# Patient Record
Sex: MALE | Race: BLACK OR AFRICAN AMERICAN | Employment: UNEMPLOYED | URBAN - METROPOLITAN AREA
[De-identification: names, ages, dates, MRNs, and addresses within clinical notes are randomized per-mention and may not be internally consistent; named-entity substitution may affect disease eponyms.]

---

## 2018-10-11 ENCOUNTER — OFFICE VISIT (OUTPATIENT)
Dept: INTERNAL MEDICINE CLINIC | Age: 53
End: 2018-10-11

## 2018-10-11 VITALS
HEART RATE: 91 BPM | DIASTOLIC BLOOD PRESSURE: 83 MMHG | RESPIRATION RATE: 16 BRPM | HEIGHT: 68 IN | TEMPERATURE: 98.1 F | BODY MASS INDEX: 29.4 KG/M2 | SYSTOLIC BLOOD PRESSURE: 149 MMHG | OXYGEN SATURATION: 99 % | WEIGHT: 194 LBS

## 2018-10-11 DIAGNOSIS — Z76.0 MEDICATION REFILL: ICD-10-CM

## 2018-10-11 DIAGNOSIS — R73.9 ELEVATED BLOOD SUGAR LEVEL: ICD-10-CM

## 2018-10-11 DIAGNOSIS — G62.9 NEUROPATHY: ICD-10-CM

## 2018-10-11 DIAGNOSIS — Z79.4 TYPE 2 DIABETES MELLITUS WITH DIABETIC POLYNEUROPATHY, WITH LONG-TERM CURRENT USE OF INSULIN (HCC): ICD-10-CM

## 2018-10-11 DIAGNOSIS — E11.42 TYPE 2 DIABETES MELLITUS WITH DIABETIC POLYNEUROPATHY, WITH LONG-TERM CURRENT USE OF INSULIN (HCC): ICD-10-CM

## 2018-10-11 DIAGNOSIS — I10 ESSENTIAL HYPERTENSION: ICD-10-CM

## 2018-10-11 DIAGNOSIS — Z76.89 ENCOUNTER TO ESTABLISH CARE WITH NEW DOCTOR: Primary | ICD-10-CM

## 2018-10-11 LAB
BILIRUB UR QL STRIP: NEGATIVE
GLUCOSE UR-MCNC: NORMAL MG/DL
HBA1C MFR BLD HPLC: 13.9 %
KETONES P FAST UR STRIP-MCNC: NEGATIVE MG/DL
PH UR STRIP: 6 [PH] (ref 4.6–8)
PROT UR QL STRIP: NORMAL
SP GR UR STRIP: 1.01 (ref 1–1.03)
UA UROBILINOGEN AMB POC: NORMAL (ref 0.2–1)
URINALYSIS CLARITY POC: CLEAR
URINALYSIS COLOR POC: YELLOW
URINE BLOOD POC: NORMAL
URINE LEUKOCYTES POC: NEGATIVE
URINE NITRITES POC: NEGATIVE

## 2018-10-11 RX ORDER — METFORMIN HYDROCHLORIDE 1000 MG/1
1000 TABLET ORAL 2 TIMES DAILY WITH MEALS
Qty: 180 TAB | Refills: 3 | Status: SHIPPED | OUTPATIENT
Start: 2018-10-11 | End: 2018-10-18 | Stop reason: ALTCHOICE

## 2018-10-11 RX ORDER — LISINOPRIL 10 MG/1
10 TABLET ORAL DAILY
Qty: 90 TAB | Refills: 3 | Status: SHIPPED | OUTPATIENT
Start: 2018-10-11 | End: 2019-01-21 | Stop reason: SDUPTHER

## 2018-10-11 RX ORDER — PREGABALIN 150 MG/1
150 CAPSULE ORAL 2 TIMES DAILY
Qty: 60 CAP | Refills: 1 | Status: SHIPPED | OUTPATIENT
Start: 2018-10-11 | End: 2019-01-21 | Stop reason: SDUPTHER

## 2018-10-11 RX ORDER — HUMAN INSULIN 100 [IU]/ML
INJECTION, SUSPENSION SUBCUTANEOUS
Refills: 1 | COMMUNITY
Start: 2018-09-25 | End: 2019-01-21 | Stop reason: SDUPTHER

## 2018-10-11 RX ORDER — GABAPENTIN 300 MG/1
CAPSULE ORAL
Refills: 0 | COMMUNITY
Start: 2018-09-25 | End: 2018-10-11

## 2018-10-11 RX ORDER — PEN NEEDLE, DIABETIC 29 G X1/2"
NEEDLE, DISPOSABLE MISCELLANEOUS
Refills: 5 | COMMUNITY
Start: 2018-09-25 | End: 2019-02-26 | Stop reason: SDUPTHER

## 2018-10-11 RX ORDER — PREGABALIN 75 MG/1
150 CAPSULE ORAL 2 TIMES DAILY
Qty: 60 CAP | Refills: 1 | Status: SHIPPED | OUTPATIENT
Start: 2018-10-11 | End: 2018-10-11 | Stop reason: SDUPTHER

## 2018-10-11 NOTE — PROGRESS NOTES
Chief Complaint   Patient presents with   [address] [place] [address]     1. Have you been to the ER, urgent care clinic since your last visit? Hospitalized since your last visit? Yes Elevated blood sugar    2. Have you seen or consulted any other health care providers outside of the [address] [place] [address] since your last visit? Include any pap smears or colon screening. No   PHQ over the last two weeks 10/11/2018   Little interest or pleasure in doing things Several days   Feeling down, depressed, irritable, or hopeless Several days   Total Score PHQ 2 2     Fall Risk Assessment, last 12 mths 10/11/2018   Able to walk? Yes   Fall in past 12 months? No     Abuse Screening Questionnaire 10/11/2018   Do you ever feel afraid of your partner? N   Are you in a relationship with someone who physically or mentally threatens you? N   Is it safe for you to go home?  Alma Stout

## 2018-10-11 NOTE — PATIENT INSTRUCTIONS
Type 2 Diabetes: Care Instructions  Your Care Instructions    Type 2 diabetes is a disease that develops when the body's tissues cannot use insulin properly. Over time, the pancreas cannot make enough insulin. Insulin is a hormone that helps the body's cells use sugar (glucose) for energy. It also helps the body store extra sugar in muscle, fat, and liver cells. Without insulin, the sugar cannot get into the cells to do its work. It stays in the blood instead. This can cause high blood sugar levels. A person has diabetes when the blood sugar stays too high too much of the time. Over time, diabetes can lead to diseases of the heart, blood vessels, nerves, kidneys, and eyes. You may be able to control your blood sugar by losing weight, eating a healthy diet, and getting daily exercise. You may also have to take insulin or other diabetes medicine. Follow-up care is a key part of your treatment and safety. Be sure to make and go to all appointments. Call your doctor if you are having problems. It's also a good idea to know your test results and keep a list of the medicines you take. How can you care for yourself at home? · Keep your blood sugar at a target level (which you set with your doctor). ¨ Eat a good diet that spreads carbohydrate throughout the day. Carbohydrate--the body's main source of fuel--affects blood sugar more than any other nutrient. Carbohydrate is in fruits, vegetables, milk, and yogurt. It also is in breads, cereals, vegetables such as potatoes and corn, and sugary foods such as candy and cakes. ¨ Aim for 30 minutes of exercise on most, preferably all, days of the week. Walking is a good choice. You also may want to do other activities, such as running, swimming, cycling, or playing tennis or team sports. If your doctor says it's okay, do muscle-strengthening exercises at least 2 times a week. ¨ Take your medicines exactly as prescribed.  Call your doctor if you think you are having a problem with your medicine. You will get more details on the specific medicines your doctor prescribes. · Check your blood sugar as often as your doctor recommends. It is important to keep track of any symptoms you have, such as low blood sugar. Also tell your doctor if you have any changes in your activities, diet, or insulin use. · Talk to your doctor before you start taking aspirin every day. Aspirin can help certain people lower their risk of a heart attack or stroke. But taking aspirin isn't right for everyone, because it can cause serious bleeding. · Do not smoke. If you need help quitting, talk to your doctor about stop-smoking programs and medicines. These can increase your chances of quitting for good. · Keep your cholesterol and blood pressure at normal levels. You may need to take one or more medicines to reach your goals. Take them exactly as directed. Do not stop or change a medicine without talking to your doctor first.  When should you call for help? Call 911 anytime you think you may need emergency care. For example, call if:    · You passed out (lost consciousness), or you suddenly become very sleepy or confused. (You may have very low blood sugar.)    Call your doctor now or seek immediate medical care if:    · Your blood sugar is 300 mg/dL or is higher than the level your doctor has set for you.     · You have symptoms of low blood sugar, such as:  ¨ Sweating. ¨ Feeling nervous, shaky, and weak. ¨ Extreme hunger and slight nausea. ¨ Dizziness and headache. ¨ Blurred vision. ¨ Confusion.    Watch closely for changes in your health, and be sure to contact your doctor if:    · You often have problems controlling your blood sugar.     · You have symptoms of long-term diabetes problems, such as:  ¨ New vision changes. ¨ New pain, numbness, or tingling in your hands or feet. ¨ Skin problems. Where can you learn more? Go to http://maciel-zack.info/.   Enter C553 in the search box to learn more about \"Type 2 Diabetes: Care Instructions. \"  Current as of: December 7, 2017  Content Version: 11.8  © 6290-4077 [employer]. Care instructions adapted under license by [employer] (which disclaims liability or warranty for this information). If you have questions about a medical condition or this instruction, always ask your healthcare professional. [address] any warranty or liability for your use of this information. High Blood Pressure: Care Instructions  Your Care Instructions    If your blood pressure is usually above 130/80, you have high blood pressure, or hypertension. That means the top number is 130 or higher or the bottom number is 80 or higher, or both. Despite what a lot of people think, high blood pressure usually doesn't cause headaches or make you feel dizzy or lightheaded. It usually has no symptoms. But it does increase your risk for heart attack, stroke, and kidney or eye damage. The higher your blood pressure, the more your risk increases. Your doctor will give you a goal for your blood pressure. Your goal will be based on your health and your age. Lifestyle changes, such as eating healthy and being active, are always important to help lower blood pressure. You might also take medicine to reach your blood pressure goal.  Follow-up care is a key part of your treatment and safety. Be sure to make and go to all appointments, and call your doctor if you are having problems. It's also a good idea to know your test results and keep a list of the medicines you take. How can you care for yourself at home? Medical treatment  · If you stop taking your medicine, your blood pressure will go back up. You may take one or more types of medicine to lower your blood pressure. Be safe with medicines. Take your medicine exactly as prescribed. Call your doctor if you think you are having a problem with your medicine.   · Talk to your doctor before you start taking aspirin every day. Aspirin can help certain people lower their risk of a heart attack or stroke. But taking aspirin isn't right for everyone, because it can cause serious bleeding. · See your doctor regularly. You may need to see the doctor more often at first or until your blood pressure comes down. · If you are taking blood pressure medicine, talk to your doctor before you take decongestants or anti-inflammatory medicine, such as ibuprofen. Some of these medicines can raise blood pressure. · Learn how to check your blood pressure at home. Lifestyle changes  · Stay at a healthy weight. This is especially important if you put on weight around the waist. Losing even 10 pounds can help you lower your blood pressure. · If your doctor recommends it, get more exercise. Walking is a good choice. Bit by bit, increase the amount you walk every day. Try for at least 30 minutes on most days of the week. You also may want to swim, bike, or do other activities. · Avoid or limit alcohol. Talk to your doctor about whether you can drink any alcohol. · Try to limit how much sodium you eat to less than 2,300 milligrams (mg) a day. Your doctor may ask you to try to eat less than 1,500 mg a day. · Eat plenty of fruits (such as bananas and oranges), vegetables, legumes, whole grains, and low-fat dairy products. · Lower the amount of saturated fat in your diet. Saturated fat is found in animal products such as milk, cheese, and meat. Limiting these foods may help you lose weight and also lower your risk for heart disease. · Do not smoke. Smoking increases your risk for heart attack and stroke. If you need help quitting, talk to your doctor about stop-smoking programs and medicines. These can increase your chances of quitting for good. When should you call for help? Call 911 anytime you think you may need emergency care.  This may mean having symptoms that suggest that your blood pressure is causing a serious heart or blood vessel problem. Your blood pressure may be over 180/120.   For example, call 911 if:    · You have symptoms of a heart attack. These may include:  ¨ Chest pain or pressure, or a strange feeling in the chest.  ¨ Sweating. ¨ Shortness of breath. ¨ Nausea or vomiting. ¨ Pain, pressure, or a strange feeling in the back, neck, jaw, or upper belly or in one or both shoulders or arms. ¨ Lightheadedness or sudden weakness. ¨ A fast or irregular heartbeat.     · You have symptoms of a stroke. These may include:  ¨ Sudden numbness, tingling, weakness, or loss of movement in your face, arm, or leg, especially on only one side of your body. ¨ Sudden vision changes. ¨ Sudden trouble speaking. ¨ Sudden confusion or trouble understanding simple statements. ¨ Sudden problems with walking or balance. ¨ A sudden, severe headache that is different from past headaches.     · You have severe back or belly pain.    Do not wait until your blood pressure comes down on its own. Get help right away.   Call your doctor now or seek immediate care if:    · Your blood pressure is much higher than normal (such as 180/120 or higher), but you don't have symptoms.     · You think high blood pressure is causing symptoms, such as:  ¨ Severe headache. ¨ Blurry vision.    Watch closely for changes in your health, and be sure to contact your doctor if:    · Your blood pressure measures higher than your doctor recommends at least 2 times. That means the top number is higher or the bottom number is higher, or both.     · You think you may be having side effects from your blood pressure medicine. Where can you learn more? Go to http://maciel-zack.info/. Enter P143 in the search box to learn more about \"High Blood Pressure: Care Instructions. \"  Current as of: December 6, 2017  Content Version: 11.8  © 6951-2085 [employer].  Care instructions adapted under license by [facility] (which disclaims liability or warranty for this information). If you have questions about a medical condition or this instruction, always ask your healthcare professional. [address] any warranty or liability for your use of this information. Neuropathic Pain: Care Instructions  Your Care Instructions    Neuropathic pain is caused by pressure on or damage to your nerves. It's often simply called nerve pain. Some people feel this type of pain all the time. For others, it comes and goes. Diabetes, shingles, or an injury can cause nerve pain. Many people say the pain feels sharp, burning, or stabbing. But some people feel it as a dull ache. In some cases, it makes your skin very sensitive. So touch, pressure, and other sensations that did not hurt before may now cause pain. It's important to know that this kind of pain is real and can affect your quality of life. It's also important to know that treatment can help. Treatment includes pain medicines, exercise, and physical therapy. Medicines can help reduce the number of pain signals that travel over the nerves. This can make the painful areas less sensitive. It can also help you sleep better and improve your mood. But medicines are only one part of successful treatment. Most people do best with more than one kind of treatment. Your doctor may recommend that you try cognitive-behavioral therapy and stress management. Or, if needed, you may decide to try to quit smoking, lower your blood pressure, or better control blood sugar. These kinds of healthy changes can also make a difference. If you feel that your treatment is not working, talk to your doctor. And be sure to tell your doctor if you think you might be depressed or anxious. These are common problems that can also be treated. Follow-up care is a key part of your treatment and safety.  Be sure to make and go to all appointments, and call your doctor if you are having problems. It's also a good idea to know your test results and keep a list of the medicines you take. How can you care for yourself at home? · Be safe with medicines. Read and follow all instructions on the label. ¨ If the doctor gave you a prescription medicine for pain, take it as prescribed. ¨ If you are not taking a prescription pain medicine, ask your doctor if you can take an over-the-counter medicine. · Save hard tasks for days when you have less pain. Follow a hard task with an easy task. And remember to take breaks. · Relax, and reduce stress. You may want to try deep breathing or meditation. These can help. · Keep moving. Gentle, daily exercise can help reduce pain. Your doctor or physical therapist can tell you what type of exercise is best for you. This may include walking, swimming, and stationary biking. It may also include stretches and range-of-motion exercises. · Try heat, cold packs, and massage. · Get enough sleep. Constant pain can make you more tired. If the pain makes it hard to sleep, talk with your doctor. · Think positively. Your thoughts can affect your pain. Do fun things to distract yourself from the pain. See a movie, read a book, listen to music, or spend time with a friend. · Keep a pain diary. Try to write down how strong your pain is and what it feels like. Also try to notice and write down how your moods, thoughts, sleep, activities, and medicine affect your pain. These notes can help you and your doctor find the best ways to treat your pain. Reducing constipation caused by pain medicine  Pain medicines often cause constipation. To reduce constipation:  · Include fruits, vegetables, beans, and whole grains in your diet each day. These foods are high in fiber. · Drink plenty of fluids, enough so that your urine is light yellow or clear like water.  If you have kidney, heart, or liver disease and have to limit fluids, talk with your doctor before you increase the amount of fluids you drink. · Get some exercise every day. Build up slowly to 30 to 60 minutes a day on 5 or more days of the week. · Take a fiber supplement, such as Citrucel or Metamucil, every day if needed. Read and follow all instructions on the label. · Schedule time each day for a bowel movement. Having a daily routine may help. Take your time and do not strain when having a bowel movement. · Ask your doctor about a laxative. The goal is to have one easy bowel movement every 1 to 2 days. Do not let constipation go untreated for more than 3 days. When should you call for help? Call your doctor now or seek immediate medical care if:    · You feel sad, anxious, or hopeless for more than a few days. This could mean you are depressed. Depression is common in people who have a lot of pain. But it can be treated.     · You have trouble with bowel movements, such as:  ¨ No bowel movement in 3 days. ¨ Blood in the anal area, in your stool, or on the toilet paper. ¨ Diarrhea for more than 24 hours.    Watch closely for changes in your health, and be sure to contact your doctor if:    · Your pain is getting worse.     · You can't sleep because of pain.     · You are very worried or anxious about your pain.     · You have trouble taking your pain medicine.     · You have any concerns about your pain medicine or its side effects.     · You have vomiting or cramps for more than 2 hours. Where can you learn more? Go to http://maciel-zack.info/. Enter B081 in the search box to learn more about \"Neuropathic Pain: Care Instructions. \"  Current as of: June 4, 2018  Content Version: 11.8  © 3578-5437 [employer]. Care instructions adapted under license by [employer] (which disclaims liability or warranty for this information).  If you have questions about a medical condition or this instruction, always ask your healthcare professional. Lacy Rincon disclaims any warranty or liability for your use of this information.

## 2018-10-11 NOTE — MR AVS SNAPSHOT
Mariana Manito 
 
 
 [address] [facility] [address] [address] 
[phone] Patient: Ivanna Fernandez MRN: [id number] :1965 Visit Information Date & Time Provider Department Dept. Phone Encounter #  
 10/11/2018  9:45 AM Michelle Simon NP [place] [id number] [address] [facility] [phone] [id number] Follow-up Instructions Return in about 1 day (around 10/12/2018), or if symptoms worsen or fail to improve, for physical exam and labs. Upcoming Health Maintenance Date Due DTaP/Tdap/Td series (1 - Tdap) 1987 Shingrix Vaccine Age 50> (1 of 2) 2016 FOBT Q 1 YEAR AGE 50-75 2016 Influenza Age 5 to Adult 3/31/2019* *Topic was postponed. The date shown is not the original due date. Allergies as of 10/11/2018  Review Complete On: 10/11/2018 By: Michelle Simon NP No Known Allergies Current Immunizations  Never Reviewed No immunizations on file. Not reviewed this visit You Were Diagnosed With   
  
 Codes Comments Encounter to establish care with new doctor    -  Primary ICD-10-CM: Z76.89 
ICD-9-CM: V65.8 Elevated blood sugar level     ICD-10-CM: R73.9 ICD-9-CM: 790.29 Type 2 diabetes mellitus with diabetic polyneuropathy, with long-term current use of insulin (HCC)     ICD-10-CM: E11.42, Z79.4 ICD-9-CM: 250.60, 357.2, V58.67 Essential hypertension     ICD-10-CM: I10 
ICD-9-CM: 401.9 Neuropathy     ICD-10-CM: G62.9 ICD-9-CM: 355.9 Medication refill     ICD-10-CM: Z76.0 ICD-9-CM: V68.1 Vitals BP Pulse Temp Resp Height(growth percentile) Weight(growth percentile) 149/83 (BP 1 Location: Left arm, BP Patient Position: Sitting) 91 98.1 °F (36.7 °C) (Oral) 16 5' 8\" (1.727 m) 194 lb (88 kg) SpO2 BMI Smoking Status 99% 29.5 kg/m2 Current Some Day Smoker Vitals History BMI and BSA Data Body Mass Index Body Surface Area 29.5 kg/m 2 2.05 m 2 Preferred Pharmacy Pharmacy Name Phone [address] [phone] Your Updated Medication List  
  
   
This list is accurate as of 10/11/18 11:19 AM.  Always use your most recent med list.  
  
  
  
  
 BD INSULIN SYRINGE ULTRA-FINE 1 mL 31 gauge x 5/16 Syrg Generic drug:  Insulin Syringe-Needle U-100  
USE AS DIRECTED FOR INJECTING INSULIN  
  
 lisinopril 10 mg tablet Commonly known as:  Perez Peppers Take 1 Tab by mouth daily. metFORMIN 1,000 mg tablet Commonly known as:  GLUCOPHAGE Take 1 Tab by mouth two (2) times daily (with meals). NovoLIN N NPH U-100 Insulin 100 unit/mL injection Generic drug:  insulin NPH INJECT 33 UNITS UNDER THE SKIN BEFORE MEALS THREE TIMES DAILY AS DIRECTED.  
  
 pregabalin 150 mg capsule Commonly known as:  Tatianae Lions Take 1 Cap by mouth two (2) times a day. Max Daily Amount: 300 mg. Prescriptions Printed Refills  
 pregabalin (LYRICA) 150 mg capsule 1 Sig: Take 1 Cap by mouth two (2) times a day. Max Daily Amount: 300 mg. Class: Print Route: Oral  
  
Prescriptions Sent to Pharmacy Refills  
 lisinopril (PRINIVIL, ZESTRIL) 10 mg tablet 3 Sig: Take 1 Tab by mouth daily. Class: Normal  
 Pharmacy: [address] Ph #: [phone] Route: Oral  
 metFORMIN (GLUCOPHAGE) 1,000 mg tablet 3 Sig: Take 1 Tab by mouth two (2) times daily (with meals). Class: Normal  
 Pharmacy: [address] Ph #: [phone] Route: Oral  
  
We Performed the Following AMB POC HEMOGLOBIN A1C [46201 CPT(R)] AMB POC URINALYSIS DIP STICK AUTO W/O MICRO [85903 CPT(R)] Follow-up Instructions Return in about 1 day (around 10/12/2018), or if symptoms worsen or fail to improve, for physical exam and labs. Patient Instructions Type 2 Diabetes: Care Instructions Your Care Instructions Type 2 diabetes is a disease that develops when the body's tissues cannot use insulin properly. Over time, the pancreas cannot make enough insulin. Insulin is a hormone that helps the body's cells use sugar (glucose) for energy. It also helps the body store extra sugar in muscle, fat, and liver cells. Without insulin, the sugar cannot get into the cells to do its work. It stays in the blood instead. This can cause high blood sugar levels. A person has diabetes when the blood sugar stays too high too much of the time. Over time, diabetes can lead to diseases of the heart, blood vessels, nerves, kidneys, and eyes. You may be able to control your blood sugar by losing weight, eating a healthy diet, and getting daily exercise. You may also have to take insulin or other diabetes medicine. Follow-up care is a key part of your treatment and safety. Be sure to make and go to all appointments. Call your doctor if you are having problems. It's also a good idea to know your test results and keep a list of the medicines you take. How can you care for yourself at home? · Keep your blood sugar at a target level (which you set with your doctor). ¨ Eat a good diet that spreads carbohydrate throughout the day. Carbohydratethe body's main source of fuelaffects blood sugar more than any other nutrient. Carbohydrate is in fruits, vegetables, milk, and yogurt. It also is in breads, cereals, vegetables such as potatoes and corn, and sugary foods such as candy and cakes. ¨ Aim for 30 minutes of exercise on most, preferably all, days of the week. Walking is a good choice. You also may want to do other activities, such as running, swimming, cycling, or playing tennis or team sports. If your doctor says it's okay, do muscle-strengthening exercises at least 2 times a week. ¨ Take your medicines exactly as prescribed.  Call your doctor if you think you are having a problem with your medicine. You will get more details on the specific medicines your doctor prescribes. · Check your blood sugar as often as your doctor recommends. It is important to keep track of any symptoms you have, such as low blood sugar. Also tell your doctor if you have any changes in your activities, diet, or insulin use. · Talk to your doctor before you start taking aspirin every day. Aspirin can help certain people lower their risk of a heart attack or stroke. But taking aspirin isn't right for everyone, because it can cause serious bleeding. · Do not smoke. If you need help quitting, talk to your doctor about stop-smoking programs and medicines. These can increase your chances of quitting for good. · Keep your cholesterol and blood pressure at normal levels. You may need to take one or more medicines to reach your goals. Take them exactly as directed. Do not stop or change a medicine without talking to your doctor first. 
When should you call for help? Call 911 anytime you think you may need emergency care. For example, call if: 
  · You passed out (lost consciousness), or you suddenly become very sleepy or confused. (You may have very low blood sugar.)  
 Call your doctor now or seek immediate medical care if: 
  · Your blood sugar is 300 mg/dL or is higher than the level your doctor has set for you.  
  · You have symptoms of low blood sugar, such as: ¨ Sweating. ¨ Feeling nervous, shaky, and weak. ¨ Extreme hunger and slight nausea. ¨ Dizziness and headache. ¨ Blurred vision. ¨ Confusion.  
 Watch closely for changes in your health, and be sure to contact your doctor if: 
  · You often have problems controlling your blood sugar.  
  · You have symptoms of long-term diabetes problems, such as: ¨ New vision changes. ¨ New pain, numbness, or tingling in your hands or feet. ¨ Skin problems. Where can you learn more? Go to http://maciel-zack.info/. Enter C553 in the search box to learn more about \"Type 2 Diabetes: Care Instructions. \" Current as of: December 7, 2017 Content Version: 11.8 © 7839-1096 [employer]. Care instructions adapted under license by [employer] (which disclaims liability or warranty for this information). If you have questions about a medical condition or this instruction, always ask your healthcare professional. [address] any warranty or liability for your use of this information. High Blood Pressure: Care Instructions Your Care Instructions If your blood pressure is usually above 130/80, you have high blood pressure, or hypertension. That means the top number is 130 or higher or the bottom number is 80 or higher, or both. Despite what a lot of people think, high blood pressure usually doesn't cause headaches or make you feel dizzy or lightheaded. It usually has no symptoms. But it does increase your risk for heart attack, stroke, and kidney or eye damage. The higher your blood pressure, the more your risk increases. Your doctor will give you a goal for your blood pressure. Your goal will be based on your health and your age. Lifestyle changes, such as eating healthy and being active, are always important to help lower blood pressure. You might also take medicine to reach your blood pressure goal. 
Follow-up care is a key part of your treatment and safety. Be sure to make and go to all appointments, and call your doctor if you are having problems. It's also a good idea to know your test results and keep a list of the medicines you take. How can you care for yourself at home? Medical treatment · If you stop taking your medicine, your blood pressure will go back up. You may take one or more types of medicine to lower your blood pressure. Be safe with medicines. Take your medicine exactly as prescribed.  Call your doctor if you think you are having a problem with your medicine. · Talk to your doctor before you start taking aspirin every day. Aspirin can help certain people lower their risk of a heart attack or stroke. But taking aspirin isn't right for everyone, because it can cause serious bleeding. · See your doctor regularly. You may need to see the doctor more often at first or until your blood pressure comes down. · If you are taking blood pressure medicine, talk to your doctor before you take decongestants or anti-inflammatory medicine, such as ibuprofen. Some of these medicines can raise blood pressure. · Learn how to check your blood pressure at home. Lifestyle changes · Stay at a healthy weight. This is especially important if you put on weight around the waist. Losing even 10 pounds can help you lower your blood pressure. · If your doctor recommends it, get more exercise. Walking is a good choice. Bit by bit, increase the amount you walk every day. Try for at least 30 minutes on most days of the week. You also may want to swim, bike, or do other activities. · Avoid or limit alcohol. Talk to your doctor about whether you can drink any alcohol. · Try to limit how much sodium you eat to less than 2,300 milligrams (mg) a day. Your doctor may ask you to try to eat less than 1,500 mg a day. · Eat plenty of fruits (such as bananas and oranges), vegetables, legumes, whole grains, and low-fat dairy products. · Lower the amount of saturated fat in your diet. Saturated fat is found in animal products such as milk, cheese, and meat. Limiting these foods may help you lose weight and also lower your risk for heart disease. · Do not smoke. Smoking increases your risk for heart attack and stroke. If you need help quitting, talk to your doctor about stop-smoking programs and medicines. These can increase your chances of quitting for good. When should you call for help? Call 911 anytime you think you may need emergency care. This may mean having symptoms that suggest that your blood pressure is causing a serious heart or blood vessel problem. Your blood pressure may be over 180/120. 
 For example, call 911 if: 
  · You have symptoms of a heart attack. These may include: ¨ Chest pain or pressure, or a strange feeling in the chest. 
¨ Sweating. ¨ Shortness of breath. ¨ Nausea or vomiting. ¨ Pain, pressure, or a strange feeling in the back, neck, jaw, or upper belly or in one or both shoulders or arms. ¨ Lightheadedness or sudden weakness. ¨ A fast or irregular heartbeat.  
  · You have symptoms of a stroke. These may include: 
¨ Sudden numbness, tingling, weakness, or loss of movement in your face, arm, or leg, especially on only one side of your body. ¨ Sudden vision changes. ¨ Sudden trouble speaking. ¨ Sudden confusion or trouble understanding simple statements. ¨ Sudden problems with walking or balance. ¨ A sudden, severe headache that is different from past headaches.  
  · You have severe back or belly pain.  
 Do not wait until your blood pressure comes down on its own. Get help right away. 
 Call your doctor now or seek immediate care if: 
  · Your blood pressure is much higher than normal (such as 180/120 or higher), but you don't have symptoms.  
  · You think high blood pressure is causing symptoms, such as: ¨ Severe headache. ¨ Blurry vision.  
 Watch closely for changes in your health, and be sure to contact your doctor if: 
  · Your blood pressure measures higher than your doctor recommends at least 2 times. That means the top number is higher or the bottom number is higher, or both.  
  · You think you may be having side effects from your blood pressure medicine. Where can you learn more? Go to http://maciel-zack.info/. Enter L438 in the search box to learn more about \"High Blood Pressure: Care Instructions. \" 
 Current as of: December 6, 2017 Content Version: 11.8 © 2134-1476 [employer]. Care instructions adapted under license by [employer] (which disclaims liability or warranty for this information). If you have questions about a medical condition or this instruction, always ask your healthcare professional. [address] any warranty or liability for your use of this information. Neuropathic Pain: Care Instructions Your Care Instructions Neuropathic pain is caused by pressure on or damage to your nerves. It's often simply called nerve pain. Some people feel this type of pain all the time. For others, it comes and goes. Diabetes, shingles, or an injury can cause nerve pain. Many people say the pain feels sharp, burning, or stabbing. But some people feel it as a dull ache. In some cases, it makes your skin very sensitive. So touch, pressure, and other sensations that did not hurt before may now cause pain. It's important to know that this kind of pain is real and can affect your quality of life. It's also important to know that treatment can help. Treatment includes pain medicines, exercise, and physical therapy. Medicines can help reduce the number of pain signals that travel over the nerves. This can make the painful areas less sensitive. It can also help you sleep better and improve your mood. But medicines are only one part of successful treatment. Most people do best with more than one kind of treatment. Your doctor may recommend that you try cognitive-behavioral therapy and stress management. Or, if needed, you may decide to try to quit smoking, lower your blood pressure, or better control blood sugar. These kinds of healthy changes can also make a difference. If you feel that your treatment is not working, talk to your doctor.  And be sure to tell your doctor if you think you might be depressed or anxious. These are common problems that can also be treated. Follow-up care is a key part of your treatment and safety. Be sure to make and go to all appointments, and call your doctor if you are having problems. It's also a good idea to know your test results and keep a list of the medicines you take. How can you care for yourself at home? · Be safe with medicines. Read and follow all instructions on the label. ¨ If the doctor gave you a prescription medicine for pain, take it as prescribed. ¨ If you are not taking a prescription pain medicine, ask your doctor if you can take an over-the-counter medicine. · Save hard tasks for days when you have less pain. Follow a hard task with an easy task. And remember to take breaks. · Relax, and reduce stress. You may want to try deep breathing or meditation. These can help. · Keep moving. Gentle, daily exercise can help reduce pain. Your doctor or physical therapist can tell you what type of exercise is best for you. This may include walking, swimming, and stationary biking. It may also include stretches and range-of-motion exercises. · Try heat, cold packs, and massage. · Get enough sleep. Constant pain can make you more tired. If the pain makes it hard to sleep, talk with your doctor. · Think positively. Your thoughts can affect your pain. Do fun things to distract yourself from the pain. See a movie, read a book, listen to music, or spend time with a friend. · Keep a pain diary. Try to write down how strong your pain is and what it feels like. Also try to notice and write down how your moods, thoughts, sleep, activities, and medicine affect your pain. These notes can help you and your doctor find the best ways to treat your pain. Reducing constipation caused by pain medicine Pain medicines often cause constipation. To reduce constipation: 
· Include fruits, vegetables, beans, and whole grains in your diet each day. These foods are high in fiber. · Drink plenty of fluids, enough so that your urine is light yellow or clear like water. If you have kidney, heart, or liver disease and have to limit fluids, talk with your doctor before you increase the amount of fluids you drink. · Get some exercise every day. Build up slowly to 30 to 60 minutes a day on 5 or more days of the week. · Take a fiber supplement, such as Citrucel or Metamucil, every day if needed. Read and follow all instructions on the label. · Schedule time each day for a bowel movement. Having a daily routine may help. Take your time and do not strain when having a bowel movement. · Ask your doctor about a laxative. The goal is to have one easy bowel movement every 1 to 2 days. Do not let constipation go untreated for more than 3 days. When should you call for help? Call your doctor now or seek immediate medical care if: 
  · You feel sad, anxious, or hopeless for more than a few days. This could mean you are depressed. Depression is common in people who have a lot of pain. But it can be treated.  
  · You have trouble with bowel movements, such as: 
¨ No bowel movement in 3 days. ¨ Blood in the anal area, in your stool, or on the toilet paper. ¨ Diarrhea for more than 24 hours.  
 Watch closely for changes in your health, and be sure to contact your doctor if: 
  · Your pain is getting worse.  
  · You can't sleep because of pain.  
  · You are very worried or anxious about your pain.  
  · You have trouble taking your pain medicine.  
  · You have any concerns about your pain medicine or its side effects.  
  · You have vomiting or cramps for more than 2 hours. Where can you learn more? Go to http://maciel-zack.info/. Enter M141 in the search box to learn more about \"Neuropathic Pain: Care Instructions. \" Current as of: June 4, 2018 Content Version: 11.8 © 6465-8611 [employer].  Care instructions adapted under license by [address] (which disclaims liability or warranty for this information). If you have questions about a medical condition or this instruction, always ask your healthcare professional. [address] any warranty or liability for your use of this information. Introducing [facility]! [facility] introduces [email or web address] patient portal. Now you can access parts of your medical record, email your doctor's office, and request medication refills online. 1. In your internet browser, go to [email or web address] 2. Click on the First Time User? Click Here link in the Sign In box. You will see the New Member Sign Up page. 3. Enter your [email or web address] Access Code exactly as it appears below. You will not need to use this code after youve completed the sign-up process. If you do not sign up before the expiration date, you must request a new code. · [email or web address] Access Code: [email or web address] Expires: 1/9/2019 11:05 AM 
 
4. Enter the last four digits of your Social Security Number (xxxx) and Date of Birth (mm/dd/yyyy) as indicated and click Submit. You will be taken to the next sign-up page. 5. Create a [email or web address] ID. This will be your [email or web address] login ID and cannot be changed, so think of one that is secure and easy to remember. 6. Create a [email or web address] password. You can change your password at any time. 7. Enter your Password Reset Question and Answer. This can be used at a later time if you forget your password. 8. Enter your e-mail address. You will receive e-mail notification when new information is available in [address] 9. Click Sign Up. You can now view and download portions of your medical record. 10. Click the Download Summary menu link to download a portable copy of your medical information. If you have questions, please visit the Frequently Asked Questions section of the [email or web address] website.  Remember, [email or web address] is NOT to be used for urgent needs. For medical emergencies, dial 911. Now available from your iPhone and Android! Please provide this summary of care documentation to your next provider. If you have any questions after today's visit, please call [phone].
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
## 2018-10-18 ENCOUNTER — OFFICE VISIT (OUTPATIENT)
Dept: INTERNAL MEDICINE CLINIC | Age: 53
End: 2018-10-18

## 2018-10-18 VITALS
TEMPERATURE: 97.4 F | RESPIRATION RATE: 16 BRPM | BODY MASS INDEX: 29.73 KG/M2 | SYSTOLIC BLOOD PRESSURE: 129 MMHG | WEIGHT: 196.2 LBS | OXYGEN SATURATION: 98 % | DIASTOLIC BLOOD PRESSURE: 67 MMHG | HEART RATE: 101 BPM | HEIGHT: 68 IN

## 2018-10-18 DIAGNOSIS — E11.9 TYPE 2 DIABETES MELLITUS WITHOUT COMPLICATION, WITH LONG-TERM CURRENT USE OF INSULIN (HCC): ICD-10-CM

## 2018-10-18 DIAGNOSIS — Z00.00 HEALTHCARE MAINTENANCE: ICD-10-CM

## 2018-10-18 DIAGNOSIS — I10 ESSENTIAL HYPERTENSION: ICD-10-CM

## 2018-10-18 DIAGNOSIS — E11.42 TYPE 2 DIABETES MELLITUS WITH DIABETIC POLYNEUROPATHY, WITH LONG-TERM CURRENT USE OF INSULIN (HCC): ICD-10-CM

## 2018-10-18 DIAGNOSIS — Z79.4 TYPE 2 DIABETES MELLITUS WITH DIABETIC POLYNEUROPATHY, WITH LONG-TERM CURRENT USE OF INSULIN (HCC): ICD-10-CM

## 2018-10-18 DIAGNOSIS — Z00.00 ANNUAL PHYSICAL EXAM: Primary | ICD-10-CM

## 2018-10-18 DIAGNOSIS — M54.31 SCIATICA OF RIGHT SIDE: ICD-10-CM

## 2018-10-18 DIAGNOSIS — G62.9 NEUROPATHY: ICD-10-CM

## 2018-10-18 DIAGNOSIS — Z79.4 TYPE 2 DIABETES MELLITUS WITHOUT COMPLICATION, WITH LONG-TERM CURRENT USE OF INSULIN (HCC): ICD-10-CM

## 2018-10-18 DIAGNOSIS — Z12.11 SCREEN FOR COLON CANCER: ICD-10-CM

## 2018-10-18 LAB
CREATININE, EXTERNAL: 0.73
LDL-C, EXTERNAL: 145

## 2018-10-18 RX ORDER — DICLOFENAC SODIUM 75 MG/1
75 TABLET, DELAYED RELEASE ORAL 2 TIMES DAILY
Qty: 60 TAB | Refills: 3 | Status: SHIPPED | OUTPATIENT
Start: 2018-10-18 | End: 2019-01-21 | Stop reason: SDUPTHER

## 2018-10-18 RX ORDER — METFORMIN HYDROCHLORIDE 750 MG/1
750 TABLET, EXTENDED RELEASE ORAL
Qty: 30 TAB | Refills: 3 | Status: SHIPPED | OUTPATIENT
Start: 2018-10-18 | End: 2019-01-21 | Stop reason: SDUPTHER

## 2018-10-18 NOTE — PATIENT INSTRUCTIONS
Well Visit, Men 48 to 72: Care Instructions  Your Care Instructions    Physical exams can help you stay healthy. Your doctor has checked your overall health and may have suggested ways to take good care of yourself. He or she also may have recommended tests. At home, you can help prevent illness with healthy eating, regular exercise, and other steps. Follow-up care is a key part of your treatment and safety. Be sure to make and go to all appointments, and call your doctor if you are having problems. It's also a good idea to know your test results and keep a list of the medicines you take. How can you care for yourself at home? · Reach and stay at a healthy weight. This will lower your risk for many problems, such as obesity, diabetes, heart disease, and high blood pressure. · Get at least 30 minutes of exercise on most days of the week. Walking is a good choice. You also may want to do other activities, such as running, swimming, cycling, or playing tennis or team sports. · Do not smoke. Smoking can make health problems worse. If you need help quitting, talk to your doctor about stop-smoking programs and medicines. These can increase your chances of quitting for good. · Protect your skin from too much sun. When you're outdoors from 10 a.m. to 4 p.m., stay in the shade or cover up with clothing and a hat with a wide brim. Wear sunglasses that block UV rays. Even when it's cloudy, put broad-spectrum sunscreen (SPF 30 or higher) on any exposed skin. · See a dentist one or two times a year for checkups and to have your teeth cleaned. · Wear a seat belt in the car. · Limit alcohol to 2 drinks a day. Too much alcohol can cause health problems. Follow your doctor's advice about when to have certain tests. These tests can spot problems early. · Cholesterol.  Your doctor will tell you how often to have this done based on your overall health and other things that can increase your risk for heart attack and stroke. · Blood pressure. Have your blood pressure checked during a routine doctor visit. Your doctor will tell you how often to check your blood pressure based on your age, your blood pressure results, and other factors. · Prostate exam. Talk to your doctor about whether you should have a blood test (called a PSA test) for prostate cancer. Experts disagree on whether men should have this test. Some experts recommend that you discuss the benefits and risks of the test with your doctor. · Diabetes. Ask your doctor whether you should have tests for diabetes. · Vision. Some experts recommend that you have yearly exams for glaucoma and other age-related eye problems starting at age 48. · Hearing. Tell your doctor if you notice any change in your hearing. You can have tests to find out how well you hear. · Colon cancer. You should begin tests for colon cancer at age 48. You may have one of several tests. Your doctor will tell you how often to have tests based on your age and risk. Risks include whether you already had a precancerous polyp removed from your colon or whether your parent, brother, sister, or child has had colon cancer. · Heart attack and stroke risk. At least every 4 to 6 years, you should have your risk for heart attack and stroke assessed. Your doctor uses factors such as your age, blood pressure, cholesterol, and whether you smoke or have diabetes to show what your risk for a heart attack or stroke is over the next 10 years. · Abdominal aortic aneurysm. Ask your doctor whether you should have a test to check for an aneurysm. You may need a test if you ever smoked or if your parent, brother, sister, or child has had an aneurysm. When should you call for help? Watch closely for changes in your health, and be sure to contact your doctor if you have any problems or symptoms that concern you. Where can you learn more? Go to http://maciel-zack.info/.   Enter G821 in the search box to learn more about \"Well Visit, Men 48 to 72: Care Instructions. \"  Current as of: March 29, 2018  Content Version: 11.8  © 2639-6045 [employer]. Care instructions adapted under license by [employer] (which disclaims liability or warranty for this information). If you have questions about a medical condition or this instruction, always ask your healthcare professional. [address] any warranty or liability for your use of this information. Sciatica: Care Instructions  Your Care Instructions    Sciatica (say \"sci-HI-ae-kuh\") is an irritation of one of the sciatic nerves, which come from the spinal cord in the lower back. The sciatic nerves and their branches extend down through the buttock to the foot. Sciatica can develop when an injured disc in the back presses against a spinal nerve root. Its main symptom is pain, numbness, or weakness that is often worse in the leg or foot than in the back. Sciatica often will improve and go away with time. Early treatment usually includes medicines and exercises to relieve pain. Follow-up care is a key part of your treatment and safety. Be sure to make and go to all appointments, and call your doctor if you are having problems. It's also a good idea to know your test results and keep a list of the medicines you take. How can you care for yourself at home? · Take pain medicines exactly as directed. ? If the doctor gave you a prescription medicine for pain, take it as prescribed. ? If you are not taking a prescription pain medicine, ask your doctor if you can take an over-the-counter medicine. · Use heat or ice to relieve pain. ? To apply heat, put a warm water bottle, heating pad set on low, or warm cloth on your back. Do not go to sleep with a heating pad on your skin. ? To use ice, put ice or a cold pack on the area for 10 to 20 minutes at a time. Put a thin cloth between the ice and your skin.   · Avoid sitting if possible, unless it feels better than standing. · Alternate lying down with short walks. Increase your walking distance as you are able to without making your symptoms worse. · Do not do anything that makes your symptoms worse. When should you call for help? Call 911 anytime you think you may need emergency care. For example, call if:    · You are unable to move a leg at all.   [facility] your doctor now or seek immediate medical care if:    · You have new or worse symptoms in your legs or buttocks. Symptoms may include:  ? Numbness or tingling. ? Weakness. ? Pain.     · You lose bladder or bowel control.    Watch closely for changes in your health, and be sure to contact your doctor if:    · You are not getting better as expected. Where can you learn more? Go to http://maciel-zack.info/. Enter [phone] in the search box to learn more about \"Sciatica: Care Instructions. \"  Current as of: November 29, 2017  Content Version: 11.8  © 5091-6513 [employer]. Care instructions adapted under license by [employer] (which disclaims liability or warranty for this information). If you have questions about a medical condition or this instruction, always ask your healthcare professional. [place] [address] any warranty or liability for your use of this information. Neuropathic Pain: Care Instructions  Your Care Instructions    Neuropathic pain is caused by pressure on or damage to your nerves. It's often simply called nerve pain. Some people feel this type of pain all the time. For others, it comes and goes. Diabetes, shingles, or an injury can cause nerve pain. Many people say the pain feels sharp, burning, or stabbing. But some people feel it as a dull ache. In some cases, it makes your skin very sensitive. So touch, pressure, and other sensations that did not hurt before may now cause pain.   It's important to know that this kind of pain is real and can affect your quality of life. It's also important to know that treatment can help. Treatment includes pain medicines, exercise, and physical therapy. Medicines can help reduce the number of pain signals that travel over the nerves. This can make the painful areas less sensitive. It can also help you sleep better and improve your mood. But medicines are only one part of successful treatment. Most people do best with more than one kind of treatment. Your doctor may recommend that you try cognitive-behavioral therapy and stress management. Or, if needed, you may decide to try to quit smoking, lower your blood pressure, or better control blood sugar. These kinds of healthy changes can also make a difference. If you feel that your treatment is not working, talk to your doctor. And be sure to tell your doctor if you think you might be depressed or anxious. These are common problems that can also be treated. Follow-up care is a key part of your treatment and safety. Be sure to make and go to all appointments, and call your doctor if you are having problems. It's also a good idea to know your test results and keep a list of the medicines you take. How can you care for yourself at home? · Be safe with medicines. Read and follow all instructions on the label. ? If the doctor gave you a prescription medicine for pain, take it as prescribed. ? If you are not taking a prescription pain medicine, ask your doctor if you can take an over-the-counter medicine. · Save hard tasks for days when you have less pain. Follow a hard task with an easy task. And remember to take breaks. · Relax, and reduce stress. You may want to try deep breathing or meditation. These can help. · Keep moving. Gentle, daily exercise can help reduce pain. Your doctor or physical therapist can tell you what type of exercise is best for you. This may include walking, swimming, and stationary biking.  It may also include stretches and range-of-motion exercises. · Try heat, cold packs, and massage. · Get enough sleep. Constant pain can make you more tired. If the pain makes it hard to sleep, talk with your doctor. · Think positively. Your thoughts can affect your pain. Do fun things to distract yourself from the pain. See a movie, read a book, listen to music, or spend time with a friend. · Keep a pain diary. Try to write down how strong your pain is and what it feels like. Also try to notice and write down how your moods, thoughts, sleep, activities, and medicine affect your pain. These notes can help you and your doctor find the best ways to treat your pain. Reducing constipation caused by pain medicine  Pain medicines often cause constipation. To reduce constipation:  · Include fruits, vegetables, beans, and whole grains in your diet each day. These foods are high in fiber. · Drink plenty of fluids, enough so that your urine is light yellow or clear like water. If you have kidney, heart, or liver disease and have to limit fluids, talk with your doctor before you increase the amount of fluids you drink. · Get some exercise every day. Build up slowly to 30 to 60 minutes a day on 5 or more days of the week. · Take a fiber supplement, such as Citrucel or Metamucil, every day if needed. Read and follow all instructions on the label. · Schedule time each day for a bowel movement. Having a daily routine may help. Take your time and do not strain when having a bowel movement. · Ask your doctor about a laxative. The goal is to have one easy bowel movement every 1 to 2 days. Do not let constipation go untreated for more than 3 days. When should you call for help? Call your doctor now or seek immediate medical care if:    · You feel sad, anxious, or hopeless for more than a few days. This could mean you are depressed. Depression is common in people who have a lot of pain.  But it can be treated.     · You have trouble with bowel movements, such as:  ? No bowel movement in 3 days. ? Blood in the anal area, in your stool, or on the toilet paper. ? Diarrhea for more than 24 hours.    Watch closely for changes in your health, and be sure to contact your doctor if:    · Your pain is getting worse.     · You can't sleep because of pain.     · You are very worried or anxious about your pain.     · You have trouble taking your pain medicine.     · You have any concerns about your pain medicine or its side effects.     · You have vomiting or cramps for more than 2 hours. Where can you learn more? Go to http://maciel-zack.info/. Enter X789 in the search box to learn more about \"Neuropathic Pain: Care Instructions. \"  Current as of: June 4, 2018  Content Version: 11.8  © 5827-4717 [employer]. Care instructions adapted under license by [employer] (which disclaims liability or warranty for this information). If you have questions about a medical condition or this instruction, always ask your healthcare professional. [place] [address] any warranty or liability for your use of this information. High Blood Pressure: Care Instructions  Your Care Instructions    If your blood pressure is usually above 130/80, you have high blood pressure, or hypertension. That means the top number is 130 or higher or the bottom number is 80 or higher, or both. Despite what a lot of people think, high blood pressure usually doesn't cause headaches or make you feel dizzy or lightheaded. It usually has no symptoms. But it does increase your risk for heart attack, stroke, and kidney or eye damage. The higher your blood pressure, the more your risk increases. Your doctor will give you a goal for your blood pressure. Your goal will be based on your health and your age. Lifestyle changes, such as eating healthy and being active, are always important to help lower blood pressure.  You might also take medicine to reach your blood pressure goal.  Follow-up care is a key part of your treatment and safety. Be sure to make and go to all appointments, and call your doctor if you are having problems. It's also a good idea to know your test results and keep a list of the medicines you take. How can you care for yourself at home? Medical treatment  · If you stop taking your medicine, your blood pressure will go back up. You may take one or more types of medicine to lower your blood pressure. Be safe with medicines. Take your medicine exactly as prescribed. Call your doctor if you think you are having a problem with your medicine. · Talk to your doctor before you start taking aspirin every day. Aspirin can help certain people lower their risk of a heart attack or stroke. But taking aspirin isn't right for everyone, because it can cause serious bleeding. · See your doctor regularly. You may need to see the doctor more often at first or until your blood pressure comes down. · If you are taking blood pressure medicine, talk to your doctor before you take decongestants or anti-inflammatory medicine, such as ibuprofen. Some of these medicines can raise blood pressure. · Learn how to check your blood pressure at home. Lifestyle changes  · Stay at a healthy weight. This is especially important if you put on weight around the waist. Losing even 10 pounds can help you lower your blood pressure. · If your doctor recommends it, get more exercise. Walking is a good choice. Bit by bit, increase the amount you walk every day. Try for at least 30 minutes on most days of the week. You also may want to swim, bike, or do other activities. · Avoid or limit alcohol. Talk to your doctor about whether you can drink any alcohol. · Try to limit how much sodium you eat to less than 2,300 milligrams (mg) a day. Your doctor may ask you to try to eat less than 1,500 mg a day.   · Eat plenty of fruits (such as bananas and oranges), vegetables, legumes, whole grains, and low-fat dairy products. · Lower the amount of saturated fat in your diet. Saturated fat is found in animal products such as milk, cheese, and meat. Limiting these foods may help you lose weight and also lower your risk for heart disease. · Do not smoke. Smoking increases your risk for heart attack and stroke. If you need help quitting, talk to your doctor about stop-smoking programs and medicines. These can increase your chances of quitting for good. When should you call for help? Call 911 anytime you think you may need emergency care. This may mean having symptoms that suggest that your blood pressure is causing a serious heart or blood vessel problem. Your blood pressure may be over 180/120.   For example, call 911 if:    · You have symptoms of a heart attack. These may include:  ? Chest pain or pressure, or a strange feeling in the chest.  ? Sweating. ? Shortness of breath. ? Nausea or vomiting. ? Pain, pressure, or a strange feeling in the back, neck, jaw, or upper belly or in one or both shoulders or arms. ? Lightheadedness or sudden weakness. ? A fast or irregular heartbeat.     · You have symptoms of a stroke. These may include:  ? Sudden numbness, tingling, weakness, or loss of movement in your face, arm, or leg, especially on only one side of your body. ? Sudden vision changes. ? Sudden trouble speaking. ? Sudden confusion or trouble understanding simple statements. ? Sudden problems with walking or balance. ? A sudden, severe headache that is different from past headaches.     · You have severe back or belly pain.    Do not wait until your blood pressure comes down on its own.  Get help right away.   Call your doctor now or seek immediate care if:    · Your blood pressure is much higher than normal (such as 180/120 or higher), but you don't have symptoms.     · You think high blood pressure is causing symptoms, such as:  ? Severe headache.  ? Blurry vision.    Watch closely for changes in your health, and be sure to contact your doctor if:    · Your blood pressure measures higher than your doctor recommends at least 2 times. That means the top number is higher or the bottom number is higher, or both.     · You think you may be having side effects from your blood pressure medicine. Where can you learn more? Go to http://maciel-zack.info/. Enter Z431 in the search box to learn more about \"High Blood Pressure: Care Instructions. \"  Current as of: December 6, 2017  Content Version: 11.8  © 8082-2656 [employer]. Care instructions adapted under license by [employer] (which disclaims liability or warranty for this information). If you have questions about a medical condition or this instruction, always ask your healthcare professional. [address] any warranty or liability for your use of this information. Type 2 Diabetes: Care Instructions  Your Care Instructions    Type 2 diabetes is a disease that develops when the body's tissues cannot use insulin properly. Over time, the pancreas cannot make enough insulin. Insulin is a hormone that helps the body's cells use sugar (glucose) for energy. It also helps the body store extra sugar in muscle, fat, and liver cells. Without insulin, the sugar cannot get into the cells to do its work. It stays in the blood instead. This can cause high blood sugar levels. A person has diabetes when the blood sugar stays too high too much of the time. Over time, diabetes can lead to diseases of the heart, blood vessels, nerves, kidneys, and eyes. You may be able to control your blood sugar by losing weight, eating a healthy diet, and getting daily exercise. You may also have to take insulin or other diabetes medicine. Follow-up care is a key part of your treatment and safety. Be sure to make and go to all appointments. Call your doctor if you are having problems.  It's also a good idea to know your test results and keep a list of the medicines you take. How can you care for yourself at home? · Keep your blood sugar at a target level (which you set with your doctor). ? Eat a good diet that spreads carbohydrate throughout the day. Carbohydrate--the body's main source of fuel--affects blood sugar more than any other nutrient. Carbohydrate is in fruits, vegetables, milk, and yogurt. It also is in breads, cereals, vegetables such as potatoes and corn, and sugary foods such as candy and cakes. ? Aim for 30 minutes of exercise on most, preferably all, days of the week. Walking is a good choice. You also may want to do other activities, such as running, swimming, cycling, or playing tennis or team sports. If your doctor says it's okay, do muscle-strengthening exercises at least 2 times a week. ? Take your medicines exactly as prescribed. Call your doctor if you think you are having a problem with your medicine. You will get more details on the specific medicines your doctor prescribes. · Check your blood sugar as often as your doctor recommends. It is important to keep track of any symptoms you have, such as low blood sugar. Also tell your doctor if you have any changes in your activities, diet, or insulin use. · Talk to your doctor before you start taking aspirin every day. Aspirin can help certain people lower their risk of a heart attack or stroke. But taking aspirin isn't right for everyone, because it can cause serious bleeding. · Do not smoke. If you need help quitting, talk to your doctor about stop-smoking programs and medicines. These can increase your chances of quitting for good. · Keep your cholesterol and blood pressure at normal levels. You may need to take one or more medicines to reach your goals. Take them exactly as directed. Do not stop or change a medicine without talking to your doctor first.  When should you call for help?   Call 911 anytime you think you may need emergency care. For example, call if:    · You passed out (lost consciousness), or you suddenly become very sleepy or confused. (You may have very low blood sugar.)    Call your doctor now or seek immediate medical care if:    · Your blood sugar is 300 mg/dL or is higher than the level your doctor has set for you.     · You have symptoms of low blood sugar, such as:  ? Sweating. ? Feeling nervous, shaky, and weak. ? Extreme hunger and slight nausea. ? Dizziness and headache.  ? Blurred vision. ? Confusion.    Watch closely for changes in your health, and be sure to contact your doctor if:    · You often have problems controlling your blood sugar.     · You have symptoms of long-term diabetes problems, such as:  ? New vision changes. ? New pain, numbness, or tingling in your hands or feet. ? Skin problems. Where can you learn more? Go to http://maciel-zack.info/. Enter C553 in the search box to learn more about \"Type 2 Diabetes: Care Instructions. \"  Current as of: December 7, 2017  Content Version: 11.8  © 6045-8703 [employer]. Care instructions adapted under license by [employer] (which disclaims liability or warranty for this information). If you have questions about a medical condition or this instruction, always ask your healthcare professional. [place] [address] any warranty or liability for your use of this information.

## 2018-10-18 NOTE — PROGRESS NOTES
Chief Complaint   Patient presents with    Complete Physical    Labs   1. Have you been to the ER, urgent care clinic since your last visit? Hospitalized since your last visit? No    2. Have you seen or consulted any other health care providers outside of the [address] [place] [address] since your last visit? Include any pap smears or colon screening. No   PHQ over the last two weeks 10/11/2018   Little interest or pleasure in doing things Several days   Feeling down, depressed, irritable, or hopeless Several days   Total Score PHQ 2 2     Fall Risk Assessment, last 12 mths 10/11/2018   Able to walk? Yes   Fall in past 12 months? No     Abuse Screening Questionnaire 10/11/2018   Do you ever feel afraid of your partner? N   Are you in a relationship with someone who physically or mentally threatens you? N   Is it safe for you to go home?  Shan Stevenson
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
## 2018-10-26 ENCOUNTER — TELEPHONE (OUTPATIENT)
Dept: INTERNAL MEDICINE CLINIC | Age: 53
End: 2018-10-26

## 2018-10-26 NOTE — TELEPHONE ENCOUNTER
Unable to reach patient with number provided. AppointmentTis scheduled for pain management.  [phone] AT 1:00PM WITH THE ARRIVAL TIME OF 12:30PM IN THE [facility] 6TH FLOOR REMINDER WILL BE MAILED TO PATIENT PER [facility]

## 2019-01-21 ENCOUNTER — OFFICE VISIT (OUTPATIENT)
Dept: INTERNAL MEDICINE CLINIC | Age: 54
End: 2019-01-21

## 2019-01-21 VITALS
TEMPERATURE: 97.6 F | BODY MASS INDEX: 28.39 KG/M2 | RESPIRATION RATE: 16 BRPM | HEART RATE: 102 BPM | OXYGEN SATURATION: 99 % | HEIGHT: 68 IN | SYSTOLIC BLOOD PRESSURE: 150 MMHG | WEIGHT: 187.3 LBS | DIASTOLIC BLOOD PRESSURE: 80 MMHG

## 2019-01-21 DIAGNOSIS — G62.9 NEUROPATHY: ICD-10-CM

## 2019-01-21 DIAGNOSIS — Z79.4 TYPE 2 DIABETES MELLITUS WITH DIABETIC POLYNEUROPATHY, WITH LONG-TERM CURRENT USE OF INSULIN (HCC): Primary | ICD-10-CM

## 2019-01-21 DIAGNOSIS — M19.90 ARTHRITIS: ICD-10-CM

## 2019-01-21 DIAGNOSIS — R80.9 URINE TEST POSITIVE FOR MICROALBUMINURIA: ICD-10-CM

## 2019-01-21 DIAGNOSIS — L98.491 CALLOUS ULCER, LIMITED TO BREAKDOWN OF SKIN (HCC): ICD-10-CM

## 2019-01-21 DIAGNOSIS — E11.42 TYPE 2 DIABETES MELLITUS WITH DIABETIC POLYNEUROPATHY, WITH LONG-TERM CURRENT USE OF INSULIN (HCC): Primary | ICD-10-CM

## 2019-01-21 DIAGNOSIS — I10 ESSENTIAL HYPERTENSION: ICD-10-CM

## 2019-01-21 DIAGNOSIS — L60.0 ONYCHOCRYPTOSIS: ICD-10-CM

## 2019-01-21 LAB
HBA1C MFR BLD HPLC: 13.8 %
MICROALBUMIN UR TEST STR-MCNC: 150 MG/L
MICROALBUMIN/CREAT RATIO POC: >300 MG/G

## 2019-01-21 RX ORDER — METFORMIN HYDROCHLORIDE 750 MG/1
750 TABLET, EXTENDED RELEASE ORAL
Qty: 30 TAB | Refills: 3 | Status: SHIPPED | OUTPATIENT
Start: 2019-01-21 | End: 2019-05-30

## 2019-01-21 RX ORDER — LISINOPRIL 10 MG/1
10 TABLET ORAL DAILY
Qty: 90 TAB | Refills: 3 | Status: SHIPPED | OUTPATIENT
Start: 2019-01-21 | End: 2019-05-23 | Stop reason: SDUPTHER

## 2019-01-21 RX ORDER — HUMAN INSULIN 100 [IU]/ML
INJECTION, SUSPENSION SUBCUTANEOUS
Qty: 1 VIAL | Refills: 1 | Status: SHIPPED | OUTPATIENT
Start: 2019-01-21 | End: 2019-05-23

## 2019-01-21 RX ORDER — DICLOFENAC SODIUM 75 MG/1
75 TABLET, DELAYED RELEASE ORAL 2 TIMES DAILY
Qty: 60 TAB | Refills: 3 | Status: SHIPPED | OUTPATIENT
Start: 2019-01-21 | End: 2019-05-23 | Stop reason: SDUPTHER

## 2019-01-21 RX ORDER — PREGABALIN 150 MG/1
150 CAPSULE ORAL 2 TIMES DAILY
Qty: 60 CAP | Refills: 1 | Status: SHIPPED | OUTPATIENT
Start: 2019-01-21 | End: 2019-05-23 | Stop reason: SDUPTHER

## 2019-01-21 NOTE — PATIENT INSTRUCTIONS
Type 2 Diabetes: Care Instructions  Your Care Instructions    Type 2 diabetes is a disease that develops when the body's tissues cannot use insulin properly. Over time, the pancreas cannot make enough insulin. Insulin is a hormone that helps the body's cells use sugar (glucose) for energy. It also helps the body store extra sugar in muscle, fat, and liver cells. Without insulin, the sugar cannot get into the cells to do its work. It stays in the blood instead. This can cause high blood sugar levels. A person has diabetes when the blood sugar stays too high too much of the time. Over time, diabetes can lead to diseases of the heart, blood vessels, nerves, kidneys, and eyes. You may be able to control your blood sugar by losing weight, eating a healthy diet, and getting daily exercise. You may also have to take insulin or other diabetes medicine. Follow-up care is a key part of your treatment and safety. Be sure to make and go to all appointments. Call your doctor if you are having problems. It's also a good idea to know your test results and keep a list of the medicines you take. How can you care for yourself at home? · Keep your blood sugar at a target level (which you set with your doctor). ? Eat a good diet that spreads carbohydrate throughout the day. Carbohydrate--the body's main source of fuel--affects blood sugar more than any other nutrient. Carbohydrate is in fruits, vegetables, milk, and yogurt. It also is in breads, cereals, vegetables such as potatoes and corn, and sugary foods such as candy and cakes. ? Aim for 30 minutes of exercise on most, preferably all, days of the week. Walking is a good choice. You also may want to do other activities, such as running, swimming, cycling, or playing tennis or team sports. If your doctor says it's okay, do muscle-strengthening exercises at least 2 times a week. ? Take your medicines exactly as prescribed.  Call your doctor if you think you are having a problem with your medicine. You will get more details on the specific medicines your doctor prescribes. · Check your blood sugar as often as your doctor recommends. It is important to keep track of any symptoms you have, such as low blood sugar. Also tell your doctor if you have any changes in your activities, diet, or insulin use. · Talk to your doctor before you start taking aspirin every day. Aspirin can help certain people lower their risk of a heart attack or stroke. But taking aspirin isn't right for everyone, because it can cause serious bleeding. · Do not smoke. If you need help quitting, talk to your doctor about stop-smoking programs and medicines. These can increase your chances of quitting for good. · Keep your cholesterol and blood pressure at normal levels. You may need to take one or more medicines to reach your goals. Take them exactly as directed. Do not stop or change a medicine without talking to your doctor first.  When should you call for help? Call 911 anytime you think you may need emergency care. For example, call if:    · You passed out (lost consciousness), or you suddenly become very sleepy or confused. (You may have very low blood sugar.)    Call your doctor now or seek immediate medical care if:    · Your blood sugar is 300 mg/dL or is higher than the level your doctor has set for you.     · You have symptoms of low blood sugar, such as:  ? Sweating. ? Feeling nervous, shaky, and weak. ? Extreme hunger and slight nausea. ? Dizziness and headache.  ? Blurred vision. ? Confusion.    Watch closely for changes in your health, and be sure to contact your doctor if:    · You often have problems controlling your blood sugar.     · You have symptoms of long-term diabetes problems, such as:  ? New vision changes. ? New pain, numbness, or tingling in your hands or feet. ? Skin problems. Where can you learn more? Go to http://maciel-zack.info/.   Enter C553 in the search box to learn more about \"Type 2 Diabetes: Care Instructions. \"  Current as of: July 25, 2018  Content Version: 11.9  © 4550-7206 [employer]. Care instructions adapted under license by [employer] (which disclaims liability or warranty for this information). If you have questions about a medical condition or this instruction, always ask your healthcare professional. [address] any warranty or liability for your use of this information. Diabetic Neuropathy: Care Instructions  Your Care Instructions    When you have diabetes, your blood sugar level may get too high. Over time, high blood sugar levels can damage nerves. This is called diabetic neuropathy. Nerve damage can cause pain, burning, tingling, and numbness and may leave you feeling weak. The feet are often affected. When you have nerve damage in your feet, you cannot feel your feet and toes as well as normal and may not notice cuts or sores. Even a small injury can lead to a serious infection. It is very important that you follow your doctor's advice on foot care. Sometimes diabetes damages nerves that help the body function. If this happens, your blood pressure, sweating, digestion, and urination might be affected. Your doctor may give you a target blood sugar level that is higher or lower than you are used to. Try to keep your blood sugar very close to this target level to prevent more damage. Follow-up care is a key part of your treatment and safety. Be sure to make and go to all appointments, and call your doctor if you are having problems. It's also a good idea to know your test results and keep a list of the medicines you take. How can you care for yourself at home? · Take your medicines exactly as prescribed. Call your doctor if you think you are having a problem with your medicine. It is very important that you take your insulin or diabetes pills as your doctor tells you.   · Try to keep blood sugar at your target level. ? Eat a variety of healthy foods, with carbohydrate spread out in your meals. A dietitian can help you plan meals. ? Try to get at least 30 minutes of exercise on most days. ? Check your blood sugar as many times each day as your doctor recommends. · Take and record your blood pressure at home if your doctor tells you to. Learn the importance of the two measures of blood pressure (such as 130 over 80, or 130/80). To take your blood pressure at home:  ? Ask your doctor to check your blood pressure monitor to be sure it is accurate and the cuff fits you. Also ask your doctor to watch you to make sure that you are using it right. ? Do not use medicine known to raise blood pressure (such as some nasal decongestant sprays) before taking your blood pressure. ? Avoid taking your blood pressure if you have just exercised or are nervous or upset. Rest at least 15 minutes before you take a reading. · Take pain medicines exactly as directed. ? If the doctor gave you a prescription medicine for pain, take it as prescribed. ? If you are not taking a prescription pain medicine, ask your doctor if you can take an over-the-counter medicine. · Do not smoke. Smoking can increase your chance for a heart attack or stroke. If you need help quitting, talk to your doctor about stop-smoking programs and medicines. These can increase your chances of quitting for good. · Limit alcohol to 2 drinks a day for men and 1 drink a day for women. Too much alcohol can cause health problems. · Eat small meals often, rather than 2 or 3 large meals a day. To care for your feet  · Prevent injury by wearing shoes at all times, even when you are indoors. · Do foot care as part of your daily routine. Wash your feet and then rub lotion on your feet, but not between your toes. Use a handheld mirror or magnifying mirror to inspect your feet for blisters, cuts, cracks, or sores.   · Have your toenails trimmed and filed straight across. · Wear shoes and socks that fit well. Soft shoes that have good support and that fit well (such as tennis shoes) are best for your feet. · Check your shoes for any loose objects or rough edges before you put them on. · Ask your doctor to check your feet during each visit. Your doctor may notice a foot problem you have missed. · Get early treatment for any foot problem, even a minor one. When should you call for help? Call your doctor now or seek immediate medical care if:    · You have symptoms of infection, such as:  ? Increased pain, swelling, warmth, or redness. ? Red streaks leading from the area. ? Pus draining from the area. ? A fever.     · You have new or worse numbness, pain, or tingling in any part of your body.    Watch closely for changes in your health, and be sure to contact your doctor if:    · You have a new problem with your feet, such as:  ? A new sore or ulcer. ? A break in the skin that is not healing after several days. ? Bleeding corns or calluses. ? An ingrown toenail.     · You do not get better as expected. Where can you learn more? Go to http://maciel-zack.info/. Enter N605 in the search box to learn more about \"Diabetic Neuropathy: Care Instructions. \"  Current as of: July 25, 2018  Content Version: 11.9  © 3858-1597 [employer]. Care instructions adapted under license by [employer] (which disclaims liability or warranty for this information). If you have questions about a medical condition or this instruction, always ask your healthcare professional. [place] [address] any warranty or liability for your use of this information. High Blood Pressure: Care Instructions  Overview    It's normal for blood pressure to go up and down throughout the day. But if it stays up, you have high blood pressure. Another name for high blood pressure is hypertension.   Despite what a lot of people think, high blood pressure usually doesn't cause headaches or make you feel dizzy or lightheaded. It usually has no symptoms. But it does increase your risk of stroke, heart attack, and other problems. You and your doctor will talk about your risks of these problems based on your blood pressure. Your doctor will give you a goal for your blood pressure. Your goal will be based on your health and your age. Lifestyle changes, such as eating healthy and being active, are always important to help lower blood pressure. You might also take medicine to reach your blood pressure goal.  Follow-up care is a key part of your treatment and safety. Be sure to make and go to all appointments, and call your doctor if you are having problems. It's also a good idea to know your test results and keep a list of the medicines you take. How can you care for yourself at home? Medical treatment  · If you stop taking your medicine, your blood pressure will go back up. You may take one or more types of medicine to lower your blood pressure. Be safe with medicines. Take your medicine exactly as prescribed. Call your doctor if you think you are having a problem with your medicine. · Talk to your doctor before you start taking aspirin every day. Aspirin can help certain people lower their risk of a heart attack or stroke. But taking aspirin isn't right for everyone, because it can cause serious bleeding. · See your doctor regularly. You may need to see the doctor more often at first or until your blood pressure comes down. · If you are taking blood pressure medicine, talk to your doctor before you take decongestants or anti-inflammatory medicine, such as ibuprofen. Some of these medicines can raise blood pressure. · Learn how to check your blood pressure at home. Lifestyle changes  · Stay at a healthy weight.  This is especially important if you put on weight around the waist. Losing even 10 pounds can help you lower your blood pressure. · If your doctor recommends it, get more exercise. Walking is a good choice. Bit by bit, increase the amount you walk every day. Try for at least 30 minutes on most days of the week. You also may want to swim, bike, or do other activities. · Avoid or limit alcohol. Talk to your doctor about whether you can drink any alcohol. · Try to limit how much sodium you eat to less than 2,300 milligrams (mg) a day. Your doctor may ask you to try to eat less than 1,500 mg a day. · Eat plenty of fruits (such as bananas and oranges), vegetables, legumes, whole grains, and low-fat dairy products. · Lower the amount of saturated fat in your diet. Saturated fat is found in animal products such as milk, cheese, and meat. Limiting these foods may help you lose weight and also lower your risk for heart disease. · Do not smoke. Smoking increases your risk for heart attack and stroke. If you need help quitting, talk to your doctor about stop-smoking programs and medicines. These can increase your chances of quitting for good. When should you call for help? Call 911 anytime you think you may need emergency care. This may mean having symptoms that suggest that your blood pressure is causing a serious heart or blood vessel problem. Your blood pressure may be over 180/120.   For example, call 911 if:    · You have symptoms of a heart attack. These may include:  ? Chest pain or pressure, or a strange feeling in the chest.  ? Sweating. ? Shortness of breath. ? Nausea or vomiting. ? Pain, pressure, or a strange feeling in the back, neck, jaw, or upper belly or in one or both shoulders or arms. ? Lightheadedness or sudden weakness. ? A fast or irregular heartbeat.     · You have symptoms of a stroke. These may include:  ? Sudden numbness, tingling, weakness, or loss of movement in your face, arm, or leg, especially on only one side of your body. ? Sudden vision changes. ? Sudden trouble speaking.   ? Sudden confusion or trouble understanding simple statements. ? Sudden problems with walking or balance. ? A sudden, severe headache that is different from past headaches.     · You have severe back or belly pain.    Do not wait until your blood pressure comes down on its own. Get help right away.   Call your doctor now or seek immediate care if:    · Your blood pressure is much higher than normal (such as 180/120 or higher), but you don't have symptoms.     · You think high blood pressure is causing symptoms, such as:  ? Severe headache.  ? Blurry vision.    Watch closely for changes in your health, and be sure to contact your doctor if:    · Your blood pressure measures higher than your doctor recommends at least 2 times. That means the top number is higher or the bottom number is higher, or both.     · You think you may be having side effects from your blood pressure medicine. Where can you learn more? Go to http://maciel-zack.info/. Enter X083 in the search box to learn more about \"High Blood Pressure: Care Instructions. \"  Current as of: July 22, 2018  Content Version: 11.9  © 8353-8182 [employer]. Care instructions adapted under license by [employer] (which disclaims liability or warranty for this information). If you have questions about a medical condition or this instruction, always ask your healthcare professional. [address] any warranty or liability for your use of this information.

## 2019-01-21 NOTE — PROGRESS NOTES
Chief Complaint   Patient presents with    Pain (Chronic)    Diabetes     1. Have you been to the ER, urgent care clinic since your last visit? Hospitalized since your last visit? No    2. Have you seen or consulted any other health care providers outside of the [address] [place] [address] since your last visit? Include any pap smears or colon screening.  No
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
## 2019-02-26 RX ORDER — PEN NEEDLE, DIABETIC 29 G X1/2"
NEEDLE, DISPOSABLE MISCELLANEOUS
Qty: 100 SYRINGE | Refills: 5 | Status: SHIPPED | OUTPATIENT
Start: 2019-02-26 | End: 2019-05-23 | Stop reason: SDUPTHER

## 2019-02-26 NOTE — PROGRESS NOTES
Ultrafine insulin needles refill sent to pharmacy on record. Unable to reach patient as phone # is not in service.

## 2019-05-21 ENCOUNTER — TELEPHONE (OUTPATIENT)
Dept: INTERNAL MEDICINE CLINIC | Age: 54
End: 2019-05-21

## 2019-05-23 ENCOUNTER — OFFICE VISIT (OUTPATIENT)
Dept: INTERNAL MEDICINE CLINIC | Age: 54
End: 2019-05-23

## 2019-05-23 VITALS
BODY MASS INDEX: 26.98 KG/M2 | DIASTOLIC BLOOD PRESSURE: 96 MMHG | SYSTOLIC BLOOD PRESSURE: 161 MMHG | OXYGEN SATURATION: 96 % | RESPIRATION RATE: 16 BRPM | HEIGHT: 68 IN | HEART RATE: 99 BPM | WEIGHT: 178 LBS | TEMPERATURE: 98.7 F

## 2019-05-23 DIAGNOSIS — E11.65 TYPE 2 DIABETES MELLITUS WITH HYPERGLYCEMIA, WITH LONG-TERM CURRENT USE OF INSULIN (HCC): Primary | ICD-10-CM

## 2019-05-23 DIAGNOSIS — Z79.4 TYPE 2 DIABETES MELLITUS WITH HYPERGLYCEMIA, WITH LONG-TERM CURRENT USE OF INSULIN (HCC): Primary | ICD-10-CM

## 2019-05-23 DIAGNOSIS — Z79.4 TYPE 2 DIABETES MELLITUS WITH DIABETIC POLYNEUROPATHY, WITH LONG-TERM CURRENT USE OF INSULIN (HCC): ICD-10-CM

## 2019-05-23 DIAGNOSIS — R60.0 LOCALIZED EDEMA: ICD-10-CM

## 2019-05-23 DIAGNOSIS — Z13.220 SCREENING FOR CHOLESTEROL LEVEL: ICD-10-CM

## 2019-05-23 DIAGNOSIS — I10 ESSENTIAL HYPERTENSION: ICD-10-CM

## 2019-05-23 DIAGNOSIS — M19.90 ARTHRITIS: ICD-10-CM

## 2019-05-23 DIAGNOSIS — G62.9 NEUROPATHY: ICD-10-CM

## 2019-05-23 DIAGNOSIS — E78.2 MIXED HYPERLIPIDEMIA: ICD-10-CM

## 2019-05-23 DIAGNOSIS — E11.42 TYPE 2 DIABETES MELLITUS WITH DIABETIC POLYNEUROPATHY, WITH LONG-TERM CURRENT USE OF INSULIN (HCC): ICD-10-CM

## 2019-05-23 LAB
CHOLEST SERPL-MCNC: 212 MG/DL
HBA1C MFR BLD HPLC: 12.3 %
HDLC SERPL-MCNC: 58 MG/DL
LDL CHOLESTEROL POC: 116 MG/DL
NON-HDL GOAL (POC): 155
TCHOL/HDL RATIO (POC): 3.7
TRIGL SERPL-MCNC: 194 MG/DL

## 2019-05-23 RX ORDER — ATORVASTATIN CALCIUM 20 MG/1
20 TABLET, FILM COATED ORAL EVERY EVENING
Qty: 90 TAB | Refills: 3 | Status: ON HOLD | OUTPATIENT
Start: 2019-05-23 | End: 2021-03-17 | Stop reason: SDUPTHER

## 2019-05-23 RX ORDER — DICLOFENAC SODIUM 75 MG/1
75 TABLET, DELAYED RELEASE ORAL 2 TIMES DAILY
Qty: 60 TAB | Refills: 3 | Status: SHIPPED | OUTPATIENT
Start: 2019-05-23 | End: 2019-06-26 | Stop reason: ALTCHOICE

## 2019-05-23 RX ORDER — PREGABALIN 150 MG/1
150 CAPSULE ORAL 2 TIMES DAILY
Qty: 60 CAP | Refills: 1 | Status: SHIPPED | OUTPATIENT
Start: 2019-05-23 | End: 2019-05-30

## 2019-05-23 RX ORDER — LISINOPRIL 20 MG/1
20 TABLET ORAL DAILY
Qty: 90 TAB | Refills: 3 | Status: SHIPPED | OUTPATIENT
Start: 2019-05-23 | End: 2019-06-26 | Stop reason: ALTCHOICE

## 2019-05-23 RX ORDER — HYDROCHLOROTHIAZIDE 25 MG/1
25 TABLET ORAL DAILY
Qty: 90 TAB | Refills: 3 | Status: SHIPPED | OUTPATIENT
Start: 2019-05-23 | End: 2019-05-30 | Stop reason: ALTCHOICE

## 2019-05-23 RX ORDER — PEN NEEDLE, DIABETIC 29 G X1/2"
NEEDLE, DISPOSABLE MISCELLANEOUS
Qty: 100 SYRINGE | Refills: 5 | Status: SHIPPED | OUTPATIENT
Start: 2019-05-23 | End: 2020-01-23 | Stop reason: SDUPTHER
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
## 2019-05-23 NOTE — PROGRESS NOTES
Chief Complaint   Patient presents with    Back Pain    Diabetes    Medication Evaluation     1. Have you been to the ER, urgent care clinic since your last visit? Hospitalized since your last visit? No    2. Have you seen or consulted any other health care providers outside of the [address] [place] [address] since your last visit? Include any pap smears or colon screening.  No

## 2019-05-23 NOTE — PATIENT INSTRUCTIONS
Arthritis: Care Instructions  Your Care Instructions  Arthritis, also called osteoarthritis, is a breakdown of the cartilage that cushions your joints. When the cartilage wears down, your bones rub against each other. This causes pain and stiffness. Many people have some arthritis as they age. Arthritis most often affects the joints of the spine, hands, hips, knees, or feet. You can take simple measures to protect your joints, ease your pain, and help you stay active. Follow-up care is a key part of your treatment and safety. Be sure to make and go to all appointments, and call your doctor if you are having problems. It's also a good idea to know your test results and keep a list of the medicines you take. How can you care for yourself at home? · Stay at a healthy weight. Being overweight puts extra strain on your joints. · Talk to your doctor or physical therapist about exercises that will help ease joint pain. ? Stretch. You may enjoy gentle forms of yoga to help keep your joints and muscles flexible. ? Walk instead of jog. Other types of exercise that are less stressful on the joints include riding a bicycle, swimming, hunter chi, or water exercise. ? Lift weights. Strong muscles help reduce stress on your joints. Stronger thigh muscles, for example, take some of the stress off of the knees and hips. Learn the right way to lift weights so you do not make joint pain worse. · Take your medicines exactly as prescribed. Call your doctor if you think you are having a problem with your medicine. · Take pain medicines exactly as directed. ? If the doctor gave you a prescription medicine for pain, take it as prescribed. ? If you are not taking a prescription pain medicine, ask your doctor if you can take an over-the-counter medicine. · Use a cane, crutch, walker, or another device if you need help to get around. These can help rest your joints.  You also can use other things to make life easier, such as a higher toilet seat and padded handles on kitchen utensils. · Do not sit in low chairs, which can make it hard to get up. · Put heat or cold on your sore joints as needed. Use whichever helps you most. You also can take turns with hot and cold packs. ? Apply heat 2 or 3 times a day for 20 to 30 minutes--using a heating pad, hot shower, or hot pack--to relieve pain and stiffness. ? Put ice or a cold pack on your sore joint for 10 to 20 minutes at a time. Put a thin cloth between the ice and your skin. When should you call for help? Call your doctor now or seek immediate medical care if:    · You have sudden swelling, warmth, or pain in any joint.     · You have joint pain and a fever or rash.     · You have such bad pain that you cannot use a joint.    Watch closely for changes in your health, and be sure to contact your doctor if:    · You have mild joint symptoms that continue even with more than 6 weeks of care at home.     · You have stomach pain or other problems with your medicine. Where can you learn more? Go to http://macielCarbolytic Materialszack.info/. Enter F460 in the search box to learn more about \"Arthritis: Care Instructions. \"  Current as of: Chery 10, 2018  Content Version: 11.9  © 3594-0239 [employer]. Care instructions adapted under license by [employer] (which disclaims liability or warranty for this information). If you have questions about a medical condition or this instruction, always ask your healthcare professional. [place] [address] any warranty or liability for your use of this information. Learning About Type 2 Diabetes  What is type 2 diabetes? Insulin is a hormone that helps your body use sugar from your food as energy. Type 2 diabetes happens when your body can't use insulin the right way. Over time, the pancreas can't make enough insulin. If you don't have enough insulin, too much sugar stays in your blood.   If you are overweight, get little or no exercise, or have type 2 diabetes in your family, you are more likely to have problems with the way insulin works in your body.  Americans, Hispanics, Native Americans,  Americans, and Pacific Islanders have a higher risk for type 2 diabetes. Type 2 diabetes can be prevented or delayed with a healthy lifestyle, which includes staying at a healthy weight, making smart food choices, and getting regular exercise. What can you expect with type 2 diabetes? Minda Lutz keep hearing about how important it is to keep your blood sugar within a target range. That's because over time, high blood sugar can lead to serious problems. It can:  · Harm your eyes, nerves, and kidneys. · Damage your blood vessels, leading to heart disease and stroke. · Reduce blood flow and cause nerve damage to parts of your body, especially your feet. This can cause slow healing and pain when you walk. · Make your immune system weak and less able to fight infections. When people hear the word \"diabetes,\" they often think of problems like these. But daily care and treatment can help prevent or delay these problems. The goal is to keep your blood sugar in a target range. That's the best way to reduce your chance of having more problems from diabetes. What are the symptoms? Some people who have type 2 diabetes may not have any symptoms early on. Many people with the disease don't even know they have it at first. But with time, diabetes starts to cause symptoms. You experience most symptoms of type 2 diabetes when your blood sugar is either too high or too low. The most common symptoms of high blood sugar include:  · Thirst.  · Frequent urination. · Weight loss. · Blurry vision. The symptoms of low blood sugar include:  · Sweating. · Shakiness. · Weakness. · Hunger. · Confusion. How can you prevent type 2 diabetes?   The best way to prevent or delay type 2 diabetes is to adopt healthy habits, which include:  · Staying at a healthy weight. · Exercising regularly. · Eating healthy foods. How is type 2 diabetes treated? If you have type 2 diabetes, here are the most important things you can do. · Take your diabetes medicines. · Check your blood sugar as often as your doctor recommends. Also, get a hemoglobin A1c test at least every 6 months. · Try to eat a variety of foods and to spread carbohydrate throughout the day. Carbohydrate raises blood sugar higher and more quickly than any other nutrient does. Carbohydrate is found in sugar, breads and cereals, fruit, starchy vegetables such as potatoes and corn, and milk and yogurt. · Get at least 30 minutes of exercise on most days of the week. Walking is a good choice. You also may want to do other activities, such as running, swimming, cycling, or playing tennis or team sports. If your doctor says it's okay, do muscle-strengthening exercises at least 2 times a week. · See your doctor for checkups and tests on a regular schedule. · If you have high blood pressure or high cholesterol, take the medicines as prescribed by your doctor. · Do not smoke. Smoking can make health problems worse. This includes problems you might have with type 2 diabetes. If you need help quitting, talk to your doctor about stop-smoking programs and medicines. These can increase your chances of quitting for good. Follow-up care is a key part of your treatment and safety. Be sure to make and go to all appointments, and call your doctor if you are having problems. It's also a good idea to know your test results and keep a list of the medicines you take. Where can you learn more? Go to http://maciel-zack.info/. Enter O347 in the search box to learn more about \"Learning About Type 2 Diabetes. \"  Current as of: July 25, 2018  Content Version: 11.9  © 6230-2706 [employer].  Care instructions adapted under license by [employer] (which disclaims liability or warranty for this information). If you have questions about a medical condition or this instruction, always ask your healthcare professional. [address] any warranty or liability for your use of this information. High Cholesterol: Care Instructions  Your Care Instructions    Cholesterol is a type of fat in your blood. It is needed for many body functions, such as making new cells. Cholesterol is made by your body. It also comes from food you eat. High cholesterol means that you have too much of the fat in your blood. This raises your risk of a heart attack and stroke. LDL and HDL are part of your total cholesterol. LDL is the \"bad\" cholesterol. High LDL can raise your risk for heart disease, heart attack, and stroke. HDL is the \"good\" cholesterol. It helps clear bad cholesterol from the body. High HDL is linked with a lower risk of heart disease, heart attack, and stroke. Your cholesterol levels help your doctor find out your risk for having a heart attack or stroke. You and your doctor can talk about whether you need to lower your risk and what treatment is best for you. A heart-healthy lifestyle along with medicines can help lower your cholesterol and your risk. The way you choose to lower your risk will depend on how high your risk is for heart attack and stroke. It will also depend on how you feel about taking medicines. Follow-up care is a key part of your treatment and safety. Be sure to make and go to all appointments, and call your doctor if you are having problems. It's also a good idea to know your test results and keep a list of the medicines you take. How can you care for yourself at home? · Eat a variety of foods every day. Good choices include fruits, vegetables, whole grains (like oatmeal), dried beans and peas, nuts and seeds, soy products (like tofu), and fat-free or low-fat dairy products.   · Replace butter, margarine, and hydrogenated or partially hydrogenated oils with olive and canola oils. (Canola oil margarine without trans fat is fine.)  · Replace red meat with fish, poultry, and soy protein (like tofu). · Limit processed and packaged foods like chips, crackers, and cookies. · Bake, broil, or steam foods. Don't liu them. · Be physically active. Get at least 30 minutes of exercise on most days of the week. Walking is a good choice. You also may want to do other activities, such as running, swimming, cycling, or playing tennis or team sports. · Stay at a healthy weight or lose weight by making the changes in eating and physical activity listed above. Losing just a small amount of weight, even 5 to 10 pounds, can reduce your risk for having a heart attack or stroke. · Do not smoke. When should you call for help? Watch closely for changes in your health, and be sure to contact your doctor if:    · You need help making lifestyle changes.     · You have questions about your medicine. Where can you learn more? Go to http://maciel-zack.info/. Enter V793 in the search box to learn more about \"High Cholesterol: Care Instructions. \"  Current as of: July 22, 2018  Content Version: 11.9  © 3956-3872 [employer]. Care instructions adapted under license by [employer] (which disclaims liability or warranty for this information). If you have questions about a medical condition or this instruction, always ask your healthcare professional. [place] [address] any warranty or liability for your use of this information. High Blood Pressure: Care Instructions  Overview    It's normal for blood pressure to go up and down throughout the day. But if it stays up, you have high blood pressure. Another name for high blood pressure is hypertension. Despite what a lot of people think, high blood pressure usually doesn't cause headaches or make you feel dizzy or lightheaded.  It usually has no symptoms. But it does increase your risk of stroke, heart attack, and other problems. You and your doctor will talk about your risks of these problems based on your blood pressure. Your doctor will give you a goal for your blood pressure. Your goal will be based on your health and your age. Lifestyle changes, such as eating healthy and being active, are always important to help lower blood pressure. You might also take medicine to reach your blood pressure goal.  Follow-up care is a key part of your treatment and safety. Be sure to make and go to all appointments, and call your doctor if you are having problems. It's also a good idea to know your test results and keep a list of the medicines you take. How can you care for yourself at home? Medical treatment  · If you stop taking your medicine, your blood pressure will go back up. You may take one or more types of medicine to lower your blood pressure. Be safe with medicines. Take your medicine exactly as prescribed. Call your doctor if you think you are having a problem with your medicine. · Talk to your doctor before you start taking aspirin every day. Aspirin can help certain people lower their risk of a heart attack or stroke. But taking aspirin isn't right for everyone, because it can cause serious bleeding. · See your doctor regularly. You may need to see the doctor more often at first or until your blood pressure comes down. · If you are taking blood pressure medicine, talk to your doctor before you take decongestants or anti-inflammatory medicine, such as ibuprofen. Some of these medicines can raise blood pressure. · Learn how to check your blood pressure at home. Lifestyle changes  · Stay at a healthy weight. This is especially important if you put on weight around the waist. Losing even 10 pounds can help you lower your blood pressure. · If your doctor recommends it, get more exercise. Walking is a good choice.  Bit by bit, increase the amount you walk every day. Try for at least 30 minutes on most days of the week. You also may want to swim, bike, or do other activities. · Avoid or limit alcohol. Talk to your doctor about whether you can drink any alcohol. · Try to limit how much sodium you eat to less than 2,300 milligrams (mg) a day. Your doctor may ask you to try to eat less than 1,500 mg a day. · Eat plenty of fruits (such as bananas and oranges), vegetables, legumes, whole grains, and low-fat dairy products. · Lower the amount of saturated fat in your diet. Saturated fat is found in animal products such as milk, cheese, and meat. Limiting these foods may help you lose weight and also lower your risk for heart disease. · Do not smoke. Smoking increases your risk for heart attack and stroke. If you need help quitting, talk to your doctor about stop-smoking programs and medicines. These can increase your chances of quitting for good. When should you call for help? Call 911 anytime you think you may need emergency care. This may mean having symptoms that suggest that your blood pressure is causing a serious heart or blood vessel problem. Your blood pressure may be over 180/120.   For example, call 911 if:    · You have symptoms of a heart attack. These may include:  ? Chest pain or pressure, or a strange feeling in the chest.  ? Sweating. ? Shortness of breath. ? Nausea or vomiting. ? Pain, pressure, or a strange feeling in the back, neck, jaw, or upper belly or in one or both shoulders or arms. ? Lightheadedness or sudden weakness. ? A fast or irregular heartbeat.     · You have symptoms of a stroke. These may include:  ? Sudden numbness, tingling, weakness, or loss of movement in your face, arm, or leg, especially on only one side of your body. ? Sudden vision changes. ? Sudden trouble speaking. ? Sudden confusion or trouble understanding simple statements. ? Sudden problems with walking or balance.   ? A sudden, severe headache that is different from past headaches.     · You have severe back or belly pain.    Do not wait until your blood pressure comes down on its own. Get help right away.   Call your doctor now or seek immediate care if:    · Your blood pressure is much higher than normal (such as 180/120 or higher), but you don't have symptoms.     · You think high blood pressure is causing symptoms, such as:  ? Severe headache.  ? Blurry vision.    Watch closely for changes in your health, and be sure to contact your doctor if:    · Your blood pressure measures higher than your doctor recommends at least 2 times. That means the top number is higher or the bottom number is higher, or both.     · You think you may be having side effects from your blood pressure medicine. Where can you learn more? Go to http://maciel-zack.info/. Enter L238 in the search box to learn more about \"High Blood Pressure: Care Instructions. \"  Current as of: July 22, 2018  Content Version: 11.9  © 2006-2018 [employer]. Care instructions adapted under license by [employer] (which disclaims liability or warranty for this information). If you have questions about a medical condition or this instruction, always ask your healthcare professional. [place] [address] any warranty or liability for your use of this information. Neuropathic Pain: Care Instructions  Your Care Instructions    Neuropathic pain is caused by pressure on or damage to your nerves. It's often simply called nerve pain. Some people feel this type of pain all the time. For others, it comes and goes. Diabetes, shingles, or an injury can cause nerve pain. Many people say the pain feels sharp, burning, or stabbing. But some people feel it as a dull ache. In some cases, it makes your skin very sensitive. So touch, pressure, and other sensations that did not hurt before may now cause pain.   It's important to know that this kind of pain is real and can affect your quality of life. It's also important to know that treatment can help. Treatment includes pain medicines, exercise, and physical therapy. Medicines can help reduce the number of pain signals that travel over the nerves. This can make the painful areas less sensitive. It can also help you sleep better and improve your mood. But medicines are only one part of successful treatment. Most people do best with more than one kind of treatment. Your doctor may recommend that you try cognitive-behavioral therapy and stress management. Or, if needed, you may decide to try to quit smoking, lower your blood pressure, or better control blood sugar. These kinds of healthy changes can also make a difference. If you feel that your treatment is not working, talk to your doctor. And be sure to tell your doctor if you think you might be depressed or anxious. These are common problems that can also be treated. Follow-up care is a key part of your treatment and safety. Be sure to make and go to all appointments, and call your doctor if you are having problems. It's also a good idea to know your test results and keep a list of the medicines you take. How can you care for yourself at home? · Be safe with medicines. Read and follow all instructions on the label. ? If the doctor gave you a prescription medicine for pain, take it as prescribed. ? If you are not taking a prescription pain medicine, ask your doctor if you can take an over-the-counter medicine. · Save hard tasks for days when you have less pain. Follow a hard task with an easy task. And remember to take breaks. · Relax, and reduce stress. You may want to try deep breathing or meditation. These can help. · Keep moving. Gentle, daily exercise can help reduce pain. Your doctor or physical therapist can tell you what type of exercise is best for you. This may include walking, swimming, and stationary biking.  It may also include stretches and range-of-motion exercises. · Try heat, cold packs, and massage. · Get enough sleep. Constant pain can make you more tired. If the pain makes it hard to sleep, talk with your doctor. · Think positively. Your thoughts can affect your pain. Do fun things to distract yourself from the pain. See a movie, read a book, listen to music, or spend time with a friend. · Keep a pain diary. Try to write down how strong your pain is and what it feels like. Also try to notice and write down how your moods, thoughts, sleep, activities, and medicine affect your pain. These notes can help you and your doctor find the best ways to treat your pain. Reducing constipation caused by pain medicine  Pain medicines often cause constipation. To reduce constipation:  · Include fruits, vegetables, beans, and whole grains in your diet each day. These foods are high in fiber. · Drink plenty of fluids, enough so that your urine is light yellow or clear like water. If you have kidney, heart, or liver disease and have to limit fluids, talk with your doctor before you increase the amount of fluids you drink. · Get some exercise every day. Build up slowly to 30 to 60 minutes a day on 5 or more days of the week. · Take a fiber supplement, such as Citrucel or Metamucil, every day if needed. Read and follow all instructions on the label. · Schedule time each day for a bowel movement. Having a daily routine may help. Take your time and do not strain when having a bowel movement. · Ask your doctor about a laxative. The goal is to have one easy bowel movement every 1 to 2 days. Do not let constipation go untreated for more than 3 days. When should you call for help? Call your doctor now or seek immediate medical care if:    · You feel sad, anxious, or hopeless for more than a few days. This could mean you are depressed. Depression is common in people who have a lot of pain.  But it can be treated.     · You have trouble with bowel movements, such as:  ? No bowel movement in 3 days. ? Blood in the anal area, in your stool, or on the toilet paper. ? Diarrhea for more than 24 hours.    Watch closely for changes in your health, and be sure to contact your doctor if:    · Your pain is getting worse.     · You can't sleep because of pain.     · You are very worried or anxious about your pain.     · You have trouble taking your pain medicine.     · You have any concerns about your pain medicine or its side effects.     · You have vomiting or cramps for more than 2 hours. Where can you learn more? Go to http://maciel-zack.info/. Enter A801 in the search box to learn more about \"Neuropathic Pain: Care Instructions. \"  Current as of: Chery 3, 2018  Content Version: 11.9  © 3305-7597 [employer]. Care instructions adapted under license by [employer] (which disclaims liability or warranty for this information). If you have questions about a medical condition or this instruction, always ask your healthcare professional. [address] any warranty or liability for your use of this information.

## 2019-05-24 LAB
ALBUMIN SERPL-MCNC: 2.6 G/DL (ref 3.5–5.5)
ALBUMIN/GLOB SERPL: 0.7 {RATIO} (ref 1.2–2.2)
ALP SERPL-CCNC: 184 IU/L (ref 39–117)
ALT SERPL-CCNC: 9 IU/L (ref 0–44)
AST SERPL-CCNC: 28 IU/L (ref 0–40)
BASOPHILS # BLD AUTO: 0 X10E3/UL (ref 0–0.2)
BASOPHILS NFR BLD AUTO: 0 %
BILIRUB SERPL-MCNC: 0.6 MG/DL (ref 0–1.2)
BUN SERPL-MCNC: 10 MG/DL (ref 6–24)
BUN/CREAT SERPL: 8 (ref 9–20)
CALCIUM SERPL-MCNC: 8.7 MG/DL (ref 8.7–10.2)
CHLORIDE SERPL-SCNC: 97 MMOL/L (ref 96–106)
CO2 SERPL-SCNC: 30 MMOL/L (ref 20–29)
CREAT SERPL-MCNC: 1.32 MG/DL (ref 0.76–1.27)
EOSINOPHIL # BLD AUTO: 0.1 X10E3/UL (ref 0–0.4)
EOSINOPHIL NFR BLD AUTO: 1 %
ERYTHROCYTE [DISTWIDTH] IN BLOOD BY AUTOMATED COUNT: 13.9 % (ref 12.3–15.4)
GLOBULIN SER CALC-MCNC: 4 G/DL (ref 1.5–4.5)
GLUCOSE SERPL-MCNC: 325 MG/DL (ref 65–99)
HCT VFR BLD AUTO: 42.1 % (ref 37.5–51)
HGB BLD-MCNC: 14 G/DL (ref 13–17.7)
IMM GRANULOCYTES # BLD AUTO: 0 X10E3/UL (ref 0–0.1)
IMM GRANULOCYTES NFR BLD AUTO: 0 %
LYMPHOCYTES # BLD AUTO: 1.5 X10E3/UL (ref 0.7–3.1)
LYMPHOCYTES NFR BLD AUTO: 9 %
MCH RBC QN AUTO: 28.2 PG (ref 26.6–33)
MCHC RBC AUTO-ENTMCNC: 33.3 G/DL (ref 31.5–35.7)
MCV RBC AUTO: 85 FL (ref 79–97)
MONOCYTES # BLD AUTO: 0.8 X10E3/UL (ref 0.1–0.9)
MONOCYTES NFR BLD AUTO: 5 %
NEUTROPHILS # BLD AUTO: 13.9 X10E3/UL (ref 1.4–7)
NEUTROPHILS NFR BLD AUTO: 85 %
PLATELET # BLD AUTO: 434 X10E3/UL (ref 150–450)
POTASSIUM SERPL-SCNC: 4.1 MMOL/L (ref 3.5–5.2)
PROT SERPL-MCNC: 6.6 G/DL (ref 6–8.5)
RBC # BLD AUTO: 4.97 X10E6/UL (ref 4.14–5.8)
SODIUM SERPL-SCNC: 140 MMOL/L (ref 134–144)
WBC # BLD AUTO: 16.5 X10E3/UL (ref 3.4–10.8)

## 2019-05-28 NOTE — PROGRESS NOTES
Call to patient and informed of lab results and need for repeat testing on visit 5/31/19. He verbalized understanding and was given the opportunity to ask questions.

## 2019-05-30 ENCOUNTER — OFFICE VISIT (OUTPATIENT)
Dept: INTERNAL MEDICINE CLINIC | Age: 54
End: 2019-05-30

## 2019-05-30 VITALS
HEART RATE: 103 BPM | HEIGHT: 68 IN | RESPIRATION RATE: 16 BRPM | DIASTOLIC BLOOD PRESSURE: 92 MMHG | OXYGEN SATURATION: 97 % | BODY MASS INDEX: 25.02 KG/M2 | WEIGHT: 165.1 LBS | SYSTOLIC BLOOD PRESSURE: 150 MMHG | TEMPERATURE: 97.9 F

## 2019-05-30 DIAGNOSIS — G62.9 NEUROPATHY: ICD-10-CM

## 2019-05-30 DIAGNOSIS — R79.89 ELEVATED SERUM CREATININE: Primary | ICD-10-CM

## 2019-05-30 DIAGNOSIS — R73.9 ELEVATED BLOOD SUGAR: ICD-10-CM

## 2019-05-30 DIAGNOSIS — R60.0 BILATERAL LOWER EXTREMITY EDEMA: ICD-10-CM

## 2019-05-30 LAB — GLUCOSE POC: 193 MG/DL

## 2019-05-30 RX ORDER — FUROSEMIDE 40 MG/1
40 TABLET ORAL DAILY
Qty: 30 TAB | Refills: 2 | Status: SHIPPED | OUTPATIENT
Start: 2019-05-30 | End: 2019-06-26 | Stop reason: ALTCHOICE

## 2019-05-30 RX ORDER — AMITRIPTYLINE HYDROCHLORIDE 25 MG/1
25 TABLET, FILM COATED ORAL
Qty: 30 TAB | Refills: 3 | Status: SHIPPED | OUTPATIENT
Start: 2019-05-30 | End: 2019-11-13 | Stop reason: SDUPTHER

## 2019-05-30 NOTE — PATIENT INSTRUCTIONS
Neuropathic Pain: Care Instructions  Your Care Instructions    Neuropathic pain is caused by pressure on or damage to your nerves. It's often simply called nerve pain. Some people feel this type of pain all the time. For others, it comes and goes. Diabetes, shingles, or an injury can cause nerve pain. Many people say the pain feels sharp, burning, or stabbing. But some people feel it as a dull ache. In some cases, it makes your skin very sensitive. So touch, pressure, and other sensations that did not hurt before may now cause pain. It's important to know that this kind of pain is real and can affect your quality of life. It's also important to know that treatment can help. Treatment includes pain medicines, exercise, and physical therapy. Medicines can help reduce the number of pain signals that travel over the nerves. This can make the painful areas less sensitive. It can also help you sleep better and improve your mood. But medicines are only one part of successful treatment. Most people do best with more than one kind of treatment. Your doctor may recommend that you try cognitive-behavioral therapy and stress management. Or, if needed, you may decide to try to quit smoking, lower your blood pressure, or better control blood sugar. These kinds of healthy changes can also make a difference. If you feel that your treatment is not working, talk to your doctor. And be sure to tell your doctor if you think you might be depressed or anxious. These are common problems that can also be treated. Follow-up care is a key part of your treatment and safety. Be sure to make and go to all appointments, and call your doctor if you are having problems. It's also a good idea to know your test results and keep a list of the medicines you take. How can you care for yourself at home? · Be safe with medicines. Read and follow all instructions on the label.   ? If the doctor gave you a prescription medicine for pain, take it as prescribed. ? If you are not taking a prescription pain medicine, ask your doctor if you can take an over-the-counter medicine. · Save hard tasks for days when you have less pain. Follow a hard task with an easy task. And remember to take breaks. · Relax, and reduce stress. You may want to try deep breathing or meditation. These can help. · Keep moving. Gentle, daily exercise can help reduce pain. Your doctor or physical therapist can tell you what type of exercise is best for you. This may include walking, swimming, and stationary biking. It may also include stretches and range-of-motion exercises. · Try heat, cold packs, and massage. · Get enough sleep. Constant pain can make you more tired. If the pain makes it hard to sleep, talk with your doctor. · Think positively. Your thoughts can affect your pain. Do fun things to distract yourself from the pain. See a movie, read a book, listen to music, or spend time with a friend. · Keep a pain diary. Try to write down how strong your pain is and what it feels like. Also try to notice and write down how your moods, thoughts, sleep, activities, and medicine affect your pain. These notes can help you and your doctor find the best ways to treat your pain. Reducing constipation caused by pain medicine  Pain medicines often cause constipation. To reduce constipation:  · Include fruits, vegetables, beans, and whole grains in your diet each day. These foods are high in fiber. · Drink plenty of fluids, enough so that your urine is light yellow or clear like water. If you have kidney, heart, or liver disease and have to limit fluids, talk with your doctor before you increase the amount of fluids you drink. · Get some exercise every day. Build up slowly to 30 to 60 minutes a day on 5 or more days of the week. · Take a fiber supplement, such as Citrucel or Metamucil, every day if needed. Read and follow all instructions on the label.   · Schedule time each day for a bowel movement. Having a daily routine may help. Take your time and do not strain when having a bowel movement. · Ask your doctor about a laxative. The goal is to have one easy bowel movement every 1 to 2 days. Do not let constipation go untreated for more than 3 days. When should you call for help? Call your doctor now or seek immediate medical care if:    · You feel sad, anxious, or hopeless for more than a few days. This could mean you are depressed. Depression is common in people who have a lot of pain. But it can be treated.     · You have trouble with bowel movements, such as:  ? No bowel movement in 3 days. ? Blood in the anal area, in your stool, or on the toilet paper. ? Diarrhea for more than 24 hours.    Watch closely for changes in your health, and be sure to contact your doctor if:    · Your pain is getting worse.     · You can't sleep because of pain.     · You are very worried or anxious about your pain.     · You have trouble taking your pain medicine.     · You have any concerns about your pain medicine or its side effects.     · You have vomiting or cramps for more than 2 hours. Where can you learn more? Go to http://maciel-zack.info/. Enter I683 in the search box to learn more about \"Neuropathic Pain: Care Instructions. \"  Current as of: Chery 3, 2018  Content Version: 11.9  © 8246-9855 [employer], [employer]. Care instructions adapted under license by [employer] (which disclaims liability or warranty for this information). If you have questions about a medical condition or this instruction, always ask your healthcare professional. [place] [address] any warranty or liability for your use of this information. Leg and Ankle Edema: Care Instructions  Your Care Instructions  Swelling in the legs, ankles, and feet is called edema. It is common after you sit or stand for a while.  Long plane flights or car rides often cause swelling in the legs and feet. You may also have swelling if you have to stand for long periods of time at your job. Problems with the veins in the legs (varicose veins) and changes in hormones can also cause swelling. Sometimes the swelling in the ankles and feet is caused by a more serious problem, such as heart failure, infection, blood clots, or liver or kidney disease. Follow-up care is a key part of your treatment and safety. Be sure to make and go to all appointments, and call your doctor if you are having problems. It's also a good idea to know your test results and keep a list of the medicines you take. How can you care for yourself at home? · If your doctor gave you medicine, take it as prescribed. Call your doctor if you think you are having a problem with your medicine. · Whenever you are resting, raise your legs up. Try to keep the swollen area higher than the level of your heart. · Take breaks from standing or sitting in one position. ? Walk around to increase the blood flow in your lower legs. ? Move your feet and ankles often while you stand, or tighten and relax your leg muscles. · Wear support stockings. Put them on in the morning, before swelling gets worse. · Eat a balanced diet. Lose weight if you need to. · Limit the amount of salt (sodium) in your diet. Salt holds fluid in the body and may increase swelling. When should you call for help? Call 911 anytime you think you may need emergency care. For example, call if:    · You have symptoms of a blood clot in your lung (called a pulmonary embolism). These may include:  ? Sudden chest pain. ? Trouble breathing. ? Coughing up blood.    Call your doctor now or seek immediate medical care if:    · You have signs of a blood clot, such as:  ? Pain in your calf, back of the knee, thigh, or groin. ? Redness and swelling in your leg or groin.     · You have symptoms of infection, such as:  ? Increased pain, swelling, warmth, or redness.   ? Red streaks or pus. ? A fever.    Watch closely for changes in your health, and be sure to contact your doctor if:    · Your swelling is getting worse.     · You have new or worsening pain in your legs.     · You do not get better as expected. Where can you learn more? Go to http://maciel-zack.info/. Enter V880 in the search box to learn more about \"Leg and Ankle Edema: Care Instructions. \"  Current as of: September 23, 2018  Content Version: 11.9  © 1262-2297 [employer]. Care instructions adapted under license by [employer] (which disclaims liability or warranty for this information). If you have questions about a medical condition or this instruction, always ask your healthcare professional. [address] any warranty or liability for your use of this information. Learning About High Blood Sugar  What is high blood sugar? Your body turns the food you eat into glucose (sugar), which it uses for energy. But if your body isn't able to use the sugar right away, it can build up in your blood and lead to high blood sugar. When the amount of sugar in your blood stays too high for too much of the time, you may have diabetes. Diabetes is a disease that can cause serious health problems. The good news is that lifestyle changes may help you get your blood sugar back to normal and avoid or delay diabetes. What causes high blood sugar? Sugar (glucose) can build up in your blood if you:  · Are overweight. · Have a family history of diabetes. · Take certain medicines, such as steroids. What are the symptoms? Having high blood sugar may not cause any symptoms at all. Or it may make you feel very thirsty or very hungry. You may also urinate more often than usual, have blurry vision, or lose weight without trying. How is high blood sugar treated? You can take steps to lower your blood sugar level if you understand what makes it get higher.  Your doctor may want you to learn how to test your blood sugar level at home. Then you can see how illness, stress, or different kinds of food or medicine raise or lower your blood sugar level. Other tests may be needed to see if you have diabetes. How can you prevent high blood sugar? · Watch your weight. If you're overweight, losing just a small amount of weight may help. Reducing fat around your waist is most important. · Limit the amount of calories, sweets, and unhealthy fat you eat. Ask your doctor if a dietitian can help you. A registered dietitian can help you create meal plans that fit your lifestyle. · Get at least 30 minutes of exercise on most days of the week. Exercise helps control your blood sugar. It also helps you maintain a healthy weight. Walking is a good choice. You also may want to do other activities, such as running, swimming, cycling, or playing tennis or team sports. · If your doctor prescribed medicines, take them exactly as prescribed. Call your doctor if you think you are having a problem with your medicine. You will get more details on the specific medicines your doctor prescribes. Follow-up care is a key part of your treatment and safety. Be sure to make and go to all appointments, and call your doctor if you are having problems. It's also a good idea to know your test results and keep a list of the medicines you take. Where can you learn more? Go to http://maciel-zack.info/. Enter O108 in the search box to learn more about \"Learning About High Blood Sugar. \"  Current as of: July 25, 2018  Content Version: 11.9  © 3181-1304 [employer]. Care instructions adapted under license by [employer] (which disclaims liability or warranty for this information).  If you have questions about a medical condition or this instruction, always ask your healthcare professional. [address] any warranty or liability for your use of this information.

## 2019-05-30 NOTE — PROGRESS NOTES
Chief Complaint   Patient presents with    Swelling    Hypertension    Diabetes     1. Have you been to the ER, urgent care clinic since your last visit? Hospitalized since your last visit? No    2. Have you seen or consulted any other health care providers outside of the [address] [place] [address] since your last visit? Include any pap smears or colon screening.  No

## 2019-05-31 ENCOUNTER — TELEPHONE (OUTPATIENT)
Dept: INTERNAL MEDICINE CLINIC | Age: 54
End: 2019-05-31

## 2019-05-31 LAB
ALBUMIN SERPL-MCNC: 2.7 G/DL (ref 3.5–5.5)
BUN SERPL-MCNC: 17 MG/DL (ref 6–24)
BUN/CREAT SERPL: 13 (ref 9–20)
CALCIUM SERPL-MCNC: 9.1 MG/DL (ref 8.7–10.2)
CHLORIDE SERPL-SCNC: 90 MMOL/L (ref 96–106)
CO2 SERPL-SCNC: 33 MMOL/L (ref 20–29)
CREAT SERPL-MCNC: 1.31 MG/DL (ref 0.76–1.27)
GLUCOSE SERPL-MCNC: 141 MG/DL (ref 65–99)
PHOSPHATE SERPL-MCNC: 4.1 MG/DL (ref 2.5–4.5)
POTASSIUM SERPL-SCNC: 3.4 MMOL/L (ref 3.5–5.2)
SODIUM SERPL-SCNC: 139 MMOL/L (ref 134–144)
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
## 2019-05-31 NOTE — TELEPHONE ENCOUNTER
TC to patient to discuss lab results (CMP and renal function). Abnormal. He was advised her needs to follow-through with the referral to nephrology. He verbalized understanding and was given the opportunity to ask questions.

## 2019-06-06 ENCOUNTER — TELEPHONE (OUTPATIENT)
Dept: INTERNAL MEDICINE CLINIC | Age: 54
End: 2019-06-06

## 2019-06-06 ENCOUNTER — OFFICE VISIT (OUTPATIENT)
Dept: INTERNAL MEDICINE CLINIC | Age: 54
End: 2019-06-06

## 2019-06-06 VITALS
TEMPERATURE: 98.4 F | OXYGEN SATURATION: 97 % | BODY MASS INDEX: 24.14 KG/M2 | WEIGHT: 159.3 LBS | RESPIRATION RATE: 16 BRPM | DIASTOLIC BLOOD PRESSURE: 92 MMHG | HEIGHT: 68 IN | HEART RATE: 104 BPM | SYSTOLIC BLOOD PRESSURE: 148 MMHG

## 2019-06-06 DIAGNOSIS — F32.A DEPRESSION, UNSPECIFIED DEPRESSION TYPE: ICD-10-CM

## 2019-06-06 DIAGNOSIS — R60.0 LOWER EXTREMITY EDEMA: Primary | ICD-10-CM

## 2019-06-06 DIAGNOSIS — I10 ESSENTIAL HYPERTENSION: ICD-10-CM

## 2019-06-06 DIAGNOSIS — E78.2 MIXED HYPERLIPIDEMIA: ICD-10-CM

## 2019-06-06 DIAGNOSIS — G62.9 PERIPHERAL POLYNEUROPATHY: ICD-10-CM

## 2019-06-06 RX ORDER — PREGABALIN 150 MG/1
150 CAPSULE ORAL 2 TIMES DAILY
Qty: 60 CAP | Refills: 1 | Status: SHIPPED | OUTPATIENT
Start: 2019-06-06 | End: 2019-12-16 | Stop reason: SDUPTHER

## 2019-06-06 NOTE — PROGRESS NOTES
Chief Complaint   Patient presents with    Medication Evaluation     lyrica approved    Hypertension     1. Have you been to the ER, urgent care clinic since your last visit? Hospitalized since your last visit? No    2. Have you seen or consulted any other health care providers outside of the [address] [place] [address] since your last visit? Include any pap smears or colon screening.  No

## 2019-06-06 NOTE — TELEPHONE ENCOUNTER
As the order is currently written, it only allows [facility] for scheduling. It is not allowing the [facility] area. A new order needs to be written.   For clarification, call Katlyn Aguirre at [phone]
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
## 2019-06-13 ENCOUNTER — HOSPITAL ENCOUNTER (OUTPATIENT)
Dept: VASCULAR SURGERY | Age: 54
Discharge: HOME | End: 2019-06-13
Attending: NURSE PRACTITIONER
Payer: MEDICAID

## 2019-06-13 DIAGNOSIS — R60.0 LOWER EXTREMITY EDEMA: ICD-10-CM

## 2019-06-13 PROCEDURE — 93970 EXTREMITY STUDY: CPT

## 2019-06-26 ENCOUNTER — OFFICE VISIT (OUTPATIENT)
Dept: INTERNAL MEDICINE CLINIC | Age: 54
End: 2019-06-26

## 2019-06-26 VITALS
BODY MASS INDEX: 22.11 KG/M2 | OXYGEN SATURATION: 97 % | SYSTOLIC BLOOD PRESSURE: 139 MMHG | TEMPERATURE: 95.9 F | HEART RATE: 88 BPM | HEIGHT: 68 IN | RESPIRATION RATE: 16 BRPM | DIASTOLIC BLOOD PRESSURE: 84 MMHG | WEIGHT: 145.9 LBS

## 2019-06-26 DIAGNOSIS — N12 PYELONEPHRITIS: ICD-10-CM

## 2019-06-26 DIAGNOSIS — N17.9 AKI (ACUTE KIDNEY INJURY) (HCC): ICD-10-CM

## 2019-06-26 DIAGNOSIS — R31.9 HEMATURIA, UNSPECIFIED TYPE: ICD-10-CM

## 2019-06-26 DIAGNOSIS — Z09 HOSPITAL DISCHARGE FOLLOW-UP: Primary | ICD-10-CM

## 2019-06-26 DIAGNOSIS — I10 ESSENTIAL HYPERTENSION: ICD-10-CM

## 2019-06-26 RX ORDER — AMLODIPINE BESYLATE 10 MG/1
10 TABLET ORAL DAILY
Qty: 30 TAB | Refills: 3 | Status: SHIPPED | OUTPATIENT
Start: 2019-06-26 | End: 2020-03-10 | Stop reason: SDUPTHER

## 2019-06-26 RX ORDER — FLUCONAZOLE 200 MG/1
200 TABLET ORAL DAILY
COMMUNITY
End: 2020-03-03

## 2019-06-26 RX ORDER — LEVOFLOXACIN 750 MG/1
750 TABLET ORAL DAILY
COMMUNITY
End: 2019-08-09

## 2019-06-26 NOTE — PROGRESS NOTES
Chief Complaint   Patient presents with   [facility] Follow Up    Medication Evaluation     pt states hospital stopped all of her meds; wants to know what he should start taking again     1. Have you been to the ER, urgent care clinic since your last visit? Hospitalized since your last visit? No    2. Have you seen or consulted any other health care providers outside of the [address] [place] [address] since your last visit? Include any pap smears or colon screening.  No

## 2019-06-26 NOTE — PATIENT INSTRUCTIONS
Blood in the Urine: Care Instructions  Your Care Instructions    Blood in the urine, or hematuria, may make the urine look red, brown, or pink. There may be blood every time you urinate or just from time to time. You cannot always see blood in the urine, but it will show up in a urine test.  Blood in the urine may be serious. It should always be checked by a doctor. Your doctor may recommend more tests, including an X-ray, a CT scan, or a cystoscopy (which lets a doctor look inside the urethra and bladder). Blood in the urine can be a sign of another problem. Common causes are bladder infections and kidney stones. An injury to your groin or your genital area can also cause bleeding in the urinary tract. Very hard exercise--such as running a marathon--can cause blood in the urine. Blood in the urine can also be a sign of kidney disease or cancer in the bladder or kidney. Many cases of blood in the urine are caused by a harmless condition that runs in families. This is called benign familial hematuria. It does not need any treatment. Sometimes your urine may look red or brown even though it does not contain blood. For example, not getting enough fluids (dehydration), taking certain medicines, or having a liver problem can change the color of your urine. Eating foods such as beets, rhubarb, or blackberries or foods with red food coloring can make your urine look red or pink. Follow-up care is a key part of your treatment and safety. Be sure to make and go to all appointments, and call your doctor if you are having problems. It's also a good idea to know your test results and keep a list of the medicines you take. When should you call for help? Call your doctor now or seek immediate medical care if:    · You have symptoms of a urinary infection. For example:  ? You have pus in your urine. ? You have pain in your back just below your rib cage. This is called flank pain. ?  You have a fever, chills, or body aches.  ? It hurts to urinate. ? You have groin or belly pain.     · You have more blood in your urine.    Watch closely for changes in your health, and be sure to contact your doctor if:    · You have new urination problems.     · You do not get better as expected. Where can you learn more? Go to http://maciel-zack.info/. Enter X422 in the search box to learn more about \"Blood in the Urine: Care Instructions. \"  Current as of: March 20, 2018  Content Version: 11.9  © 8688-8021 [employer]. Care instructions adapted under license by [employer] (which disclaims liability or warranty for this information). If you have questions about a medical condition or this instruction, always ask your healthcare professional. [address] any warranty or liability for your use of this information. High Blood Pressure: Care Instructions  Overview    It's normal for blood pressure to go up and down throughout the day. But if it stays up, you have high blood pressure. Another name for high blood pressure is hypertension. Despite what a lot of people think, high blood pressure usually doesn't cause headaches or make you feel dizzy or lightheaded. It usually has no symptoms. But it does increase your risk of stroke, heart attack, and other problems. You and your doctor will talk about your risks of these problems based on your blood pressure. Your doctor will give you a goal for your blood pressure. Your goal will be based on your health and your age. Lifestyle changes, such as eating healthy and being active, are always important to help lower blood pressure. You might also take medicine to reach your blood pressure goal.  Follow-up care is a key part of your treatment and safety. Be sure to make and go to all appointments, and call your doctor if you are having problems.  It's also a good idea to know your test results and keep a list of the medicines you take. How can you care for yourself at home? Medical treatment  · If you stop taking your medicine, your blood pressure will go back up. You may take one or more types of medicine to lower your blood pressure. Be safe with medicines. Take your medicine exactly as prescribed. Call your doctor if you think you are having a problem with your medicine. · Talk to your doctor before you start taking aspirin every day. Aspirin can help certain people lower their risk of a heart attack or stroke. But taking aspirin isn't right for everyone, because it can cause serious bleeding. · See your doctor regularly. You may need to see the doctor more often at first or until your blood pressure comes down. · If you are taking blood pressure medicine, talk to your doctor before you take decongestants or anti-inflammatory medicine, such as ibuprofen. Some of these medicines can raise blood pressure. · Learn how to check your blood pressure at home. Lifestyle changes  · Stay at a healthy weight. This is especially important if you put on weight around the waist. Losing even 10 pounds can help you lower your blood pressure. · If your doctor recommends it, get more exercise. Walking is a good choice. Bit by bit, increase the amount you walk every day. Try for at least 30 minutes on most days of the week. You also may want to swim, bike, or do other activities. · Avoid or limit alcohol. Talk to your doctor about whether you can drink any alcohol. · Try to limit how much sodium you eat to less than 2,300 milligrams (mg) a day. Your doctor may ask you to try to eat less than 1,500 mg a day. · Eat plenty of fruits (such as bananas and oranges), vegetables, legumes, whole grains, and low-fat dairy products. · Lower the amount of saturated fat in your diet. Saturated fat is found in animal products such as milk, cheese, and meat. Limiting these foods may help you lose weight and also lower your risk for heart disease.   · Do not smoke. Smoking increases your risk for heart attack and stroke. If you need help quitting, talk to your doctor about stop-smoking programs and medicines. These can increase your chances of quitting for good. When should you call for help? Call 911 anytime you think you may need emergency care. This may mean having symptoms that suggest that your blood pressure is causing a serious heart or blood vessel problem. Your blood pressure may be over 180/120.   For example, call 911 if:    · You have symptoms of a heart attack. These may include:  ? Chest pain or pressure, or a strange feeling in the chest.  ? Sweating. ? Shortness of breath. ? Nausea or vomiting. ? Pain, pressure, or a strange feeling in the back, neck, jaw, or upper belly or in one or both shoulders or arms. ? Lightheadedness or sudden weakness. ? A fast or irregular heartbeat.     · You have symptoms of a stroke. These may include:  ? Sudden numbness, tingling, weakness, or loss of movement in your face, arm, or leg, especially on only one side of your body. ? Sudden vision changes. ? Sudden trouble speaking. ? Sudden confusion or trouble understanding simple statements. ? Sudden problems with walking or balance. ? A sudden, severe headache that is different from past headaches.     · You have severe back or belly pain.    Do not wait until your blood pressure comes down on its own. Get help right away.   Call your doctor now or seek immediate care if:    · Your blood pressure is much higher than normal (such as 180/120 or higher), but you don't have symptoms.     · You think high blood pressure is causing symptoms, such as:  ? Severe headache.  ? Blurry vision.    Watch closely for changes in your health, and be sure to contact your doctor if:    · Your blood pressure measures higher than your doctor recommends at least 2 times.  That means the top number is higher or the bottom number is higher, or both.     · You think you may be having side effects from your blood pressure medicine. Where can you learn more? Go to http://maciel-zack.info/. Enter Z629 in the search box to learn more about \"High Blood Pressure: Care Instructions. \"  Current as of: July 22, 2018  Content Version: 11.9  © 6541-3409 [employer]. Care instructions adapted under license by [employer] (which disclaims liability or warranty for this information). If you have questions about a medical condition or this instruction, always ask your healthcare professional. [address] any warranty or liability for your use of this information. Kidney Infection: Care Instructions  Your Care Instructions    A kidney infection (pyelonephritis) is a type of urinary tract infection, or UTI. Most UTIs are bladder infections. Kidney infections tend to make people much sicker than bladder infections do. A kidney infection is also more serious because it can cause lasting damage if it is not treated quickly. Follow-up care is a key part of your treatment and safety. Be sure to make and go to all appointments, and call your doctor if you are having problems. It's also a good idea to know your test results and keep a list of the medicines you take. How can you care for yourself at home? · Take your antibiotics as directed. Do not stop taking them just because you feel better. You need to take the full course of antibiotics. · Drink plenty of water, enough so that your urine is light yellow or clear like water. This may help wash out bacteria that are causing the infection. If you have kidney, heart, or liver disease and have to limit fluids, talk with your doctor before you increase the amount of fluids you drink. · Urinate often. Try to empty your bladder each time. · To relieve pain, take a hot shower or lay a heating pad (set on low) over your lower belly. Never go to sleep with a heating pad in place.  Put a thin cloth between the heating pad and your skin. To help prevent kidney infections  · Drink plenty of water each day. This helps you urinate often, which clears bacteria from your system. If you have kidney, heart, or liver disease and have to limit fluids, talk with your doctor before you increase the amount of fluids you drink. · Urinate when you have the urge. Do not hold your urine for a long time. Urinate before you go to sleep. · If you have symptoms of a bladder infection, such as burning when you urinate or having to urinate often, call your doctor so you can treat the problem before it gets worse. If you do not treat a bladder infection quickly, it can spread to the kidney. · Men should keep the tip of the penis clean. If you are a woman, keep these ideas in mind:  · Urinate right after you have sex. · Change sanitary pads often. Avoid douches, feminine hygiene sprays, and other feminine hygiene products that have deodorants. · After going to the bathroom, wipe from front to back. When should you call for help? Call your doctor now or seek immediate medical care if:    · You have symptoms that a kidney infection is getting worse. These may include:  ? Pain or burning when you urinate. ? A frequent need to urinate without being able to pass much urine. ? Pain in the flank, which is just below the rib cage and above the waist on either side of the back. ? Blood in the urine. ? A fever.     · You are vomiting or nauseated.    Watch closely for changes in your health, and be sure to contact your doctor if:    · You do not get better as expected. Where can you learn more? Go to http://maciel-zack.info/. Enter J625 in the search box to learn more about \"Kidney Infection: Care Instructions. \"  Current as of: March 14, 2018  Content Version: 11.9  © 8875-4372 [employer].  Care instructions adapted under license by [employer] (which disclaims liability or warranty for this information). If you have questions about a medical condition or this instruction, always ask your healthcare professional. [address] any warranty or liability for your use of this information. Learning About Type 2 Diabetes  What is type 2 diabetes? Insulin is a hormone that helps your body use sugar from your food as energy. Type 2 diabetes happens when your body can't use insulin the right way. Over time, the pancreas can't make enough insulin. If you don't have enough insulin, too much sugar stays in your blood. If you are overweight, get little or no exercise, or have type 2 diabetes in your family, you are more likely to have problems with the way insulin works in your body.  Americans, Hispanics, Native Americans,  Americans, and Pacific Islanders have a higher risk for type 2 diabetes. Type 2 diabetes can be prevented or delayed with a healthy lifestyle, which includes staying at a healthy weight, making smart food choices, and getting regular exercise. What can you expect with type 2 diabetes? [place] keep hearing about how important it is to keep your blood sugar within a target range. That's because over time, high blood sugar can lead to serious problems. It can:  · Harm your eyes, nerves, and kidneys. · Damage your blood vessels, leading to heart disease and stroke. · Reduce blood flow and cause nerve damage to parts of your body, especially your feet. This can cause slow healing and pain when you walk. · Make your immune system weak and less able to fight infections. When people hear the word \"diabetes,\" they often think of problems like these. But daily care and treatment can help prevent or delay these problems. The goal is to keep your blood sugar in a target range. That's the best way to reduce your chance of having more problems from diabetes. What are the symptoms?   Some people who have type 2 diabetes may not have any symptoms early on. Many people with the disease don't even know they have it at first. But with time, diabetes starts to cause symptoms. You experience most symptoms of type 2 diabetes when your blood sugar is either too high or too low. The most common symptoms of high blood sugar include:  · Thirst.  · Frequent urination. · Weight loss. · Blurry vision. The symptoms of low blood sugar include:  · Sweating. · Shakiness. · Weakness. · Hunger. · Confusion. How can you prevent type 2 diabetes? The best way to prevent or delay type 2 diabetes is to adopt healthy habits, which include:  · Staying at a healthy weight. · Exercising regularly. · Eating healthy foods. How is type 2 diabetes treated? If you have type 2 diabetes, here are the most important things you can do. · Take your diabetes medicines. · Check your blood sugar as often as your doctor recommends. Also, get a hemoglobin A1c test at least every 6 months. · Try to eat a variety of foods and to spread carbohydrate throughout the day. Carbohydrate raises blood sugar higher and more quickly than any other nutrient does. Carbohydrate is found in sugar, breads and cereals, fruit, starchy vegetables such as potatoes and corn, and milk and yogurt. · Get at least 30 minutes of exercise on most days of the week. Walking is a good choice. You also may want to do other activities, such as running, swimming, cycling, or playing tennis or team sports. If your doctor says it's okay, do muscle-strengthening exercises at least 2 times a week. · See your doctor for checkups and tests on a regular schedule. · If you have high blood pressure or high cholesterol, take the medicines as prescribed by your doctor. · Do not smoke. Smoking can make health problems worse. This includes problems you might have with type 2 diabetes. If you need help quitting, talk to your doctor about stop-smoking programs and medicines.  These can increase your chances of quitting for good. Follow-up care is a key part of your treatment and safety. Be sure to make and go to all appointments, and call your doctor if you are having problems. It's also a good idea to know your test results and keep a list of the medicines you take. Where can you learn more? Go to http://maciel-zack.info/. Enter R894 in the search box to learn more about \"Learning About Type 2 Diabetes. \"  Current as of: July 25, 2018  Content Version: 11.9  © 0897-6838 [employer]. Care instructions adapted under license by [employer] (which disclaims liability or warranty for this information). If you have questions about a medical condition or this instruction, always ask your healthcare professional. [address] any warranty or liability for your use of this information. Kidney Disease and Diabetes: Care Instructions  Your Care Instructions    When you have diabetes, your body cannot make enough insulin or use it the way it should. Your body needs insulin to help sugar move from the blood to the cells. Without it, your blood sugar gets too high. High blood sugar damages your kidneys and makes it hard for them to filter blood. This causes fluid and waste to build up in your blood. If you have diabetes, it is very important to keep your blood sugar in your target range. There are many steps you can take to do this. If you can control your blood sugar, you will have the best chance to slow or stop damage to your kidneys. Follow-up care is a key part of your treatment and safety. Be sure to make and go to all appointments, and call your doctor if you are having problems. It's also a good idea to know your test results and keep a list of the medicines you take. How can you care for yourself at home? To control your diabetes and slow or stop damage to your kidneys  · Keep your blood sugar in your target range.  The American Diabetes Association recommends a hemoglobin A1c (Hb A1c) target level of less than 7%. Talk to your doctor about your target. The lower your A1c, the better your chance of stopping kidney damage. · Keep your blood pressure in your target range. Doctors recommend specific types of blood pressure medicines for people who have diabetes and kidney disease. Examples include ACE inhibitors and angiotensin II receptor blockers (ARBs). Your doctor may have you take one of these even if you don't have high blood pressure. · Take all of your medicines. You may have several. For example, you may take medicines for diabetes, cholesterol, and blood pressure. It's very important to take all of them just as your doctor tells you to and to keep taking them. · Make good food choices. Follow an eating plan that is best for your diabetes and your kidneys. You may want to work with a dietitian to make a plan. A good plan will make sure that you spread carbohydrate throughout the day. It will also make sure that you get the right amount of salt (sodium), fluids, and protein. · Stay at a healthy weight. If you need help to lose weight, talk to your doctor or dietitian. Even small changes can make a difference. Try to be aware of your portion sizes, eat more fruits and vegetables, and add some activity to your daily routine. · Exercise. Get at least 30 minutes of activity on most days of the week. Walking is a great exercise that most people can do. Being more active can help you control your blood sugar and stay at a healthy weight. It also can help you lower cholesterol and blood pressure. To improve your kidney health  · Lower your cholesterol. Keep your LDL less than 100 mg/dL and HDL levels more than 40 mg/dL for men and 50 mg/dL for women. If you have high cholesterol, your doctor may prescribe medicine. He or she may also tell you to eat less saturated fat. · Follow your treatment plan.  Check your blood sugar as many times a day as your doctor recommends. Go to all of your follow-up appointments, and be sure to have all the tests your doctor orders. Call your doctor if you think you are having a problem with your medicines. · Take a low-dose aspirin every day if your doctor suggests it. Most doctors believe this can reduce the risk of heart disease and stroke. Your risk of these diseases is much greater than your risk of kidney failure. · Avoid tobacco. Do not smoke or use other tobacco products. If you need help quitting, talk to your doctor about stop-smoking programs and medicines. These can increase your chances of quitting for good. When should you call for help? Call 911 anytime you think you may need emergency care. For example, call if:    · You passed out (lost consciousness).    Call your doctor now or seek immediate medical care if:    · You have new or worse nausea and vomiting.     · You have much less urine than normal, or you have no urine.     · You are feeling confused or cannot think clearly.     · You have new or more blood in your urine.     · You have new swelling.     · You are dizzy or lightheaded, or you feel like you may faint.    Watch closely for changes in your health, and be sure to contact your doctor if:    · You do not get better as expected. Where can you learn more? Go to http://maciel-zack.info/. Enter C726 in the search box to learn more about \"Kidney Disease and Diabetes: Care Instructions. \"  Current as of: March 14, 2018  Content Version: 11.9  © 0747-2537 [employer]. Care instructions adapted under license by [employer] (which disclaims liability or warranty for this information). If you have questions about a medical condition or this instruction, always ask your healthcare professional. [place] [address] any warranty or liability for your use of this information.
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
## 2019-07-02 ENCOUNTER — TELEPHONE (OUTPATIENT)
Dept: INTERNAL MEDICINE CLINIC | Age: 54
End: 2019-07-02

## 2019-07-02 NOTE — TELEPHONE ENCOUNTER
Telephone call to pt. Reminded pt of appt with Nephrologist on Aug 6th at 10\"20am.  Pt stated he was aware and would make sure he goes to appt. Reinforced with pt how important it is that he attend this appt. Pt expressed understanding of this info.

## 2019-08-07 ENCOUNTER — DOCUMENTATION ONLY (OUTPATIENT)
Dept: INTERNAL MEDICINE CLINIC | Age: 54
End: 2019-08-07

## 2019-08-09 ENCOUNTER — OFFICE VISIT (OUTPATIENT)
Dept: INTERNAL MEDICINE CLINIC | Age: 54
End: 2019-08-09

## 2019-08-09 VITALS
SYSTOLIC BLOOD PRESSURE: 122 MMHG | DIASTOLIC BLOOD PRESSURE: 70 MMHG | BODY MASS INDEX: 24.85 KG/M2 | OXYGEN SATURATION: 99 % | HEIGHT: 66 IN | RESPIRATION RATE: 16 BRPM | HEART RATE: 104 BPM | TEMPERATURE: 96.1 F | WEIGHT: 154.6 LBS

## 2019-08-09 DIAGNOSIS — Z09 HOSPITAL DISCHARGE FOLLOW-UP: ICD-10-CM

## 2019-08-09 DIAGNOSIS — Z79.4 TYPE 2 DIABETES MELLITUS WITH HYPERGLYCEMIA, WITH LONG-TERM CURRENT USE OF INSULIN (HCC): ICD-10-CM

## 2019-08-09 DIAGNOSIS — R73.9 ELEVATED BLOOD SUGAR LEVEL: ICD-10-CM

## 2019-08-09 DIAGNOSIS — E11.65 TYPE 2 DIABETES MELLITUS WITH HYPERGLYCEMIA, WITH LONG-TERM CURRENT USE OF INSULIN (HCC): ICD-10-CM
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
## 2019-08-09 LAB — GLUCOSE POC: 361 MG/DL

## 2019-08-09 RX ORDER — CEPHALEXIN 500 MG/1
500 CAPSULE ORAL 4 TIMES DAILY
COMMUNITY
End: 2020-03-03
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
## 2019-11-13 DIAGNOSIS — G62.9 NEUROPATHY: ICD-10-CM

## 2019-11-13 RX ORDER — AMITRIPTYLINE HYDROCHLORIDE 25 MG/1
TABLET, FILM COATED ORAL
Qty: 30 TAB | Refills: 3 | Status: SHIPPED | OUTPATIENT
Start: 2019-11-13 | End: 2020-03-10 | Stop reason: SDUPTHER

## 2019-12-16 DIAGNOSIS — G62.9 PERIPHERAL POLYNEUROPATHY: ICD-10-CM

## 2019-12-18 RX ORDER — PREGABALIN 150 MG/1
CAPSULE ORAL
Qty: 60 CAP | Refills: 0 | Status: SHIPPED | OUTPATIENT
Start: 2019-12-18 | End: 2020-01-21

## 2020-01-18 DIAGNOSIS — G62.9 PERIPHERAL POLYNEUROPATHY: ICD-10-CM

## 2020-01-21 RX ORDER — PREGABALIN 150 MG/1
CAPSULE ORAL
Qty: 60 CAP | Refills: 0 | Status: SHIPPED | OUTPATIENT
Start: 2020-01-21 | End: 2020-03-10 | Stop reason: SDUPTHER

## 2020-01-23 DIAGNOSIS — Z79.4 TYPE 2 DIABETES MELLITUS WITH DIABETIC POLYNEUROPATHY, WITH LONG-TERM CURRENT USE OF INSULIN (HCC): ICD-10-CM

## 2020-01-23 DIAGNOSIS — E11.42 TYPE 2 DIABETES MELLITUS WITH DIABETIC POLYNEUROPATHY, WITH LONG-TERM CURRENT USE OF INSULIN (HCC): ICD-10-CM

## 2020-01-28 RX ORDER — PEN NEEDLE, DIABETIC 29 G X1/2"
NEEDLE, DISPOSABLE MISCELLANEOUS
Qty: 100 SYRINGE | Refills: 5 | Status: SHIPPED | OUTPATIENT
Start: 2020-01-28 | End: 2020-03-10 | Stop reason: SDUPTHER

## 2020-02-19 ENCOUNTER — HOSPITAL ENCOUNTER (INPATIENT)
Age: 55
LOS: 13 days | Discharge: LEFT BEFORE DISCHARGE | DRG: 710 | End: 2020-03-03
Attending: EMERGENCY MEDICINE | Admitting: INTERNAL MEDICINE
Payer: MEDICAID

## 2020-02-19 ENCOUNTER — APPOINTMENT (OUTPATIENT)
Dept: GENERAL RADIOLOGY | Age: 55
DRG: 710 | End: 2020-02-19
Attending: EMERGENCY MEDICINE
Payer: MEDICAID

## 2020-02-19 DIAGNOSIS — F14.90 COCAINE USE: ICD-10-CM

## 2020-02-19 DIAGNOSIS — R65.20 SEVERE SEPSIS (HCC): Primary | ICD-10-CM

## 2020-02-19 DIAGNOSIS — F11.90 HEROIN USE: ICD-10-CM

## 2020-02-19 DIAGNOSIS — A41.9 SEVERE SEPSIS (HCC): Primary | ICD-10-CM

## 2020-02-19 DIAGNOSIS — T81.40XA POSTOPERATIVE INFECTION, UNSPECIFIED TYPE, INITIAL ENCOUNTER: ICD-10-CM

## 2020-02-19 DIAGNOSIS — T87.40: ICD-10-CM

## 2020-02-19 LAB
ALBUMIN SERPL-MCNC: 1.3 G/DL (ref 3.5–5)
ALBUMIN/GLOB SERPL: 0.2 {RATIO} (ref 1.1–2.2)
ALP SERPL-CCNC: 156 U/L (ref 45–117)
ALT SERPL-CCNC: 9 U/L (ref 12–78)
AMPHET UR QL SCN: NEGATIVE
ANION GAP SERPL CALC-SCNC: 8 MMOL/L (ref 5–15)
APPEARANCE UR: CLEAR
AST SERPL-CCNC: 26 U/L (ref 15–37)
BACTERIA URNS QL MICRO: NEGATIVE /HPF
BARBITURATES UR QL SCN: NEGATIVE
BASOPHILS # BLD: 0 K/UL (ref 0–0.1)
BASOPHILS NFR BLD: 0 % (ref 0–1)
BENZODIAZ UR QL: NEGATIVE
BILIRUB SERPL-MCNC: 0.3 MG/DL (ref 0.2–1)
BILIRUB UR QL: NEGATIVE
BNP SERPL-MCNC: ABNORMAL PG/ML
BUN SERPL-MCNC: 19 MG/DL (ref 6–20)
BUN/CREAT SERPL: 14 (ref 12–20)
CALCIUM SERPL-MCNC: 7.9 MG/DL (ref 8.5–10.1)
CANNABINOIDS UR QL SCN: NEGATIVE
CHLORIDE SERPL-SCNC: 104 MMOL/L (ref 97–108)
CK MB CFR SERPL CALC: 1.6 % (ref 0–2.5)
CK MB SERPL-MCNC: 1.4 NG/ML (ref 5–25)
CK SERPL-CCNC: 88 U/L (ref 39–308)
CO2 SERPL-SCNC: 26 MMOL/L (ref 21–32)
COCAINE UR QL SCN: POSITIVE
COLOR UR: ABNORMAL
CREAT SERPL-MCNC: 1.35 MG/DL (ref 0.7–1.3)
CRP SERPL-MCNC: 2.28 MG/DL (ref 0–0.6)
DIFFERENTIAL METHOD BLD: ABNORMAL
DRUG SCRN COMMENT,DRGCM: ABNORMAL
EOSINOPHIL # BLD: 0 K/UL (ref 0–0.4)
EOSINOPHIL NFR BLD: 0 % (ref 0–7)
EPITH CASTS URNS QL MICRO: ABNORMAL /LPF
ERYTHROCYTE [DISTWIDTH] IN BLOOD BY AUTOMATED COUNT: 14.2 % (ref 11.5–14.5)
FLUAV AG NPH QL IA: NEGATIVE
FLUBV AG NOSE QL IA: NEGATIVE
GLOBULIN SER CALC-MCNC: 6.3 G/DL (ref 2–4)
GLUCOSE BLD STRIP.AUTO-MCNC: 235 MG/DL (ref 65–100)
GLUCOSE SERPL-MCNC: 143 MG/DL (ref 65–100)
GLUCOSE UR STRIP.AUTO-MCNC: NEGATIVE MG/DL
HCT VFR BLD AUTO: 30.7 % (ref 36.6–50.3)
HGB BLD-MCNC: 10.3 G/DL (ref 12.1–17)
HGB UR QL STRIP: ABNORMAL
HYALINE CASTS URNS QL MICRO: ABNORMAL /LPF (ref 0–5)
IMM GRANULOCYTES # BLD AUTO: 0 K/UL (ref 0–0.04)
IMM GRANULOCYTES NFR BLD AUTO: 0 % (ref 0–0.5)
KETONES UR QL STRIP.AUTO: NEGATIVE MG/DL
LACTATE SERPL-SCNC: 1.8 MMOL/L (ref 0.4–2)
LEUKOCYTE ESTERASE UR QL STRIP.AUTO: NEGATIVE
LYMPHOCYTES # BLD: 0.8 K/UL (ref 0.8–3.5)
LYMPHOCYTES NFR BLD: 5 % (ref 12–49)
MCH RBC QN AUTO: 26.6 PG (ref 26–34)
MCHC RBC AUTO-ENTMCNC: 33.6 G/DL (ref 30–36.5)
MCV RBC AUTO: 79.3 FL (ref 80–99)
METHADONE UR QL: NEGATIVE
MONOCYTES # BLD: 0 K/UL (ref 0–1)
MONOCYTES NFR BLD: 0 % (ref 5–13)
NEUTS BAND NFR BLD MANUAL: 1 %
NEUTS SEG # BLD: 15.8 K/UL (ref 1.8–8)
NEUTS SEG NFR BLD: 94 % (ref 32–75)
NITRITE UR QL STRIP.AUTO: NEGATIVE
NRBC # BLD: 0 K/UL (ref 0–0.01)
NRBC BLD-RTO: 0 PER 100 WBC
OPIATES UR QL: POSITIVE
PCP UR QL: NEGATIVE
PH UR STRIP: 6.5 [PH] (ref 5–8)
PLATELET # BLD AUTO: 382 K/UL (ref 150–400)
PMV BLD AUTO: 9.8 FL (ref 8.9–12.9)
POTASSIUM SERPL-SCNC: 3.1 MMOL/L (ref 3.5–5.1)
PROT SERPL-MCNC: 7.6 G/DL (ref 6.4–8.2)
PROT UR STRIP-MCNC: 300 MG/DL
RBC # BLD AUTO: 3.87 M/UL (ref 4.1–5.7)
RBC #/AREA URNS HPF: ABNORMAL /HPF (ref 0–5)
RBC MORPH BLD: ABNORMAL
SERVICE CMNT-IMP: ABNORMAL
SODIUM SERPL-SCNC: 138 MMOL/L (ref 136–145)
SP GR UR REFRACTOMETRY: 1.01 (ref 1–1.03)
TROPONIN I SERPL-MCNC: <0.05 NG/ML
UA: UC IF INDICATED,UAUC: ABNORMAL
UROBILINOGEN UR QL STRIP.AUTO: 1 EU/DL (ref 0.2–1)
WBC # BLD AUTO: 16.6 K/UL (ref 4.1–11.1)
WBC URNS QL MICRO: ABNORMAL /HPF (ref 0–4)

## 2020-02-19 PROCEDURE — 87205 SMEAR GRAM STAIN: CPT

## 2020-02-19 PROCEDURE — 99285 EMERGENCY DEPT VISIT HI MDM: CPT

## 2020-02-19 PROCEDURE — 87077 CULTURE AEROBIC IDENTIFY: CPT

## 2020-02-19 PROCEDURE — 87147 CULTURE TYPE IMMUNOLOGIC: CPT

## 2020-02-19 PROCEDURE — 83880 ASSAY OF NATRIURETIC PEPTIDE: CPT

## 2020-02-19 PROCEDURE — 83605 ASSAY OF LACTIC ACID: CPT

## 2020-02-19 PROCEDURE — 74011000258 HC RX REV CODE- 258: Performed by: INTERNAL MEDICINE

## 2020-02-19 PROCEDURE — 82550 ASSAY OF CK (CPK): CPT

## 2020-02-19 PROCEDURE — 36415 COLL VENOUS BLD VENIPUNCTURE: CPT

## 2020-02-19 PROCEDURE — 74011250637 HC RX REV CODE- 250/637: Performed by: INTERNAL MEDICINE

## 2020-02-19 PROCEDURE — 80307 DRUG TEST PRSMV CHEM ANLYZR: CPT

## 2020-02-19 PROCEDURE — 74011250637 HC RX REV CODE- 250/637: Performed by: EMERGENCY MEDICINE

## 2020-02-19 PROCEDURE — 74011000258 HC RX REV CODE- 258: Performed by: EMERGENCY MEDICINE

## 2020-02-19 PROCEDURE — 73630 X-RAY EXAM OF FOOT: CPT

## 2020-02-19 PROCEDURE — 74011250636 HC RX REV CODE- 250/636: Performed by: EMERGENCY MEDICINE

## 2020-02-19 PROCEDURE — 84484 ASSAY OF TROPONIN QUANT: CPT

## 2020-02-19 PROCEDURE — 81001 URINALYSIS AUTO W/SCOPE: CPT

## 2020-02-19 PROCEDURE — 96361 HYDRATE IV INFUSION ADD-ON: CPT

## 2020-02-19 PROCEDURE — 96374 THER/PROPH/DIAG INJ IV PUSH: CPT

## 2020-02-19 PROCEDURE — 71045 X-RAY EXAM CHEST 1 VIEW: CPT

## 2020-02-19 PROCEDURE — 86140 C-REACTIVE PROTEIN: CPT

## 2020-02-19 PROCEDURE — 87040 BLOOD CULTURE FOR BACTERIA: CPT

## 2020-02-19 PROCEDURE — 82962 GLUCOSE BLOOD TEST: CPT

## 2020-02-19 PROCEDURE — 96375 TX/PRO/DX INJ NEW DRUG ADDON: CPT

## 2020-02-19 PROCEDURE — 74011250636 HC RX REV CODE- 250/636: Performed by: INTERNAL MEDICINE

## 2020-02-19 PROCEDURE — 74011636637 HC RX REV CODE- 636/637: Performed by: INTERNAL MEDICINE

## 2020-02-19 PROCEDURE — 87186 SC STD MICRODIL/AGAR DIL: CPT

## 2020-02-19 PROCEDURE — 65660000000 HC RM CCU STEPDOWN

## 2020-02-19 PROCEDURE — 77010033678 HC OXYGEN DAILY

## 2020-02-19 PROCEDURE — 85025 COMPLETE CBC W/AUTO DIFF WBC: CPT

## 2020-02-19 PROCEDURE — 87804 INFLUENZA ASSAY W/OPTIC: CPT

## 2020-02-19 PROCEDURE — 80053 COMPREHEN METABOLIC PANEL: CPT

## 2020-02-19 RX ORDER — ATORVASTATIN CALCIUM 20 MG/1
20 TABLET, FILM COATED ORAL EVERY EVENING
Status: DISCONTINUED | OUTPATIENT
Start: 2020-02-19 | End: 2020-02-21

## 2020-02-19 RX ORDER — INSULIN LISPRO 100 [IU]/ML
INJECTION, SOLUTION INTRAVENOUS; SUBCUTANEOUS
Status: DISCONTINUED | OUTPATIENT
Start: 2020-02-19 | End: 2020-03-01

## 2020-02-19 RX ORDER — DEXTROSE 50 % IN WATER (D50W) INTRAVENOUS SYRINGE
12.5-25 AS NEEDED
Status: DISCONTINUED | OUTPATIENT
Start: 2020-02-19 | End: 2020-03-03 | Stop reason: HOSPADM

## 2020-02-19 RX ORDER — MAGNESIUM SULFATE 100 %
4 CRYSTALS MISCELLANEOUS AS NEEDED
Status: DISCONTINUED | OUTPATIENT
Start: 2020-02-19 | End: 2020-03-03 | Stop reason: HOSPADM

## 2020-02-19 RX ORDER — AMITRIPTYLINE HYDROCHLORIDE 25 MG/1
25 TABLET, FILM COATED ORAL
Status: DISCONTINUED | OUTPATIENT
Start: 2020-02-19 | End: 2020-02-21

## 2020-02-19 RX ORDER — AMLODIPINE BESYLATE 5 MG/1
10 TABLET ORAL DAILY
Status: DISCONTINUED | OUTPATIENT
Start: 2020-02-20 | End: 2020-02-21

## 2020-02-19 RX ORDER — FUROSEMIDE 10 MG/ML
80 INJECTION INTRAMUSCULAR; INTRAVENOUS
Status: COMPLETED | OUTPATIENT
Start: 2020-02-19 | End: 2020-02-19

## 2020-02-19 RX ORDER — SODIUM CHLORIDE 0.9 % (FLUSH) 0.9 %
5-10 SYRINGE (ML) INJECTION AS NEEDED
Status: DISCONTINUED | OUTPATIENT
Start: 2020-02-19 | End: 2020-03-03 | Stop reason: HOSPADM

## 2020-02-19 RX ORDER — ENOXAPARIN SODIUM 100 MG/ML
40 INJECTION SUBCUTANEOUS EVERY 24 HOURS
Status: DISCONTINUED | OUTPATIENT
Start: 2020-02-19 | End: 2020-03-03 | Stop reason: HOSPADM

## 2020-02-19 RX ORDER — POTASSIUM CHLORIDE 20 MEQ/1
60 TABLET, EXTENDED RELEASE ORAL
Status: COMPLETED | OUTPATIENT
Start: 2020-02-19 | End: 2020-02-19

## 2020-02-19 RX ORDER — ACETAMINOPHEN 500 MG
1000 TABLET ORAL ONCE
Status: COMPLETED | OUTPATIENT
Start: 2020-02-19 | End: 2020-02-19

## 2020-02-19 RX ORDER — SODIUM CHLORIDE 9 MG/ML
125 INJECTION, SOLUTION INTRAVENOUS CONTINUOUS
Status: DISCONTINUED | OUTPATIENT
Start: 2020-02-19 | End: 2020-02-19

## 2020-02-19 RX ORDER — ACETAMINOPHEN 325 MG/1
650 TABLET ORAL
Status: DISCONTINUED | OUTPATIENT
Start: 2020-02-19 | End: 2020-03-03 | Stop reason: HOSPADM

## 2020-02-19 RX ORDER — LEVOFLOXACIN 5 MG/ML
750 INJECTION, SOLUTION INTRAVENOUS
Status: COMPLETED | OUTPATIENT
Start: 2020-02-19 | End: 2020-02-19

## 2020-02-19 RX ADMIN — AMITRIPTYLINE HYDROCHLORIDE 25 MG: 25 TABLET, FILM COATED ORAL at 21:35

## 2020-02-19 RX ADMIN — ENOXAPARIN SODIUM 40 MG: 40 INJECTION SUBCUTANEOUS at 21:35

## 2020-02-19 RX ADMIN — CEFEPIME HYDROCHLORIDE 2 G: 2 INJECTION, POWDER, FOR SOLUTION INTRAVENOUS at 20:24

## 2020-02-19 RX ADMIN — ATORVASTATIN CALCIUM 20 MG: 10 TABLET, FILM COATED ORAL at 18:29

## 2020-02-19 RX ADMIN — ACETAMINOPHEN 1000 MG: 500 TABLET ORAL at 14:12

## 2020-02-19 RX ADMIN — SODIUM CHLORIDE 1000 ML: 900 INJECTION, SOLUTION INTRAVENOUS at 14:13

## 2020-02-19 RX ADMIN — POTASSIUM CHLORIDE 60 MEQ: 20 TABLET, EXTENDED RELEASE ORAL at 18:29

## 2020-02-19 RX ADMIN — LEVOFLOXACIN 750 MG: 5 INJECTION, SOLUTION INTRAVENOUS at 16:11

## 2020-02-19 RX ADMIN — INSULIN LISPRO 2 UNITS: 100 INJECTION, SOLUTION INTRAVENOUS; SUBCUTANEOUS at 21:35

## 2020-02-19 RX ADMIN — FUROSEMIDE 80 MG: 10 INJECTION, SOLUTION INTRAMUSCULAR; INTRAVENOUS at 15:43

## 2020-02-19 RX ADMIN — PIPERACILLIN AND TAZOBACTAM 3.38 G: 3; .375 INJECTION, POWDER, LYOPHILIZED, FOR SOLUTION INTRAVENOUS at 15:43

## 2020-02-19 RX ADMIN — PREGABALIN 150 MG: 25 CAPSULE ORAL at 18:29

## 2020-02-19 RX ADMIN — VANCOMYCIN HYDROCHLORIDE 1500 MG: 10 INJECTION, POWDER, LYOPHILIZED, FOR SOLUTION INTRAVENOUS at 18:03

## 2020-02-19 NOTE — ED NOTES
Lab called to clarify new labs ordered. Will try to run what they can and will call if they need more.
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
## 2020-02-19 NOTE — ED NOTES
Patient dressing on left great toe soiled updated Dr. Sari Baker requested wound consult for dressing recommendations. New orders received.

## 2020-02-19 NOTE — ED NOTES
Vital signs done and patient resting in bed.  Tried to get patient into a gown and he does not want to [employer]

## 2020-02-19 NOTE — WOUND CARE
Wound Care Consult for the Toe Abscess / Diabetic wound:   Chart briefly reviewed of all available notes. Assessment: Spoke with the patient regarding these events with the left great toe amputation. He stated that an amputation was done two weeks ago at [facility] and \"they kicked me out\". Patient stated that he left against medical advice. Pt. Abused Heroine in the IV form last night and this morning. Since his surgical discharge from [facility] that patient has been dressing the toe wound with drug store non-stick gauze and wrapping it lightly. The current dressing that was removed was last changed this morning and the wound was not cleansed at all for the past few days due to pain. The current dressing had green drainage with pus and yellow slough covering the wound bed. Green drainage is usually indicative of pseudomonas. When the wound was lightly cleansed with NS to remove the old drainage to prepare for a wound culture, there was gas that came from the wound. There is a mild necrotic odor. There is also an abscessed area on the lateral left great toe between the first / second toes with macerated skin. The entire toe is edematous. See photo below:   2- in the Emergency Dept. :       Treatment: initial surface cleansing done with NS and a culture obtained using the Daily technique per hospital policy using only NS to cleanse away the old drainage and culture the new drainage from the middle of the wound / gas area. The middle of the wound where the gas is escaping was irrigated with NS using a syringe and 10 ml of NS. The wound was then cleansed with Betadine solution and slightly packed with kerlix gauze saturated in Betadine. Covered with gauze and wrapped with Kirill to secure the dressing. Plan: Dr. Vikash Foster came in to obtain a history of the patient for admission. Pt. Will be getting an MRI to determine if osteo is present.  He will most likely need long term antibiotics / long term hospitalization due to the IV drug abuse risk. Pt. Will need a podiatrist to surgically debride / evaluate. Diabetic evaluation and attempt at some kind of control.    Thais Sorenson RN, BSN, [employer]

## 2020-02-19 NOTE — PROGRESS NOTES
Pharmacy Automatic Renal Dosing Protocol - Antimicrobials    Indication for Antimicrobials: bone and joint infection     Current Regimen of Each Antimicrobial:  Vancomycin pharmacy to dose (Start Date ; Day # 1)  Cefepime 2g IV every 8hr (start date: , day 1)    Previous Antimicrobial Therapy:      Goal Level: VANCOMYCIN TROUGH GOAL RANGE    Vancomycin Trough: 15 - 20 mcg/mL  (AUC: 400 - 600 mg/hr/Liter/day)     Date Dose & Interval Measured (mcg/mL) Extrapolated (mcg/mL)                       Date & time of next level: to be determined, 1st dose not given yet    Significant Cultures:   : blood - pending  : wound (foot)- pending    Radiology / Imaging results: (X-ray, CT scan or MRI):   : xray left foot: IMPRESSION: Great toe osteomyelitis. : chest xray: pulmonary edema    Paralysis, amputations, malnutrition: none    Labs:  Recent Labs     20  1334   CREA 1.35*   BUN 19   WBC 16.6*     Temp (24hrs), Av.2 °F (37.9 °C), Min:98.4 °F (36.9 °C), Max:102 °F (38.9 °C)    Creatinine Clearance (mL/min) or Dialysis: ~61ml/min    Impression/Plan:   Continue cefepime  Vancomycin 1500mg IV x 1 loading dose, then 1000mg IV every 12hr to yield a trough of ~18mcg/mL and   Antimicrobial stop date to be determined     Pharmacy will follow daily and adjust medications as appropriate for renal function and/or serum levels. Thank you,  MOISES Caputo    Recommended duration of therapy  [email or web address]/[place]/[facility]/Pharmacy/Clinical%20Companion/Duration%20of%20ABX%20therapy. docx    Renal Dosing  [email or web address]/[place]/[facility]/Pharmacy/Clinical%20Companion/Renal%[email or web address]. pdf

## 2020-02-19 NOTE — ED NOTES
TRANSFER - OUT REPORT:    Verbal report given to [facility] (name) on Irena Eng  being transferred to PCU (unit) for routine progression of care       Report consisted of patients Situation, Background, Assessment and   Recommendations(SBAR). Information from the following report(s) SBAR, Kardex, ED Summary and Intake/Output was reviewed with the receiving nurse. Lines:       Opportunity for questions and clarification was provided.       Patient transported with:   Registered Nurse

## 2020-02-20 ENCOUNTER — APPOINTMENT (OUTPATIENT)
Dept: GENERAL RADIOLOGY | Age: 55
DRG: 710 | End: 2020-02-20
Attending: INTERNAL MEDICINE
Payer: MEDICAID

## 2020-02-20 ENCOUNTER — APPOINTMENT (OUTPATIENT)
Dept: NON INVASIVE DIAGNOSTICS | Age: 55
DRG: 710 | End: 2020-02-20
Attending: INTERNAL MEDICINE
Payer: MEDICAID

## 2020-02-20 LAB
ALBUMIN SERPL-MCNC: 1.4 G/DL (ref 3.5–5)
ALBUMIN/GLOB SERPL: 0.2 {RATIO} (ref 1.1–2.2)
ALP SERPL-CCNC: 136 U/L (ref 45–117)
ALT SERPL-CCNC: 10 U/L (ref 12–78)
ANION GAP SERPL CALC-SCNC: 5 MMOL/L (ref 5–15)
ARTERIAL PATENCY WRIST A: ABNORMAL
AST SERPL-CCNC: 26 U/L (ref 15–37)
ATRIAL RATE: 80 BPM
ATRIAL RATE: 82 BPM
AV VELOCITY RATIO: 0.78
BASE EXCESS BLD CALC-SCNC: 6 MMOL/L
BASOPHILS # BLD: 0.1 K/UL (ref 0–0.1)
BASOPHILS NFR BLD: 0 % (ref 0–1)
BDY SITE: ABNORMAL
BILIRUB SERPL-MCNC: 0.4 MG/DL (ref 0.2–1)
BUN SERPL-MCNC: 18 MG/DL (ref 6–20)
BUN/CREAT SERPL: 13 (ref 12–20)
CA-I BLD-SCNC: 1.19 MMOL/L (ref 1.12–1.32)
CALCIUM SERPL-MCNC: 8.1 MG/DL (ref 8.5–10.1)
CALCULATED P AXIS, ECG09: 68 DEGREES
CALCULATED P AXIS, ECG09: 73 DEGREES
CALCULATED R AXIS, ECG10: 2 DEGREES
CALCULATED R AXIS, ECG10: 5 DEGREES
CALCULATED T AXIS, ECG11: 128 DEGREES
CALCULATED T AXIS, ECG11: 129 DEGREES
CHLORIDE SERPL-SCNC: 104 MMOL/L (ref 97–108)
CO2 SERPL-SCNC: 28 MMOL/L (ref 21–32)
CREAT SERPL-MCNC: 1.35 MG/DL (ref 0.7–1.3)
DIAGNOSIS, 93000: NORMAL
DIAGNOSIS, 93000: NORMAL
DIFFERENTIAL METHOD BLD: ABNORMAL
ECHO AO ROOT DIAM: 3.68 CM
ECHO AV AREA PEAK VELOCITY: 3.1 CM2
ECHO AV AREA/BSA PEAK VELOCITY: 1.6 CM2/M2
ECHO AV PEAK GRADIENT: 2.3 MMHG
ECHO AV PEAK VELOCITY: 75.82 CM/S
ECHO EST RA PRESSURE: 10 MMHG
ECHO LA AREA 4C: 12 CM2
ECHO LA MAJOR AXIS: 3.66 CM
ECHO LA TO AORTIC ROOT RATIO: 0.99
ECHO LA VOL 4C: 28.59 ML (ref 18–58)
ECHO LA VOLUME INDEX A4C: 15.14 ML/M2 (ref 16–28)
ECHO LV E' LATERAL VELOCITY: 5.15 CM/S
ECHO LV E' SEPTAL VELOCITY: 8.04 CM/S
ECHO LV EDV TEICHHOLZ: 0.88 ML
ECHO LV ESV TEICHHOLZ: 0.69 ML
ECHO LV INTERNAL DIMENSION DIASTOLIC: 5.57 CM (ref 4.2–5.9)
ECHO LV INTERNAL DIMENSION SYSTOLIC: 5 CM
ECHO LV IVSD: 1.76 CM (ref 0.6–1)
ECHO LV MASS 2D: 366.3 G (ref 88–224)
ECHO LV MASS INDEX 2D: 194 G/M2 (ref 49–115)
ECHO LV POSTERIOR WALL DIASTOLIC: 0.81 CM (ref 0.6–1)
ECHO LV POSTERIOR WALL SYSTOLIC: 1.05 CM
ECHO LVOT DIAM: 2.25 CM
ECHO LVOT PEAK GRADIENT: 1.4 MMHG
ECHO LVOT PEAK VELOCITY: 59.03 CM/S
ECHO LVOT SV: 50.3 ML
ECHO LVOT VTI: 12.68 CM
ECHO MV A VELOCITY: 42.83 CM/S
ECHO MV AREA VTI: 2.5 CM2
ECHO MV E DECELERATION TIME (DT): 131.5 MS
ECHO MV E VELOCITY: 93.7 CM/S
ECHO MV E/A RATIO: 2.19
ECHO MV E/E' LATERAL: 18.19
ECHO MV E/E' RATIO (AVERAGED): 14.92
ECHO MV E/E' SEPTAL: 11.65
ECHO MV MAX VELOCITY: 98.03 CM/S
ECHO MV MEAN GRADIENT: 1.4 MMHG
ECHO MV MEAN INFLOW VELOCITY: 0.55 M/S
ECHO MV PEAK GRADIENT: 3.8 MMHG
ECHO MV REGURGITANT PEAK GRADIENT: 2.5 MMHG
ECHO MV REGURGITANT PEAK VELOCITY: 79.27 CM/S
ECHO MV VTI: 20.06 CM
ECHO PULMONARY ARTERY SYSTOLIC PRESSURE (PASP): 48.3 MMHG
ECHO RA AREA 4C: 9.78 CM2
ECHO RIGHT VENTRICULAR SYSTOLIC PRESSURE (RVSP): 48.3 MMHG
ECHO TV REGURGITANT MAX VELOCITY: 309.43 CM/S
ECHO TV REGURGITANT PEAK GRADIENT: 38.3 MMHG
EOSINOPHIL # BLD: 0.1 K/UL (ref 0–0.4)
EOSINOPHIL NFR BLD: 0 % (ref 0–7)
ERYTHROCYTE [DISTWIDTH] IN BLOOD BY AUTOMATED COUNT: 14.4 % (ref 11.5–14.5)
EST. AVERAGE GLUCOSE BLD GHB EST-MCNC: 183 MG/DL
GAS FLOW.O2 O2 DELIVERY SYS: ABNORMAL L/MIN
GAS FLOW.O2 SETTING OXYMISER: 6 L/M
GLOBULIN SER CALC-MCNC: 6.9 G/DL (ref 2–4)
GLUCOSE BLD STRIP.AUTO-MCNC: 124 MG/DL (ref 65–100)
GLUCOSE BLD STRIP.AUTO-MCNC: 131 MG/DL (ref 65–100)
GLUCOSE BLD STRIP.AUTO-MCNC: 135 MG/DL (ref 65–100)
GLUCOSE SERPL-MCNC: 105 MG/DL (ref 65–100)
HBA1C MFR BLD: 8 % (ref 4–5.6)
HCO3 BLD-SCNC: 30.4 MMOL/L (ref 22–26)
HCT VFR BLD AUTO: 34.9 % (ref 36.6–50.3)
HGB BLD-MCNC: 11.2 G/DL (ref 12.1–17)
IMM GRANULOCYTES # BLD AUTO: 0.1 K/UL (ref 0–0.04)
IMM GRANULOCYTES NFR BLD AUTO: 1 % (ref 0–0.5)
LVFS 2D: 10.2 %
LVOT MG: 0.83 MMHG
LVOT MV: 0.42 CM/S
LVSV (TEICH): 17.61 ML
LYMPHOCYTES # BLD: 1.6 K/UL (ref 0.8–3.5)
LYMPHOCYTES NFR BLD: 10 % (ref 12–49)
MCH RBC QN AUTO: 25.5 PG (ref 26–34)
MCHC RBC AUTO-ENTMCNC: 32.1 G/DL (ref 30–36.5)
MCV RBC AUTO: 79.3 FL (ref 80–99)
MONOCYTES # BLD: 0.7 K/UL (ref 0–1)
MONOCYTES NFR BLD: 5 % (ref 5–13)
MV DEC SLOPE: 7.12
NEUTS SEG # BLD: 13.3 K/UL (ref 1.8–8)
NEUTS SEG NFR BLD: 84 % (ref 32–75)
NRBC # BLD: 0 K/UL (ref 0–0.01)
NRBC BLD-RTO: 0 PER 100 WBC
P-R INTERVAL, ECG05: 110 MS
P-R INTERVAL, ECG05: 112 MS
PCO2 BLD: 44.1 MMHG (ref 35–45)
PH BLD: 7.45 [PH] (ref 7.35–7.45)
PLATELET # BLD AUTO: 402 K/UL (ref 150–400)
PMV BLD AUTO: 10.2 FL (ref 8.9–12.9)
PO2 BLD: 56 MMHG (ref 80–100)
POTASSIUM SERPL-SCNC: 3.5 MMOL/L (ref 3.5–5.1)
PROT SERPL-MCNC: 8.3 G/DL (ref 6.4–8.2)
Q-T INTERVAL, ECG07: 400 MS
Q-T INTERVAL, ECG07: 416 MS
QRS DURATION, ECG06: 76 MS
QRS DURATION, ECG06: 78 MS
QTC CALCULATION (BEZET), ECG08: 467 MS
QTC CALCULATION (BEZET), ECG08: 479 MS
RBC # BLD AUTO: 4.4 M/UL (ref 4.1–5.7)
SAO2 % BLD: 90 % (ref 92–97)
SERVICE CMNT-IMP: ABNORMAL
SODIUM SERPL-SCNC: 137 MMOL/L (ref 136–145)
SPECIMEN TYPE: ABNORMAL
TOTAL RESP. RATE, ITRR: 25
VENTRICULAR RATE, ECG03: 80 BPM
VENTRICULAR RATE, ECG03: 82 BPM
WBC # BLD AUTO: 15.8 K/UL (ref 4.1–11.1)

## 2020-02-20 PROCEDURE — 93005 ELECTROCARDIOGRAM TRACING: CPT

## 2020-02-20 PROCEDURE — 82803 BLOOD GASES ANY COMBINATION: CPT

## 2020-02-20 PROCEDURE — 77010033678 HC OXYGEN DAILY

## 2020-02-20 PROCEDURE — 74011250636 HC RX REV CODE- 250/636: Performed by: HOSPITALIST

## 2020-02-20 PROCEDURE — 74011000258 HC RX REV CODE- 258: Performed by: INTERNAL MEDICINE

## 2020-02-20 PROCEDURE — 74011250636 HC RX REV CODE- 250/636: Performed by: FAMILY MEDICINE

## 2020-02-20 PROCEDURE — 65620000000 HC RM CCU GENERAL

## 2020-02-20 PROCEDURE — 36600 WITHDRAWAL OF ARTERIAL BLOOD: CPT

## 2020-02-20 PROCEDURE — 93306 TTE W/DOPPLER COMPLETE: CPT

## 2020-02-20 PROCEDURE — 83036 HEMOGLOBIN GLYCOSYLATED A1C: CPT

## 2020-02-20 PROCEDURE — 74011250637 HC RX REV CODE- 250/637: Performed by: INTERNAL MEDICINE

## 2020-02-20 PROCEDURE — 74018 RADEX ABDOMEN 1 VIEW: CPT

## 2020-02-20 PROCEDURE — 85025 COMPLETE CBC W/AUTO DIFF WBC: CPT

## 2020-02-20 PROCEDURE — 74011000250 HC RX REV CODE- 250: Performed by: INTERNAL MEDICINE

## 2020-02-20 PROCEDURE — 82962 GLUCOSE BLOOD TEST: CPT

## 2020-02-20 PROCEDURE — 36415 COLL VENOUS BLD VENIPUNCTURE: CPT

## 2020-02-20 PROCEDURE — 94660 CPAP INITIATION&MGMT: CPT

## 2020-02-20 PROCEDURE — 74011250636 HC RX REV CODE- 250/636: Performed by: INTERNAL MEDICINE

## 2020-02-20 PROCEDURE — 71045 X-RAY EXAM CHEST 1 VIEW: CPT

## 2020-02-20 PROCEDURE — 80053 COMPREHEN METABOLIC PANEL: CPT

## 2020-02-20 PROCEDURE — 74011000250 HC RX REV CODE- 250: Performed by: EMERGENCY MEDICINE

## 2020-02-20 RX ORDER — LANOLIN ALCOHOL/MO/W.PET/CERES
3 CREAM (GRAM) TOPICAL
Status: DISCONTINUED | OUTPATIENT
Start: 2020-02-20 | End: 2020-02-25

## 2020-02-20 RX ORDER — CLONIDINE 0.3 MG/24H
1 PATCH, EXTENDED RELEASE TRANSDERMAL
Status: DISCONTINUED | OUTPATIENT
Start: 2020-02-20 | End: 2020-02-25

## 2020-02-20 RX ORDER — DIAZEPAM 5 MG/1
10 TABLET ORAL EVERY 6 HOURS
Status: DISCONTINUED | OUTPATIENT
Start: 2020-02-20 | End: 2020-02-20

## 2020-02-20 RX ORDER — POTASSIUM CHLORIDE 7.45 MG/ML
10 INJECTION INTRAVENOUS
Status: DISPENSED | OUTPATIENT
Start: 2020-02-20 | End: 2020-02-20

## 2020-02-20 RX ORDER — LORAZEPAM 2 MG/ML
1 INJECTION INTRAMUSCULAR
Status: COMPLETED | OUTPATIENT
Start: 2020-02-20 | End: 2020-02-20

## 2020-02-20 RX ORDER — METRONIDAZOLE 500 MG/100ML
500 INJECTION, SOLUTION INTRAVENOUS EVERY 12 HOURS
Status: DISPENSED | OUTPATIENT
Start: 2020-02-20 | End: 2020-02-26

## 2020-02-20 RX ORDER — FUROSEMIDE 10 MG/ML
INJECTION INTRAMUSCULAR; INTRAVENOUS
Status: DISPENSED
Start: 2020-02-20 | End: 2020-02-21

## 2020-02-20 RX ORDER — GABAPENTIN 300 MG/1
300 CAPSULE ORAL 3 TIMES DAILY
Status: DISCONTINUED | OUTPATIENT
Start: 2020-02-20 | End: 2020-02-20

## 2020-02-20 RX ORDER — FUROSEMIDE 10 MG/ML
60 INJECTION INTRAMUSCULAR; INTRAVENOUS ONCE
Status: COMPLETED | OUTPATIENT
Start: 2020-02-20 | End: 2020-02-20

## 2020-02-20 RX ORDER — CLONIDINE HYDROCHLORIDE 0.1 MG/1
0.2 TABLET ORAL 2 TIMES DAILY
Status: DISCONTINUED | OUTPATIENT
Start: 2020-02-20 | End: 2020-02-20

## 2020-02-20 RX ORDER — DIPHENHYDRAMINE HYDROCHLORIDE 50 MG/ML
25 INJECTION, SOLUTION INTRAMUSCULAR; INTRAVENOUS ONCE
Status: COMPLETED | OUTPATIENT
Start: 2020-02-20 | End: 2020-02-20

## 2020-02-20 RX ORDER — FUROSEMIDE 10 MG/ML
40 INJECTION INTRAMUSCULAR; INTRAVENOUS ONCE
Status: DISPENSED | OUTPATIENT
Start: 2020-02-20 | End: 2020-02-20

## 2020-02-20 RX ORDER — LORAZEPAM 2 MG/ML
1 INJECTION INTRAMUSCULAR
Status: DISCONTINUED | OUTPATIENT
Start: 2020-02-20 | End: 2020-02-20

## 2020-02-20 RX ORDER — LORAZEPAM 2 MG/ML
1 INJECTION INTRAMUSCULAR ONCE
Status: ACTIVE | OUTPATIENT
Start: 2020-02-20 | End: 2020-02-20

## 2020-02-20 RX ORDER — POTASSIUM CHLORIDE 7.45 MG/ML
10 INJECTION INTRAVENOUS ONCE
Status: COMPLETED | OUTPATIENT
Start: 2020-02-20 | End: 2020-02-20

## 2020-02-20 RX ORDER — FUROSEMIDE 10 MG/ML
40 INJECTION INTRAMUSCULAR; INTRAVENOUS ONCE
Status: COMPLETED | OUTPATIENT
Start: 2020-02-20 | End: 2020-02-20

## 2020-02-20 RX ORDER — LORAZEPAM 2 MG/ML
2 INJECTION INTRAMUSCULAR
Status: DISCONTINUED | OUTPATIENT
Start: 2020-02-20 | End: 2020-02-26 | Stop reason: SDUPTHER

## 2020-02-20 RX ADMIN — CEFEPIME HYDROCHLORIDE 2 G: 2 INJECTION, POWDER, FOR SOLUTION INTRAVENOUS at 03:09

## 2020-02-20 RX ADMIN — DAKIN'S SOLUTION 0.125% (QUARTER STRENGTH): 0.12 SOLUTION at 17:56

## 2020-02-20 RX ADMIN — LORAZEPAM 1 MG: 2 INJECTION INTRAMUSCULAR; INTRAVENOUS at 02:49

## 2020-02-20 RX ADMIN — DAKIN'S SOLUTION 0.125% (QUARTER STRENGTH): 0.12 SOLUTION at 00:20

## 2020-02-20 RX ADMIN — DEXMEDETOMIDINE 1.4 MCG/KG/HR: 100 INJECTION, SOLUTION, CONCENTRATE INTRAVENOUS at 15:49

## 2020-02-20 RX ADMIN — LORAZEPAM 1 MG: 2 INJECTION INTRAMUSCULAR; INTRAVENOUS at 00:52

## 2020-02-20 RX ADMIN — DAKIN'S SOLUTION 0.125% (QUARTER STRENGTH): 0.12 SOLUTION at 10:43

## 2020-02-20 RX ADMIN — CEFEPIME HYDROCHLORIDE 2 G: 2 INJECTION, POWDER, FOR SOLUTION INTRAVENOUS at 10:57

## 2020-02-20 RX ADMIN — VANCOMYCIN HYDROCHLORIDE 1000 MG: 1 INJECTION, POWDER, LYOPHILIZED, FOR SOLUTION INTRAVENOUS at 06:21

## 2020-02-20 RX ADMIN — FUROSEMIDE 60 MG: 10 INJECTION, SOLUTION INTRAMUSCULAR; INTRAVENOUS at 16:22

## 2020-02-20 RX ADMIN — DIPHENHYDRAMINE HYDROCHLORIDE 25 MG: 50 INJECTION, SOLUTION INTRAMUSCULAR; INTRAVENOUS at 03:05

## 2020-02-20 RX ADMIN — DEXMEDETOMIDINE 0.4 MCG/KG/HR: 100 INJECTION, SOLUTION, CONCENTRATE INTRAVENOUS at 03:23

## 2020-02-20 RX ADMIN — METRONIDAZOLE 500 MG: 500 INJECTION, SOLUTION INTRAVENOUS at 10:58

## 2020-02-20 RX ADMIN — DEXMEDETOMIDINE 1.4 MCG/KG/HR: 100 INJECTION, SOLUTION, CONCENTRATE INTRAVENOUS at 20:48

## 2020-02-20 RX ADMIN — CEFEPIME HYDROCHLORIDE 2 G: 2 INJECTION, POWDER, FOR SOLUTION INTRAVENOUS at 17:57

## 2020-02-20 RX ADMIN — VANCOMYCIN HYDROCHLORIDE 1000 MG: 1 INJECTION, POWDER, LYOPHILIZED, FOR SOLUTION INTRAVENOUS at 17:59

## 2020-02-20 RX ADMIN — FUROSEMIDE 40 MG: 10 INJECTION, SOLUTION INTRAMUSCULAR; INTRAVENOUS at 04:30

## 2020-02-20 RX ADMIN — DEXMEDETOMIDINE 1.4 MCG/KG/HR: 100 INJECTION, SOLUTION, CONCENTRATE INTRAVENOUS at 09:29

## 2020-02-20 RX ADMIN — LORAZEPAM 1 MG: 2 INJECTION INTRAMUSCULAR; INTRAVENOUS at 19:50

## 2020-02-20 RX ADMIN — POTASSIUM CHLORIDE 10 MEQ: 7.46 INJECTION, SOLUTION INTRAVENOUS at 08:30

## 2020-02-20 RX ADMIN — Medication 1 AMPULE: at 21:00

## 2020-02-20 RX ADMIN — POTASSIUM CHLORIDE 10 MEQ: 7.46 INJECTION, SOLUTION INTRAVENOUS at 06:24

## 2020-02-20 RX ADMIN — ENOXAPARIN SODIUM 40 MG: 40 INJECTION SUBCUTANEOUS at 21:02

## 2020-02-20 RX ADMIN — LORAZEPAM 2 MG: 2 INJECTION INTRAMUSCULAR; INTRAVENOUS at 23:29

## 2020-02-20 RX ADMIN — DEXMEDETOMIDINE 0.2 MCG/KG/HR: 100 INJECTION, SOLUTION, CONCENTRATE INTRAVENOUS at 03:17

## 2020-02-20 RX ADMIN — METRONIDAZOLE 500 MG: 500 INJECTION, SOLUTION INTRAVENOUS at 21:02

## 2020-02-20 RX ADMIN — SODIUM CHLORIDE 2.5 MG/HR: 900 INJECTION, SOLUTION INTRAVENOUS at 04:29

## 2020-02-20 RX ADMIN — Medication 1 AMPULE: at 08:39

## 2020-02-20 NOTE — PROGRESS NOTES
Care Management:    DALIA: Anticipate home with [facility] and follow up with surgeon and PCP. [facility] to transport. Reason for Admission:   Severe sepsis. Podiatry consulted and for MRI this afternoon. Possible need for amputation of toe, ? Osteomyelitis. Hx of DM, HTN, neuropathy, substance abuse. RUR Score:   20    Lives with his fiindira. Active with PCP. Uses [facility]. Do you (patient/family) have any concerns for transition/discharge? Not at this time. Plan for utilizing home health:   Undetermined. Current Advanced Directive/Advance Care Plan:            Full code    DTR Rommel Call is [facility] and her contact number is [phone]. Patient may with to do an advanced directive when he is alert and oriented to appointment his [facility] his decision maker. Care Management Interventions  PCP Verified by CM: Yes  Mode of Transport at Discharge: Other (see comment)([facility])  Current Support Network: Other(Lives with his [facility] and is independent with ADL's. [facility] takes him to his appointments. He has a cane. Te Fair[place]  )  Confirm Follow Up Transport: Friends([facility])     Guille Argueta RN ACM [id number]

## 2020-02-20 NOTE — PROGRESS NOTES
Patient had dressing change scheduled for 2100. Patient refused dressing change, saying it was just done in ER. Nurse checked EMR and dressing change done at 1730 in ER.

## 2020-02-20 NOTE — PROGRESS NOTES
Bedside shift change report given to Bekah Klnie (oncoming nurse) by Pancho Cruz (offgoing nurse). Report included the following information SBAR, Kardex, Intake/Output, MAR, Recent Results and Cardiac Rhythm NSR.    2000  Patient is very somnolent and minimally responsive at beginning of shift. It is very difficult to get him to turn over to administer IV meds or assess him. Patient seems angry when responding. Mendez  Nursing assistant attempts to take patient's vital signs. Patient is very agitated and aggressive to nursing assistant. Nurse had told patient not to get out of bed due to wound on left foot and risk of falls. Patient said he would walk to bathroom and not use bedpan or bedside commode. Patient responses are continually angry. Patient is noted to be shaking. Patient reports feeling SOB, but is 95% on RA. Patient placed on 1L/NC, 97%. 0025  Nurse changed patient's left foot dsg as he stated the dsg was too tight. Patient had refused this dressing change earlier. Patient constantly moving during dressing change. 0030  Patient getting out of bed continually. Patient is agitated and aggressive. Telehospitalist paged regarding patient behavior. Order for 1 mg ativan obtained and given to patient. 8200  Patient had telesitter placed in room. Patient ignores telesitter and continually gets up. He states he is not a fall risk. Patient stands independently, but staggers and sways while standing. 46  Telehospitalist paged regarding patient behavior, likely due to heroin withdrawal.  Patient HR has increased to 120's, ST.  PO meds ordered for patient, but patient unable to stay still, so PO meds not thought to be feasible for patient. Nurse called ER physician to come see patient for severe agitation and withdrawal symptoms. [address] came to see patient. Orders for 1 time dose of IV ativan and transfer to CCU.    0235  Patient transferred to CCU.
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
## 2020-02-20 NOTE — PROGRESS NOTES
Patient still having symptoms of severe detox. Shaking violently, HR in 120's now. B/p elevated 184/114. Patient extremely agitated. Can I please get med for agitation, and something to help him rest. Maybe more Ativan and Benadryl?     Scheduled-Valium, gabapentin, clondine  PRN ativan  one time dose of Benadryl

## 2020-02-20 NOTE — PROGRESS NOTES
I get paged by the nurse about patient agitated tachycardic and tachypneic most likely withdrawal, patient was seen and examined patient tachycardic heart rate 123, blood pressure elevated 184/114, patient most likely withdrawal from opioid/possible alcohol withdrawal, will transfer patient to the ICU, will start patient on Ativan as needed and Precedex.
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
## 2020-02-20 NOTE — PROGRESS NOTES
0105Calton Babinski Telesitter Monitor initiated on 2/20/2020 at [address] for the following reason(s): Attempting to get out of bed, restless/agitated, pulling at lines. Patient educated on use of camera and is in agreement.     If patient unable to verbalize understanding of camera necessity, the responsible party notified and educated:  Patient educated
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
## 2020-02-20 NOTE — PROGRESS NOTES
Interdisciplinary team rounds were held   2/20/2020  with the following team members:Care Management, Diabetes Treatment Specialist, Nursing, Nutrition, Pharmacy, Physical Therapy, Physician and Respiratory Therapy. Plan of care discussed. Goal: See MD orders and progress notes for further  interventions and desired outcomes.
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
## 2020-02-20 NOTE — PROGRESS NOTES
RAPID RESPONSE TEAM    Called to room 2274 over concerns of patient going through withdrawal.  Lorazepam 1 mg IV has been given once with no response. Primary RN Kenisha Soto has been in contact with tele-hospitalist and orders have been received, however patient unable to take medications PO. In house hospitalist Nguyen Torres arrived to bedside, orders received to transfer patient to CCU to initiated precedex gtt. Patient transferred to CCU. Please call with any needs or concerns.     Vero Verduzco  Rapid Response RN  [facility]

## 2020-02-20 NOTE — PROGRESS NOTES
TELE-HOSPITALIST CROSS COVER NOTE:    Visit Vitals  BP (!) 155/93 (BP 1 Location: Right arm, BP Patient Position: At rest)   Pulse 96   Temp 98.1 °F (36.7 °C)   Resp 18   Ht 5' 8\" (1.727 m)   Wt 75.3 kg (166 lb 0.1 oz)   SpO2 97%   BMI 25.24 kg/m²         D/W RN; medical records reviewed; Lorazepam 1mg IV x 1 for symptomatic management.       Francheska Pretty

## 2020-02-20 NOTE — DIABETES MGMT
LORRI HUTCHINS  CLINICAL NURSE SPECIALIST CONSULT  PROGRAM FOR DIABETES HEALTH    INITIAL NOTE    Presentation   This 49year old AA male with known Type 2 diabetes (treated with [place] and NPH insulin PTA) was admitted from the ER with left great toe infection including purulent drainage 2/19/20. Osteomyelitis on Xray. Wound care findings noted. Podiatry consulted; diagnostics ordered. MRI scheduled today; anticipate amputation of affected area. Known heroin user. On Abx and IV sedation. Subjective   Non-responsive    Objective   Physical exam  General Non-responsive. NPO  Vital Signs   Visit Vitals  BP (!) 139/97   Pulse 78   Temp 97.7 °F (36.5 °C)   Resp 15   Ht 5' 8\" (1.727 m)   Wt 75.3 kg (166 lb 0.1 oz)   SpO2 100%   BMI 25.24 kg/m²     Laboratory  Lab Results   Component Value Date/Time    Hemoglobin A1c 8.0 (H) 02/20/2020 04:12 AM    Hemoglobin A1c (POC) 12.3 05/23/2019 03:14 PM     No results found for: LDL, LDLC, DLDLP  Lab Results   Component Value Date/Time    Creatinine 1.35 (H) 02/20/2020 04:12 AM     Lab Results   Component Value Date/Time    Sodium 137 02/20/2020 04:12 AM    Potassium 3.5 02/20/2020 04:12 AM    Chloride 104 02/20/2020 04:12 AM    CO2 28 02/20/2020 04:12 AM    Anion gap 5 02/20/2020 04:12 AM    Glucose 105 (H) 02/20/2020 04:12 AM    BUN 18 02/20/2020 04:12 AM    Creatinine 1.35 (H) 02/20/2020 04:12 AM    BUN/Creatinine ratio 13 02/20/2020 04:12 AM    GFR est AA >60 02/20/2020 04:12 AM    GFR est non-AA 55 (L) 02/20/2020 04:12 AM    Calcium 8.1 (L) 02/20/2020 04:12 AM    Bilirubin, total 0.4 02/20/2020 04:12 AM    AST (SGOT) 26 02/20/2020 04:12 AM    Alk.  phosphatase 136 (H) 02/20/2020 04:12 AM    Protein, total 8.3 (H) 02/20/2020 04:12 AM    Albumin 1.4 (L) 02/20/2020 04:12 AM    Globulin 6.9 (H) 02/20/2020 04:12 AM    A-G Ratio 0.2 (L) 02/20/2020 04:12 AM    ALT (SGPT) 10 (L) 02/20/2020 04:12 AM     Lab Results   Component Value Date/Time    ALT (SGPT) 10 (L) 02/20/2020 04:12 AM Blood glucose pattern        Assessment and Plan   Nursing Diagnosis Risk for unstable blood glucose pattern   Nursing Intervention Domain 7542 Decision-making Support   Nursing Interventions Examined current inpatient diabetes control   Explored factors facilitating and impeding inpatient management     Evaluation   This gentleman, with Type 2 diabetes, has achieved inpatient blood glucose target of 100-180mg/dl excluding one BG reading. Since his PTA medication list includes insulin, will monitor BG trends to determine when insulin is required. Since SGLTs require adequate hydration (and have potential to compromise extremities), this is not a suitable post-discharge medication.     Recommendations   Recommend:    Continuing corrective insulin     BG monitoring    Billing Code(s)     [x] [id number] IP subsequent hospital care - 30 minutes     Abhishek Roe, CNS  Access via R Mindi [place] [phone]

## 2020-02-20 NOTE — PROGRESS NOTES
Patient admits to injecting heroin every day for at least a year. He says he has never had withdrawal symptoms, but has never made an attempt to quit.

## 2020-02-20 NOTE — PROGRESS NOTES
TRANSFER - OUT REPORT:    Bedside report given to Laena (name) on Dario Radford  being transferred to CCU (unit) for change in patient condition(Severe heroin detoxification)       Report consisted of patients Situation, Background, Assessment and   Recommendations(SBAR). Information from the following report(s) SBAR, Kardex, Intake/Output, MAR, Recent Results and Cardiac Rhythm ST was reviewed with the receiving nurse. Lines:   Peripheral IV 02/19/20 Right Hand (Active)   Site Assessment Clean, dry, & intact 2/19/2020  7:20 PM   Phlebitis Assessment 0 2/19/2020  7:20 PM   Infiltration Assessment 0 2/19/2020  7:20 PM   Dressing Status Clean, dry, & intact 2/19/2020  7:20 PM   Dressing Type Tape;Transparent 2/19/2020  7:20 PM   Hub Color/Line Status Pink; Infusing;Flushed;Patent 2/19/2020  7:20 PM   Alcohol Cap Used No 2/19/2020  7:20 PM        Opportunity for questions and clarification was provided.       Patient transported with:   Monitor  O2 @ 2 liters     Bedside report given in CCU

## 2020-02-20 NOTE — PROGRESS NOTES
0330 TRANSFER - IN REPORT:    Verbal report received from Derik(name) on Giuliana Murdock  being received from PCU(unit) for urgent transfer      Report consisted of patients Situation, Background, Assessment and   Recommendations(SBAR). Information from the following report(s) SBAR, Kardex, Intake/Output, MAR, Recent Results and Cardiac Rhythm ST was reviewed with the receiving nurse. Opportunity for questions and clarification was provided. Assessment completed upon patients arrival to unit and care assumed. 0330 transfer to CCU for severe agitation and withdrawal from IV heroin. Need precedex. Drip. Combative. Screaming. Ativan and benadaryl  Given. Awaiting precedex from Pharmacy. Seizure precautions. Very sob. Rales noted. Dual skin done. Only left toes with dressing. Strong odor. 0400 still very agitated. . Started on precedex and titrate to get to RASS -1 to -2. . Dr Brice Brewer made aware of situation. Started on cardene. Drip. Lasix 40 mg IVP given . johnston placed without difficulty. abg done and awaiting cxr. Kenyaann Gang 0430 placed on restraint. Still attempting to pull out lines  0500 CHG bath rendered.  Patient with rass -1.  7 am bedside shift report given to [facility]

## 2020-02-21 ENCOUNTER — APPOINTMENT (OUTPATIENT)
Dept: GENERAL RADIOLOGY | Age: 55
DRG: 710 | End: 2020-02-21
Attending: INTERNAL MEDICINE
Payer: MEDICAID

## 2020-02-21 ENCOUNTER — ANESTHESIA (OUTPATIENT)
Dept: SURGERY | Age: 55
DRG: 710 | End: 2020-02-21
Payer: MEDICAID

## 2020-02-21 ENCOUNTER — ANESTHESIA EVENT (OUTPATIENT)
Dept: SURGERY | Age: 55
DRG: 710 | End: 2020-02-21
Payer: MEDICAID

## 2020-02-21 PROBLEM — F19.10 SUBSTANCE ABUSE (HCC): Status: ACTIVE | Noted: 2020-02-21

## 2020-02-21 PROBLEM — I42.9 CARDIOMYOPATHY (HCC): Status: ACTIVE | Noted: 2020-02-21

## 2020-02-21 PROBLEM — I50.21 ACUTE SYSTOLIC HEART FAILURE (HCC): Status: ACTIVE | Noted: 2020-02-21

## 2020-02-21 LAB
ALBUMIN SERPL-MCNC: 1.3 G/DL (ref 3.5–5)
ALBUMIN/GLOB SERPL: 0.2 {RATIO} (ref 1.1–2.2)
ALP SERPL-CCNC: 112 U/L (ref 45–117)
ALT SERPL-CCNC: 7 U/L (ref 12–78)
ANION GAP SERPL CALC-SCNC: 6 MMOL/L (ref 5–15)
AST SERPL-CCNC: 28 U/L (ref 15–37)
BACTERIA SPEC CULT: ABNORMAL
BASOPHILS # BLD: 0 K/UL (ref 0–0.1)
BASOPHILS NFR BLD: 0 % (ref 0–1)
BILIRUB SERPL-MCNC: 0.7 MG/DL (ref 0.2–1)
BUN SERPL-MCNC: 27 MG/DL (ref 6–20)
BUN/CREAT SERPL: 17 (ref 12–20)
CALCIUM SERPL-MCNC: 8.5 MG/DL (ref 8.5–10.1)
CHLORIDE SERPL-SCNC: 104 MMOL/L (ref 97–108)
CO2 SERPL-SCNC: 29 MMOL/L (ref 21–32)
CREAT SERPL-MCNC: 1.58 MG/DL (ref 0.7–1.3)
DATE LAST DOSE: ABNORMAL
DIFFERENTIAL METHOD BLD: ABNORMAL
EOSINOPHIL # BLD: 0 K/UL (ref 0–0.4)
EOSINOPHIL NFR BLD: 0 % (ref 0–7)
ERYTHROCYTE [DISTWIDTH] IN BLOOD BY AUTOMATED COUNT: 14.5 % (ref 11.5–14.5)
GLOBULIN SER CALC-MCNC: 6.9 G/DL (ref 2–4)
GLUCOSE BLD STRIP.AUTO-MCNC: 104 MG/DL (ref 65–100)
GLUCOSE BLD STRIP.AUTO-MCNC: 131 MG/DL (ref 65–100)
GLUCOSE BLD STRIP.AUTO-MCNC: 154 MG/DL (ref 65–100)
GLUCOSE BLD STRIP.AUTO-MCNC: 165 MG/DL (ref 65–100)
GLUCOSE SERPL-MCNC: 130 MG/DL (ref 65–100)
GRAM STN SPEC: ABNORMAL
GRAM STN SPEC: ABNORMAL
HCT VFR BLD AUTO: 35.6 % (ref 36.6–50.3)
HGB BLD-MCNC: 11.8 G/DL (ref 12.1–17)
IMM GRANULOCYTES # BLD AUTO: 0.1 K/UL (ref 0–0.04)
IMM GRANULOCYTES NFR BLD AUTO: 1 % (ref 0–0.5)
LYMPHOCYTES # BLD: 1.6 K/UL (ref 0.8–3.5)
LYMPHOCYTES NFR BLD: 12 % (ref 12–49)
MAGNESIUM SERPL-MCNC: 1.7 MG/DL (ref 1.6–2.4)
MCH RBC QN AUTO: 25.5 PG (ref 26–34)
MCHC RBC AUTO-ENTMCNC: 33.1 G/DL (ref 30–36.5)
MCV RBC AUTO: 77.1 FL (ref 80–99)
MONOCYTES # BLD: 0.9 K/UL (ref 0–1)
MONOCYTES NFR BLD: 7 % (ref 5–13)
NEUTS SEG # BLD: 10.9 K/UL (ref 1.8–8)
NEUTS SEG NFR BLD: 80 % (ref 32–75)
NRBC # BLD: 0 K/UL (ref 0–0.01)
NRBC BLD-RTO: 0 PER 100 WBC
PHOSPHATE SERPL-MCNC: 4.5 MG/DL (ref 2.6–4.7)
PLATELET # BLD AUTO: 364 K/UL (ref 150–400)
PMV BLD AUTO: 9.9 FL (ref 8.9–12.9)
POTASSIUM SERPL-SCNC: 3.5 MMOL/L (ref 3.5–5.1)
PROT SERPL-MCNC: 8.2 G/DL (ref 6.4–8.2)
RBC # BLD AUTO: 4.62 M/UL (ref 4.1–5.7)
REPORTED DOSE,DOSE: ABNORMAL UNITS
REPORTED DOSE/TIME,TMG: ABNORMAL
SERVICE CMNT-IMP: ABNORMAL
SODIUM SERPL-SCNC: 139 MMOL/L (ref 136–145)
VANCOMYCIN TROUGH SERPL-MCNC: 19.9 UG/ML (ref 5–10)
WBC # BLD AUTO: 13.6 K/UL (ref 4.1–11.1)

## 2020-02-21 PROCEDURE — 74011000258 HC RX REV CODE- 258: Performed by: INTERNAL MEDICINE

## 2020-02-21 PROCEDURE — 84100 ASSAY OF PHOSPHORUS: CPT

## 2020-02-21 PROCEDURE — 80202 ASSAY OF VANCOMYCIN: CPT

## 2020-02-21 PROCEDURE — 77030006788 HC BLD SAW OSC STRY -B: Performed by: PODIATRIST

## 2020-02-21 PROCEDURE — 82962 GLUCOSE BLOOD TEST: CPT

## 2020-02-21 PROCEDURE — 74011250636 HC RX REV CODE- 250/636: Performed by: HOSPITALIST

## 2020-02-21 PROCEDURE — 77030002916 HC SUT ETHLN J&J -A: Performed by: PODIATRIST

## 2020-02-21 PROCEDURE — 88305 TISSUE EXAM BY PATHOLOGIST: CPT

## 2020-02-21 PROCEDURE — 77030011640 HC PAD GRND REM COVD -A: Performed by: PODIATRIST

## 2020-02-21 PROCEDURE — 87186 SC STD MICRODIL/AGAR DIL: CPT

## 2020-02-21 PROCEDURE — 87147 CULTURE TYPE IMMUNOLOGIC: CPT

## 2020-02-21 PROCEDURE — 74011000250 HC RX REV CODE- 250: Performed by: PODIATRIST

## 2020-02-21 PROCEDURE — 0Y6N0Z9 DETACHMENT AT LEFT FOOT, PARTIAL 1ST RAY, OPEN APPROACH: ICD-10-PCS | Performed by: PODIATRIST

## 2020-02-21 PROCEDURE — 76060000032 HC ANESTHESIA 0.5 TO 1 HR: Performed by: PODIATRIST

## 2020-02-21 PROCEDURE — 74011250636 HC RX REV CODE- 250/636: Performed by: INTERNAL MEDICINE

## 2020-02-21 PROCEDURE — 88311 DECALCIFY TISSUE: CPT

## 2020-02-21 PROCEDURE — 71045 X-RAY EXAM CHEST 1 VIEW: CPT

## 2020-02-21 PROCEDURE — 87075 CULTR BACTERIA EXCEPT BLOOD: CPT

## 2020-02-21 PROCEDURE — 83735 ASSAY OF MAGNESIUM: CPT

## 2020-02-21 PROCEDURE — 74011250637 HC RX REV CODE- 250/637: Performed by: INTERNAL MEDICINE

## 2020-02-21 PROCEDURE — 65610000006 HC RM INTENSIVE CARE

## 2020-02-21 PROCEDURE — 87077 CULTURE AEROBIC IDENTIFY: CPT

## 2020-02-21 PROCEDURE — 74011250636 HC RX REV CODE- 250/636: Performed by: PODIATRIST

## 2020-02-21 PROCEDURE — 74011000250 HC RX REV CODE- 250: Performed by: NURSE ANESTHETIST, CERTIFIED REGISTERED

## 2020-02-21 PROCEDURE — 74011250636 HC RX REV CODE- 250/636: Performed by: NURSE ANESTHETIST, CERTIFIED REGISTERED

## 2020-02-21 PROCEDURE — 76210000006 HC OR PH I REC 0.5 TO 1 HR: Performed by: PODIATRIST

## 2020-02-21 PROCEDURE — 74011250637 HC RX REV CODE- 250/637: Performed by: PODIATRIST

## 2020-02-21 PROCEDURE — 85025 COMPLETE CBC W/AUTO DIFF WBC: CPT

## 2020-02-21 PROCEDURE — 87205 SMEAR GRAM STAIN: CPT

## 2020-02-21 PROCEDURE — 76010000138 HC OR TIME 0.5 TO 1 HR: Performed by: PODIATRIST

## 2020-02-21 PROCEDURE — 74011000250 HC RX REV CODE- 250: Performed by: INTERNAL MEDICINE

## 2020-02-21 PROCEDURE — 80053 COMPREHEN METABOLIC PANEL: CPT

## 2020-02-21 PROCEDURE — 74011636637 HC RX REV CODE- 636/637: Performed by: INTERNAL MEDICINE

## 2020-02-21 RX ORDER — SODIUM CHLORIDE, SODIUM LACTATE, POTASSIUM CHLORIDE, CALCIUM CHLORIDE 600; 310; 30; 20 MG/100ML; MG/100ML; MG/100ML; MG/100ML
25 INJECTION, SOLUTION INTRAVENOUS CONTINUOUS
Status: DISCONTINUED | OUTPATIENT
Start: 2020-02-21 | End: 2020-02-21 | Stop reason: HOSPADM

## 2020-02-21 RX ORDER — SODIUM CHLORIDE 0.9 % (FLUSH) 0.9 %
5-40 SYRINGE (ML) INJECTION AS NEEDED
Status: DISCONTINUED | OUTPATIENT
Start: 2020-02-21 | End: 2020-02-21 | Stop reason: HOSPADM

## 2020-02-21 RX ORDER — FUROSEMIDE 10 MG/ML
80 INJECTION INTRAMUSCULAR; INTRAVENOUS ONCE
Status: COMPLETED | OUTPATIENT
Start: 2020-02-21 | End: 2020-02-21

## 2020-02-21 RX ORDER — POTASSIUM CHLORIDE 7.45 MG/ML
10 INJECTION INTRAVENOUS
Status: COMPLETED | OUTPATIENT
Start: 2020-02-21 | End: 2020-02-21

## 2020-02-21 RX ORDER — LIDOCAINE HYDROCHLORIDE 20 MG/ML
INJECTION, SOLUTION EPIDURAL; INFILTRATION; INTRACAUDAL; PERINEURAL AS NEEDED
Status: DISCONTINUED | OUTPATIENT
Start: 2020-02-21 | End: 2020-02-21 | Stop reason: HOSPADM

## 2020-02-21 RX ORDER — FENTANYL CITRATE 50 UG/ML
25 INJECTION, SOLUTION INTRAMUSCULAR; INTRAVENOUS
Status: DISCONTINUED | OUTPATIENT
Start: 2020-02-21 | End: 2020-02-21 | Stop reason: HOSPADM

## 2020-02-21 RX ORDER — MORPHINE SULFATE 10 MG/ML
2 INJECTION, SOLUTION INTRAMUSCULAR; INTRAVENOUS
Status: DISCONTINUED | OUTPATIENT
Start: 2020-02-21 | End: 2020-02-21 | Stop reason: HOSPADM

## 2020-02-21 RX ORDER — ONDANSETRON 2 MG/ML
4 INJECTION INTRAMUSCULAR; INTRAVENOUS AS NEEDED
Status: DISCONTINUED | OUTPATIENT
Start: 2020-02-21 | End: 2020-02-21 | Stop reason: HOSPADM

## 2020-02-21 RX ORDER — LORAZEPAM 2 MG/ML
2 INJECTION INTRAMUSCULAR EVERY 6 HOURS
Status: DISCONTINUED | OUTPATIENT
Start: 2020-02-21 | End: 2020-02-25

## 2020-02-21 RX ORDER — MIDAZOLAM HYDROCHLORIDE 1 MG/ML
0.5 INJECTION, SOLUTION INTRAMUSCULAR; INTRAVENOUS
Status: DISCONTINUED | OUTPATIENT
Start: 2020-02-21 | End: 2020-02-21 | Stop reason: HOSPADM

## 2020-02-21 RX ORDER — HYDRALAZINE HYDROCHLORIDE 20 MG/ML
10 INJECTION INTRAMUSCULAR; INTRAVENOUS EVERY 6 HOURS
Status: DISCONTINUED | OUTPATIENT
Start: 2020-02-21 | End: 2020-02-24

## 2020-02-21 RX ORDER — DIPHENHYDRAMINE HYDROCHLORIDE 50 MG/ML
12.5 INJECTION, SOLUTION INTRAMUSCULAR; INTRAVENOUS AS NEEDED
Status: DISCONTINUED | OUTPATIENT
Start: 2020-02-21 | End: 2020-02-21 | Stop reason: HOSPADM

## 2020-02-21 RX ORDER — HYDROMORPHONE HYDROCHLORIDE 1 MG/ML
.2-.5 INJECTION, SOLUTION INTRAMUSCULAR; INTRAVENOUS; SUBCUTANEOUS
Status: DISCONTINUED | OUTPATIENT
Start: 2020-02-21 | End: 2020-02-21 | Stop reason: HOSPADM

## 2020-02-21 RX ORDER — CARVEDILOL 6.25 MG/1
3.12 TABLET ORAL 2 TIMES DAILY
Status: DISCONTINUED | OUTPATIENT
Start: 2020-02-21 | End: 2020-02-25

## 2020-02-21 RX ORDER — BUPIVACAINE HYDROCHLORIDE 5 MG/ML
INJECTION, SOLUTION EPIDURAL; INTRACAUDAL AS NEEDED
Status: DISCONTINUED | OUTPATIENT
Start: 2020-02-21 | End: 2020-02-21 | Stop reason: HOSPADM

## 2020-02-21 RX ORDER — LIDOCAINE HYDROCHLORIDE 10 MG/ML
0.1 INJECTION, SOLUTION EPIDURAL; INFILTRATION; INTRACAUDAL; PERINEURAL AS NEEDED
Status: DISCONTINUED | OUTPATIENT
Start: 2020-02-21 | End: 2020-02-21 | Stop reason: HOSPADM

## 2020-02-21 RX ORDER — FENTANYL CITRATE 50 UG/ML
50 INJECTION, SOLUTION INTRAMUSCULAR; INTRAVENOUS AS NEEDED
Status: DISCONTINUED | OUTPATIENT
Start: 2020-02-21 | End: 2020-02-21 | Stop reason: HOSPADM

## 2020-02-21 RX ORDER — PROPOFOL 10 MG/ML
INJECTION, EMULSION INTRAVENOUS
Status: DISCONTINUED | OUTPATIENT
Start: 2020-02-21 | End: 2020-02-21 | Stop reason: HOSPADM

## 2020-02-21 RX ORDER — FUROSEMIDE 10 MG/ML
40 INJECTION INTRAMUSCULAR; INTRAVENOUS DAILY
Status: DISCONTINUED | OUTPATIENT
Start: 2020-02-22 | End: 2020-02-25

## 2020-02-21 RX ORDER — LORAZEPAM 2 MG/ML
2 INJECTION INTRAMUSCULAR EVERY 6 HOURS
Status: DISCONTINUED | OUTPATIENT
Start: 2020-02-21 | End: 2020-02-21

## 2020-02-21 RX ORDER — SODIUM CHLORIDE 0.9 % (FLUSH) 0.9 %
5-40 SYRINGE (ML) INJECTION EVERY 8 HOURS
Status: DISCONTINUED | OUTPATIENT
Start: 2020-02-21 | End: 2020-02-21 | Stop reason: HOSPADM

## 2020-02-21 RX ADMIN — DAKIN'S SOLUTION 0.125% (QUARTER STRENGTH): 0.12 SOLUTION at 10:51

## 2020-02-21 RX ADMIN — LORAZEPAM 2 MG: 2 INJECTION INTRAMUSCULAR; INTRAVENOUS at 12:40

## 2020-02-21 RX ADMIN — CEFEPIME HYDROCHLORIDE 2 G: 2 INJECTION, POWDER, FOR SOLUTION INTRAVENOUS at 15:04

## 2020-02-21 RX ADMIN — POTASSIUM CHLORIDE 10 MEQ: 7.46 INJECTION, SOLUTION INTRAVENOUS at 16:53

## 2020-02-21 RX ADMIN — DEXMEDETOMIDINE 1.4 MCG/KG/HR: 100 INJECTION, SOLUTION, CONCENTRATE INTRAVENOUS at 06:08

## 2020-02-21 RX ADMIN — POTASSIUM CHLORIDE 10 MEQ: 7.46 INJECTION, SOLUTION INTRAVENOUS at 10:55

## 2020-02-21 RX ADMIN — LORAZEPAM 2 MG: 2 INJECTION INTRAMUSCULAR; INTRAVENOUS at 19:18

## 2020-02-21 RX ADMIN — PROPOFOL 25 MCG/KG/MIN: 10 INJECTION, EMULSION INTRAVENOUS at 17:28

## 2020-02-21 RX ADMIN — METRONIDAZOLE 500 MG: 500 INJECTION, SOLUTION INTRAVENOUS at 09:31

## 2020-02-21 RX ADMIN — LORAZEPAM 2 MG: 2 INJECTION INTRAMUSCULAR; INTRAVENOUS at 02:12

## 2020-02-21 RX ADMIN — METRONIDAZOLE 500 MG: 500 INJECTION, SOLUTION INTRAVENOUS at 21:00

## 2020-02-21 RX ADMIN — CEFEPIME HYDROCHLORIDE 2 G: 2 INJECTION, POWDER, FOR SOLUTION INTRAVENOUS at 01:31

## 2020-02-21 RX ADMIN — LORAZEPAM 2 MG: 2 INJECTION INTRAMUSCULAR; INTRAVENOUS at 23:10

## 2020-02-21 RX ADMIN — Medication 3 AMPULE: at 16:36

## 2020-02-21 RX ADMIN — SODIUM CHLORIDE, SODIUM LACTATE, POTASSIUM CHLORIDE, AND CALCIUM CHLORIDE 25 ML/HR: 600; 310; 30; 20 INJECTION, SOLUTION INTRAVENOUS at 16:36

## 2020-02-21 RX ADMIN — LORAZEPAM 2 MG: 2 INJECTION INTRAMUSCULAR; INTRAVENOUS at 05:16

## 2020-02-21 RX ADMIN — POTASSIUM CHLORIDE 10 MEQ: 7.46 INJECTION, SOLUTION INTRAVENOUS at 12:36

## 2020-02-21 RX ADMIN — HYDRALAZINE HYDROCHLORIDE 10 MG: 20 INJECTION INTRAMUSCULAR; INTRAVENOUS at 18:55

## 2020-02-21 RX ADMIN — HYDRALAZINE HYDROCHLORIDE 10 MG: 20 INJECTION INTRAMUSCULAR; INTRAVENOUS at 23:10

## 2020-02-21 RX ADMIN — ENOXAPARIN SODIUM 40 MG: 40 INJECTION SUBCUTANEOUS at 21:00

## 2020-02-21 RX ADMIN — LIDOCAINE HYDROCHLORIDE 60 MG: 20 INJECTION, SOLUTION EPIDURAL; INFILTRATION; INTRACAUDAL; PERINEURAL at 17:28

## 2020-02-21 RX ADMIN — VANCOMYCIN HYDROCHLORIDE 1000 MG: 1 INJECTION, POWDER, LYOPHILIZED, FOR SOLUTION INTRAVENOUS at 05:16

## 2020-02-21 RX ADMIN — FUROSEMIDE 80 MG: 10 INJECTION, SOLUTION INTRAMUSCULAR; INTRAVENOUS at 10:52

## 2020-02-21 RX ADMIN — INSULIN LISPRO 2 UNITS: 100 INJECTION, SOLUTION INTRAVENOUS; SUBCUTANEOUS at 13:04

## 2020-02-21 RX ADMIN — DEXMEDETOMIDINE 1.4 MCG/KG/HR: 100 INJECTION, SOLUTION, CONCENTRATE INTRAVENOUS at 02:03

## 2020-02-21 RX ADMIN — INSULIN LISPRO 2 UNITS: 100 INJECTION, SOLUTION INTRAVENOUS; SUBCUTANEOUS at 18:47

## 2020-02-21 RX ADMIN — Medication 1 AMPULE: at 10:18

## 2020-02-21 RX ADMIN — HYDRALAZINE HYDROCHLORIDE 10 MG: 20 INJECTION INTRAMUSCULAR; INTRAVENOUS at 12:37

## 2020-02-21 RX ADMIN — Medication 1 AMPULE: at 20:21

## 2020-02-21 RX ADMIN — VANCOMYCIN HYDROCHLORIDE 1000 MG: 1 INJECTION, POWDER, LYOPHILIZED, FOR SOLUTION INTRAVENOUS at 20:21

## 2020-02-21 RX ADMIN — DEXMEDETOMIDINE 1 MCG/KG/HR: 100 INJECTION, SOLUTION, CONCENTRATE INTRAVENOUS at 14:30

## 2020-02-21 NOTE — PROGRESS NOTES
I spoke with the patient's daughter, Supa Cooper, today, and told her that she needs to contact the nurses for informed consent for her father, discussed with her the reasoning behind the procedure and due to severe signs of infection clinically and radiographically of the proximal phalanx and head of the first metatarsal left foot, incision and drainage with amputation is the best bet to prevent further infection, and to decrease the white blood cell count which is still increased. This is discussed in layman's terms as well as the risks and possible complications to the procedure. The risks and possible complications of not having incision and drainage was also discussed thoroughly. I then saw the patient and examine the foot again, left foot, I also spoke to nurses directly at CCU, also discussed with him the procedure, they are fully aware regarding having the to nurses present during phone conversation with the patient's next of kin, daughter. Patient is on the schedule today for the I&D left foot.
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
## 2020-02-21 NOTE — ANESTHESIA PREPROCEDURE EVALUATION
Relevant Problems   No relevant active problems       Anesthetic History   No history of anesthetic complications            Review of Systems / Medical History  Patient summary reviewed, nursing notes reviewed and pertinent labs reviewed    Pulmonary          Smoker      Comments: Current Some Day Smoker   Neuro/Psych             Comments:  Altered mental status  Neuropathy   Sciatica      Cardiovascular    Hypertension              Exercise tolerance: <4 METS     GI/Hepatic/Renal  Within defined limits              Endo/Other    Diabetes         Other Findings   Comments: OSTEOMYLITIS LEFT FOOT GREAT TOE         Physical Exam    Airway            Comments: Unable to assess Cardiovascular  Regular rate and rhythm,  S1 and S2 normal,  no murmur, click, rub, or gallop             Dental      Comments: Unable to assess   Pulmonary  Breath sounds clear to auscultation               Abdominal  GI exam deferred       Other Findings            Anesthetic Plan    ASA: 3  Anesthesia type: MAC and total IV anesthesia          Induction: Intravenous  Anesthetic plan and risks discussed with: Patient

## 2020-02-21 NOTE — PROGRESS NOTES
Hospitalist Progress Note    NAME: Sakina Pinto   :  1965   MRN:  [id number]     Assessment / Plan:  Sepsis with fever, tachycardia and leukocytosis POA  Acute Metabolic Encephalopathy due to infection  Due to Left Great Toe Osteomyelitis  Bacteremia with MRSA recently at [facility], called by pharmacist Darylene Crew who has called [facility]  Managed in ICU due to sedation  Pt signed AMA at [facility] per report recently  Awaiting formal culture and sensitivities from [facility]  Continue IV Vancomycin, Cefepime and Flagyl  2D echo with EF 25%-30% and MR and IV drug use. Will ask cardiology for JAIME eval  Plan for surgery amputation by podiatry today  On Precedex and ativan due to severe delirium  F/u cultures    Acute Pulmonary Edema suspect cocaine related vs valvular etiology as IV drug abuse  2D echo with 25-30% EF  May need JAIME depending on hospital coarse  IV diuretics as per pulmonary, giving on PRN basis as NPO status  Monitor fluid status  Wean o2 as able to  Images on CXR can be due to septic emboli     Insulin-dependent diabetes  Noncompliance with insulin  Hemoglobin A1c 8  SSI     Polysubstance Abuse  Urine tox screen is positive for cocaine, heroin and methadone  Counseling provided on admission per H&P     Hypokalemia  Replaced orally       Peripheral neuropathy  Holding meds due to sedation    HTN  Considering unable to take PO meds due to sedation, will place on clonidine patch       Code Status: Full code  Surrogate Decision Maker: Girlfrienlogan Keys Raymond([phone])     DVT Prophylaxis: lovenox  GI Prophylaxis: not indicated     Baseline: Functional, IV drug abuse                 Subjective: Pt seen and examined at bedside. Sedated.  Overnight events d/w RN   CHIEF COMPLAINT: f/u \"Fever chills and shortness of breath\"       Review of Systems:  Symptom Y/N Comments  Symptom Y/N Comments   Fever/Chills    Chest Pain     Poor Appetite    Edema     Cough    Abdominal Pain     Sputum    Joint Pain     SOB/DE [place] CRUZ Pruritis/Rash     Nausea/vomit    Tolerating PT/OT     Diarrhea    Tolerating Diet     Constipation    Other       Could NOT obtain due to: Sedated      Objective:     VITALS:   Last 24hrs VS reviewed since prior progress note. Most recent are:  Patient Vitals for the past 24 hrs:   Temp Pulse Resp BP SpO2   02/21/20 1400 -- 72 (!) 32 125/72 (!) 89 %   02/21/20 1300 -- 69 8 145/82 96 %   02/21/20 1200 97.2 °F (36.2 °C) 66 13 (!) 156/93 97 %   02/21/20 1100 -- 68 21 (!) 162/96 94 %   02/21/20 1000 -- 70 25 153/90 96 %   02/21/20 0900 -- 71 25 (!) 163/99 94 %   02/21/20 0800 97.7 °F (36.5 °C) 71 29 (!) 160/96 97 %   02/21/20 0700 -- 75 24 (!) 158/98 97 %   02/21/20 0600 -- 75 27 (!) 155/96 97 %   02/21/20 0500 -- 78 27 (!) 162/92 97 %   02/21/20 0400 -- 79 28 (!) 149/95 97 %   02/21/20 0300 -- 80 28 150/88 97 %   02/21/20 0200 -- 81 29 146/87 97 %   02/21/20 0100 -- 81 (!) 33 (!) 156/92 96 %   02/21/20 0001 -- 83 (!) 31 (!) 155/94 95 %   02/20/20 2300 97.8 °F (36.6 °C) 86 (!) 31 (!) 152/94 96 %   02/20/20 2200 -- 88 30 (!) 152/94 95 %   02/20/20 2139 -- 87 28 (!) 153/96 100 %   02/20/20 2100 -- 82 26 (!) 157/101 100 %   02/20/20 2000 97.8 °F (36.6 °C) 85 24 (!) 159/104 100 %   02/20/20 1900 -- 87 26 (!) 151/97 100 %   02/20/20 1635 -- 84 25 -- 100 %       Intake/Output Summary (Last 24 hours) at 2/21/2020 1526  Last data filed at 2/21/2020 1432  Gross per 24 hour   Intake 1493.6 ml   Output 5185 ml   Net -3691.4 ml        PHYSICAL EXAM:  General: WD, WN. sedated, no acute distress    EENT:  EOMI. Anicteric sclerae. MMM  Resp:  CTA bilaterally, no wheezing or rales. No accessory muscle use  CV:  Regular  rhythm,  No edema  GI:  Soft, Non distended, Non tender.  +Bowel sounds  Neurologic:  Exam limited due to sedation  Psych:   Exam limited due to sedation  Skin:  Left Great Toe Ulcer.   No jaundice    Reviewed most current lab test results and cultures  YES  Reviewed most current radiology test results   YES  Review and summation of old records today    NO  Reviewed patient's current orders and MAR    YES  PMH/SH reviewed - no change compared to H&P  ________________________________________________________________________  Care Plan discussed with:    Comments   Patient     Family   Not at bedside today   RN y    Care Manager     Consultant                        Multidiciplinary team rounds were held today with , nursing, pharmacist and clinical coordinator. Patient's plan of care was discussed; medications were reviewed and discharge planning was addressed. ________________________________________________________________________  Total NON critical care TIME:  30 Minutes    Total CRITICAL CARE TIME Spent:   Minutes non procedure based      Comments   >50% of visit spent in counseling and coordination of care     ________________________________________________________________________  Heidi Gore MD     Procedures: see electronic medical records for all procedures/Xrays and details which were not copied into this note but were reviewed prior to creation of Plan. LABS:  I reviewed today's most current labs and imaging studies.   Pertinent labs include:  Recent Labs     02/21/20  0303 02/20/20  0412 02/19/20  1334   WBC 13.6* 15.8* 16.6*   HGB 11.8* 11.2* 10.3*   HCT 35.6* 34.9* 30.7*    402* 382     Recent Labs     02/21/20  0303 02/20/20  0412 02/19/20  1334    137 138   K 3.5 3.5 3.1*    104 104   CO2 29 28 26   * 105* 143*   BUN 27* 18 19   CREA 1.58* 1.35* 1.35*   CA 8.5 8.1* 7.9*   MG 1.7  --   --    PHOS 4.5  --   --    ALB 1.3* 1.4* 1.3*   TBILI 0.7 0.4 0.3   SGOT 28 26 26   ALT 7* 10* 9*       Signed: Heidi Gore MD

## 2020-02-21 NOTE — PROGRESS NOTES
O2 sat 86% on 6 L NC. suctioned patient,stimulating strong congested cough. sats remained 88%. Changed to 40% VM.

## 2020-02-21 NOTE — INTERDISCIPLINARY ROUNDS
Interdisciplinary team rounds were held 2/21/2020 with the following team members:Care Management, Diabetes Treatment Specialist, Nursing, Nutrition, Pharmacy, Physical Therapy, Physician, Respiratory Therapy and Clinical Coordinator. Plan of care discussed. See clinical pathway and/or care plan for interventions and desired outcomes.

## 2020-02-21 NOTE — PROGRESS NOTES
I think having anesthesia perform an ankle block is the best idea, planning for just distal digital amputation, distal metatarsal level. I am trying to get him on for later this late afternoon. X-ray shoes destruction of proximal phalanx, possible level at distal first metatarsal, second digit unremarkable.

## 2020-02-21 NOTE — PERIOP NOTES
TRANSFER - IN REPORT:    Verbal report received from [facility]) on Tico Pickett  being received from CCU Room 2544(unit) for ordered procedure      Report consisted of patients Situation, Background, Assessment and   Recommendations(SBAR). Information from the following report(s) SBAR, Kardex, Intake/Output, MAR, Recent Results, Med Rec Status, Cardiac Rhythm NSR and Procedure Verification was reviewed with the receiving nurse. Opportunity for questions and clarification was provided. Assessment completed upon patients arrival to unit and care assumed.

## 2020-02-21 NOTE — PROGRESS NOTES
Pharmacy Automatic Renal Dosing Protocol - Antimicrobials    Indication for Antimicrobials: bone and joint infection     Current Regimen of Each Antimicrobial:  Vancomycin pharmacy to dose (Start Date ; Day # 3)  Cefepime 2g IV every 8hr (start date: , day 3)  Metronidazole 500 mg q12h (start date: , Day 2)    Previous Antimicrobial Therapy:      Goal Level: VANCOMYCIN TROUGH GOAL RANGE    Vancomycin Trough: 15 - 20 mcg/mL  (AUC: 400 - 600 mg/hr/Liter/day)     Date Dose & Interval Measured (mcg/mL) Extrapolated (mcg/mL)    1200 mg q12h 19.9 23.2                 Date & time of next level:   2000    Significant Cultures:   [facility] HS Cultures:  : blood - methicillin resistant staph aureous - susceptible to vancomycin   : wound (toe) - Moderate gram positive cocci, beta hemolytic strep group B - susceptible to clindamycin, tetracycline, bactrim, and vancomycin - resistant to oxacillin    [facility]  : blood - NG - prelim  : wound (foot)- Heavy possible staph aureous, heavy streptococci beta hemolytic group B, few gram negative rods - prelim    Radiology / Imaging results: (X-ray, CT scan or MRI):   : xray left foot: IMPRESSION: Great toe osteomyelitis.   : chest xray: pulmonary edema  : chest xray: Interval worsening of congestive HF with moderate pulmonary edema    Paralysis, amputations, malnutrition: none    Labs:  Recent Labs     20  0303 20  0412 20  1334   CREA 1.58* 1.35* 1.35*   BUN 27* 18 19   WBC 13.6* 15.8* 16.6*     Temp (24hrs), Av.8 °F (36.6 °C), Min:97.7 °F (36.5 °C), Max:97.8 °F (36.6 °C)    Creatinine Clearance (mL/min) or Dialysis: ~52 ml/min    Impression/Plan:   Contacted [facility] for culture data - presented above  Continue current regimen; cefepime renally adjusted  Continue vancomycin adjusted to 1000 mg every 16 hours for estimated trough of ~15.4 and AUC of 559     Trough to be drawn prior to 4th dose - approximately  ~ 2000  Antimicrobial stop date to be determined - I&D scheduled for today      Pharmacy will follow daily and adjust medications as appropriate for renal function and/or serum levels. Thank you,  Tuan Mendiola    Recommended duration of therapy  http://[facility]/[place]/[facility]/Pharmacy/Clinical%20Companion/Duration%20of%20ABX%20therapy. docx    Renal Dosing  http://[facility]/[place]/[facility]/Pharmacy/Clinical%20Companion/Renal%[id number]. pdf

## 2020-02-21 NOTE — PROGRESS NOTES
Nutrition Assessment:    RECOMMENDATIONS:   Advance to Diabetic diet as medically able postop    DIETITIANS INTERVENTIONS/PLAN:   Advance diet as able  Monitor plan of care    ASSESSMENT:   Pt admitted with severe sepsis, osteomyelitis + pulmonary edema. PMH: HTN, DM, drug abuse (heroine, cocaine). MST triggered for significant wt loss, per EMR review pt's UBW is between 145-160lb. Current bed wt is 152lb. No significant fat or muscle wasting noted per visual assessment. Unable to complete full NFPE given precedex drip and agitation. No family in the room to speak with. He is NPO currently for big toe amputation later this afternoon. SUBJECTIVE/OBJECTIVE:   Pt sedated on precedex  Diet Order: NPO  % Eaten:  No data found. Pertinent Medications:cefepime, humalog, flagyl, KCl, vancomycin; Drips: precedex      Chemistries:  Lab Results   Component Value Date/Time    Sodium 139 02/21/2020 03:03 AM    Potassium 3.5 02/21/2020 03:03 AM    Chloride 104 02/21/2020 03:03 AM    CO2 29 02/21/2020 03:03 AM    Anion gap 6 02/21/2020 03:03 AM    Glucose 130 (H) 02/21/2020 03:03 AM    BUN 27 (H) 02/21/2020 03:03 AM    Creatinine 1.58 (H) 02/21/2020 03:03 AM    BUN/Creatinine ratio 17 02/21/2020 03:03 AM    GFR est AA 56 (L) 02/21/2020 03:03 AM    GFR est non-AA 46 (L) 02/21/2020 03:03 AM    Calcium 8.5 02/21/2020 03:03 AM    AST (SGOT) 28 02/21/2020 03:03 AM    Alk. phosphatase 112 02/21/2020 03:03 AM    Protein, total 8.2 02/21/2020 03:03 AM    Albumin 1.3 (L) 02/21/2020 03:03 AM    Globulin 6.9 (H) 02/21/2020 03:03 AM    A-G Ratio 0.2 (L) 02/21/2020 03:03 AM    ALT (SGPT) 7 (L) 02/21/2020 03:03 AM      Anthropometrics: Height: 5' 8\" (172.7 cm) Weight: 69 kg (152 lb 1.9 oz)  [] standing scale     [x]bed scale    []stated   []unknown    IBW (%IBW):   ( ) UBW (%UBW):   (  %)    BMI: Body mass index is 23.13 kg/m².     This BMI is indicative of:  []Underweight   [x]Normal   []Overweight   [] Obesity   [] Extreme Obesity (BMI>40)  Estimated Nutrition Needs (Based on): 2000 Kcals/day(MSJ 1504 x 1.3) , 69 g(-93 (1-1.2gPro/kg) ) Protein  Carbohydrate: At Least 130 g/day  Fluids: 2000 mL/day    Last BM: 2/18   []Active     []Hyperactive  [x]Hypoactive       [] Absent   BS  Skin:    [] Intact   [] Incision  [x] Breakdown (diabetic toe ulcer)   [] DTI   [] Tears/Excoriation/Abrasion  []Edema [] Other: Wt Readings from Last 30 Encounters:   02/21/20 69 kg (152 lb 1.9 oz)   02/20/20 75.3 kg (166 lb)   08/09/19 70.1 kg (154 lb 9.6 oz)   06/26/19 66.2 kg (145 lb 14.4 oz)   06/06/19 72.3 kg (159 lb 4.8 oz)   05/30/19 74.9 kg (165 lb 1.6 oz)   05/23/19 80.7 kg (178 lb)   01/21/19 85 kg (187 lb 4.8 oz)   10/18/18 89 kg (196 lb 3.2 oz)   10/11/18 88 kg (194 lb)      NUTRITION DIAGNOSES:   Problem:  Inadequate protein-energy intake      Etiology: related to pt NPO     Signs/Symptoms: as evidenced by NPO meets 0% kcal and protein needs. NUTRITION INTERVENTIONS:  Meals/Snacks: Other(advance diet as able)                  GOAL:   Pt will advance to PO diet in 2-4 days.      Cultural, Alevism, or Ethnic Dietary Needs: None     LEARNING NEEDS (Diet, Food/Nutrient-Drug Interaction):    [x] None Identified   [] Identified and Education Provided/Documented   [] Identified and Pt declined/was not appropriate      [x] Interdisciplinary Care Plan Reviewed/Documented    [x] Participated in Discharge Planning: Diabetic diet     [x] Interdisciplinary Rounds     NUTRITION RISK:    [x] High              [x] Moderate           []  Low  []  Minimal/Uncompromised    PT SEEN FOR:    []  MD Consult: []Calorie Count      []Diabetic Diet Education        []Diet Education     []Electrolyte Management     []General Nutrition Management and Supplements     []Management of Tube Feeding     []TPN Recommendations    [x]  RN Referral:  [x]MST score >=2     []Enteral/Parenteral Nutrition PTA     []Pregnant: Gestational DM or Multigestation   []  Low BMI  []  Re-Screen []  LOS   []  NPO/clears x 5 days   []  New TF/TPN    Deanna Bridges, RD, [address]   Pager [phone]  Weekend Pager [phone]

## 2020-02-21 NOTE — PERIOP NOTES
Handoff Report from Operating Room to PACU    Report received from [address] [place] [address]  and Leo Darnell CRNA regarding Tico Pickett. Surgeon(s):  Emma Borjas DPM  And Procedure(s) (LRB):  I & D WITH LEFT GREAT TOE AMPUTATION AND PARTIAL FIRST METATARSAL  LEFT FOOT (Left)  confirmed   with drains and dressings discussed. Anesthesia type, drugs, patient history, complications, estimated blood loss, vital signs, intake and output, and last pain medication were reviewed.

## 2020-02-21 NOTE — PROGRESS NOTES
Asked to evaluate this patient for best approach to anesthesia for I&D and amputation of left great toe due to osteomyelitis. Given the patient's significant pulmonary edema, I would not recommend general anesthesia for this case. Spinal or epidural might be difficult with his combativeness, and given his possible sepsis I would avoid neuraxial techniques altogether. My recommendation is that the case be done under sedation with either an ankle block or popliteal/femoral block, depending on the extent of the amputation planned.

## 2020-02-21 NOTE — CONSULTS
[facility]     Date of  Admission: 2/19/2020 12:58 PM     Admission type:Emergency    Consult for: cardiomyopath, systolic HF and JAIME  Consult by: hospitalist     Subjective:     Sonam Hahn is a 49 y.o. male admitted for Severe sepsis ([facility] [A41.9, R65.20]. Patient admitted for increased SOB, left great toe infection. Noted to have pulm edema, acute hypoxia, managed with diuretics, now neg 8L since admisson on 2/19/2020. Echo shows EF 20-25%. No previous cardiac hx.  +cocaine and heroin. Unable to obtain information as patient is sedated. Discussed with patients \"fiance\". ECG SR    Cardiac risk factors: smoking/ tobacco exposure, male gender.       Patient Active Problem List    Diagnosis Date Noted    Cardiomyopathy [facility]) 78/63/1241    Acute systolic heart failure ([facility] 02/21/2020    Substance abuse ([facility] 02/21/2020    Severe sepsis ([facility] 02/19/2020    Elevated blood sugar level 10/11/2018    Encounter to establish care with new doctor 10/11/2018      Emile Sunshine NP  Past Medical History:   Diagnosis Date    Acute systolic heart failure ([facility] 2/21/2020    Cardiomyopathy ([facility] 2/21/2020    Diabetes ([facility]     Hypertension     Neuropathy     Sciatica     Substance abuse ([facility] 2/21/2020      Social History     Socioeconomic History    Marital status: UNKNOWN     Spouse name: Not on file    Number of children: Not on file    Years of education: Not on file    Highest education level: Not on file   Tobacco Use    Smoking status: Current Some Day Smoker     Years: 40.00    Smokeless tobacco: Never Used    Tobacco comment: 8-9 cigs/day   Substance and Sexual Activity    Alcohol use: No    Drug use: No    Sexual activity: Yes     No Known Allergies   Family History   Problem Relation Age of Onset    Diabetes Mother     Diabetes Father       Current Facility-Administered Medications   Medication Dose Route Frequency    cefepime (MAXIPIME) 2 g in 0.9% sodium chloride (MBP/ADV) 100 mL  2 g IntraVENous Q12H    LORazepam (ATIVAN) injection 2 mg  2 mg IntraVENous Q6H    potassium chloride 10 mEq in 100 ml IVPB  10 mEq IntraVENous Q2H    hydrALAZINE (APRESOLINE) 20 mg/mL injection 10 mg  10 mg IntraVENous Q6H    vancomycin (VANCOCIN) 1,000 mg in 0.9% sodium chloride (MBP/ADV) 250 mL  1,000 mg IntraVENous Q16H    [Held by provider] melatonin tablet 3 mg  3 mg Oral QHS    alcohol 62% (NOZIN) nasal  1 Ampule  1 Ampule Topical Q12H    dexmedeTOMidine (PRECEDEX) 600 mcg in 0.9% sodium chloride 150 mL infusion  0.2-1.4 mcg/kg/hr (Order-Specific) IntraVENous TITRATE    metroNIDAZOLE (FLAGYL) IVPB premix 500 mg  500 mg IntraVENous Q12H    influenza vaccine 2019-20 (6 mos+)(PF) (FLUARIX/FLULAVAL/FLUZONE QUAD) injection 0.5 mL  0.5 mL IntraMUSCular PRIOR TO DISCHARGE    cloNIDine (CATAPRES) 0.3 mg/24 hr patch 1 Patch  1 Patch TransDERmal Q7D    LORazepam (ATIVAN) injection 2 mg  2 mg IntraVENous Q3H PRN    acetaminophen (TYLENOL) tablet 650 mg  650 mg Oral Q6H PRN    [Held by provider] pregabalin (LYRICA) capsule 150 mg  150 mg Oral BID    sodium chloride (NS) flush 5-10 mL  5-10 mL IntraVENous PRN    insulin lispro (HUMALOG) injection   SubCUTAneous AC&HS    glucose chewable tablet 16 g  4 Tab Oral PRN    dextrose (D50W) injection syrg 12.5-25 g  12.5-25 g IntraVENous PRN    glucagon (GLUCAGEN) injection 1 mg  1 mg IntraMUSCular PRN    enoxaparin (LOVENOX) injection 40 mg  40 mg SubCUTAneous Q24H    sodium hypochlorite (QUARTER STRENGTH DAKIN'S) 0.125% irrigation (bottle)   Topical BID        Review of Symptoms: unable to obtain        Objective:      Visit Vitals  BP (!) 154/93 (BP 1 Location: Right arm, BP Patient Position: At rest)   Pulse 71   Temp 98 °F (36.7 °C)   Resp 24   Ht 5' 8\" (1.727 m)   Wt 69 kg (152 lb 1.9 oz)   SpO2 96%   BMI 23.13 kg/m²       Physical:   General:   Heart: RRR, no m/S3/JVD, no carotid bruits Lungs: clear   Abdomen: Soft, +BS, NTND   Extremities: LE cole +DP/PT, no edema   Neurologic: Grossly normal    Data Review:   Recent Labs     02/21/20  0303 02/20/20  0412 02/19/20  1334   WBC 13.6* 15.8* 16.6*   HGB 11.8* 11.2* 10.3*   HCT 35.6* 34.9* 30.7*    402* 382     Recent Labs     02/21/20  0303 02/20/20  0412 02/19/20  1334    137 138   K 3.5 3.5 3.1*    104 104   CO2 29 28 26   * 105* 143*   BUN 27* 18 19   CREA 1.58* 1.35* 1.35*   CA 8.5 8.1* 7.9*   MG 1.7  --   --    PHOS 4.5  --   --    ALB 1.3* 1.4* 1.3*   TBILI 0.7 0.4 0.3   SGOT 28 26 26   ALT 7* 10* 9*       Recent Labs     02/19/20  1334   TROIQ <0.05   CPK 88   CKMB 1.4         Intake/Output Summary (Last 24 hours) at 2/21/2020 1632  Last data filed at 2/21/2020 1630  Gross per 24 hour   Intake 1493.6 ml   Output 5190 ml   Net -3696.4 ml        Cardiographics    Telemetry: ST-SR  ECG: TWave changes, no previous ECG to compare    Echocardiogram: 2/20/2020  · Mildly dilated left ventricle. Moderate concentric hypertrophy. Mild-to-moderate systolic function. Estimated left ventricular ejection fraction is 25 - 30%. · Reduced systolic function. · Mitral valve non-specific thickening. Mild mitral valve regurgitation is present. · Moderately dilated left atrium. CXRAY:Prominent pulmonary edema pattern. No significant change       Assessment:       Active Problems:    Severe sepsis ([facility] (2/19/2020)      Cardiomyopathy ([facility] (2/21/2020)      Acute systolic heart failure ([facility] (2/21/2020)      Substance abuse ([facility] (2/21/2020)         Plan:     Cardiomyopathy with acute systoli heart failure: (EF 20-25%)  Continue with diuretics, CR mildly elevated, but he continues with pulm edema. Worse given his hypoalbumenimia. Monitor I/Os, daily weights and labs  ? BB due to +cocaine on admission, but it has been several days and with his BP will benefit.  starting low dose Coreg.   No ACEI/ARB/Entresto at this time to allow BB    Sepsis/bacteremia:  JAIME on Monday to evaluate for endocarditis  Currently, consent will need to be signed by POA. Thank you for consulting [address], NP       [facility]    2/21/2020         Patient seen, examined by me personally. Plan discussed as detailed. Agree with note as outlined by  NP. I confirm findings in history and physical exam. No additional findings noted. Agree with plan as outlined above.      Ignacio Rincon MD

## 2020-02-21 NOTE — PROGRESS NOTES
Patient did very well in the procedure, he is stable and has sterile bandage left foot, Left with viable looking bone margin distal first metatarsal left foot, biopsy sent to pathology. Stay off of the left foot, level elevation in bed. Regular previous diet now. I will change the bandage tomorrow.

## 2020-02-21 NOTE — PROGRESS NOTES
I am wanting to have anesthesia assess the patient this AM, for the best possible approach to anesthesia for his I & D and amputation. I can certainly schedule the case and then we can decide what is best, however, I think the assessment first is the best way to go, he is combative and may not be suited for just local anesthesia for the foot procedure. This may put himself ans the staff at risk during the surgery.

## 2020-02-21 NOTE — ANESTHESIA POSTPROCEDURE EVALUATION
Procedure(s):  I & D WITH LEFT GREAT TOE AMPUTATION AND PARTIAL FIRST METATARSAL  LEFT FOOT.    MAC, total IV anesthesia    Anesthesia Post Evaluation        Patient location during evaluation: PACU  Note status: Adequate. Level of consciousness: responsive to verbal stimuli and sleepy but conscious  Pain management: satisfactory to patient  Airway patency: patent  Anesthetic complications: no  Cardiovascular status: acceptable  Respiratory status: acceptable  Hydration status: acceptable  Comments: +Post-Anesthesia Evaluation and Assessment    Patient: Anna Urias MRN: [id number]  SSN: [id number]   YOB: 1965  Age: 49 y.o. Sex: male      Cardiovascular Function/Vital Signs    /87   Pulse 64   Temp 36.5 °C (97.7 °F)   Resp 21   Ht 5' 8\" (1.727 m)   Wt 69 kg (152 lb 1.9 oz)   SpO2 99%   BMI 23.13 kg/m²     Patient is status post Procedure(s):  I & D WITH LEFT GREAT TOE AMPUTATION AND PARTIAL FIRST METATARSAL  LEFT FOOT. Nausea/Vomiting: Controlled. Postoperative hydration reviewed and adequate. Pain:  Pain Scale 1: Visual (02/21/20 1820)  Pain Intensity 1: 0 (02/21/20 1820)   Managed. Neurological Status:   Neuro (WDL): Exceptions to WDL (02/21/20 1645)   At baseline. Mental Status and Level of Consciousness: Arousable. Pulmonary Status:   O2 Device: Ventimask (02/21/20 1820)   Adequate oxygenation and airway patent. Complications related to anesthesia: None    Post-anesthesia assessment completed. No concerns. Signed By: Alta Caal MD    2/21/2020  Post anesthesia nausea and vomiting:  controlled      Vitals Value Taken Time   /87 2/21/2020  6:40 PM   Temp 36.5 °C (97.7 °F) 2/21/2020  6:20 PM   Pulse 65 2/21/2020  6:45 PM   Resp 20 2/21/2020  6:45 PM   SpO2 99 % 2/21/2020  6:45 PM   Vitals shown include unvalidated device data.

## 2020-02-22 ENCOUNTER — APPOINTMENT (OUTPATIENT)
Dept: GENERAL RADIOLOGY | Age: 55
DRG: 710 | End: 2020-02-22
Attending: INTERNAL MEDICINE
Payer: MEDICAID

## 2020-02-22 ENCOUNTER — APPOINTMENT (OUTPATIENT)
Dept: NON INVASIVE DIAGNOSTICS | Age: 55
DRG: 710 | End: 2020-02-22
Attending: NURSE PRACTITIONER
Payer: MEDICAID

## 2020-02-22 LAB
ALBUMIN SERPL-MCNC: 1.2 G/DL (ref 3.5–5)
ALBUMIN/GLOB SERPL: 0.2 {RATIO} (ref 1.1–2.2)
ALP SERPL-CCNC: 100 U/L (ref 45–117)
ALT SERPL-CCNC: <6 U/L (ref 12–78)
ANION GAP SERPL CALC-SCNC: 7 MMOL/L (ref 5–15)
AST SERPL-CCNC: 32 U/L (ref 15–37)
BASOPHILS # BLD: 0 K/UL (ref 0–0.1)
BASOPHILS NFR BLD: 0 % (ref 0–1)
BILIRUB SERPL-MCNC: 0.6 MG/DL (ref 0.2–1)
BUN SERPL-MCNC: 31 MG/DL (ref 6–20)
BUN/CREAT SERPL: 21 (ref 12–20)
CALCIUM SERPL-MCNC: 8.3 MG/DL (ref 8.5–10.1)
CHLORIDE SERPL-SCNC: 106 MMOL/L (ref 97–108)
CO2 SERPL-SCNC: 27 MMOL/L (ref 21–32)
CREAT SERPL-MCNC: 1.45 MG/DL (ref 0.7–1.3)
DIFFERENTIAL METHOD BLD: ABNORMAL
EOSINOPHIL # BLD: 0 K/UL (ref 0–0.4)
EOSINOPHIL NFR BLD: 0 % (ref 0–7)
ERYTHROCYTE [DISTWIDTH] IN BLOOD BY AUTOMATED COUNT: 14.3 % (ref 11.5–14.5)
GLOBULIN SER CALC-MCNC: 6.9 G/DL (ref 2–4)
GLUCOSE BLD STRIP.AUTO-MCNC: 129 MG/DL (ref 65–100)
GLUCOSE BLD STRIP.AUTO-MCNC: 149 MG/DL (ref 65–100)
GLUCOSE BLD STRIP.AUTO-MCNC: 90 MG/DL (ref 65–100)
GLUCOSE SERPL-MCNC: 113 MG/DL (ref 65–100)
HCT VFR BLD AUTO: 36.1 % (ref 36.6–50.3)
HGB BLD-MCNC: 12.2 G/DL (ref 12.1–17)
IMM GRANULOCYTES # BLD AUTO: 0.1 K/UL (ref 0–0.04)
IMM GRANULOCYTES NFR BLD AUTO: 1 % (ref 0–0.5)
LYMPHOCYTES # BLD: 0.8 K/UL (ref 0.8–3.5)
LYMPHOCYTES NFR BLD: 6 % (ref 12–49)
MAGNESIUM SERPL-MCNC: 1.6 MG/DL (ref 1.6–2.4)
MCH RBC QN AUTO: 26 PG (ref 26–34)
MCHC RBC AUTO-ENTMCNC: 33.8 G/DL (ref 30–36.5)
MCV RBC AUTO: 77 FL (ref 80–99)
MONOCYTES # BLD: 0.6 K/UL (ref 0–1)
MONOCYTES NFR BLD: 5 % (ref 5–13)
NEUTS SEG # BLD: 11.2 K/UL (ref 1.8–8)
NEUTS SEG NFR BLD: 88 % (ref 32–75)
NRBC # BLD: 0 K/UL (ref 0–0.01)
NRBC BLD-RTO: 0 PER 100 WBC
PHOSPHATE SERPL-MCNC: 2.7 MG/DL (ref 2.6–4.7)
PLATELET # BLD AUTO: 432 K/UL (ref 150–400)
PMV BLD AUTO: 10.4 FL (ref 8.9–12.9)
POTASSIUM SERPL-SCNC: 3 MMOL/L (ref 3.5–5.1)
PROT SERPL-MCNC: 8.1 G/DL (ref 6.4–8.2)
RBC # BLD AUTO: 4.69 M/UL (ref 4.1–5.7)
RBC MORPH BLD: ABNORMAL
SERVICE CMNT-IMP: ABNORMAL
SERVICE CMNT-IMP: ABNORMAL
SERVICE CMNT-IMP: NORMAL
SODIUM SERPL-SCNC: 140 MMOL/L (ref 136–145)
WBC # BLD AUTO: 12.7 K/UL (ref 4.1–11.1)

## 2020-02-22 PROCEDURE — 80053 COMPREHEN METABOLIC PANEL: CPT

## 2020-02-22 PROCEDURE — 74011000258 HC RX REV CODE- 258: Performed by: INTERNAL MEDICINE

## 2020-02-22 PROCEDURE — 74011250636 HC RX REV CODE- 250/636: Performed by: NURSE PRACTITIONER

## 2020-02-22 PROCEDURE — 74011000250 HC RX REV CODE- 250: Performed by: INTERNAL MEDICINE

## 2020-02-22 PROCEDURE — 74011250636 HC RX REV CODE- 250/636: Performed by: HOSPITALIST

## 2020-02-22 PROCEDURE — 65610000006 HC RM INTENSIVE CARE

## 2020-02-22 PROCEDURE — 74011636637 HC RX REV CODE- 636/637: Performed by: INTERNAL MEDICINE

## 2020-02-22 PROCEDURE — 74011250636 HC RX REV CODE- 250/636: Performed by: INTERNAL MEDICINE

## 2020-02-22 PROCEDURE — 71045 X-RAY EXAM CHEST 1 VIEW: CPT

## 2020-02-22 PROCEDURE — 83735 ASSAY OF MAGNESIUM: CPT

## 2020-02-22 PROCEDURE — 84100 ASSAY OF PHOSPHORUS: CPT

## 2020-02-22 PROCEDURE — 74011250637 HC RX REV CODE- 250/637: Performed by: INTERNAL MEDICINE

## 2020-02-22 PROCEDURE — 77010033678 HC OXYGEN DAILY

## 2020-02-22 PROCEDURE — 74011250637 HC RX REV CODE- 250/637: Performed by: NURSE PRACTITIONER

## 2020-02-22 PROCEDURE — 85025 COMPLETE CBC W/AUTO DIFF WBC: CPT

## 2020-02-22 PROCEDURE — 36415 COLL VENOUS BLD VENIPUNCTURE: CPT

## 2020-02-22 PROCEDURE — 82962 GLUCOSE BLOOD TEST: CPT

## 2020-02-22 RX ADMIN — Medication 1 AMPULE: at 11:14

## 2020-02-22 RX ADMIN — LORAZEPAM 2 MG: 2 INJECTION INTRAMUSCULAR; INTRAVENOUS at 17:17

## 2020-02-22 RX ADMIN — DEXMEDETOMIDINE 0.7 MCG/KG/HR: 100 INJECTION, SOLUTION, CONCENTRATE INTRAVENOUS at 01:05

## 2020-02-22 RX ADMIN — LORAZEPAM 2 MG: 2 INJECTION INTRAMUSCULAR; INTRAVENOUS at 22:10

## 2020-02-22 RX ADMIN — INSULIN LISPRO 2 UNITS: 100 INJECTION, SOLUTION INTRAVENOUS; SUBCUTANEOUS at 17:56

## 2020-02-22 RX ADMIN — DEXMEDETOMIDINE 1 MCG/KG/HR: 100 INJECTION, SOLUTION, CONCENTRATE INTRAVENOUS at 11:15

## 2020-02-22 RX ADMIN — DAKIN'S SOLUTION 0.125% (QUARTER STRENGTH): 0.12 SOLUTION at 11:20

## 2020-02-22 RX ADMIN — CEFEPIME HYDROCHLORIDE 2 G: 2 INJECTION, POWDER, FOR SOLUTION INTRAVENOUS at 01:05

## 2020-02-22 RX ADMIN — METRONIDAZOLE 500 MG: 500 INJECTION, SOLUTION INTRAVENOUS at 11:14

## 2020-02-22 RX ADMIN — HYDRALAZINE HYDROCHLORIDE 10 MG: 20 INJECTION INTRAMUSCULAR; INTRAVENOUS at 17:17

## 2020-02-22 RX ADMIN — CARVEDILOL 3.12 MG: 3.12 TABLET, FILM COATED ORAL at 11:13

## 2020-02-22 RX ADMIN — ENOXAPARIN SODIUM 40 MG: 40 INJECTION SUBCUTANEOUS at 22:10

## 2020-02-22 RX ADMIN — FUROSEMIDE 40 MG: 10 INJECTION, SOLUTION INTRAMUSCULAR; INTRAVENOUS at 11:13

## 2020-02-22 RX ADMIN — CEFEPIME HYDROCHLORIDE 2 G: 2 INJECTION, POWDER, FOR SOLUTION INTRAVENOUS at 14:54

## 2020-02-22 RX ADMIN — LORAZEPAM 2 MG: 2 INJECTION INTRAMUSCULAR; INTRAVENOUS at 02:20

## 2020-02-22 RX ADMIN — LORAZEPAM 2 MG: 2 INJECTION INTRAMUSCULAR; INTRAVENOUS at 05:00

## 2020-02-22 RX ADMIN — LORAZEPAM 2 MG: 2 INJECTION INTRAMUSCULAR; INTRAVENOUS at 12:02

## 2020-02-22 RX ADMIN — DAKIN'S SOLUTION 0.125% (QUARTER STRENGTH): 0.12 SOLUTION at 17:56

## 2020-02-22 RX ADMIN — CARVEDILOL 3.12 MG: 3.12 TABLET, FILM COATED ORAL at 17:17

## 2020-02-22 RX ADMIN — Medication 10 ML: at 22:10

## 2020-02-22 RX ADMIN — LORAZEPAM 2 MG: 2 INJECTION INTRAMUSCULAR; INTRAVENOUS at 05:24

## 2020-02-22 RX ADMIN — VANCOMYCIN HYDROCHLORIDE 1000 MG: 1 INJECTION, POWDER, LYOPHILIZED, FOR SOLUTION INTRAVENOUS at 14:51

## 2020-02-22 RX ADMIN — Medication 1 AMPULE: at 22:10

## 2020-02-22 RX ADMIN — METRONIDAZOLE 500 MG: 500 INJECTION, SOLUTION INTRAVENOUS at 22:10

## 2020-02-22 RX ADMIN — HYDRALAZINE HYDROCHLORIDE 10 MG: 20 INJECTION INTRAMUSCULAR; INTRAVENOUS at 05:24

## 2020-02-22 NOTE — OP NOTES
[address]  OPERATIVE REPORT    Name:  Juve Hernandez  MR#:  [id number]  :  1965  ACCOUNT #:  [de-identified]  DATE OF SERVICE:  2020      PREOPERATIVE DIAGNOSIS:  Necrotic diabetic infection with osteomyelitis great toe left foot. POSTOPERATIVE DIAGNOSIS:  Necrotic diabetic infection with osteomyelitis great toe left foot. PROCEDURE PERFORMED:  Incision and drainage with amputation of great toe left foot, submitted to Pathology for identification, also resection of partial first metatarsal head for submission as a viable proximal bone resection, left foot. SURGEON:  Sara Sierra MD.    ASSISTANT: None. ANESTHESIA:  IV sedation. INJECTABLES:  14 mL of 0.5% Marcaine plain injected preoperatively to left foot. HEMOSTASIS:  Esmarch bandage at midfoot level. COMPLICATIONS:  None apparent. SPECIMENS REMOVED:  Pathology as stated above, necrotic left great toe with viable bone margin of the distal aspect of the first metatarsal head, left foot. MICROBIOLOGY:  Aerobic and anaerobic deep tissue swab cultures of internal surgical site, left foot. IMPLANTS:  None. ESTIMATED BLOOD LOSS:  50 mL. OPERATIVE TECHNIQUE:  The patient was brought to the operating room, placed on the operating room table in supine position, followed by IV sedation with the Anesthesia Department. A mid-foot block using 0.5% Marcaine plain was utilized in the left foot at this point. Next, the left foot was then prepped and draped in usual aseptic technique and an esmarch bandage was utilized at the mid-foot level as hemostasis. A converging semi-elliptical incision was placed on the medial and lateral aspect of the base of the proximal phalanx converging dorsally and plantarly.   Full-thickness flap was utilized to create a disarticulation and removed the base of proximal phalanx from the head of first metatarsal, abundant purulence was seen distal, great toe as well as on the medial incision initially. Base of the proximal phalanx cartilage looked friable, but still dorsally it looked necrotic. The nonviable tissue and tendinous structures that were nonviable were resected far proximal back to viable tissue, sharply. The area was then irrigated with copious amounts of sterile saline, followed by further debridement sharply of all necrotic tissue. At this point, the first metatarsal head did not show any severe damage, there was no purulence at this level. Deep tissue cultures were taken at this region mostly plantarly where the initial infection had been over the base of the proximal phalanx previously was located. Once this was completed, the area was also checked and shown to have no proximal sinus tracking or other sinus tracks or invasion to the lateral aspect or proximal aspect of the foot. Next, the first metatarsal head distally was resected in a plane that was dorsal distal to plantar proximal in a 45-degree fashion to procure a viable bone margin. The marrow at this region was examined showed good bleeding bone marrow with no purulence and no necrosis. At this point, good bleeding tissue edges were appreciated in the surgical site. At this point, the assistant held plantar full-thickness flap in corrected position with some force upward while the skin edges were reapproximated and coapted with 4-0 Vicryl and 3-0 Vicryl in order to coaptate the edges without too much pressure on the direct tissue margins. Once this was completed, complete coaptation of the wound was completed. The tourniquet was removed, Esmarch bandage and mid-foot showing immediate hyperemic flush to the end of the foot into the digits 2 through 5 left. The pathology specimens were labelled and prepared in the back table. The swab cultures were also submitted to microbiology for Gram stain and culture and sensitivity.   Dressing was then completed now with Xeroform, 4 x 4, 3-inch Kerlix, and 3-inch Coban in the left foot. The patient tolerated the above procedures and anesthesia well, was discharged from the operating room with vital signs stable and vascular status intact to the left foot. Prognosis for this patient was satisfactory.         MD HANNAH [email or web address]  D:  02/21/2020 18:12  T:  02/22/2020 0:25  JOB #:  [id number]

## 2020-02-22 NOTE — PERIOP NOTES
TRANSFER - OUT REPORT:    Verbal report given to Bakari Wyatt RN (name) on Aruna De Leon  being transferred to [facility](unit) for routine post - op       Report consisted of patients Situation, Background, Assessment and   Recommendations(SBAR). Information from the following report(s) SBAR, Kardex, OR Summary, Procedure Summary, Intake/Output, MAR, Recent Results and Cardiac Rhythm SR was reviewed with the receiving nurse. Opportunity for questions and clarification was provided.       Patient transported with:   Monitor  O2 @ VM 50% liters  Registered Nurse

## 2020-02-22 NOTE — PROGRESS NOTES
Several attempts were made to change the patients bandage this afternoon. He has refused and pulled away significantly. The bandage does look good, clean and dry, and he is doing better overall. Dressing can wait until tomorrow-he may require sedation for this. Planning for wound care nursing dressing change tomorrow. Surgically, he looked good and had all viable tissue edges immediate post-op. I will see how Nursing wound care dressing changes go for him tomorrow. We are awaiting cultures deep tissue, and awaiting proximal bone margin final reports.

## 2020-02-22 NOTE — PROGRESS NOTES
Hospitalist Progress Note    NAME: Tanesha Olivarez   :  1965   MRN:  [id number]     Assessment / Plan:  Sepsis with fever, tachycardia and leukocytosis POA  Acute Metabolic Encephalopathy due to infection  Due to Left Great Toe Osteomyelitis  Bacteremia with MRSA recently at [facility]  Per Pharmacy report  At [facility],   : blood - methicillin resistant staph aureous - susceptible to vancomycin   2: wound (toe) - Moderate gram positive cocci, beta hemolytic strep group B - susceptible to clindamycin, tetracycline, bactrim, and vancomycin - resistant to oxacillin  Managed in ICU due to sedation  Blood cultures here negative so far  Pt signed AMA at [facility] per report recently  Awaiting sensitivities report from [facility]  Continue IV Vancomycin, Cefepime and Flagyl  2D echo with EF 25%-30% and MR and IV drug use. Plan for JAIME on Monday as per cardiology  Plan for surgery amputation by podiatry today  On Precedex and ativan due to severe delirium  F/u cultures from here    Acute Pulmonary Edema suspect cocaine related vs valvular etiology as IV drug abuse  2D echo with 25-30% EF  Continue IV diuretics  Monitor fluid status  Wean o2 as able to  Cardiology is now on the case     Insulin-dependent diabetes  Noncompliance with insulin  Hemoglobin A1c 8  SSI     Polysubstance Abuse  Urine tox screen is positive for cocaine, heroin and methadone  Counseling provided on admission per H&P     Hypokalemia  Replaced orally       Peripheral neuropathy  Holding meds due to sedation    HTN  Considering unable to take PO meds due to sedation, will place on clonidine patch       Code Status: Full code  Surrogate Decision Maker: Girlfriend Rufina Mcconnell Raymond([phone])     DVT Prophylaxis: lovenox  GI Prophylaxis: not indicated     Baseline: Functional, IV drug abuse                 Subjective: Pt seen and examined at bedside. Sedated.  Overnight events d/w RN   CHIEF COMPLAINT: f/u \"Fever chills and shortness of breath\"       Review of Systems:  Symptom Y/N Comments  Symptom Y/N Comments   Fever/Chills    Chest Pain     Poor Appetite    Edema     Cough    Abdominal Pain     Sputum    Joint Pain     SOB/[place] CRUZ    Pruritis/Rash     Nausea/vomit    Tolerating PT/OT     Diarrhea    Tolerating Diet     Constipation    Other       Could NOT obtain due to: Sedated      Objective:     VITALS:   Last 24hrs VS reviewed since prior progress note.  Most recent are:  Patient Vitals for the past 24 hrs:   Temp Pulse Resp BP SpO2   02/22/20 0802 -- -- -- -- 99 %   02/22/20 0700 -- 82 16 116/70 99 %   02/22/20 0600 -- 88 22 117/62 95 %   02/22/20 0500 -- 92 26 133/78 --   02/22/20 0400 98.4 °F (36.9 °C) 91 28 141/82 95 %   02/22/20 0300 -- 91 22 132/75 94 %   02/22/20 0200 -- 94 27 123/59 94 %   02/22/20 0100 -- 98 26 136/76 93 %   02/22/20 0000 98.4 °F (36.9 °C) 98 20 118/64 94 %   02/21/20 2300 -- 89 29 135/75 91 %   02/21/20 2200 -- 88 25 124/75 95 %   02/21/20 2100 -- 72 24 107/65 93 %   02/21/20 2000 97.9 °F (36.6 °C) 68 28 103/59 95 %   02/21/20 1915 -- 67 21 132/78 97 %   02/21/20 1900 -- 64 20 146/86 99 %   02/21/20 1855 -- 65 -- 145/90 --   02/21/20 1845 -- 65 20 145/90 99 %   02/21/20 1840 -- 64 21 141/87 99 %   02/21/20 1835 -- 64 20 140/89 99 %   02/21/20 1830 -- 63 20 139/83 99 %   02/21/20 1825 -- 63 21 139/81 99 %   02/21/20 1820 97.7 °F (36.5 °C) 63 22 136/81 99 %   02/21/20 1815 -- 63 21 137/81 99 %   02/21/20 1810 97.5 °F (36.4 °C) 65 21 143/81 99 %   02/21/20 1808 -- -- -- 143/81 --   02/21/20 1653 -- 71 23 (!) 150/92 96 %   02/21/20 1627 98 °F (36.7 °C) 71 24 (!) 154/93 96 %   02/21/20 1600 -- 69 27 141/83 98 %   02/21/20 1500 -- 68 30 124/71 92 %   02/21/20 1400 -- 72 (!) 32 125/72 (!) 89 %   02/21/20 1300 -- 69 8 145/82 96 %   02/21/20 1200 97.2 °F (36.2 °C) 66 13 (!) 156/93 97 %   02/21/20 1100 -- 68 21 (!) 162/96 94 %       Intake/Output Summary (Last 24 hours) at 2/22/2020 1003  Last data filed at 2/22/2020 0700  Gross per 24 hour   Intake 1903.89 ml   Output 3735 ml   Net -1831.11 ml        PHYSICAL EXAM:  General: WD, WN. sedated, no acute distress    EENT:  EOMI. Anicteric sclerae. MMM  Resp:  CTA bilaterally, no wheezing or rales. No accessory muscle use  CV:  Regular  rhythm,  No edema  GI:  Soft, Non distended, Non tender.  +Bowel sounds  Neurologic:  Exam limited due to sedation  Psych:   Exam limited due to sedation  Skin:  Left Great Toe Ulcer. No jaundice    Reviewed most current lab test results and cultures  YES  Reviewed most current radiology test results   YES  Review and summation of old records today    NO  Reviewed patient's current orders and MAR    YES  PMH/SH reviewed - no change compared to H&P  ________________________________________________________________________  Care Plan discussed with:    Comments   Patient     Family   Not at bedside today   RN y    Care Manager     Consultant                        Multidiciplinary team rounds were held today with , nursing, pharmacist and clinical coordinator. Patient's plan of care was discussed; medications were reviewed and discharge planning was addressed. ________________________________________________________________________  Total NON critical care TIME: 30 Minutes    Total CRITICAL CARE TIME Spent:   Minutes non procedure based      Comments   >50% of visit spent in counseling and coordination of care     ________________________________________________________________________  Meli Montemayor MD     Procedures: see electronic medical records for all procedures/Xrays and details which were not copied into this note but were reviewed prior to creation of Plan. LABS:  I reviewed today's most current labs and imaging studies.   Pertinent labs include:  Recent Labs     02/22/20  0330 02/21/20  0303 02/20/20  0412   WBC 12.7* 13.6* 15.8*   HGB 12.2 11.8* 11.2*   HCT 36.1* 35.6* 34.9*   * 364 402*     Recent Labs     02/22/20  0330 02/21/20  0303 02/20/20  0412    139 137   K 3.0* 3.5 3.5    104 104   CO2 27 29 28   * 130* 105*   BUN 31* 27* 18   CREA 1.45* 1.58* 1.35*   CA 8.3* 8.5 8.1*   MG 1.6 1.7  --    PHOS 2.7 4.5  --    ALB 1.2* 1.3* 1.4*   TBILI 0.6 0.7 0.4   SGOT 32 28 26   ALT <6* 7* 10*       Signed: Brie Oliveira MD
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
## 2020-02-22 NOTE — PROGRESS NOTES
Pt attempting to climb out of bed. Pt restrained for safety, bed alarms on and working. Ativan PRN given as charted. Will continue to monitor closely.

## 2020-02-22 NOTE — PROGRESS NOTES
Cardiology Progress Note      2/22/2020 2:42 PM    Admit Date: 2/19/2020    Admit Diagnosis: Severe sepsis ([facility] [address] [A41.9, R65.20]      Subjective:     Venice Simmons is somnolent. More pleasant.     Visit Vitals  /65   Pulse 75   Temp 98.4 °F (36.9 °C)   Resp 18   Ht 5' 8\" (1.727 m)   Wt 152 lb 5.4 oz (69.1 kg)   SpO2 98%   BMI 23.16 kg/m²       Current Facility-Administered Medications   Medication Dose Route Frequency    [START ON 2/23/2020] Vancomycin Trough- Please draw prior to 0500 dose on 2/23/20  1 Each Other ONCE    cefepime (MAXIPIME) 2 g in 0.9% sodium chloride (MBP/ADV) 100 mL  2 g IntraVENous Q12H    LORazepam (ATIVAN) injection 2 mg  2 mg IntraVENous Q6H    hydrALAZINE (APRESOLINE) 20 mg/mL injection 10 mg  10 mg IntraVENous Q6H    vancomycin (VANCOCIN) 1,000 mg in 0.9% sodium chloride (MBP/ADV) 250 mL  1,000 mg IntraVENous Q16H    furosemide (LASIX) injection 40 mg  40 mg IntraVENous DAILY    carvediloL (COREG) tablet 3.125 mg  3.125 mg Oral BID    [Held by provider] melatonin tablet 3 mg  3 mg Oral QHS    alcohol 62% (NOZIN) nasal  1 Ampule  1 Ampule Topical Q12H    dexmedeTOMidine (PRECEDEX) 600 mcg in 0.9% sodium chloride 150 mL infusion  0.2-1.4 mcg/kg/hr (Order-Specific) IntraVENous TITRATE    metroNIDAZOLE (FLAGYL) IVPB premix 500 mg  500 mg IntraVENous Q12H    influenza vaccine 2019-20 (6 mos+)(PF) (FLUARIX/FLULAVAL/FLUZONE QUAD) injection 0.5 mL  0.5 mL IntraMUSCular PRIOR TO DISCHARGE    cloNIDine (CATAPRES) 0.3 mg/24 hr patch 1 Patch  1 Patch TransDERmal Q7D    LORazepam (ATIVAN) injection 2 mg  2 mg IntraVENous Q3H PRN    acetaminophen (TYLENOL) tablet 650 mg  650 mg Oral Q6H PRN    [Held by provider] pregabalin (LYRICA) capsule 150 mg  150 mg Oral BID    sodium chloride (NS) flush 5-10 mL  5-10 mL IntraVENous PRN    insulin lispro (HUMALOG) injection   SubCUTAneous AC&HS    glucose chewable tablet 16 g  4 Tab Oral PRN    dextrose (D50W) injection syrg 12.5-25 g  12.5-25 g IntraVENous PRN    glucagon (GLUCAGEN) injection 1 mg  1 mg IntraMUSCular PRN    enoxaparin (LOVENOX) injection 40 mg  40 mg SubCUTAneous Q24H    sodium hypochlorite (QUARTER STRENGTH DAKIN'S) 0.125% irrigation (bottle)   Topical BID         Objective:      Physical Exam:  Visit Vitals  /65   Pulse 75   Temp 98.4 °F (36.9 °C)   Resp 18   Ht 5' 8\" (1.727 m)   Wt 152 lb 5.4 oz (69.1 kg)   SpO2 98%   BMI 23.16 kg/m²     General Appearance:  Well developed, well nourished, individual in no acute distress. Ears/Nose/Mouth/Throat:   Hearing grossly normal.         Neck: Supple. Chest:   Lungs clear to auscultation bilaterally. Cardiovascular:  Regular rate and rhythm, S1, S2 normal, no murmur. Abdomen:   Soft, non-tender, bowel sounds are active. Extremities: No edema bilaterally. Skin: Warm and dry.                Data Review:   Labs:    Recent Results (from the past 24 hour(s))   CULTURE, WOUND W GRAM STAIN    Collection Time: 02/21/20  6:01 PM   Result Value Ref Range    Special Requests: DEEP CULTURE AMPUTATION SITE, LEFT FOOT     GRAM STAIN RARE WBCS SEEN      GRAM STAIN NO ORGANISMS SEEN      Culture result: CHECKING FOR POSSIBLE  SCANT  GROWTH       GLUCOSE, POC    Collection Time: 02/21/20  6:13 PM   Result Value Ref Range    Glucose (POC) 154 (H) 65 - 100 mg/dL    Performed by Mayito Pradhan ([facility])    GLUCOSE, POC    Collection Time: 02/21/20 10:53 PM   Result Value Ref Range    Glucose (POC) 104 (H) 65 - 100 mg/dL    Performed by Safia Pack RN  (traveler)    CBC WITH AUTOMATED DIFF    Collection Time: 02/22/20  3:30 AM   Result Value Ref Range    WBC 12.7 (H) 4.1 - 11.1 K/uL    RBC 4.69 4.10 - 5.70 M/uL    HGB 12.2 12.1 - 17.0 g/dL    HCT 36.1 (L) 36.6 - 50.3 %    MCV 77.0 (L) 80.0 - 99.0 FL    MCH 26.0 26.0 - 34.0 PG    MCHC 33.8 30.0 - 36.5 g/dL    RDW 14.3 11.5 - 14.5 %    PLATELET 209 (H) 709 - 400 K/uL    MPV 10.4 8.9 - 12.9 FL    NRBC 0.0 0  WBC ABSOLUTE NRBC 0.00 0.00 - 0.01 K/uL    NEUTROPHILS 88 (H) 32 - 75 %    LYMPHOCYTES 6 (L) 12 - 49 %    MONOCYTES 5 5 - 13 %    EOSINOPHILS 0 0 - 7 %    BASOPHILS 0 0 - 1 %    IMMATURE GRANULOCYTES 1 (H) 0.0 - 0.5 %    ABS. NEUTROPHILS 11.2 (H) 1.8 - 8.0 K/UL    ABS. LYMPHOCYTES 0.8 0.8 - 3.5 K/UL    ABS. MONOCYTES 0.6 0.0 - 1.0 K/UL    ABS. EOSINOPHILS 0.0 0.0 - 0.4 K/UL    ABS. BASOPHILS 0.0 0.0 - 0.1 K/UL    ABS. IMM. GRANS. 0.1 (H) 0.00 - 0.04 K/UL    DF AUTOMATED      RBC COMMENTS NORMOCYTIC, NORMOCHROMIC     METABOLIC PANEL, COMPREHENSIVE    Collection Time: 02/22/20  3:30 AM   Result Value Ref Range    Sodium 140 136 - 145 mmol/L    Potassium 3.0 (L) 3.5 - 5.1 mmol/L    Chloride 106 97 - 108 mmol/L    CO2 27 21 - 32 mmol/L    Anion gap 7 5 - 15 mmol/L    Glucose 113 (H) 65 - 100 mg/dL    BUN 31 (H) 6 - 20 MG/DL    Creatinine 1.45 (H) 0.70 - 1.30 MG/DL    BUN/Creatinine ratio 21 (H) 12 - 20      GFR est AA >60 >60 ml/min/1.73m2    GFR est non-AA 51 (L) >60 ml/min/1.73m2    Calcium 8.3 (L) 8.5 - 10.1 MG/DL    Bilirubin, total 0.6 0.2 - 1.0 MG/DL    ALT (SGPT) <6 (L) 12 - 78 U/L    AST (SGOT) 32 15 - 37 U/L    Alk. phosphatase 100 45 - 117 U/L    Protein, total 8.1 6.4 - 8.2 g/dL    Albumin 1.2 (L) 3.5 - 5.0 g/dL    Globulin 6.9 (H) 2.0 - 4.0 g/dL    A-G Ratio 0.2 (L) 1.1 - 2.2     MAGNESIUM    Collection Time: 02/22/20  3:30 AM   Result Value Ref Range    Magnesium 1.6 1.6 - 2.4 mg/dL   PHOSPHORUS    Collection Time: 02/22/20  3:30 AM   Result Value Ref Range    Phosphorus 2.7 2.6 - 4.7 MG/DL   GLUCOSE, POC    Collection Time: 02/22/20 12:39 PM   Result Value Ref Range    Glucose (POC) 129 (H) 65 - 100 mg/dL    Performed by Minerva Wiggins        Telemetry: normal sinus rhythm      Assessment:     Active Problems:    Severe sepsis ([facility] [address] (2/19/2020)      Cardiomyopathy ([facility] [address] (2/21/2020)      Acute systolic heart failure ([address] (2/21/2020)      Substance abuse ([facility] [address] (2/21/2020)        Plan:      Tolerating current therapy. JAIME planned for Monday.     Terri Newton MD

## 2020-02-23 LAB
ALBUMIN SERPL-MCNC: 1.1 G/DL (ref 3.5–5)
ALBUMIN/GLOB SERPL: 0.2 {RATIO} (ref 1.1–2.2)
ALP SERPL-CCNC: 83 U/L (ref 45–117)
ALT SERPL-CCNC: <6 U/L (ref 12–78)
ANION GAP SERPL CALC-SCNC: 8 MMOL/L (ref 5–15)
AST SERPL-CCNC: 29 U/L (ref 15–37)
BACTERIA SPEC CULT: ABNORMAL
BASOPHILS # BLD: 0 K/UL (ref 0–0.1)
BASOPHILS NFR BLD: 0 % (ref 0–1)
BILIRUB SERPL-MCNC: 0.4 MG/DL (ref 0.2–1)
BUN SERPL-MCNC: 28 MG/DL (ref 6–20)
BUN/CREAT SERPL: 21 (ref 12–20)
CALCIUM SERPL-MCNC: 8.2 MG/DL (ref 8.5–10.1)
CHLORIDE SERPL-SCNC: 106 MMOL/L (ref 97–108)
CO2 SERPL-SCNC: 26 MMOL/L (ref 21–32)
CREAT SERPL-MCNC: 1.33 MG/DL (ref 0.7–1.3)
DATE LAST DOSE: ABNORMAL
DIFFERENTIAL METHOD BLD: ABNORMAL
EOSINOPHIL # BLD: 0.1 K/UL (ref 0–0.4)
EOSINOPHIL NFR BLD: 1 % (ref 0–7)
ERYTHROCYTE [DISTWIDTH] IN BLOOD BY AUTOMATED COUNT: 14.4 % (ref 11.5–14.5)
GLOBULIN SER CALC-MCNC: 6.4 G/DL (ref 2–4)
GLUCOSE BLD STRIP.AUTO-MCNC: 106 MG/DL (ref 65–100)
GLUCOSE BLD STRIP.AUTO-MCNC: 171 MG/DL (ref 65–100)
GLUCOSE BLD STRIP.AUTO-MCNC: 179 MG/DL (ref 65–100)
GLUCOSE BLD STRIP.AUTO-MCNC: 191 MG/DL (ref 65–100)
GLUCOSE SERPL-MCNC: 115 MG/DL (ref 65–100)
GRAM STN SPEC: ABNORMAL
GRAM STN SPEC: ABNORMAL
HCT VFR BLD AUTO: 33.9 % (ref 36.6–50.3)
HGB BLD-MCNC: 11.2 G/DL (ref 12.1–17)
IMM GRANULOCYTES # BLD AUTO: 0.1 K/UL (ref 0–0.04)
IMM GRANULOCYTES NFR BLD AUTO: 1 % (ref 0–0.5)
LYMPHOCYTES # BLD: 1.2 K/UL (ref 0.8–3.5)
LYMPHOCYTES NFR BLD: 12 % (ref 12–49)
MAGNESIUM SERPL-MCNC: 1.8 MG/DL (ref 1.6–2.4)
MCH RBC QN AUTO: 25.6 PG (ref 26–34)
MCHC RBC AUTO-ENTMCNC: 33 G/DL (ref 30–36.5)
MCV RBC AUTO: 77.6 FL (ref 80–99)
MONOCYTES # BLD: 0.7 K/UL (ref 0–1)
MONOCYTES NFR BLD: 7 % (ref 5–13)
NEUTS SEG # BLD: 8.3 K/UL (ref 1.8–8)
NEUTS SEG NFR BLD: 79 % (ref 32–75)
NRBC # BLD: 0 K/UL (ref 0–0.01)
NRBC BLD-RTO: 0 PER 100 WBC
PHOSPHATE SERPL-MCNC: 2.8 MG/DL (ref 2.6–4.7)
PLATELET # BLD AUTO: 404 K/UL (ref 150–400)
PMV BLD AUTO: 10.1 FL (ref 8.9–12.9)
POTASSIUM SERPL-SCNC: 2.6 MMOL/L (ref 3.5–5.1)
PROT SERPL-MCNC: 7.5 G/DL (ref 6.4–8.2)
RBC # BLD AUTO: 4.37 M/UL (ref 4.1–5.7)
REPORTED DOSE,DOSE: ABNORMAL UNITS
REPORTED DOSE/TIME,TMG: ABNORMAL
SERVICE CMNT-IMP: ABNORMAL
SODIUM SERPL-SCNC: 140 MMOL/L (ref 136–145)
VANCOMYCIN TROUGH SERPL-MCNC: 18.7 UG/ML (ref 5–10)
WBC # BLD AUTO: 10.3 K/UL (ref 4.1–11.1)

## 2020-02-23 PROCEDURE — 74011000258 HC RX REV CODE- 258: Performed by: INTERNAL MEDICINE

## 2020-02-23 PROCEDURE — 74011250637 HC RX REV CODE- 250/637: Performed by: NURSE PRACTITIONER

## 2020-02-23 PROCEDURE — B246ZZ4 ULTRASONOGRAPHY OF RIGHT AND LEFT HEART, TRANSESOPHAGEAL: ICD-10-PCS | Performed by: INTERNAL MEDICINE

## 2020-02-23 PROCEDURE — 36415 COLL VENOUS BLD VENIPUNCTURE: CPT

## 2020-02-23 PROCEDURE — 74011250637 HC RX REV CODE- 250/637: Performed by: INTERNAL MEDICINE

## 2020-02-23 PROCEDURE — 74011250637 HC RX REV CODE- 250/637: Performed by: HOSPITALIST

## 2020-02-23 PROCEDURE — 74011636637 HC RX REV CODE- 636/637: Performed by: INTERNAL MEDICINE

## 2020-02-23 PROCEDURE — 74011250636 HC RX REV CODE- 250/636: Performed by: NURSE PRACTITIONER

## 2020-02-23 PROCEDURE — 74011250636 HC RX REV CODE- 250/636: Performed by: INTERNAL MEDICINE

## 2020-02-23 PROCEDURE — 80053 COMPREHEN METABOLIC PANEL: CPT

## 2020-02-23 PROCEDURE — 74011000250 HC RX REV CODE- 250: Performed by: INTERNAL MEDICINE

## 2020-02-23 PROCEDURE — 80202 ASSAY OF VANCOMYCIN: CPT

## 2020-02-23 PROCEDURE — 82962 GLUCOSE BLOOD TEST: CPT

## 2020-02-23 PROCEDURE — 84100 ASSAY OF PHOSPHORUS: CPT

## 2020-02-23 PROCEDURE — 65610000006 HC RM INTENSIVE CARE

## 2020-02-23 PROCEDURE — 85025 COMPLETE CBC W/AUTO DIFF WBC: CPT

## 2020-02-23 PROCEDURE — 83735 ASSAY OF MAGNESIUM: CPT

## 2020-02-23 RX ORDER — POTASSIUM CHLORIDE 20 MEQ/1
40 TABLET, EXTENDED RELEASE ORAL
Status: COMPLETED | OUTPATIENT
Start: 2020-02-23 | End: 2020-02-23

## 2020-02-23 RX ORDER — POTASSIUM CHLORIDE 20 MEQ/1
40 TABLET, EXTENDED RELEASE ORAL ONCE
Status: COMPLETED | OUTPATIENT
Start: 2020-02-23 | End: 2020-02-23

## 2020-02-23 RX ORDER — LOPERAMIDE HYDROCHLORIDE 2 MG/1
2 CAPSULE ORAL ONCE
Status: COMPLETED | OUTPATIENT
Start: 2020-02-23 | End: 2020-02-23

## 2020-02-23 RX ADMIN — METRONIDAZOLE 500 MG: 500 INJECTION, SOLUTION INTRAVENOUS at 23:25

## 2020-02-23 RX ADMIN — ENOXAPARIN SODIUM 40 MG: 40 INJECTION SUBCUTANEOUS at 21:02

## 2020-02-23 RX ADMIN — LORAZEPAM 2 MG: 2 INJECTION INTRAMUSCULAR; INTRAVENOUS at 05:45

## 2020-02-23 RX ADMIN — Medication 1 AMPULE: at 20:55

## 2020-02-23 RX ADMIN — CARVEDILOL 3.12 MG: 3.12 TABLET, FILM COATED ORAL at 08:12

## 2020-02-23 RX ADMIN — HYDRALAZINE HYDROCHLORIDE 10 MG: 20 INJECTION INTRAMUSCULAR; INTRAVENOUS at 05:44

## 2020-02-23 RX ADMIN — CEFEPIME HYDROCHLORIDE 2 G: 2 INJECTION, POWDER, FOR SOLUTION INTRAVENOUS at 14:10

## 2020-02-23 RX ADMIN — DEXMEDETOMIDINE 1 MCG/KG/HR: 100 INJECTION, SOLUTION, CONCENTRATE INTRAVENOUS at 04:22

## 2020-02-23 RX ADMIN — Medication 10 ML: at 17:18

## 2020-02-23 RX ADMIN — LORAZEPAM 2 MG: 2 INJECTION INTRAMUSCULAR; INTRAVENOUS at 00:24

## 2020-02-23 RX ADMIN — POTASSIUM CHLORIDE 40 MEQ: 20 TABLET, EXTENDED RELEASE ORAL at 05:44

## 2020-02-23 RX ADMIN — POTASSIUM CHLORIDE 40 MEQ: 20 TABLET, EXTENDED RELEASE ORAL at 08:12

## 2020-02-23 RX ADMIN — VANCOMYCIN HYDROCHLORIDE 1000 MG: 1 INJECTION, POWDER, LYOPHILIZED, FOR SOLUTION INTRAVENOUS at 20:56

## 2020-02-23 RX ADMIN — LOPERAMIDE HYDROCHLORIDE 2 MG: 2 CAPSULE ORAL at 11:32

## 2020-02-23 RX ADMIN — LORAZEPAM 2 MG: 2 INJECTION INTRAMUSCULAR; INTRAVENOUS at 11:32

## 2020-02-23 RX ADMIN — VANCOMYCIN HYDROCHLORIDE 1000 MG: 1 INJECTION, POWDER, LYOPHILIZED, FOR SOLUTION INTRAVENOUS at 05:44

## 2020-02-23 RX ADMIN — METRONIDAZOLE 500 MG: 500 INJECTION, SOLUTION INTRAVENOUS at 11:32

## 2020-02-23 RX ADMIN — Medication 1 AMPULE: at 08:12

## 2020-02-23 RX ADMIN — FUROSEMIDE 40 MG: 10 INJECTION, SOLUTION INTRAMUSCULAR; INTRAVENOUS at 08:12

## 2020-02-23 RX ADMIN — DEXMEDETOMIDINE 1 MCG/KG/HR: 100 INJECTION, SOLUTION, CONCENTRATE INTRAVENOUS at 21:12

## 2020-02-23 RX ADMIN — INSULIN LISPRO 2 UNITS: 100 INJECTION, SOLUTION INTRAVENOUS; SUBCUTANEOUS at 12:34

## 2020-02-23 RX ADMIN — CEFEPIME HYDROCHLORIDE 2 G: 2 INJECTION, POWDER, FOR SOLUTION INTRAVENOUS at 02:00

## 2020-02-23 RX ADMIN — INSULIN LISPRO 2 UNITS: 100 INJECTION, SOLUTION INTRAVENOUS; SUBCUTANEOUS at 17:40

## 2020-02-23 RX ADMIN — CARVEDILOL 3.12 MG: 3.12 TABLET, FILM COATED ORAL at 17:40

## 2020-02-23 RX ADMIN — HYDRALAZINE HYDROCHLORIDE 10 MG: 20 INJECTION INTRAMUSCULAR; INTRAVENOUS at 00:24

## 2020-02-23 RX ADMIN — DAKIN'S SOLUTION 0.125% (QUARTER STRENGTH): 0.12 SOLUTION at 08:30

## 2020-02-23 RX ADMIN — DEXMEDETOMIDINE 1 MCG/KG/HR: 100 INJECTION, SOLUTION, CONCENTRATE INTRAVENOUS at 11:46

## 2020-02-23 RX ADMIN — LORAZEPAM 2 MG: 2 INJECTION INTRAMUSCULAR; INTRAVENOUS at 17:40
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
## 2020-02-23 NOTE — PROGRESS NOTES
Cardiology Progress Note      2/23/2020 12:12 PM    Admit Date: 2/19/2020    Admit Diagnosis: Severe sepsis [facility] [A41.9, R65.20]      Subjective:     Dione Denton is more alert, talkative.     Visit Vitals  /59 (BP 1 Location: Right arm, BP Patient Position: At rest)   Pulse 95   Temp 97.8 °F (36.6 °C)   Resp 20   Ht 5' 8\" (1.727 m)   Wt 139 lb 15.9 oz (63.5 kg)   SpO2 97%   BMI 21.29 kg/m²       Current Facility-Administered Medications   Medication Dose Route Frequency    cefepime (MAXIPIME) 2 g in 0.9% sodium chloride (MBP/ADV) 100 mL  2 g IntraVENous Q12H    LORazepam (ATIVAN) injection 2 mg  2 mg IntraVENous Q6H    hydrALAZINE (APRESOLINE) 20 mg/mL injection 10 mg  10 mg IntraVENous Q6H    vancomycin (VANCOCIN) 1,000 mg in 0.9% sodium chloride (MBP/ADV) 250 mL  1,000 mg IntraVENous Q16H    furosemide (LASIX) injection 40 mg  40 mg IntraVENous DAILY    carvediloL (COREG) tablet 3.125 mg  3.125 mg Oral BID    [Held by provider] melatonin tablet 3 mg  3 mg Oral QHS    alcohol 62% (NOZIN) nasal  1 Ampule  1 Ampule Topical Q12H    dexmedeTOMidine (PRECEDEX) 600 mcg in 0.9% sodium chloride 150 mL infusion  0.2-1.4 mcg/kg/hr (Order-Specific) IntraVENous TITRATE    metroNIDAZOLE (FLAGYL) IVPB premix 500 mg  500 mg IntraVENous Q12H    influenza vaccine 2019-20 (6 mos+)(PF) (FLUARIX/FLULAVAL/FLUZONE QUAD) injection 0.5 mL  0.5 mL IntraMUSCular PRIOR TO DISCHARGE    cloNIDine (CATAPRES) 0.3 mg/24 hr patch 1 Patch  1 Patch TransDERmal Q7D    LORazepam (ATIVAN) injection 2 mg  2 mg IntraVENous Q3H PRN    acetaminophen (TYLENOL) tablet 650 mg  650 mg Oral Q6H PRN    [Held by provider] pregabalin (LYRICA) capsule 150 mg  150 mg Oral BID    sodium chloride (NS) flush 5-10 mL  5-10 mL IntraVENous PRN    insulin lispro (HUMALOG) injection   SubCUTAneous AC&HS    glucose chewable tablet 16 g  4 Tab Oral PRN    dextrose (D50W) injection syrg 12.5-25 g  12.5-25 g IntraVENous PRN    glucagon (GLUCAGEN) injection 1 mg  1 mg IntraMUSCular PRN    enoxaparin (LOVENOX) injection 40 mg  40 mg SubCUTAneous Q24H    sodium hypochlorite (QUARTER STRENGTH DAKIN'S) 0.125% irrigation (bottle)   Topical BID         Objective:      Physical Exam:  Visit Vitals  /59 (BP 1 Location: Right arm, BP Patient Position: At rest)   Pulse 95   Temp 97.8 °F (36.6 °C)   Resp 20   Ht 5' 8\" (1.727 m)   Wt 139 lb 15.9 oz (63.5 kg)   SpO2 97%   BMI 21.29 kg/m²     General Appearance:  Well developed, well nourished,alert and oriented x 3, and individual in no acute distress. Ears/Nose/Mouth/Throat:   Hearing grossly normal.         Neck: Supple. Chest:   Lungs clear to auscultation bilaterally. Cardiovascular:  Regular rate and rhythm, S1, S2 normal, no murmur. Abdomen:   Soft, non-tender, bowel sounds are active. Extremities: No edema bilaterally. Skin: Warm and dry.                Data Review:   Labs:    Recent Results (from the past 24 hour(s))   GLUCOSE, POC    Collection Time: 02/22/20 12:39 PM   Result Value Ref Range    Glucose (POC) 129 (H) 65 - 100 mg/dL    Performed by Oksana Ruano, POC    Collection Time: 02/22/20  5:16 PM   Result Value Ref Range    Glucose (POC) 149 (H) 65 - 100 mg/dL    Performed by Oksana Ruano, POC    Collection Time: 02/22/20 10:22 PM   Result Value Ref Range    Glucose (POC) 90 65 - 100 mg/dL    Performed by Jenni Robb RN  (traveler)    CBC WITH AUTOMATED DIFF    Collection Time: 02/23/20  3:35 AM   Result Value Ref Range    WBC 10.3 4.1 - 11.1 K/uL    RBC 4.37 4.10 - 5.70 M/uL    HGB 11.2 (L) 12.1 - 17.0 g/dL    HCT 33.9 (L) 36.6 - 50.3 %    MCV 77.6 (L) 80.0 - 99.0 FL    MCH 25.6 (L) 26.0 - 34.0 PG    MCHC 33.0 30.0 - 36.5 g/dL    RDW 14.4 11.5 - 14.5 %    PLATELET 207 (H) 496 - 400 K/uL    MPV 10.1 8.9 - 12.9 FL    NRBC 0.0 0  WBC    ABSOLUTE NRBC 0.00 0.00 - 0.01 K/uL    NEUTROPHILS 79 (H) 32 - 75 %    LYMPHOCYTES 12 12 - 49 %    MONOCYTES 7 5 - 13 %    EOSINOPHILS 1 0 - 7 %    BASOPHILS 0 0 - 1 %    IMMATURE GRANULOCYTES 1 (H) 0.0 - 0.5 %    ABS. NEUTROPHILS 8.3 (H) 1.8 - 8.0 K/UL    ABS. LYMPHOCYTES 1.2 0.8 - 3.5 K/UL    ABS. MONOCYTES 0.7 0.0 - 1.0 K/UL    ABS. EOSINOPHILS 0.1 0.0 - 0.4 K/UL    ABS. BASOPHILS 0.0 0.0 - 0.1 K/UL    ABS. IMM. GRANS. 0.1 (H) 0.00 - 0.04 K/UL    DF AUTOMATED     METABOLIC PANEL, COMPREHENSIVE    Collection Time: 02/23/20  3:35 AM   Result Value Ref Range    Sodium 140 136 - 145 mmol/L    Potassium 2.6 (LL) 3.5 - 5.1 mmol/L    Chloride 106 97 - 108 mmol/L    CO2 26 21 - 32 mmol/L    Anion gap 8 5 - 15 mmol/L    Glucose 115 (H) 65 - 100 mg/dL    BUN 28 (H) 6 - 20 MG/DL    Creatinine 1.33 (H) 0.70 - 1.30 MG/DL    BUN/Creatinine ratio 21 (H) 12 - 20      GFR est AA >60 >60 ml/min/1.73m2    GFR est non-AA 56 (L) >60 ml/min/1.73m2    Calcium 8.2 (L) 8.5 - 10.1 MG/DL    Bilirubin, total 0.4 0.2 - 1.0 MG/DL    ALT (SGPT) <6 (L) 12 - 78 U/L    AST (SGOT) 29 15 - 37 U/L    Alk.  phosphatase 83 45 - 117 U/L    Protein, total 7.5 6.4 - 8.2 g/dL    Albumin 1.1 (L) 3.5 - 5.0 g/dL    Globulin 6.4 (H) 2.0 - 4.0 g/dL    A-G Ratio 0.2 (L) 1.1 - 2.2     MAGNESIUM    Collection Time: 02/23/20  3:35 AM   Result Value Ref Range    Magnesium 1.8 1.6 - 2.4 mg/dL   PHOSPHORUS    Collection Time: 02/23/20  3:35 AM   Result Value Ref Range    Phosphorus 2.8 2.6 - 4.7 MG/DL   Terrell Lion    Collection Time: 02/23/20  3:35 AM   Result Value Ref Range    Vancomycin,trough 18.7 (H) 5.0 - 10.0 ug/mL    Reported dose date: NOT PROVIDED      Reported dose time: NOT PROVIDED      Reported dose: NOT PROVIDED UNITS   GLUCOSE, POC    Collection Time: 02/23/20  7:50 AM   Result Value Ref Range    Glucose (POC) 106 (H) 65 - 100 mg/dL    Performed by Paxton Perez, POC    Collection Time: 02/23/20 11:31 AM   Result Value Ref Range    Glucose (POC) 179 (H) 65 - 100 mg/dL    Performed by Oli Galan        Telemetry: normal sinus rhythm      Assessment:     Active Problems:    Severe sepsis ([facility] (2/19/2020)      Cardiomyopathy ([facility] (2/21/2020)      Acute systolic heart failure ([facility] (2/21/2020)      Substance abuse ([facility] (2/21/2020)        Plan:     Continue coreg. JAIME tomorrow. Add ACEI/entresto if renal function stable. Ischemia evaluation down the road.     Bird De Leon MD

## 2020-02-23 NOTE — PROGRESS NOTES
0700-Report received from [facility]    0800 Complex assessment completed please see flow-sheet for detailed assessment. 1200 Complex assessment unchanged. 1600 Complex assessment unchanged. 1800 Wound care completed, and dressing changed. Patient was calm, and cooperative during dressing change. Will contact surgery/podiatry for updated wound care recommendations. Wound care directions do not correlate with surgical intervention.      1900 Report given to [facility]

## 2020-02-23 NOTE — PROGRESS NOTES
Spoke with Luis Alberto Roe, PharmD regarding pt's vanc trough of 18.7. Per pharmacist, will administer next vanc dose.

## 2020-02-23 NOTE — PROGRESS NOTES
Pharmacy Automatic Renal Dosing Protocol - Antimicrobials    Indication for Antimicrobials: bone and joint infection     Current Regimen of Each Antimicrobial:  Vancomycin 1000 mg IV every 16 hours (Start Date ; Day # 5)  Cefepime 2 g IV every 12 hr (start date: , day 5)  Metronidazole 500 mg q12h (start date: , Day 4)    Previous Antimicrobial Therapy:  None    Goal Level: VANCOMYCIN TROUGH GOAL RANGE  Vancomycin Trough: 15 - 20 mcg/mL  (AUC: 400 - 600 mg/hr/Liter/day)     Date Dose & Interval Measured (mcg/mL) Extrapolated (mcg/mL)    1200 mg q12h 19.9 23.2    @ 0335 1 g every 16 hours 18.7 15.3           Date & time of next level:    @ 500    Significant Cultures:   [facility] HS Cultures:  : blood - methicillin resistant staph aureous - susceptible to vancomycin   : wound (toe) - Moderate gram positive cocci, beta hemolytic strep group B - susceptible to clindamycin, tetracycline, bactrim, and vancomycin - resistant to oxacillin    [facility]:  : blood - NG - prelim  : wound (foot)- Heavy MRSA, heavy streptococci beta hemolytic group B, few enterobacter - final  : Wound: checking for possible scant growth- pending    Radiology / Imaging results: (X-ray, CT scan or MRI):   : xray left foot: Great toe osteomyelitis. : chest xray: pulmonary edema  : chest xray: Interval worsening of congestive HF with moderate pulmonary edema    Paralysis, amputations, malnutrition: none    Labs:  Recent Labs     20  0335 20  0330 20  0303   CREA 1.33* 1.45* 1.58*   BUN 28* 31* 27*   WBC 10.3 12.7* 13.6*     Temp (24hrs), Av.2 °F (36.8 °C), Min:97.7 °F (36.5 °C), Max:98.7 °F (37.1 °C)    Creatinine Clearance (mL/min) or Dialysis: ~57 ml/min    Impression/Plan:   Vancomycin trough resulted as therapeutic at 15.3 mcg/mL. Will continue current regimen as appropriate for indication and renal function.    Antimicrobial stop date to be determined - I&D scheduled for today      Pharmacy will follow daily and adjust medications as appropriate for renal function and/or serum levels. Thank you,  ANKIT ZhaoD    Recommended duration of therapy  [email or web address]. [email or web address]    Renal Dosing  [email or web address]/[email or web address]. pdf

## 2020-02-23 NOTE — PROGRESS NOTES
0700 Report received from [facility]    7659 Complex assessment completed. Patient is alert and oriented to himself, and date. Still confused on where he is, but knows he is here for his foot. Patient is on room air and oxygen saturation is within normal limits. Patient has coarse lung sounds but has a strong productive cough. Patient had a large bowel movement and was bathed, turned and repositioned. 1200 Reassessment completed and unchanged. [id number] Paged MD Castellano for updated dressing change. MD Castellano discontinued Dakins solution, and wants dressing changed to include xeroform over surgical site, then wrapped with gauze and kerlix. [address] bedside to consent patient for JAIME tomorrow. Patient will be NPO at midnight for procedure. 1600 Reassessment completed and unchanged. Dressing change completed. Sutures are intact, and wound is clean and dry. Xeroform applied and wrapped with kerlix per MD Castellano's order. 1742 Patient was complaining of pain in sacrum. No broken skin noted but pain and possible deep tissue injury. Explained importance of rotating from side side. Will put in wound consult for sacrum.  Z-guard applied and turned on left side    1900 Bedside report given to [employer]

## 2020-02-23 NOTE — PROGRESS NOTES
Hospitalist Progress Note    NAME: Evelin Lei   :  1965   MRN:  [id number]     Assessment / Plan:  Sepsis with fever, tachycardia and leukocytosis POA  Acute Metabolic Encephalopathy due to infection  Due to Left Great Toe Osteomyelitis  Bacteremia with MRSA recently at [facility]  Per Pharmacy report  At [facility],   : blood - methicillin resistant staph aureous - susceptible to vancomycin   : wound (toe) - Moderate gram positive cocci, beta hemolytic strep group B - susceptible to clindamycin, tetracycline, bactrim, and vancomycin - resistant to oxacillin  Managed in ICU due to sedation  Blood cultures here negative so far  Pt signed AMA at [facility] per report recently  Awaiting sensitivities report from [facility]  Continue IV Vancomycin, Cefepime and Flagyl  2D echo with EF 25%-30% and MR and IV drug use. Plan for JAIME on Monday as per cardiology  Plan for surgery amputation by podiatry today  On Precedex and ativan due to severe delirium  F/u cultures from here    Acute Pulmonary Edema suspect cocaine related vs valvular etiology as IV drug abuse  2D echo with 25-30% EF  Continue IV diuretics  Monitor fluid status  Wean o2 as able to  Cardiology is now on the case    Hypokalemia  Monitoring and repleting PRN  Mg ok     Insulin-dependent diabetes  Noncompliance with insulin  Hemoglobin A1c 8  SSI     Polysubstance Abuse  Urine tox screen is positive for cocaine, heroin and methadone  Counseling provided on admission per H&P     Hypokalemia  Replaced orally       Peripheral neuropathy  Holding meds due to sedation    HTN  Considering unable to take PO meds due to sedation, will place on clonidine patch       Code Status: Full code  Surrogate Decision Maker: Girlfriend Cristi Dahl Raymond([phone])     DVT Prophylaxis: lovenox  GI Prophylaxis: not indicated     Baseline: Functional, IV drug abuse                 Subjective: Pt seen and examined at bedside. Sedated.  Overnight events d/w RN   CHIEF COMPLAINT: f/u \"Fever chills and shortness of breath\"       Review of Systems:  Symptom Y/N Comments  Symptom Y/N Comments   Fever/Chills    Chest Pain     Poor Appetite    Edema     Cough    Abdominal Pain     Sputum    Joint Pain     SOB/DE [place] CRUZ    Pruritis/Rash     Nausea/vomit    Tolerating PT/OT     Diarrhea    Tolerating Diet     Constipation    Other       Could NOT obtain due to: Sedated      Objective:     VITALS:   Last 24hrs VS reviewed since prior progress note. Most recent are:  Patient Vitals for the past 24 hrs:   Temp Pulse Resp BP SpO2   02/23/20 1148 97.8 °F (36.6 °C) 95 20 105/59 97 %   02/23/20 1132 -- -- -- 109/59 --   02/23/20 1100 -- 87 24 109/59 --   02/23/20 1000 -- 98 23 130/71 --   02/23/20 0900 -- 89 19 122/70 --   02/23/20 0800 -- 81 18 125/69 --   02/23/20 0748 97.8 °F (36.6 °C) 80 20 -- --   02/23/20 0700 -- 77 18 124/67 98 %   02/23/20 0600 -- 74 16 116/69 97 %   02/23/20 0500 -- 77 25 133/74 96 %   02/23/20 0400 98.3 °F (36.8 °C) 72 24 120/66 96 %   02/23/20 0300 -- 72 21 119/69 97 %   02/23/20 0200 -- 75 13 119/64 97 %   02/23/20 0100 -- 72 17 103/61 96 %   02/23/20 0000 97.7 °F (36.5 °C) 73 21 137/80 99 %   02/22/20 2300 -- 65 21 121/72 99 %   02/22/20 2200 98.2 °F (36.8 °C) 63 18 126/66 98 %   02/22/20 2100 -- 63 19 122/68 99 %   02/22/20 2000 -- 63 19 116/64 97 %   02/22/20 1940 -- 65 23 114/63 98 %   02/22/20 1900 -- 66 17 116/66 97 %   02/22/20 1800 -- 70 19 134/75 97 %   02/22/20 1700 -- 70 18 134/72 99 %   02/22/20 1600 -- 72 18 130/75 97 %   02/22/20 1500 98.4 °F (36.9 °C) 71 19 123/69 98 %   02/22/20 1400 -- 74 18 121/79 97 %   02/22/20 1300 -- 74 19 124/68 99 %   02/22/20 1200 98.7 °F (37.1 °C) 75 18 111/65 98 %       Intake/Output Summary (Last 24 hours) at 2/23/2020 1150  Last data filed at 2/23/2020 0835  Gross per 24 hour   Intake 1677.67 ml   Output 2060 ml   Net -382.33 ml        PHYSICAL EXAM:  General: WD, WN. sedated, no acute distress    EENT:  EOMI. Anicteric sclerae.  MMM  Resp:  CTA bilaterally, no wheezing or rales. No accessory muscle use  CV:  Regular  rhythm,  No edema  GI:  Soft, Non distended, Non tender.  +Bowel sounds  Neurologic:  Exam limited due to sedation  Psych:   Exam limited due to sedation  Skin:  Left Great Toe Ulcer. No jaundice    Reviewed most current lab test results and cultures  YES  Reviewed most current radiology test results   YES  Review and summation of old records today    NO  Reviewed patient's current orders and MAR    YES  PMH/SH reviewed - no change compared to H&P  ________________________________________________________________________  Care Plan discussed with:    Comments   Patient     Family   Not at bedside today   RN y    Care Manager     Consultant                        Multidiciplinary team rounds were held today with , nursing, pharmacist and clinical coordinator. Patient's plan of care was discussed; medications were reviewed and discharge planning was addressed. ________________________________________________________________________  Total NON critical care TIME: 30 Minutes    Total CRITICAL CARE TIME Spent:   Minutes non procedure based      Comments   >50% of visit spent in counseling and coordination of care     ________________________________________________________________________  Nabila Pablo MD     Procedures: see electronic medical records for all procedures/Xrays and details which were not copied into this note but were reviewed prior to creation of Plan. LABS:  I reviewed today's most current labs and imaging studies.   Pertinent labs include:  Recent Labs     02/23/20  0335 02/22/20  0330 02/21/20  0303   WBC 10.3 12.7* 13.6*   HGB 11.2* 12.2 11.8*   HCT 33.9* 36.1* 35.6*   * 432* 364     Recent Labs     02/23/20  0335 02/22/20  0330 02/21/20  0303    140 139   K 2.6* 3.0* 3.5    106 104   CO2 26 27 29   * 113* 130*   BUN 28* 31* 27*   CREA 1.33* 1.45* 1.58*   CA 8.2* 8.3* 8.5   MG 1.8 1. 6 1.7   PHOS 2.8 2.7 4.5   ALB 1.1* 1.2* 1.3*   TBILI 0.4 0.6 0.7   SGOT 29 32 28   ALT <6* <6* 7*       Signed: Randell Lay MD

## 2020-02-24 ENCOUNTER — APPOINTMENT (OUTPATIENT)
Dept: NON INVASIVE DIAGNOSTICS | Age: 55
DRG: 710 | End: 2020-02-24
Attending: NURSE PRACTITIONER
Payer: MEDICAID

## 2020-02-24 LAB
ALBUMIN SERPL-MCNC: 1.1 G/DL (ref 3.5–5)
ALBUMIN/GLOB SERPL: 0.2 {RATIO} (ref 1.1–2.2)
ALP SERPL-CCNC: 105 U/L (ref 45–117)
ALT SERPL-CCNC: <6 U/L (ref 12–78)
ANION GAP SERPL CALC-SCNC: 6 MMOL/L (ref 5–15)
AST SERPL-CCNC: 32 U/L (ref 15–37)
BACTERIA SPEC CULT: ABNORMAL
BACTERIA SPEC CULT: ABNORMAL
BACTERIA SPEC CULT: NORMAL
BASOPHILS # BLD: 0 K/UL (ref 0–0.1)
BASOPHILS NFR BLD: 0 % (ref 0–1)
BILIRUB SERPL-MCNC: 0.3 MG/DL (ref 0.2–1)
BUN SERPL-MCNC: 25 MG/DL (ref 6–20)
BUN/CREAT SERPL: 18 (ref 12–20)
CALCIUM SERPL-MCNC: 8.1 MG/DL (ref 8.5–10.1)
CHLORIDE SERPL-SCNC: 105 MMOL/L (ref 97–108)
CO2 SERPL-SCNC: 25 MMOL/L (ref 21–32)
CREAT SERPL-MCNC: 1.38 MG/DL (ref 0.7–1.3)
DIFFERENTIAL METHOD BLD: ABNORMAL
EOSINOPHIL # BLD: 0.1 K/UL (ref 0–0.4)
EOSINOPHIL NFR BLD: 1 % (ref 0–7)
ERYTHROCYTE [DISTWIDTH] IN BLOOD BY AUTOMATED COUNT: 14.3 % (ref 11.5–14.5)
GLOBULIN SER CALC-MCNC: 6.3 G/DL (ref 2–4)
GLUCOSE BLD STRIP.AUTO-MCNC: 179 MG/DL (ref 65–100)
GLUCOSE BLD STRIP.AUTO-MCNC: 188 MG/DL (ref 65–100)
GLUCOSE BLD STRIP.AUTO-MCNC: 255 MG/DL (ref 65–100)
GLUCOSE BLD STRIP.AUTO-MCNC: 277 MG/DL (ref 65–100)
GLUCOSE SERPL-MCNC: 193 MG/DL (ref 65–100)
HCT VFR BLD AUTO: 33 % (ref 36.6–50.3)
HGB BLD-MCNC: 10.9 G/DL (ref 12.1–17)
IMM GRANULOCYTES # BLD AUTO: 0.1 K/UL (ref 0–0.04)
IMM GRANULOCYTES NFR BLD AUTO: 1 % (ref 0–0.5)
LYMPHOCYTES # BLD: 1.7 K/UL (ref 0.8–3.5)
LYMPHOCYTES NFR BLD: 17 % (ref 12–49)
MAGNESIUM SERPL-MCNC: 2 MG/DL (ref 1.6–2.4)
MCH RBC QN AUTO: 26 PG (ref 26–34)
MCHC RBC AUTO-ENTMCNC: 33 G/DL (ref 30–36.5)
MCV RBC AUTO: 78.6 FL (ref 80–99)
MONOCYTES # BLD: 0.7 K/UL (ref 0–1)
MONOCYTES NFR BLD: 7 % (ref 5–13)
NEUTS SEG # BLD: 7.6 K/UL (ref 1.8–8)
NEUTS SEG NFR BLD: 74 % (ref 32–75)
NRBC # BLD: 0 K/UL (ref 0–0.01)
NRBC BLD-RTO: 0 PER 100 WBC
PHOSPHATE SERPL-MCNC: 2.5 MG/DL (ref 2.6–4.7)
PLATELET # BLD AUTO: 334 K/UL (ref 150–400)
PMV BLD AUTO: 10.5 FL (ref 8.9–12.9)
POTASSIUM SERPL-SCNC: 3.1 MMOL/L (ref 3.5–5.1)
PROT SERPL-MCNC: 7.4 G/DL (ref 6.4–8.2)
RBC # BLD AUTO: 4.2 M/UL (ref 4.1–5.7)
SERVICE CMNT-IMP: ABNORMAL
SERVICE CMNT-IMP: NORMAL
SODIUM SERPL-SCNC: 136 MMOL/L (ref 136–145)
WBC # BLD AUTO: 10.2 K/UL (ref 4.1–11.1)

## 2020-02-24 PROCEDURE — 77030018836 HC SOL IRR NACL ICUM -A

## 2020-02-24 PROCEDURE — 74011250636 HC RX REV CODE- 250/636: Performed by: INTERNAL MEDICINE

## 2020-02-24 PROCEDURE — 74011000258 HC RX REV CODE- 258: Performed by: INTERNAL MEDICINE

## 2020-02-24 PROCEDURE — 83735 ASSAY OF MAGNESIUM: CPT

## 2020-02-24 PROCEDURE — 74011250637 HC RX REV CODE- 250/637: Performed by: NURSE PRACTITIONER

## 2020-02-24 PROCEDURE — 65610000006 HC RM INTENSIVE CARE

## 2020-02-24 PROCEDURE — 74011250636 HC RX REV CODE- 250/636: Performed by: NURSE PRACTITIONER

## 2020-02-24 PROCEDURE — 36415 COLL VENOUS BLD VENIPUNCTURE: CPT

## 2020-02-24 PROCEDURE — 74011250637 HC RX REV CODE- 250/637: Performed by: INTERNAL MEDICINE

## 2020-02-24 PROCEDURE — 80053 COMPREHEN METABOLIC PANEL: CPT

## 2020-02-24 PROCEDURE — 84100 ASSAY OF PHOSPHORUS: CPT

## 2020-02-24 PROCEDURE — 85025 COMPLETE CBC W/AUTO DIFF WBC: CPT

## 2020-02-24 PROCEDURE — 74011636637 HC RX REV CODE- 636/637: Performed by: INTERNAL MEDICINE

## 2020-02-24 PROCEDURE — 82962 GLUCOSE BLOOD TEST: CPT

## 2020-02-24 PROCEDURE — 74011000250 HC RX REV CODE- 250: Performed by: INTERNAL MEDICINE

## 2020-02-24 PROCEDURE — 93312 ECHO TRANSESOPHAGEAL: CPT

## 2020-02-24 RX ORDER — MIDAZOLAM HYDROCHLORIDE 5 MG/ML
5 INJECTION, SOLUTION INTRAMUSCULAR; INTRAVENOUS ONCE
Status: DISCONTINUED | OUTPATIENT
Start: 2020-02-24 | End: 2020-02-24

## 2020-02-24 RX ORDER — MIDAZOLAM HYDROCHLORIDE 1 MG/ML
INJECTION, SOLUTION INTRAMUSCULAR; INTRAVENOUS
Status: DISPENSED
Start: 2020-02-24 | End: 2020-02-24

## 2020-02-24 RX ORDER — MIDAZOLAM HYDROCHLORIDE 1 MG/ML
5 INJECTION, SOLUTION INTRAMUSCULAR; INTRAVENOUS ONCE
Status: COMPLETED | OUTPATIENT
Start: 2020-02-24 | End: 2020-02-24

## 2020-02-24 RX ORDER — HYDRALAZINE HYDROCHLORIDE 20 MG/ML
10 INJECTION INTRAMUSCULAR; INTRAVENOUS
Status: DISCONTINUED | OUTPATIENT
Start: 2020-02-24 | End: 2020-03-01

## 2020-02-24 RX ORDER — POTASSIUM CHLORIDE 7.45 MG/ML
10 INJECTION INTRAVENOUS
Status: DISCONTINUED | OUTPATIENT
Start: 2020-02-24 | End: 2020-02-24

## 2020-02-24 RX ORDER — LISINOPRIL 5 MG/1
2.5 TABLET ORAL DAILY
Status: DISCONTINUED | OUTPATIENT
Start: 2020-02-25 | End: 2020-02-26

## 2020-02-24 RX ORDER — FENTANYL CITRATE 50 UG/ML
100 INJECTION, SOLUTION INTRAMUSCULAR; INTRAVENOUS ONCE
Status: COMPLETED | OUTPATIENT
Start: 2020-02-24 | End: 2020-02-24

## 2020-02-24 RX ORDER — POTASSIUM CHLORIDE 20 MEQ/1
40 TABLET, EXTENDED RELEASE ORAL EVERY 4 HOURS
Status: COMPLETED | OUTPATIENT
Start: 2020-02-24 | End: 2020-02-24

## 2020-02-24 RX ADMIN — CEFEPIME HYDROCHLORIDE 2 G: 2 INJECTION, POWDER, FOR SOLUTION INTRAVENOUS at 02:08

## 2020-02-24 RX ADMIN — INSULIN LISPRO 5 UNITS: 100 INJECTION, SOLUTION INTRAVENOUS; SUBCUTANEOUS at 17:04

## 2020-02-24 RX ADMIN — POTASSIUM CHLORIDE 40 MEQ: 20 TABLET, EXTENDED RELEASE ORAL at 21:04

## 2020-02-24 RX ADMIN — FENTANYL CITRATE 25 MCG: 50 INJECTION, SOLUTION INTRAMUSCULAR; INTRAVENOUS at 11:17

## 2020-02-24 RX ADMIN — METRONIDAZOLE 500 MG: 500 INJECTION, SOLUTION INTRAVENOUS at 14:13

## 2020-02-24 RX ADMIN — POTASSIUM CHLORIDE 10 MEQ: 7.46 INJECTION, SOLUTION INTRAVENOUS at 13:41

## 2020-02-24 RX ADMIN — LORAZEPAM 2 MG: 2 INJECTION INTRAMUSCULAR; INTRAVENOUS at 13:16

## 2020-02-24 RX ADMIN — POTASSIUM CHLORIDE 40 MEQ: 20 TABLET, EXTENDED RELEASE ORAL at 16:42

## 2020-02-24 RX ADMIN — LORAZEPAM 2 MG: 2 INJECTION INTRAMUSCULAR; INTRAVENOUS at 23:21

## 2020-02-24 RX ADMIN — ENOXAPARIN SODIUM 40 MG: 40 INJECTION SUBCUTANEOUS at 21:05

## 2020-02-24 RX ADMIN — LORAZEPAM 2 MG: 2 INJECTION INTRAMUSCULAR; INTRAVENOUS at 05:44

## 2020-02-24 RX ADMIN — DEXMEDETOMIDINE 1 MCG/KG/HR: 100 INJECTION, SOLUTION, CONCENTRATE INTRAVENOUS at 05:41

## 2020-02-24 RX ADMIN — LORAZEPAM 2 MG: 2 INJECTION INTRAMUSCULAR; INTRAVENOUS at 18:30

## 2020-02-24 RX ADMIN — VANCOMYCIN HYDROCHLORIDE 1000 MG: 1 INJECTION, POWDER, LYOPHILIZED, FOR SOLUTION INTRAVENOUS at 16:32

## 2020-02-24 RX ADMIN — DAKIN'S SOLUTION 0.125% (QUARTER STRENGTH): 0.12 SOLUTION at 16:43

## 2020-02-24 RX ADMIN — Medication 1 AMPULE: at 14:15

## 2020-02-24 RX ADMIN — INSULIN LISPRO 5 UNITS: 100 INJECTION, SOLUTION INTRAVENOUS; SUBCUTANEOUS at 13:14

## 2020-02-24 RX ADMIN — HYDRALAZINE HYDROCHLORIDE 10 MG: 20 INJECTION INTRAMUSCULAR; INTRAVENOUS at 05:43

## 2020-02-24 RX ADMIN — CEFEPIME HYDROCHLORIDE 2 G: 2 INJECTION, POWDER, FOR SOLUTION INTRAVENOUS at 15:16

## 2020-02-24 RX ADMIN — MIDAZOLAM 1 MG: 1 INJECTION INTRAMUSCULAR; INTRAVENOUS at 11:14

## 2020-02-24 RX ADMIN — CARVEDILOL 3.12 MG: 3.12 TABLET, FILM COATED ORAL at 13:20

## 2020-02-24 RX ADMIN — CARVEDILOL 3.12 MG: 3.12 TABLET, FILM COATED ORAL at 18:27

## 2020-02-24 RX ADMIN — LORAZEPAM 2 MG: 2 INJECTION INTRAMUSCULAR; INTRAVENOUS at 00:45

## 2020-02-24 RX ADMIN — FUROSEMIDE 40 MG: 10 INJECTION, SOLUTION INTRAMUSCULAR; INTRAVENOUS at 10:45

## 2020-02-24 RX ADMIN — METRONIDAZOLE 500 MG: 500 INJECTION, SOLUTION INTRAVENOUS at 23:20

## 2020-02-24 RX ADMIN — DAPTOMYCIN 600 MG: 500 INJECTION, POWDER, LYOPHILIZED, FOR SOLUTION INTRAVENOUS at 18:45

## 2020-02-24 RX ADMIN — DEXMEDETOMIDINE 0.4 MCG/KG/HR: 100 INJECTION, SOLUTION, CONCENTRATE INTRAVENOUS at 16:35

## 2020-02-24 RX ADMIN — Medication 1 AMPULE: at 21:04
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
## 2020-02-24 NOTE — PROGRESS NOTES
1915 Bedside and Verbal shift change report given to Ayesha Trejo (oncoming nurse) by Domingo Oakley RN (offgoing nurse). Report included the following information SBAR, Intake/Output, Recent Results and Cardiac Rhythm NSR.     0030  No change in assessment. VSS. Patient drowsy but easy to arouse and AX4, NPO for JAIME.    0430  No change in assessment. VSS.    0630  No issues with patient. He is anxious to know about the JAIME.    0778  Bedside report to Marya Bonilla RN at bedside. Dual skin eval done. Questionable that there is a DTI vs fraying/?excoriation right at top of gluteal cleft. No drainage or blistering. All skin blanches well.

## 2020-02-24 NOTE — INTERDISCIPLINARY ROUNDS
Interdisciplinary team rounds were held 2/24/2020 with the following team members:Care Management, Diabetes Treatment Specialist, Nursing, Nutrition, Pharmacy, Physical Therapy, Physician and Respiratory Therapy. Plan of care discussed. See clinical pathway and/or care plan for interventions and desired outcomes.

## 2020-02-24 NOTE — PROGRESS NOTES
Care Management:    DALIA: Anticipate home with shaji and follow up with MD.  Lorena Pichardo will transport. May require [facility] for dressing changes but I am not sure he will agree to that. Continues to require precedex for sedation as he is agitated due to withdrawal from cocaine and heroin. Plan for JAIME today at bedside. S/P Incision and drainage with amputation of great toe left foot, submitted to Pathology for identification, also resection of partial first metatarsal head for submission as a viable proximal bone resection, left foot. Requiring dressing changes. Patient has not been cooperative. Pam Nance and [facility] are his NOK. PTA he was independent and lives with shaji.      Carolynn Ahumada RN AC 2402

## 2020-02-24 NOTE — PROGRESS NOTES
[address] [place] [address]  [phone]      Cardiology Progress Note      2/24/2020 9:56 AM    Admit Date: 2/19/2020    Admit Diagnosis:   Severe sepsis ([address] [A41.9, R65.20]    Subjective:     Gaurav Ramírez continues to require precedex for sedation as he is aggitated due to withdrawal from cocaine and heroin.   Plan for JAIME today at bedside    Visit Vitals  /67 (BP 1 Location: Right arm, BP Patient Position: At rest)   Pulse 71   Temp 97.8 °F (36.6 °C)   Resp 19   Ht 5' 8\" (1.727 m)   Wt 63.5 kg (139 lb 15.9 oz)   SpO2 97%   BMI 21.29 kg/m²       Current Facility-Administered Medications   Medication Dose Route Frequency    potassium chloride 10 mEq in 100 ml IVPB  10 mEq IntraVENous Q1H    cefepime (MAXIPIME) 2 g in 0.9% sodium chloride (MBP/ADV) 100 mL  2 g IntraVENous Q12H    LORazepam (ATIVAN) injection 2 mg  2 mg IntraVENous Q6H    hydrALAZINE (APRESOLINE) 20 mg/mL injection 10 mg  10 mg IntraVENous Q6H    vancomycin (VANCOCIN) 1,000 mg in 0.9% sodium chloride (MBP/ADV) 250 mL  1,000 mg IntraVENous Q16H    furosemide (LASIX) injection 40 mg  40 mg IntraVENous DAILY    carvediloL (COREG) tablet 3.125 mg  3.125 mg Oral BID    [Held by provider] melatonin tablet 3 mg  3 mg Oral QHS    alcohol 62% (NOZIN) nasal  1 Ampule  1 Ampule Topical Q12H    dexmedeTOMidine (PRECEDEX) 600 mcg in 0.9% sodium chloride 150 mL infusion  0.2-1.4 mcg/kg/hr (Order-Specific) IntraVENous TITRATE    metroNIDAZOLE (FLAGYL) IVPB premix 500 mg  500 mg IntraVENous Q12H    influenza vaccine 2019-20 (6 mos+)(PF) (FLUARIX/FLULAVAL/FLUZONE QUAD) injection 0.5 mL  0.5 mL IntraMUSCular PRIOR TO DISCHARGE    cloNIDine (CATAPRES) 0.3 mg/24 hr patch 1 Patch  1 Patch TransDERmal Q7D    LORazepam (ATIVAN) injection 2 mg  2 mg IntraVENous Q3H PRN    acetaminophen (TYLENOL) tablet 650 mg  650 mg Oral Q6H PRN    [Held by provider] pregabalin (LYRICA) capsule 150 mg  150 mg Oral BID    sodium chloride (NS) flush 5-10 mL  5-10 mL IntraVENous PRN    insulin lispro (HUMALOG) injection   SubCUTAneous AC&HS    glucose chewable tablet 16 g  4 Tab Oral PRN    dextrose (D50W) injection syrg 12.5-25 g  12.5-25 g IntraVENous PRN    glucagon (GLUCAGEN) injection 1 mg  1 mg IntraMUSCular PRN    enoxaparin (LOVENOX) injection 40 mg  40 mg SubCUTAneous Q24H    sodium hypochlorite (QUARTER STRENGTH DAKIN'S) 0.125% irrigation (bottle)   Topical BID       Objective:      Physical Exam:  General Appearance:  WNWD AAM in no acute distress  Chest:   Clear  Cardiovascular:  tachy, no murmur. Abdomen:   Soft, non-tender, bowel sounds are active. Extremities: no peripheral edema  Skin:  Warm and dry. Data Review:   Recent Labs     02/24/20 0536 02/23/20 0335 02/22/20  0330   WBC 10.2 10.3 12.7*   HGB 10.9* 11.2* 12.2   HCT 33.0* 33.9* 36.1*    404* 432*     Recent Labs     02/24/20 0536 02/23/20 0335 02/22/20  0330    140 140   K 3.1* 2.6* 3.0*    106 106   CO2 25 26 27   * 115* 113*   BUN 25* 28* 31*   CREA 1.38* 1.33* 1.45*   CA 8.1* 8.2* 8.3*   MG 2.0 1.8 1.6   PHOS 2.5* 2.8 2.7   ALB 1.1* 1.1* 1.2*   TBILI 0.3 0.4 0.6   SGOT 32 29 32   ALT <6* <6* <6*       No results for input(s): TROIQ, CPK, CKMB in the last 72 hours. Intake/Output Summary (Last 24 hours) at 2/24/2020 0956  Last data filed at 2/24/2020 0800  Gross per 24 hour   Intake 2620.86 ml   Output 1470 ml   Net 1150.86 ml        Telemetry: ST      Assessment:     Active Problems:    Severe sepsis ([facility] (2/19/2020)      Cardiomyopathy ([facility] (2/21/2020)      Acute systolic heart failure ([facility] (2/21/2020)      Substance abuse ([facility] (2/21/2020)        Plan:     Cardiomyopathy with acute systolic heart failure: (EF 20-25%)  Continue with diuretics, neg 9L. CR stable. Supplementing K+  Worse given his hypoalbumenimia, now albumin 1.1, nutrition consulted.   Unsure of his intake  Monitor I/Os, daily weights and labs  Continue on BB, start ACEI/ARB tomorrow as BP more stable. Consider adding spironolactone for diuretic and K+ sparing    Sepsis/bacteremia:  JAIME today to evaluate for endocarditis  Currently, consent will need to be signed by JUAN DAVID Ferraro [facility]  [facility]      [facility]    2/24/2020         Patient seen, examined by me personally. Plan discussed as detailed. Agree with note as outlined by  NP. I confirm findings in history and physical exam. No additional findings noted. Agree with plan as outlined above. JAIME showed EF 45%, no endocarditis.     Terri Newton MD

## 2020-02-24 NOTE — PROGRESS NOTES
Pharmacy Automatic Renal Dosing Protocol - Antimicrobials    Indication for Antimicrobials: bone and joint infection, bloodstream infection    Current Regimen of Each Antimicrobial:  Daptomycin 8 mg/kg every 24 hours (Start Date ; Day # 1)  Cefepime 2 g IV every 12 hr (start date: , Day # 6)  Metronidazole 500 mg q12h (start date: , Day # 5)    Previous Antimicrobial Therapy:  Vancomycin 1000 mg IV every 16 hours (Start Date ; Day # 6)    Significant Cultures:   [facility] HS Cultures:  : blood - methicillin resistant staph aureous - susceptible to vancomycin   : wound (toe) - Moderate gram positive cocci, beta hemolytic strep group B - susceptible to clindamycin, tetracycline, bactrim, and vancomycin - resistant to oxacillin    [facility] Cultures:  : blood - NG - prelim  : wound (foot)- Heavy MRSA, heavy streptococci beta hemolytic group B, few enterobacter - final  : Wound: checking for possible scant growth- pending    Radiology / Imaging results: (X-ray, CT scan or MRI):   : xray left foot: Great toe osteomyelitis. : chest xray: pulmonary edema  : chest xray: Interval worsening of congestive HF with moderate pulmonary edema    Paralysis, amputations, malnutrition: none    Labs:  Recent Labs     20  0536 20  0335 20  0330   CREA 1.38* 1.33* 1.45*   BUN 25* 28* 31*   WBC 10.2 10.3 12.7*     Temp (24hrs), Av.6 °F (37 °C), Min:97.8 °F (36.6 °C), Max:99.1 °F (37.3 °C)    Creatinine Clearance (mL/min) or Dialysis: ~59 ml/min    Impression/Plan:   Continue current regimen of cefepime and metronidazole  Daptomycin dose rounded to 600 mg IV every 24 hours. CK baseline and weekly thereafter  Antimicrobial stop date to be determined - I&D + amputation complete - ID consulted     Pharmacy will follow daily and adjust medications as appropriate for renal function and/or serum levels.     Thank you,  Marta August, ANIKTD

## 2020-02-24 NOTE — WOUND CARE
Wound Care Consult for the post amputation wound of the Left foot. I first saw patient on 2/19/2020 when he was in the ED. See photo of that wound at the bottom and the most recent photo of the new amputation site is just below:  Assessment today: The amputation site is sutured and patient stated he has no pain at the site at all. There is no excessive swelling or redness at the site and no abnormal drainage. The drainage on the old dressing was serosanguinous. The suture site was macerated. Needs to stay dryer. Treatment today: Cleansed the site with NS and wiped with gauze to remove the old drainage which was serosanguinous. The suture site was cleansed again with Betadine and a dry dressing placed on the site. Wrapped with jagruti. Orders placed for the same. 2- post op day 3: Amputation site  Sutured. 2- in the ED on day   of Admission:       Plan: Patient needs to improve his protein intake for wound healing.  He has been told this, among other drug habits he needs to stop in order to heal.   Denise Dang, RN, BSN, [employer]

## 2020-02-24 NOTE — PROGRESS NOTES
Nutrition:  Consult received, chart reviewed and case discussed with CCU team.  Noted albumin 1.1. CRP is elevated which indicates pt is in an inflammatory state. As albumin is a negative acute phase protein/reactant, would attribute hypoalbuminemia to inflammation/infection given osteomyelitis. Thus it is less likely that hypoalbuminemia is nutrition related. Pt's UBW is between 145-160lb and per our wt records his wt is fairly stable. He was very hungry this morning, threatening to cancel procedures if he did not get fed. Per RN he has been consuming 100% of his meals trays. Can add Glucerna TID to boost kcal and protein intake. Will continue to monitor pt's case closely. Thank you!     Jack, [address]    Pager [phone]

## 2020-02-24 NOTE — PROGRESS NOTES
Hospitalist Progress Note    NAME: Karina Elizalde   :  1965   MRN:  [id number]     Assessment / Plan:  Sepsis with fever, tachycardia and leukocytosis POA  Acute Metabolic Encephalopathy due to infection  Due to Left Great Toe Osteomyelitis  Bacteremia with MRSA recently at [facility]  Per Pharmacy report  At [facility],   : blood - methicillin resistant staph aureous - susceptible to vancomycin   2: wound (toe) - Moderate gram positive cocci, beta hemolytic strep group B - susceptible to clindamycin, tetracycline, bactrim, and vancomycin - resistant to oxacillin  Managed in ICU due to sedation  Blood cultures here negative so far  Pt signed AMA at [facility] per report recently  Awaiting sensitivities report from [facility]  Continue IV Vancomycin, Cefepime and Flagyl  2D echo with EF 25%-30% and MR and IV drug use.    Appreciate cardiology help with JAIME which is negative  Blood Cultures here negative  S/p Incision and drainage with amputation of great toe left foot, submitted to Pathology for identification, also resection of partial first metatarsal head for submission as a viable proximal bone resection, left foot this admission  On Precedex and ativan due to severe delirium  Wound cultures here with MRSA  ID consult for recommendation and duration of IV abx. likely will need to stay in hospital to complete the coarse with incomplete treatment of bacteremia as signed AMA from [facility]    Acute Pulmonary Edema suspect cocaine related vs valvular etiology as IV drug abuse  2D echo with 25-30% EF  Continue IV diuretics  Monitor fluid status  Wean o2 as able to  Cardiology is now on the case    Hypokalemia  Monitoring and repleting PRN  Mg ok     Insulin-dependent diabetes  Noncompliance with insulin  Hemoglobin A1c 8  SSI     Polysubstance Abuse  Urine tox screen is positive for cocaine, heroin and methadone  Counseling provided on admission per H&P     Hypokalemia  Replaced orally       Peripheral neuropathy  Holding meds due to sedation    HTN  Considering unable to take PO meds due to sedation, will place on clonidine patch       Code Status: Full code  Surrogate Decision Maker: Girlfriend Janey Andrade([phone])     DVT Prophylaxis: lovenox  GI Prophylaxis: not indicated     Baseline: Functional, IV drug abuse                 Subjective: Pt seen and examined at bedside. Alert but on sedation today. Overnight events d/w RN   CHIEF COMPLAINT: f/u \"Fever chills and shortness of breath\"       Review of Systems:  Symptom Y/N Comments  Symptom Y/N Comments   Fever/Chills    Chest Pain     Poor Appetite    Edema     Cough    Abdominal Pain     Sputum    Joint Pain     SOB/DE LA CRUZ    Pruritis/Rash     Nausea/vomit    Tolerating PT/OT     Diarrhea    Tolerating Diet     Constipation    Other       Could NOT obtain due to: Sedated      Objective:     VITALS:   Last 24hrs VS reviewed since prior progress note.  Most recent are:  Patient Vitals for the past 24 hrs:   Temp Pulse Resp BP SpO2   02/24/20 1500 -- 73 20 107/59 98 %   02/24/20 1320 -- 79 -- 107/67 --   02/24/20 1159 -- -- -- 119/68 --   02/24/20 1140 -- 65 19 119/68 97 %   02/24/20 1135 -- 68 18 112/65 96 %   02/24/20 1130 -- 72 18 113/63 98 %   02/24/20 1125 -- 67 19 114/64 (!) 84 %   02/24/20 1120 -- 68 23 -- 96 %   02/24/20 1100 -- 68 13 125/71 99 %   02/24/20 1045 -- 68 -- 123/71 --   02/24/20 1000 -- 68 11 123/71 95 %   02/24/20 0900 -- 67 17 115/68 96 %   02/24/20 0800 97.8 °F (36.6 °C) 71 19 115/67 97 %   02/24/20 0700 -- 76 24 126/74 --   02/24/20 0600 -- 69 16 104/55 --   02/24/20 0543 -- 68 -- 116/68 --   02/24/20 0500 -- 69 15 116/68 98 %   02/24/20 0400 99.1 °F (37.3 °C) 69 22 114/68 99 %   02/24/20 0300 -- 70 19 114/65 98 %   02/24/20 0200 -- 73 22 123/75 100 %   02/24/20 0100 -- 77 18 -- 98 %   02/24/20 0000 99.1 °F (37.3 °C) 75 21 120/78 98 %   02/23/20 2300 -- 76 17 117/69 99 %   02/23/20 2200 -- 78 20 122/70 98 %   02/23/20 2100 -- 79 15 118/66 95 %   02/23/20 2000 99.1 °F (37.3 °C) 78 22 106/61 95 %   02/23/20 1900 -- 79 21 104/57 93 %   02/23/20 1800 -- 82 20 95/50 --   02/23/20 1718 -- -- -- 116/65 --   02/23/20 1700 -- 80 23 116/65 95 %   02/23/20 1600 98.6 °F (37 °C) 80 23 117/73 97 %       Intake/Output Summary (Last 24 hours) at 2/24/2020 1514  Last data filed at 2/24/2020 1500  Gross per 24 hour   Intake 1620.86 ml   Output 1470 ml   Net 150.86 ml        PHYSICAL EXAM:  General: WD, WN. sedated, no acute distress    EENT:  EOMI. Anicteric sclerae. MMM  Resp:  CTA bilaterally, no wheezing or rales. No accessory muscle use  CV:  Regular  rhythm,  No edema  GI:  Soft, Non distended, Non tender.  +Bowel sounds  Neurologic:  Exam limited due to sedation  Psych:   Exam limited due to sedation  Skin:  Left Great Toe Ulcer. No jaundice    Reviewed most current lab test results and cultures  YES  Reviewed most current radiology test results   YES  Review and summation of old records today    NO  Reviewed patient's current orders and MAR    YES  PMH/SH reviewed - no change compared to H&P  ________________________________________________________________________  Care Plan discussed with:    Comments   Patient y    Family  y GirlFriend at bedside today   RN y    Care Manager     Consultant                        Multidiciplinary team rounds were held today with , nursing, pharmacist and clinical coordinator. Patient's plan of care was discussed; medications were reviewed and discharge planning was addressed.      ________________________________________________________________________  Total NON critical care TIME: 30 Minutes    Total CRITICAL CARE TIME Spent:   Minutes non procedure based      Comments   >50% of visit spent in counseling and coordination of care     ________________________________________________________________________  Armando Multani MD     Procedures: see electronic medical records for all procedures/Xrays and details which were not copied into this note but were reviewed prior to creation of Plan. LABS:  I reviewed today's most current labs and imaging studies.   Pertinent labs include:  Recent Labs     02/24/20 0536 02/23/20 0335 02/22/20 0330   WBC 10.2 10.3 12.7*   HGB 10.9* 11.2* 12.2   HCT 33.0* 33.9* 36.1*    404* 432*     Recent Labs     02/24/20 0536 02/23/20 0335 02/22/20 0330    140 140   K 3.1* 2.6* 3.0*    106 106   CO2 25 26 27   * 115* 113*   BUN 25* 28* 31*   CREA 1.38* 1.33* 1.45*   CA 8.1* 8.2* 8.3*   MG 2.0 1.8 1.6   PHOS 2.5* 2.8 2.7   ALB 1.1* 1.1* 1.2*   TBILI 0.3 0.4 0.6   SGOT 32 29 32   ALT <6* <6* <6*       Signed: Jesenia Molina MD

## 2020-02-25 LAB
AMPHET UR QL SCN: NEGATIVE
ANION GAP SERPL CALC-SCNC: 5 MMOL/L (ref 5–15)
BARBITURATES UR QL SCN: NEGATIVE
BENZODIAZ UR QL: POSITIVE
BUN SERPL-MCNC: 21 MG/DL (ref 6–20)
BUN/CREAT SERPL: 16 (ref 12–20)
CALCIUM SERPL-MCNC: 7.8 MG/DL (ref 8.5–10.1)
CANNABINOIDS UR QL SCN: NEGATIVE
CHLORIDE SERPL-SCNC: 107 MMOL/L (ref 97–108)
CK SERPL-CCNC: 35 U/L (ref 39–308)
CO2 SERPL-SCNC: 25 MMOL/L (ref 21–32)
COCAINE UR QL SCN: POSITIVE
CREAT SERPL-MCNC: 1.29 MG/DL (ref 0.7–1.3)
DRUG SCRN COMMENT,DRGCM: ABNORMAL
GLUCOSE BLD STRIP.AUTO-MCNC: 146 MG/DL (ref 65–100)
GLUCOSE BLD STRIP.AUTO-MCNC: 161 MG/DL (ref 65–100)
GLUCOSE BLD STRIP.AUTO-MCNC: 268 MG/DL (ref 65–100)
GLUCOSE BLD STRIP.AUTO-MCNC: 279 MG/DL (ref 65–100)
GLUCOSE SERPL-MCNC: 161 MG/DL (ref 65–100)
HAV IGM SER QL: NONREACTIVE
HBV CORE IGM SER QL: NONREACTIVE
HBV SURFACE AG SER QL: <0.1 INDEX
HBV SURFACE AG SER QL: NEGATIVE
HCV AB SER IA-ACNC: >11 INDEX
HCV AB SERPL QL IA: REACTIVE
HCV COMMENT,HCGAC: ABNORMAL
METHADONE UR QL: NEGATIVE
OPIATES UR QL: POSITIVE
PCP UR QL: NEGATIVE
POTASSIUM SERPL-SCNC: 3.4 MMOL/L (ref 3.5–5.1)
SERVICE CMNT-IMP: ABNORMAL
SODIUM SERPL-SCNC: 137 MMOL/L (ref 136–145)
SP1: ABNORMAL
SP2: ABNORMAL
SP3: ABNORMAL

## 2020-02-25 PROCEDURE — 74011250637 HC RX REV CODE- 250/637: Performed by: INTERNAL MEDICINE

## 2020-02-25 PROCEDURE — 74011000258 HC RX REV CODE- 258: Performed by: INTERNAL MEDICINE

## 2020-02-25 PROCEDURE — 74011000250 HC RX REV CODE- 250: Performed by: EMERGENCY MEDICINE

## 2020-02-25 PROCEDURE — 74011250636 HC RX REV CODE- 250/636: Performed by: INTERNAL MEDICINE

## 2020-02-25 PROCEDURE — 82550 ASSAY OF CK (CPK): CPT

## 2020-02-25 PROCEDURE — 74011250637 HC RX REV CODE- 250/637: Performed by: NURSE PRACTITIONER

## 2020-02-25 PROCEDURE — 80307 DRUG TEST PRSMV CHEM ANLYZR: CPT

## 2020-02-25 PROCEDURE — 36415 COLL VENOUS BLD VENIPUNCTURE: CPT

## 2020-02-25 PROCEDURE — 80074 ACUTE HEPATITIS PANEL: CPT

## 2020-02-25 PROCEDURE — 74011636637 HC RX REV CODE- 636/637: Performed by: INTERNAL MEDICINE

## 2020-02-25 PROCEDURE — 82962 GLUCOSE BLOOD TEST: CPT

## 2020-02-25 PROCEDURE — 80048 BASIC METABOLIC PNL TOTAL CA: CPT

## 2020-02-25 PROCEDURE — 65270000029 HC RM PRIVATE

## 2020-02-25 RX ORDER — CARVEDILOL 6.25 MG/1
6.25 TABLET ORAL 2 TIMES DAILY
Status: DISCONTINUED | OUTPATIENT
Start: 2020-02-25 | End: 2020-03-01

## 2020-02-25 RX ORDER — LORAZEPAM 2 MG/ML
2 INJECTION INTRAMUSCULAR
Status: DISCONTINUED | OUTPATIENT
Start: 2020-02-25 | End: 2020-03-01

## 2020-02-25 RX ORDER — AMLODIPINE BESYLATE 5 MG/1
10 TABLET ORAL DAILY
Status: DISCONTINUED | OUTPATIENT
Start: 2020-02-26 | End: 2020-03-03 | Stop reason: HOSPADM

## 2020-02-25 RX ORDER — FUROSEMIDE 40 MG/1
40 TABLET ORAL DAILY
Status: DISCONTINUED | OUTPATIENT
Start: 2020-02-25 | End: 2020-03-03 | Stop reason: HOSPADM

## 2020-02-25 RX ADMIN — DAKIN'S SOLUTION 0.125% (QUARTER STRENGTH): 0.12 SOLUTION at 08:08

## 2020-02-25 RX ADMIN — CEFEPIME HYDROCHLORIDE 2 G: 2 INJECTION, POWDER, FOR SOLUTION INTRAVENOUS at 02:24

## 2020-02-25 RX ADMIN — Medication 1 AMPULE: at 21:40

## 2020-02-25 RX ADMIN — METRONIDAZOLE 500 MG: 500 INJECTION, SOLUTION INTRAVENOUS at 12:26

## 2020-02-25 RX ADMIN — LISINOPRIL 2.5 MG: 5 TABLET ORAL at 08:04

## 2020-02-25 RX ADMIN — Medication 10 ML: at 08:57

## 2020-02-25 RX ADMIN — Medication 1 AMPULE: at 08:06

## 2020-02-25 RX ADMIN — CARVEDILOL 6.25 MG: 6.25 TABLET, FILM COATED ORAL at 17:58

## 2020-02-25 RX ADMIN — INSULIN LISPRO 2 UNITS: 100 INJECTION, SOLUTION INTRAVENOUS; SUBCUTANEOUS at 08:04

## 2020-02-25 RX ADMIN — INSULIN LISPRO 5 UNITS: 100 INJECTION, SOLUTION INTRAVENOUS; SUBCUTANEOUS at 17:59

## 2020-02-25 RX ADMIN — INSULIN LISPRO 5 UNITS: 100 INJECTION, SOLUTION INTRAVENOUS; SUBCUTANEOUS at 12:26

## 2020-02-25 RX ADMIN — METRONIDAZOLE 500 MG: 500 INJECTION, SOLUTION INTRAVENOUS at 23:23

## 2020-02-25 RX ADMIN — DAPTOMYCIN 600 MG: 500 INJECTION, POWDER, LYOPHILIZED, FOR SOLUTION INTRAVENOUS at 19:21

## 2020-02-25 RX ADMIN — DAKIN'S SOLUTION 0.125% (QUARTER STRENGTH): 0.12 SOLUTION at 17:58

## 2020-02-25 RX ADMIN — LORAZEPAM 2 MG: 2 INJECTION INTRAMUSCULAR; INTRAVENOUS at 06:00

## 2020-02-25 RX ADMIN — CARVEDILOL 3.12 MG: 3.12 TABLET, FILM COATED ORAL at 08:05

## 2020-02-25 RX ADMIN — ENOXAPARIN SODIUM 40 MG: 40 INJECTION SUBCUTANEOUS at 21:40

## 2020-02-25 RX ADMIN — CEFEPIME HYDROCHLORIDE 2 G: 2 INJECTION, POWDER, FOR SOLUTION INTRAVENOUS at 14:18

## 2020-02-25 RX ADMIN — FUROSEMIDE 40 MG: 40 TABLET ORAL at 08:05

## 2020-02-25 NOTE — PROGRESS NOTES
Spiritual Care Assessment/Progress Note  [facility]      NAME: Dario Radford      MRN: [id number]  AGE: 48 y.o.  SEX: male  Sikh Affiliation: Taoism   Language: English     2/24/2020     Total Time (in minutes): 21     Spiritual Assessment begun in [facility] 2 CRITICAL CARE 2 through conversation with:         [x]Patient        [] Family    [] Friend(s)        Reason for Consult: Initial/Spiritual assessment, patient floor, Initial visit     Spiritual beliefs: (Please include comment if needed)     [x] Identifies with a cookie tradition:         [] Supported by a cookie community:            [] Claims no spiritual orientation:           [] Seeking spiritual identity:                [] Adheres to an individual form of spirituality:           [] Not able to assess:                           Identified resources for coping:      [] Prayer                               [] Music                  [] Guided Imagery     [x] Family/friends                 [] Pet visits     [] Devotional reading                         [] Unknown     [] Other:                                              Interventions offered during this visit: (See comments for more details)    Patient Interventions: Affirmation of emotions/emotional suffering, Affirmation of cookie, Initial visit, Initial/Spiritual assessment, patient floor, Normalization of emotional/spiritual concerns, Prayer (assurance of)           Plan of Care:     [x] Support spiritual and/or cultural needs    [] Support AMD and/or advance care planning process      [] Support grieving process   [] Coordinate Rites and/or Rituals    [] Coordination with community clergy   [] No spiritual needs identified at this time   [] Detailed Plan of Care below (See Comments)  [] Make referral to Music Therapy  [] Make referral to Pet Therapy     [] Make referral to Addiction services  [] Make referral to [facility]  [] Make referral to Spiritual Care Partner  [] No future visits requested        [x] Follow up visits as needed     Comments:   made initial visit to patients room on CCU. Patient was lying in bed. Patient had requested the visit from the staff. Patient visited about his medical condition and how he was having some difficulty with his condition. Patient shared some of his feelings, but it was difficult to get him to talk at times.  provided pastoral care, support and active listening during this visit.  also provided words of compassion and encouragement to the patient. Rev.  Josesito Araya 69  Pediatric Speciality Staff   NICU & PICU Staff [facility] Staff   [address] [place] [address] J [facility], [place] [address]: [phone]/ S:[phone]  [address]  [email or web address]

## 2020-02-25 NOTE — PROGRESS NOTES
1915  Bedside and Verbal shift change report given to Ja Ontiveros RN (oncoming nurse) by Gretta Hanna RN (offgoing nurse). Report included the following information SBAR, Kardex, Intake/Output, MAR and Cardiac Rhythm NSR.     2030  Assessment completed. Patient was sleeping when I entered the room. Patient refusing to take potassium supplement stating that his previous nurse told him he only needed to take it once earlier. Stated pills were too big. RN attempted to explain that his supplement was scheduled over a number of doses as did another nurse, but patient was not receptive. Dose held. Patient also stated that he \"needs to have a snack tonight\" due to being a diabetic and not getting enough food for dinner. Stated that I will try to find him diet snacks for bed time. 2110  Patient used bedpan and had moderate amount to loose stool. Zinc ointment applied to gluteal cleft where there is a linear lesion of excoriation. Patient requested his potassium pills. Stated that he was not refusing them earlier. RN stated that he is AO X 4 and is able to accept or refuse medications. Patient did take 2000 dose of K-dur. 2230  Wife at bedside. Curtain was pulled. I went to the bedside and the patient was eating a Filet O' Fish sandwich from [employer]. Not sure if other food was brought in by family. Due to efforts to curtail approved diet and medications as well as bringing in items from outside, I am concerned that patient may be getting street drug from visitors. Will send message to hospitalist.    0005  Paged on call hospitalist.  Concerned about patient getting outside drugs from visitors. Patient making numerous phone calls and RN concerned about giving scheduled medications if patient is taking drugs from outside. Last night, patient was very sleepy and this evening, he has stayed awake and is making constant phone calls and does not appear to be tired at all.     [place]  Spoke with hospitalist and conveyed my concerns. We will get a UDA and if patient's behavior changes more, then may consider having Security search his belongings. 0115  Came to bedside. I was involved with another patient and during that time patient rang for nurse to heat up his Filet O' Fish sandwich. This is the second sandwich the patient ate this evening. I relayed to him that we are balancing his diet and his insulin to promote healing and patient stated that he was told to eat more fish to help with healing. Patient has been on the phone making calls since ~2300. Urine drug screen obtained from [facility]. 0345  No change in assessment. 2686  Patient requesting to sit at the side of the bed or in chair. Reviewed current ordered activity restrictions. Patient continued to ask to get OOB. Explained to patient that we are not able to address this request at 0440 in the morning. 0630  Patient eating chips in bed. Ordering breakfast, also. 0730  Bedside and Verbal shift change report given to Zunilda Correia RN (oncoming nurse) by Ja Ontiveros RN (offgoing nurse). Report included the following information SBAR, Kardex, Procedure Summary, Intake/Output, Recent Results and Cardiac Rhythm NSR with prolonged QTc. Nya Ceja
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
## 2020-02-25 NOTE — PROGRESS NOTES
Nutrition Assessment:    RECOMMENDATIONS:   Continue diabetic diet and supplements    ASSESSMENT:   Chart reviewed, pt moving out of CCU at time of attempted visit. His appetite is excellent and he is consuming 100% of his meals as well as asking for additional items after hours. Added supplements TID that are diabetic friendly. Current intake is likely adequate to meet est needs. BG [id number], diabetes nurse specialist following. Would limit outside food. Dietitians Intervention(s)/Plan(s): Continue diet and supplements  SUBJECTIVE/OBJECTIVE:   Pt moving off the floor   Diet Order: Consistent carb, Other (comment)(Glucerna TID )  % Eaten:    Patient Vitals for the past 72 hrs:   % Diet Eaten   02/23/20 1700 100 %   02/23/20 1300 100 %   02/23/20 0835 100 %     Pertinent Medications:cefepime, cubicin, lasix, humalog, flagyl      Chemistries:  Lab Results   Component Value Date/Time    Sodium 137 02/25/2020 06:14 AM    Potassium 3.4 (L) 02/25/2020 06:14 AM    Chloride 107 02/25/2020 06:14 AM    CO2 25 02/25/2020 06:14 AM    Anion gap 5 02/25/2020 06:14 AM    Glucose 161 (H) 02/25/2020 06:14 AM    BUN 21 (H) 02/25/2020 06:14 AM    Creatinine 1.29 02/25/2020 06:14 AM    BUN/Creatinine ratio 16 02/25/2020 06:14 AM    GFR est AA >60 02/25/2020 06:14 AM    GFR est non-AA 58 (L) 02/25/2020 06:14 AM    Calcium 7.8 (L) 02/25/2020 06:14 AM    Albumin 1.1 (L) 02/24/2020 05:36 AM      Anthropometrics: Height: 5' 8\" (172.7 cm) Weight: 68.9 kg (152 lb)  [] standing scale    []bed scale    []stated   []unknown     IBW (%IBW):   ( ) UBW (%UBW):   (  %)    BMI: Body mass index is 23.11 kg/m². This BMI is indicative of:  []Underweight   [x]Normal   []Overweight   [] Obesity   [] Extreme Obesity (BMI>40)  Estimated Nutrition Needs (Based on): 2000 Kcals/day(MSJ 1504 x 1.3) , 69 g(-93 (1-1.2gPro/kg) ) Protein  Carbohydrate:  At Least 130 g/day  Fluids: 2000 mL/day    Last BM: 2/23   [x]Active     []Hyperactive  []Hypoactive [] Absent   BS  Skin:    [] Intact   [x] Incision  [] Breakdown   [x] DTI(buttocks)   [] Tears/Excoriation/Abrasion  []Edema [] Other: Wt Readings from Last 30 Encounters:   02/24/20 68.9 kg (152 lb)   02/20/20 75.3 kg (166 lb)   08/09/19 70.1 kg (154 lb 9.6 oz)   06/26/19 66.2 kg (145 lb 14.4 oz)   06/06/19 72.3 kg (159 lb 4.8 oz)   05/30/19 74.9 kg (165 lb 1.6 oz)   05/23/19 80.7 kg (178 lb)   01/21/19 85 kg (187 lb 4.8 oz)   10/18/18 89 kg (196 lb 3.2 oz)   10/11/18 88 kg (194 lb)        Dx of Malnutrition since admission: no    NUTRITION DIAGNOSES:   Problem:  Inadequate protein-energy intake      Etiology: related to pt NPO     Signs/Symptoms: as evidenced by NPO meets 0% kcal and protein needs. Previous dx re: inadequate protein energy intake resolved.      NUTRITION INTERVENTIONS:  Meals/Snacks: General/healthful diet   Supplements: Commercial supplement              GOAL:   Pt will consume >75% of meals/supplements in 4-6 days     NUTRITION MONITORING AND EVALUATION   Previous Goal: Pt will advance to PO diet in 2-4 days   Previous Goal Met: Yes   Previous Recommendations Implemented: Yes   Cultural, Hinduism, or Ethnic Dietary Needs: None   LEARNING NEEDS (Diet, Food/Nutrient-Drug Interaction):    [x] None Identified   [] Identified and Education Provided/Documented   [] Identified and Pt declined/was not appropriate      [x] Interdisciplinary Care Plan Reviewed/Documented    [x] Participated in Discharge Planning: Diabetic diet    [] Interdisciplinary Rounds     NUTRITION RISK:    [] High              [] Moderate           [x]  Low  []  Minimal/Uncompromised      Nicol Samuels, [address]  Pager [phone]  Weekend Pager [phone]

## 2020-02-25 NOTE — ROUTINE PROCESS
TRANSFER - OUT REPORT:    Verbal report given to Merry(name) on Dario Sis  being transferred to [facility]) for routine progression of care       Report consisted of patients Situation, Background, Assessment and   Recommendations(SBAR). Information from the following report(s) SBAR, Kardex, Procedure Summary, Intake/Output, MAR, Recent Results and Cardiac Rhythm NSR was reviewed with the receiving nurse. Lines:   Peripheral IV 02/24/20 Right Forearm (Active)   Site Assessment Clean, dry, & intact 2/25/2020  8:00 AM   Phlebitis Assessment 0 2/25/2020  8:00 AM   Infiltration Assessment 0 2/25/2020  8:00 AM   Dressing Status Clean, dry, & intact 2/25/2020  8:00 AM   Dressing Type Transparent 2/25/2020  8:00 AM   Hub Color/Line Status Pink;Capped;Flushed 2/25/2020  8:00 AM   Action Taken Open ports on tubing capped 2/25/2020  8:00 AM   Alcohol Cap Used Yes 2/25/2020  8:00 AM       Peripheral IV 02/24/20 Left Forearm (Active)   Site Assessment Clean, dry, & intact 2/25/2020  8:00 AM   Phlebitis Assessment 0 2/25/2020  8:00 AM   Infiltration Assessment 0 2/25/2020  8:00 AM   Dressing Status Clean, dry, & intact 2/25/2020  8:00 AM   Dressing Type Transparent 2/25/2020  8:00 AM   Hub Color/Line Status Pink;Capped 2/25/2020  8:00 AM   Action Taken Open ports on tubing capped 2/25/2020  8:00 AM   Alcohol Cap Used Yes 2/25/2020  8:00 AM        Opportunity for questions and clarification was provided.       Patient transported with:   [email or web address]
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
## 2020-02-25 NOTE — PROGRESS NOTES
Physical Therapy Screening:    An [facility] screening referral was triggered for physical therapy based on results obtained during the nursing admission assessment. The patients chart was reviewed and the patient is appropriate for a skilled therapy evaluation if there is a decline in functional mobility from baseline. Please order a consult for physical therapy if you are in agreement and would like an evaluation to be completed. Thank you.     Arcelia Avalos, PT, DPT

## 2020-02-25 NOTE — PROGRESS NOTES
Cardiology Progress Note      2/25/2020 12:12 PM    Admit Date: 2/19/2020    Admit Diagnosis: Severe sepsis ([facility] [A41.9, R65.20]      Subjective:     Dione Denton is more alert, off restraints friend at bedside. JAIME yesterday negative for endocarditis, EF 45%.     Visit Vitals  /84   Pulse 87   Temp 98.6 °F (37 °C)   Resp 18   Ht 5' 8\" (1.727 m)   Wt 68.9 kg (152 lb)   SpO2 98%   BMI 23.11 kg/m²       Current Facility-Administered Medications   Medication Dose Route Frequency    LORazepam (ATIVAN) injection 2 mg  2 mg IntraVENous Q6H PRN    furosemide (LASIX) tablet 40 mg  40 mg Oral DAILY    lisinopril (PRINIVIL, ZESTRIL) tablet 2.5 mg  2.5 mg Oral DAILY    hydrALAZINE (APRESOLINE) 20 mg/mL injection 10 mg  10 mg IntraVENous Q6H PRN    DAPTOmycin (CUBICIN) 600 mg in 0.9% sodium chloride 50 mL IVPB  600 mg IntraVENous Q24H    cefepime (MAXIPIME) 2 g in 0.9% sodium chloride (MBP/ADV) 100 mL  2 g IntraVENous Q12H    carvediloL (COREG) tablet 3.125 mg  3.125 mg Oral BID    alcohol 62% (NOZIN) nasal  1 Ampule  1 Ampule Topical Q12H    metroNIDAZOLE (FLAGYL) IVPB premix 500 mg  500 mg IntraVENous Q12H    influenza vaccine 2019-20 (6 mos+)(PF) (FLUARIX/FLULAVAL/FLUZONE QUAD) injection 0.5 mL  0.5 mL IntraMUSCular PRIOR TO DISCHARGE    cloNIDine (CATAPRES) 0.3 mg/24 hr patch 1 Patch  1 Patch TransDERmal Q7D    LORazepam (ATIVAN) injection 2 mg  2 mg IntraVENous Q3H PRN    acetaminophen (TYLENOL) tablet 650 mg  650 mg Oral Q6H PRN    [Held by provider] pregabalin (LYRICA) capsule 150 mg  150 mg Oral BID    sodium chloride (NS) flush 5-10 mL  5-10 mL IntraVENous PRN    insulin lispro (HUMALOG) injection   SubCUTAneous AC&HS    glucose chewable tablet 16 g  4 Tab Oral PRN    dextrose (D50W) injection syrg 12.5-25 g  12.5-25 g IntraVENous PRN    glucagon (GLUCAGEN) injection 1 mg  1 mg IntraMUSCular PRN    enoxaparin (LOVENOX) injection 40 mg  40 mg SubCUTAneous Q24H    sodium hypochlorite (QUARTER STRENGTH DAKIN'S) 0.125% irrigation (bottle)   Topical BID         Objective:      Physical Exam:  Visit Vitals  /84   Pulse 87   Temp 98.6 °F (37 °C)   Resp 18   Ht 5' 8\" (1.727 m)   Wt 68.9 kg (152 lb)   SpO2 98%   BMI 23.11 kg/m²     General Appearance:  Well developed, well nourished,alert and oriented x 3, and individual in no acute distress. Neck: Supple. Chest:   Lungs clear to auscultation bilaterally. Cardiovascular:  Regular rate and rhythm, S1, S2 normal, no murmur. Abdomen:   Soft, non-tender, bowel sounds are active. Extremities: No edema bilaterally. Skin: Warm and dry.              Data Review:   Labs:    Recent Results (from the past 24 hour(s))   GLUCOSE, POC    Collection Time: 02/24/20  4:42 PM   Result Value Ref Range    Glucose (POC) 277 (H) 65 - 100 mg/dL    Performed by Matthew Horowitz RN    GLUCOSE, POC    Collection Time: 02/24/20  9:07 PM   Result Value Ref Range    Glucose (POC) 179 (H) 65 - 100 mg/dL    Performed by Jourdan    Collection Time: 02/25/20  1:21 AM   Result Value Ref Range    AMPHETAMINES NEGATIVE  NEG      BARBITURATES NEGATIVE  NEG      BENZODIAZEPINES POSITIVE (A) NEG      COCAINE POSITIVE (A) NEG      METHADONE NEGATIVE  NEG      OPIATES POSITIVE (A) NEG      PCP(PHENCYCLIDINE) NEGATIVE  NEG      THC (TH-CANNABINOL) NEGATIVE  NEG      Drug screen comment (NOTE)    CK    Collection Time: 02/25/20  6:14 AM   Result Value Ref Range    CK 35 (L) 39 - 219 U/L   METABOLIC PANEL, BASIC    Collection Time: 02/25/20  6:14 AM   Result Value Ref Range    Sodium 137 136 - 145 mmol/L    Potassium 3.4 (L) 3.5 - 5.1 mmol/L    Chloride 107 97 - 108 mmol/L    CO2 25 21 - 32 mmol/L    Anion gap 5 5 - 15 mmol/L    Glucose 161 (H) 65 - 100 mg/dL    BUN 21 (H) 6 - 20 MG/DL    Creatinine 1.29 0.70 - 1.30 MG/DL    BUN/Creatinine ratio 16 12 - 20      GFR est AA >60 >60 ml/min/1.73m2    GFR est non-AA 58 (L) >60 ml/min/1.73m2    Calcium 7.8 (L) 8.5 - 10.1 MG/DL   GLUCOSE, POC    Collection Time: 02/25/20  7:45 AM   Result Value Ref Range    Glucose (POC) 146 (H) 65 - 100 mg/dL    Performed by Tiara 25, POC    Collection Time: 02/25/20 11:24 AM   Result Value Ref Range    Glucose (POC) 268 (H) 65 - 100 mg/dL    Performed by Yesenia Ryan (PCT)        Telemetry: normal sinus rhythm      Assessment:     Active Problems:    Severe sepsis ([facility] (2/19/2020)      Cardiomyopathy ([facility] (2/21/2020)      Acute systolic heart failure ([facility] (2/21/2020)      Substance abuse ([facility] (2/21/2020)        Plan:     Cardiomyopathy with acute systolic heart failure: (EF 45% by JAIME 2/23/2020)  Continue with diuretics, CR mildly elevated, but he continues with pulm edema. Worse given his hypoalbumenimia - Nutrition has seen patient and thinks low albumin r/t infection, not malnutrition   Monitor I/Os, daily weights and labs  Continue low dose BB, lisinopril low dose   Ischemic evaluation eventually, likely as outpt once pt comes to f/u appts and shows compliance. Sepsis/bacteremia:  JAIME negative for endocarditis        Kenneth Barnett NP       [facility]    2/25/2020         Patient seen, examined by me personally. Plan discussed as detailed. Agree with note as outlined by  NP. I confirm findings in history and physical exam. No additional findings noted. Agree with plan as outlined above.      Celina Kirk MD

## 2020-02-25 NOTE — PROGRESS NOTES
0700  Report from Cesia Merlos RN    0800  Assessment complete see flow sheet    1130  Dr Stephanie Watson at bedside for JAIME  See flow sheet   1 mg Versed and 25 mcg Fentanyl given Suction and ambu bag at bedside. Pt on monitor and pulse ox along with O2.    1200  Assessment complete  No changes    1230  Pt able to swallow water with no coughing  At this time  No c/o of numbness in back of throat. Vitals remain stable    1300  Pt ate lunch without difficulty.      1600  Assessment complete  Pt c/o of IV potassium spoke with Dr Asim Salinas will change to PO    1900  Assessment to [facility]

## 2020-02-25 NOTE — PROGRESS NOTES
Telemedicine cross cover:    Ok to check urine drug screen again. - concern about pt's family bringing food.

## 2020-02-25 NOTE — PROGRESS NOTES
0930- Pt arrived to room insisting that he walk around saying that he has been in the bed for the last five days. Nurse assisted pt to walk around room. Nurse explained to pt the need to call to get up out of bed as he was unsteady on his feet. Pt stated \" dont think about putting one of those bed alarms on me\". 1100- Nurse found pt walking back from bathroom unassisted. Pt states that he was using the walker for support. Pt asked to sit in recliner but is refusing for nurse to lock the wheels on the recliner so that it does not roll around. 1244- Pt family just finished visiting pt and brought pt a 2 liter of grape soda. Pt educated that he should not be drinking this because he his a diabetic. His response was that \"he was going to hide it\". General Surgery End of Shift Nursing Note    Bedside shift change report given to Anselmo Sheppard (oncoming nurse) by Ruddy (offgoing nurse). Report included the following information SBAR, Kardex, Procedure Summary, Intake/Output, MAR and Recent Results. Shift worked:   7a-11p   Summary of shift:   Pt arrived to unit from CCU ~0930. Dressing to foot changed per order. Pt ambulating to the bathroom voiding appropriately. Pt had one bowel movemnt this shift. Pt requiring insulin coverage at mealtimes. No complaints of pain.     Issues for physician to address:        Number times ambulated in hallway past shift: 0    Number of times OOB to chair past shift: 3      GI:    Current diet:  DIET DIABETIC CONSISTENT CARB Regular  DIET NUTRITIONAL SUPPLEMENTS Breakfast, Lunch, Dinner; Glucerna Shake    Tolerating current diet: YES  Passing flatus: YES  Last Bowel Movement: today        Abraham Mayers

## 2020-02-25 NOTE — PROGRESS NOTES
0720 - Bedside and Verbal shift change report given to mannie guadarrama (oncoming nurse) by manpreet ulrich (offgoing nurse). Report included the following information SBAR, Kardex, Intake/Output, MAR, Recent Results and Cardiac Rhythm NSR.   0910 - report called to gen surg. Patient transferred at this time.

## 2020-02-26 ENCOUNTER — HOSPITAL ENCOUNTER (OUTPATIENT)
Dept: NON INVASIVE DIAGNOSTICS | Age: 55
Discharge: HOME | DRG: 710 | End: 2020-02-26
Attending: NURSE PRACTITIONER
Payer: MEDICAID

## 2020-02-26 ENCOUNTER — APPOINTMENT (OUTPATIENT)
Dept: NUCLEAR MEDICINE | Age: 55
DRG: 710 | End: 2020-02-26
Attending: NURSE PRACTITIONER
Payer: MEDICAID

## 2020-02-26 LAB
ANION GAP SERPL CALC-SCNC: 5 MMOL/L (ref 5–15)
BUN SERPL-MCNC: 20 MG/DL (ref 6–20)
BUN/CREAT SERPL: 16 (ref 12–20)
CALCIUM SERPL-MCNC: 7.9 MG/DL (ref 8.5–10.1)
CHLORIDE SERPL-SCNC: 102 MMOL/L (ref 97–108)
CO2 SERPL-SCNC: 27 MMOL/L (ref 21–32)
CREAT SERPL-MCNC: 1.26 MG/DL (ref 0.7–1.3)
ERYTHROCYTE [DISTWIDTH] IN BLOOD BY AUTOMATED COUNT: 14.2 % (ref 11.5–14.5)
GLUCOSE BLD STRIP.AUTO-MCNC: 188 MG/DL (ref 65–100)
GLUCOSE BLD STRIP.AUTO-MCNC: 199 MG/DL (ref 65–100)
GLUCOSE BLD STRIP.AUTO-MCNC: 216 MG/DL (ref 65–100)
GLUCOSE BLD STRIP.AUTO-MCNC: 233 MG/DL (ref 65–100)
GLUCOSE SERPL-MCNC: 244 MG/DL (ref 65–100)
HCT VFR BLD AUTO: 34 % (ref 36.6–50.3)
HGB BLD-MCNC: 10.8 G/DL (ref 12.1–17)
MCH RBC QN AUTO: 25.7 PG (ref 26–34)
MCHC RBC AUTO-ENTMCNC: 31.8 G/DL (ref 30–36.5)
MCV RBC AUTO: 81 FL (ref 80–99)
NRBC # BLD: 0 K/UL (ref 0–0.01)
NRBC BLD-RTO: 0 PER 100 WBC
PLATELET # BLD AUTO: 423 K/UL (ref 150–400)
PMV BLD AUTO: 10.4 FL (ref 8.9–12.9)
POTASSIUM SERPL-SCNC: 3.4 MMOL/L (ref 3.5–5.1)
RBC # BLD AUTO: 4.2 M/UL (ref 4.1–5.7)
SERVICE CMNT-IMP: ABNORMAL
SODIUM SERPL-SCNC: 134 MMOL/L (ref 136–145)
WBC # BLD AUTO: 12.5 K/UL (ref 4.1–11.1)

## 2020-02-26 PROCEDURE — 74011250637 HC RX REV CODE- 250/637: Performed by: INTERNAL MEDICINE

## 2020-02-26 PROCEDURE — 65270000029 HC RM PRIVATE

## 2020-02-26 PROCEDURE — 74011250637 HC RX REV CODE- 250/637: Performed by: NURSE PRACTITIONER

## 2020-02-26 PROCEDURE — 85027 COMPLETE CBC AUTOMATED: CPT

## 2020-02-26 PROCEDURE — 80048 BASIC METABOLIC PNL TOTAL CA: CPT

## 2020-02-26 PROCEDURE — 74011250636 HC RX REV CODE- 250/636: Performed by: INTERNAL MEDICINE

## 2020-02-26 PROCEDURE — 74011000258 HC RX REV CODE- 258: Performed by: INTERNAL MEDICINE

## 2020-02-26 PROCEDURE — 93017 CV STRESS TEST TRACING ONLY: CPT

## 2020-02-26 PROCEDURE — 74011250636 HC RX REV CODE- 250/636: Performed by: NURSE PRACTITIONER

## 2020-02-26 PROCEDURE — 74011636637 HC RX REV CODE- 636/637: Performed by: INTERNAL MEDICINE

## 2020-02-26 PROCEDURE — 82962 GLUCOSE BLOOD TEST: CPT

## 2020-02-26 PROCEDURE — 78452 HT MUSCLE IMAGE SPECT MULT: CPT

## 2020-02-26 PROCEDURE — 94760 N-INVAS EAR/PLS OXIMETRY 1: CPT

## 2020-02-26 PROCEDURE — 36415 COLL VENOUS BLD VENIPUNCTURE: CPT

## 2020-02-26 RX ORDER — LISINOPRIL 5 MG/1
7.5 TABLET ORAL ONCE
Status: COMPLETED | OUTPATIENT
Start: 2020-02-26 | End: 2020-02-26

## 2020-02-26 RX ORDER — LISINOPRIL 10 MG/1
10 TABLET ORAL DAILY
Status: DISCONTINUED | OUTPATIENT
Start: 2020-02-27 | End: 2020-03-03 | Stop reason: HOSPADM

## 2020-02-26 RX ORDER — POTASSIUM CHLORIDE 750 MG/1
40 TABLET, FILM COATED, EXTENDED RELEASE ORAL
Status: COMPLETED | OUTPATIENT
Start: 2020-02-26 | End: 2020-02-26

## 2020-02-26 RX ORDER — LINEZOLID 600 MG/1
600 TABLET, FILM COATED ORAL 2 TIMES DAILY
Qty: 60 TAB | Refills: 0 | Status: SHIPPED | OUTPATIENT
Start: 2020-02-26 | End: 2020-03-03

## 2020-02-26 RX ADMIN — Medication 1 AMPULE: at 21:00

## 2020-02-26 RX ADMIN — CEFEPIME HYDROCHLORIDE 2 G: 2 INJECTION, POWDER, FOR SOLUTION INTRAVENOUS at 13:28

## 2020-02-26 RX ADMIN — LISINOPRIL 7.5 MG: 5 TABLET ORAL at 12:19

## 2020-02-26 RX ADMIN — Medication 1 AMPULE: at 08:35

## 2020-02-26 RX ADMIN — METRONIDAZOLE 500 MG: 500 INJECTION, SOLUTION INTRAVENOUS at 12:19

## 2020-02-26 RX ADMIN — CARVEDILOL 6.25 MG: 6.25 TABLET, FILM COATED ORAL at 08:27

## 2020-02-26 RX ADMIN — DAPTOMYCIN 600 MG: 500 INJECTION, POWDER, LYOPHILIZED, FOR SOLUTION INTRAVENOUS at 18:48

## 2020-02-26 RX ADMIN — LISINOPRIL 2.5 MG: 5 TABLET ORAL at 08:27

## 2020-02-26 RX ADMIN — POTASSIUM CHLORIDE 40 MEQ: 750 TABLET, FILM COATED, EXTENDED RELEASE ORAL at 08:27

## 2020-02-26 RX ADMIN — INSULIN LISPRO 2 UNITS: 100 INJECTION, SOLUTION INTRAVENOUS; SUBCUTANEOUS at 12:19

## 2020-02-26 RX ADMIN — DAKIN'S SOLUTION 0.125% (QUARTER STRENGTH): 0.12 SOLUTION at 08:35

## 2020-02-26 RX ADMIN — FUROSEMIDE 40 MG: 40 TABLET ORAL at 08:27

## 2020-02-26 RX ADMIN — CARVEDILOL 6.25 MG: 6.25 TABLET, FILM COATED ORAL at 18:27

## 2020-02-26 RX ADMIN — INSULIN LISPRO 2 UNITS: 100 INJECTION, SOLUTION INTRAVENOUS; SUBCUTANEOUS at 21:36

## 2020-02-26 RX ADMIN — INSULIN LISPRO 2 UNITS: 100 INJECTION, SOLUTION INTRAVENOUS; SUBCUTANEOUS at 16:48

## 2020-02-26 RX ADMIN — AMLODIPINE BESYLATE 10 MG: 5 TABLET ORAL at 08:27

## 2020-02-26 RX ADMIN — DAKIN'S SOLUTION 0.125% (QUARTER STRENGTH): 0.12 SOLUTION at 18:00

## 2020-02-26 RX ADMIN — CEFEPIME HYDROCHLORIDE 2 G: 2 INJECTION, POWDER, FOR SOLUTION INTRAVENOUS at 01:41

## 2020-02-26 RX ADMIN — INSULIN LISPRO 3 UNITS: 100 INJECTION, SOLUTION INTRAVENOUS; SUBCUTANEOUS at 08:27

## 2020-02-26 RX ADMIN — PREGABALIN 150 MG: 25 CAPSULE ORAL at 18:26

## 2020-02-26 RX ADMIN — ENOXAPARIN SODIUM 40 MG: 40 INJECTION SUBCUTANEOUS at 21:36

## 2020-02-26 RX ADMIN — CEFEPIME HYDROCHLORIDE 2 G: 2 INJECTION, POWDER, FOR SOLUTION INTRAVENOUS at 21:36

## 2020-02-26 NOTE — PROGRESS NOTES
Cm spoke to pt's Parul Herr [phone] via phone and offered [facility] CM,CCM and  ARTS( addiction recovery treatment services) service. CM recommended  pt to [address] [place] [address] for OP SA services. Walk in clinic open from 8 am. [place] number [phone]. Pt doesn't have any SNF benefits under his medicaid plan. [facility] services recommend upon discharge.      Danial Salguero MSW  ED Case Manager   Cxm -[phone]

## 2020-02-26 NOTE — PROGRESS NOTES
General Surgery End of Shift Nursing Note    Bedside shift change report given to Cuco Small RN (oncoming nurse) by Allen Cooper RN (offgoing nurse). Report included the following information SBAR, Kardex, OR Summary, Procedure Summary, Intake/Output, MAR, Accordion and Recent Results. Shift worked:   7a-7p   Summary of shift:    Uneventful shift, left foot drsg chg by LPN as ordered. Issues for physician to address:   none     Number times ambulated in hallway past shift: 0    Number of times OOB to chair past shift: 1, majority of shift    Pain Management:  Current medication: none  Patient states pain is manageable on current pain medication: YES    GI:    Current diet:  DIET DIABETIC CONSISTENT CARB Regular  DIET NUTRITIONAL SUPPLEMENTS Breakfast, Lunch, Dinner; Glucerna Shake  DIET ONE TIME MESSAGE    Tolerating current diet: YES  Passing flatus: YES  Last Bowel Movement: several days ago   Appearance:     Respiratory:    Incentive Spirometer at bedside: NO  Patient instructed on use: NO    Patient Safety:    Falls Score: 2  Bed Alarm On? No  Sitter?  No    Brenda Tran RN

## 2020-02-26 NOTE — PROGRESS NOTES
I reviewed pertinent labs and imaging, and discussed /agreed on the plan of care with Dr. Baldemar Morton. Hospitalist Progress Note    NAME: Raymond Rocha   :  1965   MRN:  [id number]     History of Present Illness:  Raymond Rocha is a 49 y.o.  male with past medical history significant for polysubstance abuse including IV heroin abuse, insulin-dependent type 2 diabetes, hypertension and peripheral neuropathy who recently had a left distal phalanx of the big toe amputation done at [facility] and left AGAINST MEDICAL ADVICE 2 weeks ago, he presents with the complaints of sudden onset fever chills and shortness of breath this morning. He also reports increasing discharge from his left big toe phalanx, patient admits to injecting heroin yesterday. He is noted to be septic in the emergency department with purulent discharge from wound on the left big toe. X-ray showed osteomyelitis. Assessment / Plan:  If margins of resection clear then continue Daptomycin and cefepime for 2 weeks if not then further debridement or 6 weeks of antibiotics will be needed. Sepsis, POA d/t left great toe OM  Acute metabolic encephalopathy d/t infection  Left great toe OM  MRSA bacteremia, results from recent admission at [facility]  · Recently left [facility]  · Per pharmacy report from [facility]:  2020- BC with MRSA, susceptible to vancomycin.   2020- Toe wound with moderate gram positive cocci, beta hemolytic strep group b, susceptible to clindamycin, tetracycline, bactrim, and vancomycin; resistant to oxacillin   · Moved out of ICU yesterday; was in ICU for sedation, precedex and ativan for severe delirium   · BC NGTD  · Continue IV vancomycin and cefepime  · Complete 7 days of Flagyl; last dose today   · ECHO with EF 25-30%  · Appreciate cardiology input; stress test today/tomorrow   · S/p I & D with amputation of left great toe and resection of partial fist metatarsal head; pathology results pending   · Wound cultures with MRSA and beta hemolytic group b streptococci   · Appreciate ID input     Acute pulmonary edema, suspect cocaine related vs valvular etiology as IV drug abuse  · ECHO with EF 25-30%  · Continue IV diuretics  · Off O2  · Appreciate cardiology input   · NT pro-BNP 10,870  · 02/22/2020:  CXR- Some improvement in the pulmonary edema    Hypokalemia  · K 3.4; replace and monitor     IDDM type 2  Peripheral neuropathy r/t DM   · Hgb A1c 8.0  · BS AC&HS  · SSI   · Hx of noncompliance     Polysubstance abuse  · UDS +cocaine and opiates    HTN  · Continue amlodipine and coreg     18.5 - 24.9 Normal weight / Body mass index is 23.26 kg/m². Code status: Full  Prophylaxis: Lovenox  Recommended Disposition: TBD     Subjective:     Chief Complaint / Reason for Physician Visit  \"I feel great. \"  Discussed with RN events overnight. Review of Systems:  Symptom Y/N Comments  Symptom Y/N Comments   Fever/Chills n   Chest Pain n    Poor Appetite n   Edema     Cough    Abdominal Pain     Sputum    Joint Pain     SOB/DE LA CRUZ n   Pruritis/Rash     Nausea/vomit n   Tolerating PT/OT     Diarrhea n   Tolerating Diet y    Constipation n   Other       Could NOT obtain due to:      Objective:     VITALS:   Last 24hrs VS reviewed since prior progress note. Most recent are:  Patient Vitals for the past 24 hrs:   Temp Pulse Resp BP SpO2   02/26/20 0759 98.3 °F (36.8 °C) 100 18 (!) 153/93 96 %   02/26/20 0237 97 °F (36.1 °C) 80 16 (!) 156/96 100 %   02/26/20 0106 98.1 °F (36.7 °C) 85 16 147/81 100 %   02/25/20 1943 98.3 °F (36.8 °C) 95 16 126/76 100 %   02/25/20 1605 98.6 °F (37 °C) 87 18 161/84 98 %   02/25/20 1130 98.6 °F (37 °C) 87 18 174/88 96 %       Intake/Output Summary (Last 24 hours) at 2/26/2020 1023  Last data filed at 2/26/2020 4430  Gross per 24 hour   Intake 2190 ml   Output 750 ml   Net 1440 ml        PHYSICAL EXAM:  General: Pleasant AA male. NAD    EENT:  EOMI. Anicteric sclerae.  MMM  Resp:  CTA bilaterally, no wheezing or rales. No accessory muscle use  CV:  Regular rate rhythm,  No edema  GI:  Soft, Non distended, Non tender.  +Bowel sounds  Neurologic:  Alert and oriented X 3, normal speech,   Psych:   Good insight. Not anxious nor agitated  Skin:  No rashes. No jaundice. Picture of amputation site below             Reviewed most current lab test results and cultures  YES  Reviewed most current radiology test results   YES  Review and summation of old records today    NO  Reviewed patient's current orders and MAR    YES  PMH/SH reviewed - no change compared to H&P  ________________________________________________________________________  Care Plan discussed with:    Comments   Patient x    Family      RN x    Care Manager x    Consultant                        Multidiciplinary team rounds were held today with , nursing, pharmacist and clinical coordinator. Patient's plan of care was discussed; medications were reviewed and discharge planning was addressed. ________________________________________________________________________  Total NON critical care TIME:  25   Minutes    Total CRITICAL CARE TIME Spent:   Minutes non procedure based      Comments   >50% of visit spent in counseling and coordination of care     ________________________________________________________________________  Luis Felipe Morales NP     Procedures: see electronic medical records for all procedures/Xrays and details which were not copied into this note but were reviewed prior to creation of Plan. LABS:  I reviewed today's most current labs and imaging studies.   Pertinent labs include:  Recent Labs     02/26/20  0347 02/24/20  0536   WBC 12.5* 10.2   HGB 10.8* 10.9*   HCT 34.0* 33.0*   * 334     Recent Labs     02/26/20  0347 02/25/20  0614 02/24/20  0536   * 137 136   K 3.4* 3.4* 3.1*    107 105   CO2 27 25 25   * 161* 193*   BUN 20 21* 25*   CREA 1.26 1.29 1.38*   CA 7.9* 7.8* 8.1*   MG  --   --  2.0 PHOS  --   --  2.5*   ALB  --   --  1.1*   TBILI  --   --  0.3   SGOT  --   --  32   ALT  --   --  <6*       Signed: Hunt Peabody, NP

## 2020-02-26 NOTE — PROGRESS NOTES
23:00- Received oncoming report from Humberto Herman RN    07:30-  General Surgery End of Shift Nursing Note    Bedside shift change report given to Jose Oden RN (oncoming nurse) by Arthur Zhang RN   (offgoing nurse). Report included the following information SBAR, Kardex, Intake/Output, MAR and Recent Results.     Shift worked:   11p-7a   Summary of shift:    Uneventful   Issues for physician to address:        Arthur Zhang RN

## 2020-02-26 NOTE — PROGRESS NOTES
DALIA:    Medication Price Check  Home with family    UPDATE: 11:14AM    CM informed by [facility] that pt will need prior authorization for medication before d/c. CM will inform MD and NP of the following. KATY Rocha,          CM staffed case with NP and was informed that pt will be d/c PO vs with IV. CM informed that pt's scripts will need to be priced checked for medications. CM sent scripts to the [facility], via fax: [phone]. CM aware that pt has [employer]  Once medications are verified, CM will inform NP and pt of the following. CM will continue to follow.     KATY Rocha, [address] [place] [address]

## 2020-02-26 NOTE — PROGRESS NOTES
Pharmacy Automatic Renal Dosing Protocol - Antimicrobials    Indication for Antimicrobials: bone and joint infection, bloodstream infection    Current Regimen of Each Antimicrobial:  Daptomycin 8 mg/kg every 24 hours (Start Date ; Day # 3)  Cefepime 2 g IV every 12 hr (start date: , Day # 8)  Metronidazole 500 mg q12h (start date: , Day # 7)    Previous Antimicrobial Therapy:  Vancomycin 1000 mg IV every 16 hours (Start Date ; Day # 6)    Goal Level: VANCOMYCIN TROUGH GOAL RANGE  Vancomycin Trough: 15 - 20 mcg/mL  (AUC: 400 - 600 mg/hr/Liter/day)     Date Dose & Interval Measured (mcg/mL) Extrapolated (mcg/mL)    1200 mg q12h 19.9 23.2    @ 0335 1 g every 16 hours 18.7 15.3           Date & time of next level:     Significant Cultures:   [facility] HS Cultures:  : blood - methicillin resistant staph aureous - susceptible to vancomycin   : wound (toe) - Moderate gram positive cocci, beta hemolytic strep group B - susceptible to clindamycin, tetracycline, bactrim, and vancomycin - resistant to oxacillin    [facility] Cultures:  : blood - NG - prelim  : wound (foot)- Heavy MRSA, heavy streptococci beta hemolytic group B, few enterobacter - final  : Wound: checking for possible scant growth- rare mrsa ans strep    Radiology / Imaging results: (X-ray, CT scan or MRI):   : xray left foot: Great toe osteomyelitis.   : chest xray: pulmonary edema  : chest xray: Interval worsening of congestive HF with moderate pulmonary edema    Paralysis, amputations, malnutrition: none    Labs:  Recent Labs     20  0347 20  0614 20  0536   CREA 1.26 1.29 1.38*   BUN 20 21* 25*   WBC 12.5*  --  10.2     Temp (24hrs), Av.2 °F (36.8 °C), Min:97 °F (36.1 °C), Max:98.6 °F (37 °C)    Creatinine Clearance (mL/min) or Dialysis: 65 ml/min    Impression/Plan:   · Cefepime 2 gm every 8 hours for improved SCr  · Coninue metronidazole 500 mg every 12 hours  · Daptomycin dose rounded to 600 mg IV every 24 hours. CK baseline and weekly thereafter  · Antimicrobial stop date to be determined - I&D + amputation complete - ID consulted     Pharmacy will follow daily and adjust medications as appropriate for renal function and/or serum levels.     Thank you,  Apryl Burgess, PHARMD

## 2020-02-26 NOTE — PROGRESS NOTES
Cardiology Progress Note      2/26/2020 9:30AM    Admit Date: 2/19/2020    Admit Diagnosis: Severe sepsis ([facility] [address] [A41.9, R65.20]      Subjective:     Lizz West is A&O. Long discussion reference possible heart damage for use of cocaine and heroin. Plan for nuclear lexiscan stress study today/tomorrow.       Visit Vitals  BP (!) 153/93 Comment: RN notified    Pulse 100   Temp 98.3 °F (36.8 °C)   Resp 18   Ht 5' 8\" (1.727 m)   Wt 69.4 kg (153 lb)   SpO2 96%   BMI 23.26 kg/m²       Current Facility-Administered Medications   Medication Dose Route Frequency    [START ON 2/27/2020] lisinopril (PRINIVIL, ZESTRIL) tablet 10 mg  10 mg Oral DAILY    lisinopril (PRINIVIL, ZESTRIL) tablet 7.5 mg  7.5 mg Oral ONCE    LORazepam (ATIVAN) injection 2 mg  2 mg IntraVENous Q6H PRN    furosemide (LASIX) tablet 40 mg  40 mg Oral DAILY    carvediloL (COREG) tablet 6.25 mg  6.25 mg Oral BID    amLODIPine (NORVASC) tablet 10 mg  10 mg Oral DAILY    hydrALAZINE (APRESOLINE) 20 mg/mL injection 10 mg  10 mg IntraVENous Q6H PRN    DAPTOmycin (CUBICIN) 600 mg in 0.9% sodium chloride 50 mL IVPB  600 mg IntraVENous Q24H    cefepime (MAXIPIME) 2 g in 0.9% sodium chloride (MBP/ADV) 100 mL  2 g IntraVENous Q12H    alcohol 62% (NOZIN) nasal  1 Ampule  1 Ampule Topical Q12H    metroNIDAZOLE (FLAGYL) IVPB premix 500 mg  500 mg IntraVENous Q12H    influenza vaccine 2019-20 (6 mos+)(PF) (FLUARIX/FLULAVAL/FLUZONE QUAD) injection 0.5 mL  0.5 mL IntraMUSCular PRIOR TO DISCHARGE    LORazepam (ATIVAN) injection 2 mg  2 mg IntraVENous Q3H PRN    acetaminophen (TYLENOL) tablet 650 mg  650 mg Oral Q6H PRN    [Held by provider] pregabalin (LYRICA) capsule 150 mg  150 mg Oral BID    sodium chloride (NS) flush 5-10 mL  5-10 mL IntraVENous PRN    insulin lispro (HUMALOG) injection   SubCUTAneous AC&HS    glucose chewable tablet 16 g  4 Tab Oral PRN    dextrose (D50W) injection syrg 12.5-25 g  12.5-25 g IntraVENous PRN    glucagon (GLUCAGEN) injection 1 mg  1 mg IntraMUSCular PRN    enoxaparin (LOVENOX) injection 40 mg  40 mg SubCUTAneous Q24H    sodium hypochlorite (QUARTER STRENGTH DAKIN'S) 0.125% irrigation (bottle)   Topical BID         Objective:      Physical Exam:  Visit Vitals  BP (!) 153/93 Comment: RN notified    Pulse 100   Temp 98.3 °F (36.8 °C)   Resp 18   Ht 5' 8\" (1.727 m)   Wt 69.4 kg (153 lb)   SpO2 96%   BMI 23.26 kg/m²     General Appearance:  Well developed, well nourished,alert and oriented x 3, and individual in no acute distress. Neck: Supple. Chest:   Lungs clear to auscultation bilaterally. Cardiovascular:  Regular rate and rhythm, S1, S2 normal, no murmur. Abdomen:   Soft, non-tender, bowel sounds are active. Extremities: No edema bilaterally. Skin: Warm and dry. Data Review:   Labs:    Recent Results (from the past 24 hour(s))   GLUCOSE, POC    Collection Time: 02/25/20 11:24 AM   Result Value Ref Range    Glucose (POC) 268 (H) 65 - 100 mg/dL    Performed by Esther Dial (PCT)    HEPATITIS PANEL, ACUTE    Collection Time: 02/25/20  1:53 PM   Result Value Ref Range    Hepatitis A, IgM NONREACTIVE NR      __          Hepatitis B surface Ag <0.10 Index    Hep B surface Ag Interp. NEGATIVE  NEG      __          Hepatitis B core, IgM NONREACTIVE NR      __          Hepatitis C virus Ab >11.00 Index    Hep C  virus Ab Interp. REACTIVE (A) NR      Hep C  virus Ab comment Method used is Flowboard     GLUCOSE, POC    Collection Time: 02/25/20  4:40 PM   Result Value Ref Range    Glucose (POC) 279 (H) 65 - 100 mg/dL    Performed by Esther Dial (PCT)    GLUCOSE, POC    Collection Time: 02/25/20 10:18 PM   Result Value Ref Range    Glucose (POC) 161 (H) 65 - 100 mg/dL    Performed by Rowena SOTO(CON)    CBC W/O DIFF    Collection Time: 02/26/20  3:47 AM   Result Value Ref Range    WBC 12.5 (H) 4.1 - 11.1 K/uL    RBC 4.20 4. 10 - 5.70 M/uL    HGB 10.8 (L) 12.1 - 17.0 g/dL HCT 34.0 (L) 36.6 - 50.3 %    MCV 81.0 80.0 - 99.0 FL    MCH 25.7 (L) 26.0 - 34.0 PG    MCHC 31.8 30.0 - 36.5 g/dL    RDW 14.2 11.5 - 14.5 %    PLATELET 570 (H) 429 - 400 K/uL    MPV 10.4 8.9 - 12.9 FL    NRBC 0.0 0  WBC    ABSOLUTE NRBC 0.00 0.00 - 1.75 K/uL   METABOLIC PANEL, BASIC    Collection Time: 02/26/20  3:47 AM   Result Value Ref Range    Sodium 134 (L) 136 - 145 mmol/L    Potassium 3.4 (L) 3.5 - 5.1 mmol/L    Chloride 102 97 - 108 mmol/L    CO2 27 21 - 32 mmol/L    Anion gap 5 5 - 15 mmol/L    Glucose 244 (H) 65 - 100 mg/dL    BUN 20 6 - 20 MG/DL    Creatinine 1.26 0.70 - 1.30 MG/DL    BUN/Creatinine ratio 16 12 - 20      GFR est AA >60 >60 ml/min/1.73m2    GFR est non-AA 60 (L) >60 ml/min/1.73m2    Calcium 7.9 (L) 8.5 - 10.1 MG/DL   GLUCOSE, POC    Collection Time: 02/26/20  7:13 AM   Result Value Ref Range    Glucose (POC) 233 (H) 65 - 100 mg/dL    Performed by Ontodia (PCT)        Telemetry: normal sinus rhythm      Assessment:     Active Problems:    Severe sepsis ([facility] [address] (2/19/2020)      Cardiomyopathy ([facility] [address] (2/21/2020)      Acute systolic heart failure ([facility] [address] (2/21/2020)      Substance abuse ([facility] [address] (2/21/2020)        Plan:     Cardiomyopathy with acute systolic heart failure: (EF 45% by JAIME 2/23/2020)  Continue with diuretics, CR mildly elevated, trending down. Supplement K+  Monitor I/Os, daily weights and labs. Neg 8L, weights not accurate, bed weights  Continue BB increasing for BP control, lisinopril  Ischemic evaluation with nuclear navin study. Sepsis/bacteremia:  JAIME negative for endocarditis      Yuli Cain NP       [address] [facility] Cardiology    2/26/2020         Patient seen, examined by me personally. Plan discussed as detailed. Agree with note as outlined by  NP. I confirm findings in history and physical exam. No additional findings noted. Agree with plan as outlined above.      July Smith MD

## 2020-02-26 NOTE — PROGRESS NOTES
I reviewed the pathology report for the more proximal aspect first metatarsal head of the surgical resection site left foot, he looks much better, obviously,  than the proximal phalanx, but still has area mentioned as 'patchy osteomyelitis', not full osteomyelitis by any means. No malaise, may require further resection, clinically he looks excellent and much improved. However, WBCs now back to 12. I will round on the patient this evening.  I have been in communication with the patient's family ulcer

## 2020-02-26 NOTE — PROGRESS NOTES
Patient seen today, family present. He is alert I. x3, very cooperative. I discussed the pathology biopsy showing the patchy osteomyelitis, and that is a good idea to get back in there, make a dorsal incision and leave the other incision along, and resect more bone to get to viable bony get a viable bone section, I do not think this would be a large piece of bone but we want to make sure we get enough. This is discussed with the patient and the patient's family, he understands totally, and is fully agreeable. The risks and possible complications are again discussed, as well as the reasoning behind the procedure and the importance of following the pathology report. They understand fully, he is fully agreeable, family is fully agreeable. He has a nuclear stress test tomorrow, and he can stay n.p.o. tomorrow and we can the procedure later tomorrow late afternoon after clinic, in the PM after 5. However, I am not sure if he can handle 2 anesthesias and one-day, I will wait and discuss this tomorrow. It may be better to wait till Friday, unless he can handle anesthesia later in the evening also. This is discussed and he agrees also to this.
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
## 2020-02-26 NOTE — PROGRESS NOTES
Hospitalist Progress Note    NAME: Altamese Apley   :  1965   MRN:  [id number]     Assessment / Plan:  Sepsis  POA due to left great toe osteomyelitis  Acute Metabolic Encephalopathy due to infection  Due to Left Great Toe Osteomyelitis  MRSA bacteremia recently at [facility]  Per Pharmacy report  At [facility],   : blood - methicillin resistant staph aureous - susceptible to vancomycin   2: wound (toe) - Moderate gram positive cocci, beta hemolytic strep group B - susceptible to clindamycin, tetracycline, bactrim, and vancomycin - resistant to oxacillin  Managed in ICU due to sedation  Blood cultures here negative so far  Pt signed AMA at [facility] per report recently  Awaiting sensitivities report from [facility]  Continue IV Vancomycin, Cefepime and Flagyl  2D echo with EF 25%-30% and MR and IV drug use. Appreciate cardiology help with JAIME which is negative  Blood Cultures here negative  S/p Incision and drainage with amputation of great toe left foot, submitted to Pathology for identification, also resection of partial first metatarsal head for submission as a viable proximal bone resection, left foot this admission  S/p  Precedex and ativan due to severe delirium  Wound cultures here with MRSA  Appreciate recommendations from ID. Follow final culture results. Antibiotic adjustments per ID    Acute Pulmonary Edema suspect cocaine related vs valvular etiology as IV drug abuse  2D echo with 25-30% EF  Continue IV diuretics  Monitor fluid status  Wean o2 as able to  Cardiology is following. Check BMP in a.m. Hypokalemia  K replaced today. Recheck in a.m.     Insulin-dependent diabetes  Noncompliance with insulin  Hemoglobin A1c 8  SSI     Polysubstance Abuse  Urine tox screen is positive for cocaine, heroin and methadone       Peripheral neuropathy  Holding meds due to sedation    Hypertension  Stop clonidine patch that was started during hospitalization  Continue amlodipine.   Continue Coreg.        Code Status: Full code  Surrogate Decision Maker: Girlfriend Josi Andrade([phone])     DVT Prophylaxis: lovenox  GI Prophylaxis: not indicated     Baseline: Functional, IV drug abuse                 Subjective:    Continues to have foot pain. No fever. No chills. No diarrhea. Transfer out of ICU    CHIEF COMPLAINT: f/u \"Fever chills and shortness of breath\"           Objective:     VITALS:   Last 24hrs VS reviewed since prior progress note. Most recent are:  Patient Vitals for the past 24 hrs:   Temp Pulse Resp BP SpO2   02/25/20 1943 98.3 °F (36.8 °C) 95 16 126/76 100 %   02/25/20 1605 98.6 °F (37 °C) 87 18 161/84 98 %   02/25/20 1130 98.6 °F (37 °C) 87 18 174/88 96 %   02/25/20 0954 97.7 °F (36.5 °C) 97 20 (!) 167/97 98 %   02/25/20 0900 -- -- -- 143/78 --   02/25/20 0800 98.3 °F (36.8 °C) (!) 103 24 144/85 96 %   02/25/20 0700 -- 98 22 148/81 96 %   02/25/20 0600 -- 95 17 148/87 97 %   02/25/20 0500 -- 94 20 139/81 95 %   02/25/20 0400 -- 91 19 130/78 97 %   02/25/20 0300 -- 92 17 133/70 98 %   02/25/20 0200 -- 91 15 132/73 97 %   02/25/20 0130 -- 100 14 153/65 98 %   02/25/20 0100 -- 91 22 155/79 96 %   02/25/20 0030 -- 84 18 128/62 98 %   02/25/20 0000 98.5 °F (36.9 °C) 86 24 117/63 97 %   02/24/20 2330 -- 86 17 119/63 96 %   02/24/20 2300 -- 82 22 116/59 97 %   02/24/20 2230 -- 86 18 127/72 99 %   02/24/20 2200 -- 83 22 120/61 98 %       Intake/Output Summary (Last 24 hours) at 2/25/2020 2155  Last data filed at 2/25/2020 0954  Gross per 24 hour   Intake 369.41 ml   Output 1250 ml   Net -880.59 ml        PHYSICAL EXAM:  General: no acute distress    EENT:  EOMI. Anicteric sclerae. MMM  Resp:  CTA bilaterally, no wheezing or rales. No accessory muscle use  CV:  Regular  rhythm,  No edema  GI:  Soft, Non distended, Non tender.  +Bowel sounds  Neurologic:  Alert and awake  Psych:   Pleasant  Skin:  Left Great Toe Ulcer.   No jaundice    Reviewed most current lab test results and cultures  YES  Reviewed most current radiology test results   YES  Review and summation of old records today    NO  Reviewed patient's current orders and MAR    YES  PMH/SH reviewed - no change compared to H&P  ________________________________________________________________________  Care Plan discussed with:    Comments   Patient y    Family      RN y    Care Manager     Consultant                        Multidiciplinary team rounds were held today with , nursing, pharmacist and clinical coordinator. Patient's plan of care was discussed; medications were reviewed and discharge planning was addressed. ________________________________________________________________________  Total NON critical care TIME: 30 Minutes    Total CRITICAL CARE TIME Spent:   Minutes non procedure based      Comments   >50% of visit spent in counseling and coordination of care     ________________________________________________________________________  Darcy Barr MD     Procedures: see electronic medical records for all procedures/Xrays and details which were not copied into this note but were reviewed prior to creation of Plan. LABS:  I reviewed today's most current labs and imaging studies.   Pertinent labs include:  Recent Labs     02/24/20  0536 02/23/20  0335   WBC 10.2 10.3   HGB 10.9* 11.2*   HCT 33.0* 33.9*    404*     Recent Labs     02/25/20  0614 02/24/20  0536 02/23/20  0335    136 140   K 3.4* 3.1* 2.6*    105 106   CO2 25 25 26   * 193* 115*   BUN 21* 25* 28*   CREA 1.29 1.38* 1.33*   CA 7.8* 8.1* 8.2*   MG  --  2.0 1.8   PHOS  --  2.5* 2.8   ALB  --  1.1* 1.1*   TBILI  --  0.3 0.4   SGOT  --  32 29   ALT  --  <6* <6*       Signed: Darcy Barr MD

## 2020-02-27 ENCOUNTER — HOSPITAL ENCOUNTER (OUTPATIENT)
Dept: NUCLEAR MEDICINE | Age: 55
Discharge: HOME | DRG: 710 | End: 2020-02-27
Attending: NURSE PRACTITIONER
Payer: MEDICAID

## 2020-02-27 ENCOUNTER — ANESTHESIA EVENT (OUTPATIENT)
Dept: SURGERY | Age: 55
DRG: 710 | End: 2020-02-27
Payer: MEDICAID

## 2020-02-27 LAB
ANION GAP SERPL CALC-SCNC: 2 MMOL/L (ref 5–15)
BUN SERPL-MCNC: 22 MG/DL (ref 6–20)
BUN/CREAT SERPL: 17 (ref 12–20)
CALCIUM SERPL-MCNC: 8.1 MG/DL (ref 8.5–10.1)
CHLORIDE SERPL-SCNC: 106 MMOL/L (ref 97–108)
CO2 SERPL-SCNC: 26 MMOL/L (ref 21–32)
CREAT SERPL-MCNC: 1.27 MG/DL (ref 0.7–1.3)
ERYTHROCYTE [DISTWIDTH] IN BLOOD BY AUTOMATED COUNT: 14.2 % (ref 11.5–14.5)
GLUCOSE BLD STRIP.AUTO-MCNC: 121 MG/DL (ref 65–100)
GLUCOSE BLD STRIP.AUTO-MCNC: 148 MG/DL (ref 65–100)
GLUCOSE BLD STRIP.AUTO-MCNC: 250 MG/DL (ref 65–100)
GLUCOSE BLD STRIP.AUTO-MCNC: 280 MG/DL (ref 65–100)
GLUCOSE SERPL-MCNC: 197 MG/DL (ref 65–100)
HCT VFR BLD AUTO: 33.2 % (ref 36.6–50.3)
HGB BLD-MCNC: 11 G/DL (ref 12.1–17)
MCH RBC QN AUTO: 26.1 PG (ref 26–34)
MCHC RBC AUTO-ENTMCNC: 33.1 G/DL (ref 30–36.5)
MCV RBC AUTO: 78.7 FL (ref 80–99)
NRBC # BLD: 0 K/UL (ref 0–0.01)
NRBC BLD-RTO: 0 PER 100 WBC
PLATELET # BLD AUTO: 397 K/UL (ref 150–400)
PMV BLD AUTO: 10.2 FL (ref 8.9–12.9)
POTASSIUM SERPL-SCNC: 3.8 MMOL/L (ref 3.5–5.1)
RBC # BLD AUTO: 4.22 M/UL (ref 4.1–5.7)
SERVICE CMNT-IMP: ABNORMAL
SODIUM SERPL-SCNC: 134 MMOL/L (ref 136–145)
WBC # BLD AUTO: 11 K/UL (ref 4.1–11.1)

## 2020-02-27 PROCEDURE — 85027 COMPLETE CBC AUTOMATED: CPT

## 2020-02-27 PROCEDURE — 74011250636 HC RX REV CODE- 250/636: Performed by: INTERNAL MEDICINE

## 2020-02-27 PROCEDURE — 74011000258 HC RX REV CODE- 258: Performed by: INTERNAL MEDICINE

## 2020-02-27 PROCEDURE — 74011250637 HC RX REV CODE- 250/637: Performed by: INTERNAL MEDICINE

## 2020-02-27 PROCEDURE — 36415 COLL VENOUS BLD VENIPUNCTURE: CPT

## 2020-02-27 PROCEDURE — 80048 BASIC METABOLIC PNL TOTAL CA: CPT

## 2020-02-27 PROCEDURE — 74011636637 HC RX REV CODE- 636/637: Performed by: INTERNAL MEDICINE

## 2020-02-27 PROCEDURE — 74011250637 HC RX REV CODE- 250/637: Performed by: NURSE PRACTITIONER

## 2020-02-27 PROCEDURE — 94760 N-INVAS EAR/PLS OXIMETRY 1: CPT

## 2020-02-27 PROCEDURE — 65270000029 HC RM PRIVATE

## 2020-02-27 PROCEDURE — 82962 GLUCOSE BLOOD TEST: CPT

## 2020-02-27 RX ADMIN — PREGABALIN 50 MG: 25 CAPSULE ORAL at 18:00

## 2020-02-27 RX ADMIN — INSULIN LISPRO 5 UNITS: 100 INJECTION, SOLUTION INTRAVENOUS; SUBCUTANEOUS at 16:30

## 2020-02-27 RX ADMIN — LISINOPRIL 10 MG: 10 TABLET ORAL at 09:20

## 2020-02-27 RX ADMIN — PREGABALIN 150 MG: 25 CAPSULE ORAL at 09:20

## 2020-02-27 RX ADMIN — DAPTOMYCIN 600 MG: 500 INJECTION, POWDER, LYOPHILIZED, FOR SOLUTION INTRAVENOUS at 20:00

## 2020-02-27 RX ADMIN — LORAZEPAM 2 MG: 2 INJECTION INTRAMUSCULAR; INTRAVENOUS at 16:06

## 2020-02-27 RX ADMIN — DAKIN'S SOLUTION 0.125% (QUARTER STRENGTH): 0.12 SOLUTION at 17:54

## 2020-02-27 RX ADMIN — CEFEPIME HYDROCHLORIDE 2 G: 2 INJECTION, POWDER, FOR SOLUTION INTRAVENOUS at 06:00

## 2020-02-27 RX ADMIN — PREGABALIN 100 MG: 25 CAPSULE ORAL at 17:47

## 2020-02-27 RX ADMIN — INSULIN LISPRO 2 UNITS: 100 INJECTION, SOLUTION INTRAVENOUS; SUBCUTANEOUS at 22:19

## 2020-02-27 RX ADMIN — ENOXAPARIN SODIUM 40 MG: 40 INJECTION SUBCUTANEOUS at 21:19

## 2020-02-27 RX ADMIN — CEFEPIME HYDROCHLORIDE 2 G: 2 INJECTION, POWDER, FOR SOLUTION INTRAVENOUS at 22:06

## 2020-02-27 RX ADMIN — CARVEDILOL 6.25 MG: 6.25 TABLET, FILM COATED ORAL at 09:20

## 2020-02-27 RX ADMIN — AMLODIPINE BESYLATE 10 MG: 5 TABLET ORAL at 09:20

## 2020-02-27 RX ADMIN — FUROSEMIDE 40 MG: 40 TABLET ORAL at 09:20

## 2020-02-27 RX ADMIN — CARVEDILOL 6.25 MG: 6.25 TABLET, FILM COATED ORAL at 17:47

## 2020-02-27 RX ADMIN — INSULIN LISPRO 3 UNITS: 100 INJECTION, SOLUTION INTRAVENOUS; SUBCUTANEOUS at 09:20

## 2020-02-27 RX ADMIN — CEFEPIME HYDROCHLORIDE 2 G: 2 INJECTION, POWDER, FOR SOLUTION INTRAVENOUS at 13:53

## 2020-02-27 NOTE — PROGRESS NOTES
Cardiology Progress Note      2/27/2020 9:30AM    Admit Date: 2/19/2020    Admit Diagnosis: [facility] [A41.9, R65.20]      Subjective:     Karina lEizalde is A&O. Received resting nuclear stress test yesterday and was to complete stress portion today, but ate breakfast, even though he knew he should be fasting. Will attempt tomorrow.         Visit Vitals  /88 (BP 1 Location: Left arm, BP Patient Position: At rest)   Pulse 98   Temp 98.4 °F (36.9 °C)   Resp 18   Ht 5' 8\" (1.727 m)   Wt 69.4 kg (153 lb)   SpO2 100%   BMI 23.26 kg/m²       Current Facility-Administered Medications   Medication Dose Route Frequency    lisinopril (PRINIVIL, ZESTRIL) tablet 10 mg  10 mg Oral DAILY    cefepime (MAXIPIME) 2 g in 0.9% sodium chloride (MBP/ADV) 100 mL  2 g IntraVENous Q8H    LORazepam (ATIVAN) injection 2 mg  2 mg IntraVENous Q6H PRN    furosemide (LASIX) tablet 40 mg  40 mg Oral DAILY    carvediloL (COREG) tablet 6.25 mg  6.25 mg Oral BID    amLODIPine (NORVASC) tablet 10 mg  10 mg Oral DAILY    hydrALAZINE (APRESOLINE) 20 mg/mL injection 10 mg  10 mg IntraVENous Q6H PRN    DAPTOmycin (CUBICIN) 600 mg in 0.9% sodium chloride 50 mL IVPB  600 mg IntraVENous Q24H    alcohol 62% (NOZIN) nasal  1 Ampule  1 Ampule Topical Q12H    influenza vaccine 2019-20 (6 mos+)(PF) (FLUARIX/FLULAVAL/FLUZONE QUAD) injection 0.5 mL  0.5 mL IntraMUSCular PRIOR TO DISCHARGE    acetaminophen (TYLENOL) tablet 650 mg  650 mg Oral Q6H PRN    pregabalin (LYRICA) capsule 150 mg  150 mg Oral BID    sodium chloride (NS) flush 5-10 mL  5-10 mL IntraVENous PRN    insulin lispro (HUMALOG) injection   SubCUTAneous AC&HS    glucose chewable tablet 16 g  4 Tab Oral PRN    dextrose (D50W) injection syrg 12.5-25 g  12.5-25 g IntraVENous PRN    glucagon (GLUCAGEN) injection 1 mg  1 mg IntraMUSCular PRN    enoxaparin (LOVENOX) injection 40 mg  40 mg SubCUTAneous Q24H    sodium hypochlorite (QUARTER STRENGTH DAKIN'S) 0.125% irrigation (bottle)   Topical BID         Objective:      Physical Exam:  Visit Vitals  /88 (BP 1 Location: Left arm, BP Patient Position: At rest)   Pulse 98   Temp 98.4 °F (36.9 °C)   Resp 18   Ht 5' 8\" (1.727 m)   Wt 69.4 kg (153 lb)   SpO2 100%   BMI 23.26 kg/m²     General Appearance:  Well developed, well nourished,alert and oriented x 3, and individual in no acute distress. Neck: Supple. Chest:   Lungs clear to auscultation bilaterally. Cardiovascular:  Regular rate and rhythm, S1, S2 normal, no murmur. Abdomen:   Soft, non-tender, bowel sounds are active. Extremities: No edema bilaterally. Skin: Warm and dry.              Data Review:   Labs:    Recent Results (from the past 24 hour(s))   GLUCOSE, POC    Collection Time: 02/26/20 11:44 AM   Result Value Ref Range    Glucose (POC) 188 (H) 65 - 100 mg/dL    Performed by Chinyere May LPN    NUCLEAR CARDIAC STRESS TEST    Collection Time: 02/26/20 12:07 PM   Result Value Ref Range    Target  bpm   GLUCOSE, POC    Collection Time: 02/26/20  4:22 PM   Result Value Ref Range    Glucose (POC) 199 (H) 65 - 100 mg/dL    Performed by Marisol SOTO(CON)    GLUCOSE, POC    Collection Time: 02/26/20  9:26 PM   Result Value Ref Range    Glucose (POC) 216 (H) 65 - 100 mg/dL    Performed by Marisol SOTO(CON)    CBC W/O DIFF    Collection Time: 02/27/20  3:46 AM   Result Value Ref Range    WBC 11.0 4.1 - 11.1 K/uL    RBC 4.22 4.10 - 5.70 M/uL    HGB 11.0 (L) 12.1 - 17.0 g/dL    HCT 33.2 (L) 36.6 - 50.3 %    MCV 78.7 (L) 80.0 - 99.0 FL    MCH 26.1 26.0 - 34.0 PG    MCHC 33.1 30.0 - 36.5 g/dL    RDW 14.2 11.5 - 14.5 %    PLATELET 837 512 - 784 K/uL    MPV 10.2 8.9 - 12.9 FL    NRBC 0.0 0  WBC    ABSOLUTE NRBC 0.00 0.00 - 4.63 K/uL   METABOLIC PANEL, BASIC    Collection Time: 02/27/20  3:46 AM   Result Value Ref Range    Sodium 134 (L) 136 - 145 mmol/L    Potassium 3.8 3.5 - 5.1 mmol/L    Chloride 106 97 - 108 mmol/L    CO2 26 21 - 32 mmol/L    Anion gap 2 (L) 5 - 15 mmol/L    Glucose 197 (H) 65 - 100 mg/dL    BUN 22 (H) 6 - 20 MG/DL    Creatinine 1.27 0.70 - 1.30 MG/DL    BUN/Creatinine ratio 17 12 - 20      GFR est AA >60 >60 ml/min/1.73m2    GFR est non-AA 59 (L) >60 ml/min/1.73m2    Calcium 8.1 (L) 8.5 - 10.1 MG/DL       Telemetry: normal sinus rhythm      Assessment:     Active Problems:    Severe sepsis (HCC) (2/19/2020)      Cardiomyopathy ([facility] (2/21/2020)      Acute systolic heart failure ([facility] (2/21/2020)      Substance abuse ([facility] (2/21/2020)        Plan:     Cardiomyopathy with acute systolic heart failure: (EF 45% by JAIME 2/23/2020)  Continue with diuretics, CR mildly elevated. Supplement K+  Monitor I/Os, daily weights and labs. Neg 7L, weights not accurate, bed weights  Continue BB increasing for BP control, lisinopril  Ischemic evaluation with nuclear navin study to continue tomorrow if patient is compliant with diet/NPO orders    Sepsis/bacteremia:  JAIME negative for endocarditis      Natalia Pichardo NP       [facility]    2/27/2020         Patient seen, examined by me personally. Plan discussed as detailed. Agree with note as outlined by  NP. I confirm findings in history and physical exam. No additional findings noted. Agree with plan as outlined above.      Nova Martin MD

## 2020-02-27 NOTE — PROGRESS NOTES
I reviewed pertinent labs and imaging, and discussed /agreed on the plan of care with Dr. Rivera Walton. Hospitalist Progress Note    NAME: Giuliana Murdock   :  1965   MRN:  [id number]     History of Present Illness:  Giuliana Murdock is a 49 y.o.  male with past medical history significant for polysubstance abuse including IV heroin abuse, insulin-dependent type 2 diabetes, hypertension and peripheral neuropathy who recently had a left distal phalanx of the big toe amputation done at [facility] and left AGAINST MEDICAL ADVICE 2 weeks ago, he presents with the complaints of sudden onset fever chills and shortness of breath this morning. He also reports increasing discharge from his left big toe phalanx, patient admits to injecting heroin yesterday. He is noted to be septic in the emergency department with purulent discharge from wound on the left big toe. X-ray showed osteomyelitis. Assessment / Plan:  Patient ate breakfast, was suppose to be fasting. Plan for stress and resection on Friday     Sepsis, POA d/t left great toe OM  Acute metabolic encephalopathy d/t infection  Left great toe OM  MRSA bacteremia, results from recent admission at [facility]  · Recently left [facility] AMA  · Per pharmacy report from [facility]:  2020- BC with MRSA, susceptible to vancomycin. 2020- Toe wound with moderate gram positive cocci, beta hemolytic strep group b, susceptible to clindamycin, tetracycline, bactrim, and vancomycin; resistant to oxacillin   · Moved out of ICU 2020; was in ICU for sedation, precedex and ativan for severe delirium   · BC NGTD  · Continue IV vancomycin and cefepime  · Completed 7 days of Flagyl   · ECHO with EF 25-30%  · Appreciate cardiology input; stress test was scheduled for today however patient ate breakfast knowing he should have been fasting. Will attempt stress test tomorrow.    · S/p I & D with amputation of left great toe and resection of partial fist metatarsal head  · Final pathologic results: 1. Viable bone margin 1st metatarsal:  Acute chondritis and patchy osteomyelitis  2. Necrotic great toe of left foot, amputation: Cutaneous ulcer with suppurative cellulitis, abscess and osteomyelitis  · Plan for further resection with podiatry on Friday    · Wound cultures with MRSA and beta hemolytic group b streptococci   · Appreciate ID input     Acute pulmonary edema, suspect cocaine related vs valvular etiology as IV drug abuse  · ECHO with EF 25-30%  · Continue IV diuretics  · Off O2  · Appreciate cardiology input   · NT pro-BNP 10,870  · 02/22/2020:  CXR- Some improvement in the pulmonary edema    Hypokalemia- resolved    IDDM type 2  Peripheral neuropathy r/t DM   · Hgb A1c 8.0  · BS AC&HS  · SSI   · Hx of noncompliance     Polysubstance abuse  · UDS +cocaine and opiates    HTN  · Continue amlodipine and coreg     18.5 - 24.9 Normal weight / Body mass index is 23.26 kg/m². Code status: Full  Prophylaxis: Lovenox  Recommended Disposition: TBD     Subjective:     Chief Complaint / Reason for Physician Visit  Patient ate breakfast.  Unable to complete stress test or resection today. Both scheduled for tomorrow. No other new complaints. Discussed with RN events overnight. Review of Systems:  Symptom Y/N Comments  Symptom Y/N Comments   Fever/Chills n   Chest Pain n    Poor Appetite n   Edema     Cough    Abdominal Pain     Sputum    Joint Pain     SOB/DE LA CRUZ n   Pruritis/Rash     Nausea/vomit n   Tolerating PT/OT     Diarrhea n   Tolerating Diet y    Constipation n   Other       Could NOT obtain due to:      Objective:     VITALS:   Last 24hrs VS reviewed since prior progress note.  Most recent are:  Patient Vitals for the past 24 hrs:   Temp Pulse Resp BP SpO2   02/27/20 0413 98.4 °F (36.9 °C) 98 18 148/88 100 %   02/26/20 2348 98.3 °F (36.8 °C) 89 18 127/81 100 %   02/26/20 1934 98.4 °F (36.9 °C) 91 12 156/90 100 %   02/26/20 1501 98.9 °F (37.2 °C) 99 18 147/89 97 %   02/26/20 1149 98.6 °F (37 °C) 100 18 160/82 99 %       Intake/Output Summary (Last 24 hours) at 2/27/2020 1011  Last data filed at 2/26/2020 2235  Gross per 24 hour   Intake 1600 ml   Output 400 ml   Net 1200 ml        PHYSICAL EXAM:  General: No acute distress. AA male. EENT:  EOMI. Anicteric sclerae. MMM  Resp:  Lungs clear throughout, no wheezing or rales. No accessory muscle use  CV:  RRR,  No edema  GI:  Soft, Non distended, Non tender.  +Bowel sounds  Neurologic:  Alert and oriented X 3, normal speech,   Psych:   Good insight. Not anxious nor agitated  Skin:  No rashes. No jaundice. Picture of amputation site below             Reviewed most current lab test results and cultures  YES  Reviewed most current radiology test results   YES  Review and summation of old records today    NO  Reviewed patient's current orders and MAR    YES  PMH/SH reviewed - no change compared to H&P  ________________________________________________________________________  Care Plan discussed with:    Comments   Patient x    Family      RN x    Care Manager x    Consultant                        Multidiciplinary team rounds were held today with , nursing, pharmacist and clinical coordinator. Patient's plan of care was discussed; medications were reviewed and discharge planning was addressed. ________________________________________________________________________  Total NON critical care TIME:  25   Minutes    Total CRITICAL CARE TIME Spent:   Minutes non procedure based      Comments   >50% of visit spent in counseling and coordination of care     ________________________________________________________________________  Shandra Folk, NP     Procedures: see electronic medical records for all procedures/Xrays and details which were not copied into this note but were reviewed prior to creation of Plan. LABS:  I reviewed today's most current labs and imaging studies.   Pertinent labs include:  Recent Labs 02/27/20 0346 02/26/20 0347   WBC 11.0 12.5*   HGB 11.0* 10.8*   HCT 33.2* 34.0*    423*     Recent Labs     02/27/20 0346 02/26/20 0347 02/25/20 0614   * 134* 137   K 3.8 3.4* 3.4*    102 107   CO2 26 27 25   * 244* 161*   BUN 22* 20 21*   CREA 1.27 1.26 1.29   CA 8.1* 7.9* 7.8*       Signed: Ghada Lee, NP

## 2020-02-27 NOTE — PROGRESS NOTES
Patient has eaten today, will now have to plan for the procedure on the foot for tomorrow, I will put in NPO orders for just past AM for tomorrow, I suspect I may be able to do the procedure Friday AM, or may have to wait til Friday PM, if I get bumped on the schedule.

## 2020-02-27 NOTE — PROGRESS NOTES
Problem: Falls - Risk of  Goal: *Absence of Falls  Description  Document Ashley Getting Fall Risk and appropriate interventions in the flowsheet.   Outcome: Progressing Towards Goal  Note: Fall Risk Interventions:  Mobility Interventions: Utilize walker, cane, or other assistive device    Mentation Interventions: Room close to nurse's station    Medication Interventions: Patient to call before getting OOB    Elimination Interventions: Call light in reach    History of Falls Interventions: Room close to nurse's station         Problem: Patient Education: Go to Patient Education Activity  Goal: Patient/Family Education  Outcome: Progressing Towards Goal     Problem: Non-Violent Restraints  Goal: *Removal from restraints as soon as assessed to be safe  Outcome: Progressing Towards Goal  Goal: *No harm/injury to patient while restraints in use  Outcome: Progressing Towards Goal  Goal: *Patient's dignity will be maintained  Outcome: Progressing Towards Goal  Goal: *Patient Specific Goal (EDIT GOAL, INSERT TEXT)  Outcome: Progressing Towards Goal  Goal: Non-violent Restaints:Standard Interventions  Outcome: Progressing Towards Goal  Goal: Non-violent Restraints:Patient Interventions  Outcome: Progressing Towards Goal  Goal: Patient/Family Education  Outcome: Progressing Towards Goal

## 2020-02-27 NOTE — PROGRESS NOTES
.General Surgery End of Shift Nursing Note    Bedside shift change report given to Thebenny García RN (oncoming nurse) by Kylie Lemus RN (offgoing nurse). Report included the following information SBAR, Kardex, Intake/Output, MAR, Accordion and Recent Results. Shift worked:   7p-7a   Summary of shift:    No significant events overnight. Patient stated Dr. Rahat Qureshi for him to be NPO. No order given per chart. Mentioned in the note by podiatry to continue patient to be NPO. RN instructed patient to not eat or drink after midnight in the occurrence that he goes for podiatry procedure. RN walked into patients room this morning to patient eating food. Patient stated he didn't eat yesterday and was going to eat. Pleasantly stated he didn't care if he had the procedure today he wanted to eat. .... Dressing changed to foot x1 [place] discharge. No complaints of pain   Issues for physician to address:   Plan of care     Number times ambulated in hallway past shift: 1    Number of times OOB to chair past shift: 0      GI:    Current diet:  DIET DIABETIC CONSISTENT CARB Regular  DIET NUTRITIONAL SUPPLEMENTS Breakfast, Lunch, Dinner; Glucerna Shake  DIET ONE TIME MESSAGE    Tolerating current diet: YES  Passing flatus: YES  Last Bowel Movement: today   Appearance: soft formed    Respiratory:    Incentive Spirometer at bedside: YES  Patient instructed on use: YES    Patient Safety:    Falls Score: 2  Bed Alarm On? No  Sitter?  No    Hilda Julian

## 2020-02-28 ENCOUNTER — ANESTHESIA (OUTPATIENT)
Dept: SURGERY | Age: 55
DRG: 710 | End: 2020-02-28
Payer: MEDICAID

## 2020-02-28 PROBLEM — Z76.89 ENCOUNTER TO ESTABLISH CARE WITH NEW DOCTOR: Status: RESOLVED | Noted: 2018-10-11 | Resolved: 2020-02-28

## 2020-02-28 PROBLEM — E11.9 DM (DIABETES MELLITUS), TYPE 2 (HCC): Status: ACTIVE | Noted: 2020-02-28

## 2020-02-28 PROBLEM — I10 HTN (HYPERTENSION): Status: ACTIVE | Noted: 2020-02-28

## 2020-02-28 PROBLEM — E87.6 HYPOKALEMIA: Status: ACTIVE | Noted: 2020-02-28

## 2020-02-28 PROBLEM — M86.9 OSTEOMYELITIS OF LEFT FOOT (HCC): Status: ACTIVE | Noted: 2020-02-28

## 2020-02-28 PROBLEM — B95.62 MRSA BACTEREMIA: Status: ACTIVE | Noted: 2020-02-28

## 2020-02-28 PROBLEM — E26.1 SECONDARY HYPERALDOSTERONISM (HCC): Status: ACTIVE | Noted: 2020-02-28

## 2020-02-28 PROBLEM — G93.41 METABOLIC ENCEPHALOPATHY: Status: ACTIVE | Noted: 2020-02-28

## 2020-02-28 PROBLEM — R78.81 MRSA BACTEREMIA: Status: ACTIVE | Noted: 2020-02-28

## 2020-02-28 LAB
ANION GAP SERPL CALC-SCNC: 2 MMOL/L (ref 5–15)
BUN SERPL-MCNC: 23 MG/DL (ref 6–20)
BUN/CREAT SERPL: 16 (ref 12–20)
CALCIUM SERPL-MCNC: 8.1 MG/DL (ref 8.5–10.1)
CHLORIDE SERPL-SCNC: 105 MMOL/L (ref 97–108)
CO2 SERPL-SCNC: 28 MMOL/L (ref 21–32)
CREAT SERPL-MCNC: 1.4 MG/DL (ref 0.7–1.3)
ERYTHROCYTE [DISTWIDTH] IN BLOOD BY AUTOMATED COUNT: 14.7 % (ref 11.5–14.5)
GLUCOSE BLD STRIP.AUTO-MCNC: 100 MG/DL (ref 65–100)
GLUCOSE BLD STRIP.AUTO-MCNC: 103 MG/DL (ref 65–100)
GLUCOSE BLD STRIP.AUTO-MCNC: 113 MG/DL (ref 65–100)
GLUCOSE BLD STRIP.AUTO-MCNC: 141 MG/DL (ref 65–100)
GLUCOSE BLD STRIP.AUTO-MCNC: 179 MG/DL (ref 65–100)
GLUCOSE SERPL-MCNC: 230 MG/DL (ref 65–100)
HCT VFR BLD AUTO: 33.4 % (ref 36.6–50.3)
HGB BLD-MCNC: 11 G/DL (ref 12.1–17)
MCH RBC QN AUTO: 25.9 PG (ref 26–34)
MCHC RBC AUTO-ENTMCNC: 32.9 G/DL (ref 30–36.5)
MCV RBC AUTO: 78.8 FL (ref 80–99)
NRBC # BLD: 0 K/UL (ref 0–0.01)
NRBC BLD-RTO: 0 PER 100 WBC
PLATELET # BLD AUTO: 370 K/UL (ref 150–400)
PMV BLD AUTO: 10.4 FL (ref 8.9–12.9)
POTASSIUM SERPL-SCNC: 4 MMOL/L (ref 3.5–5.1)
RBC # BLD AUTO: 4.24 M/UL (ref 4.1–5.7)
SERVICE CMNT-IMP: ABNORMAL
SERVICE CMNT-IMP: NORMAL
SODIUM SERPL-SCNC: 135 MMOL/L (ref 136–145)
STRESS BASELINE HR: 91 BPM
STRESS ESTIMATED WORKLOAD: 1 METS
STRESS EXERCISE DUR MIN: NORMAL
STRESS PEAK DIAS BP: 102 MMHG
STRESS PEAK SYS BP: 161 MMHG
STRESS PERCENT HR ACHIEVED: 64 %
STRESS POST PEAK HR: 107 BPM
STRESS RATE PRESSURE PRODUCT: NORMAL BPM*MMHG
STRESS TARGET HR: 166 BPM
WBC # BLD AUTO: 9.9 K/UL (ref 4.1–11.1)

## 2020-02-28 PROCEDURE — 87075 CULTR BACTERIA EXCEPT BLOOD: CPT

## 2020-02-28 PROCEDURE — 74011250636 HC RX REV CODE- 250/636: Performed by: NURSE ANESTHETIST, CERTIFIED REGISTERED

## 2020-02-28 PROCEDURE — 36415 COLL VENOUS BLD VENIPUNCTURE: CPT

## 2020-02-28 PROCEDURE — 76210000063 HC OR PH I REC FIRST 0.5 HR: Performed by: PODIATRIST

## 2020-02-28 PROCEDURE — 77030018836 HC SOL IRR NACL ICUM -A: Performed by: PODIATRIST

## 2020-02-28 PROCEDURE — 74011250637 HC RX REV CODE- 250/637: Performed by: INTERNAL MEDICINE

## 2020-02-28 PROCEDURE — 87077 CULTURE AEROBIC IDENTIFY: CPT

## 2020-02-28 PROCEDURE — 87205 SMEAR GRAM STAIN: CPT

## 2020-02-28 PROCEDURE — 77030002916 HC SUT ETHLN J&J -A: Performed by: PODIATRIST

## 2020-02-28 PROCEDURE — 88311 DECALCIFY TISSUE: CPT

## 2020-02-28 PROCEDURE — 74011250636 HC RX REV CODE- 250/636: Performed by: NURSE PRACTITIONER

## 2020-02-28 PROCEDURE — 74011250637 HC RX REV CODE- 250/637: Performed by: NURSE PRACTITIONER

## 2020-02-28 PROCEDURE — 82962 GLUCOSE BLOOD TEST: CPT

## 2020-02-28 PROCEDURE — 85027 COMPLETE CBC AUTOMATED: CPT

## 2020-02-28 PROCEDURE — 74011250636 HC RX REV CODE- 250/636: Performed by: INTERNAL MEDICINE

## 2020-02-28 PROCEDURE — 80048 BASIC METABOLIC PNL TOTAL CA: CPT

## 2020-02-28 PROCEDURE — 74011000250 HC RX REV CODE- 250: Performed by: NURSE ANESTHETIST, CERTIFIED REGISTERED

## 2020-02-28 PROCEDURE — 74011000250 HC RX REV CODE- 250: Performed by: PODIATRIST

## 2020-02-28 PROCEDURE — 87186 SC STD MICRODIL/AGAR DIL: CPT

## 2020-02-28 PROCEDURE — 74011000258 HC RX REV CODE- 258: Performed by: INTERNAL MEDICINE

## 2020-02-28 PROCEDURE — 65270000029 HC RM PRIVATE

## 2020-02-28 PROCEDURE — 77030031139 HC SUT VCRL2 J&J -A: Performed by: PODIATRIST

## 2020-02-28 PROCEDURE — 76010000138 HC OR TIME 0.5 TO 1 HR: Performed by: PODIATRIST

## 2020-02-28 PROCEDURE — 94760 N-INVAS EAR/PLS OXIMETRY 1: CPT

## 2020-02-28 PROCEDURE — 76060000032 HC ANESTHESIA 0.5 TO 1 HR: Performed by: PODIATRIST

## 2020-02-28 PROCEDURE — 88307 TISSUE EXAM BY PATHOLOGIST: CPT

## 2020-02-28 PROCEDURE — 77030011640 HC PAD GRND REM COVD -A: Performed by: PODIATRIST

## 2020-02-28 RX ORDER — SODIUM CHLORIDE 0.9 % (FLUSH) 0.9 %
5-40 SYRINGE (ML) INJECTION AS NEEDED
Status: DISCONTINUED | OUTPATIENT
Start: 2020-02-28 | End: 2020-02-28 | Stop reason: HOSPADM

## 2020-02-28 RX ORDER — BUPIVACAINE HYDROCHLORIDE 5 MG/ML
INJECTION, SOLUTION EPIDURAL; INTRACAUDAL AS NEEDED
Status: DISCONTINUED | OUTPATIENT
Start: 2020-02-28 | End: 2020-02-28 | Stop reason: HOSPADM

## 2020-02-28 RX ORDER — SODIUM CHLORIDE 0.9 % (FLUSH) 0.9 %
10 SYRINGE (ML) INJECTION AS NEEDED
Status: DISCONTINUED | OUTPATIENT
Start: 2020-02-28 | End: 2020-03-01 | Stop reason: HOSPADM

## 2020-02-28 RX ORDER — PROPOFOL 10 MG/ML
INJECTION, EMULSION INTRAVENOUS AS NEEDED
Status: DISCONTINUED | OUTPATIENT
Start: 2020-02-28 | End: 2020-02-28 | Stop reason: HOSPADM

## 2020-02-28 RX ORDER — DIPHENHYDRAMINE HYDROCHLORIDE 50 MG/ML
12.5 INJECTION, SOLUTION INTRAMUSCULAR; INTRAVENOUS AS NEEDED
Status: DISCONTINUED | OUTPATIENT
Start: 2020-02-28 | End: 2020-02-28 | Stop reason: HOSPADM

## 2020-02-28 RX ORDER — FENTANYL CITRATE 50 UG/ML
25 INJECTION, SOLUTION INTRAMUSCULAR; INTRAVENOUS
Status: DISCONTINUED | OUTPATIENT
Start: 2020-02-28 | End: 2020-02-28 | Stop reason: HOSPADM

## 2020-02-28 RX ORDER — DEXMEDETOMIDINE HYDROCHLORIDE 100 UG/ML
INJECTION, SOLUTION INTRAVENOUS AS NEEDED
Status: DISCONTINUED | OUTPATIENT
Start: 2020-02-28 | End: 2020-02-28 | Stop reason: HOSPADM

## 2020-02-28 RX ORDER — MORPHINE SULFATE 10 MG/ML
2 INJECTION, SOLUTION INTRAMUSCULAR; INTRAVENOUS
Status: DISCONTINUED | OUTPATIENT
Start: 2020-02-28 | End: 2020-02-28 | Stop reason: HOSPADM

## 2020-02-28 RX ORDER — SODIUM CHLORIDE, SODIUM LACTATE, POTASSIUM CHLORIDE, CALCIUM CHLORIDE 600; 310; 30; 20 MG/100ML; MG/100ML; MG/100ML; MG/100ML
25 INJECTION, SOLUTION INTRAVENOUS CONTINUOUS
Status: DISCONTINUED | OUTPATIENT
Start: 2020-02-28 | End: 2020-02-28 | Stop reason: HOSPADM

## 2020-02-28 RX ORDER — SODIUM CHLORIDE, SODIUM LACTATE, POTASSIUM CHLORIDE, CALCIUM CHLORIDE 600; 310; 30; 20 MG/100ML; MG/100ML; MG/100ML; MG/100ML
INJECTION, SOLUTION INTRAVENOUS
Status: DISCONTINUED | OUTPATIENT
Start: 2020-02-28 | End: 2020-02-28 | Stop reason: HOSPADM

## 2020-02-28 RX ORDER — ONDANSETRON 2 MG/ML
4 INJECTION INTRAMUSCULAR; INTRAVENOUS AS NEEDED
Status: DISCONTINUED | OUTPATIENT
Start: 2020-02-28 | End: 2020-02-28 | Stop reason: HOSPADM

## 2020-02-28 RX ORDER — PROPOFOL 10 MG/ML
INJECTION, EMULSION INTRAVENOUS
Status: DISCONTINUED | OUTPATIENT
Start: 2020-02-28 | End: 2020-02-28 | Stop reason: HOSPADM

## 2020-02-28 RX ORDER — HYDROMORPHONE HYDROCHLORIDE 1 MG/ML
.2-.5 INJECTION, SOLUTION INTRAMUSCULAR; INTRAVENOUS; SUBCUTANEOUS
Status: DISCONTINUED | OUTPATIENT
Start: 2020-02-28 | End: 2020-02-28 | Stop reason: HOSPADM

## 2020-02-28 RX ORDER — SODIUM CHLORIDE 0.9 % (FLUSH) 0.9 %
5-40 SYRINGE (ML) INJECTION EVERY 8 HOURS
Status: DISCONTINUED | OUTPATIENT
Start: 2020-02-28 | End: 2020-02-28 | Stop reason: HOSPADM

## 2020-02-28 RX ADMIN — DEXMEDETOMIDINE HYDROCHLORIDE 10 MCG: 100 INJECTION, SOLUTION, CONCENTRATE INTRAVENOUS at 18:23

## 2020-02-28 RX ADMIN — PREGABALIN 150 MG: 25 CAPSULE ORAL at 21:14

## 2020-02-28 RX ADMIN — LORAZEPAM 2 MG: 2 INJECTION INTRAMUSCULAR; INTRAVENOUS at 06:00

## 2020-02-28 RX ADMIN — ENOXAPARIN SODIUM 40 MG: 40 INJECTION SUBCUTANEOUS at 21:14

## 2020-02-28 RX ADMIN — CEFEPIME HYDROCHLORIDE 2 G: 2 INJECTION, POWDER, FOR SOLUTION INTRAVENOUS at 05:59

## 2020-02-28 RX ADMIN — DAPTOMYCIN 600 MG: 500 INJECTION, POWDER, LYOPHILIZED, FOR SOLUTION INTRAVENOUS at 19:35

## 2020-02-28 RX ADMIN — REGADENOSON 0.4 MG: 0.08 INJECTION, SOLUTION INTRAVENOUS at 09:54

## 2020-02-28 RX ADMIN — AMLODIPINE BESYLATE 10 MG: 5 TABLET ORAL at 09:00

## 2020-02-28 RX ADMIN — Medication 10 ML: at 09:54

## 2020-02-28 RX ADMIN — LORAZEPAM 2 MG: 2 INJECTION INTRAMUSCULAR; INTRAVENOUS at 12:00

## 2020-02-28 RX ADMIN — PROPOFOL 50 MCG/KG/MIN: 10 INJECTION, EMULSION INTRAVENOUS at 18:24

## 2020-02-28 RX ADMIN — PROPOFOL 30 MG: 10 INJECTION, EMULSION INTRAVENOUS at 18:25

## 2020-02-28 RX ADMIN — CARVEDILOL 6.25 MG: 6.25 TABLET, FILM COATED ORAL at 09:00

## 2020-02-28 RX ADMIN — SODIUM CHLORIDE 0.6 MCG/KG/HR: 900 INJECTION, SOLUTION INTRAVENOUS at 18:22

## 2020-02-28 RX ADMIN — CEFEPIME HYDROCHLORIDE 2 G: 2 INJECTION, POWDER, FOR SOLUTION INTRAVENOUS at 21:15

## 2020-02-28 RX ADMIN — Medication 1 AMPULE: at 09:14

## 2020-02-28 RX ADMIN — SODIUM CHLORIDE, POTASSIUM CHLORIDE, SODIUM LACTATE AND CALCIUM CHLORIDE: 600; 310; 30; 20 INJECTION, SOLUTION INTRAVENOUS at 17:58

## 2020-02-28 RX ADMIN — PREGABALIN 150 MG: 25 CAPSULE ORAL at 09:00

## 2020-02-28 RX ADMIN — CEFEPIME HYDROCHLORIDE 2 G: 2 INJECTION, POWDER, FOR SOLUTION INTRAVENOUS at 14:38

## 2020-02-28 RX ADMIN — DAKIN'S SOLUTION 0.125% (QUARTER STRENGTH): 0.12 SOLUTION at 09:16

## 2020-02-28 RX ADMIN — LISINOPRIL 10 MG: 10 TABLET ORAL at 09:00

## 2020-02-28 RX ADMIN — FUROSEMIDE 40 MG: 40 TABLET ORAL at 09:00

## 2020-02-28 NOTE — PROGRESS NOTES
I reviewed pertinent labs and imaging, and discussed /agreed on the plan of care with Dr. Andres Petty. Hospitalist Progress Note    NAME: Dario Radford   :  1965   MRN:  [id number]     History of Present Illness:  Dario Radford is a 49 y.o.  male with past medical history significant for polysubstance abuse including IV heroin abuse, insulin-dependent type 2 diabetes, hypertension and peripheral neuropathy who recently had a left distal phalanx of the big toe amputation done at [facility] and left AGAINST MEDICAL ADVICE 2 weeks ago, he presents with the complaints of sudden onset fever chills and shortness of breath this morning. He also reports increasing discharge from his left big toe phalanx, patient admits to injecting heroin yesterday. He is noted to be septic in the emergency department with purulent discharge from wound on the left big toe. X-ray showed osteomyelitis. Assessment / Plan:  Stress test this AM then surgery with podiatry at 1730    Sepsis, POA d/t left great toe OM  Acute metabolic encephalopathy d/t infection  Left great toe OM  MRSA bacteremia, results from recent admission at [facility]  · Recently left [facility] AMA  · Per pharmacy report from [facility]:  2020- BC with MRSA, susceptible to vancomycin. 2020- Toe wound with moderate gram positive cocci, beta hemolytic strep group b, susceptible to clindamycin, tetracycline, bactrim, and vancomycin; resistant to oxacillin   · Moved out of ICU 2020; was in ICU for sedation, precedex and ativan for severe delirium   · BC NGTD  · Continue IV vancomycin and cefepime  · Completed 7 days of Flagyl   · ECHO with EF 25-30%  · Appreciate cardiology input; stress test was scheduled for today however patient ate breakfast knowing he should have been fasting. Will attempt stress test tomorrow. · S/p I & D with amputation of left great toe and resection of partial fist metatarsal head  · Final pathologic results: 1.  Viable bone margin 1st metatarsal:  Acute chondritis and patchy osteomyelitis  2. Necrotic great toe of left foot, amputation: Cutaneous ulcer with suppurative cellulitis, abscess and osteomyelitis  · Plan for further resection with podiatry on Friday    · Wound cultures with MRSA and beta hemolytic group b streptococci   · Appreciate ID input     Cardiomyopathy with acute systolic heart failure  Secondary hyperaldosteronism r/t HF  Acute pulmonary edema, suspect cocaine related vs valvular etiology as IV drug abuse  · ECHO with EF 25-30%  · Continue IV diuretics  · Off O2  · Appreciate cardiology input   · NT pro-BNP 10,870  · 02/22/2020:  CXR- Some improvement in the pulmonary edema  · Continue BB and lisinopril   · Stress test today     Hypokalemia- resolved    IDDM type 2  Peripheral neuropathy r/t DM   · Hgb A1c 8.0  · BS AC&HS  · SSI   · Hx of noncompliance     Polysubstance abuse  · UDS +cocaine and opiates    HTN  · Continue amlodipine and coreg     18.5 - 24.9 Normal weight / Body mass index is 23.26 kg/m². Code status: Full  Prophylaxis: Lovenox  Recommended Disposition: TBD     Subjective:     Chief Complaint / Reason for Physician Visit  Stress test this AM and OR at 1730 today. Patient continues to ask nursing when he can eat. Surgery postponed yesterday d/t patient eating breakfast.  Discussed with RN events overnight. Review of Systems:  Symptom Y/N Comments  Symptom Y/N Comments   Fever/Chills n   Chest Pain n    Poor Appetite n   Edema     Cough    Abdominal Pain     Sputum    Joint Pain     SOB/DE LA CRUZ n   Pruritis/Rash     Nausea/vomit n   Tolerating PT/OT     Diarrhea n   Tolerating Diet y    Constipation n   Other       Could NOT obtain due to:      Objective:     VITALS:   Last 24hrs VS reviewed since prior progress note.  Most recent are:  Patient Vitals for the past 24 hrs:   Temp Pulse Resp BP SpO2   02/28/20 0813 98 °F (36.7 °C) (!) 101 17 157/67 98 %   02/28/20 0224 98.6 °F (37 °C) (!) 110 18 162/69 97 % 02/27/20 2358 97.9 °F (36.6 °C) 100 18 161/85 100 %   02/27/20 2044 98.2 °F (36.8 °C) 86 18 138/85 100 %   02/27/20 1550 98.1 °F (36.7 °C) 95 18 142/87 100 %   02/27/20 1206 98.4 °F (36.9 °C) 100 18 149/89 96 %       Intake/Output Summary (Last 24 hours) at 2/28/2020 1040  Last data filed at 2/28/2020 1034  Gross per 24 hour   Intake 800 ml   Output 800 ml   Net 0 ml        PHYSICAL EXAM:  General: AA male. NAD   EENT:  EOMI. Anicteric sclerae. MMM  Resp:  CTA, no wheezing or rales. No accessory muscle use  CV:  Regular rate rhythm,  No edema  GI:  Soft, Non distended, Non tender.  +Bowel sounds  Neurologic:  Alert and oriented X 3, normal speech,   Psych:   Good insight. Not anxious nor agitated  Skin:  No rashes. No jaundice. Picture of amputation site below             Reviewed most current lab test results and cultures  YES  Reviewed most current radiology test results   YES  Review and summation of old records today    NO  Reviewed patient's current orders and MAR    YES  PMH/SH reviewed - no change compared to H&P  ________________________________________________________________________  Care Plan discussed with:    Comments   Patient x    Family      RN x    Care Manager     Consultant                        Multidiciplinary team rounds were held today with , nursing, pharmacist and clinical coordinator. Patient's plan of care was discussed; medications were reviewed and discharge planning was addressed.      ________________________________________________________________________  Total NON critical care TIME:  25   Minutes    Total CRITICAL CARE TIME Spent:   Minutes non procedure based      Comments   >50% of visit spent in counseling and coordination of care     ________________________________________________________________________  Ana Gao NP     Procedures: see electronic medical records for all procedures/Xrays and details which were not copied into this note but were reviewed prior to creation of Plan. LABS:  I reviewed today's most current labs and imaging studies.   Pertinent labs include:  Recent Labs     02/28/20 0218 02/27/20 0346 02/26/20 0347   WBC 9.9 11.0 12.5*   HGB 11.0* 11.0* 10.8*   HCT 33.4* 33.2* 34.0*    397 423*     Recent Labs     02/28/20 0218 02/27/20 0346 02/26/20 0347   * 134* 134*   K 4.0 3.8 3.4*    106 102   CO2 28 26 27   * 197* 244*   BUN 23* 22* 20   CREA 1.40* 1.27 1.26   CA 8.1* 8.1* 7.9*       Signed: Tomasa Hawkins, NP

## 2020-02-28 NOTE — PROGRESS NOTES
Problem: Falls - Risk of  Goal: *Absence of Falls  Description  Document Alva Mc Fall Risk and appropriate interventions in the flowsheet.   Outcome: Progressing Towards Goal  Note: Fall Risk Interventions:  Mobility Interventions: Patient to call before getting OOB    Mentation Interventions: Room close to nurse's station    Medication Interventions: Patient to call before getting OOB    Elimination Interventions: Call light in reach    History of Falls Interventions: Room close to nurse's station         Problem: Patient Education: Go to Patient Education Activity  Goal: Patient/Family Education  Outcome: Progressing Towards Goal     Problem: Non-Violent Restraints  Goal: *Removal from restraints as soon as assessed to be safe  Outcome: Progressing Towards Goal  Goal: *No harm/injury to patient while restraints in use  Outcome: Progressing Towards Goal  Goal: *Patient's dignity will be maintained  Outcome: Progressing Towards Goal  Goal: *Patient Specific Goal (EDIT GOAL, INSERT TEXT)  Outcome: Progressing Towards Goal  Goal: Non-violent Restaints:Standard Interventions  Outcome: Progressing Towards Goal  Goal: Non-violent Restraints:Patient Interventions  Outcome: Progressing Towards Goal  Goal: Patient/Family Education  Outcome: Progressing Towards Goal

## 2020-02-28 NOTE — PROGRESS NOTES
1555: patient very aggressive and agitated towards staff. 2 mg of Ativan given IV.    1730: patient attempting to get out of bed with very unsteady gait, patient barely opening eyes. Instructed patient to call staff when needing to get out of bed due to the most recent administration of ativan; bed alarm placed for safety. 1945: Patient attempting to get out of bed. Four staff members at bedside to assist patient back to bed. Patient stated, \" I've been in this bed too long. \" Patient is leaning forward with eyes closed. Informed patient that at this time he is not safe to ambulate in the room or move to the bedside chair due decrease LOC. [id number] Shaggy at bedside. Patient attempting to get out of bed. Informed patient that he needs to have staff at bedside for safety due to his gait being unsteady and LOC decreased. Landen Barrios stated, \" I have him. \"  Informed her that a staff needs to be present for safety. Patient is a high fall risk at this time due to ativan. Patient stated, \" I used drugs for 30 years and I never had issue before. \" Reiterated that staff needs to be present for safety as long as he continues to receive ativan. 2157: Patient in bed eating Mar[employer] cups. Educated patient on trying to control sugars for wound healing. Also informed patient on being NPO after midnight. Patient verbalized understanding.
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
## 2020-02-28 NOTE — PROGRESS NOTES
Pt to have cardiac nuc med study in am tomorrow. Otherwise ok to proceed with surgery. Benzo, opiate and cocaine positive 2/25.

## 2020-02-28 NOTE — PROGRESS NOTES
Pt stressed with [facility].  Imaging will be completed at 10:50am today with results to follow

## 2020-02-28 NOTE — PROGRESS NOTES
Pt anxious about scheduled procedures today expressed and walking back and forth, alert and oriented to sign consent for stress test, pt back in room from stress test, awaiting surgery at 5:30pm    Morning medication held for stress test, but to be given now

## 2020-02-28 NOTE — ANESTHESIA PREPROCEDURE EVALUATION
Relevant Problems   No relevant active problems       Anesthetic History   No history of anesthetic complications            Review of Systems / Medical History  Patient summary reviewed, nursing notes reviewed and pertinent labs reviewed    Pulmonary          Smoker      Comments: Current Some Day Smoker   Neuro/Psych             Comments: Altered mental status  Neuropathy   Sciatica      Cardiovascular    Hypertension      CHF        Exercise tolerance: <4 METS  Comments: EF 33% with no demonstrated ischemia on stress test today. outpt CHF cardiology follow up.    GI/Hepatic/Renal  Within defined limits              Endo/Other    Diabetes: poorly controlled         Other Findings   Comments: OSTEOMYLITIS LEFT FOOT GREAT TOE      Alcohol use: Yes; 1.0 standard drinks per week  Drug use: Marijuana, Heroin, Cocaine             Physical Exam    Airway  Mallampati: II  TM Distance: 4 - 6 cm  Neck ROM: normal range of motion   Mouth opening: Normal    Comments: Unable to assess Cardiovascular  Regular rate and rhythm,  S1 and S2 normal,  no murmur, click, rub, or gallop             Dental  No notable dental hx    Comments: Unable to assess   Pulmonary  Breath sounds clear to auscultation               Abdominal  GI exam deferred       Other Findings            Anesthetic Plan    ASA: 4  Anesthesia type: MAC and total IV anesthesia          Induction: Intravenous  Anesthetic plan and risks discussed with: Patient

## 2020-02-28 NOTE — PROGRESS NOTES
Problem: Falls - Risk of  Goal: *Absence of Falls  Description  Document Mike Espinosa Fall Risk and appropriate interventions in the flowsheet. Outcome: Progressing Towards Goal  Note: Fall Risk Interventions:  Mobility Interventions: Patient to call before getting OOB    Mentation Interventions: Room close to nurse's station    Medication Interventions: Patient to call before getting OOB    Elimination Interventions: Call light in reach    History of Falls Interventions: Room close to nurse's station         Problem: Patient Education: Go to Patient Education Activity  Goal: Patient/Family Education  Outcome: Progressing Towards Goal     Problem: Non-Violent Restraints  Goal: *Removal from restraints as soon as assessed to be safe  Outcome: Progressing Towards Goal  Goal: *No harm/injury to patient while restraints in use  Outcome: Progressing Towards Goal  Goal: *Patient's dignity will be maintained  Outcome: Progressing Towards Goal  Goal: *Patient Specific Goal (EDIT GOAL, INSERT TEXT)  Outcome: Progressing Towards Goal  Goal: Non-violent Restaints:Standard Interventions  Outcome: Progressing Towards Goal  Goal: Non-violent Restraints:Patient Interventions  Outcome: Progressing Towards Goal  Goal: Patient/Family Education  Outcome: Progressing Towards Goal     Problem: Pressure Injury - Risk of  Goal: *Prevention of pressure injury  Description  Document Sarwat Scale and appropriate interventions in the flowsheet.   Outcome: Progressing Towards Goal  Note: Pressure Injury Interventions:  Sensory Interventions: Assess changes in LOC    Moisture Interventions: Apply protective barrier, creams and emollients    Activity Interventions: Increase time out of bed    Mobility Interventions: Float heels    Nutrition Interventions: Document food/fluid/supplement intake    Friction and Shear Interventions: HOB 30 degrees or less                Problem: Patient Education: Go to Patient Education Activity  Goal: Patient/Family Education  Outcome: Progressing Towards Goal     Problem: Diabetes Self-Management  Goal: *Disease process and treatment process  Description  Define diabetes and identify own type of diabetes; list 3 options for treating diabetes. Outcome: Progressing Towards Goal  Goal: *Incorporating nutritional management into lifestyle  Description  Describe effect of type, amount and timing of food on blood glucose; list 3 methods for planning meals. Outcome: Progressing Towards Goal  Goal: *Incorporating physical activity into lifestyle  Description  State effect of exercise on blood glucose levels. Outcome: Progressing Towards Goal  Goal: *Developing strategies to promote health/change behavior  Description  Define the ABC's of diabetes; identify appropriate screenings, schedule and personal plan for screenings. Outcome: Progressing Towards Goal  Goal: *Using medications safely  Description  State effect of diabetes medications on diabetes; name diabetes medication taking, action and side effects. Outcome: Progressing Towards Goal  Goal: *Monitoring blood glucose, interpreting and using results  Description  Identify recommended blood glucose targets  and personal targets. Outcome: Progressing Towards Goal  Goal: *Prevention, detection, treatment of acute complications  Description  List symptoms of hyper- and hypoglycemia; describe how to treat low blood sugar and actions for lowering  high blood glucose level. Outcome: Progressing Towards Goal  Goal: *Prevention, detection and treatment of chronic complications  Description  Define the natural course of diabetes and describe the relationship of blood glucose levels to long term complications of diabetes.   Outcome: Progressing Towards Goal  Goal: *Developing strategies to address psychosocial issues  Description  Describe feelings about living with diabetes; identify support needed and support network  Outcome: Progressing Towards Goal  Goal: *Insulin pump training  Outcome: Progressing Towards Goal  Goal: *Sick day guidelines  Outcome: Progressing Towards Goal  Goal: *Patient Specific Goal (EDIT GOAL, INSERT TEXT)  Outcome: Progressing Towards Goal     Problem: Patient Education: Go to Patient Education Activity  Goal: Patient/Family Education  Outcome: Progressing Towards Goal     Problem: Risk for Spread of Infection  Goal: Prevent transmission of infectious organism to others  Description  Prevent the transmission of infectious organisms to other patients, staff members, and visitors.   Outcome: Progressing Towards Goal     Problem: Patient Education:  Go to Education Activity  Goal: Patient/Family Education  Outcome: Progressing Towards Goal

## 2020-02-28 NOTE — PROGRESS NOTES
Patient states he is withdrawing from polysubstance abuse (chills + aggitation) spoke to tele hospitalist who referred me to consulting oncall cardiology. Spoke to MD on call states clear to administer ativan.

## 2020-02-28 NOTE — PROGRESS NOTES
.General Surgery End of Shift Nursing Note     Bedside shift change report given to [address] [place] [address] RN (oncoming nurse) by Asa Clifford RN (offgoing nurse). Report included the following information SBAR, Kardex, Intake/Output, MAR, Accordion and Recent Results.     Shift worked:   7p-7a   Summary of shift:   Patient agitated at beginning of shift. Arguing with nursing staff. Given ativan previously in dayshift and remained drowsy for the beginning of nightshift. Unsteady on his feet. Became more awake and pleasant later in the night. This morning patient stated that he was withdrawing. Due to nuclear stress test RN did not know if medication would interfere. Paged hospitalist who referred RN to consult cardiology. RN paged and spoke to on call cardologist who stated it was okay to give ativan. Patient remains NPO since midnight. Consent for toe I&D placed in chart. Issues for physician to address:        Number times ambulated in hallway past shift:   Number of times OOB to chair past shift: 3        GI:     Current diet:  DIET NPO    Tolerating current diet: YES  Passing flatus: YES  Last Bowel Movement: yesterday              Appearance: soft     Respiratory:     Incentive Spirometer at bedside: YES  Patient instructed on use: YES     Patient Safety:     Falls Score: 4  Bed Alarm On? No  Sitter?  No     Karin Herzog

## 2020-02-28 NOTE — PROGRESS NOTES
Cardiology Progress Note      2/28/2020 1:30PM    Admit Date: 2/19/2020    Admit Diagnosis: Severe sepsis [facility] [A41.9, R65.20]      Subjective:     Venice Simmons is A&O. Completed Nuclear Lexiscan, negative for ischemia.          Visit Vitals  /67   Pulse 97   Temp 98.4 °F (36.9 °C)   Resp 17   Ht 5' 8\" (1.727 m)   Wt 69.4 kg (153 lb)   SpO2 98%   BMI 23.26 kg/m²       Current Facility-Administered Medications   Medication Dose Route Frequency    lisinopril (PRINIVIL, ZESTRIL) tablet 10 mg  10 mg Oral DAILY    cefepime (MAXIPIME) 2 g in 0.9% sodium chloride (MBP/ADV) 100 mL  2 g IntraVENous Q8H    LORazepam (ATIVAN) injection 2 mg  2 mg IntraVENous Q6H PRN    furosemide (LASIX) tablet 40 mg  40 mg Oral DAILY    carvediloL (COREG) tablet 6.25 mg  6.25 mg Oral BID    amLODIPine (NORVASC) tablet 10 mg  10 mg Oral DAILY    hydrALAZINE (APRESOLINE) 20 mg/mL injection 10 mg  10 mg IntraVENous Q6H PRN    DAPTOmycin (CUBICIN) 600 mg in 0.9% sodium chloride 50 mL IVPB  600 mg IntraVENous Q24H    alcohol 62% (NOZIN) nasal  1 Ampule  1 Ampule Topical Q12H    influenza vaccine 2019-20 (6 mos+)(PF) (FLUARIX/FLULAVAL/FLUZONE QUAD) injection 0.5 mL  0.5 mL IntraMUSCular PRIOR TO DISCHARGE    acetaminophen (TYLENOL) tablet 650 mg  650 mg Oral Q6H PRN    pregabalin (LYRICA) capsule 150 mg  150 mg Oral BID    sodium chloride (NS) flush 5-10 mL  5-10 mL IntraVENous PRN    insulin lispro (HUMALOG) injection   SubCUTAneous AC&HS    glucose chewable tablet 16 g  4 Tab Oral PRN    dextrose (D50W) injection syrg 12.5-25 g  12.5-25 g IntraVENous PRN    glucagon (GLUCAGEN) injection 1 mg  1 mg IntraMUSCular PRN    enoxaparin (LOVENOX) injection 40 mg  40 mg SubCUTAneous Q24H    sodium hypochlorite (QUARTER STRENGTH DAKIN'S) 0.125% irrigation (bottle)   Topical BID     Facility-Administered Medications Ordered in Other Encounters   Medication Dose Route Frequency    sodium chloride (NS) flush 10 mL  10 mL IntraVENous PRN         Objective:      Physical Exam:  Visit Vitals  /67   Pulse 97   Temp 98.4 °F (36.9 °C)   Resp 17   Ht 5' 8\" (1.727 m)   Wt 69.4 kg (153 lb)   SpO2 98%   BMI 23.26 kg/m²     General Appearance:  Well developed, well nourished,alert and oriented x 3, and individual in no acute distress. Neck: Supple. Chest:   Lungs clear to auscultation bilaterally. Cardiovascular:  tachy, S1, S2 normal, no murmur. Abdomen:   Soft, non-tender, bowel sounds are active. Extremities: No edema bilaterally. Skin: Warm and dry.              Data Review:   Labs:    Recent Results (from the past 24 hour(s))   GLUCOSE, POC    Collection Time: 02/27/20  4:26 PM   Result Value Ref Range    Glucose (POC) 280 (H) 65 - 100 mg/dL    Performed by Alis Nunez (PCT)    GLUCOSE, POC    Collection Time: 02/27/20 10:06 PM   Result Value Ref Range    Glucose (POC) 148 (H) 65 - 100 mg/dL    Performed by Alanna Bustamante PCT    CBC W/O DIFF    Collection Time: 02/28/20  2:18 AM   Result Value Ref Range    WBC 9.9 4.1 - 11.1 K/uL    RBC 4.24 4.10 - 5.70 M/uL    HGB 11.0 (L) 12.1 - 17.0 g/dL    HCT 33.4 (L) 36.6 - 50.3 %    MCV 78.8 (L) 80.0 - 99.0 FL    MCH 25.9 (L) 26.0 - 34.0 PG    MCHC 32.9 30.0 - 36.5 g/dL    RDW 14.7 (H) 11.5 - 14.5 %    PLATELET 004 356 - 320 K/uL    MPV 10.4 8.9 - 12.9 FL    NRBC 0.0 0  WBC    ABSOLUTE NRBC 0.00 0.00 - 1.57 K/uL   METABOLIC PANEL, BASIC    Collection Time: 02/28/20  2:18 AM   Result Value Ref Range    Sodium 135 (L) 136 - 145 mmol/L    Potassium 4.0 3.5 - 5.1 mmol/L    Chloride 105 97 - 108 mmol/L    CO2 28 21 - 32 mmol/L    Anion gap 2 (L) 5 - 15 mmol/L    Glucose 230 (H) 65 - 100 mg/dL    BUN 23 (H) 6 - 20 MG/DL    Creatinine 1.40 (H) 0.70 - 1.30 MG/DL    BUN/Creatinine ratio 16 12 - 20      GFR est AA >60 >60 ml/min/1.73m2    GFR est non-AA 53 (L) >60 ml/min/1.73m2    Calcium 8.1 (L) 8.5 - 10.1 MG/DL   GLUCOSE, POC    Collection Time: 02/28/20  6:51 AM Result Value Ref Range    Glucose (POC) 113 (H) 65 - 100 mg/dL    Performed by [facility] Glennjacqueline PCT    NUCLEAR CARDIAC STRESS TEST    Collection Time: 02/28/20 10:23 AM   Result Value Ref Range    Target  bpm    Exercise duration time 00:00:50     Stress Base Systolic  mmHg    Stress Base Diastolic  mmHg    Post peak  BPM    Baseline HR 91 BPM    Estimated workload 1.0 METS    Percent HR 64 %    Stress Rate Pressure Product 17,227 BPM*mmHg   GLUCOSE, POC    Collection Time: 02/28/20 12:03 PM   Result Value Ref Range    Glucose (POC) 141 (H) 65 - 100 mg/dL    Performed by Lena Aranda (PCT)        Telemetry: normal sinus rhythm      Assessment:     Principal Problem:    Severe sepsis ([address] (2/19/2020)    Active Problems:    Cardiomyopathy ([address] (2/21/2020)      Acute systolic heart failure ([address] (2/21/2020)      Substance abuse ([address] (0/22/0222)      Metabolic encephalopathy (0/62/9067)      Osteomyelitis of left foot ([address] (2/28/2020)      MRSA bacteremia (2/28/2020)      Secondary hyperaldosteronism ([address] (2/28/2020)      Hypokalemia (2/28/2020)      DM (diabetes mellitus), type 2 ([address] (2/28/2020)      HTN (hypertension) (2/28/2020)        Plan:     Cardiomyopathy with acute systolic heart failure: (EF 45% by JAIME 2/23/2020)  Continue with diuretics, CR mildly elevated. Supplement K+  Monitor I/Os, daily weights and labs. Neg 7L. Continue BB, lisinopril  Ischemic evaluation negative. No further cardiology workup  Patient has received education information reference management of heart failure. Sepsis/bacteremia:  JAIME negative for endocarditis    HTN:  Stable, continue on BB, ACEI, norvasc    RCA signing off. Will schedule to Dr. Esthela Alaniz within 2-3 weeks post discharge. Celia Guzmán NP       [facility]    2/28/2020         Patient seen, examined by me personally. Plan discussed as detailed. Agree with note as outlined by  NP.  I confirm findings in history and physical exam. No additional findings noted. Agree with plan as outlined above.      Terri Newton MD

## 2020-02-28 NOTE — PERIOP NOTES
Spoke with nurse on floor [address] Na[address] Epp[address]. she reports that pt is  Stable and had a stress test done today. Unsure of the results. Pt has been npo as per orders. consent is on the chart. Reviewed medical history  Floor will be called when or team is coming for pt.

## 2020-02-29 LAB
ANION GAP SERPL CALC-SCNC: 4 MMOL/L (ref 5–15)
BUN SERPL-MCNC: 25 MG/DL (ref 6–20)
BUN/CREAT SERPL: 20 (ref 12–20)
CALCIUM SERPL-MCNC: 7.7 MG/DL (ref 8.5–10.1)
CHLORIDE SERPL-SCNC: 103 MMOL/L (ref 97–108)
CO2 SERPL-SCNC: 28 MMOL/L (ref 21–32)
CREAT SERPL-MCNC: 1.26 MG/DL (ref 0.7–1.3)
ERYTHROCYTE [DISTWIDTH] IN BLOOD BY AUTOMATED COUNT: 14.6 % (ref 11.5–14.5)
GLUCOSE BLD STRIP.AUTO-MCNC: 160 MG/DL (ref 65–100)
GLUCOSE BLD STRIP.AUTO-MCNC: 346 MG/DL (ref 65–100)
GLUCOSE BLD STRIP.AUTO-MCNC: 367 MG/DL (ref 65–100)
GLUCOSE BLD STRIP.AUTO-MCNC: 425 MG/DL (ref 65–100)
GLUCOSE BLD STRIP.AUTO-MCNC: 555 MG/DL (ref 65–100)
GLUCOSE BLD STRIP.AUTO-MCNC: >600 MG/DL (ref 65–100)
GLUCOSE SERPL-MCNC: 275 MG/DL (ref 65–100)
HCT VFR BLD AUTO: 32.8 % (ref 36.6–50.3)
HGB BLD-MCNC: 10.8 G/DL (ref 12.1–17)
MCH RBC QN AUTO: 26 PG (ref 26–34)
MCHC RBC AUTO-ENTMCNC: 32.9 G/DL (ref 30–36.5)
MCV RBC AUTO: 79 FL (ref 80–99)
NRBC # BLD: 0 K/UL (ref 0–0.01)
NRBC BLD-RTO: 0 PER 100 WBC
PLATELET # BLD AUTO: 390 K/UL (ref 150–400)
PMV BLD AUTO: 10.1 FL (ref 8.9–12.9)
POTASSIUM SERPL-SCNC: 3.4 MMOL/L (ref 3.5–5.1)
RBC # BLD AUTO: 4.15 M/UL (ref 4.1–5.7)
SERVICE CMNT-IMP: ABNORMAL
SODIUM SERPL-SCNC: 135 MMOL/L (ref 136–145)
WBC # BLD AUTO: 10.1 K/UL (ref 4.1–11.1)

## 2020-02-29 PROCEDURE — 74011636637 HC RX REV CODE- 636/637: Performed by: INTERNAL MEDICINE

## 2020-02-29 PROCEDURE — 36415 COLL VENOUS BLD VENIPUNCTURE: CPT

## 2020-02-29 PROCEDURE — 74011250637 HC RX REV CODE- 250/637: Performed by: INTERNAL MEDICINE

## 2020-02-29 PROCEDURE — 80048 BASIC METABOLIC PNL TOTAL CA: CPT

## 2020-02-29 PROCEDURE — 74011000258 HC RX REV CODE- 258: Performed by: INTERNAL MEDICINE

## 2020-02-29 PROCEDURE — 74011250637 HC RX REV CODE- 250/637: Performed by: NURSE PRACTITIONER

## 2020-02-29 PROCEDURE — 82962 GLUCOSE BLOOD TEST: CPT

## 2020-02-29 PROCEDURE — 74011250636 HC RX REV CODE- 250/636: Performed by: INTERNAL MEDICINE

## 2020-02-29 PROCEDURE — 65270000029 HC RM PRIVATE

## 2020-02-29 PROCEDURE — 74011250637 HC RX REV CODE- 250/637: Performed by: HOSPITALIST

## 2020-02-29 PROCEDURE — 85027 COMPLETE CBC AUTOMATED: CPT

## 2020-02-29 RX ORDER — OXYCODONE AND ACETAMINOPHEN 5; 325 MG/1; MG/1
1 TABLET ORAL
Status: DISCONTINUED | OUTPATIENT
Start: 2020-02-29 | End: 2020-03-03 | Stop reason: HOSPADM

## 2020-02-29 RX ORDER — INSULIN GLARGINE 100 [IU]/ML
10 INJECTION, SOLUTION SUBCUTANEOUS
Status: DISCONTINUED | OUTPATIENT
Start: 2020-02-29 | End: 2020-03-01

## 2020-02-29 RX ORDER — POTASSIUM CHLORIDE 20 MEQ/1
40 TABLET, EXTENDED RELEASE ORAL
Status: COMPLETED | OUTPATIENT
Start: 2020-02-29 | End: 2020-02-29

## 2020-02-29 RX ORDER — INSULIN LISPRO 100 [IU]/ML
10 INJECTION, SOLUTION INTRAVENOUS; SUBCUTANEOUS
Status: COMPLETED | OUTPATIENT
Start: 2020-02-29 | End: 2020-02-29

## 2020-02-29 RX ADMIN — LISINOPRIL 10 MG: 10 TABLET ORAL at 08:21

## 2020-02-29 RX ADMIN — AMLODIPINE BESYLATE 10 MG: 5 TABLET ORAL at 08:20

## 2020-02-29 RX ADMIN — PREGABALIN 150 MG: 25 CAPSULE ORAL at 08:21

## 2020-02-29 RX ADMIN — INSULIN GLARGINE 10 UNITS: 100 INJECTION, SOLUTION SUBCUTANEOUS at 22:19

## 2020-02-29 RX ADMIN — POTASSIUM CHLORIDE 40 MEQ: 20 TABLET, EXTENDED RELEASE ORAL at 08:20

## 2020-02-29 RX ADMIN — CEFEPIME HYDROCHLORIDE 2 G: 2 INJECTION, POWDER, FOR SOLUTION INTRAVENOUS at 06:13

## 2020-02-29 RX ADMIN — INSULIN LISPRO 2 UNITS: 100 INJECTION, SOLUTION INTRAVENOUS; SUBCUTANEOUS at 07:30

## 2020-02-29 RX ADMIN — DAKIN'S SOLUTION 0.125% (QUARTER STRENGTH): 0.12 SOLUTION at 08:27

## 2020-02-29 RX ADMIN — ENOXAPARIN SODIUM 40 MG: 40 INJECTION SUBCUTANEOUS at 22:18

## 2020-02-29 RX ADMIN — CEFEPIME HYDROCHLORIDE 2 G: 2 INJECTION, POWDER, FOR SOLUTION INTRAVENOUS at 15:16

## 2020-02-29 RX ADMIN — OXYCODONE HYDROCHLORIDE AND ACETAMINOPHEN 1 TABLET: 5; 325 TABLET ORAL at 12:19

## 2020-02-29 RX ADMIN — DAPTOMYCIN 600 MG: 500 INJECTION, POWDER, LYOPHILIZED, FOR SOLUTION INTRAVENOUS at 19:54

## 2020-02-29 RX ADMIN — LORAZEPAM 2 MG: 2 INJECTION INTRAMUSCULAR; INTRAVENOUS at 18:31

## 2020-02-29 RX ADMIN — FUROSEMIDE 40 MG: 40 TABLET ORAL at 08:20

## 2020-02-29 RX ADMIN — OXYCODONE HYDROCHLORIDE AND ACETAMINOPHEN 1 TABLET: 5; 325 TABLET ORAL at 03:46

## 2020-02-29 RX ADMIN — INSULIN LISPRO 10 UNITS: 100 INJECTION, SOLUTION INTRAVENOUS; SUBCUTANEOUS at 22:19

## 2020-02-29 RX ADMIN — PREGABALIN 150 MG: 25 CAPSULE ORAL at 17:00

## 2020-02-29 RX ADMIN — OXYCODONE HYDROCHLORIDE AND ACETAMINOPHEN 1 TABLET: 5; 325 TABLET ORAL at 08:21

## 2020-02-29 RX ADMIN — OXYCODONE HYDROCHLORIDE AND ACETAMINOPHEN 1 TABLET: 5; 325 TABLET ORAL at 17:00

## 2020-02-29 RX ADMIN — Medication 1 AMPULE: at 08:21

## 2020-02-29 RX ADMIN — LORAZEPAM 2 MG: 2 INJECTION INTRAMUSCULAR; INTRAVENOUS at 12:20

## 2020-02-29 RX ADMIN — CARVEDILOL 6.25 MG: 6.25 TABLET, FILM COATED ORAL at 17:00

## 2020-02-29 RX ADMIN — INSULIN LISPRO 7 UNITS: 100 INJECTION, SOLUTION INTRAVENOUS; SUBCUTANEOUS at 16:30

## 2020-02-29 RX ADMIN — CARVEDILOL 6.25 MG: 6.25 TABLET, FILM COATED ORAL at 08:20

## 2020-02-29 RX ADMIN — LORAZEPAM 2 MG: 2 INJECTION INTRAMUSCULAR; INTRAVENOUS at 00:59

## 2020-02-29 RX ADMIN — CEFEPIME HYDROCHLORIDE 2 G: 2 INJECTION, POWDER, FOR SOLUTION INTRAVENOUS at 22:18

## 2020-02-29 RX ADMIN — DAKIN'S SOLUTION 0.125% (QUARTER STRENGTH): 0.12 SOLUTION at 17:01

## 2020-02-29 RX ADMIN — LORAZEPAM 2 MG: 2 INJECTION INTRAMUSCULAR; INTRAVENOUS at 06:27

## 2020-02-29 RX ADMIN — INSULIN LISPRO 9 UNITS: 100 INJECTION, SOLUTION INTRAVENOUS; SUBCUTANEOUS at 11:30

## 2020-02-29 NOTE — PROGRESS NOTES
I reviewed pertinent labs and imaging, and discussed /agreed on the plan of care with Dr. Crissy Gambino. Hospitalist Progress Note    NAME: Gaurav Ramírez   :  1965   MRN:  [id number]     History of Present Illness:  Gaurav Ramírez is a 49 y.o.  male with past medical history significant for polysubstance abuse including IV heroin abuse, insulin-dependent type 2 diabetes, hypertension and peripheral neuropathy who recently had a left distal phalanx of the big toe amputation done at [facility] and left AGAINST MEDICAL ADVICE 2 weeks ago, he presents with the complaints of sudden onset fever chills and shortness of breath this morning. He also reports increasing discharge from his left big toe phalanx, patient admits to injecting heroin yesterday. He is noted to be septic in the emergency department with purulent discharge from wound on the left big toe. X-ray showed osteomyelitis. Assessment / Plan:  POD #1    Sepsis, POA d/t left great toe OM  Acute metabolic encephalopathy d/t infection  Left great toe OM  MRSA bacteremia, results from recent admission at [facility]  · Recently left [facility] AMA  · Per pharmacy report from [facility]:  2020- BC with MRSA, susceptible to vancomycin. 2020- Toe wound with moderate gram positive cocci, beta hemolytic strep group b, susceptible to clindamycin, tetracycline, bactrim, and vancomycin; resistant to oxacillin   · Moved out of ICU 2020; was in ICU for sedation, precedex and ativan for severe delirium   · BC NGTD  · Continue IV vancomycin and cefepime  · Completed 7 days of Flagyl   · ECHO with EF 25-30%  · Appreciate cardiology input; stress test was scheduled for today however patient ate breakfast knowing he should have been fasting. Will attempt stress test tomorrow. · S/p I & D with amputation of left great toe and resection of partial fist metatarsal head  · Final pathologic results: 1.  Viable bone margin 1st metatarsal:  Acute chondritis and patchy osteomyelitis  2. Necrotic great toe of left foot, amputation: Cutaneous ulcer with suppurative cellulitis, abscess and osteomyelitis  · Wound cultures with MRSA and beta hemolytic group b streptococci   · Appreciate ID input   · 02/28/2020:  Taken back to OR with podiatry for further resection. Cultures pending. Cardiomyopathy with acute systolic heart failure  Secondary hyperaldosteronism r/t HF  Acute pulmonary edema, suspect cocaine related vs valvular etiology as IV drug abuse  · ECHO with EF 25-30%  · Continue IV diuretics  · Off O2  · Appreciate cardiology input   · NT pro-BNP 10,870  · 02/22/2020:  CXR- Some improvement in the pulmonary edema  · Continue BB and lisinopril   · Stress test negative; cardiology signed off 02/28/2020    Hypokalemia  · K 3.4; replace and monitor     IDDM type 2  Peripheral neuropathy r/t DM   · Hgb A1c 8.0  · BS AC&HS  · SSI   · Hx of noncompliance     Polysubstance abuse  · UDS +cocaine and opiates    HTN  · Continue amlodipine and coreg     18.5 - 24.9 Normal weight / Body mass index is 23.26 kg/m². Code status: Full  Prophylaxis: Lovenox  Recommended Disposition: TBD     Subjective:     Chief Complaint / Reason for Physician Visit  Patient requesting stronger pain medication. Discussed with RN events overnight. Review of Systems:  Symptom Y/N Comments  Symptom Y/N Comments   Fever/Chills n   Chest Pain n    Poor Appetite n   Edema     Cough    Abdominal Pain     Sputum    Joint Pain     SOB/DE [place] CRUZ n   Pruritis/Rash     Nausea/vomit n   Tolerating PT/OT     Diarrhea n   Tolerating Diet y    Constipation n   Other       Could NOT obtain due to:      Objective:     VITALS:   Last 24hrs VS reviewed since prior progress note.  Most recent are:  Patient Vitals for the past 24 hrs:   Temp Pulse Resp BP SpO2   02/29/20 0713 98 °F (36.7 °C) 100 18 (!) 156/97 99 %   02/29/20 0333 98.9 °F (37.2 °C) (!) 103 18 129/85 98 %   02/28/20 2236 97.9 °F (36.6 °C) (!) 102 18 (!) 142/92 99 %   02/28/20 1930 98.8 °F (37.1 °C) 81 19 107/75 --   02/28/20 1920 -- 79 13 106/73 100 %   02/28/20 1915 -- 79 14 113/72 100 %   02/28/20 1910 -- 79 14 111/73 100 %   02/28/20 1908 98.1 °F (36.7 °C) 80 16 113/73 100 %   02/28/20 1906 98.1 °F (36.7 °C) 80 16 113/73 --   02/28/20 1702 98.1 °F (36.7 °C) 89 17 (!) 148/92 100 %   02/28/20 1058 98.4 °F (36.9 °C) 97 17 148/67 98 %       Intake/Output Summary (Last 24 hours) at 2/29/2020 1049  Last data filed at 2/29/2020 0948  Gross per 24 hour   Intake 1400 ml   Output 325 ml   Net 1075 ml        PHYSICAL EXAM:  General: No acute distress. AA male. EENT:  EOMI. Anicteric sclerae. MMM  Resp:  Lungs clear, no wheezing or rales. No accessory muscle use  CV:  RRR,  No edema  GI:  Soft, Non distended, Non tender.  +Bowel sounds  Neurologic:  Alert and oriented X 3, normal speech,   Psych:   Good insight. Not anxious nor agitated  Skin:  No rashes. No jaundice. Left foot with clean dressing. Podiatry to change dressing today    Reviewed most current lab test results and cultures  YES  Reviewed most current radiology test results   YES  Review and summation of old records today    NO  Reviewed patient's current orders and MAR    YES  PMH/ reviewed - no change compared to H&P  ________________________________________________________________________  Care Plan discussed with:    Comments   Patient x    Family      RN x    Care Manager     Consultant                        Multidiciplinary team rounds were held today with , nursing, pharmacist and clinical coordinator. Patient's plan of care was discussed; medications were reviewed and discharge planning was addressed.      ________________________________________________________________________  Total NON critical care TIME:  25   Minutes    Total CRITICAL CARE TIME Spent:   Minutes non procedure based      Comments   >50% of visit spent in counseling and coordination of care ________________________________________________________________________  Pema Villarreal NP     Procedures: see electronic medical records for all procedures/Xrays and details which were not copied into this note but were reviewed prior to creation of Plan. LABS:  I reviewed today's most current labs and imaging studies.   Pertinent labs include:  Recent Labs     02/29/20 0107 02/28/20 0218 02/27/20 0346   WBC 10.1 9.9 11.0   HGB 10.8* 11.0* 11.0*   HCT 32.8* 33.4* 33.2*    370 397     Recent Labs     02/29/20 0107 02/28/20 0218 02/27/20 0346   * 135* 134*   K 3.4* 4.0 3.8    105 106   CO2 28 28 26   * 230* 197*   BUN 25* 23* 22*   CREA 1.26 1.40* 1.27   CA 7.7* 8.1* 8.1*       Signed: Pema Villarreal NP

## 2020-02-29 NOTE — PROGRESS NOTES
Problem: Falls - Risk of  Goal: *Absence of Falls  Description  Document Raul Dove Fall Risk and appropriate interventions in the flowsheet. Outcome: Progressing Towards Goal  Note: Fall Risk Interventions:  Mobility Interventions: Patient to call before getting OOB    Mentation Interventions: Room close to nurse's station    Medication Interventions: Patient to call before getting OOB    Elimination Interventions: Call light in reach    History of Falls Interventions: Room close to nurse's station         Problem: Patient Education: Go to Patient Education Activity  Goal: Patient/Family Education  Outcome: Progressing Towards Goal     Problem: Non-Violent Restraints  Goal: *Removal from restraints as soon as assessed to be safe  Outcome: Progressing Towards Goal  Goal: *No harm/injury to patient while restraints in use  Outcome: Progressing Towards Goal  Goal: *Patient's dignity will be maintained  Outcome: Progressing Towards Goal  Goal: *Patient Specific Goal (EDIT GOAL, INSERT TEXT)  Outcome: Progressing Towards Goal  Goal: Non-violent Restaints:Standard Interventions  Outcome: Progressing Towards Goal  Goal: Non-violent Restraints:Patient Interventions  Outcome: Progressing Towards Goal  Goal: Patient/Family Education  Outcome: Progressing Towards Goal     Problem: Pressure Injury - Risk of  Goal: *Prevention of pressure injury  Description  Document Sarwat Scale and appropriate interventions in the flowsheet.   Outcome: Progressing Towards Goal  Note: Pressure Injury Interventions:  Sensory Interventions: Assess need for specialty bed, Float heels, Keep linens dry and wrinkle-free, Maintain/enhance activity level, Minimize linen layers, Monitor skin under medical devices    Moisture Interventions: Apply protective barrier, creams and emollients    Activity Interventions: Increase time out of bed    Mobility Interventions: Float heels    Nutrition Interventions: Document food/fluid/supplement intake    Friction and Shear Interventions: HOB 30 degrees or less                Problem: Patient Education: Go to Patient Education Activity  Goal: Patient/Family Education  Outcome: Progressing Towards Goal     Problem: Diabetes Self-Management  Goal: *Disease process and treatment process  Description  Define diabetes and identify own type of diabetes; list 3 options for treating diabetes. Outcome: Progressing Towards Goal  Goal: *Incorporating nutritional management into lifestyle  Description  Describe effect of type, amount and timing of food on blood glucose; list 3 methods for planning meals. Outcome: Progressing Towards Goal  Goal: *Incorporating physical activity into lifestyle  Description  State effect of exercise on blood glucose levels. Outcome: Progressing Towards Goal  Goal: *Developing strategies to promote health/change behavior  Description  Define the ABC's of diabetes; identify appropriate screenings, schedule and personal plan for screenings. Outcome: Progressing Towards Goal  Goal: *Using medications safely  Description  State effect of diabetes medications on diabetes; name diabetes medication taking, action and side effects. Outcome: Progressing Towards Goal  Goal: *Monitoring blood glucose, interpreting and using results  Description  Identify recommended blood glucose targets  and personal targets. Outcome: Progressing Towards Goal  Goal: *Prevention, detection, treatment of acute complications  Description  List symptoms of hyper- and hypoglycemia; describe how to treat low blood sugar and actions for lowering  high blood glucose level. Outcome: Progressing Towards Goal  Goal: *Prevention, detection and treatment of chronic complications  Description  Define the natural course of diabetes and describe the relationship of blood glucose levels to long term complications of diabetes.   Outcome: Progressing Towards Goal  Goal: *Developing strategies to address psychosocial issues  Description  Describe feelings about living with diabetes; identify support needed and support network  Outcome: Progressing Towards Goal  Goal: *Insulin pump training  Outcome: Progressing Towards Goal  Goal: *Sick day guidelines  Outcome: Progressing Towards Goal  Goal: *Patient Specific Goal (EDIT GOAL, INSERT TEXT)  Outcome: Progressing Towards Goal     Problem: Patient Education: Go to Patient Education Activity  Goal: Patient/Family Education  Outcome: Progressing Towards Goal     Problem: Risk for Spread of Infection  Goal: Prevent transmission of infectious organism to others  Description  Prevent the transmission of infectious organisms to other patients, staff members, and visitors.   Outcome: Progressing Towards Goal     Problem: Patient Education:  Go to Education Activity  Goal: Patient/Family Education  Outcome: Progressing Towards Goal

## 2020-02-29 NOTE — PROGRESS NOTES
Patient tolerated procedure well, and had a good viable appearing section of bone resection as a clear margin (see OP Report), left foot. I will change the dressing tomorrow.

## 2020-02-29 NOTE — ANESTHESIA POSTPROCEDURE EVALUATION
Procedure(s):  INCISION AND DRAINAGE WITH PARTIAL RESECTION OF 1ST METATARSAL LEFT FOOT.    MAC, total IV anesthesia    Anesthesia Post Evaluation        Patient location during evaluation: PACU  Note status: Adequate. Level of consciousness: responsive to verbal stimuli and sleepy but conscious  Pain management: satisfactory to patient  Airway patency: patent  Anesthetic complications: no  Cardiovascular status: acceptable  Respiratory status: acceptable  Hydration status: acceptable  Comments: +Post-Anesthesia Evaluation and Assessment    Patient: Dionne Stevenson MRN: [id number]  SSN: [id number]   YOB: 1965  Age: 48 y.o. Sex: male      Cardiovascular Function/Vital Signs    /73   Pulse 79   Temp 36.7 °C (98.1 °F)   Resp 13   Ht 5' 8\" (1.727 m)   Wt 69.4 kg (153 lb)   SpO2 100%   BMI 23.26 kg/m²     Patient is status post Procedure(s):  INCISION AND DRAINAGE WITH PARTIAL RESECTION OF 1ST METATARSAL LEFT FOOT. Nausea/Vomiting: Controlled. Postoperative hydration reviewed and adequate. Pain:  Pain Scale 1: Numeric (0 - 10) (02/28/20 1930)  Pain Intensity 1: 0 (02/28/20 1930)   Managed. Neurological Status:   Neuro (WDL): Exceptions to WDL (02/21/20 1645)   At baseline. Mental Status and Level of Consciousness: Arousable. Pulmonary Status:   O2 Device: Room air (02/28/20 1930)   Adequate oxygenation and airway patent. Complications related to anesthesia: None    Post-anesthesia assessment completed. No concerns. Signed By: Reynaldo Shaw MD    2/28/2020  Post anesthesia nausea and vomiting:  controlled      Vitals Value Taken Time   BP 94/67 2/28/2020  7:25 PM   Temp 36.7 °C (98.1 °F) 2/28/2020  7:08 PM   Pulse 79 2/28/2020  7:29 PM   Resp 17 2/28/2020  7:29 PM   SpO2 100 % 2/28/2020  7:29 PM   Vitals shown include unvalidated device data.

## 2020-02-29 NOTE — OP NOTES
[address]  OPERATIVE REPORT    Name:  Galo Kam  MR#:  [id number]  :  1965  ACCOUNT #:  [de-identified]  DATE OF SERVICE:  2020      PREOPERATIVE DIAGNOSIS:  Osteomyelitis, first metatarsal, left foot. POSTOPERATIVE DIAGNOSIS:  Osteomyelitis, first metatarsal, left foot. PROCEDURE PERFORMED:  Partial resection of distal first metatarsal with samples sent to pathology for identification, left foot. SURGEON:  Jeferson Nunez MD    ASSISTANT:  None. ANESTHESIA:  General.    HEMOSTASIS:  Esmarch bandage used at midfoot level, left. COMPLICATIONS:  None apparent. SPECIMENS REMOVED:  Microbiology:  Deep tissue culture at surgical site and distal first metatarsal region in the left foot. Pathology:  One section of distal first metatarsal sent to pathology and a second more proximal section of 1 cm of viable appearing bone structure sent to pathology in separate container. IMPLANTS:  None. ESTIMATED BLOOD LOSS:  Approximately 5 mL. INJECTABLES:  14 mL of 0.5% Marcaine plain injected in left foot. OPERATIVE TECHNIQUE:  The patient was brought to the operating table in supine position. Anesthesiologist administered general anesthesia followed by local infiltrative block, the above-mentioned anesthesia, left foot. Foot was then prepped and draped in the usual aseptic technique, left side. An Esmarch bandage was utilized for hemostasis, left side at midfoot and rearfoot level. Access was gained towards the dorsal aspect of the first metatarsal just proximal to the previous incision from last week, a 4-cm linear incision was placed with care being taken to avoid the neurovascular structures. All superficial bleeders were cauterized as necessary. Full-thickness capsule incision was taken down to expose the distal first metatarsal.  No purulence was encountered, no sinus tracking or proximal damage to bone was encountered.   No purulence or deep severe atrophy of tissue was seen. Good bleeding tissue edges were appreciated throughout. 1.5 cm of the distal first metatarsal was then resected flush at 90 degree perpendicular angle and this was sent to pathology labeled distal first metatarsal.  Next, the area was copiously irrigated with bacitracin solution and any nonviable soft tissue was debrided. Tendon tissue was found to be viable with normal extensor and flexor tendon tissue and normal soft tissue with good bleeding tissue edges. At this point, the remaining 1.5-2 cm section of bone was resected with sagittal saw, with viable appearing bone and normal bone marrow tissue seen with no purulence, no foul odor and good solid cortical structure noted. This was placed in separate containers for pathology labeled viable proximal bone of first metatarsal, left foot. Once this was completed, no sharp edges were seen along the plantar aspect of the bone and soft palpable tissue was seen from the plantar aspect. The area was again copiously irrigated with saline bacitracin solution. All bleeders were again assessed and found to be containing no bleeders, but good bleeding tissue edges and viable tissue remaining only in the surgical site. The area was then placed together with light coaptation using 4-0 nylon simple interrupted fashion. Tourniquet was removed showing immediate hyperemic flush to distal stump and digits 2 through 5, left foot. Dressing was now completed with Xeroform, 4x4 padded sterile gauze dressing and Coban, left foot. The patient tolerated the above procedure and anesthesia well and was discharged from the operating room. Vital signs stable and vascular status intact to the left foot. Prognosis for this patient is satisfactory.         Nikko Hsu MD      [id number]  D:  02/28/2020 19:08  T:  02/29/2020 0:03  JOB #:  [id number]

## 2020-02-29 NOTE — PROGRESS NOTES
Pt and family request nurse to ask MD for temporary handicap permit at D/C and to contact  about starting disability paper work.     Primary nurse notifying care team

## 2020-02-29 NOTE — PERIOP NOTES
1906 Handoff Report from Operating Room to PACU    Report received from [facility] CRNA regarding Ty Madrigal. Surgeon(s):  Erika Campbell DPM  And Procedure(s) (LRB):  INCISION AND DRAINAGE WITH PARTIAL RESECTION OF 1ST METATARSAL LEFT FOOT (Left)  confirmed   with allergies and dressings discussed. Anesthesia type, drugs, patient history, complications, estimated blood loss, vital signs, intake and output, and last pain medication, lines and temperature were reviewed. 1935 TRANSFER - OUT REPORT:    Verbal report given to Linsey FRAUSTO(name) on Ty Madrigal  being transferred to [facility] for routine post - op       Report consisted of patients Situation, Background, Assessment and   Recommendations(SBAR). Information from the following report(s) SBAR, Kardex, OR Summary, Procedure Summary, Intake/Output and MAR was reviewed with the receiving nurse. Opportunity for questions and clarification was provided.       1939 Patient transported with:   Registered Nurse  Tech

## 2020-02-29 NOTE — PROGRESS NOTES
General Surgery End of Shift Nursing Note     Bedside shift change report given to Hilda FRAUSTO (oncoming nurse) by Karin FRAUSTO (offgoing nurse). Report included the following information SBAR, Kardex, Intake/Output, MAR, Accordion and Recent Results.     Shift worked:   7p-7a   Summary of shift:   No issues overnight. Patient in good spirits. Complaining of toe pain. PRN perc ordered 5mg Q4.  Ativan given Q6 for \"withdrawal\" symptoms/ agitation   Issues for physician to address:        Number times ambulated in [facility] shift: 3  Number of times OOB to chair past shift: 3        GI:     Current diet:  DIET DIABETIC     Tolerating current diet: YES  Passing flatus: YES  Last Bowel Movement: yesterday              Appearance: soft     Respiratory:     Incentive Spirometer at bedside: YES  Patient instructed on use: YES     Patient Safety:     Falls Score: 4  Bed Alarm On? No  Sitter? No     Karin Herzog

## 2020-02-29 NOTE — PROGRESS NOTES
Problem: Falls - Risk of  Goal: *Absence of Falls  Description  Document Tia Manus Fall Risk and appropriate interventions in the flowsheet.   Outcome: Progressing Towards Goal  Note: Fall Risk Interventions:  Mobility Interventions: Patient to call before getting OOB    Mentation Interventions: Room close to nurse's station    Medication Interventions: Patient to call before getting OOB    Elimination Interventions: Call light in reach    History of Falls Interventions: Room close to nurse's station         Problem: Patient Education: Go to Patient Education Activity  Goal: Patient/Family Education  Outcome: Progressing Towards Goal     Problem: Non-Violent Restraints  Goal: *Removal from restraints as soon as assessed to be safe  Outcome: Progressing Towards Goal  Goal: *No harm/injury to patient while restraints in use  Outcome: Progressing Towards Goal  Goal: *Patient's dignity will be maintained  Outcome: Progressing Towards Goal  Goal: *Patient Specific Goal (EDIT GOAL, INSERT TEXT)  Outcome: Progressing Towards Goal  Goal: Non-violent Restaints:Standard Interventions  Outcome: Progressing Towards Goal  Goal: Non-violent Restraints:Patient Interventions  Outcome: Progressing Towards Goal  Goal: Patient/Family Education  Outcome: Progressing Towards Goal

## 2020-03-01 LAB
BACTERIA SPEC CULT: NORMAL
GLUCOSE BLD STRIP.AUTO-MCNC: 309 MG/DL (ref 65–100)
GLUCOSE BLD STRIP.AUTO-MCNC: 313 MG/DL (ref 65–100)
GLUCOSE BLD STRIP.AUTO-MCNC: 480 MG/DL (ref 65–100)
GLUCOSE BLD STRIP.AUTO-MCNC: 493 MG/DL (ref 65–100)
GLUCOSE BLD STRIP.AUTO-MCNC: 556 MG/DL (ref 65–100)
SERVICE CMNT-IMP: ABNORMAL
SERVICE CMNT-IMP: NORMAL

## 2020-03-01 PROCEDURE — 65270000029 HC RM PRIVATE

## 2020-03-01 PROCEDURE — 74011250637 HC RX REV CODE- 250/637: Performed by: HOSPITALIST

## 2020-03-01 PROCEDURE — 74011250637 HC RX REV CODE- 250/637: Performed by: NURSE PRACTITIONER

## 2020-03-01 PROCEDURE — 74011250636 HC RX REV CODE- 250/636: Performed by: INTERNAL MEDICINE

## 2020-03-01 PROCEDURE — 74011000258 HC RX REV CODE- 258: Performed by: INTERNAL MEDICINE

## 2020-03-01 PROCEDURE — 74011636637 HC RX REV CODE- 636/637: Performed by: NURSE PRACTITIONER

## 2020-03-01 PROCEDURE — 74011636637 HC RX REV CODE- 636/637: Performed by: INTERNAL MEDICINE

## 2020-03-01 PROCEDURE — 74011250637 HC RX REV CODE- 250/637: Performed by: INTERNAL MEDICINE

## 2020-03-01 PROCEDURE — 94760 N-INVAS EAR/PLS OXIMETRY 1: CPT

## 2020-03-01 PROCEDURE — 82962 GLUCOSE BLOOD TEST: CPT

## 2020-03-01 RX ORDER — CLONIDINE HYDROCHLORIDE 0.1 MG/1
0.1 TABLET ORAL
Status: DISCONTINUED | OUTPATIENT
Start: 2020-03-01 | End: 2020-03-03 | Stop reason: HOSPADM

## 2020-03-01 RX ORDER — INSULIN LISPRO 100 [IU]/ML
10 INJECTION, SOLUTION INTRAVENOUS; SUBCUTANEOUS ONCE
Status: COMPLETED | OUTPATIENT
Start: 2020-03-01 | End: 2020-03-01

## 2020-03-01 RX ORDER — LORAZEPAM 0.5 MG/1
0.5 TABLET ORAL
Status: DISCONTINUED | OUTPATIENT
Start: 2020-03-01 | End: 2020-03-03 | Stop reason: HOSPADM

## 2020-03-01 RX ORDER — CARVEDILOL 12.5 MG/1
12.5 TABLET ORAL 2 TIMES DAILY
Status: DISCONTINUED | OUTPATIENT
Start: 2020-03-01 | End: 2020-03-03 | Stop reason: HOSPADM

## 2020-03-01 RX ORDER — INSULIN GLARGINE 100 [IU]/ML
15 INJECTION, SOLUTION SUBCUTANEOUS
Status: DISCONTINUED | OUTPATIENT
Start: 2020-03-01 | End: 2020-03-02

## 2020-03-01 RX ORDER — INSULIN LISPRO 100 [IU]/ML
INJECTION, SOLUTION INTRAVENOUS; SUBCUTANEOUS
Status: DISCONTINUED | OUTPATIENT
Start: 2020-03-01 | End: 2020-03-03 | Stop reason: HOSPADM

## 2020-03-01 RX ADMIN — CEFEPIME HYDROCHLORIDE 2 G: 2 INJECTION, POWDER, FOR SOLUTION INTRAVENOUS at 05:11

## 2020-03-01 RX ADMIN — FUROSEMIDE 40 MG: 40 TABLET ORAL at 08:31

## 2020-03-01 RX ADMIN — LORAZEPAM 0.5 MG: 0.5 TABLET ORAL at 23:18

## 2020-03-01 RX ADMIN — OXYCODONE HYDROCHLORIDE AND ACETAMINOPHEN 1 TABLET: 5; 325 TABLET ORAL at 13:18

## 2020-03-01 RX ADMIN — LORAZEPAM 2 MG: 2 INJECTION INTRAMUSCULAR; INTRAVENOUS at 04:54

## 2020-03-01 RX ADMIN — LORAZEPAM 0.5 MG: 0.5 TABLET ORAL at 17:37

## 2020-03-01 RX ADMIN — DAPTOMYCIN 600 MG: 500 INJECTION, POWDER, LYOPHILIZED, FOR SOLUTION INTRAVENOUS at 21:11

## 2020-03-01 RX ADMIN — INSULIN GLARGINE 15 UNITS: 100 INJECTION, SOLUTION SUBCUTANEOUS at 21:12

## 2020-03-01 RX ADMIN — LORAZEPAM 0.5 MG: 0.5 TABLET ORAL at 11:20

## 2020-03-01 RX ADMIN — Medication 1 AMPULE: at 08:31

## 2020-03-01 RX ADMIN — INSULIN LISPRO 10 UNITS: 100 INJECTION, SOLUTION INTRAVENOUS; SUBCUTANEOUS at 08:00

## 2020-03-01 RX ADMIN — INSULIN LISPRO 12 UNITS: 100 INJECTION, SOLUTION INTRAVENOUS; SUBCUTANEOUS at 11:30

## 2020-03-01 RX ADMIN — PREGABALIN 150 MG: 25 CAPSULE ORAL at 17:36

## 2020-03-01 RX ADMIN — CARVEDILOL 6.25 MG: 6.25 TABLET, FILM COATED ORAL at 08:31

## 2020-03-01 RX ADMIN — Medication 10 ML: at 21:12

## 2020-03-01 RX ADMIN — PREGABALIN 150 MG: 25 CAPSULE ORAL at 08:31

## 2020-03-01 RX ADMIN — LISINOPRIL 10 MG: 10 TABLET ORAL at 08:31

## 2020-03-01 RX ADMIN — OXYCODONE HYDROCHLORIDE AND ACETAMINOPHEN 1 TABLET: 5; 325 TABLET ORAL at 17:37

## 2020-03-01 RX ADMIN — CEFEPIME HYDROCHLORIDE 2 G: 2 INJECTION, POWDER, FOR SOLUTION INTRAVENOUS at 21:13

## 2020-03-01 RX ADMIN — CARVEDILOL 12.5 MG: 12.5 TABLET, FILM COATED ORAL at 17:37

## 2020-03-01 RX ADMIN — OXYCODONE HYDROCHLORIDE AND ACETAMINOPHEN 1 TABLET: 5; 325 TABLET ORAL at 08:31

## 2020-03-01 RX ADMIN — DAKIN'S SOLUTION 0.125% (QUARTER STRENGTH): 0.12 SOLUTION at 11:20

## 2020-03-01 RX ADMIN — AMLODIPINE BESYLATE 10 MG: 5 TABLET ORAL at 08:31

## 2020-03-01 RX ADMIN — INSULIN LISPRO 10 UNITS: 100 INJECTION, SOLUTION INTRAVENOUS; SUBCUTANEOUS at 16:30

## 2020-03-01 RX ADMIN — ENOXAPARIN SODIUM 40 MG: 40 INJECTION SUBCUTANEOUS at 21:12

## 2020-03-01 RX ADMIN — INSULIN LISPRO 5 UNITS: 100 INJECTION, SOLUTION INTRAVENOUS; SUBCUTANEOUS at 21:12

## 2020-03-01 RX ADMIN — OXYCODONE HYDROCHLORIDE AND ACETAMINOPHEN 1 TABLET: 5; 325 TABLET ORAL at 21:21

## 2020-03-01 RX ADMIN — CEFEPIME HYDROCHLORIDE 2 G: 2 INJECTION, POWDER, FOR SOLUTION INTRAVENOUS at 13:18

## 2020-03-01 NOTE — PROGRESS NOTES
Patient is seen today, dressing change showing no dehiscence. A bit of ecchymosis from the surgery, I understand he had been walking on it excessively outside of his hospital section earlier today. I explained to him to remain off of foot as much as possible to utilize the surgical shoe for any necessary walking. I am waiting on pathology result. He shows no dehiscence, sutures intact, no foul odor, minimal drainage. Sterile dressing reapplied, the nursing staff can change this tomorrow.

## 2020-03-01 NOTE — PROGRESS NOTES
Telemed: Paged as follows- RN walked into patients room to find empty candy packages and apple juice in the patients room . That being said, Patients blood glucose at HS was 555 which is outside the parameters for insulin amounts to give.  Thanks    Plan: Chart reviewed and will order Lantus 10 units SQ QHS and Lispro 10 units once now SQ

## 2020-03-01 NOTE — PROGRESS NOTES
Patient was educated by Nabil Duran about not leaving the floor. He was found outside ED in the parking lot. Patient asked to please not leave the unit and to notify his nurse at any time leaving the room. Writer educated patient that if this behavior continues that we will need to put a telesitter outside of his room to alert staff of his whereabouts. Patient then called CNA and LPN demanding his Ativan PRN. Patient then yelled for his primary nurse Guillermina Marsh RN to get his Ativan. Nurse talked with him in depth about not feeling comfortable giving him Ativan if he is unable to stand erect, sit up, and follow commands. Patient expressed that he was given the order for drug withdrawal and that he should be able to get it whenever he wants to. Writer feels that he should get a drug test to compare to his positive drug test on admission. Writer feels his Ativan should be decreased or omitted. Writer reiterated that if patient behavior continues then he will have a telesitter outside of his door.     Felecia Conti RN  Resource Nurse

## 2020-03-01 NOTE — PROGRESS NOTES
Problem: Falls - Risk of  Goal: *Absence of Falls  Description  Document Astrid Ayoub Fall Risk and appropriate interventions in the flowsheet. Outcome: Progressing Towards Goal  Note: Fall Risk Interventions:  Mobility Interventions: Patient to call before getting OOB    Mentation Interventions: Room close to nurse's station    Medication Interventions: Patient to call before getting OOB    Elimination Interventions: Call light in reach    History of Falls Interventions: Room close to nurse's station         Problem: Patient Education: Go to Patient Education Activity  Goal: Patient/Family Education  Outcome: Progressing Towards Goal     Problem: Non-Violent Restraints  Goal: *Removal from restraints as soon as assessed to be safe  Outcome: Progressing Towards Goal  Goal: *No harm/injury to patient while restraints in use  Outcome: Progressing Towards Goal  Goal: *Patient's dignity will be maintained  Outcome: Progressing Towards Goal  Goal: *Patient Specific Goal (EDIT GOAL, INSERT TEXT)  Outcome: Progressing Towards Goal  Goal: Non-violent Restaints:Standard Interventions  Outcome: Progressing Towards Goal  Goal: Non-violent Restraints:Patient Interventions  Outcome: Progressing Towards Goal  Goal: Patient/Family Education  Outcome: Progressing Towards Goal     Problem: Pressure Injury - Risk of  Goal: *Prevention of pressure injury  Description  Document Sarwat Scale and appropriate interventions in the flowsheet.   Outcome: Progressing Towards Goal  Note: Pressure Injury Interventions:  Sensory Interventions: Assess need for specialty bed, Float heels    Moisture Interventions: Apply protective barrier, creams and emollients    Activity Interventions: Increase time out of bed    Mobility Interventions: Float heels    Nutrition Interventions: Document food/fluid/supplement intake    Friction and Shear Interventions: HOB 30 degrees or less                Problem: Patient Education: Go to Patient Education Activity  Goal: Patient/Family Education  Outcome: Progressing Towards Goal     Problem: Diabetes Self-Management  Goal: *Disease process and treatment process  Description  Define diabetes and identify own type of diabetes; list 3 options for treating diabetes. Outcome: Progressing Towards Goal  Goal: *Incorporating nutritional management into lifestyle  Description  Describe effect of type, amount and timing of food on blood glucose; list 3 methods for planning meals. Outcome: Progressing Towards Goal  Goal: *Incorporating physical activity into lifestyle  Description  State effect of exercise on blood glucose levels. Outcome: Progressing Towards Goal  Goal: *Developing strategies to promote health/change behavior  Description  Define the ABC's of diabetes; identify appropriate screenings, schedule and personal plan for screenings. Outcome: Progressing Towards Goal  Goal: *Using medications safely  Description  State effect of diabetes medications on diabetes; name diabetes medication taking, action and side effects. Outcome: Progressing Towards Goal  Goal: *Monitoring blood glucose, interpreting and using results  Description  Identify recommended blood glucose targets  and personal targets. Outcome: Progressing Towards Goal  Goal: *Prevention, detection, treatment of acute complications  Description  List symptoms of hyper- and hypoglycemia; describe how to treat low blood sugar and actions for lowering  high blood glucose level. Outcome: Progressing Towards Goal  Goal: *Prevention, detection and treatment of chronic complications  Description  Define the natural course of diabetes and describe the relationship of blood glucose levels to long term complications of diabetes.   Outcome: Progressing Towards Goal  Goal: *Developing strategies to address psychosocial issues  Description  Describe feelings about living with diabetes; identify support needed and support network  Outcome: Progressing Towards Goal  Goal: *Insulin pump training  Outcome: Progressing Towards Goal  Goal: *Sick day guidelines  Outcome: Progressing Towards Goal  Goal: *Patient Specific Goal (EDIT GOAL, INSERT TEXT)  Outcome: Progressing Towards Goal     Problem: Patient Education: Go to Patient Education Activity  Goal: Patient/Family Education  Outcome: Progressing Towards Goal     Problem: Risk for Spread of Infection  Goal: Prevent transmission of infectious organism to others  Description  Prevent the transmission of infectious organisms to other patients, staff members, and visitors.   Outcome: Progressing Towards Goal     Problem: Patient Education:  Go to Education Activity  Goal: Patient/Family Education  Outcome: Progressing Towards Goal

## 2020-03-01 NOTE — PROGRESS NOTES
Problem: Falls - Risk of  Goal: *Absence of Falls  Description  Document Jorden Page Fall Risk and appropriate interventions in the flowsheet.   Outcome: Progressing Towards Goal  Note: Fall Risk Interventions:  Mobility Interventions: Patient to call before getting OOB    Mentation Interventions: Room close to nurse's station    Medication Interventions: Patient to call before getting OOB    Elimination Interventions: Call light in reach    History of Falls Interventions: Room close to nurse's station         Problem: Patient Education: Go to Patient Education Activity  Goal: Patient/Family Education  Outcome: Progressing Towards Goal     Problem: Non-Violent Restraints  Goal: *Removal from restraints as soon as assessed to be safe  Outcome: Progressing Towards Goal  Goal: *No harm/injury to patient while restraints in use  Outcome: Progressing Towards Goal  Goal: *Patient's dignity will be maintained  Outcome: Progressing Towards Goal  Goal: *Patient Specific Goal (EDIT GOAL, INSERT TEXT)  Outcome: Progressing Towards Goal  Goal: Non-violent Restaints:Standard Interventions  Outcome: Progressing Towards Goal  Goal: Non-violent Restraints:Patient Interventions  Outcome: Progressing Towards Goal  Goal: Patient/Family Education  Outcome: Progressing Towards Goal

## 2020-03-01 NOTE — PROGRESS NOTES
3/1/2020  1:32 PM - Artemio, Lab In [facility]     Specimen Information: Toe        Component Value Flag Ref Range Units Status   Special Requests:      Preliminary   NO SPECIAL REQUESTS    GRAM STAIN RARE WBCS SEEN      Preliminary   GRAM STAIN NO ORGANISMS SEEN      Preliminary   Culture result: Abnormal       Preliminary   RARE GRAM NEGATIVE RODS . Peace Swenson .(OXIDASE POSITIVE)

## 2020-03-01 NOTE — PROGRESS NOTES
Pt seen by surgeon, able to ambulate on foot but minimal, shoe ordered, and agreeable to temporary handicap form if hospitalist sign for it       Shoe placed on pt from ortho unit

## 2020-03-01 NOTE — PROGRESS NOTES
Patient found by security outside the ER. Returned by security. Patient initially told RN that he was going to the galley to heat up his sandwich. Patient returned to floor with a subway bag. Patient and spouse educated indepth on not leaving the floor/risks/saftey/fall concerns. Patient was not acting any different than when he was seen before his success of leaving the floor. Due to history of polysubstance abuse and previous positive drug use while in hospital stay, could benefit from drug test........ Side note: At the start of RN shift patient was very drowsy in the room. Was found stumbling in the hallway stating afterwards he thought he was on the street and was out of it. Patient returned to bed and bed alarm was placed on. Patient slept for the majority of the beginning of my shift with out disruption. Upon assessment and patient interaction patient asked for ativan RN did not find it appropriate at the time due to patients level of consciousness being very drowsy. Patient quickly fell back asleep after RN interaction. After the fact educated indepth regarding side effects of medication/fall prevention/risk. I concur with note of charge nurse about medication and her note regarding situation. Patient is currently resting in the room and understands the consequences of leaving the floor and medication requirements.        Vineet Fischer RN

## 2020-03-01 NOTE — PROGRESS NOTES
I reviewed pertinent labs and imaging, and discussed /agreed on the plan of care with Dr. Vishal Tavarez. Hospitalist Progress Note    NAME: Neymar Arellano   :  1965   MRN:  [id number]     History of Present Illness:  Neymar Arellano is a 49 y.o.  male with past medical history significant for polysubstance abuse including IV heroin abuse, insulin-dependent type 2 diabetes, hypertension and peripheral neuropathy who recently [place] distal [place] of [place] [facility] AGAINST MEDICAL ADVICE 2 weeks ago, he presents with the complaints of sudden onset fever chills and shortness of breath this morning. He also reports increasing discharge from his left big toe phalanx, patient admits to injecting heroin yesterday. He is noted to be septic in the emergency department with purulent discharge from wound on the left big toe. X-ray showed osteomyelitis. Assessment / Plan:  POD #2  Disposition per podiatry and ID. Pathology results pending    Sepsis, POA d/t left great toe OM  Acute metabolic encephalopathy d/t infection  Left great toe OM  MRSA bacteremia, results from recent admission at [facility]  · Recently left [facility] AMA  · Per pharmacy report from [facility]:  2020- BC with MRSA, susceptible to vancomycin. 2020- Toe wound with moderate gram positive cocci, beta hemolytic strep group b, susceptible to clindamycin, tetracycline, bactrim, and vancomycin; resistant to oxacillin   · Moved out of ICU 2020; was in ICU for sedation, precedex and ativan for severe delirium   · BC NGTD  · Continue IV vancomycin and cefepime  · Completed 7 days of Flagyl   · ECHO with EF 25-30%  · Appreciate cardiology input; stress test was scheduled for today however patient ate breakfast knowing he should have been fasting. Will attempt stress test tomorrow. · S/p I & D with amputation of left great toe and resection of partial fist metatarsal head  · Final pathologic results: 1.  Viable bone margin 1st metatarsal:  Acute chondritis and patchy osteomyelitis  2. Necrotic great toe of left foot, amputation: Cutaneous ulcer with suppurative cellulitis, abscess and osteomyelitis  · Wound cultures with MRSA and beta hemolytic group b streptococci   · Appreciate ID input   · 02/28/2020:  Taken back to OR with podiatry for further resection. Cultures pending. Cardiomyopathy with acute systolic heart failure  Secondary hyperaldosteronism r/t HF  Acute pulmonary edema, suspect cocaine related vs valvular etiology as IV drug abuse  · ECHO with EF 25-30%  · Continue IV diuretics  · Off O2  · Appreciate cardiology input   · NT pro-BNP 10,870  · 02/22/2020:  CXR- Some improvement in the pulmonary edema  · Continue BB and lisinopril   · Stress test negative; cardiology signed off 02/28/2020    Hypokalemia  · K 3.4; replace and monitor     IDDM type 2  Peripheral neuropathy r/t DM   · Hgb A1c 8.0  · BS AC&HS  · SSI   · Hx of noncompliance     Polysubstance abuse  · UDS +cocaine and opiates    HTN  · Continue amlodipine and coreg     18.5 - 24.9 Normal weight / Body mass index is 23.26 kg/m². Code status: Full  Prophylaxis: Lovenox  Recommended Disposition: TBD     Subjective:     Chief Complaint / Reason for Physician Visit  Per nursing patient stated he was going to heat up food last night in the galley, however, when nursing went to check on him, he was not there. Security located patient in parking lot with significant other. Discussed with RN events overnight. Review of Systems:  Symptom Y/N Comments  Symptom Y/N Comments   Fever/Chills n   Chest Pain n    Poor Appetite n   Edema     Cough    Abdominal Pain     Sputum    Joint Pain     SOB/DE [place] CRUZ n   Pruritis/Rash     Nausea/vomit n   Tolerating PT/OT     Diarrhea n   Tolerating Diet y    Constipation n   Other       Could NOT obtain due to:      Objective:     VITALS:   Last 24hrs VS reviewed since prior progress note.  Most recent are:  Patient Vitals for the past 24 hrs:   Temp Pulse Resp BP SpO2   03/01/20 0810 98.4 °F (36.9 °C) (!) 107 18 (!) 182/96 100 %   03/01/20 0311 98.3 °F (36.8 °C) (!) 102 18 161/85 100 %   02/29/20 2010 98.2 °F (36.8 °C) (!) 101 18 149/85 97 %   02/29/20 1517 98.2 °F (36.8 °C) (!) 104 18 155/82 100 %   02/29/20 1142 98.6 °F (37 °C) 95 17 147/87 100 %       Intake/Output Summary (Last 24 hours) at 3/1/2020 1002  Last data filed at 3/1/2020 0907  Gross per 24 hour   Intake 1240 ml   Output 1700 ml   Net -460 ml        PHYSICAL EXAM:  General: AA male. NAD  EENT:  EOMI. Anicteric sclerae. MMM  Resp:  CTA no wheezing or rales. No accessory muscle use  CV:  Regular rate rhythm,  No edema  GI:  Soft, Non distended, Non tender.  +Bowel sounds  Neurologic:  Alert and oriented X 3, normal speech,   Psych:   Good insight. Not anxious nor agitated  Skin:  No rashes. No jaundice. Left foot with clean dressing. Reviewed most current lab test results and cultures  YES  Reviewed most current radiology test results   YES  Review and summation of old records today    NO  Reviewed patient's current orders and MAR    YES  PMH/SH reviewed - no change compared to H&P  ________________________________________________________________________  Care Plan discussed with:    Comments   Patient x    Family      RN x    Care Manager     Consultant                        Multidiciplinary team rounds were held today with , nursing, pharmacist and clinical coordinator. Patient's plan of care was discussed; medications were reviewed and discharge planning was addressed.      ________________________________________________________________________  Total NON critical care TIME:  25   Minutes    Total CRITICAL CARE TIME Spent:   Minutes non procedure based      Comments   >50% of visit spent in counseling and coordination of care     ________________________________________________________________________  Ailyn Holman NP Procedures: see electronic medical records for all procedures/Xrays and details which were not copied into this note but were reviewed prior to creation of Plan. LABS:  I reviewed today's most current labs and imaging studies.   Pertinent labs include:  Recent Labs     02/29/20 0107 02/28/20 0218   WBC 10.1 9.9   HGB 10.8* 11.0*   HCT 32.8* 33.4*    370     Recent Labs     02/29/20 0107 02/28/20 0218   * 135*   K 3.4* 4.0    105   CO2 28 28   * 230*   BUN 25* 23*   CREA 1.26 1.40*   CA 7.7* 8.1*       Signed: Connor Pizano NP

## 2020-03-02 LAB
ANION GAP SERPL CALC-SCNC: 6 MMOL/L (ref 5–15)
BACTERIA SPEC CULT: ABNORMAL
BUN SERPL-MCNC: 25 MG/DL (ref 6–20)
BUN/CREAT SERPL: 17 (ref 12–20)
CALCIUM SERPL-MCNC: 8.2 MG/DL (ref 8.5–10.1)
CHLORIDE SERPL-SCNC: 104 MMOL/L (ref 97–108)
CK SERPL-CCNC: 61 U/L (ref 39–308)
CO2 SERPL-SCNC: 24 MMOL/L (ref 21–32)
CREAT SERPL-MCNC: 1.49 MG/DL (ref 0.7–1.3)
ERYTHROCYTE [DISTWIDTH] IN BLOOD BY AUTOMATED COUNT: 15.1 % (ref 11.5–14.5)
GLUCOSE BLD STRIP.AUTO-MCNC: 341 MG/DL (ref 65–100)
GLUCOSE BLD STRIP.AUTO-MCNC: 349 MG/DL (ref 65–100)
GLUCOSE BLD STRIP.AUTO-MCNC: 408 MG/DL (ref 65–100)
GLUCOSE BLD STRIP.AUTO-MCNC: 475 MG/DL (ref 65–100)
GLUCOSE SERPL-MCNC: 346 MG/DL (ref 65–100)
GRAM STN SPEC: ABNORMAL
GRAM STN SPEC: ABNORMAL
HCT VFR BLD AUTO: 32.9 % (ref 36.6–50.3)
HGB BLD-MCNC: 10.6 G/DL (ref 12.1–17)
MCH RBC QN AUTO: 26.4 PG (ref 26–34)
MCHC RBC AUTO-ENTMCNC: 32.2 G/DL (ref 30–36.5)
MCV RBC AUTO: 81.8 FL (ref 80–99)
NRBC # BLD: 0 K/UL (ref 0–0.01)
NRBC BLD-RTO: 0 PER 100 WBC
PLATELET # BLD AUTO: 400 K/UL (ref 150–400)
PMV BLD AUTO: 10.6 FL (ref 8.9–12.9)
POTASSIUM SERPL-SCNC: 4.2 MMOL/L (ref 3.5–5.1)
RBC # BLD AUTO: 4.02 M/UL (ref 4.1–5.7)
SERVICE CMNT-IMP: ABNORMAL
SODIUM SERPL-SCNC: 134 MMOL/L (ref 136–145)
WBC # BLD AUTO: 13.5 K/UL (ref 4.1–11.1)

## 2020-03-02 PROCEDURE — 74011250637 HC RX REV CODE- 250/637: Performed by: INTERNAL MEDICINE

## 2020-03-02 PROCEDURE — 85027 COMPLETE CBC AUTOMATED: CPT

## 2020-03-02 PROCEDURE — 74011636637 HC RX REV CODE- 636/637: Performed by: NURSE PRACTITIONER

## 2020-03-02 PROCEDURE — 74011250636 HC RX REV CODE- 250/636: Performed by: INTERNAL MEDICINE

## 2020-03-02 PROCEDURE — 36415 COLL VENOUS BLD VENIPUNCTURE: CPT

## 2020-03-02 PROCEDURE — 74011250637 HC RX REV CODE- 250/637: Performed by: HOSPITALIST

## 2020-03-02 PROCEDURE — 74011250637 HC RX REV CODE- 250/637: Performed by: NURSE PRACTITIONER

## 2020-03-02 PROCEDURE — 65270000029 HC RM PRIVATE

## 2020-03-02 PROCEDURE — 80048 BASIC METABOLIC PNL TOTAL CA: CPT

## 2020-03-02 PROCEDURE — 82962 GLUCOSE BLOOD TEST: CPT

## 2020-03-02 PROCEDURE — 74011000258 HC RX REV CODE- 258: Performed by: INTERNAL MEDICINE

## 2020-03-02 PROCEDURE — 74011000250 HC RX REV CODE- 250: Performed by: EMERGENCY MEDICINE

## 2020-03-02 PROCEDURE — 82550 ASSAY OF CK (CPK): CPT

## 2020-03-02 RX ORDER — INSULIN LISPRO 100 [IU]/ML
15 INJECTION, SOLUTION INTRAVENOUS; SUBCUTANEOUS ONCE
Status: COMPLETED | OUTPATIENT
Start: 2020-03-02 | End: 2020-03-02

## 2020-03-02 RX ORDER — INSULIN GLARGINE 100 [IU]/ML
30 INJECTION, SOLUTION SUBCUTANEOUS
Status: DISCONTINUED | OUTPATIENT
Start: 2020-03-02 | End: 2020-03-03 | Stop reason: HOSPADM

## 2020-03-02 RX ORDER — INSULIN GLARGINE 100 [IU]/ML
20 INJECTION, SOLUTION SUBCUTANEOUS
Status: DISCONTINUED | OUTPATIENT
Start: 2020-03-02 | End: 2020-03-02

## 2020-03-02 RX ORDER — INSULIN LISPRO 100 [IU]/ML
10 INJECTION, SOLUTION INTRAVENOUS; SUBCUTANEOUS
Status: DISCONTINUED | OUTPATIENT
Start: 2020-03-02 | End: 2020-03-03 | Stop reason: HOSPADM

## 2020-03-02 RX ORDER — INSULIN LISPRO 100 [IU]/ML
18 INJECTION, SOLUTION INTRAVENOUS; SUBCUTANEOUS
Status: COMPLETED | OUTPATIENT
Start: 2020-03-02 | End: 2020-03-02

## 2020-03-02 RX ADMIN — LORAZEPAM 0.5 MG: 0.5 TABLET ORAL at 05:27

## 2020-03-02 RX ADMIN — DAKIN'S SOLUTION 0.125% (QUARTER STRENGTH): 0.12 SOLUTION at 17:33

## 2020-03-02 RX ADMIN — CARVEDILOL 12.5 MG: 12.5 TABLET, FILM COATED ORAL at 17:21

## 2020-03-02 RX ADMIN — INSULIN LISPRO 5 UNITS: 100 INJECTION, SOLUTION INTRAVENOUS; SUBCUTANEOUS at 21:38

## 2020-03-02 RX ADMIN — DAPTOMYCIN 600 MG: 500 INJECTION, POWDER, LYOPHILIZED, FOR SOLUTION INTRAVENOUS at 19:51

## 2020-03-02 RX ADMIN — Medication 1 AMPULE: at 11:19

## 2020-03-02 RX ADMIN — OXYCODONE HYDROCHLORIDE AND ACETAMINOPHEN 1 TABLET: 5; 325 TABLET ORAL at 05:19

## 2020-03-02 RX ADMIN — PREGABALIN 150 MG: 25 CAPSULE ORAL at 17:21

## 2020-03-02 RX ADMIN — LORAZEPAM 0.5 MG: 0.5 TABLET ORAL at 11:20

## 2020-03-02 RX ADMIN — OXYCODONE HYDROCHLORIDE AND ACETAMINOPHEN 1 TABLET: 5; 325 TABLET ORAL at 09:18

## 2020-03-02 RX ADMIN — DAKIN'S SOLUTION 0.125% (QUARTER STRENGTH): 0.12 SOLUTION at 16:29

## 2020-03-02 RX ADMIN — LORAZEPAM 0.5 MG: 0.5 TABLET ORAL at 19:00

## 2020-03-02 RX ADMIN — AMLODIPINE BESYLATE 10 MG: 5 TABLET ORAL at 09:08

## 2020-03-02 RX ADMIN — CEFEPIME HYDROCHLORIDE 2 G: 2 INJECTION, POWDER, FOR SOLUTION INTRAVENOUS at 16:38

## 2020-03-02 RX ADMIN — CARVEDILOL 12.5 MG: 12.5 TABLET, FILM COATED ORAL at 09:08

## 2020-03-02 RX ADMIN — LISINOPRIL 10 MG: 10 TABLET ORAL at 09:08

## 2020-03-02 RX ADMIN — INSULIN LISPRO 18 UNITS: 100 INJECTION, SOLUTION INTRAVENOUS; SUBCUTANEOUS at 17:32

## 2020-03-02 RX ADMIN — Medication 1 AMPULE: at 21:40

## 2020-03-02 RX ADMIN — FUROSEMIDE 40 MG: 40 TABLET ORAL at 09:08

## 2020-03-02 RX ADMIN — OXYCODONE HYDROCHLORIDE AND ACETAMINOPHEN 1 TABLET: 5; 325 TABLET ORAL at 17:25

## 2020-03-02 RX ADMIN — OXYCODONE HYDROCHLORIDE AND ACETAMINOPHEN 1 TABLET: 5; 325 TABLET ORAL at 13:21

## 2020-03-02 RX ADMIN — INSULIN GLARGINE 30 UNITS: 100 INJECTION, SOLUTION SUBCUTANEOUS at 21:38

## 2020-03-02 RX ADMIN — INSULIN LISPRO 10 UNITS: 100 INJECTION, SOLUTION INTRAVENOUS; SUBCUTANEOUS at 07:58

## 2020-03-02 RX ADMIN — CEFEPIME HYDROCHLORIDE 2 G: 2 INJECTION, POWDER, FOR SOLUTION INTRAVENOUS at 21:37

## 2020-03-02 RX ADMIN — CEFEPIME HYDROCHLORIDE 2 G: 2 INJECTION, POWDER, FOR SOLUTION INTRAVENOUS at 05:19

## 2020-03-02 RX ADMIN — PREGABALIN 150 MG: 25 CAPSULE ORAL at 09:08

## 2020-03-02 RX ADMIN — ENOXAPARIN SODIUM 40 MG: 40 INJECTION SUBCUTANEOUS at 21:38

## 2020-03-02 RX ADMIN — INSULIN LISPRO 15 UNITS: 100 INJECTION, SOLUTION INTRAVENOUS; SUBCUTANEOUS at 11:38

## 2020-03-02 RX ADMIN — CLONIDINE HYDROCHLORIDE 0.1 MG: 0.1 TABLET ORAL at 11:43

## 2020-03-02 RX ADMIN — OXYCODONE HYDROCHLORIDE AND ACETAMINOPHEN 1 TABLET: 5; 325 TABLET ORAL at 21:38

## 2020-03-02 NOTE — DIABETES MGMT
[facility]  CLINICAL NURSE SPECIALIST CONSULT  PROGRAM FOR DIABETES HEALTH    Presentation   Aruna De Leon is a 48 y.o. male admitted for dyspnea & fevers 2.19.20. Found to have acute respiratory failure, pulmonary edema and sepsis. Osteomyelitis of left great toe required partial amputation (completed at [facility]). Hx of drug abuse (heroin and cocaine abuse), followed by partial resection of distal first metatarsal of left foot per podiatry 2/28/20. On ABx. Currently afebrile. Hypertension. Current clinical course has been uncomplicated. Patient has known Type 2 diabetes, last treated with insulin. Patient describes a couple stints in the \"[facility]ent[place]\" with resultant 90 lb weight gain. He reports losing that weight when he left halfway. Has not taken anything for his diabetes in several years. Patient does not correlate poor BG control to amputation risk or CV health risk. Nursing staff report family is bringing food from home & fast food into patient. Consulted by Provider for advanced diabetes nursing assessment and care, specifically related to   [x] Inpatient management strategy    Subjective   Listen to me. You can't control my blood sugar in a couple days of being in the hospital.    Patient reports the following home diabetes self-care practices:  Eating pattern-May eat one meal per day at ANY time of day  [x] Breakfast Pancake, eggs with sausage  [x] Dinner  Chicken, potatoes or corn w/wo greens  Physical activity pattern-None shared  Monitoring pattern-Doesn't have a working meter system right now  Taking medications pattern-Doesn't take any medication    Objective   Physical exam  General Alert, oriented and ill-appearing.    Vital Signs   Visit Vitals  /88   Pulse 90   Temp 98.9 °F (37.2 °C)   Resp 18   Ht 5' 8\" (1.727 m)   Wt 69.4 kg (153 lb)   SpO2 99%   BMI 23.26 kg/m²     Laboratory  Lab Results   Component Value Date/Time    Hemoglobin A1c 8.0 (H) 02/20/2020 04:12 AM    Hemoglobin A1c (POC) 12.3 05/23/2019 03:14 PM     No results found for: LDL, LDLC, DLDLP  Lab Results   Component Value Date/Time    Creatinine 1.49 (H) 03/02/2020 05:18 AM     Lab Results   Component Value Date/Time    Sodium 134 (L) 03/02/2020 05:18 AM    Potassium 4.2 03/02/2020 05:18 AM    Chloride 104 03/02/2020 05:18 AM    CO2 24 03/02/2020 05:18 AM    Anion gap 6 03/02/2020 05:18 AM    Glucose 346 (H) 03/02/2020 05:18 AM    BUN 25 (H) 03/02/2020 05:18 AM    Creatinine 1.49 (H) 03/02/2020 05:18 AM    BUN/Creatinine ratio 17 03/02/2020 05:18 AM    GFR est AA 60 (L) 03/02/2020 05:18 AM    GFR est non-AA 49 (L) 03/02/2020 05:18 AM    Calcium 8.2 (L) 03/02/2020 05:18 AM    Bilirubin, total 0.3 02/24/2020 05:36 AM    AST (SGOT) 32 02/24/2020 05:36 AM    Alk. phosphatase 105 02/24/2020 05:36 AM    Protein, total 7.4 02/24/2020 05:36 AM    Albumin 1.1 (L) 02/24/2020 05:36 AM    Globulin 6.3 (H) 02/24/2020 05:36 AM    A-G Ratio 0.2 (L) 02/24/2020 05:36 AM    ALT (SGPT) <6 (L) 02/24/2020 05:36 AM     Lab Results   Component Value Date/Time    ALT (SGPT) <6 (L) 02/24/2020 05:36 AM     Blood glucose pattern        Assessment and Plan   Nursing Diagnosis Risk for unstable blood glucose pattern   Nursing Intervention Domain 6421 Decision-making Support   Nursing Interventions Examined current inpatient diabetes control   Explored factors facilitating and impeding inpatient management  Identified self-management practices impeding diabetes control     Evaluation   This gentleman, with Type 2, hasn't achieved inpatient blood glucose target of 100-180mg/dl. Patient required 38 units of insluin 2/29/20 and 52 units 3/1/20. BGs still in the 300-400 range. Since he is eating food from outside hospital, need to provide enough insulin coverage to override this effect.     Recommendations   Recommend:  Basal insulin   [x] 0.4 units/kg/D = Lantus insulin 30 units D    Bolus insulin  [x] Normal sensitivity = Humalog insulin 10 units with consumed meals    Corrective insulin  [x] Normal sensitivity    Billing Code(s)     [x] [id number] IP subsequent hospital care - 39 minutes      Claria Meigs, CNS  Access via LAURYN Valencia [phone]

## 2020-03-02 NOTE — PROGRESS NOTES
General Surgery End of Shift Nursing Note      Shift worked: [id number]   Summary of shift:  No acute events or behavior issues over night. Pt calm and cooperative. Pt brought up that he left the unit last night, and told me he would be going to the [facility] for food/coffee but told me he'd let me know when he did and that he would not leave the unit. Percocet requested and given x2, Ativan requested and given x2. Educated about post op shoe, pt verbalizes understanding. Pt saves extra food from during the day and eats it throughout night.      Pt refused 2300 & 0300 VS.    Issues for physician to address:  None     Number times ambulated in hallway past shift: 5    Number of times OOB to chair past shift: 2    Pain Management:  Current medication: Percocet   Patient states pain is manageable on current pain medication: YES    GI:    Current diet:  DIET NUTRITIONAL SUPPLEMENTS Breakfast, Lunch, Dinner; [employer]  DIET ONE TIME MESSAGE  DIET REGULAR    Tolerating current diet: YES  Passing flatus: YES  Last Bowel Movement: yesterday         Damian Qureshi RN

## 2020-03-02 NOTE — PROGRESS NOTES
Hospitalist Progress Note    NAME: Rip Calloway   :  1965   MRN:  [id number]     I reviewed with Dr. Jared Lee about the medical history and the findings on the physical examination. I discussed with Dr. Jared Lee the patient's diagnosis and concur with the plan. Interim Hospital Summary: 48 y.o. male whom presented on 2020 with fever/chills. He is currently treated for MRSA bacteremia. Pt required ICU stay for sedation for severe delirium. Pt was transferred to the floor on 2020     Assessment / Plan:  Pt is very noncompliant even as an inpatient. Pt walks around the kitchen drinks whatever the drink he wants to drink, orders additional tray when he desires. As a result, his blood glucose has been 300-500 during this hospitalization. We discussed about importance blood glucose control. DM educator and dietitian have been consulted. Pt wonders off from his room then returns whenever he wants to do. At this time, we asked him to stay inside of his room, limit ambulation to near by his room. Pt became very agitated during conversation as he states that \"I will do whatever I want to. Will see about that. \"  Presented very aggressive behavior and mannerism during visit. Sepsis, POA d/t left great toe OM  Acute metabolic encephalopathy (resolved) d/t infection  Left great toe OM  MRSA bacteremia, results from recent admission at [facility]  - Recently left [facility] AMA without completing treatment   [facility]: blood cx (2020) grew MRSA, susceptible to vancomycin. Wound cx ( & ): MRSA, Strep beta hemolytic group B, enterobacter cloacae, pseudomonas aeruginosa, alpha strep    Wound cx (2020) RARE PSEUDOMONAS AERUGINOSA     Blood cx (2020) no growth    Pathology (2020):   1. Viable bone margin 1st metatarsal:  Acute chondritis and patchy osteomyelitis   2.  Necrotic great toe of left foot, amputation: Cutaneous ulcer with suppurative cellulitis, abscess and osteomyelitis      JAIME (2/24/20202): no vegetation, EF 40-45%      Appreciate ID input; Continue with IV daptomycin, cefepime (received 7 days of IV Flagyl)     Appreciate podiatry input;     S/p partial resection of distal first metatarsal (2/28/2020): pathology result pending    Cardiomyopathy with acute systolic heart failure  Secondary hyperaldosteronism r/t HF  Acute pulmonary edema, suspect cocaine related vs valvular etiology as IV drug abuse  Hypertension  - ECHO with EF 40-45%    Appreciate cardiology input; continue with IV diuretics. Check daily weight; Wt Readings from Last 3 Encounters:   02/28/20 69.4 kg (153 lb)   02/20/20 75.3 kg (166 lb)   08/09/19 70.1 kg (154 lb 9.6 oz)       NT pro-BNP 10,870    CXR- Some improvement in the pulmonary edema    Continue BB, norvasc, and lisinopril     Stress test negative; cardiology signed off 02/28/2020     Hypokalemia  - resolved     IDDM type 2 with hyperglycemia  - A1C 8.0    Fasting blood glucose has been 300 - 500's due poor dietary compliance. Pt visits kitchen and get additional food/drink    Continue with lantus (we may have to increase twice a day. Will wait until pt seen by diabetic educator first)    Check qac/qhs blood glucose and follow SSI    Peripheral neuropathy r/t DM   - continue with lyrica     Polysubstance abuse  - UDS +cocaine and opiates       18.5 - 24.9 Normal weight / Body mass index is 23.26 kg/m².     Code status: Full  Prophylaxis: Lovenox    Recommended Disposition: Home w/Family     Subjective:     Chief Complaint / Reason for Physician Visit  \"I will eat more if I get hungry\". Discussed with RN events overnight.      Review of Systems:  Symptom Y/N Comments  Symptom Y/N Comments   Fever/Chills n   Chest Pain n    Poor Appetite n   Edema     Cough    Abdominal Pain     Sputum    Joint Pain     SOB/DE LA CRUZ n   Pruritis/Rash     Nausea/vomit n   Tolerating PT/OT     Diarrhea n   Tolerating Diet     Constipation n   Other       Could NOT obtain due to: Objective:     VITALS:   Last 24hrs VS reviewed since prior progress note. Most recent are:  Patient Vitals for the past 24 hrs:   Temp Pulse Resp BP SpO2   03/01/20 2126 98.1 °F (36.7 °C) 89 18 117/63 98 %   03/01/20 1504 98.7 °F (37.1 °C) (!) 110 18 162/90 98 %   03/01/20 1051 98.4 °F (36.9 °C) (!) 103 18 145/70 99 %   03/01/20 0810 98.4 °F (36.9 °C) (!) 107 18 (!) 182/96 100 %       Intake/Output Summary (Last 24 hours) at 3/2/2020 0754  Last data filed at 3/1/2020 7889  Gross per 24 hour   Intake 240 ml   Output --   Net 240 ml        PHYSICAL EXAM:  General: Ill appearing, Alert, cooperative, no acute distress    EENT:  EOMI. Anicteric sclerae. MMM  Resp:  CTA bilaterally, no wheezing or rales. No accessory muscle use  CV:  Regular  rhythm,  No edema  GI:  Soft, Non distended, Non tender. +Bowel sounds  Neurologic:  Alert and oriented X 3, normal speech,   Psych:   fair insight. easily gets anxious and agitated  Skin:  No rashes. No jaundice, right foot dressing dry and intact    Reviewed most current lab test results and cultures  YES  Reviewed most current radiology test results   YES  Review and summation of old records today    NO  Reviewed patient's current orders and MAR    YES  PMH/SH reviewed - no change compared to H&P  ________________________________________________________________________  Care Plan discussed with:    Comments   Patient y    Family      RN y    Care Manager     Consultant                        Multidiciplinary team rounds were held today with , nursing, pharmacist and clinical coordinator. Patient's plan of care was discussed; medications were reviewed and discharge planning was addressed. ________________________________________________________________________  Dhaval Neither, NP     Procedures: see electronic medical records for all procedures/Xrays and details which were not copied into this note but were reviewed prior to creation of Plan.       LABS:  I reviewed today's most current labs and imaging studies.   Pertinent labs include:  Recent Labs     03/02/20 0518 02/29/20 0107   WBC 13.5* 10.1   HGB 10.6* 10.8*   HCT 32.9* 32.8*    390     Recent Labs     03/02/20 0518 02/29/20 0107   * 135*   K 4.2 3.4*    103   CO2 24 28   * 275*   BUN 25* 25*   CREA 1.49* 1.26   CA 8.2* 7.7*       Signed: )Johana Chaudhari, NP

## 2020-03-02 NOTE — PROGRESS NOTES
Nutrition Assessment:    INTERVENTIONS/RECOMMENDATIONS:   · Continue current consistent carb diet. · Glucerna TID. ASSESSMENT:   3/2: Chart reviewed, pt med noted for osteomyelitis of big toe, s/p amputation, noncompliant w/ insulin dependent DM, polysubstance abuse, sepsis, pulmonary edema. Consulted for diabetes education. Visited pt at bedside with CNS, pt spouse present. Pt very agitated, displayed lack of cookie in healthcare team \"you can't control my BG with a week of being in the hospital. I've been doing this for 30 years, its impossible. \" Pt BG continuing to rise, last 3 days steadily ~300-600. Pt noted that when he feels hypoglycemic (for him BG ~120) he \"eats everything\" because he \"hates that feeling. \" Pt dislikes hospital food, states it has no seasoning, claims he cannot eat it, ~50% of lunch tray seen eaten. Pt currently on consistent carb diet, per flow-sheet 100% consumed, pt ordering duplicate trays and eating snacks/candy brought in by family members. Difficult to determine what pt is consuming. Pt claimed wt loss from 250# in 2018, no significant acute wt loss noted. Provided diabetic myPlate handout, pt unwilling to discuss. Highly doubt dietary compliance as pt has not yet reached pre-contemplation stage. Diet Order: Consistent carb, Other (comment)(Glucerna TID )  % Eaten:    Patient Vitals for the past 72 hrs:   % Diet Eaten   03/01/20 0907 100 %   02/29/20 0948 100 %     Pertinent Medications: [x] Reviewed []Other: insulin, diuretics   Pertinent Labs: [x]Reviewed  []Other:  Food Allergies: [x]None []Other:     Last BM: 2/28   []Active     []Hyperactive  []Hypoactive       [] Absent  BS  Skin:    [] Intact   [x] Incision toe  [] Breakdown   []Edema   []Other:    Anthropometrics: Height: 5' 8\" (172.7 cm) Weight: 69.4 kg (153 lb)    IBW (%IBW):   ( ) UBW (%UBW):   (  %)    BMI: Body mass index is 23.26 kg/m².     This BMI is indicative of:  []Underweight   [x]Normal   []Overweight   [] Obesity   [] Extreme Obesity (BMI>40)  Last Weight Metrics:  Weight Loss Metrics 2/28/2020 8/9/2019 6/26/2019 6/6/2019 5/30/2019 5/23/2019 1/21/2019   Today's Wt 153 lb 154 lb 9.6 oz 145 lb 14.4 oz 159 lb 4.8 oz 165 lb 1.6 oz 178 lb 187 lb 4.8 oz   BMI 23.26 kg/m2 24.95 kg/m2 22.18 kg/m2 24.22 kg/m2 25.1 kg/m2 27.06 kg/m2 28.48 kg/m2     Estimated Nutrition Needs (Based on): 2000 Kcals/day(MSJ 1504 x 1.3) , 69 g(-93 (1-1.2gPro/kg) ) Protein  Carbohydrate: At Least 130 g/day  Fluids: 2000 mL/day  NUTRITION DIAGNOSES:   Problem:  Not ready for diet/lifestyle change      Etiology: related to uncontrolled DM      Signs/Symptoms: as evidenced by pt interview, agitated distrustful pt.     Previous Nutrition Dx:  [] Resolved  [] Unresolved           [x] Progressing  NUTRITION INTERVENTIONS:  Meals/Snacks: General/healthful diet   Supplements: Commercial supplement           GOAL:   Pt will consume >75% of meals/supplements in 4-6 days   NUTRITION MONITORING AND EVALUATION   Food/Nutrient Intake Outcomes: Liquid meal replacement, Total energy intake  Physical Signs/Symptoms Outcomes: Electrolyte and renal profile, GI profile, Weight/weight change, Glucose profile, GI  Previous Goal Met:   [] Met              [x] Progressing Towards Goal              [] Not Progressing Towards Goal   Previous Recommendations:   [x] Implemented          [] Not Implemented          [] Not Applicable  LEARNING NEEDS (Diet, Food/Nutrient-Drug Interaction):    [x] None Identified   [] Identified and Education Provided/Documented   [] Identified and Pt declined/was not appropriate  Cultural, Shinto, OR Ethnic Dietary Needs:    [x] None Identified   [] Identified and Addressed   [x] Interdisciplinary Care Plan Reviewed/Documented    [x] Discharge Planning: consistent carb diet    [] Participated in Interdisciplinary Rounds  NUTRITION RISK:    [x] Patient At Nutritional Risk       [] Patient Not At [facility]  Pager [phone]  Weekend Pager [phone]

## 2020-03-03 VITALS
DIASTOLIC BLOOD PRESSURE: 84 MMHG | SYSTOLIC BLOOD PRESSURE: 147 MMHG | HEIGHT: 68 IN | OXYGEN SATURATION: 99 % | TEMPERATURE: 98 F | RESPIRATION RATE: 17 BRPM | HEART RATE: 67 BPM | BODY MASS INDEX: 23.19 KG/M2 | WEIGHT: 153 LBS

## 2020-03-03 LAB
ANION GAP SERPL CALC-SCNC: 4 MMOL/L (ref 5–15)
BUN SERPL-MCNC: 26 MG/DL (ref 6–20)
BUN/CREAT SERPL: 18 (ref 12–20)
CALCIUM SERPL-MCNC: 8.2 MG/DL (ref 8.5–10.1)
CHLORIDE SERPL-SCNC: 104 MMOL/L (ref 97–108)
CO2 SERPL-SCNC: 26 MMOL/L (ref 21–32)
CREAT SERPL-MCNC: 1.41 MG/DL (ref 0.7–1.3)
ERYTHROCYTE [SEDIMENTATION RATE] IN BLOOD: 107 MM/HR (ref 0–20)
GLUCOSE BLD STRIP.AUTO-MCNC: 237 MG/DL (ref 65–100)
GLUCOSE BLD STRIP.AUTO-MCNC: 246 MG/DL (ref 65–100)
GLUCOSE SERPL-MCNC: 239 MG/DL (ref 65–100)
POTASSIUM SERPL-SCNC: 4.3 MMOL/L (ref 3.5–5.1)
SERVICE CMNT-IMP: ABNORMAL
SERVICE CMNT-IMP: ABNORMAL
SODIUM SERPL-SCNC: 134 MMOL/L (ref 136–145)

## 2020-03-03 PROCEDURE — 74011250636 HC RX REV CODE- 250/636: Performed by: INTERNAL MEDICINE

## 2020-03-03 PROCEDURE — 77030036687 HC SHOE PSTOP S2SG -A

## 2020-03-03 PROCEDURE — 74011250637 HC RX REV CODE- 250/637: Performed by: INTERNAL MEDICINE

## 2020-03-03 PROCEDURE — 85652 RBC SED RATE AUTOMATED: CPT

## 2020-03-03 PROCEDURE — 74011250637 HC RX REV CODE- 250/637: Performed by: NURSE PRACTITIONER

## 2020-03-03 PROCEDURE — 74011636637 HC RX REV CODE- 636/637: Performed by: NURSE PRACTITIONER

## 2020-03-03 PROCEDURE — 80048 BASIC METABOLIC PNL TOTAL CA: CPT

## 2020-03-03 PROCEDURE — 82962 GLUCOSE BLOOD TEST: CPT

## 2020-03-03 PROCEDURE — 74011250637 HC RX REV CODE- 250/637: Performed by: HOSPITALIST

## 2020-03-03 PROCEDURE — 36415 COLL VENOUS BLD VENIPUNCTURE: CPT

## 2020-03-03 PROCEDURE — 74011000258 HC RX REV CODE- 258: Performed by: INTERNAL MEDICINE

## 2020-03-03 RX ORDER — INSULIN GLARGINE 100 [IU]/ML
30 INJECTION, SOLUTION SUBCUTANEOUS
Qty: 1 VIAL | Refills: 0 | Status: SHIPPED | OUTPATIENT
Start: 2020-03-03 | End: 2020-03-10 | Stop reason: SDUPTHER

## 2020-03-03 RX ORDER — FUROSEMIDE 40 MG/1
40 TABLET ORAL DAILY
Qty: 30 TAB | Refills: 0 | Status: SHIPPED | OUTPATIENT
Start: 2020-03-03 | End: 2020-03-10 | Stop reason: ALTCHOICE

## 2020-03-03 RX ORDER — LISINOPRIL 10 MG/1
10 TABLET ORAL DAILY
Qty: 30 TAB | Refills: 0 | Status: SHIPPED | OUTPATIENT
Start: 2020-03-04 | End: 2020-03-10 | Stop reason: SDUPTHER

## 2020-03-03 RX ORDER — CIPROFLOXACIN 500 MG/1
500 TABLET ORAL 2 TIMES DAILY
Qty: 28 TAB | Refills: 0 | Status: SHIPPED | OUTPATIENT
Start: 2020-03-03 | End: 2020-03-17

## 2020-03-03 RX ORDER — DOXYCYCLINE 100 MG/1
100 TABLET ORAL 2 TIMES DAILY
Qty: 28 TAB | Refills: 0 | Status: SHIPPED | OUTPATIENT
Start: 2020-03-03 | End: 2020-03-17

## 2020-03-03 RX ORDER — CARVEDILOL 12.5 MG/1
12.5 TABLET ORAL 2 TIMES DAILY
Qty: 60 TAB | Refills: 0 | Status: SHIPPED | OUTPATIENT
Start: 2020-03-03 | End: 2020-03-10 | Stop reason: SDUPTHER

## 2020-03-03 RX ADMIN — FUROSEMIDE 40 MG: 40 TABLET ORAL at 08:07

## 2020-03-03 RX ADMIN — INSULIN LISPRO 4 UNITS: 100 INJECTION, SOLUTION INTRAVENOUS; SUBCUTANEOUS at 11:42

## 2020-03-03 RX ADMIN — CEFEPIME HYDROCHLORIDE 2 G: 2 INJECTION, POWDER, FOR SOLUTION INTRAVENOUS at 13:47

## 2020-03-03 RX ADMIN — LISINOPRIL 10 MG: 10 TABLET ORAL at 08:07

## 2020-03-03 RX ADMIN — DAKIN'S SOLUTION 0.125% (QUARTER STRENGTH): 0.12 SOLUTION at 08:07

## 2020-03-03 RX ADMIN — AMLODIPINE BESYLATE 10 MG: 5 TABLET ORAL at 08:07

## 2020-03-03 RX ADMIN — LORAZEPAM 0.5 MG: 0.5 TABLET ORAL at 02:23

## 2020-03-03 RX ADMIN — INSULIN LISPRO 4 UNITS: 100 INJECTION, SOLUTION INTRAVENOUS; SUBCUTANEOUS at 07:31

## 2020-03-03 RX ADMIN — OXYCODONE HYDROCHLORIDE AND ACETAMINOPHEN 1 TABLET: 5; 325 TABLET ORAL at 11:49

## 2020-03-03 RX ADMIN — OXYCODONE HYDROCHLORIDE AND ACETAMINOPHEN 1 TABLET: 5; 325 TABLET ORAL at 07:34

## 2020-03-03 RX ADMIN — LORAZEPAM 0.5 MG: 0.5 TABLET ORAL at 14:13

## 2020-03-03 RX ADMIN — OXYCODONE HYDROCHLORIDE AND ACETAMINOPHEN 1 TABLET: 5; 325 TABLET ORAL at 02:23

## 2020-03-03 RX ADMIN — LORAZEPAM 0.5 MG: 0.5 TABLET ORAL at 08:17

## 2020-03-03 RX ADMIN — INSULIN LISPRO 10 UNITS: 100 INJECTION, SOLUTION INTRAVENOUS; SUBCUTANEOUS at 07:31

## 2020-03-03 RX ADMIN — PREGABALIN 150 MG: 25 CAPSULE ORAL at 08:07

## 2020-03-03 RX ADMIN — Medication 1 AMPULE: at 08:06

## 2020-03-03 RX ADMIN — CARVEDILOL 12.5 MG: 12.5 TABLET, FILM COATED ORAL at 08:07

## 2020-03-03 RX ADMIN — Medication 10 ML: at 13:48

## 2020-03-03 RX ADMIN — INSULIN LISPRO 10 UNITS: 100 INJECTION, SOLUTION INTRAVENOUS; SUBCUTANEOUS at 11:42

## 2020-03-03 RX ADMIN — CEFEPIME HYDROCHLORIDE 2 G: 2 INJECTION, POWDER, FOR SOLUTION INTRAVENOUS at 05:28

## 2020-03-03 NOTE — DISCHARGE INSTRUCTIONS
Smoking Cessation Program:   This is a free, phone/text/email based, smoking cessation program. The program is individualized to meet each patient's needs. To enroll use this link [email or web address]    Patient Discharge Instructions     Pt Name  Golden Jones   Date of Birth 1965   Age  48 y.o. Medical Record Number  [id number]   PCP Peggy Lopez NP    Admit date:  2/19/2020 @    [place] [address]    Room Number  2195/21   Date of Discharge 3/3/2020     Admission Diagnoses:     Osteomyelitis of left foot ([address]        No Known Allergies     You were admitted to [address] for  Osteomyelitis of left foot ([address]    YOUR OTHER MEDICAL DIAGNOSES INCLUDE (BUT NOT LIMITED TO ):  Present on Admission:   Severe sepsis ([address]   Cardiomyopathy ([address]   Acute systolic heart failure ([address]   Substance abuse ([address]   Metabolic encephalopathy   Osteomyelitis of left foot ([address]   MRSA bacteremia   Secondary hyperaldosteronism ([address]   Hypokalemia   DM (diabetes mellitus), type 2 ([address]   HTN (hypertension)      DIET:  Diabetic Diet       Recommended activity: Activity as tolerated  Follow up : Follow-up Information     Follow up With Specialties Details Why Contact Info    [facility]  On 3/19/2020 at 10:15AM with Nurse Practitioner [id number] [place] [address] , [address] [place] [address], NP Nurse Practitioner   [address] [facility]  [phone]           Wound care for the Left Great Toe Wound:  Cleanse the Amputation site with Dakin Solution and gauze and dry well. Apply some Betadine and allow it to dry and then apply a bulky amount of 4x4's and wrap with jagruti. · It is important that you take the medication exactly as they are prescribed.    · Keep your medication in the bottles provided by the pharmacist and keep a list of the medication names, dosages, and times to be taken in your wallet. · Do not take other medications without consulting your doctor. ADDITIONAL INFORMATION: If you experience any of the following symptoms or have any health problem not listed below, then please call your primary care physician or return to the emergency room if you cannot get hold of your doctor: Fever, chills, nausea, vomiting, diarrhea, change in mentation, falling, bleeding, shortness of breath. I understand that if any problems occur once I am discharged, I am supposed to call my Primary care physician for further care or seek help in the Emergency Department at the nearest Healthcare facility. I have had an opportunity to discuss my clinical issues with my doctor and nursing staff. I understand and acknowledge receipt of the above instructions.                                                                                                                                            Physician's or R.N.'s Signature                                                            Date/Time                                                                                                                                              Patient or Representative Signature                                                 Date/Time

## 2020-03-03 NOTE — PROGRESS NOTES
Patient not following infectious disease and hospitalists directives, and did not follow directions by staying in house, and following proper dietary guidelines to decrease sugar levels, and wait for the final pathology results. He left prematurely against the recommendation of the nurse practitioner and infectious disease department. He has walked excessively, and sometimes without surgical shoe, told him this puts him at high risk for postop complication, and that he needs to follow proper dietary guidelines, medications from both the doctors as far as internal medicine and infectious disease, and have patient's waiting for the final viable bone pathology report. He has refused all of this, and has left the hospital and the care on his own-I will be attempting to contact him and his family regarding proper home health care dressing changes, as well as following up in my clinic  so I can assess the wound incision make sure it is healing, nature he is stable, they eventually, hopefully, graduating into proper shoe gear in the future.     -High-risk due to drug usage and noncompliant behavior  -High-risk due to dismissal of medical care and leading medical institutions this without proper medical recommendation from the professionals  -High-risk due to willingness to follow recommendations regarding medication and mostly dietary guidelines

## 2020-03-03 NOTE — PROGRESS NOTES
Hospitalist Progress Note    NAME: Dionne Stevenson   :  1965   MRN:  [id number]     I reviewed with Dr. Laxmi Cancino about the medical history and the findings on the physical examination. I discussed with Dr. Laxmi Cancino the patient's diagnosis and concur with the plan. Interim Hospital Summary: 48 y.o. male whom presented on 2020 with fever/chills. He is currently treated for MRSA bacteremia. Pt required ICU stay for sedation for severe delirium. Pt was transferred to the floor on 2020     Assessment / Plan:  Pt's blood glucose is now down to 200's vs 300-600. Pt was very upset about the choice of food. I suggested pt to talk with the dietitian again to get help with selecting his menu choice based on their recommendation. Pt immediately screamed and yell as stating, \"You make me stay hungry, get the hell out, I don't ever want to see you again. \" Pt refused to be examined. Sepsis, POA d/t left great toe OM  Acute metabolic encephalopathy (resolved) d/t infection  Left great toe OM  MRSA bacteremia, results from recent admission at [facility]  - Recently left [facility] AMA without completing treatment   [facility]: blood cx (2020) grew MRSA, susceptible to vancomycin. Wound cx ( & ): MRSA, Strep beta hemolytic group B, enterobacter cloacae, pseudomonas aeruginosa, alpha strep    Wound cx (2020) RARE PSEUDOMONAS AERUGINOSA     Blood cx (2020) no growth    Pathology (2020):   1. Viable bone margin 1st metatarsal:  Acute chondritis and patchy osteomyelitis   2. Necrotic great toe of left foot, amputation: Cutaneous ulcer with suppurative cellulitis, abscess and osteomyelitis     Waiting for pathology report from . If margins is clear then we may d/c the pt with doxycycline/cipro per ID.      JAIME (): no vegetation, EF 40-45%      Appreciate ID input; Continue with IV daptomycin, cefepime (received 7 days of IV Flagyl)     Appreciate podiatry input;     S/p partial resection of distal first metatarsal (2/28/2020): pathology result pending    Cardiomyopathy with acute systolic heart failure  Secondary hyperaldosteronism r/t HF  Acute pulmonary edema, suspect cocaine related vs valvular etiology as IV drug abuse  Hypertension  - ECHO with EF 40-45%    Appreciate cardiology input; continue with IV diuretics. Check daily weight; Wt Readings from Last 3 Encounters:   02/28/20 69.4 kg (153 lb)   02/20/20 75.3 kg (166 lb)   08/09/19 70.1 kg (154 lb 9.6 oz)       NT pro-BNP 10,870    CXR- Some improvement in the pulmonary edema    Continue BB, norvasc, and lisinopril     Stress test negative; cardiology signed off 02/28/2020     Hypokalemia  - resolved     IDDM type 2 with hyperglycemia  - A1C 8.0    Fasting blood glucose has been 300 - 500's due poor dietary compliance. Pt visits kitchen and get additional food/drink. Much better glucose control, but pt is extremely upset with the menu. Continue with lantus (we may have to increase twice a day. Will wait until pt seen by diabetic educator first)    Check qac/qhs blood glucose and follow SSI    Peripheral neuropathy r/t DM   - continue with lyrica     Polysubstance abuse  - UDS +cocaine and opiates       18.5 - 24.9 Normal weight / Body mass index is 23.26 kg/m².     Code status: Full  Prophylaxis: Lovenox    Recommended Disposition: Home w/Family     Subjective:     Chief Complaint / Reason for Physician Visit  \"I don't like the menu, you make me stay hungry! \". Discussed with RN events overnight. Review of Systems:  Symptom Y/N Comments  Symptom Y/N Comments   Fever/Chills n   Chest Pain n    Poor Appetite n   Edema     Cough    Abdominal Pain     Sputum    Joint Pain     SOB/DE LA CRUZ n   Pruritis/Rash     Nausea/vomit n   Tolerating PT/OT     Diarrhea n   Tolerating Diet     Constipation n   Other       Could NOT obtain due to:      Objective:     VITALS:   Last 24hrs VS reviewed since prior progress note.  Most recent are:  Patient Vitals for the past 24 hrs:   Temp Pulse Resp BP SpO2   03/03/20 1130 98 °F (36.7 °C) 67 17 145/84 98 %   03/03/20 0820 98.7 °F (37.1 °C) 96 18 150/82 96 %   03/03/20 0513 98.1 °F (36.7 °C) 87 18 133/81 100 %   03/02/20 1925 98.4 °F (36.9 °C) 91 18 121/71 100 %   03/02/20 1538 98.2 °F (36.8 °C) 90 18 130/68 99 %       Intake/Output Summary (Last 24 hours) at 3/3/2020 1418  Last data filed at 3/3/2020 0830  Gross per 24 hour   Intake 720 ml   Output 100 ml   Net 620 ml        PHYSICAL EXAM:  General: Ill appearing, Alert, cooperative, no acute distress    EENT:  EOMI. Anicteric sclerae. MMM  Resp:  Pt declined to be auscultate. No accessory muscle use  CV:  Regular  rhythm,  No edema  GI:  Pt declined for exam  Neurologic:  Alert and oriented X 3, normal speech,   Psych:   fair insight. easily gets anxious and agitated  Skin:  No rashes. No jaundice, right foot dressing dry and intact    Reviewed most current lab test results and cultures  YES  Reviewed most current radiology test results   YES  Review and summation of old records today    NO  Reviewed patient's current orders and MAR    YES  PMH/ reviewed - no change compared to H&P  ________________________________________________________________________  Care Plan discussed with:    Comments   Patient y    Family  y Wife at bedside   RN y    Care Manager     Consultant                        Multidiciplinary team rounds were held today with , nursing, pharmacist and clinical coordinator. Patient's plan of care was discussed; medications were reviewed and discharge planning was addressed. ________________________________________________________________________  Aun March, NP     Procedures: see electronic medical records for all procedures/Xrays and details which were not copied into this note but were reviewed prior to creation of Plan. LABS:  I reviewed today's most current labs and imaging studies.   Pertinent labs include:  Recent Labs 03/02/20  0518   WBC 13.5*   HGB 10.6*   HCT 32.9*        Recent Labs     03/03/20  0813 03/02/20  0518   * 134*   K 4.3 4.2    104   CO2 26 24   * 346*   BUN 26* 25*   CREA 1.41* 1.49*   CA 8.2* 8.2*       Signed: )Johana Chaudhari, NP

## 2020-03-03 NOTE — PROGRESS NOTES
Verbal shift change report given to [facility] (oncoming nurse) by [address] [facility] [place] (offgoing nurse). Report included the following information SBAR, Kardex, Intake/Output, MAR and Recent Results. Pt uneventful. BS increased. Diabetes management came and saw patient. Pt states that he is here for his foot to be treated and the infection and not to control his diabetes. States he will walk on unit and to [place] if he wants to and eat what he wants to eat. Medicated as needed for ativan and percocet. Wound care done on foot.

## 2020-03-03 NOTE — DISCHARGE SUMMARY
Hospitalist Discharge Summary     Patient ID:  Venice Simmons  [id number]  48 y.o.  1965    PCP on record: Tash Nunez NP    Admit date: 2/19/2020  Discharge date and time: 3/3/2020      DISCHARGE DIAGNOSIS:  Sepsis, POA d/t left great toe OM  Acute metabolic encephalopathy (resolved) d/t infection  Left great toe OM  MRSA bacteremia, results from recent admission at [address] [facility] with acute systolic heart failure  Secondary hyperaldosteronism r/t HF  Acute pulmonary edema, suspect cocaine related vs valvular etiology as IV drug abuse  Hypertension  Hypokalemia  IDDM type 2 with hyperglycemia  Peripheral neuropathy r/t DM   Polysubstance abuse  18.5 - 24.9 Normal weight / Body mass index is 23.26 kg/m².         CONSULTATIONS:  IP CONSULT TO HOSPITALIST  IP CONSULT TO PODIATRY  IP CONSULT TO INFECTIOUS DISEASES  IP CONSULT TO CARDIOLOGY    Excerpted HPI from H&P of Mert Irwin MD:  Venice Simmons is a 49 y.o.  male with past medical history significant for polysubstance abuse including IV heroin abuse, insulin-dependent type 2 diabetes, hypertension and peripheral neuropathy who recently had a left distal phalanx of the big toe amputation done at [facility] and left AGAINST MEDICAL ADVICE 2 weeks ago, he presents with the complaints of sudden onset fever chills and shortness of breath this morning. He also reports increasing discharge from his left big toe phalanx, patient admits to injecting heroin yesterday. He denies any cough, sputum production, abdominal pain, diarrhea, urine urgency frequency dysuria, heat or cold intolerance. He is noted to be septic in the emergency department with purulent discharge from wound on the left big toe. X-ray showed osteomyelitis. Patient is also noted to have pulmonary edema.     We were asked to admit for work up and evaluation of the above problems. ______________________________________________________________________  DISCHARGE SUMMARY/HOSPITAL COURSE:  for full details see H&P, daily progress notes, labs, consult notes. 48 y.o. male whom presented on 2/19/2020 with fever/chills. Pt left AMA from [address] about 2 weeks prior to the admission at [address] He was treated for MRSA bacteremia and left great toe OM. Pt required ICU stay for sedation for severe delirium. Pt was transferred to the floor on 2/25/2020. Pt had two resection during this admission. Pt was not compliant with medical therapy. He was on isolation for MRSA but tends to wonder around all over the hospital and going off the floor, visited kitchen, drank juices/regular ginger ale. His family brought junk food and he was ordering extra regular trays in addition to what is already on scheduled. Pt's fasting blood glucose was running between 300-500's. He was visited by two dietitians yesterday and working selecting appropriate menus from the option. His blood glucose finally down to 200's today. As I was complementing his blood glucose control goal and providing him with updated information on antibiotic therapy choices and possible discharge plan. He started to screaming and yelling at me as stated \"you make me feel hungry. I eat whatever the food I want to eat. \" He demanded to get out of the room. Pt's advocate has been called. He then demanded to leave the hospital but he did not want to get as sick as before. We attempt to reasoning about importance of completing medical therapy include optimal blood glucose control. But, he declined. I spoke with Dr. Gia Rdz and Dr. Allison You. We all agreed to provided him with both doxycyline and cipro for 2 weeks to complete therapy. Pt understood that the infection may not get resolved and may have recurrent infection from the wound.   Pt left the hospital with the prescriptions as he assuring the nursing staff that he will not return to the hospital.     Very high risk of readmission with another Sepsis due to poor compliance! Below are the detail medical illness that we treated during this hospitalization prior to pt leaving AMA; Sepsis, POA d/t left great toe OM  Acute metabolic encephalopathy (resolved) d/t infection  Left great toe OM  MRSA bacteremia, results from recent admission at [facility]  - Recently left [facility] AMA without completing treatment   [facility]: blood cx (01/28/2020) grew MRSA, susceptible to vancomycin. Wound cx (2/19 & 21/2020): MRSA, Strep beta hemolytic group B, enterobacter cloacae, pseudomonas aeruginosa, alpha strep    Wound cx (2/28/2020) RARE PSEUDOMONAS AERUGINOSA     Blood cx (2/19/2020) no growth    Pathology (2/21/2020):   1. Viable bone margin 1st metatarsal:  Acute chondritis and patchy osteomyelitis   2. Necrotic great toe of left foot, amputation: Cutaneous ulcer with suppurative cellulitis, abscess and osteomyelitis     Was Waiting for pathology report from 2/28. If margins is clear then we may d/c the pt with doxycycline/cipro per ID.    -> RX for doxcy and cipro given when pt insisted to leave AMA     JAIME (2/24/20202): no vegetation, EF 40-45%      Appreciate ID input; Continue with IV daptomycin, cefepime (received 7 days of IV Flagyl)     Appreciate podiatry input;     S/p partial resection of distal first metatarsal (2/28/2020): pathology result pending     Cardiomyopathy with acute systolic heart failure  Secondary hyperaldosteronism r/t HF  Acute pulmonary edema, suspect cocaine related vs valvular etiology as IV drug abuse  Hypertension  - ECHO with EF 40-45%    Appreciate cardiology input; continue with IV diuretics. Check daily weight;       Wt Readings from Last 3 Encounters:   02/28/20 69.4 kg (153 lb)   02/20/20 75.3 kg (166 lb)   08/09/19 70.1 kg (154 lb 9.6 oz)        NT pro-BNP 10,870    CXR- Some improvement in the pulmonary edema    Continue BB, norvasc, and lisinopril     Stress test negative; cardiology signed off 02/28/2020     Hypokalemia  - resolved     IDDM type 2 with hyperglycemia  - A1C 8.0    Fasting blood glucose has been 300 - 500's due poor dietary compliance. Pt visits kitchen and get additional food/drink. Much better glucose control, but pt is extremely upset with the menu. Continue with lantus (we may have to increase twice a day. Will wait until pt seen by diabetic educator first)    Check qac/qhs blood glucose and follow SSI     Peripheral neuropathy r/t DM   - continue with lyrica     Polysubstance abuse  - UDS +cocaine and opiates        18.5 - 24.9 Normal weight / Body mass index is 23.26 kg/m².             _______________________________________________________________________  Patient seen and examined by me on discharge day. Pertinent Findings:  Gen:    Not in distress  Pt was alert and orient x 4. VSS. Declined to be examined. Left foot dressing dry and intact.   _______________________________________________________________________  DISCHARGE MEDICATIONS:   Current Discharge Medication List      START taking these medications    Details   furosemide (LASIX) 40 mg tablet Take 1 Tab by mouth daily. Qty: 30 Tab, Refills: 0      lisinopril (PRINIVIL, ZESTRIL) 10 mg tablet Take 1 Tab by mouth daily. Qty: 30 Tab, Refills: 0      carvediloL (COREG) 12.5 mg tablet Take 1 Tab by mouth two (2) times a day. Qty: 60 Tab, Refills: 0      insulin glargine (LANTUS) 100 unit/mL injection 30 Units by SubCUTAneous route nightly. Qty: 1 Vial, Refills: 0      doxycycline (ADOXA) 100 mg tablet Take 1 Tab by mouth two (2) times a day for 14 days. Qty: 28 Tab, Refills: 0      ciprofloxacin HCl (CIPRO) 500 mg tablet Take 1 Tab by mouth two (2) times a day for 14 days.   Qty: 28 Tab, Refills: 0         CONTINUE these medications which have NOT CHANGED    Details   BD INSULIN SYRINGE ULTRA-FINE 1 mL 31 gauge x 5/16 syrg USE AS DIRECTED FOR INJECTING INSULIN  Qty: 100 Syringe, Refills: 5 Associated Diagnoses: Type 2 diabetes mellitus with diabetic polyneuropathy, with long-term current use of insulin ([facility])      pregabalin (LYRICA) 150 mg capsule take 1 capsule by mouth twice a day . maximum daily dose of 2 CAPSULES  Qty: 60 Cap, Refills: 0    Associated Diagnoses: Peripheral polyneuropathy      amitriptyline (ELAVIL) 25 mg tablet take 1 tablet by mouth NIGHTLY  Qty: 30 Tab, Refills: 3    Associated Diagnoses: Neuropathy      amLODIPine (NORVASC) 10 mg tablet Take 1 Tab by mouth daily. Qty: 30 Tab, Refills: 3    Associated Diagnoses: Essential hypertension      atorvastatin (LIPITOR) 20 mg tablet Take 1 Tab by mouth every evening. Qty: 90 Tab, Refills: 3    Associated Diagnoses: Mixed hyperlipidemia      dapagliflozin (FARXIGA) 5 mg tab tablet Take 1 Tab by mouth daily. Qty: 90 Tab, Refills: 3    Associated Diagnoses: Type 2 diabetes mellitus with hyperglycemia, with long-term current use of insulin ([facility])      insulin NPH (NOVOLIN N, HUMULIN N) 100 unit/mL injection Inject 33 units subcutaneously three times a day  Qty: 1 Vial, Refills: 11    Comments: Please dispense as Humulin N. Patient's insurance does not cover Novolin N. Associated Diagnoses: Type 2 diabetes mellitus with hyperglycemia, with long-term current use of insulin ([facility])         STOP taking these medications       cephALEXin (KEFLEX) 500 mg capsule Comments:   Reason for Stopping:         fluconazole (DIFLUCAN) 200 mg tablet Comments:   Reason for Stopping:               My Recommended Diet, Activity, Wound Care, and follow-up labs are listed in the patient's Discharge Insturctions which I have personally completed and reviewed.     _______________________________________________________________________  DISPOSITION:    Home with Family:    Home with HH/PT/OT/RN:    SNF/LTC:    JUNE:    OTHER: y       Condition at Discharge:  Stable  Left Against medical advice  _______________________________________________________________________  Follow up with:   PCP : Ab Acevedo NP  Follow-up Information     Follow up With Specialties Details Why Contact [facility]  On 3/19/2020 at 10:15AM with Nurse Practitioner [address] [facility] [address] [address]   [address] [place] [address] [facility], Mylene Wagner NP Nurse Practitioner   [address] [facility] [address] [phone]                Total time in minutes spent coordinating this discharge (includes going over instructions, follow-up, prescriptions, and preparing report for sign off to her PCP) :  40 minutes    Signed:  Johana Roman NP

## 2020-03-03 NOTE — PROGRESS NOTES
Verbal shift change report given to Aminta Keating (oncoming nurse) by Noe Velez (offgoing nurse). Report included the following information SBAR, Kardex, Intake/Output, MAR and Recent Results.      Pt noted resting, no distress noted at this time. Pt. Iv replaced. Pt. Is wanting to know when he will transition to oral abx. Pt. With complaint of pain, medicated and resolved. Late Lab orders noted. Day RN made aware.
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
## 2020-03-03 NOTE — PROGRESS NOTES
Pt left AMA and signed form. Pt wanted to see Gray or Moustapha to be discharged and told patient that those two doctors aren't in charge of discharge, the hospitalist/internal medicine HCP is in charge. Pt stated he did not want to see Catracho Norwood NP anymore because she was the problem. Called Gray the poditrist and informed him that patient wanted to go home, in which he replied that he was not in charge of discharge and was waiting for results of pathology that was taken of foot so antibiotics could be prescribed. . This nurse also called Catracho Norwood to inform that patient wanted to be discharged and wanted the antibiotics that Dr. Esther Snyder said that pt would need when he would be dishcarged, NP stated that she will speak with Moustapha to see what antibiotics would be prescribed. AMA form was signed by this nurse as witness, Gina Valladares, and the patient and form is located in chart. IV line removed. Prescriptions given to patient and patient took remaining supplies for wound care with him. Told the patient that there were risks to him leaving and stated that if he gets sick he will go to another hospital. Before leaving pt told this nurse, LPN and myrtleee thank you.
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
## 2020-03-10 ENCOUNTER — OFFICE VISIT (OUTPATIENT)
Dept: INTERNAL MEDICINE CLINIC | Age: 55
End: 2020-03-10

## 2020-03-10 VITALS
HEIGHT: 68 IN | OXYGEN SATURATION: 99 % | TEMPERATURE: 97.9 F | RESPIRATION RATE: 16 BRPM | DIASTOLIC BLOOD PRESSURE: 78 MMHG | WEIGHT: 152.4 LBS | BODY MASS INDEX: 23.1 KG/M2 | SYSTOLIC BLOOD PRESSURE: 128 MMHG | HEART RATE: 86 BPM

## 2020-03-10 DIAGNOSIS — G62.9 NEUROPATHY: ICD-10-CM

## 2020-03-10 DIAGNOSIS — I10 ESSENTIAL HYPERTENSION: ICD-10-CM

## 2020-03-10 DIAGNOSIS — Z09 HOSPITAL DISCHARGE FOLLOW-UP: Primary | ICD-10-CM

## 2020-03-10 DIAGNOSIS — G62.9 PERIPHERAL POLYNEUROPATHY: ICD-10-CM

## 2020-03-10 DIAGNOSIS — E11.65 TYPE 2 DIABETES MELLITUS WITH HYPERGLYCEMIA, WITH LONG-TERM CURRENT USE OF INSULIN (HCC): ICD-10-CM

## 2020-03-10 DIAGNOSIS — I50.23 ACUTE ON CHRONIC SYSTOLIC CONGESTIVE HEART FAILURE (HCC): ICD-10-CM

## 2020-03-10 DIAGNOSIS — I42.9 CARDIOMYOPATHY, UNSPECIFIED TYPE (HCC): ICD-10-CM

## 2020-03-10 DIAGNOSIS — Z79.4 TYPE 2 DIABETES MELLITUS WITH DIABETIC POLYNEUROPATHY, WITH LONG-TERM CURRENT USE OF INSULIN (HCC): ICD-10-CM

## 2020-03-10 DIAGNOSIS — Z79.4 TYPE 2 DIABETES MELLITUS WITH HYPERGLYCEMIA, WITH LONG-TERM CURRENT USE OF INSULIN (HCC): ICD-10-CM

## 2020-03-10 DIAGNOSIS — E11.42 TYPE 2 DIABETES MELLITUS WITH DIABETIC POLYNEUROPATHY, WITH LONG-TERM CURRENT USE OF INSULIN (HCC): ICD-10-CM

## 2020-03-10 LAB — GLUCOSE POC: 286 MG/DL

## 2020-03-10 RX ORDER — INSULIN GLARGINE 100 [IU]/ML
30 INJECTION, SOLUTION SUBCUTANEOUS
Qty: 1 VIAL | Refills: 11 | Status: SHIPPED | OUTPATIENT
Start: 2020-03-10 | End: 2021-06-04

## 2020-03-10 RX ORDER — AMLODIPINE BESYLATE 10 MG/1
10 TABLET ORAL DAILY
Qty: 30 TAB | Refills: 5 | Status: SHIPPED | OUTPATIENT
Start: 2020-03-10 | End: 2020-07-29 | Stop reason: SDUPTHER

## 2020-03-10 RX ORDER — LANCETS
EACH MISCELLANEOUS
Qty: 100 EACH | Refills: 11 | Status: SHIPPED | OUTPATIENT
Start: 2020-03-10 | End: 2021-12-03

## 2020-03-10 RX ORDER — PREGABALIN 150 MG/1
CAPSULE ORAL
Qty: 60 CAP | Refills: 0 | Status: SHIPPED | OUTPATIENT
Start: 2020-03-10 | End: 2020-04-22

## 2020-03-10 RX ORDER — CARVEDILOL 12.5 MG/1
12.5 TABLET ORAL 2 TIMES DAILY
Qty: 60 TAB | Refills: 5 | Status: ON HOLD | OUTPATIENT
Start: 2020-03-10 | End: 2021-03-17 | Stop reason: SDUPTHER

## 2020-03-10 RX ORDER — LISINOPRIL 10 MG/1
10 TABLET ORAL DAILY
Qty: 30 TAB | Refills: 5 | Status: SHIPPED | OUTPATIENT
Start: 2020-03-10 | End: 2021-03-17

## 2020-03-10 RX ORDER — BLOOD-GLUCOSE METER
1 EACH MISCELLANEOUS 2 TIMES DAILY
Qty: 1 EACH | Refills: 0 | Status: SHIPPED | OUTPATIENT
Start: 2020-03-10 | End: 2021-12-03

## 2020-03-10 RX ORDER — AMITRIPTYLINE HYDROCHLORIDE 25 MG/1
TABLET, FILM COATED ORAL
Qty: 30 TAB | Refills: 3 | Status: SHIPPED | OUTPATIENT
Start: 2020-03-10 | End: 2021-04-30

## 2020-03-10 RX ORDER — HYDROCHLOROTHIAZIDE 25 MG/1
25 TABLET ORAL DAILY
Qty: 30 TAB | Refills: 5 | Status: SHIPPED | OUTPATIENT
Start: 2020-03-10 | End: 2021-03-17

## 2020-03-10 RX ORDER — PEN NEEDLE, DIABETIC 29 G X1/2"
NEEDLE, DISPOSABLE MISCELLANEOUS
Qty: 100 SYRINGE | Refills: 5 | Status: SHIPPED | OUTPATIENT
Start: 2020-03-10 | End: 2021-12-03

## 2020-03-10 NOTE — PROGRESS NOTES
Chief Complaint   Patient presents with   [facility] Follow Up     sepsis     1. Have you been to the ER, urgent care clinic since your last visit? Hospitalized since your last visit? Yes Where: [facility] Sepsis    2. Have you seen or consulted any other health care providers outside of the [address] [place] [address] since your last visit? Include any pap smears or colon screening.  Yes Reason for visit: ED; hospital care

## 2020-03-10 NOTE — PATIENT INSTRUCTIONS
Type 2 Diabetes: Care Instructions  Your Care Instructions    Type 2 diabetes is a disease that develops when the body's tissues cannot use insulin properly. Over time, the pancreas cannot make enough insulin. Insulin is a hormone that helps the body's cells use sugar (glucose) for energy. It also helps the body store extra sugar in muscle, fat, and liver cells. Without insulin, the sugar cannot get into the cells to do its work. It stays in the blood instead. This can cause high blood sugar levels. A person has diabetes when the blood sugar stays too high too much of the time. Over time, diabetes can lead to diseases of the heart, blood vessels, nerves, kidneys, and eyes. You may be able to control your blood sugar by losing weight, eating a healthy diet, and getting daily exercise. You may also have to take insulin or other diabetes medicine. Follow-up care is a key part of your treatment and safety. Be sure to make and go to all appointments. Call your doctor if you are having problems. It's also a good idea to know your test results and keep a list of the medicines you take. How can you care for yourself at home? · Keep your blood sugar at a target level (which you set with your doctor). ? Eat a good diet that spreads carbohydrate throughout the day. Carbohydrate--the body's main source of fuel--affects blood sugar more than any other nutrient. Carbohydrate is in fruits, vegetables, milk, and yogurt. It also is in breads, cereals, vegetables such as potatoes and corn, and sugary foods such as candy and cakes. ? Aim for 30 minutes of exercise on most, preferably all, days of the week. Walking is a good choice. You also may want to do other activities, such as running, swimming, cycling, or playing tennis or team sports. If your doctor says it's okay, do muscle-strengthening exercises at least 2 times a week. ? Take your medicines exactly as prescribed.  Call your doctor if you think you are having a problem with your medicine. You will get more details on the specific medicines your doctor prescribes. · Check your blood sugar as often as your doctor recommends. It is important to keep track of any symptoms you have, such as low blood sugar. Also tell your doctor if you have any changes in your activities, diet, or insulin use. · Talk to your doctor before you start taking aspirin every day. Aspirin can help certain people lower their risk of a heart attack or stroke. But taking aspirin isn't right for everyone, because it can cause serious bleeding. · Do not smoke. If you need help quitting, talk to your doctor about stop-smoking programs and medicines. These can increase your chances of quitting for good. · Keep your cholesterol and blood pressure at normal levels. You may need to take one or more medicines to reach your goals. Take them exactly as directed. Do not stop or change a medicine without talking to your doctor first.  When should you call for help? Call 911 anytime you think you may need emergency care. For example, call if:    · You passed out (lost consciousness), or you suddenly become very sleepy or confused. (You may have very low blood sugar.)    Call your doctor now or seek immediate medical care if:    · Your blood sugar is 300 mg/dL or is higher than the level your doctor has set for you.     · You have symptoms of low blood sugar, such as:  ? Sweating. ? Feeling nervous, shaky, and weak. ? Extreme hunger and slight nausea. ? Dizziness and headache.  ? Blurred vision. ? Confusion.    Watch closely for changes in your health, and be sure to contact your doctor if:    · You often have problems controlling your blood sugar.     · You have symptoms of long-term diabetes problems, such as:  ? New vision changes. ? New pain, numbness, or tingling in your hands or feet. ? Skin problems. Where can you learn more? Go to http://maciel-zack.info/.   Enter C553 in the search box to learn more about \"Type 2 Diabetes: Care Instructions. \"  Current as of: April 16, 2019  Content Version: 12.2  © 3135-7772 [employer]. Care instructions adapted under license by [employer] (which disclaims liability or warranty for this information). If you have questions about a medical condition or this instruction, always ask your healthcare professional. [address] any warranty or liability for your use of this information. High Blood Pressure: Care Instructions  Overview    It's normal for blood pressure to go up and down throughout the day. But if it stays up, you have high blood pressure. Another name for high blood pressure is hypertension. Despite what a lot of people think, high blood pressure usually doesn't cause headaches or make you feel dizzy or lightheaded. It usually has no symptoms. But it does increase your risk of stroke, heart attack, and other problems. You and your doctor will talk about your risks of these problems based on your blood pressure. Your doctor will give you a goal for your blood pressure. Your goal will be based on your health and your age. Lifestyle changes, such as eating healthy and being active, are always important to help lower blood pressure. You might also take medicine to reach your blood pressure goal.  Follow-up care is a key part of your treatment and safety. Be sure to make and go to all appointments, and call your doctor if you are having problems. It's also a good idea to know your test results and keep a list of the medicines you take. How can you care for yourself at home? Medical treatment  · If you stop taking your medicine, your blood pressure will go back up. You may take one or more types of medicine to lower your blood pressure. Be safe with medicines. Take your medicine exactly as prescribed. Call your doctor if you think you are having a problem with your medicine.   · Talk to your doctor before you start taking aspirin every day. Aspirin can help certain people lower their risk of a heart attack or stroke. But taking aspirin isn't right for everyone, because it can cause serious bleeding. · See your doctor regularly. You may need to see the doctor more often at first or until your blood pressure comes down. · If you are taking blood pressure medicine, talk to your doctor before you take decongestants or anti-inflammatory medicine, such as ibuprofen. Some of these medicines can raise blood pressure. · Learn how to check your blood pressure at home. Lifestyle changes  · Stay at a healthy weight. This is especially important if you put on weight around the waist. Losing even 10 pounds can help you lower your blood pressure. · If your doctor recommends it, get more exercise. Walking is a good choice. Bit by bit, increase the amount you walk every day. Try for at least 30 minutes on most days of the week. You also may want to swim, bike, or do other activities. · Avoid or limit alcohol. Talk to your doctor about whether you can drink any alcohol. · Try to limit how much sodium you eat to less than 2,300 milligrams (mg) a day. Your doctor may ask you to try to eat less than 1,500 mg a day. · Eat plenty of fruits (such as bananas and oranges), vegetables, legumes, whole grains, and low-fat dairy products. · Lower the amount of saturated fat in your diet. Saturated fat is found in animal products such as milk, cheese, and meat. Limiting these foods may help you lose weight and also lower your risk for heart disease. · Do not smoke. Smoking increases your risk for heart attack and stroke. If you need help quitting, talk to your doctor about stop-smoking programs and medicines. These can increase your chances of quitting for good. When should you call for help? Call  911 anytime you think you may need emergency care.  This may mean having symptoms that suggest that your blood pressure is causing a serious heart or blood vessel problem. Your blood pressure may be over 180/120.   For example, call  911 if:    · You have symptoms of a heart attack. These may include:  ? Chest pain or pressure, or a strange feeling in the chest.  ? Sweating. ? Shortness of breath. ? Nausea or vomiting. ? Pain, pressure, or a strange feeling in the back, neck, jaw, or upper belly or in one or both shoulders or arms. ? Lightheadedness or sudden weakness. ? A fast or irregular heartbeat.     · You have symptoms of a stroke. These may include:  ? Sudden numbness, tingling, weakness, or loss of movement in your face, arm, or leg, especially on only one side of your body. ? Sudden vision changes. ? Sudden trouble speaking. ? Sudden confusion or trouble understanding simple statements. ? Sudden problems with walking or balance. ? A sudden, severe headache that is different from past headaches.     · You have severe back or belly pain.    Do not wait until your blood pressure comes down on its own. Get help right away.   Call your doctor now or seek immediate care if:    · Your blood pressure is much higher than normal (such as 180/120 or higher), but you don't have symptoms.     · You think high blood pressure is causing symptoms, such as:  ? Severe headache.  ? Blurry vision.    Watch closely for changes in your health, and be sure to contact your doctor if:    · Your blood pressure measures higher than your doctor recommends at least 2 times. That means the top number is higher or the bottom number is higher, or both.     · You think you may be having side effects from your blood pressure medicine. Where can you learn more? Go to http://maciel-zack.info/. Enter K124 in the search box to learn more about \"High Blood Pressure: Care Instructions. \"  Current as of: April 9, 2019  Content Version: 12.2  © 8505-8064 [employer].  Care instructions adapted under license by [employer] (which disclaims liability or warranty for this information). If you have questions about a medical condition or this instruction, always ask your healthcare professional. [address] any warranty or liability for your use of this information.

## 2020-03-10 NOTE — PROGRESS NOTES
I did get a hold of this patient and he is planning to come for wound check visit in my clinic at the end of this week.
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
## 2020-04-14 ENCOUNTER — VIRTUAL VISIT (OUTPATIENT)
Dept: CARDIOLOGY CLINIC | Age: 55
End: 2020-04-14

## 2020-04-14 VITALS — BODY MASS INDEX: 24.25 KG/M2 | WEIGHT: 160 LBS | HEIGHT: 68 IN

## 2020-04-14 DIAGNOSIS — I42.0 DILATED CARDIOMYOPATHY (HCC): Primary | ICD-10-CM

## 2020-04-14 DIAGNOSIS — I10 ESSENTIAL HYPERTENSION: ICD-10-CM

## 2020-04-14 NOTE — PROGRESS NOTES
Chief Complaint   Patient presents with    Referral / Consult    CHF    Hypertension    His current  Glucose 220 no other vitals. Abigail Altamirano

## 2020-04-14 NOTE — PROGRESS NOTES
Cardiology Telemedicine Encounter    Chandra Whiteside is a 48 y.o. male who was seen by synchronous (real-time) audio-video technology on 4/14/2020           Subjective/HPI:     Chandra Whiteside is a 48 y.o. male is here for routine follow-up via virtual visit. He has a PMHx of non ischemic CM with an EF of 45% diagnosed 3/20. He was admitted with osteomyelitis of his toe, sepsis at that time. JAIME was negative for endocarditis. Stress test showed ni ischemia. He denies any chest pain, SOB. Some swelling in the foot with surgery. PCP Provider  Quinten Hill NP    Past Medical History:   Diagnosis Date    Acute systolic heart failure ([facility] 2/21/2020    Cardiomyopathy ([facility] 2/21/2020    Diabetes ([facility]     Encounter to establish care with new doctor 10/11/2018    Hypertension     Neuropathy     Sciatica     Substance abuse ([facility] 2/21/2020        No Known Allergies     Outpatient Encounter Medications as of 4/14/2020   Medication Sig Dispense Refill    insulin glargine (LANTUS) 100 unit/mL injection 30 Units by SubCUTAneous route nightly. (Patient taking differently: 30 Units by SubCUTAneous route nightly. PRN IG  GLUCOSE  > 200) 1 Vial 11    BD INSULIN SYRINGE ULTRA-FINE 1 mL 31 gauge x 5/16 syrg USE AS DIRECTED FOR INJECTING INSULIN 100 Syringe 5    pregabalin (LYRICA) 150 mg capsule take 1 capsule by mouth twice a day 60 Cap 0    dapagliflozin (FARXIGA) 5 mg tab tablet Take 1 Tab by mouth daily. 90 Tab 3    Blood-Glucose Meter (TRUE METRIX GLUCOSE METER) misc 1 Device by Does Not Apply route two (2) times a day. 1 Each 0    glucose blood VI test strips (TRUE METRIX GLUCOSE TEST STRIP) strip Test BS 2 times a day 100 Strip 11    lancets misc Check BS 2 times a day 100 Each 11    lisinopriL (PRINIVIL, ZESTRIL) 10 mg tablet Take 1 Tab by mouth daily. 30 Tab 5    carvediloL (COREG) 12.5 mg tablet Take 1 Tab by mouth two (2) times a day.  60 Tab 5    amLODIPine (NORVASC) 10 mg tablet Take 1 Tab by mouth daily. 30 Tab 5    amitriptyline (ELAVIL) 25 mg tablet take 1 tablet by mouth NIGHTLY 30 Tab 3    hydroCHLOROthiazide (HYDRODIURIL) 25 mg tablet Take 1 Tab by mouth daily. 30 Tab 5    atorvastatin (LIPITOR) 20 mg tablet Take 1 Tab by mouth every evening. 90 Tab 3     No facility-administered encounter medications on file as of 4/14/2020. Review of Symptoms:    Review of Systems   Constitutional: Negative. Negative for chills and fever. HENT: Negative. Negative for hearing loss. Respiratory: Negative. Negative for cough, hemoptysis and shortness of breath. Cardiovascular: Negative. Negative for chest pain, palpitations, orthopnea, claudication, leg swelling and PND. Gastrointestinal: Negative. Negative for nausea and vomiting. Musculoskeletal: Negative for myalgias. Skin: Negative. Negative for rash. Neurological: Negative. Negative for dizziness, loss of consciousness and headaches. Physical Exam:      General: Well developed, in no acute distress, cooperative and alert  HEENT: trach is midline. PEERL, EOM intact. Respiratory: No audible wheezing, no acute respiratory distress, normal effort of breathing  Extremities:  No cyanosis, atraumatic. No lower extremity edema. Neuro: A&Ox3, speech clear  Skin: Skin color is normal. No rashes or lesions. Non diaphoretic  Due to this being a TeleHealth evaluation, many elements of the physical examination are unable to be assessed.      Cardiology Labs:    No results found for: FLP, CHOL, HDL, LDLC, VLDL, CHHD    Lab Results   Component Value Date/Time    Hemoglobin A1c 8.0 (H) 02/20/2020 04:12 AM    Hemoglobin A1c (POC) 12.3 05/23/2019 03:14 PM       Lab Results   Component Value Date/Time    Sodium 134 (L) 03/03/2020 08:13 AM    Potassium 4.3 03/03/2020 08:13 AM    Chloride 104 03/03/2020 08:13 AM    CO2 26 03/03/2020 08:13 AM    Glucose 239 (H) 03/03/2020 08:13 AM    BUN 26 (H) 03/03/2020 08:13 AM Creatinine 1.41 (H) 03/03/2020 08:13 AM    BUN/Creatinine ratio 18 03/03/2020 08:13 AM    GFR est AA >60 03/03/2020 08:13 AM    GFR est non-AA 52 (L) 03/03/2020 08:13 AM    Calcium 8.2 (L) 03/03/2020 08:13 AM    Anion gap 4 (L) 03/03/2020 08:13 AM    Bilirubin, total 0.3 02/24/2020 05:36 AM    ALT (SGPT) <6 (L) 02/24/2020 05:36 AM    AST (SGOT) 32 02/24/2020 05:36 AM    Alk. phosphatase 105 02/24/2020 05:36 AM    Protein, total 7.4 02/24/2020 05:36 AM    Albumin 1.1 (L) 02/24/2020 05:36 AM    Globulin 6.3 (H) 02/24/2020 05:36 AM    A-G Ratio 0.2 (L) 02/24/2020 05:36 AM          Assessment:       ICD-10-CM ICD-9-CM    1. Dilated cardiomyopathy (HCC) I42.0 425.4    2. Essential hypertension I10 401.9         Plan:     1. Dilated cardiomyopathy (HCC)  Stable. Last EF 45%. Continue current therapy. Repeat echo in 6 months . Advised to continue current therapy. 2. Essential hypertension  Continue current therapy. F/u in 6 months. Gab Pretty MD        This visit was completed using [employer] Virtual Visit telemedicine services    Pursuant to the emergency declaration under the [facility] waiver authority and the Coronavirus Preparedness and [employer] Act, this Virtual Visit was conducted, with patient's consent, to reduce the patient's risk of exposure to COVID-19 and provide continuity of care for an established patient. Services were provided through a video synchronous discussion virtually to substitute for in-person clinic visit.

## 2020-04-22 DIAGNOSIS — G62.9 PERIPHERAL POLYNEUROPATHY: ICD-10-CM

## 2020-04-22 RX ORDER — PREGABALIN 150 MG/1
CAPSULE ORAL
Qty: 60 CAP | Refills: 0 | Status: SHIPPED | OUTPATIENT
Start: 2020-04-22 | End: 2021-03-17

## 2020-06-18 ENCOUNTER — VIRTUAL VISIT (OUTPATIENT)
Dept: INTERNAL MEDICINE CLINIC | Age: 55
End: 2020-06-18

## 2020-06-18 DIAGNOSIS — E11.42 DIABETIC POLYNEUROPATHY ASSOCIATED WITH TYPE 2 DIABETES MELLITUS (HCC): Primary | ICD-10-CM

## 2020-06-18 RX ORDER — HYDROCHLOROTHIAZIDE 12.5 MG/1
12.5 CAPSULE ORAL DAILY
COMMUNITY
End: 2021-03-17

## 2020-06-18 RX ORDER — LISINOPRIL 10 MG/1
TABLET ORAL DAILY
COMMUNITY
End: 2021-03-17

## 2020-06-18 RX ORDER — INSULIN GLARGINE 100 [IU]/ML
INJECTION, SOLUTION SUBCUTANEOUS
Status: ON HOLD | COMMUNITY
End: 2021-04-30 | Stop reason: SDUPTHER

## 2020-06-18 RX ORDER — PREGABALIN 150 MG/1
150 CAPSULE ORAL 2 TIMES DAILY
Status: ON HOLD | COMMUNITY
End: 2020-06-19 | Stop reason: SDUPTHER

## 2020-06-18 RX ORDER — AMLODIPINE BESYLATE 10 MG/1
TABLET ORAL DAILY
COMMUNITY
End: 2020-07-02

## 2020-06-18 RX ORDER — ATORVASTATIN CALCIUM 20 MG/1
20 TABLET, FILM COATED ORAL DAILY
COMMUNITY
End: 2021-03-17

## 2020-06-18 RX ORDER — AMITRIPTYLINE HYDROCHLORIDE 25 MG/1
25 TABLET, FILM COATED ORAL
Status: ON HOLD | COMMUNITY
End: 2020-06-19 | Stop reason: SDUPTHER

## 2020-06-18 RX ORDER — CARVEDILOL 12.5 MG/1
12.5 TABLET ORAL 2 TIMES DAILY WITH MEALS
COMMUNITY
End: 2021-03-17

## 2020-06-18 RX ORDER — CEPHALEXIN 500 MG/1
500 CAPSULE ORAL 2 TIMES DAILY
COMMUNITY
End: 2020-07-02

## 2020-06-18 NOTE — PROGRESS NOTES
Chief Complaint   Patient presents with    Medication Evaluation     Patient states that he need a medication refill. 1. Have you been to the ER, urgent care clinic since your last visit? Hospitalized since your last visit? Yes Reason for visit: infection in toe     2. Have you seen or consulted any other health care providers outside of the [address] [place] [address] since your last visit? Include any pap smears or colon screening.  No

## 2020-06-19 ENCOUNTER — HOSPITAL ENCOUNTER (INPATIENT)
Age: 55
LOS: 12 days | Discharge: HOME HEALTH | DRG: 951 | End: 2020-07-02
Attending: EMERGENCY MEDICINE | Admitting: HOSPITALIST
Payer: MEDICAID

## 2020-06-19 DIAGNOSIS — M86.60 OTHER CHRONIC OSTEOMYELITIS, UNSPECIFIED SITE (HCC): Primary | ICD-10-CM

## 2020-06-19 DIAGNOSIS — I50.33 ACUTE ON CHRONIC DIASTOLIC CONGESTIVE HEART FAILURE (HCC): ICD-10-CM

## 2020-06-19 PROCEDURE — 96365 THER/PROPH/DIAG IV INF INIT: CPT

## 2020-06-19 PROCEDURE — 86140 C-REACTIVE PROTEIN: CPT

## 2020-06-19 PROCEDURE — 84484 ASSAY OF TROPONIN QUANT: CPT

## 2020-06-19 PROCEDURE — 96375 TX/PRO/DX INJ NEW DRUG ADDON: CPT

## 2020-06-19 PROCEDURE — 99285 EMERGENCY DEPT VISIT HI MDM: CPT

## 2020-06-19 PROCEDURE — 80053 COMPREHEN METABOLIC PANEL: CPT

## 2020-06-19 PROCEDURE — 85652 RBC SED RATE AUTOMATED: CPT

## 2020-06-19 PROCEDURE — 85025 COMPLETE CBC W/AUTO DIFF WBC: CPT

## 2020-06-20 ENCOUNTER — APPOINTMENT (OUTPATIENT)
Dept: GENERAL RADIOLOGY | Age: 55
DRG: 951 | End: 2020-06-20
Attending: EMERGENCY MEDICINE
Payer: MEDICAID

## 2020-06-20 PROBLEM — I50.9 ACUTE CHF (CONGESTIVE HEART FAILURE) (HCC): Status: ACTIVE | Noted: 2020-06-20

## 2020-06-20 LAB
ALBUMIN SERPL-MCNC: 1.8 G/DL (ref 3.5–5)
ALBUMIN/GLOB SERPL: 0.2 {RATIO} (ref 1.1–2.2)
ALP SERPL-CCNC: 142 U/L (ref 45–117)
ALT SERPL-CCNC: 16 U/L (ref 12–78)
ANION GAP SERPL CALC-SCNC: 6 MMOL/L (ref 5–15)
APPEARANCE UR: ABNORMAL
APTT PPP: 29.5 SEC (ref 22.1–32)
AST SERPL-CCNC: 57 U/L (ref 15–37)
ATRIAL RATE: 121 BPM
BACTERIA URNS QL MICRO: NEGATIVE /HPF
BASOPHILS # BLD: 0 K/UL (ref 0–0.1)
BASOPHILS NFR BLD: 0 % (ref 0–1)
BILIRUB SERPL-MCNC: 0.2 MG/DL (ref 0.2–1)
BILIRUB UR QL: NEGATIVE
BNP SERPL-MCNC: 6588 PG/ML
BUN SERPL-MCNC: 27 MG/DL (ref 6–20)
BUN/CREAT SERPL: 13 (ref 12–20)
CALCIUM SERPL-MCNC: 7.9 MG/DL (ref 8.5–10.1)
CALCULATED P AXIS, ECG09: 71 DEGREES
CALCULATED R AXIS, ECG10: -23 DEGREES
CALCULATED T AXIS, ECG11: 180 DEGREES
CHLORIDE SERPL-SCNC: 100 MMOL/L (ref 97–108)
CO2 SERPL-SCNC: 25 MMOL/L (ref 21–32)
COLOR UR: ABNORMAL
COMMENT, HOLDF: NORMAL
CREAT SERPL-MCNC: 2.15 MG/DL (ref 0.7–1.3)
CRP SERPL-MCNC: 3.96 MG/DL (ref 0–0.6)
D DIMER PPP FEU-MCNC: 2.97 MG/L FEU (ref 0–0.65)
DIAGNOSIS, 93000: NORMAL
DIFFERENTIAL METHOD BLD: ABNORMAL
EOSINOPHIL # BLD: 0.2 K/UL (ref 0–0.4)
EOSINOPHIL NFR BLD: 2 % (ref 0–7)
EPITH CASTS URNS QL MICRO: ABNORMAL /LPF
ERYTHROCYTE [DISTWIDTH] IN BLOOD BY AUTOMATED COUNT: 15.5 % (ref 11.5–14.5)
ERYTHROCYTE [SEDIMENTATION RATE] IN BLOOD: 128 MM/HR (ref 0–20)
FERRITIN SERPL-MCNC: 337 NG/ML (ref 26–388)
FIBRINOGEN PPP-MCNC: 508 MG/DL (ref 200–475)
GLOBULIN SER CALC-MCNC: 7.3 G/DL (ref 2–4)
GLUCOSE BLD STRIP.AUTO-MCNC: 160 MG/DL (ref 65–100)
GLUCOSE BLD STRIP.AUTO-MCNC: 88 MG/DL (ref 65–100)
GLUCOSE BLD STRIP.AUTO-MCNC: 97 MG/DL (ref 65–100)
GLUCOSE SERPL-MCNC: 162 MG/DL (ref 65–100)
GLUCOSE UR STRIP.AUTO-MCNC: 100 MG/DL
HCT VFR BLD AUTO: 30.3 % (ref 36.6–50.3)
HGB BLD-MCNC: 9.7 G/DL (ref 12.1–17)
HGB UR QL STRIP: ABNORMAL
HYALINE CASTS URNS QL MICRO: ABNORMAL /LPF (ref 0–5)
IMM GRANULOCYTES # BLD AUTO: 0.1 K/UL (ref 0–0.04)
IMM GRANULOCYTES NFR BLD AUTO: 1 % (ref 0–0.5)
INR PPP: 1.1 (ref 0.9–1.1)
KETONES UR QL STRIP.AUTO: NEGATIVE MG/DL
LACTATE BLD-SCNC: 1.11 MMOL/L (ref 0.4–2)
LEUKOCYTE ESTERASE UR QL STRIP.AUTO: NEGATIVE
LYMPHOCYTES # BLD: 1.1 K/UL (ref 0.8–3.5)
LYMPHOCYTES NFR BLD: 12 % (ref 12–49)
MAGNESIUM SERPL-MCNC: 2 MG/DL (ref 1.6–2.4)
MCH RBC QN AUTO: 26.1 PG (ref 26–34)
MCHC RBC AUTO-ENTMCNC: 32 G/DL (ref 30–36.5)
MCV RBC AUTO: 81.7 FL (ref 80–99)
MONOCYTES # BLD: 0.7 K/UL (ref 0–1)
MONOCYTES NFR BLD: 7 % (ref 5–13)
NEUTS SEG # BLD: 7.1 K/UL (ref 1.8–8)
NEUTS SEG NFR BLD: 78 % (ref 32–75)
NITRITE UR QL STRIP.AUTO: NEGATIVE
NRBC # BLD: 0 K/UL (ref 0–0.01)
NRBC BLD-RTO: 0 PER 100 WBC
P-R INTERVAL, ECG05: 124 MS
PH UR STRIP: 6.5 [PH] (ref 5–8)
PLATELET # BLD AUTO: 263 K/UL (ref 150–400)
PMV BLD AUTO: 9.9 FL (ref 8.9–12.9)
POTASSIUM SERPL-SCNC: 3.6 MMOL/L (ref 3.5–5.1)
PROCALCITONIN SERPL-MCNC: 6.91 NG/ML
PROT SERPL-MCNC: 9.1 G/DL (ref 6.4–8.2)
PROT UR STRIP-MCNC: 300 MG/DL
PROTHROMBIN TIME: 11.7 SEC (ref 9–11.1)
Q-T INTERVAL, ECG07: 352 MS
QRS DURATION, ECG06: 82 MS
QTC CALCULATION (BEZET), ECG08: 499 MS
RBC # BLD AUTO: 3.71 M/UL (ref 4.1–5.7)
RBC #/AREA URNS HPF: ABNORMAL /HPF (ref 0–5)
SAMPLES BEING HELD,HOLD: NORMAL
SERVICE CMNT-IMP: ABNORMAL
SERVICE CMNT-IMP: NORMAL
SERVICE CMNT-IMP: NORMAL
SODIUM SERPL-SCNC: 131 MMOL/L (ref 136–145)
SP GR UR REFRACTOMETRY: 1.01 (ref 1–1.03)
THERAPEUTIC RANGE,PTTT: NORMAL SECS (ref 58–77)
TROPONIN I SERPL-MCNC: <0.05 NG/ML
TROPONIN I SERPL-MCNC: <0.05 NG/ML
UA: UC IF INDICATED,UAUC: ABNORMAL
UROBILINOGEN UR QL STRIP.AUTO: 1 EU/DL (ref 0.2–1)
VENTRICULAR RATE, ECG03: 121 BPM
WBC # BLD AUTO: 9.2 K/UL (ref 4.1–11.1)
WBC URNS QL MICRO: ABNORMAL /HPF (ref 0–4)

## 2020-06-20 PROCEDURE — 85379 FIBRIN DEGRADATION QUANT: CPT

## 2020-06-20 PROCEDURE — 93005 ELECTROCARDIOGRAM TRACING: CPT

## 2020-06-20 PROCEDURE — 85730 THROMBOPLASTIN TIME PARTIAL: CPT

## 2020-06-20 PROCEDURE — 83605 ASSAY OF LACTIC ACID: CPT

## 2020-06-20 PROCEDURE — 87635 SARS-COV-2 COVID-19 AMP PRB: CPT

## 2020-06-20 PROCEDURE — 84145 PROCALCITONIN (PCT): CPT

## 2020-06-20 PROCEDURE — 74011250636 HC RX REV CODE- 250/636: Performed by: EMERGENCY MEDICINE

## 2020-06-20 PROCEDURE — 74011636637 HC RX REV CODE- 636/637: Performed by: HOSPITALIST

## 2020-06-20 PROCEDURE — 81001 URINALYSIS AUTO W/SCOPE: CPT

## 2020-06-20 PROCEDURE — 73630 X-RAY EXAM OF FOOT: CPT

## 2020-06-20 PROCEDURE — 71045 X-RAY EXAM CHEST 1 VIEW: CPT

## 2020-06-20 PROCEDURE — 74011250637 HC RX REV CODE- 250/637: Performed by: HOSPITALIST

## 2020-06-20 PROCEDURE — 74011250636 HC RX REV CODE- 250/636: Performed by: HOSPITALIST

## 2020-06-20 PROCEDURE — 82728 ASSAY OF FERRITIN: CPT

## 2020-06-20 PROCEDURE — 87040 BLOOD CULTURE FOR BACTERIA: CPT

## 2020-06-20 PROCEDURE — 82962 GLUCOSE BLOOD TEST: CPT

## 2020-06-20 PROCEDURE — 85610 PROTHROMBIN TIME: CPT

## 2020-06-20 PROCEDURE — 83880 ASSAY OF NATRIURETIC PEPTIDE: CPT

## 2020-06-20 PROCEDURE — 74011250637 HC RX REV CODE- 250/637: Performed by: EMERGENCY MEDICINE

## 2020-06-20 PROCEDURE — 36415 COLL VENOUS BLD VENIPUNCTURE: CPT

## 2020-06-20 PROCEDURE — 65660000000 HC RM CCU STEPDOWN

## 2020-06-20 PROCEDURE — 83735 ASSAY OF MAGNESIUM: CPT

## 2020-06-20 PROCEDURE — 85384 FIBRINOGEN ACTIVITY: CPT

## 2020-06-20 RX ORDER — AMLODIPINE BESYLATE 5 MG/1
10 TABLET ORAL DAILY
Status: DISCONTINUED | OUTPATIENT
Start: 2020-06-20 | End: 2020-06-30

## 2020-06-20 RX ORDER — INSULIN LISPRO 100 [IU]/ML
INJECTION, SOLUTION INTRAVENOUS; SUBCUTANEOUS
Status: DISCONTINUED | OUTPATIENT
Start: 2020-06-20 | End: 2020-06-24

## 2020-06-20 RX ORDER — SODIUM CHLORIDE 0.9 % (FLUSH) 0.9 %
5-40 SYRINGE (ML) INJECTION AS NEEDED
Status: DISCONTINUED | OUTPATIENT
Start: 2020-06-20 | End: 2020-07-02 | Stop reason: HOSPADM

## 2020-06-20 RX ORDER — DEXTROSE 50 % IN WATER (D50W) INTRAVENOUS SYRINGE
12.5-25 AS NEEDED
Status: DISCONTINUED | OUTPATIENT
Start: 2020-06-20 | End: 2020-07-02 | Stop reason: HOSPADM

## 2020-06-20 RX ORDER — ONDANSETRON 2 MG/ML
4 INJECTION INTRAMUSCULAR; INTRAVENOUS
Status: DISCONTINUED | OUTPATIENT
Start: 2020-06-20 | End: 2020-06-21

## 2020-06-20 RX ORDER — PREGABALIN 150 MG/1
150 CAPSULE ORAL 2 TIMES DAILY
Status: DISCONTINUED | OUTPATIENT
Start: 2020-06-20 | End: 2020-07-02 | Stop reason: HOSPADM

## 2020-06-20 RX ORDER — FUROSEMIDE 10 MG/ML
40 INJECTION INTRAMUSCULAR; INTRAVENOUS
Status: COMPLETED | OUTPATIENT
Start: 2020-06-20 | End: 2020-06-20

## 2020-06-20 RX ORDER — SODIUM CHLORIDE 0.9 % (FLUSH) 0.9 %
5-40 SYRINGE (ML) INJECTION EVERY 8 HOURS
Status: DISCONTINUED | OUTPATIENT
Start: 2020-06-20 | End: 2020-07-02 | Stop reason: HOSPADM

## 2020-06-20 RX ORDER — NITROGLYCERIN 0.4 MG/1
0.4 TABLET SUBLINGUAL
Status: DISCONTINUED | OUTPATIENT
Start: 2020-06-20 | End: 2020-07-02 | Stop reason: HOSPADM

## 2020-06-20 RX ORDER — NITROGLYCERIN 0.4 MG/1
0.4 TABLET SUBLINGUAL
Status: DISCONTINUED | OUTPATIENT
Start: 2020-06-20 | End: 2020-06-21

## 2020-06-20 RX ORDER — AMITRIPTYLINE HYDROCHLORIDE 50 MG/1
25 TABLET, FILM COATED ORAL
Status: DISCONTINUED | OUTPATIENT
Start: 2020-06-20 | End: 2020-07-02 | Stop reason: HOSPADM

## 2020-06-20 RX ORDER — ACETAMINOPHEN 325 MG/1
650 TABLET ORAL
Status: DISCONTINUED | OUTPATIENT
Start: 2020-06-20 | End: 2020-07-02 | Stop reason: HOSPADM

## 2020-06-20 RX ORDER — CARVEDILOL 12.5 MG/1
12.5 TABLET ORAL 2 TIMES DAILY WITH MEALS
Status: DISCONTINUED | OUTPATIENT
Start: 2020-06-20 | End: 2020-07-02 | Stop reason: HOSPADM

## 2020-06-20 RX ORDER — HEPARIN SODIUM 5000 [USP'U]/ML
5000 INJECTION, SOLUTION INTRAVENOUS; SUBCUTANEOUS EVERY 8 HOURS
Status: DISCONTINUED | OUTPATIENT
Start: 2020-06-20 | End: 2020-07-02 | Stop reason: HOSPADM

## 2020-06-20 RX ORDER — ATORVASTATIN CALCIUM 20 MG/1
20 TABLET, FILM COATED ORAL DAILY
Status: DISCONTINUED | OUTPATIENT
Start: 2020-06-20 | End: 2020-07-02 | Stop reason: HOSPADM

## 2020-06-20 RX ORDER — MAGNESIUM SULFATE 100 %
4 CRYSTALS MISCELLANEOUS AS NEEDED
Status: DISCONTINUED | OUTPATIENT
Start: 2020-06-20 | End: 2020-07-02 | Stop reason: HOSPADM

## 2020-06-20 RX ORDER — INSULIN GLARGINE 100 [IU]/ML
20 INJECTION, SOLUTION SUBCUTANEOUS
Status: DISCONTINUED | OUTPATIENT
Start: 2020-06-20 | End: 2020-06-24

## 2020-06-20 RX ORDER — NITROGLYCERIN 0.4 MG/1
0.4 TABLET SUBLINGUAL
Status: COMPLETED | OUTPATIENT
Start: 2020-06-20 | End: 2020-06-20

## 2020-06-20 RX ORDER — FUROSEMIDE 10 MG/ML
40 INJECTION INTRAMUSCULAR; INTRAVENOUS DAILY
Status: DISCONTINUED | OUTPATIENT
Start: 2020-06-20 | End: 2020-06-24

## 2020-06-20 RX ADMIN — Medication 10 ML: at 15:52

## 2020-06-20 RX ADMIN — CARVEDILOL 12.5 MG: 12.5 TABLET, FILM COATED ORAL at 16:20

## 2020-06-20 RX ADMIN — FUROSEMIDE 40 MG: 10 INJECTION, SOLUTION INTRAMUSCULAR; INTRAVENOUS at 03:44

## 2020-06-20 RX ADMIN — NITROGLYCERIN 0.4 MG: 0.4 TABLET, ORALLY DISINTEGRATING SUBLINGUAL at 01:54

## 2020-06-20 RX ADMIN — FUROSEMIDE 40 MG: 10 INJECTION, SOLUTION INTRAMUSCULAR; INTRAVENOUS at 11:59

## 2020-06-20 RX ADMIN — NITROGLYCERIN 1 INCH: 20 OINTMENT TOPICAL at 04:14

## 2020-06-20 RX ADMIN — HEPARIN SODIUM 5000 UNITS: 5000 INJECTION INTRAVENOUS; SUBCUTANEOUS at 08:09

## 2020-06-20 RX ADMIN — NITROGLYCERIN 0.4 MG: 0.4 TABLET, ORALLY DISINTEGRATING SUBLINGUAL at 03:44

## 2020-06-20 RX ADMIN — NITROGLYCERIN 0.4 MG: 0.4 TABLET, ORALLY DISINTEGRATING SUBLINGUAL at 01:59

## 2020-06-20 RX ADMIN — AMLODIPINE BESYLATE 10 MG: 5 TABLET ORAL at 08:09

## 2020-06-20 RX ADMIN — INSULIN GLARGINE 20 UNITS: 100 INJECTION, SOLUTION SUBCUTANEOUS at 23:27

## 2020-06-20 RX ADMIN — AZITHROMYCIN 500 MG: 500 INJECTION, POWDER, LYOPHILIZED, FOR SOLUTION INTRAVENOUS at 05:22

## 2020-06-20 RX ADMIN — PREGABALIN 150 MG: 150 CAPSULE ORAL at 18:37

## 2020-06-20 RX ADMIN — AMITRIPTYLINE HYDROCHLORIDE 25 MG: 25 TABLET, FILM COATED ORAL at 23:27

## 2020-06-20 RX ADMIN — Medication 10 ML: at 23:27

## 2020-06-20 RX ADMIN — HEPARIN SODIUM 5000 UNITS: 5000 INJECTION INTRAVENOUS; SUBCUTANEOUS at 15:52

## 2020-06-20 RX ADMIN — CARVEDILOL 12.5 MG: 12.5 TABLET, FILM COATED ORAL at 07:54

## 2020-06-20 RX ADMIN — HEPARIN SODIUM 5000 UNITS: 5000 INJECTION INTRAVENOUS; SUBCUTANEOUS at 23:45

## 2020-06-20 RX ADMIN — INSULIN LISPRO 2 UNITS: 100 INJECTION, SOLUTION INTRAVENOUS; SUBCUTANEOUS at 16:20

## 2020-06-20 NOTE — PROGRESS NOTES
TRANSFER - IN REPORT:    Verbal report received from ty (name) on [facility] Records  being received from ER(unit) for routine progression of care      Report consisted of patients Situation, Background, Assessment and   Recommendations(SBAR). Information from the following report(s) SBAR, Kardex, Intake/Output, MAR and Recent Results was reviewed with the receiving nurse. Opportunity for questions and clarification was provided. Assessment completed upon patients arrival to unit and care assumed. [facility] consult. Left message  0737 Kyle Marrow will see patient    [id number] Patient does not listen to instructions and gets up out of bed without a nurse in the room.   Bed alarm on

## 2020-06-20 NOTE — ED NOTES
Pt got up out of bed to the bedside commode and had a large BM (filled up over half a BSC) brown green loose incontinent further in his briefs and on the commode,   Assist pt with cleaning all of this, he got a clean pair of shorts from his own suitcase  He took items out of his pockets of his red pants and put them hisself in his suitcase  His red pants, belt and grey briefs he had on were soiled with stool which I helped him clean with warm water  Pt was feeling much better after this breathing at ease. He said he hadn't had a BM in several days. He was appreciative of our care.    RN Amelie Bo helped me with cleaning pt

## 2020-06-20 NOTE — ED PROVIDER NOTES
EMERGENCY DEPARTMENT HISTORY AND PHYSICAL EXAM      Date: 6/19/2020  Patient Name: Jasmin Dalal  Patient Age and Sex: 48 y.o. male    History of Presenting Illness     Chief Complaint   Patient presents with    Toe Pain     Pt is diabetic- got big left toe removed- and was told that adjacent toe has to be removed soon-  told  Pt to come to ER  to get it removed. Dr Nova Carey was referring physician. History Provided By: Patient    HPI: Jasmin Dalal, is a 48 y.o. male whose medical history includes renal disease, insulin-dependent diabetes, diabetic nephropathy, hypertension, presents to the emergency room after his podiatrist told him to come here in order to get his second right toe amputated. He was encouraged to come to the ED on Wednesday but did not come immediately because he had \"things to do\". No fever or chills. No increased pain in his leg. Pt denies any other alleviating or exacerbating factors. There are no other complaints, changes or physical findings at this time. PCP: Leena Kemp MD    Past History   Past Medical History:  Past Medical History:   Diagnosis Date    Diabetes ([address]     Hypertension        Past Surgical History:  Past Surgical History:   Procedure Laterality Date    HX HEENT      HX ORTHOPAEDIC         Family History:  No family history on file. Social History:  Social History     Tobacco Use    Smoking status: Current Every Day Smoker    Smokeless tobacco: Former User   Substance Use Topics    Alcohol use: Not Currently    Drug use: Not Currently     Types: Cocaine, Heroin       Allergies:  No Known Allergies    Current Medications:  No current facility-administered medications on file prior to encounter. Current Outpatient Medications on File Prior to Encounter   Medication Sig Dispense Refill    amitriptyline (ELAVIL) 25 mg tablet Take 25 mg by mouth nightly.       carvediloL (COREG) 12.5 mg tablet Take 12.5 mg by mouth two (2) times daily (with meals).  atorvastatin (LIPITOR) 20 mg tablet Take 20 mg by mouth daily.  hydroCHLOROthiazide (MICROZIDE) 12.5 mg capsule Take 12.5 mg by mouth daily.  lisinopriL (PRINIVIL, ZESTRIL) 10 mg tablet Take  by mouth daily.  amLODIPine (NORVASC) 10 mg tablet Take  by mouth daily.  cephALEXin (KEFLEX) 500 mg capsule Take 500 mg by mouth two (2) times a day.  pregabalin (Lyrica) 150 mg capsule Take 150 mg by mouth two (2) times a day.  insulin glargine (Lantus U-100 Insulin) 100 unit/mL injection by SubCUTAneous route nightly. 30 units      dapagliflozin (Farxiga) 5 mg tab tablet Take 5 mg by mouth daily. Review of Systems     Review of Systems   Constitutional: Negative for appetite change, chills and fever. Respiratory: Negative for cough, chest tightness and shortness of breath. Cardiovascular: Negative for chest pain, palpitations and leg swelling. Gastrointestinal: Negative for abdominal distention, abdominal pain, blood in stool, constipation, diarrhea, nausea and vomiting. Genitourinary: Negative for decreased urine volume, difficulty urinating, dysuria, flank pain, frequency and hematuria. Musculoskeletal: Negative for arthralgias, joint swelling, myalgias, neck pain and neck stiffness. Skin: Positive for wound. Neurological: Negative for dizziness, weakness, light-headedness, numbness and headaches. Hematological: Negative for adenopathy. All other systems reviewed and are negative. Physical Exam     Physical Exam  Vitals signs and nursing note reviewed. Constitutional:       Appearance: He is not toxic-appearing. HENT:      Head: Atraumatic. Mouth/Throat:      Mouth: Mucous membranes are moist.   Eyes:      General: No scleral icterus. Extraocular Movements: Extraocular movements intact. Conjunctiva/sclera: Conjunctivae normal.      Pupils: Pupils are equal, round, and reactive to light.    Neck:      Musculoskeletal: Normal range of motion and neck supple. Cardiovascular:      Rate and Rhythm: Normal rate and regular rhythm. Pulses: Normal pulses. Heart sounds: Normal heart sounds. Pulmonary:      Effort: Pulmonary effort is normal.      Breath sounds: Normal breath sounds. No wheezing. Abdominal:      General: Bowel sounds are normal.      Palpations: Abdomen is soft. Tenderness: There is no abdominal tenderness. Musculoskeletal: Normal range of motion. Comments: Left second toe with chronic ulceration at base. No purulent discharge. Skin:     General: Skin is warm and dry. Capillary Refill: Capillary refill takes less than 2 seconds. Neurological:      General: No focal deficit present. Mental Status: He is alert. Mental status is at baseline. Psychiatric:         Mood and Affect: Mood normal.         Behavior: Behavior normal.         Diagnostic Study Results     Labs - I have personally reviewed and interpreted all laboratory results. Juve Cabrera MD, MSc  Recent Results (from the past 24 hour(s))   TROPONIN I    Collection Time: 06/19/20 11:55 PM   Result Value Ref Range    Troponin-I, Qt. <0.05 <0.05 ng/mL   CBC WITH AUTOMATED DIFF    Collection Time: 06/19/20 11:55 PM   Result Value Ref Range    WBC 9.2 4.1 - 11.1 K/uL    RBC 3.71 (L) 4.10 - 5.70 M/uL    HGB 9.7 (L) 12.1 - 17.0 g/dL    HCT 30.3 (L) 36.6 - 50.3 %    MCV 81.7 80.0 - 99.0 FL    MCH 26.1 26.0 - 34.0 PG    MCHC 32.0 30.0 - 36.5 g/dL    RDW 15.5 (H) 11.5 - 14.5 %    PLATELET 446 142 - 429 K/uL    MPV 9.9 8.9 - 12.9 FL    NRBC 0.0 0  WBC    ABSOLUTE NRBC 0.00 0.00 - 0.01 K/uL    NEUTROPHILS 78 (H) 32 - 75 %    LYMPHOCYTES 12 12 - 49 %    MONOCYTES 7 5 - 13 %    EOSINOPHILS 2 0 - 7 %    BASOPHILS 0 0 - 1 %    IMMATURE GRANULOCYTES 1 (H) 0.0 - 0.5 %    ABS. NEUTROPHILS 7.1 1.8 - 8.0 K/UL    ABS. LYMPHOCYTES 1.1 0.8 - 3.5 K/UL    ABS. MONOCYTES 0.7 0.0 - 1.0 K/UL    ABS. EOSINOPHILS 0.2 0.0 - 0.4 K/UL    ABS.  BASOPHILS 0.0 0.0 - 0.1 K/UL    ABS. IMM. GRANS. 0.1 (H) 0.00 - 0.04 K/UL    DF AUTOMATED     METABOLIC PANEL, COMPREHENSIVE    Collection Time: 06/19/20 11:55 PM   Result Value Ref Range    Sodium 131 (L) 136 - 145 mmol/L    Potassium 3.6 3.5 - 5.1 mmol/L    Chloride 100 97 - 108 mmol/L    CO2 25 21 - 32 mmol/L    Anion gap 6 5 - 15 mmol/L    Glucose 162 (H) 65 - 100 mg/dL    BUN 27 (H) 6 - 20 MG/DL    Creatinine 2.15 (H) 0.70 - 1.30 MG/DL    BUN/Creatinine ratio 13 12 - 20      GFR est AA 39 (L) >60 ml/min/1.73m2    GFR est non-AA 32 (L) >60 ml/min/1.73m2    Calcium 7.9 (L) 8.5 - 10.1 MG/DL    Bilirubin, total 0.2 0.2 - 1.0 MG/DL    ALT (SGPT) 16 12 - 78 U/L    AST (SGOT) 57 (H) 15 - 37 U/L    Alk. phosphatase 142 (H) 45 - 117 U/L    Protein, total 9.1 (H) 6.4 - 8.2 g/dL    Albumin 1.8 (L) 3.5 - 5.0 g/dL    Globulin 7.3 (H) 2.0 - 4.0 g/dL    A-G Ratio 0.2 (L) 1.1 - 2.2     SED RATE (ESR)    Collection Time: 06/19/20 11:55 PM   Result Value Ref Range    Sed rate, automated 128 (H) 0 - 20 mm/hr   POC LACTIC ACID    Collection Time: 06/20/20  1:24 AM   Result Value Ref Range    Lactic Acid (POC) 1.11 0.40 - 2.00 mmol/L   EKG, 12 LEAD, INITIAL    Collection Time: 06/20/20  1:27 AM   Result Value Ref Range    Ventricular Rate 121 BPM    Atrial Rate 121 BPM    P-R Interval 124 ms    QRS Duration 82 ms    Q-T Interval 352 ms    QTC Calculation (Bezet) 499 ms    Calculated P Axis 71 degrees    Calculated R Axis -23 degrees    Calculated T Axis 180 degrees    Diagnosis       Sinus tachycardia with occasional premature ventricular complexes  Possible Anterior infarct , age undetermined  ST & T wave abnormality, consider lateral ischemia  No previous ECGs available         Radiologic Studies - I have personally reviewed and interpreted all imaging studies and agree with radiology interpretation and report. - Saba Layne MD, MSc  XR CHEST PORT   Final Result   IMPRESSION: Extensive bilateral interstitial and patchy airspace disease. Differential diagnosis includes: Pulmonary vascular congestive changes and/or   infectious process. XR FOOT LT MIN 3 V   Final Result   IMPRESSION:    1. Osteomyelitis of the distal second metatarsal and base of the proximal   phalanx of the left second toe. Oblique distracted fracture of the base of the   proximal phalanx of the left second toe. 2. Left great toe transmetatarsal amputation postoperative changes. Multiple   ossific fragments adjacent to the residual distal first metatarsal. Residual   great toe soft tissues are edematous. MR can be performed for further   evaluation, as indicated. Medical Decision Making   I am the first provider for this patient. Records Reviewed: I reviewed our electronic medical record system for any past medical records that were available that may contribute to the patient's current condition, including their PMH, surgical history, social and family history. Reviewed the nursing notes and vital signs from today's visit. Nursing notes will be reviewed as they become available in realtime while the pt has been in the ED. Kristian Hawthorne MD Msc    Vital Signs-Reviewed the patient's vital signs. Patient Vitals for the past 24 hrs:   Temp Pulse Resp BP SpO2   06/20/20 0230 -- (!) 105 12 125/66 95 %   06/20/20 0154 -- (!) 116 15 (!) 175/155 96 %   06/20/20 0145 -- (!) 116 13 (!) 200/112 98 %   06/19/20 2330 -- -- -- (!) 155/98 91 %   06/19/20 2125 98.1 °F (36.7 °C) (!) 102 18 (!) 154/98 98 %       Provider Notes (Medical Decision Making):   Patient presents with a chronic diabetic ulcer affecting second toe on left. Is here for admission as recommended by podiatry. On exam, chronic ulceration noted, no significant cellutlitis surrounding lesion, no crepitus or odor. While in the ED, patient experienced multiple episodes of recurrent sob associated with severe htn and tachycardia. No hypoxemia. Possible flash pulm edema, possible related to his htn. Osteo also noted on imaging studies. Will start abx for toe. In terms of sob, will start nitro to control BP, drip if needed. ED Course:   Initial assessment performed. The patients presenting problems have been discussed, and they are in agreement with the care plan formulated and outlined with them. I have encouraged them to ask questions as they arise throughout their visit. Eduardo Pierre MD, am the attending of record for this patient encounter. ED Orders Placed/Medications Administered During ED Course:   Orders Placed This Encounter    XR FOOT LT MIN 3 V    XR CHEST PORT    TROPONIN I    CBC WITH AUTOMATED DIFF    METABOLIC PANEL, COMPREHENSIVE    SED RATE (ESR)    C REACTIVE PROTEIN, QT    NT-PRO BNP    MAGNESIUM    PROCALCITONIN    PROTHROMBIN TIME + INR    PTT    FIBRINOGEN    FERRITIN    D DIMER    SARS-COV-2    POC LACTIC ACID    NURSING-MISCELLANEOUS: PPE required for any aerosolizing procedure (ie, intubation, bronchoscopy). Surgical mask on patient for any transport outside room. Patient should be single room, closed door with notification of droplet plus isolation. CON. ..    DROPLET PLUS    POC LACTIC ACID    EKG 12 LEAD INITIAL    nitroglycerin (NITROSTAT) tablet 0.4 mg    nitroglycerin (NITROSTAT) tablet 0.4 mg    furosemide (LASIX) injection 40 mg    nitroglycerin (NITROSTAT) tablet 0.4 mg    nitroglycerin (NITROBID) 2 % ointment 1 Inch       Medications   nitroglycerin (NITROSTAT) tablet 0.4 mg (has no administration in time range)   nitroglycerin (NITROSTAT) tablet 0.4 mg (0.4 mg SubLINGual Given 6/20/20 0159)   furosemide (LASIX) injection 40 mg (has no administration in time range)   nitroglycerin (NITROSTAT) tablet 0.4 mg (has no administration in time range)   nitroglycerin (NITROBID) 2 % ointment 1 Inch (has no administration in time range)     ED Course as of Jun 20 0255   Sat Jun 20, 2020   0146 Patient reports sudden onset of SOB.  States that this has happened to him 4 or 5 times in the past and since his toe amputation surgery. Chronic dry cough, not changed. No fever. Denies chest pain. O2 sat currently 98% patient tachy to 110. Speaking in full sentences. [TZ]   0152 On my repeat exam of the patient, he has coarse bilateral breath sounds, minimal wheezing. Systolic pressure significantly elevated. [TZ]   0153 On my reexamination of the patient, his lung sounds are coarse bilaterally. Mild tachypnea but minimal wheezing. He has an elevated JVD. All this suggests flash pulmonary edema or congestive heart failure exacerbation. I have a low suspicion for COVID. We will start with sublingual nitro to treat pressure as well as the shortness of breath, if necessary we will start him on a drip. Chest x-ray read still pending as is BNP. [TZ]   0220 Feeling better after nitro but still having difficulty breathing. HTN improved although still significantly high. Will continue to monitor. [TZ]      ED Course User Index  [TZ] Cesar Dale MD       Consult Note:  Kristian Hawthorne MD spoke with admitting hospitalist,   Discussed pt's hx, physical exam and available diagnostic and imaging results. Reviewed care plans. Agree with management and plan thus far. DISPOSITION: ADMIT  Patient is being admitted to the hospital.  Their test results and reasons for admission have been discussed. The patient and/or available family express agreement with and understanding of the need to be admitted and their admission diagnosis. Thank you for resuming the care of this patient. Please don't hesitate to contact me in the emergency department if you  have any additional questions. Kristian Hawthorne MD, MSc        Diagnosis     Clinical Impression:   1. Other chronic osteomyelitis, unspecified site ([facility] [address]    2.  Acute on chronic diastolic congestive heart failure (HCC)        Attestation:  I personally performed the services described in this documentation on this date 6/19/2020 for patient Brand Perry. Sheila Goodman MD    Please note that this dictation was completed with [facility], the computer voice recognition software. Quite often unanticipated grammatical, syntax, homophones, and other interpretive errors are inadvertently transcribed by the computer software. Please disregard these errors. Please excuse any errors that have escaped final proofreading.

## 2020-06-20 NOTE — ED NOTES
TRANSFER - OUT REPORT:    Verbal report given to Sofiya RN(name) on Renetta Bertrand  being transferred to pcu (unit) for routine progression of care       Report consisted of patients Situation, Background, Assessment and   Recommendations(SBAR). Information from the following report(s) SBAR, Kardex, ED Summary, [facility] and Recent Results was reviewed with the receiving nurse. Lines:   Peripheral IV 06/20/20 Right Forearm (Active)   Site Assessment Clean, dry, & intact 6/20/2020  2:40 AM   Phlebitis Assessment 0 6/20/2020  2:40 AM   Infiltration Assessment 0 6/20/2020  2:40 AM   Dressing Status Clean, dry, & intact 6/20/2020  2:40 AM   Dressing Type Tape;Transparent 6/20/2020  2:40 AM   Hub Color/Line Status Pink;Flushed;Patent 6/20/2020  2:40 AM   Action Taken Blood drawn 6/20/2020  2:40 AM   Alcohol Cap Used No 6/20/2020  2:40 AM        Opportunity for questions and clarification was provided.       Patient transported with:   Registered Nurse

## 2020-06-20 NOTE — ED NOTES
Pt states toe needs to be amputated, pt states PCP sent him to the ED to get his toe amputated; pt states he gotten his big toe on left foot amputated end of feb/ beginning of march; denies chest pain, sob, NVD; placed on monitor x2; call bell within reach will continue to monitor

## 2020-06-20 NOTE — H&P
HISTORY AND PHYSICAL      PCP: Zhao Braxton MD  History source: the patient, ED      CC: shortness of breath      HPI: 48 y.o man with DM, HTN, who initially presented to the ED due to his 2nd left toe being \"messed up\" and reportedly needing to be amputated. While here, he developed shortness of breath and was referred for admission. He states his dyspnea began about 2 weeks ago which coincides when he noticed that his toe was hurting him. He denies cough or fever. No leg swelling or chest pain. He was given IV furosemide and his dyspnea is improving. He denies a history of kidney or heart disease. he is a poor historian. PMH/PSH:  Past Medical History:   Diagnosis Date    Diabetes ([facility] [address]     Hypertension      Past Surgical History:   Procedure Laterality Date    HX HEENT      HX ORTHOPAEDIC         Home meds:   Prior to Admission medications    Medication Sig Start Date End Date Taking? Authorizing Provider   amitriptyline (ELAVIL) 25 mg tablet Take 25 mg by mouth nightly. Provider, Historical   carvediloL (COREG) 12.5 mg tablet Take 12.5 mg by mouth two (2) times daily (with meals). Provider, Historical   atorvastatin (LIPITOR) 20 mg tablet Take 20 mg by mouth daily. Provider, Historical   hydroCHLOROthiazide (MICROZIDE) 12.5 mg capsule Take 12.5 mg by mouth daily. Provider, Historical   lisinopriL (PRINIVIL, ZESTRIL) 10 mg tablet Take  by mouth daily. Provider, Historical   amLODIPine (NORVASC) 10 mg tablet Take  by mouth daily. Provider, Historical   cephALEXin (KEFLEX) 500 mg capsule Take 500 mg by mouth two (2) times a day. Provider, Historical   pregabalin (Lyrica) 150 mg capsule Take 150 mg by mouth two (2) times a day. Provider, Historical   insulin glargine (Lantus U-100 Insulin) 100 unit/mL injection by SubCUTAneous route nightly. 30 units    Provider, Historical   dapagliflozin (Farxiga) 5 mg tab tablet Take 5 mg by mouth daily.     Provider, Historical Allergies:  No Known Allergies    FH:  No pertinent family history    SH:  Social History     Tobacco Use    Smoking status: Current Every Day Smoker    Smokeless tobacco: Former User   Substance Use Topics    Alcohol use: Not Currently       ROS: Review of systems not obtained due to patient factors. poor historian; not forthcoming with answers. PHYSICAL EXAM:  Visit Vitals  BP (!) 157/98 (BP 1 Location: Right arm, BP Patient Position: Lying right side) Comment: Simultaneous filing. User may not have seen previous data. Pulse (!) 104 Comment: Simultaneous filing. User may not have seen previous data. Temp 98.5 °F (36.9 °C)   Resp 13 Comment: Simultaneous filing. User may not have seen previous data. Ht 5' 7\" (1.702 m)   Wt 65.8 kg (145 lb)   SpO2 97% Comment: Simultaneous filing. User may not have seen previous data.    BMI 22.71 kg/m²       Gen: NAD, non-toxic  HEENT: anicteric sclerae, normal conjunctiva  Neck: supple, trachea midline, no adenopathy  Heart: RRR, no MRG, no JVD, no peripheral edema  Lungs: feint b/l crackles, non-labored respirations on O2  Abd: soft, NT, ND, BS+  Extr: L 1st toe amputated, 2nd toe with some dry  Skin: dry, no rash  Neuro: CN II-XII grossly intact, normal speech, moves all extremities  Psych: answers questions reluctantly, sleeping throughout interview, keeps eyes closed when awakened      Labs/Imaging:  Recent Results (from the past 24 hour(s))   TROPONIN I    Collection Time: 06/19/20 11:55 PM   Result Value Ref Range    Troponin-I, Qt. <0.05 <0.05 ng/mL   CBC WITH AUTOMATED DIFF    Collection Time: 06/19/20 11:55 PM   Result Value Ref Range    WBC 9.2 4.1 - 11.1 K/uL    RBC 3.71 (L) 4.10 - 5.70 M/uL    HGB 9.7 (L) 12.1 - 17.0 g/dL    HCT 30.3 (L) 36.6 - 50.3 %    MCV 81.7 80.0 - 99.0 FL    MCH 26.1 26.0 - 34.0 PG    MCHC 32.0 30.0 - 36.5 g/dL    RDW 15.5 (H) 11.5 - 14.5 %    PLATELET 723 379 - 119 K/uL    MPV 9.9 8.9 - 12.9 FL    NRBC 0.0 0  WBC ABSOLUTE NRBC 0.00 0.00 - 0.01 K/uL    NEUTROPHILS 78 (H) 32 - 75 %    LYMPHOCYTES 12 12 - 49 %    MONOCYTES 7 5 - 13 %    EOSINOPHILS 2 0 - 7 %    BASOPHILS 0 0 - 1 %    IMMATURE GRANULOCYTES 1 (H) 0.0 - 0.5 %    ABS. NEUTROPHILS 7.1 1.8 - 8.0 K/UL    ABS. LYMPHOCYTES 1.1 0.8 - 3.5 K/UL    ABS. MONOCYTES 0.7 0.0 - 1.0 K/UL    ABS. EOSINOPHILS 0.2 0.0 - 0.4 K/UL    ABS. BASOPHILS 0.0 0.0 - 0.1 K/UL    ABS. IMM. GRANS. 0.1 (H) 0.00 - 0.04 K/UL    DF AUTOMATED     METABOLIC PANEL, COMPREHENSIVE    Collection Time: 06/19/20 11:55 PM   Result Value Ref Range    Sodium 131 (L) 136 - 145 mmol/L    Potassium 3.6 3.5 - 5.1 mmol/L    Chloride 100 97 - 108 mmol/L    CO2 25 21 - 32 mmol/L    Anion gap 6 5 - 15 mmol/L    Glucose 162 (H) 65 - 100 mg/dL    BUN 27 (H) 6 - 20 MG/DL    Creatinine 2.15 (H) 0.70 - 1.30 MG/DL    BUN/Creatinine ratio 13 12 - 20      GFR est AA 39 (L) >60 ml/min/1.73m2    GFR est non-AA 32 (L) >60 ml/min/1.73m2    Calcium 7.9 (L) 8.5 - 10.1 MG/DL    Bilirubin, total 0.2 0.2 - 1.0 MG/DL    ALT (SGPT) 16 12 - 78 U/L    AST (SGOT) 57 (H) 15 - 37 U/L    Alk.  phosphatase 142 (H) 45 - 117 U/L    Protein, total 9.1 (H) 6.4 - 8.2 g/dL    Albumin 1.8 (L) 3.5 - 5.0 g/dL    Globulin 7.3 (H) 2.0 - 4.0 g/dL    A-G Ratio 0.2 (L) 1.1 - 2.2     SED RATE (ESR)    Collection Time: 06/19/20 11:55 PM   Result Value Ref Range    Sed rate, automated 128 (H) 0 - 20 mm/hr   POC LACTIC ACID    Collection Time: 06/20/20  1:24 AM   Result Value Ref Range    Lactic Acid (POC) 1.11 0.40 - 2.00 mmol/L   EKG, 12 LEAD, INITIAL    Collection Time: 06/20/20  1:27 AM   Result Value Ref Range    Ventricular Rate 121 BPM    Atrial Rate 121 BPM    P-R Interval 124 ms    QRS Duration 82 ms    Q-T Interval 352 ms    QTC Calculation (Bezet) 499 ms    Calculated P Axis 71 degrees    Calculated R Axis -23 degrees    Calculated T Axis 180 degrees    Diagnosis       Sinus tachycardia with occasional premature ventricular complexes  Possible Anterior infarct , age undetermined  ST & T wave abnormality, consider lateral ischemia  No previous ECGs available     NT-PRO BNP    Collection Time: 06/20/20  2:41 AM   Result Value Ref Range    NT pro-BNP 6,588 (H) <125 PG/ML   MAGNESIUM    Collection Time: 06/20/20  2:41 AM   Result Value Ref Range    Magnesium 2.0 1.6 - 2.4 mg/dL   SAMPLES BEING HELD    Collection Time: 06/20/20  2:41 AM   Result Value Ref Range    SAMPLES BEING HELD LV     COMMENT        Add-on orders for these samples will be processed based on acceptable specimen integrity and analyte stability, which may vary by analyte.    TROPONIN I    Collection Time: 06/20/20  2:41 AM   Result Value Ref Range    Troponin-I, Qt. <0.05 <0.05 ng/mL   PROCALCITONIN    Collection Time: 06/20/20  2:46 AM   Result Value Ref Range    Procalcitonin 6.91 ng/mL   PROTHROMBIN TIME + INR    Collection Time: 06/20/20  2:46 AM   Result Value Ref Range    INR 1.1 0.9 - 1.1      Prothrombin time 11.7 (H) 9.0 - 11.1 sec   PTT    Collection Time: 06/20/20  2:46 AM   Result Value Ref Range    aPTT 29.5 22.1 - 32.0 sec    aPTT, therapeutic range     58.0 - 77.0 SECS   D DIMER    Collection Time: 06/20/20  2:46 AM   Result Value Ref Range    D-dimer 2.97 (H) 0.00 - 0.65 mg/L FEU   SARS-COV-2    Collection Time: 06/20/20  2:46 AM   Result Value Ref Range    Specimen source Nasopharyngeal      SARS-CoV-2 PENDING     SARS-CoV-2 PENDING     Specimen source Nasopharyngeal      COVID-19 rapid test PENDING     COVID-19 PENDING NEG   FIBRINOGEN    Collection Time: 06/20/20  2:46 AM   Result Value Ref Range    Fibrinogen 508 (H) 200 - 475 mg/dL   URINALYSIS W/ REFLEX CULTURE    Collection Time: 06/20/20  4:15 AM   Result Value Ref Range    Color YELLOW/STRAW      Appearance TURBID (A) CLEAR      Specific gravity 1.014 1.003 - 1.030      pH (UA) 6.5 5.0 - 8.0      Protein 300 (A) NEG mg/dL    Glucose 100 (A) NEG mg/dL    Ketone Negative NEG mg/dL    Bilirubin Negative NEG      Blood MODERATE (A) NEG      Urobilinogen 1.0 0.2 - 1.0 EU/dL    Nitrites Negative NEG      Leukocyte Esterase Negative NEG      WBC 0-4 0 - 4 /hpf    RBC 10-20 0 - 5 /hpf    Epithelial cells FEW FEW /lpf    Bacteria Negative NEG /hpf    UA:UC IF INDICATED CULTURE NOT INDICATED BY UA RESULT CNI      Hyaline cast 0-2 0 - 5 /lpf       Recent Labs     06/19/20  2355   WBC 9.2   HGB 9.7*   HCT 30.3*        Recent Labs     06/20/20 0241 06/19/20  2355   NA  --  131*   K  --  3.6   CL  --  100   CO2  --  25   BUN  --  27*   CREA  --  2.15*   GLU  --  162*   CA  --  7.9*   MG 2.0  --      Recent Labs     06/19/20  2355   ALT 16   *   TBILI 0.2   TP 9.1*   ALB 1.8*   GLOB 7.3*       Recent Labs     06/20/20 0241 06/19/20  2355   TROIQ <0.05 <0.05       Recent Labs     06/20/20  0246   INR 1.1   PTP 11.7*   APTT 29.5        No results for input(s): PH, PCO2, PO2 in the last 72 hours. Xr Foot Lt Min 3 V    Result Date: 6/20/2020  IMPRESSION: 1. Osteomyelitis of the distal second metatarsal and base of the proximal phalanx of the left second toe. Oblique distracted fracture of the base of the proximal phalanx of the left second toe. 2. Left great toe transmetatarsal amputation postoperative changes. Multiple ossific fragments adjacent to the residual distal first metatarsal. Residual great toe soft tissues are edematous. MR can be performed for further evaluation, as indicated. Xr Chest Port    Result Date: 6/20/2020  IMPRESSION: Extensive bilateral interstitial and patchy airspace disease. Differential diagnosis includes: Pulmonary vascular congestive changes and/or infectious process. Assessment & Plan:     Shortness of breath: suspect acute pulmonary edema. He denies underlying CHF. NT pro BNP is elevated.  Symptoms improving with diuretics  -continue IV furosemide  -I/O's, daily weight  -echocardiogram  -COVID-19 being ruled out    L 2nd toe osteomyelitis:  -consult podiatry    Type 2 DM:  -continue Lantus at reduced dose  -SSI/POC checks    HTN:  -continue home meds  -hold HCTZ while on IV diuretics    Elevated serum Cr: he denies history of CKD  -hold lisinopril  -monitor with diuresis    DVT ppx: sq heparin  Code status: full  Disposition: home when ready    Signed By: Earl Issa MD     June 20, 2020

## 2020-06-20 NOTE — ED NOTES
Bedside shift change report given to [address] (oncoming nurse) by Roberto Purdy RN (offgoing nurse). Report included the following information SBAR, Kardex, ED Summary, [facility] and Recent Results.

## 2020-06-20 NOTE — ED NOTES
Pt standing on side of bed, stating he cannot breathe; O2 SATS high 90s; sinus tachy on the monitor; will continue to monitor

## 2020-06-21 ENCOUNTER — APPOINTMENT (OUTPATIENT)
Dept: NON INVASIVE DIAGNOSTICS | Age: 55
DRG: 951 | End: 2020-06-21
Attending: HOSPITALIST
Payer: MEDICAID

## 2020-06-21 LAB
ANION GAP SERPL CALC-SCNC: 6 MMOL/L (ref 5–15)
BUN SERPL-MCNC: 22 MG/DL (ref 6–20)
BUN/CREAT SERPL: 11 (ref 12–20)
CALCIUM SERPL-MCNC: 7.6 MG/DL (ref 8.5–10.1)
CHLORIDE SERPL-SCNC: 105 MMOL/L (ref 97–108)
CO2 SERPL-SCNC: 25 MMOL/L (ref 21–32)
CREAT SERPL-MCNC: 2 MG/DL (ref 0.7–1.3)
ERYTHROCYTE [DISTWIDTH] IN BLOOD BY AUTOMATED COUNT: 15.5 % (ref 11.5–14.5)
EST. AVERAGE GLUCOSE BLD GHB EST-MCNC: 174 MG/DL
GLUCOSE BLD STRIP.AUTO-MCNC: 100 MG/DL (ref 65–100)
GLUCOSE BLD STRIP.AUTO-MCNC: 113 MG/DL (ref 65–100)
GLUCOSE BLD STRIP.AUTO-MCNC: 129 MG/DL (ref 65–100)
GLUCOSE BLD STRIP.AUTO-MCNC: 138 MG/DL (ref 65–100)
GLUCOSE SERPL-MCNC: 127 MG/DL (ref 65–100)
HBA1C MFR BLD: 7.7 % (ref 4–5.6)
HCT VFR BLD AUTO: 28.4 % (ref 36.6–50.3)
HGB BLD-MCNC: 9.4 G/DL (ref 12.1–17)
MAGNESIUM SERPL-MCNC: 1.8 MG/DL (ref 1.6–2.4)
MCH RBC QN AUTO: 26.6 PG (ref 26–34)
MCHC RBC AUTO-ENTMCNC: 33.1 G/DL (ref 30–36.5)
MCV RBC AUTO: 80.5 FL (ref 80–99)
NRBC # BLD: 0 K/UL (ref 0–0.01)
NRBC BLD-RTO: 0 PER 100 WBC
PLATELET # BLD AUTO: 300 K/UL (ref 150–400)
PMV BLD AUTO: 9.7 FL (ref 8.9–12.9)
POTASSIUM SERPL-SCNC: 3.2 MMOL/L (ref 3.5–5.1)
RBC # BLD AUTO: 3.53 M/UL (ref 4.1–5.7)
SERVICE CMNT-IMP: ABNORMAL
SERVICE CMNT-IMP: NORMAL
SODIUM SERPL-SCNC: 136 MMOL/L (ref 136–145)
T4 FREE SERPL-MCNC: 1.5 NG/DL (ref 0.8–1.5)
TSH SERPL DL<=0.05 MIU/L-ACNC: 0.62 UIU/ML (ref 0.36–3.74)
WBC # BLD AUTO: 8.9 K/UL (ref 4.1–11.1)

## 2020-06-21 PROCEDURE — 85027 COMPLETE CBC AUTOMATED: CPT

## 2020-06-21 PROCEDURE — 65270000029 HC RM PRIVATE

## 2020-06-21 PROCEDURE — 83735 ASSAY OF MAGNESIUM: CPT

## 2020-06-21 PROCEDURE — 80048 BASIC METABOLIC PNL TOTAL CA: CPT

## 2020-06-21 PROCEDURE — 36415 COLL VENOUS BLD VENIPUNCTURE: CPT

## 2020-06-21 PROCEDURE — 82962 GLUCOSE BLOOD TEST: CPT

## 2020-06-21 PROCEDURE — 74011250637 HC RX REV CODE- 250/637: Performed by: HOSPITALIST

## 2020-06-21 PROCEDURE — 84439 ASSAY OF FREE THYROXINE: CPT

## 2020-06-21 PROCEDURE — 83036 HEMOGLOBIN GLYCOSYLATED A1C: CPT

## 2020-06-21 PROCEDURE — 84443 ASSAY THYROID STIM HORMONE: CPT

## 2020-06-21 PROCEDURE — 74011000258 HC RX REV CODE- 258: Performed by: INTERNAL MEDICINE

## 2020-06-21 PROCEDURE — 74011250636 HC RX REV CODE- 250/636: Performed by: HOSPITALIST

## 2020-06-21 PROCEDURE — 74011250636 HC RX REV CODE- 250/636: Performed by: INTERNAL MEDICINE

## 2020-06-21 RX ORDER — POTASSIUM CHLORIDE 20 MEQ/1
40 TABLET, EXTENDED RELEASE ORAL
Status: COMPLETED | OUTPATIENT
Start: 2020-06-21 | End: 2020-06-21

## 2020-06-21 RX ORDER — POTASSIUM CHLORIDE 20 MEQ/1
40 TABLET, EXTENDED RELEASE ORAL
Status: DISCONTINUED | OUTPATIENT
Start: 2020-06-21 | End: 2020-06-21

## 2020-06-21 RX ORDER — POTASSIUM CHLORIDE 20 MEQ/1
20 TABLET, EXTENDED RELEASE ORAL
Status: COMPLETED | OUTPATIENT
Start: 2020-06-21 | End: 2020-06-21

## 2020-06-21 RX ORDER — VANCOMYCIN/0.9 % SOD CHLORIDE 1.5G/250ML
1500 PLASTIC BAG, INJECTION (ML) INTRAVENOUS ONCE
Status: COMPLETED | OUTPATIENT
Start: 2020-06-21 | End: 2020-06-22

## 2020-06-21 RX ORDER — ONDANSETRON 2 MG/ML
2 INJECTION INTRAMUSCULAR; INTRAVENOUS
Status: DISCONTINUED | OUTPATIENT
Start: 2020-06-21 | End: 2020-07-02 | Stop reason: HOSPADM

## 2020-06-21 RX ADMIN — VANCOMYCIN HYDROCHLORIDE 1500 MG: 10 INJECTION, POWDER, LYOPHILIZED, FOR SOLUTION INTRAVENOUS at 17:35

## 2020-06-21 RX ADMIN — POTASSIUM CHLORIDE 20 MEQ: 1500 TABLET, EXTENDED RELEASE ORAL at 06:53

## 2020-06-21 RX ADMIN — POTASSIUM CHLORIDE 40 MEQ: 1500 TABLET, EXTENDED RELEASE ORAL at 06:52

## 2020-06-21 RX ADMIN — PREGABALIN 150 MG: 150 CAPSULE ORAL at 09:12

## 2020-06-21 RX ADMIN — HEPARIN SODIUM 5000 UNITS: 5000 INJECTION INTRAVENOUS; SUBCUTANEOUS at 16:06

## 2020-06-21 RX ADMIN — Medication 10 ML: at 06:53

## 2020-06-21 RX ADMIN — ATORVASTATIN CALCIUM 20 MG: 20 TABLET, FILM COATED ORAL at 09:12

## 2020-06-21 RX ADMIN — Medication 10 ML: at 16:06

## 2020-06-21 RX ADMIN — CEFTRIAXONE 1 G: 1 INJECTION, POWDER, FOR SOLUTION INTRAMUSCULAR; INTRAVENOUS at 16:05

## 2020-06-21 RX ADMIN — HEPARIN SODIUM 5000 UNITS: 5000 INJECTION INTRAVENOUS; SUBCUTANEOUS at 09:12

## 2020-06-21 RX ADMIN — CARVEDILOL 12.5 MG: 12.5 TABLET, FILM COATED ORAL at 09:12

## 2020-06-21 RX ADMIN — FUROSEMIDE 40 MG: 10 INJECTION, SOLUTION INTRAMUSCULAR; INTRAVENOUS at 09:12

## 2020-06-21 RX ADMIN — AMLODIPINE BESYLATE 10 MG: 5 TABLET ORAL at 09:13

## 2020-06-21 NOTE — PROGRESS NOTES
Orthopedic End of Shift Note    Bedside shift change report given to JETT Ortega (oncoming nurse) by Navin Shea RN (offgoing nurse). Report included the following information ED Summary, Intake/Output, MAR, Recent Results and Cardiac Rhythm NSR. POD# N/A  Significant issues during shift: Labs added on for AM labs procalcitonin, CRP, high sensitivity, sed rate when attempted to get labs patient stated \"ya'll gonna have to come back,I'm sick of this shit, I want to get some sleep \"  Patient also informed that if he has a bm to alert staff so a stool sample can be collected. Patient voiced, \"If I have to [place] hold it in cause if I give you my shit , my blood sugar will bottom out\"  Communicated to patient that giving us a stool sample will not make his blood sugar bottom out.  Patient turned over and closed his eyes and said, \"right now I'm going to sleep\"    Issues for Physician to address: Dr. Sam Borrero needs to be contacted in AM  for podiatry consult telephone report from Angelique Ayon , [facility] voiced that podiatry consult to Sam Borrero was attempted to call several times and no answer     Activity This Shift  (check all that apply) [] chair  [] dangle   [] bathroom  [] bedside commode [] hallway  [x] bedrest   Nausea/Vomiting [] yes [x] no     Voiding Status [x] void [] Mares [] I&O Cath   Bowel Movements [] yes [x] no     Foot Pumps or SCD [] yes [x] no    Ice Pack [] yes    [x] no    Incentive Spirometer [] yes [x] no Volume:      Telemetry Monitoring   [x] yes [] no Rhythm: NSR   Supplemental O2 [] yes [x] no Sat off O2: 99  %

## 2020-06-21 NOTE — PROGRESS NOTES
Problem: Falls - Risk of  Goal: *Absence of Falls  Description: Document Luz Elena Stade Fall Risk and appropriate interventions in the flowsheet. Outcome: Progressing Towards Goal  Note: Fall Risk Interventions:  Mobility Interventions: Assess mobility with egress test         Medication Interventions: Evaluate medications/consider consulting pharmacy, Assess postural VS orthostatic hypotension, Bed/chair exit alarm    Elimination Interventions: Bed/chair exit alarm, Call light in reach, Patient to call for help with toileting needs              Problem: Patient Education: Go to Patient Education Activity  Goal: Patient/Family Education  Outcome: Progressing Towards Goal     Problem: Pressure Injury - Risk of  Goal: *Prevention of pressure injury  Description: Document [place] Scale and appropriate interventions in the flowsheet.   Outcome: Progressing Towards Goal  Note: Pressure Injury Interventions:  Sensory Interventions: Assess changes in LOC, Assess need for specialty bed    Moisture Interventions: Absorbent underpads    Activity Interventions: PT/OT evaluation, Increase time out of bed    Mobility Interventions: PT/OT evaluation, Assess need for specialty bed    Nutrition Interventions: Document food/fluid/supplement intake                     Problem: Patient Education: Go to Patient Education Activity  Goal: Patient/Family Education  Outcome: Progressing Towards Goal

## 2020-06-21 NOTE — PROGRESS NOTES
Podiatry    Pt is existing patient of Dr. Inez Carias, who told him to come to [facility]. The call schedule is for unassigned patients, not those with pre-existing patient-doctor relationship. Please consult Dr. Clifford Parsons. I have ordered ABIs to help facilitate the work-up.

## 2020-06-21 NOTE — PROGRESS NOTES
Progress Note      Pt Name  Vladimir Lowe   Date of Birth 1965   Medical Record Number  [id number]      Age  48 y.o. PCP Sepideh Christianson MD   Admit date:  6/19/2020    Room Number  2243/01  @ [facility]   Date of Service  6/21/2020     Admission Diagnoses:  Acute respiratory failure with hypoxia      Admission Summary:  \" 48 y.o man with DM, HTN, who initially presented to the ED due to his 2nd left toe being \"messed up\" and reportedly needing to be amputated. While here, he developed shortness of breath and was referred for admission. He states his dyspnea began about 2 weeks ago which coincides when he noticed that his toe was hurting him. He denies cough or fever. No leg swelling or chest pain. He was given IV furosemide and his dyspnea is improving. He denies a history of kidney or heart disease. he is a poor historian.  \"       Assessment and plan:     Acute respiratory failure with hypoxia Etiology not clear; I am not sure if this all due to pulmonary edema/heart failure; I wonder if he is suffering from viral infection   Diarrhea - intermittent   Chills without fever PTA   I remain concerned for COVID infection in this pt who is residing in community, probably exposed to COVID-19 and reportedly abuses Heroin   · Check for COVID -19 infection x2 (1st one result pending)   · Oxygen supplementation   · Empiric Diuretic ordered - we will continue that   · Echo complete to determine the EF and / or any other wall motion abnormalities   · Watch chemistries closely   · Continue Droplet + isolation until COVID is ruled out (usually two tests)   · Will ask pulmonologist to see him tomorrow   · Sputum for RVP     CKD (?) we don't have any previous record for this pt's previous renal function     Osteomyelitis of Foot   · Possibly chronic   · Podiatrist consulted   · Wound culture will be sent for bacterial infection   · I would like to start him on Abx - which should help, in case the ground glass opacities in his lungs are due to bacterial infection as well     Diabetes Mellitus   · Change diet to diabetic   · SSI   · Check HgbA1c     HTN   Continue Beta blocker and  CCB     Hyperlipidemia   · Continue Statin as was     Neuropathy ?    · Continue Gabapentin as was PTA     Heroin abuse   · Watch for withdrawal         CODE STATUS   Full     Functional Status  Pt lives with a female  in [place]   He I a retired        Surrogate decision maker:  Pt's lady friend      Prophylaxis   Hep SQ   Discharge Plan:  Home w/Family,     Misc   Benefit:  Payor: [facility] MEDICAID / Plan: [employer] / Product Type: Managed Care Medicaid /    Isolation :  Droplet Plus   ADT status:  INPATIENT      Query   None noted today    Prognosis      Social issues  Date  Comment                        Subjective Data     \"I could not breathe \"  Review of Systems - History obtained from the patient  Respiratory ROS: positive for - cough and chills prior to admission   Cardiovascular ROS: positive for - dyspnea on exertion    Objective Data       Comments  Pt is sitting on bedside chair in no distress   He has a small pull-on in the room      Patient Vitals for the past 24 hrs:   BP   06/21/20 1054 125/75   06/21/20 0755 120/75   06/21/20 0400 124/70   06/20/20 2330 131/82   06/20/20 1930 142/85   06/20/20 1500 135/80      Patient Vitals for the past 24 hrs:   Pulse   06/21/20 1054 85   06/21/20 0755 89   06/21/20 0400 89   06/20/20 2330 96   06/20/20 1930 81   06/20/20 1500 88      Patient Vitals for the past 24 hrs:   Resp   06/21/20 1054 16   06/21/20 0755 16   06/21/20 0400 16   06/20/20 2330 16   06/20/20 1930 16   06/20/20 1500 18      Patient Vitals for the past 24 hrs:   Temp   06/21/20 1054 98.6 °F (37 °C)   06/21/20 0755 98.6 °F (37 °C)   06/21/20 0400 98.4 °F (36.9 °C)   06/20/20 2330 98 °F (36.7 °C)   06/20/20 1930 98.2 °F (36.8 °C)   06/20/20 1500 98.4 °F (36.9 °C)        SpO2 Readings from Last 6 Encounters:   06/21/20 98%       O2 Flow Rate (L/min): 2 l/min  O2 Device: Room air Body mass index is 24.43 kg/m². -  Wt Readings from Last 10 Encounters:   06/20/20 70.8 kg (156 lb)        Physical Exam:             General:  Alert, cooperative,   well noursished,   well developed,   appears stated age    Ears/Eyes:  Hearing intact  Sclera anicteric. Pupils equal   Mouth/Throat:  Mucous membranes normal pink and moist     Neck:     Lungs:  Trachea midline  Chest excursion symmetrical   Auscultation B/L Symmetrical with   Vesicular breath sounds     No Crepitations noted          CVS:  Regular rate and rhythm   no  murmur,   No click, rub or gallop  S1 normal   S2 normal      Abdomen:    Soft, non-tender  Bowel sounds normal     Extremities:  LEFT Foot Great toe missing from recent amputation   Second toe is swollen with clear small amount of drainage   Could not tell if there is cellulitis, no warmth to touch   No cyanosis, jaundice  No edema noted   No sign of DVT/cord like lesion on palpation  No sign of acute trauma .     Skin:    Skin color, texture, turgor normal. no acute rash or lesions    Lymph nodes:     Musculoskeletal Muscle bulk B/L symmetrical   Neuro Cranial nerves are intact,   motor movement b/l symmetrical,   Sensory evaluation b/l symmetrical    Psych:  Alert and oriented,   normal mood & affect          Medications reviewed     Current Facility-Administered Medications   Medication Dose Route Frequency    ondansetron (ZOFRAN) injection 2 mg  2 mg IntraVENous Q4H PRN    nitroglycerin (NITROSTAT) tablet 0.4 mg  0.4 mg SubLINGual Q5MIN PRN    acetaminophen (TYLENOL) tablet 650 mg  650 mg Oral Q6H PRN    sodium chloride (NS) flush 5-40 mL  5-40 mL IntraVENous Q8H    sodium chloride (NS) flush 5-40 mL  5-40 mL IntraVENous PRN    heparin (porcine) injection 5,000 Units  5,000 Units SubCUTAneous Q8H    amitriptyline (ELAVIL) tablet 25 mg  25 mg Oral QHS    amLODIPine (NORVASC) tablet 10 mg  10 mg Oral DAILY    carvediloL (COREG) tablet 12.5 mg  12.5 mg Oral BID WITH MEALS    atorvastatin (LIPITOR) tablet 20 mg  20 mg Oral DAILY    pregabalin (LYRICA) capsule 150 mg  150 mg Oral BID    furosemide (LASIX) injection 40 mg  40 mg IntraVENous DAILY    insulin lispro (HUMALOG) injection   SubCUTAneous AC&HS    glucose chewable tablet 16 g  4 Tab Oral PRN    dextrose (D50W) injection syrg 12.5-25 g  12.5-25 g IntraVENous PRN    glucagon (GLUCAGEN) injection 1 mg  1 mg IntraMUSCular PRN    insulin glargine (LANTUS) injection 20 Units  20 Units SubCUTAneous QHS       Relevant other informations: Other medical conditions listed in [facility] problem list section; all of these and other pertinent data were taken into consideration when treatment plan is developed and customized to this patient's unique overall circumstances and needs. We have reviewed available old medical records within the constraints of this admission process. Data Review:   Recent Days:  All Micro Results     Procedure Component Value Units Date/Time    CULTURE, BLOOD, PAIRED [[id number]] Collected:  06/20/20 1305    Order Status:  Completed Specimen:  Blood Updated:  06/20/20 1312    S. Fredderick Canavan, UR/CSF [[id number]]     Order Status:  Canceled           Recent Labs     06/21/20  0253 06/19/20  2355   WBC 8.9 9.2   HGB 9.4* 9.7*   HCT 28.4* 30.3*    263     Recent Labs     06/21/20  0253 06/20/20  0246 06/20/20  0241 06/19/20  2355     --   --  131*   K 3.2*  --   --  3.6     --   --  100   CO2 25  --   --  25   *  --   --  162*   BUN 22*  --   --  27*   CREA 2.00*  --   --  2.15*   CA 7.6*  --   --  7.9*   MG 1.8  --  2.0  --    ALB  --   --   --  1.8*   TBILI  --   --   --  0.2   ALT  --   --   --  16   INR  --  1.1  --   --       No results found for: TSH, TSHEXT         Care Plan discussed with:Patient/Family and Nurse   Other medical conditions are listed in the active hospital problem list section; these and other pertinent data were taken into consideration when the treatment plan was developed and customized to this patient's unique overall circumstances and needs. High complexity decision making was performed for this patient who is at high risk for decompensation with multiple organ involvement. Today total floor/unit time was 45 minutes while caring for this patient and greater than 50% of that time was spent with patient (and/or family) coordinating patients clinical issues; this includes time spent during multidisciplinary rounds.         Kylie Green MD MPH FACP    6/21/2020

## 2020-06-21 NOTE — PROGRESS NOTES
Bedside and Verbal shift change report given to [address] (oncoming nurse) by Ruby Whitt (offgoing nurse). Report included the following information SBAR, Kardex, Intake/Output, MAR, Med Rec Status and Quality Measures. [id number] Genaro Tompkins came and examined patient. Patient's podiatrist is delphine linda [phone] and he will be called because he was the one who sent patient to [facility]    [phone] to give report but nurse was busy settling another patient. 1800 Patient refused medications, coreg and lyrica. Patient has been noncompliant with diet ordered and aggressive verbally to staff. TRANSFER - OUT REPORT:    Verbal report given to Nico Dwyer (name) on Floria Dancer  being transferred to [facility] (unit) for routine progression of care       Report consisted of patients Situation, Background, Assessment and   Recommendations(SBAR). Information from the following report(s) SBAR, Kardex, Intake/Output, MAR and Quality Measures was reviewed with the receiving nurse.     Lines:   Peripheral IV 06/20/20 Right Forearm (Active)   Site Assessment Clean, dry, & intact 6/21/2020  7:55 AM   Phlebitis Assessment 0 6/21/2020  7:55 AM   Infiltration Assessment 0 6/21/2020  7:55 AM   Dressing Status Clean, dry, & intact 6/21/2020  7:55 AM   Dressing Type Tape;Transparent 6/21/2020  7:55 AM   Hub Color/Line Status Capped;Pink 6/21/2020  7:55 AM   Action Taken Blood drawn 6/20/2020  2:40 AM   Alcohol Cap Used No 6/20/2020  2:40 AM       Peripheral IV 93/34/82 Left;Upper Basilic (Active)   Site Assessment Clean, dry, & intact 6/21/2020  7:55 AM   Phlebitis Assessment 0 6/21/2020  7:55 AM   Infiltration Assessment 0 6/21/2020  7:55 AM   Dressing Status Clean, dry, & intact 6/21/2020  7:55 AM   Dressing Type Tape;Transparent 6/21/2020  7:55 AM   Hub Color/Line Status Green 6/21/2020  7:55 AM   Action Taken Open ports on tubing capped 6/21/2020  7:55 AM   Alcohol Cap Used No 6/21/2020  7:55 AM        Opportunity for questions and clarification was provided.       Patient transported with:   Registered Nurse

## 2020-06-21 NOTE — PROGRESS NOTES
1900: Verbal shift change report given to René quintero RN  (oncoming nurse) by Chong Knox RN  (offgoing nurse). Report included the following information SBAR, Kardex, MAR and Cardiac Rhythm NSR, sinus tach. * contacted tele med about low potassium. Shift summary :   Pt Become more alert throughout the night. Woke up and ate snacks from his personal suitcase. Pt was easier to arise than at start of shift . Was more tolerable of safety measures put in place however did often refuse to stay connected to monitoring. Pt remained hemodynamically stable throughout the night. 0700: Bedside and Verbal shift change report given to JETT Arriaza (oncoming nurse) by René quintero RN (offgoing nurse). Report included the following information SBAR, Kardex, MAR and Cardiac Rhythm NSR. Pt is up out of bed , demanding to take a shower , Told pt that he does not have shower privileges and can not get in the shower. Pt is not tolerating request to use call bell before getting out of bed. Pt is seems agitated. Proper education given to  pt about fall safety and prevention.

## 2020-06-21 NOTE — PROGRESS NOTES
Pharmacy Automatic Renal Dosing Protocol - Antimicrobials    Indication for Antimicrobials: SSTI     Current Regimen of Each Antimicrobial:  Vancomycin - pharmacy dosing (Start Date ; Day # 1)  Ceftriaxone 1 gm every 24 hours (, day 1)    Previous Antimicrobial Therapy:    Goal Level: VANCOMYCIN TROUGH GOAL RANGE  ~15   (AUC: 400 - 600 mg/hr/Liter/day)     Date Dose & Interval Measured (mcg/mL) Extrapolated (mcg/mL)                       Date & time of next level:    Significant Cultures:    blood:     Radiology / Imaging results: (X-ray, CT scan or MRI):     Paralysis, amputations, malnutrition:     Labs:  Recent Labs     20  0253 20  2355   CREA 2.00* 2.15*   BUN 22* 27*   WBC 8.9 9.2     Temp (24hrs), Av.4 °F (36.9 °C), Min:98 °F (36.7 °C), Max:98.6 °F (37 °C)    Creatinine Clearance (mL/min) or Dialysis: 40    Impression/Plan:   · Vancomycin 1500 mg x 1, then 750 mg every 16 hours  · Antimicrobial stop date 5 days CTX, TBD for vanc     Pharmacy will follow daily and adjust medications as appropriate for renal function and/or serum levels.     Thank you,  Sanchez Aranda, PHARMD

## 2020-06-22 ENCOUNTER — APPOINTMENT (OUTPATIENT)
Dept: NON INVASIVE DIAGNOSTICS | Age: 55
DRG: 951 | End: 2020-06-22
Attending: HOSPITALIST
Payer: MEDICAID

## 2020-06-22 LAB
ANION GAP SERPL CALC-SCNC: 4 MMOL/L (ref 5–15)
BASOPHILS # BLD: 0.1 K/UL (ref 0–0.1)
BASOPHILS NFR BLD: 1 % (ref 0–1)
BNP SERPL-MCNC: 6076 PG/ML
BUN SERPL-MCNC: 21 MG/DL (ref 6–20)
BUN/CREAT SERPL: 10 (ref 12–20)
CALCIUM SERPL-MCNC: 7.8 MG/DL (ref 8.5–10.1)
CHLORIDE SERPL-SCNC: 107 MMOL/L (ref 97–108)
CO2 SERPL-SCNC: 27 MMOL/L (ref 21–32)
CREAT SERPL-MCNC: 2.06 MG/DL (ref 0.7–1.3)
CRP SERPL HS-MCNC: >9.5 MG/L
DIFFERENTIAL METHOD BLD: ABNORMAL
EOSINOPHIL # BLD: 0.3 K/UL (ref 0–0.4)
EOSINOPHIL NFR BLD: 4 % (ref 0–7)
ERYTHROCYTE [DISTWIDTH] IN BLOOD BY AUTOMATED COUNT: 16.2 % (ref 11.5–14.5)
ERYTHROCYTE [SEDIMENTATION RATE] IN BLOOD: 82 MM/HR (ref 0–20)
GLUCOSE BLD STRIP.AUTO-MCNC: 105 MG/DL (ref 65–100)
GLUCOSE BLD STRIP.AUTO-MCNC: 114 MG/DL (ref 65–100)
GLUCOSE BLD STRIP.AUTO-MCNC: 137 MG/DL (ref 65–100)
GLUCOSE BLD STRIP.AUTO-MCNC: 87 MG/DL (ref 65–100)
GLUCOSE SERPL-MCNC: 157 MG/DL (ref 65–100)
HCT VFR BLD AUTO: 34.1 % (ref 36.6–50.3)
HGB BLD-MCNC: 11.1 G/DL (ref 12.1–17)
IMM GRANULOCYTES # BLD AUTO: 0.1 K/UL (ref 0–0.04)
IMM GRANULOCYTES NFR BLD AUTO: 1 % (ref 0–0.5)
LYMPHOCYTES # BLD: 1.4 K/UL (ref 0.8–3.5)
LYMPHOCYTES NFR BLD: 17 % (ref 12–49)
MCH RBC QN AUTO: 26.5 PG (ref 26–34)
MCHC RBC AUTO-ENTMCNC: 32.6 G/DL (ref 30–36.5)
MCV RBC AUTO: 81.4 FL (ref 80–99)
MONOCYTES # BLD: 0.6 K/UL (ref 0–1)
MONOCYTES NFR BLD: 8 % (ref 5–13)
NEUTS SEG # BLD: 5.5 K/UL (ref 1.8–8)
NEUTS SEG NFR BLD: 69 % (ref 32–75)
NRBC # BLD: 0 K/UL (ref 0–0.01)
NRBC BLD-RTO: 0 PER 100 WBC
PLATELET # BLD AUTO: 373 K/UL (ref 150–400)
PMV BLD AUTO: 9.2 FL (ref 8.9–12.9)
POTASSIUM SERPL-SCNC: 4.2 MMOL/L (ref 3.5–5.1)
PROCALCITONIN SERPL-MCNC: 2.03 NG/ML
RBC # BLD AUTO: 4.19 M/UL (ref 4.1–5.7)
SARS-COV-2, COV2: NOT DETECTED
SERVICE CMNT-IMP: ABNORMAL
SERVICE CMNT-IMP: NORMAL
SODIUM SERPL-SCNC: 138 MMOL/L (ref 136–145)
SOURCE, COVRS: NORMAL
SPECIMEN SOURCE, FCOV2M: NORMAL
WBC # BLD AUTO: 7.9 K/UL (ref 4.1–11.1)

## 2020-06-22 PROCEDURE — 74011250637 HC RX REV CODE- 250/637: Performed by: INTERNAL MEDICINE

## 2020-06-22 PROCEDURE — 87635 SARS-COV-2 COVID-19 AMP PRB: CPT

## 2020-06-22 PROCEDURE — 83880 ASSAY OF NATRIURETIC PEPTIDE: CPT

## 2020-06-22 PROCEDURE — 74011250636 HC RX REV CODE- 250/636: Performed by: INTERNAL MEDICINE

## 2020-06-22 PROCEDURE — 85025 COMPLETE CBC W/AUTO DIFF WBC: CPT

## 2020-06-22 PROCEDURE — 74011250636 HC RX REV CODE- 250/636: Performed by: HOSPITALIST

## 2020-06-22 PROCEDURE — 82962 GLUCOSE BLOOD TEST: CPT

## 2020-06-22 PROCEDURE — 84145 PROCALCITONIN (PCT): CPT

## 2020-06-22 PROCEDURE — 65270000029 HC RM PRIVATE

## 2020-06-22 PROCEDURE — 74011000258 HC RX REV CODE- 258: Performed by: INTERNAL MEDICINE

## 2020-06-22 PROCEDURE — 80048 BASIC METABOLIC PNL TOTAL CA: CPT

## 2020-06-22 PROCEDURE — 74011636637 HC RX REV CODE- 636/637: Performed by: HOSPITALIST

## 2020-06-22 PROCEDURE — 85652 RBC SED RATE AUTOMATED: CPT

## 2020-06-22 PROCEDURE — 74011250637 HC RX REV CODE- 250/637: Performed by: HOSPITALIST

## 2020-06-22 PROCEDURE — 86141 C-REACTIVE PROTEIN HS: CPT

## 2020-06-22 PROCEDURE — 36415 COLL VENOUS BLD VENIPUNCTURE: CPT

## 2020-06-22 PROCEDURE — 93306 TTE W/DOPPLER COMPLETE: CPT

## 2020-06-22 RX ORDER — BENZONATATE 100 MG/1
100 CAPSULE ORAL
Status: DISCONTINUED | OUTPATIENT
Start: 2020-06-22 | End: 2020-07-02 | Stop reason: HOSPADM

## 2020-06-22 RX ORDER — FUROSEMIDE 10 MG/ML
40 INJECTION INTRAMUSCULAR; INTRAVENOUS ONCE
Status: COMPLETED | OUTPATIENT
Start: 2020-06-22 | End: 2020-06-22

## 2020-06-22 RX ORDER — IPRATROPIUM BROMIDE AND ALBUTEROL SULFATE 2.5; .5 MG/3ML; MG/3ML
3 SOLUTION RESPIRATORY (INHALATION)
Status: DISCONTINUED | OUTPATIENT
Start: 2020-06-22 | End: 2020-07-02 | Stop reason: HOSPADM

## 2020-06-22 RX ORDER — GUAIFENESIN 100 MG/5ML
100 SOLUTION ORAL
Status: DISCONTINUED | OUTPATIENT
Start: 2020-06-22 | End: 2020-07-02 | Stop reason: HOSPADM

## 2020-06-22 RX ADMIN — INSULIN GLARGINE 20 UNITS: 100 INJECTION, SOLUTION SUBCUTANEOUS at 21:06

## 2020-06-22 RX ADMIN — Medication 10 ML: at 00:18

## 2020-06-22 RX ADMIN — Medication 10 ML: at 14:22

## 2020-06-22 RX ADMIN — FUROSEMIDE 40 MG: 10 INJECTION, SOLUTION INTRAMUSCULAR; INTRAVENOUS at 08:57

## 2020-06-22 RX ADMIN — CARVEDILOL 12.5 MG: 12.5 TABLET, FILM COATED ORAL at 08:57

## 2020-06-22 RX ADMIN — HEPARIN SODIUM 5000 UNITS: 5000 INJECTION INTRAVENOUS; SUBCUTANEOUS at 10:16

## 2020-06-22 RX ADMIN — INSULIN GLARGINE 20 UNITS: 100 INJECTION, SOLUTION SUBCUTANEOUS at 00:14

## 2020-06-22 RX ADMIN — AMLODIPINE BESYLATE 10 MG: 5 TABLET ORAL at 08:56

## 2020-06-22 RX ADMIN — ATORVASTATIN CALCIUM 20 MG: 20 TABLET, FILM COATED ORAL at 08:57

## 2020-06-22 RX ADMIN — PREGABALIN 150 MG: 150 CAPSULE ORAL at 18:28

## 2020-06-22 RX ADMIN — BENZONATATE 100 MG: 100 CAPSULE ORAL at 00:56

## 2020-06-22 RX ADMIN — Medication 10 ML: at 06:32

## 2020-06-22 RX ADMIN — AMITRIPTYLINE HYDROCHLORIDE 25 MG: 25 TABLET, FILM COATED ORAL at 21:06

## 2020-06-22 RX ADMIN — FUROSEMIDE 40 MG: 10 INJECTION, SOLUTION INTRAMUSCULAR; INTRAVENOUS at 21:03

## 2020-06-22 RX ADMIN — CEFTRIAXONE 1 G: 1 INJECTION, POWDER, FOR SOLUTION INTRAMUSCULAR; INTRAVENOUS at 16:51

## 2020-06-22 RX ADMIN — PREGABALIN 150 MG: 150 CAPSULE ORAL at 08:56

## 2020-06-22 RX ADMIN — HEPARIN SODIUM 5000 UNITS: 5000 INJECTION INTRAVENOUS; SUBCUTANEOUS at 18:28

## 2020-06-22 RX ADMIN — CARVEDILOL 12.5 MG: 12.5 TABLET, FILM COATED ORAL at 16:52

## 2020-06-22 RX ADMIN — Medication 30 ML: at 21:07

## 2020-06-22 RX ADMIN — Medication 10 ML: at 21:03

## 2020-06-22 RX ADMIN — AZITHROMYCIN 500 MG: 500 INJECTION, POWDER, LYOPHILIZED, FOR SOLUTION INTRAVENOUS at 12:00

## 2020-06-22 RX ADMIN — AMITRIPTYLINE HYDROCHLORIDE 25 MG: 25 TABLET, FILM COATED ORAL at 00:13

## 2020-06-22 RX ADMIN — BENZONATATE 100 MG: 100 CAPSULE ORAL at 06:31

## 2020-06-22 RX ADMIN — VANCOMYCIN HYDROCHLORIDE 750 MG: 750 INJECTION, POWDER, LYOPHILIZED, FOR SOLUTION INTRAVENOUS at 08:57

## 2020-06-22 NOTE — PROGRESS NOTES
Oncology End of Shift Note      Bedside shift change report given to Angel De Leon RN (incoming nurse) by Errol Nicholson (outgoing nurse) on [employer]. Report included the following information SBAR, Kardex and MAR. Shift Summary:   Patient has not complained of any pain during my shift. Patient refused Heparin, see MAR. Humalog was held due to patient's blood sugar being 129. All other medication were given, see MAR. Patient was coughing incessantly throughout the night, Tessalon was prescribed, see PRN MAR. I tried drawing the patient's morning labs twice but was unsuccessful, I subsequently informed the Charge Nurse, Carlito Gandhi. Patient is up with the assistance of one to the bathroom. Routine rounding and patient teaching was done. Patient refused Heparin medication,, see MAR      Issues for Physician to Address:       Patient on Cardiac Monitoring?     [] Yes  [x] No    Rhythm:          Shift Events        Errol Nicholson

## 2020-06-22 NOTE — PROGRESS NOTES
Problem: Falls - Risk of  Goal: *Absence of Falls  Description: Document Austin Hernandez Fall Risk and appropriate interventions in the flowsheet. Outcome: Progressing Towards Goal  Note: Fall Risk Interventions:  Mobility Interventions: Patient to call before getting OOB    Medication Interventions: Patient to call before getting OOB, Teach patient to arise slowly    Elimination Interventions: Call light in reach       Problem: Pressure Injury - Risk of  Goal: *Prevention of pressure injury  Description: Document Sarwat Scale and appropriate interventions in the flowsheet. Outcome: Progressing Towards Goal  Note: Pressure Injury Interventions:  Sensory Interventions: Keep linens dry and wrinkle-free, Minimize linen layers    Moisture Interventions: Minimize layers    Activity Interventions: Increase time out of bed    Mobility Interventions: HOB 30 degrees or less    Nutrition Interventions: Document food/fluid/supplement intake      Problem: Diabetes Self-Management  Goal: *Disease process and treatment process  Description: Define diabetes and identify own type of diabetes; list 3 options for treating diabetes. Outcome: Progressing Towards Goal  Goal: *Incorporating nutritional management into lifestyle  Description: Describe effect of type, amount and timing of food on blood glucose; list 3 methods for planning meals. Outcome: Progressing Towards Goal  Goal: *Incorporating physical activity into lifestyle  Description: State effect of exercise on blood glucose levels. Outcome: Progressing Towards Goal  Goal: *Developing strategies to promote health/change behavior  Description: Define the ABC's of diabetes; identify appropriate screenings, schedule and personal plan for screenings. Outcome: Progressing Towards Goal  Goal: *Using medications safely  Description: State effect of diabetes medications on diabetes; name diabetes medication taking, action and side effects.   Outcome: Progressing Towards Goal  Goal: *Monitoring blood glucose, interpreting and using results  Description: Identify recommended blood glucose targets  and personal targets. Outcome: Progressing Towards Goal  Goal: *Prevention, detection, treatment of acute complications  Description: List symptoms of hyper- and hypoglycemia; describe how to treat low blood sugar and actions for lowering  high blood glucose level. Outcome: Progressing Towards Goal  Goal: *Prevention, detection and treatment of chronic complications  Description: Define the natural course of diabetes and describe the relationship of blood glucose levels to long term complications of diabetes. Outcome: Progressing Towards Goal  Goal: *Developing strategies to address psychosocial issues  Description: Describe feelings about living with diabetes; identify support needed and support network  Outcome: Progressing Towards Goal      Problem: Heart Failure: Day 3  Goal: Activity/Safety  Outcome: Progressing Towards Goal  Goal: Diagnostic Test/Procedures  Outcome: Progressing Towards Goal  Note: Echo pending.   Goal: Nutrition/Diet  Outcome: Progressing Towards Goal  Goal: Discharge Planning  Outcome: Progressing Towards Goal  Goal: Medications  Outcome: Progressing Towards Goal  Goal: Respiratory  Outcome: Progressing Towards Goal  Goal: Treatments/Interventions/Procedures  Outcome: Progressing Towards Goal  Goal: Psychosocial  Outcome: Progressing Towards Goal  Goal: *Oxygen saturation within defined limits  Outcome: Progressing Towards Goal  Goal: *Hemodynamically stable  Outcome: Progressing Towards Goal  Goal: *Optimal pain control at patient's stated goal  Outcome: Progressing Towards Goal  Goal: *Anxiety reduced or absent  Outcome: Progressing Towards Goal  Goal: *Demonstrates progressive activity  Outcome: Progressing Towards Goal

## 2020-06-22 NOTE — CONSULTS
[facility]  Pulmonary, Critical Care, and Sleep Medicine    Initial Patient Consult    Name: Luly Mtz MRN: [id number]   : 1965 Hospital: [facility]   Date: 2020        IMPRESSION:   · No hypoxemia  · Pulmonary edema  · Heroine use  · R/O COVID      RECOMMENDATIONS:   · No need for O2  · Improved with diuretics  · Empiric abx  · Awaiting covid testing results  · On respiratory isolation  · Nothing more to add  · Will follow pt prn     Subjective: This patient has been seen and evaluated at the request of Dr. Luisa Cerna for r/o covid. Patient is a 48 y.o. male heroine user presented c/o 2nd left toe pain, sob. No fever, chills  Admitted to r/o covid  Dyspnea improved with diuretics  Today pt in no distress, on RA with adequate sats  No sob, no cp      Past Medical History:   Diagnosis Date    Diabetes ([facility] [address]     Hypertension       Past Surgical History:   Procedure Laterality Date    HX HEENT      HX ORTHOPAEDIC        Prior to Admission medications    Medication Sig Start Date End Date Taking? Authorizing Provider   amitriptyline (ELAVIL) 25 mg tablet Take 25 mg by mouth nightly. Provider, Historical   carvediloL (COREG) 12.5 mg tablet Take 12.5 mg by mouth two (2) times daily (with meals). Provider, Historical   atorvastatin (LIPITOR) 20 mg tablet Take 20 mg by mouth daily. Provider, Historical   hydroCHLOROthiazide (MICROZIDE) 12.5 mg capsule Take 12.5 mg by mouth daily. Provider, Historical   lisinopriL (PRINIVIL, ZESTRIL) 10 mg tablet Take  by mouth daily. Provider, Historical   amLODIPine (NORVASC) 10 mg tablet Take  by mouth daily. Provider, Historical   cephALEXin (KEFLEX) 500 mg capsule Take 500 mg by mouth two (2) times a day. Provider, Historical   pregabalin (Lyrica) 150 mg capsule Take 150 mg by mouth two (2) times a day.     Provider, Historical   insulin glargine (Lantus U-100 Insulin) 100 unit/mL injection by SubCUTAneous route nightly. 30 units    Provider, Historical   dapagliflozin (Farxiga) 5 mg tab tablet Take 5 mg by mouth daily. Provider, Historical     No Known Allergies   Social History     Tobacco Use    Smoking status: Current Every Day Smoker    Smokeless tobacco: Former User   Substance Use Topics    Alcohol use: Not Currently      No family history on file. Current Facility-Administered Medications   Medication Dose Route Frequency    cefTRIAXone (ROCEPHIN) 1 g in 0.9% sodium chloride (MBP/ADV) 50 mL  1 g IntraVENous Q24H    vancomycin (VANCOCIN) 750 mg in 0.9% sodium chloride (MBP/ADV) 250 mL  750 mg IntraVENous Q16H    sodium chloride (NS) flush 5-40 mL  5-40 mL IntraVENous Q8H    heparin (porcine) injection 5,000 Units  5,000 Units SubCUTAneous Q8H    amitriptyline (ELAVIL) tablet 25 mg  25 mg Oral QHS    amLODIPine (NORVASC) tablet 10 mg  10 mg Oral DAILY    carvediloL (COREG) tablet 12.5 mg  12.5 mg Oral BID WITH MEALS    atorvastatin (LIPITOR) tablet 20 mg  20 mg Oral DAILY    pregabalin (LYRICA) capsule 150 mg  150 mg Oral BID    furosemide (LASIX) injection 40 mg  40 mg IntraVENous DAILY    insulin lispro (HUMALOG) injection   SubCUTAneous AC&HS    insulin glargine (LANTUS) injection 20 Units  20 Units SubCUTAneous QHS       Review of Systems:  A comprehensive review of systems was negative. Objective:   Vital Signs:    Visit Vitals  /87   Pulse (!) 107   Temp 98.3 °F (36.8 °C)   Resp 18   Ht 5' 7\" (1.702 m)   Wt 70.8 kg (156 lb)   SpO2 93%   BMI 24.43 kg/m²       O2 Device: Room air   O2 Flow Rate (L/min): 2 l/min   Temp (24hrs), Av.2 °F (36.8 °C), Min:97.9 °F (36.6 °C), Max:98.6 °F (37 °C)       Intake/Output:   Last shift:      No intake/output data recorded.   Last 3 shifts:  1901 -  0700  In: 1400 [P.O.:1400]  Out: 0     Intake/Output Summary (Last 24 hours) at 2020 0858  Last data filed at 2020 1735  Gross per 24 hour   Intake 550 ml Output --   Net 550 ml      Physical Exam:   General:  Alert, cooperative, no distress, appears stated age. Head:  Normocephalic, without obvious abnormality, atraumatic. Eyes:  Conjunctivae/corneas clear. PERRL, EOMs intact. Nose: Nares normal. Septum midline. Mucosa normal. No drainage or sinus tenderness. Throat: Lips, mucosa, and tongue normal. Teeth and gums normal.   Neck: Supple, symmetrical, trachea midline, no adenopathy, thyroid: no enlargment/tenderness/nodules, no carotid bruit and no JVD. Back:   Symmetric, no curvature. ROM normal.   Lungs:   Clear to auscultation bilaterally. Chest wall:  No tenderness or deformity. Heart:  Regular rate and rhythm, S1, S2 normal, no murmur, click, rub or gallop. Abdomen:   Soft, non-tender. Bowel sounds normal. No masses,  No organomegaly. Extremities: Extremities normal, atraumatic, no cyanosis or edema. Pulses: 2+ and symmetric all extremities. Skin: Skin color, texture, turgor normal. No rashes or lesions   Lymph nodes: Cervical, supraclavicular, and axillary nodes normal.   Neurologic: Grossly nonfocal     Data review:     Recent Results (from the past 24 hour(s))   GLUCOSE, POC    Collection Time: 06/21/20 11:45 AM   Result Value Ref Range    Glucose (POC) 100 65 - 100 mg/dL    Performed by Jennifer Castañeda RN    GLUCOSE, POC    Collection Time: 06/21/20  5:00 PM   Result Value Ref Range    Glucose (POC) 138 (H) 65 - 100 mg/dL    Performed by Jennifer Castañeda RN    HEMOGLOBIN A1C WITH EAG    Collection Time: 06/21/20  5:38 PM   Result Value Ref Range    Hemoglobin A1c 7.7 (H) 4.0 - 5.6 %    Est. average glucose 174 mg/dL   GLUCOSE, POC    Collection Time: 06/21/20  9:31 PM   Result Value Ref Range    Glucose (POC) 129 (H) 65 - 100 mg/dL    Performed by Karri Villa    CBC WITH AUTOMATED DIFF    Collection Time: 06/22/20  5:49 AM   Result Value Ref Range    WBC 7.9 4.1 - 11.1 K/uL    RBC 4.19 4. 10 - 5.70 M/uL    HGB 11.1 (L) 12.1 - 17.0 g/dL HCT 34.1 (L) 36.6 - 50.3 %    MCV 81.4 80.0 - 99.0 FL    MCH 26.5 26.0 - 34.0 PG    MCHC 32.6 30.0 - 36.5 g/dL    RDW 16.2 (H) 11.5 - 14.5 %    PLATELET 619 303 - 328 K/uL    MPV 9.2 8.9 - 12.9 FL    NRBC 0.0 0  WBC    ABSOLUTE NRBC 0.00 0.00 - 0.01 K/uL    NEUTROPHILS 69 32 - 75 %    LYMPHOCYTES 17 12 - 49 %    MONOCYTES 8 5 - 13 %    EOSINOPHILS 4 0 - 7 %    BASOPHILS 1 0 - 1 %    IMMATURE GRANULOCYTES 1 (H) 0.0 - 0.5 %    ABS. NEUTROPHILS 5.5 1.8 - 8.0 K/UL    ABS. LYMPHOCYTES 1.4 0.8 - 3.5 K/UL    ABS. MONOCYTES 0.6 0.0 - 1.0 K/UL    ABS. EOSINOPHILS 0.3 0.0 - 0.4 K/UL    ABS. BASOPHILS 0.1 0.0 - 0.1 K/UL    ABS. IMM.  GRANS. 0.1 (H) 0.00 - 0.04 K/UL    DF AUTOMATED     METABOLIC PANEL, BASIC    Collection Time: 06/22/20  5:49 AM   Result Value Ref Range    Sodium 138 136 - 145 mmol/L    Potassium 4.2 3.5 - 5.1 mmol/L    Chloride 107 97 - 108 mmol/L    CO2 27 21 - 32 mmol/L    Anion gap 4 (L) 5 - 15 mmol/L    Glucose 157 (H) 65 - 100 mg/dL    BUN 21 (H) 6 - 20 MG/DL    Creatinine 2.06 (H) 0.70 - 1.30 MG/DL    BUN/Creatinine ratio 10 (L) 12 - 20      GFR est AA 41 (L) >60 ml/min/1.73m2    GFR est non-AA 34 (L) >60 ml/min/1.73m2    Calcium 7.8 (L) 8.5 - 10.1 MG/DL   PROCALCITONIN    Collection Time: 06/22/20  5:49 AM   Result Value Ref Range    Procalcitonin 2.03 ng/mL   SED RATE (ESR)    Collection Time: 06/22/20  5:49 AM   Result Value Ref Range    Sed rate, automated 82 (H) 0 - 20 mm/hr   GLUCOSE, POC    Collection Time: 06/22/20  7:05 AM   Result Value Ref Range    Glucose (POC) 137 (H) 65 - 100 mg/dL    Performed by Alyce Ortiz PCT        Imaging:  I have personally reviewed the patients radiographs and have reviewed the reports:  CXR: pulmonary edema        Miladis French MD

## 2020-06-22 NOTE — PROGRESS NOTES
Hospitalist Progress Note    NAME: Brenda Villagomez   :  1965   MRN:  [id number]       Assessment / Plan:  SOB due to PNA vs pulmonary edema  Diarrhea, intermittent. None today   CXR showed Extensive bilateral interstitial and patchy airspace disease. Differential diagnosis includes: Pulmonary vascular congestive changes and/or  infectious process. procalcitonin 6.9  Start empiric abx for PNA  Cont' IV lasix  Initial covid neg, repeat pending  Echo ordered pending  He is currently on RA. No longer hypoxic    CKD3   Osteomyelitis of Foot   XR L foot showed Osteomyelitis of the distal second metatarsal and base of the proximal phalanx of the left second toe. Oblique distracted fracture of the base of the  proximal phalanx of the left second toe. Left great toe transmetatarsal amputation postoperative changes. Multiple ossific fragments adjacent to the residual distal first metatarsal. Residual great toe soft tissues are edematous. MR can be performed for further evaluation, as indicated. SHERI ordered and is pending  consult podiatry    Type 2 DM with neuropathy  continue Lantus at reduced dose  SSI/POC checks  cont' gabapentin     HTN   Continue Beta blocker and  CCB      Hyperlipidemia  Cont' statin     Heroin abuse     Code: Full  DVT prophylaxis: heparin     Subjective:     Chief Complaint / Reason for Physician Visit  Pt up in bed, NAD. He wants icechips. Discussed with RN events overnight. Review of Systems:  Symptom Y/N Comments  Symptom Y/N Comments   Fever/Chills n   Chest Pain n    Poor Appetite    Edema     Cough    Abdominal Pain     Sputum    Joint Pain     SOB/DE [place] CRUZ n   Pruritis/Rash     Nausea/vomit    Tolerating PT/OT     Diarrhea    Tolerating Diet     Constipation    Other       Could NOT obtain due to:      Objective:     VITALS:   Last 24hrs VS reviewed since prior progress note.  Most recent are:  Patient Vitals for the past 24 hrs:   Temp Pulse Resp BP SpO2   20 1159 -- 99 -- -- --   06/22/20 1147 97.9 °F (36.6 °C) (!) 108 18 (!) 156/95 98 %   06/22/20 0808 98.3 °F (36.8 °C) (!) 107 18 162/87 93 %   06/22/20 0322 98.2 °F (36.8 °C) 97 18 127/77 94 %   06/21/20 2321 97.9 °F (36.6 °C) 92 18 140/85 92 %   06/21/20 1904 97.9 °F (36.6 °C) 73 18 121/84 93 %   06/21/20 1500 98.2 °F (36.8 °C) 78 16 130/80 98 %       Intake/Output Summary (Last 24 hours) at 6/22/2020 1346  Last data filed at 6/22/2020 1159  Gross per 24 hour   Intake 550 ml   Output 400 ml   Net 150 ml        PHYSICAL EXAM:  General:          no acute distress    EENT:              Anicteric sclerae. Resp:               No accessory muscle use, breathing pattern regular  CV:                  Tachycardic, sinus  GI:                   No distended   Neurologic:      Alert and awake , normal speech  Psych:             Not anxious nor agitated  Skin:                No rashes. No jaundice    Reviewed most current lab test results and cultures  YES  Reviewed most current radiology test results   YES  Review and summation of old records today    NO  Reviewed patient's current orders and MAR    YES  PMH/ reviewed - no change compared to H&P  ________________________________________________________________________  Care Plan discussed with:    Comments   Patient x    Family      RN x    Care Manager     Consultant                        Multidiciplinary team rounds were held today with , nursing, pharmacist and clinical coordinator. Patient's plan of care was discussed; medications were reviewed and discharge planning was addressed.      ________________________________________________________________________  Total NON critical care TIME:  35  Minutes    Total CRITICAL CARE TIME Spent:   Minutes non procedure based      Comments   >50% of visit spent in counseling and coordination of care     ________________________________________________________________________  Nadia Sacks, MD     Procedures: see electronic medical records for all procedures/Xrays and details which were not copied into this note but were reviewed prior to creation of Plan. LABS:  I reviewed today's most current labs and imaging studies.   Pertinent labs include:  Recent Labs     06/22/20 0549 06/21/20 0253 06/19/20  2355   WBC 7.9 8.9 9.2   HGB 11.1* 9.4* 9.7*   HCT 34.1* 28.4* 30.3*    300 263     Recent Labs     06/22/20 0549 06/21/20 0253 06/20/20 0246 06/20/20 0241 06/19/20  2355    136  --   --  131*   K 4.2 3.2*  --   --  3.6    105  --   --  100   CO2 27 25  --   --  25   * 127*  --   --  162*   BUN 21* 22*  --   --  27*   CREA 2.06* 2.00*  --   --  2.15*   CA 7.8* 7.6*  --   --  7.9*   MG  --  1.8  --  2.0  --    ALB  --   --   --   --  1.8*   TBILI  --   --   --   --  0.2   ALT  --   --   --   --  16   INR  --   --  1.1  --   --        Signed: Mandeep Parra MD
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
## 2020-06-22 NOTE — PROGRESS NOTES
Transition of Care  Home  PCP follow-up       CM acknowledge inpatient admission. CM will follow pt for consults and any needs prior to discharge. If any concerns or questions arise pertaining to pt discharge, please contact unit CM.       Anna Maria Metro, [place], [address]

## 2020-06-22 NOTE — PROGRESS NOTES
[phone]: Patient is stating he is feeling SOB since he woke up from sleep around 3pm. O2 sat is 95% on room air, lung sounds diminished. Notified Dr. Cesario Rich and received order for additional dose of Lasix 40mg IV.    1915: Verbal shift change report given to [facility] (oncoming nurse) by [facility] (offgoing nurse). Report included the following information SBAR, Kardex, Intake/Output, MAR, Recent Results and Med Rec Status.

## 2020-06-23 ENCOUNTER — APPOINTMENT (OUTPATIENT)
Dept: VASCULAR SURGERY | Age: 55
DRG: 951 | End: 2020-06-23
Attending: PODIATRIST
Payer: MEDICAID

## 2020-06-23 ENCOUNTER — ANESTHESIA (OUTPATIENT)
Dept: SURGERY | Age: 55
DRG: 951 | End: 2020-06-23
Payer: MEDICAID

## 2020-06-23 ENCOUNTER — ANESTHESIA EVENT (OUTPATIENT)
Dept: SURGERY | Age: 55
DRG: 951 | End: 2020-06-23
Payer: MEDICAID

## 2020-06-23 LAB
ANION GAP SERPL CALC-SCNC: 6 MMOL/L (ref 5–15)
BASOPHILS # BLD: 0.1 K/UL (ref 0–0.1)
BASOPHILS NFR BLD: 1 % (ref 0–1)
BUN SERPL-MCNC: 23 MG/DL (ref 6–20)
BUN/CREAT SERPL: 13 (ref 12–20)
CALCIUM SERPL-MCNC: 8 MG/DL (ref 8.5–10.1)
CHLORIDE SERPL-SCNC: 109 MMOL/L (ref 97–108)
CO2 SERPL-SCNC: 24 MMOL/L (ref 21–32)
CREAT SERPL-MCNC: 1.8 MG/DL (ref 0.7–1.3)
DATE LAST DOSE: ABNORMAL
DIFFERENTIAL METHOD BLD: ABNORMAL
ECHO AO ROOT DIAM: 3.09 CM
ECHO AR MAX VEL PISA: 266.51 CM/S
ECHO AR MAX VEL PISA: 399.99 CM/S
ECHO AR MAX VEL PISA: 65.66 CM/S
ECHO AR MAX VEL PISA: 94.66 CM/S
ECHO AV AREA PEAK VELOCITY: 2.46 CM2
ECHO AV CUSP MM: 2.16 CM
ECHO AV PEAK GRADIENT: 3.58 MMHG
ECHO EST RA PRESSURE: 10 MMHG
ECHO LA AREA 4C: 25.72 CM2
ECHO LA MAJOR AXIS: 4.23 CM
ECHO LA MINOR AXIS: 2.4 CM
ECHO LA TO AORTIC ROOT RATIO: 1.27
ECHO LA TO AORTIC ROOT RATIO: 1.27
ECHO LA VOL 2C: 80.02 ML (ref 18–58)
ECHO LA VOL 4C: 91.26 ML (ref 18–58)
ECHO LA VOL BP: 98.05 ML (ref 18–58)
ECHO LA VOL/BSA BIPLANE: 55.58 ML/M2 (ref 16–28)
ECHO LA VOLUME INDEX A2C: 45.36 ML/M2 (ref 16–28)
ECHO LA VOLUME INDEX A4C: 51.73 ML/M2 (ref 16–28)
ECHO LV E' LATERAL VELOCITY: 6.53 CM/S
ECHO LV E' SEPTAL VELOCITY: 8.75 CM/S
ECHO LV INTERNAL DIMENSION DIASTOLIC: 4.66 CM (ref 4.2–5.9)
ECHO LV INTERNAL DIMENSION SYSTOLIC: 3.9 CM
ECHO LV IVSD: 1.43 CM (ref 0.6–1)
ECHO LV IVSS: 1.86 CM
ECHO LV POSTERIOR WALL DIASTOLIC: 1.38 CM (ref 0.6–1)
ECHO LV POSTERIOR WALL SYSTOLIC: 1.97 CM
ECHO LVOT DIAM: 2.12 CM
ECHO LVOT PEAK GRADIENT: 1.72 MMHG
ECHO MV A VELOCITY: 38.1 CM/S
ECHO MV E DECELERATION TIME (DT): 0.08 S
ECHO MV E VELOCITY: 76.99 CM/S
ECHO MV E/A RATIO: 2.02
ECHO MV E/E' LATERAL: 11.79
ECHO MV E/E' RATIO (AVERAGED): 10.29
ECHO MV E/E' SEPTAL: 8.8
ECHO RA AREA 4C: 11.16 CM2
ECHO RIGHT VENTRICULAR SYSTOLIC PRESSURE (RVSP): 38.41 MMHG
ECHO RV INTERNAL DIMENSION: 2.49 CM
ECHO TV REGURGITANT PEAK GRADIENT: 28.41 MMHG
EOSINOPHIL # BLD: 0.3 K/UL (ref 0–0.4)
EOSINOPHIL NFR BLD: 4 % (ref 0–7)
ERYTHROCYTE [DISTWIDTH] IN BLOOD BY AUTOMATED COUNT: 16 % (ref 11.5–14.5)
GLUCOSE BLD STRIP.AUTO-MCNC: 109 MG/DL (ref 65–100)
GLUCOSE BLD STRIP.AUTO-MCNC: 111 MG/DL (ref 65–100)
GLUCOSE BLD STRIP.AUTO-MCNC: 161 MG/DL (ref 65–100)
GLUCOSE BLD STRIP.AUTO-MCNC: 167 MG/DL (ref 65–100)
GLUCOSE BLD STRIP.AUTO-MCNC: 170 MG/DL (ref 65–100)
GLUCOSE BLD STRIP.AUTO-MCNC: 52 MG/DL (ref 65–100)
GLUCOSE BLD STRIP.AUTO-MCNC: 75 MG/DL (ref 65–100)
GLUCOSE BLD STRIP.AUTO-MCNC: 93 MG/DL (ref 65–100)
GLUCOSE SERPL-MCNC: 96 MG/DL (ref 65–100)
HCT VFR BLD AUTO: 32.3 % (ref 36.6–50.3)
HGB BLD-MCNC: 10.2 G/DL (ref 12.1–17)
IMM GRANULOCYTES # BLD AUTO: 0.1 K/UL (ref 0–0.04)
IMM GRANULOCYTES NFR BLD AUTO: 1 % (ref 0–0.5)
LYMPHOCYTES # BLD: 1.7 K/UL (ref 0.8–3.5)
LYMPHOCYTES NFR BLD: 17 % (ref 12–49)
MCH RBC QN AUTO: 26.2 PG (ref 26–34)
MCHC RBC AUTO-ENTMCNC: 31.6 G/DL (ref 30–36.5)
MCV RBC AUTO: 83 FL (ref 80–99)
MONOCYTES # BLD: 0.7 K/UL (ref 0–1)
MONOCYTES NFR BLD: 7 % (ref 5–13)
NEUTS SEG # BLD: 7 K/UL (ref 1.8–8)
NEUTS SEG NFR BLD: 70 % (ref 32–75)
NRBC # BLD: 0 K/UL (ref 0–0.01)
NRBC BLD-RTO: 0 PER 100 WBC
PLATELET # BLD AUTO: 368 K/UL (ref 150–400)
PMV BLD AUTO: 9.1 FL (ref 8.9–12.9)
POTASSIUM SERPL-SCNC: 4.3 MMOL/L (ref 3.5–5.1)
RBC # BLD AUTO: 3.89 M/UL (ref 4.1–5.7)
REPORTED DOSE,DOSE: ABNORMAL UNITS
REPORTED DOSE/TIME,TMG: ABNORMAL
SARS-COV-2, COV2: NOT DETECTED
SERVICE CMNT-IMP: ABNORMAL
SERVICE CMNT-IMP: NORMAL
SERVICE CMNT-IMP: NORMAL
SODIUM SERPL-SCNC: 139 MMOL/L (ref 136–145)
SOURCE, COVRS: NORMAL
SPECIMEN SOURCE, FCOV2M: NORMAL
VANCOMYCIN TROUGH SERPL-MCNC: 20.1 UG/ML (ref 5–10)
WBC # BLD AUTO: 9.8 K/UL (ref 4.1–11.1)

## 2020-06-23 PROCEDURE — 80202 ASSAY OF VANCOMYCIN: CPT

## 2020-06-23 PROCEDURE — 74011250637 HC RX REV CODE- 250/637: Performed by: HOSPITALIST

## 2020-06-23 PROCEDURE — 74011250637 HC RX REV CODE- 250/637: Performed by: INTERNAL MEDICINE

## 2020-06-23 PROCEDURE — 0Y6Q0Z1 DETACHMENT AT LEFT 1ST TOE, HIGH, OPEN APPROACH: ICD-10-PCS | Performed by: PODIATRIST

## 2020-06-23 PROCEDURE — 80048 BASIC METABOLIC PNL TOTAL CA: CPT

## 2020-06-23 PROCEDURE — 74011250636 HC RX REV CODE- 250/636: Performed by: INTERNAL MEDICINE

## 2020-06-23 PROCEDURE — 74011250636 HC RX REV CODE- 250/636: Performed by: HOSPITALIST

## 2020-06-23 PROCEDURE — 85025 COMPLETE CBC W/AUTO DIFF WBC: CPT

## 2020-06-23 PROCEDURE — 82962 GLUCOSE BLOOD TEST: CPT

## 2020-06-23 PROCEDURE — 74011636637 HC RX REV CODE- 636/637: Performed by: HOSPITALIST

## 2020-06-23 PROCEDURE — 74011000258 HC RX REV CODE- 258: Performed by: INTERNAL MEDICINE

## 2020-06-23 PROCEDURE — 74011000250 HC RX REV CODE- 250: Performed by: HOSPITALIST

## 2020-06-23 PROCEDURE — 0Y6S0Z0 DETACHMENT AT LEFT 2ND TOE, COMPLETE, OPEN APPROACH: ICD-10-PCS | Performed by: PODIATRIST

## 2020-06-23 PROCEDURE — 93922 UPR/L XTREMITY ART 2 LEVELS: CPT

## 2020-06-23 PROCEDURE — 65270000029 HC RM PRIVATE

## 2020-06-23 PROCEDURE — 74011000250 HC RX REV CODE- 250: Performed by: ANESTHESIOLOGY

## 2020-06-23 PROCEDURE — 36415 COLL VENOUS BLD VENIPUNCTURE: CPT

## 2020-06-23 RX ORDER — CLONIDINE HYDROCHLORIDE 0.1 MG/1
0.1 TABLET ORAL
Status: DISCONTINUED | OUTPATIENT
Start: 2020-06-23 | End: 2020-06-23 | Stop reason: SDUPTHER

## 2020-06-23 RX ORDER — LOPERAMIDE HYDROCHLORIDE 2 MG/1
4 CAPSULE ORAL
Status: DISCONTINUED | OUTPATIENT
Start: 2020-06-23 | End: 2020-07-02 | Stop reason: HOSPADM

## 2020-06-23 RX ORDER — LORAZEPAM 2 MG/ML
1 INJECTION INTRAMUSCULAR
Status: DISCONTINUED | OUTPATIENT
Start: 2020-06-23 | End: 2020-06-25

## 2020-06-23 RX ORDER — DEXTROSE 50 % IN WATER (D50W) INTRAVENOUS SYRINGE
Status: DISPENSED
Start: 2020-06-23 | End: 2020-06-24

## 2020-06-23 RX ORDER — CLONIDINE HYDROCHLORIDE 0.1 MG/1
0.1 TABLET ORAL
Status: DISCONTINUED | OUTPATIENT
Start: 2020-06-23 | End: 2020-06-25

## 2020-06-23 RX ORDER — LIDOCAINE HYDROCHLORIDE 10 MG/ML
0.1 INJECTION, SOLUTION EPIDURAL; INFILTRATION; INTRACAUDAL; PERINEURAL AS NEEDED
Status: DISCONTINUED | OUTPATIENT
Start: 2020-06-23 | End: 2020-06-24 | Stop reason: HOSPADM

## 2020-06-23 RX ORDER — DEXTROSE 50 % IN WATER (D50W) INTRAVENOUS SYRINGE
12.5 ONCE
Status: COMPLETED | OUTPATIENT
Start: 2020-06-23 | End: 2020-06-23

## 2020-06-23 RX ORDER — DICYCLOMINE HYDROCHLORIDE 10 MG/1
10 CAPSULE ORAL 4 TIMES DAILY
Status: DISCONTINUED | OUTPATIENT
Start: 2020-06-23 | End: 2020-07-02 | Stop reason: HOSPADM

## 2020-06-23 RX ORDER — METHADONE HYDROCHLORIDE 10 MG/1
10 TABLET ORAL ONCE
Status: COMPLETED | OUTPATIENT
Start: 2020-06-23 | End: 2020-06-23

## 2020-06-23 RX ORDER — PREGABALIN 150 MG/1
150 CAPSULE ORAL 2 TIMES DAILY
Qty: 60 CAP | Refills: 0 | Status: SHIPPED | OUTPATIENT
Start: 2020-06-23 | End: 2020-07-08 | Stop reason: SDUPTHER

## 2020-06-23 RX ORDER — FENTANYL CITRATE 50 UG/ML
50 INJECTION, SOLUTION INTRAMUSCULAR; INTRAVENOUS AS NEEDED
Status: DISCONTINUED | OUTPATIENT
Start: 2020-06-23 | End: 2020-06-24 | Stop reason: HOSPADM

## 2020-06-23 RX ORDER — AMITRIPTYLINE HYDROCHLORIDE 25 MG/1
25 TABLET, FILM COATED ORAL
Qty: 30 TAB | Refills: 0 | Status: SHIPPED | OUTPATIENT
Start: 2020-06-23 | End: 2020-07-08 | Stop reason: SDUPTHER

## 2020-06-23 RX ORDER — LORAZEPAM 2 MG/ML
1 INJECTION INTRAMUSCULAR
Status: DISCONTINUED | OUTPATIENT
Start: 2020-06-23 | End: 2020-06-23

## 2020-06-23 RX ADMIN — CARVEDILOL 12.5 MG: 12.5 TABLET, FILM COATED ORAL at 17:29

## 2020-06-23 RX ADMIN — VANCOMYCIN HYDROCHLORIDE 750 MG: 750 INJECTION, POWDER, LYOPHILIZED, FOR SOLUTION INTRAVENOUS at 17:28

## 2020-06-23 RX ADMIN — PREGABALIN 150 MG: 150 CAPSULE ORAL at 09:34

## 2020-06-23 RX ADMIN — CEFTRIAXONE 1 G: 1 INJECTION, POWDER, FOR SOLUTION INTRAMUSCULAR; INTRAVENOUS at 16:00

## 2020-06-23 RX ADMIN — LORAZEPAM 1 MG: 2 INJECTION INTRAMUSCULAR; INTRAVENOUS at 08:47

## 2020-06-23 RX ADMIN — LORAZEPAM 1 MG: 2 INJECTION INTRAMUSCULAR; INTRAVENOUS at 22:00

## 2020-06-23 RX ADMIN — DICYCLOMINE HYDROCHLORIDE 10 MG: 10 CAPSULE ORAL at 17:29

## 2020-06-23 RX ADMIN — PREGABALIN 150 MG: 150 CAPSULE ORAL at 17:29

## 2020-06-23 RX ADMIN — FUROSEMIDE 40 MG: 10 INJECTION, SOLUTION INTRAMUSCULAR; INTRAVENOUS at 09:39

## 2020-06-23 RX ADMIN — DICYCLOMINE HYDROCHLORIDE 10 MG: 10 CAPSULE ORAL at 14:34

## 2020-06-23 RX ADMIN — Medication 10 ML: at 04:17

## 2020-06-23 RX ADMIN — METHADONE HYDROCHLORIDE 10 MG: 10 TABLET ORAL at 09:00

## 2020-06-23 RX ADMIN — LOPERAMIDE HYDROCHLORIDE 4 MG: 2 CAPSULE ORAL at 19:16

## 2020-06-23 RX ADMIN — DICYCLOMINE HYDROCHLORIDE 10 MG: 10 CAPSULE ORAL at 09:34

## 2020-06-23 RX ADMIN — GUAIFENESIN 100 MG: 200 SOLUTION ORAL at 04:34

## 2020-06-23 RX ADMIN — Medication 10 ML: at 01:16

## 2020-06-23 RX ADMIN — DICYCLOMINE HYDROCHLORIDE 10 MG: 10 CAPSULE ORAL at 21:59

## 2020-06-23 RX ADMIN — HEPARIN SODIUM 5000 UNITS: 5000 INJECTION INTRAVENOUS; SUBCUTANEOUS at 01:14

## 2020-06-23 RX ADMIN — CARVEDILOL 12.5 MG: 12.5 TABLET, FILM COATED ORAL at 09:34

## 2020-06-23 RX ADMIN — ATORVASTATIN CALCIUM 20 MG: 20 TABLET, FILM COATED ORAL at 09:34

## 2020-06-23 RX ADMIN — INSULIN GLARGINE 20 UNITS: 100 INJECTION, SOLUTION SUBCUTANEOUS at 21:59

## 2020-06-23 RX ADMIN — DEXTROSE MONOHYDRATE 25 G: 25 INJECTION, SOLUTION INTRAVENOUS at 12:07

## 2020-06-23 RX ADMIN — Medication 10 ML: at 14:34

## 2020-06-23 RX ADMIN — AZITHROMYCIN 500 MG: 500 INJECTION, POWDER, LYOPHILIZED, FOR SOLUTION INTRAVENOUS at 14:35

## 2020-06-23 RX ADMIN — DEXTROSE MONOHYDRATE 12.5 G: 25 INJECTION, SOLUTION INTRAVENOUS at 13:15

## 2020-06-23 RX ADMIN — Medication 10 ML: at 21:51

## 2020-06-23 RX ADMIN — HEPARIN SODIUM 5000 UNITS: 5000 INJECTION INTRAVENOUS; SUBCUTANEOUS at 17:28

## 2020-06-23 RX ADMIN — VANCOMYCIN HYDROCHLORIDE 750 MG: 750 INJECTION, POWDER, LYOPHILIZED, FOR SOLUTION INTRAVENOUS at 01:14

## 2020-06-23 RX ADMIN — AMITRIPTYLINE HYDROCHLORIDE 25 MG: 25 TABLET, FILM COATED ORAL at 21:59

## 2020-06-23 RX ADMIN — GUAIFENESIN 100 MG: 200 SOLUTION ORAL at 21:59

## 2020-06-23 RX ADMIN — ONDANSETRON 2 MG: 2 INJECTION INTRAMUSCULAR; INTRAVENOUS at 08:47

## 2020-06-23 NOTE — PROGRESS NOTES
**Consult Information**  Member Facility: [address] [place] [address] MRN: [id number]  Consult ID: [id number]  Facility Time Zone: ET  Date and Time of Request: 06/22/2020 10:46:13 PM  Requesting Clinician: jesu Lay  Patient Name: Gabrielle Asencio  YOB: 1965  Gender: Male    **Clinical Note**  Clinical Note: Order placed for prn Robitussin. **Attestation**  Interaction Mode: Phone Only  Phone Duration (mins): 1  Time of Call : 06/22/2020 10:47 PM  Interaction Attestation: Interprofessional internet consultation was delivered through telephonic and/or electronic communication upon the request of the patients treating provider, while the requesting and the rendering provider were not in the same physical location. A written summary report was provided to the requesting provider at the originating site.   Evaluation Duration (mins): 1    **Physician Signature**  This document was electronically signed by: Brooklyn Marcus MD  06/22/2020 10:47 PM

## 2020-06-23 NOTE — PERIOP NOTES
0673:  Infection control phoned & message left regarding droplet precautions. 8461:  TRANSFER - IN REPORT:    Verbal report received from Ciera Berg [facility] on Yefri Cotter  being received from [phone] for ordered procedure      Report consisted of patients Situation, Background, Assessment and   Recommendations(SBAR). Information from the following report(s) SBAR, Kardex, Intake/Output, MAR and Recent Results was reviewed with the receiving nurse. Opportunity for questions and clarification was provided. 1225:  Assessment completed upon patients arrival to unit and care assumed. Patient only responsive to painful stimulus at this time. O2 sats on room air 90%. Patient placed on monitor & O2 via NC at 2l/m. Dr. Chester Roy (anesthesia) phoned. 1232:  Dr. Chester Roy at bedside. 1310:  Dr. Lala Barajas made aware that we still do not have consent for surgery. We will give the patient's daughter until 1330 to return calls. If she does not return call surgery may be postponed till another day.

## 2020-06-23 NOTE — PROGRESS NOTES
1245-Pt medicated, unable to sign consent at this time. Relative on file contacted, Shalini Fernandez. She states she is his fiance. She will contact daughter, Yojana Kanrk. 1300-Shelley called back, unable to reach Leodis . I attempted to reach Leodis  as well, left message.  Notified team.

## 2020-06-23 NOTE — PROGRESS NOTES
Patient was found eating and drinking at 0145 am. Patient was educated about staying NPO for surgery tomorrow. Patient stated; \"The doctor told me that I don't need to stay NPO\". Food was removed and patient agreed not to eat or drink until after surgery.

## 2020-06-23 NOTE — PROGRESS NOTES
I spoke with patient's fiancé, she is clear and oriented. She understands the need for the procedure, as well as the reason for delay in in order to get the consent, then I can have time of clinic to perform the procedure. This will be for tomorrow, which may be best anyway, arterial Doppler was done at bedside, need to review that. Also gives him another day of antibiotics and hopefully stability as far as withdrawal? Still the same plan, I am trying to get the schedule for tomorrow sometime, and will follow through with this. This discussed thoroughly with patient's fiancé over the phone.       Dr. Bush Nearing  [phone]

## 2020-06-23 NOTE — PROGRESS NOTES
07 30  - PCT attempted to obtain vital signs and blood sugar on patient. Patient refused. Patient would not allow nurse to check vitals. Patient states he is withdrawing from heroin. Patient shaking and agitated. Patient also hallucinating and trying to get out of bed on his own. Orders obtained for ativan and methadone. Unable to get consent form signed at this time. Patient refused CHG. 1030 - Tele sitter at bedside as patient is trying to get up on his own.     1102 - SHERI being performed at the bedside.

## 2020-06-23 NOTE — PROGRESS NOTES
Patient requested cough meds.  MD Hospitalist notified by page      Success  A new connect request was successfully created for: Victor M Ferrari : 1965 MRN: [id number] ConnectID: [id number] REASON: Other non-Emergent ACUITY: 30 Minutes SUBMITTED: 2020 22:46 EDT CLOSE

## 2020-06-23 NOTE — PROGRESS NOTES
Spoke with Dr. Mariposa Valdez who stated patient will go for surgery at 5pm tomorrow. Patient can have breakfast tomorrow. NPO at 9am tomorrow morning.

## 2020-06-23 NOTE — PROGRESS NOTES
Angelica Lai is a 48 y.o. male who was seen by synchronous (real-time) audio-video technology on 6/18/2020. Consent: Angelica Lai, who was seen by synchronous (real-time) audio-video technology, and/or his healthcare decision maker, is aware that this patient-initiated, Telehealth encounter on 6/18/2020 is a billable service, with coverage as determined by his insurance carrier. He is aware that he may receive a bill and has provided verbal consent to proceed: Yes. Assessment & Plan:   Diagnoses and all orders for this visit:    1. Diabetic polyneuropathy associated with type 2 diabetes mellitus ([facility] [address]    Other orders  -     amitriptyline (ELAVIL) 25 mg tablet; Take 1 Tab by mouth nightly. -     pregabalin (Lyrica) 150 mg capsule; Take 1 Cap by mouth two (2) times a day. Max Daily Amount: 300 mg. (not able to forward prescriptions as generated; there is no pharmacy information on record)    Per review of available records and patients , there is no sign of overuse, misuse, diversion, or concerning side effects. Today reviewed: the risk of overdose, addiction, and dependency ,proper storage and disposal of medications, the goals of treatment (improve functionality, quality of life, and pain) alternative treatment options including non-narcotic modalities, the risks and benefits of continuing current regimen  The following changes were made to the patients current treatment plan: none    Office visit following amputation procedure. Subjective:   Angelica Lai is a 48 y.o. male who was seen for Medication Evaluation (Patient states that he need a medication refill. )    Male patient presenting for refills of chronic medications. Patient has a history of diabetic polyneuropathy, hypertension, dyslipidemia, toe amputation. He is preop for an additional amputation of a toe. He takes Elavil and Lyrica for neuropathic pain. He tolerates his medications without side effects.     Prior to Admission medications    Medication Sig Start Date End Date Taking? Authorizing Provider   amitriptyline (ELAVIL) 25 mg tablet Take 25 mg by mouth nightly. Yes Provider, Historical   carvediloL (COREG) 12.5 mg tablet Take 12.5 mg by mouth two (2) times daily (with meals). Yes Provider, Historical   atorvastatin (LIPITOR) 20 mg tablet Take 20 mg by mouth daily. Yes Provider, Historical   hydroCHLOROthiazide (MICROZIDE) 12.5 mg capsule Take 12.5 mg by mouth daily. Yes Provider, Historical   lisinopriL (PRINIVIL, ZESTRIL) 10 mg tablet Take  by mouth daily. Yes Provider, Historical   amLODIPine (NORVASC) 10 mg tablet Take  by mouth daily. Yes Provider, Historical   cephALEXin (KEFLEX) 500 mg capsule Take 500 mg by mouth two (2) times a day. Yes Provider, Historical   pregabalin (Lyrica) 150 mg capsule Take 150 mg by mouth two (2) times a day. Yes Provider, Historical   insulin glargine (Lantus U-100 Insulin) 100 unit/mL injection by SubCUTAneous route nightly. 30 units   Yes Provider, Historical   dapagliflozin (Farxiga) 5 mg tab tablet Take 5 mg by mouth daily.    Yes Provider, Historical     No Known Allergies    Patient Active Problem List   Diagnosis Code    Acute CHF (congestive heart failure) ([facility]) I50.9     Patient Active Problem List    Diagnosis Date Noted    Acute CHF (congestive heart failure) ([facility] [address] 06/20/2020     Facility-Administered Medications Ordered in Other Visits   Medication Dose Route Frequency Provider Last Rate Last Dose    dicyclomine (BENTYL) capsule 10 mg  10 mg Oral QID Kerri Wheeler MD   10 mg at 06/23/20 1729    loperamide (IMODIUM) capsule 4 mg  4 mg Oral Q4H PRN Kerri Wheeler MD        cloNIDine HCL (CATAPRES) tablet 0.1 mg  0.1 mg Oral TID PRN Kerri Wheeler MD        prochlorperazine (COMPAZINE) with saline injection 5 mg  5 mg IntraVENous Q6H PRN Kerri Wheeler MD        [START ON 6/24/2020] Vancomycin Trough- please draw prior to 0900 dose on 6/24/20  1 Each Other ONCE Kerri Wheeler MD  lidocaine (PF) (XYLOCAINE) 10 mg/mL (1 %) injection 0.1 mL  0.1 mL SubCUTAneous PRN Pooja Mcconnell MD        fentaNYL citrate (PF) injection 50 mcg  50 mcg IntraVENous PRN Pooja Mcconnell MD        dextrose (D50W) 50% injection syrg             LORazepam (ATIVAN) injection 1 mg  1 mg IntraVENous TID PRN Drenda Kawasaki, MD        albuterol-ipratropium (DUO-NEB) 2.5 MG-0.5 MG/3 ML  3 mL Nebulization Q4H PRN Joselito Hidalgo MD        benzonatate (TESSALON) capsule 100 mg  100 mg Oral Q4H PRN Joselito Hidalgo MD   100 mg at 06/22/20 0631    azithromycin (ZITHROMAX) 500 mg in 0.9% sodium chloride (MBP/ADV) 250 mL  500 mg IntraVENous Q24H Drenda Kawasaki,  mL/hr at 06/23/20 1435 500 mg at 06/23/20 1435    guaiFENesin (ROBITUSSIN) 100 mg/5 mL oral liquid 100 mg  100 mg Oral Q4H PRN Homar Blevins MD   100 mg at 06/23/20 0434    ondansetron (ZOFRAN) injection 2 mg  2 mg IntraVENous Q4H PRN Haseeb Cheek MD   2 mg at 06/23/20 0847    cefTRIAXone (ROCEPHIN) 1 g in 0.9% sodium chloride (MBP/ADV) 50 mL  1 g IntraVENous Q24H Haseeb Cheek  mL/hr at 06/23/20 1600 1 g at 06/23/20 1600    vancomycin (VANCOCIN) 750 mg in 0.9% sodium chloride (MBP/ADV) 250 mL  750 mg IntraVENous Q16H Haseeb Cheek  mL/hr at 06/23/20 1728 750 mg at 06/23/20 1728    nitroglycerin (NITROSTAT) tablet 0.4 mg  0.4 mg SubLINGual Q5MIN PRN Sirena Garces MD        acetaminophen (TYLENOL) tablet 650 mg  650 mg Oral Q6H PRN Sirena Garces MD        sodium chloride (NS) flush 5-40 mL  5-40 mL IntraVENous Q8H Brit Medley MD   10 mL at 06/23/20 1434    sodium chloride (NS) flush 5-40 mL  5-40 mL IntraVENous PRN Vandana Hernández MD   10 mL at 06/23/20 0116    heparin (porcine) injection 5,000 Units  5,000 Units SubCUTAneous Q8H Vandana Hernández MD   5,000 Units at 06/23/20 1728    amitriptyline (ELAVIL) tablet 25 mg  25 mg Oral QHS Vandana Hernández MD   25 mg at 06/22/20 2106    amLODIPine (NORVASC) tablet 10 mg 10 mg Oral DAILY Carlotta Laurent MD   Stopped at 06/23/20 0933    carvediloL (COREG) tablet 12.5 mg  12.5 mg Oral BID WITH MEALS Carlotta Laurent MD   12.5 mg at 06/23/20 1729    atorvastatin (LIPITOR) tablet 20 mg  20 mg Oral DAILY Carlotta Laurent MD   20 mg at 06/23/20 3596    pregabalin (LYRICA) capsule 150 mg  150 mg Oral BID Carlotta Laurent MD   150 mg at 06/23/20 1729    furosemide (LASIX) injection 40 mg  40 mg IntraVENous DAILY Carlotta Laurent MD   40 mg at 06/23/20 1015    insulin lispro (HUMALOG) injection   SubCUTAneous AC&HS Carlotta Laurent MD   Stopped at 06/20/20 2200    glucose chewable tablet 16 g  4 Tab Oral PRN Carlotta Laurent MD        dextrose (D50W) injection syrg 12.5-25 g  12.5-25 g IntraVENous PRN Carlotta Laurent MD   25 g at 06/23/20 1207    glucagon (GLUCAGEN) injection 1 mg  1 mg IntraMUSCular PRN Carlotta Laurent MD        insulin glargine (LANTUS) injection 20 Units  20 Units SubCUTAneous QHS Carlotta Laurent MD   20 Units at 06/22/20 2106     No Known Allergies  Past Medical History:   Diagnosis Date    Diabetes ([facility] [address]     Hypertension      Past Surgical History:   Procedure Laterality Date    HX HEENT      HX ORTHOPAEDIC       History reviewed. No pertinent family history. Social History     Tobacco Use    Smoking status: Current Every Day Smoker    Smokeless tobacco: Former User   Substance Use Topics    Alcohol use: Not Currently       ROS  Pertinent positives and negatives are listed in HPI    Objective:   Vital Signs: (As obtained by patient/caregiver at home)  There were no vitals taken for this visit.      [INSTRUCTIONS:  \"[x]\" Indicates a positive item  \"[]\" Indicates a negative item  -- DELETE ALL ITEMS NOT EXAMINED]    Constitutional: [x] Appears well-developed and well-nourished [x] No apparent distress      [] Abnormal -     Mental status: [x] Alert and awake  [x] Oriented to person/place/time [x] Able to follow commands    [] Abnormal -     Eyes:   EOM    [x]  Normal    [] Abnormal -   Sclera  [x]  Normal    [] Abnormal -          Discharge [x]  None visible   [] Abnormal -     HENT: [x] Normocephalic, atraumatic  [] Abnormal -   [x] Mouth/Throat: Mucous membranes are moist    External Ears [x] Normal  [] Abnormal -    Neck: [x] No visualized mass [] Abnormal -     Pulmonary/Chest: [x] Respiratory effort normal   [x] No visualized signs of difficulty breathing or respiratory distress        [] Abnormal -      Musculoskeletal:   [] Normal gait with no signs of ataxia         [x] Normal range of motion of neck        [] Abnormal -     Neurological:        [x] No Facial Asymmetry (Cranial nerve 7 motor function) (limited exam due to video visit)          [x] No gaze palsy        [] Abnormal -          Skin:        [x] No significant exanthematous lesions or discoloration noted on facial skin         [] Abnormal -            Psychiatric:       [x] Normal Affect [] Abnormal -        [x] No Hallucinations    Other pertinent observable physical exam findings:-        We discussed the expected course, resolution and complications of the diagnosis(es) in detail. Medication risks, benefits, costs, interactions, and alternatives were discussed as indicated. I advised him to contact the office if his condition worsens, changes or fails to improve as anticipated. He expressed understanding with the diagnosis(es) and plan. Darren Topete is a 48 y.o. male who was evaluated by a video visit encounter for concerns as above. Patient identification was verified prior to start of the visit. A caregiver was present when appropriate. Due to this being a TeleHealth encounter (During [facility] public [facility] emergency), evaluation of the following organ systems was limited: Vitals/Constitutional/EENT/Resp/CV/GI//MS/Neuro/Skin/Heme-Lymph-Imm.   Pursuant to the emergency declaration under the [address], [address] waiver authority and the [employer] Act, this Virtual  Visit was conducted, with patient's (and/or legal guardian's) consent, to reduce the patient's risk of exposure to COVID-19 and provide necessary medical care. Services were provided through a video synchronous discussion virtually to substitute for in-person clinic visit. Patient and provider were located at their individual homes.       Stephanie Castillo MD

## 2020-06-23 NOTE — PROGRESS NOTES
@ 06:30 am patient was found drinking water and believed to be from sink. Patient continue requesting food and drinks. Patient was educated about NPO status.

## 2020-06-23 NOTE — ANESTHESIA PREPROCEDURE EVALUATION
Relevant Problems   No relevant active problems       Anesthetic History   No history of anesthetic complications            Review of Systems / Medical History  Patient summary reviewed, nursing notes reviewed and pertinent labs reviewed    Pulmonary          Smoker      Comments: Current Every Day Smoker   Neuro/Psych   Within defined limits           Cardiovascular    Hypertension      CHF  Dysrhythmias : PVC      Exercise tolerance: >4 METS  Comments: Echo 6/22/20: The estimated ejection fraction is 50 - 55%  Tachycardia     GI/Hepatic/Renal         Renal disease: CRI       Endo/Other    Diabetes    Anemia     Other Findings   Comments: Hx drug abuse (heroin, cocaine)  Actively withdrawing from heroin  Code Placedo called earlier today because patient was hallucinating.          Physical Exam    Airway  Mallampati: IV    Neck ROM: normal range of motion   Mouth opening: Normal     Cardiovascular      Rate: abnormal         Dental    Dentition: Poor dentition  Comments: Multiple missing teeth   Pulmonary  Breath sounds clear to auscultation               Abdominal  GI exam deferred       Other Findings            Anesthetic Plan    ASA: 3  Anesthesia type: general    Monitoring Plan: BIS      Induction: Intravenous  Anesthetic plan and risks discussed with: Patient      LMA OK

## 2020-06-23 NOTE — PROGRESS NOTES
Hospitalist Progress Note    NAME: Erica James   :  1965   MRN:  [id number]       Assessment / Plan:  SOB due to PNA vs pulmonary edema  Diarrhea, intermittent. None today   CXR showed Extensive bilateral interstitial and patchy airspace disease. Differential diagnosis includes: Pulmonary vascular congestive changes and/or  infectious process. procalcitonin 6.9  Start empiric abx for PNA  Cont' IV lasix  Initial covid neg, repeat pending  Echo with EF 50-55%. Mild concentric hypertrophy  He is currently on RA. No longer hypoxic    Heroin withdrawal symptoms  Received ativan this AM, speech is slurred. Will order methadone 10mg x1. Pt is scheduled for surgery later today. Add bentyl, prn promethazine and prn loperamide to help with withdrawal symptoms    CKD3   Osteomyelitis of Foot   XR L foot showed Osteomyelitis of the distal second metatarsal and base of the proximal phalanx of the left second toe. Oblique distracted fracture of the base of the  proximal phalanx of the left second toe. Left great toe transmetatarsal amputation postoperative changes. Multiple ossific fragments adjacent to the residual distal first metatarsal. Residual great toe soft tissues are edematous. MR can be performed for further evaluation, as indicated. Cont' empiric abx for now  SHERI ordered and is pending  Podiatry following    Type 2 DM with neuropathy  continue Lantus at reduced dose  SSI/POC checks  cont' gabapentin     HTN   Continue Beta blocker and  CCB      Hyperlipidemia  Cont' statin     Heroin abuse     Code: Full  DVT prophylaxis: heparin     Subjective:     Chief Complaint / Reason for Physician Visit  Pt up in bed, agitated, having withdrawal symptoms from heroin. Discussed with RN events overnight.      Review of Systems:  Symptom Y/N Comments  Symptom Y/N Comments   Fever/Chills n   Chest Pain n    Poor Appetite    Edema     Cough    Abdominal Pain     Sputum    Joint Pain     SOB/[place] n Pruritis/Rash     Nausea/vomit    Tolerating PT/OT     Diarrhea    Tolerating Diet     Constipation    Other       Could NOT obtain due to:      Objective:     VITALS:   Last 24hrs VS reviewed since prior progress note. Most recent are:  Patient Vitals for the past 24 hrs:   Temp Pulse Resp BP SpO2   06/23/20 0803 97.6 °F (36.4 °C) 89 20 142/88 100 %   06/23/20 0258 98.2 °F (36.8 °C) 97 18 141/89 95 %   06/22/20 2315 98.2 °F (36.8 °C) 94 17 129/72 96 %   06/22/20 1932 97.9 °F (36.6 °C) 95 18 (!) 153/94 98 %   06/22/20 1651 -- 91 -- (!) 150/97 --   06/22/20 1423 98.1 °F (36.7 °C) 98 18 131/85 95 %   06/22/20 1159 -- 99 -- -- --   06/22/20 1147 97.9 °F (36.6 °C) (!) 108 18 (!) 156/95 98 %       Intake/Output Summary (Last 24 hours) at 6/23/2020 0914  Last data filed at 6/23/2020 0145  Gross per 24 hour   Intake 970 ml   Output 400 ml   Net 570 ml        PHYSICAL EXAM:  General:          no acute distress    EENT:              Anicteric sclerae. Resp:               No accessory muscle use, breathing pattern regular  CV:                  Tachycardic, sinus  GI:                   No distended   Neurologic:      Alert and awake , normal speech  Psych:             Not anxious nor agitated  Skin:                No rashes. No jaundice    Reviewed most current lab test results and cultures  YES  Reviewed most current radiology test results   YES  Review and summation of old records today    NO  Reviewed patient's current orders and MAR    YES  PMH/SH reviewed - no change compared to H&P  ________________________________________________________________________  Care Plan discussed with:    Comments   Patient x    Family      RN x    Care Manager     Consultant                        Multidiciplinary team rounds were held today with , nursing, pharmacist and clinical coordinator. Patient's plan of care was discussed; medications were reviewed and discharge planning was addressed. ________________________________________________________________________  Total NON critical care TIME:  35  Minutes    Total CRITICAL CARE TIME Spent:   Minutes non procedure based      Comments   >50% of visit spent in counseling and coordination of care     ________________________________________________________________________  Rica Felty, MD     Procedures: see electronic medical records for all procedures/Xrays and details which were not copied into this note but were reviewed prior to creation of Plan. LABS:  I reviewed today's most current labs and imaging studies.   Pertinent labs include:  Recent Labs     06/23/20 0418 06/22/20  0549 06/21/20  0253   WBC 9.8 7.9 8.9   HGB 10.2* 11.1* 9.4*   HCT 32.3* 34.1* 28.4*    373 300     Recent Labs     06/23/20 0418 06/22/20  0549 06/21/20  0253    138 136   K 4.3 4.2 3.2*   * 107 105   CO2 24 27 25   GLU 96 157* 127*   BUN 23* 21* 22*   CREA 1.80* 2.06* 2.00*   CA 8.0* 7.8* 7.6*   MG  --   --  1.8       Signed: Rica Felty, MD

## 2020-06-23 NOTE — PROGRESS NOTES
Interpretation Summary     · The right resting SHERI is normal.  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 · Right toe PPG is normal.  · Left great toe amputation. 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 The right resting SHERI is normal. PVR waveforms in the right ankle are normal. PVR waveforms at the right metatarsal region are normal. PVR waveforms in the left ankle are normal. PVR waveforms of the left metatarsal region are normal. Right toe PPG is normal. Left great toe amputation.    Arterial Pressure Measurements      Right Left   Brachial  mmHg       143 mmHg         Post Tibial  mmHg             Tibial/DP  mmHg             Tibial/DP Location DPA        DPA          SHERI 1.4              Toe Pressure 159 mmHg             TBI 1.11

## 2020-06-23 NOTE — PERIOP NOTES
1235 - PT ARRIVED INTO PRE-OP [facility] [place] [facility].  PT RESPONDS TO TACTILE STIMULI ONLY. RESP EVEN AND UNLABORED - POX ON RA 90% - PLACED ON 2L NC.  POX >96%. DR. Mitchell Keep AWARE OF PT'S CURRENT STATUS. ATTEMPTING TO GET IN TOUCH WITH DTR BJ FOR SURGICAL CONSENT. CALL PLACED TO DR. Jeff Flood. 1315- BS=75  DR. Arturo Isaac. 1/2 AMP D50 ADM IVP AS PER ORDERED. PT AT TIMES WAKES AND IS CONFUSED - DOES NOT FOLLOW COMMAND - YELLING. ATTEMPTED TO RE-ORIENTED PT WITH LITTLE EFFECT. READILY FALLS BACK TO SLEEP.    1345- CASE POSTPONED TO TOMORROW PER DR. Jeff Flood. REPORT PHONED TO PAUL FRAUSTO - AWARE OF CURRENT  AND PT'S CURRENT STATUS. PT TRANSFERRED TO 3240 VIA BED.    3054 - PT ARRIVED INTO 3240 [facility] RN AWARE. BED PLACED IN LOWEST POSITION, CB IN PLACE AND SR UP X3. UNABLE TO PLUG IN BED ALARM - CNA AWARE. RELINQUISHING CARE.

## 2020-06-23 NOTE — PROGRESS NOTES
Pharmacy Automatic Renal Dosing Protocol - Antimicrobials    Indication for Antimicrobials: SSTI     Current Regimen of Each Antimicrobial:  Vancomycin 750 mg IV every 16 hours (Start Date ; Day # 3)  Ceftriaxone 1 g every 24 hours (Start Date ; Day # 3)  Azithromycin 500 mg IV every 24 hours (Start Date ; Day # 3)    Previous Antimicrobial Therapy:  None    Goal Level: VANCOMYCIN TROUGH GOAL RANGE  ~15   (AUC: 400 - 600 mg/hr/Liter/day)     Date Dose & Interval Measured (mcg/mL) Extrapolated (mcg/mL)    @ 0846 750 mg every 16 hours 20.1                  Date & time of next level:  @ 0900    Significant Cultures:    blood: pending    Radiology / Imaging results: (X-ray, CT scan or MRI):    CXR: Extensive bilateral interstitial and patchy airspace disease. Paralysis, amputations, malnutrition: None documented    Labs:  Recent Labs     20  0418 20  0549 20  0253   CREA 1.80* 2.06* 2.00*   BUN 23* 21* 22*   WBC 9.8 7.9 8.9     Temp (24hrs), Av °F (36.7 °C), Min:97.6 °F (36.4 °C), Max:98.2 °F (36.8 °C)    Creatinine Clearance (mL/min) or Dialysis: 38 mL/min    Impression/Plan:   Will continue current regimen as appropriate for indication and renal function. Level drawn too soon, will check level tomorrow for more accurate measure of staeady state. Renal function is improving so I do not expect it to be supratherapeutic  Antimicrobial stop date 5 days CTX, TBD for vanc     Pharmacy will follow daily and adjust medications as appropriate for renal function and/or serum levels. Thank you,  Dottie Brown, PHARMD    Recommended duration of therapy  [email or web address]/Pharmacy/Clinical%20Companion/[email or web address]%[email or web address]. docx    Renal Dosing  [email or web address]/[facility]/Pharmacy/Clinical%20Companion/Renal%[email or web address]. pdf

## 2020-06-23 NOTE — PROGRESS NOTES
Problem: Falls - Risk of  Goal: *Absence of Falls  Description: Document Gregorio Romo Fall Risk and appropriate interventions in the flowsheet. Outcome: Progressing Towards Goal  Note: Fall Risk Interventions:  Mobility Interventions: Patient to call before getting OOB         Medication Interventions: Patient to call before getting OOB, Teach patient to arise slowly, Evaluate medications/consider consulting pharmacy    Elimination Interventions: Call light in reach              Problem: Patient Education: Go to Patient Education Activity  Goal: Patient/Family Education  Outcome: Progressing Towards Goal     Problem: Pressure Injury - Risk of  Goal: *Prevention of pressure injury  Description: Document Sar[place] Scale and appropriate interventions in the flowsheet. Outcome: Progressing Towards Goal  Note: Pressure Injury Interventions:  Sensory Interventions: Keep linens dry and wrinkle-free, Minimize linen layers    Moisture Interventions: Minimize layers    Activity Interventions: Increase time out of bed    Mobility Interventions: HOB 30 degrees or less    Nutrition Interventions: Document food/fluid/supplement intake                     Problem: Patient Education: Go to Patient Education Activity  Goal: Patient/Family Education  Outcome: Progressing Towards Goal     Problem: Diabetes Self-Management  Goal: *Disease process and treatment process  Description: Define diabetes and identify own type of diabetes; list 3 options for treating diabetes. Outcome: Progressing Towards Goal  Goal: *Incorporating nutritional management into lifestyle  Description: Describe effect of type, amount and timing of food on blood glucose; list 3 methods for planning meals. Outcome: Progressing Towards Goal  Goal: *Incorporating physical activity into lifestyle  Description: State effect of exercise on blood glucose levels.   Outcome: Progressing Towards Goal  Goal: *Developing strategies to promote health/change behavior  Description: Define the ABC's of diabetes; identify appropriate screenings, schedule and personal plan for screenings. Outcome: Progressing Towards Goal  Goal: *Using medications safely  Description: State effect of diabetes medications on diabetes; name diabetes medication taking, action and side effects. Outcome: Progressing Towards Goal  Goal: *Monitoring blood glucose, interpreting and using results  Description: Identify recommended blood glucose targets  and personal targets. Outcome: Progressing Towards Goal  Goal: *Prevention, detection, treatment of acute complications  Description: List symptoms of hyper- and hypoglycemia; describe how to treat low blood sugar and actions for lowering  high blood glucose level. Outcome: Progressing Towards Goal  Goal: *Prevention, detection and treatment of chronic complications  Description: Define the natural course of diabetes and describe the relationship of blood glucose levels to long term complications of diabetes.   Outcome: Progressing Towards Goal  Goal: *Developing strategies to address psychosocial issues  Description: Describe feelings about living with diabetes; identify support needed and support network  Outcome: Progressing Towards Goal  Goal: *Insulin pump training  Outcome: Progressing Towards Goal  Goal: *Sick day guidelines  Outcome: Progressing Towards Goal  Goal: *Patient Specific Goal (EDIT GOAL, INSERT TEXT)  Outcome: Progressing Towards Goal     Problem: Patient Education: Go to Patient Education Activity  Goal: Patient/Family Education  Outcome: Progressing Towards Goal     Problem: Heart Failure: Day 3  Goal: Off Pathway (Use only if patient is Off Pathway)  Outcome: Progressing Towards Goal  Goal: Activity/Safety  Outcome: Progressing Towards Goal  Goal: Diagnostic Test/Procedures  Outcome: Progressing Towards Goal  Goal: Nutrition/Diet  Outcome: Progressing Towards Goal  Goal: Discharge Planning  Outcome: Progressing Towards Goal  Goal: Medications  Outcome: Progressing Towards Goal  Goal: Respiratory  Outcome: Progressing Towards Goal  Goal: Treatments/Interventions/Procedures  Outcome: Progressing Towards Goal  Goal: Psychosocial  Outcome: Progressing Towards Goal  Goal: *Oxygen saturation within defined limits  Outcome: Progressing Towards Goal  Goal: *Hemodynamically stable  Outcome: Progressing Towards Goal  Goal: *Optimal pain control at patient's stated goal  Outcome: Progressing Towards Goal  Goal: *Anxiety reduced or absent  Outcome: Progressing Towards Goal  Goal: *Demonstrates progressive activity  Outcome: Progressing Towards Goal     Problem: Heart Failure: Day 4  Goal: Off Pathway (Use only if patient is Off Pathway)  Outcome: Progressing Towards Goal  Goal: Activity/Safety  Outcome: Progressing Towards Goal  Goal: Diagnostic Test/Procedures  Outcome: Progressing Towards Goal  Goal: Nutrition/Diet  Outcome: Progressing Towards Goal  Goal: Discharge Planning  Outcome: Progressing Towards Goal  Goal: Medications  Outcome: Progressing Towards Goal  Goal: Respiratory  Outcome: Progressing Towards Goal  Goal: Treatments/Interventions/Procedures  Outcome: Progressing Towards Goal  Goal: Psychosocial  Outcome: Progressing Towards Goal  Goal: *Oxygen saturation within defined limits  Outcome: Progressing Towards Goal  Goal: *Hemodynamically stable  Outcome: Progressing Towards Goal  Goal: *Optimal pain control at patient's stated goal  Outcome: Progressing Towards Goal  Goal: *Anxiety reduced or absent  Outcome: Progressing Towards Goal  Goal: *Demonstrates progressive activity  Outcome: Progressing Towards Goal     Problem: Heart Failure: Day 5  Goal: Off Pathway (Use only if patient is Off Pathway)  Outcome: Progressing Towards Goal  Goal: Activity/Safety  Outcome: Progressing Towards Goal  Goal: Diagnostic Test/Procedures  Outcome: Progressing Towards Goal  Goal: Nutrition/Diet  Outcome: Progressing Towards Goal  Goal: Discharge Planning  Outcome: Progressing Towards Goal  Goal: Medications  Outcome: Progressing Towards Goal  Goal: Respiratory  Outcome: Progressing Towards Goal  Goal: Treatments/Interventions/Procedures  Outcome: Progressing Towards Goal  Goal: Psychosocial  Outcome: Progressing Towards Goal     Problem: Heart Failure: Discharge Outcomes  Goal: *Demonstrates ability to perform prescribed activity without shortness of breath or discomfort  Outcome: Progressing Towards Goal  Goal: *Left ventricular function assessment completed prior to or during stay, or planned for post-discharge  Outcome: Progressing Towards Goal  Goal: *ACEI prescribed if LVEF less than 40% and no contraindications or ARB prescribed  Outcome: Progressing Towards Goal  Goal: *Verbalizes understanding and describes prescribed diet  Outcome: Progressing Towards Goal  Goal: *Verbalizes understanding/describes prescribed medications  Outcome: Progressing Towards Goal  Goal: *Describes available resources and support systems  Description: (eg: Home Health, Palliative Care, Advanced Medical Directive)  Outcome: Progressing Towards Goal  Goal: *Describes smoking cessation resources  Outcome: Progressing Towards Goal  Goal: *Understands and describes signs and symptoms to report to providers(Stroke Metric)  Outcome: Progressing Towards Goal  Goal: *Describes/verbalizes understanding of follow-up/return appt  Description: (eg: to physicians, diabetes treatment coordinator, and other resources  Outcome: Progressing Towards Goal  Goal: *Describes importance of continuing daily weights and changes to report to physician  Outcome: Progressing Towards Goal

## 2020-06-24 ENCOUNTER — APPOINTMENT (OUTPATIENT)
Dept: CT IMAGING | Age: 55
DRG: 951 | End: 2020-06-24
Attending: HOSPITALIST
Payer: MEDICAID

## 2020-06-24 ENCOUNTER — APPOINTMENT (OUTPATIENT)
Dept: GENERAL RADIOLOGY | Age: 55
DRG: 951 | End: 2020-06-24
Attending: INTERNAL MEDICINE
Payer: MEDICAID

## 2020-06-24 ENCOUNTER — ANESTHESIA (OUTPATIENT)
Dept: SURGERY | Age: 55
DRG: 951 | End: 2020-06-24
Payer: MEDICAID

## 2020-06-24 ENCOUNTER — ANESTHESIA EVENT (OUTPATIENT)
Dept: SURGERY | Age: 55
DRG: 951 | End: 2020-06-24
Payer: MEDICAID

## 2020-06-24 LAB
ANION GAP SERPL CALC-SCNC: 4 MMOL/L (ref 5–15)
BASOPHILS # BLD: 0.1 K/UL (ref 0–0.1)
BASOPHILS NFR BLD: 1 % (ref 0–1)
BNP SERPL-MCNC: 5202 PG/ML
BUN SERPL-MCNC: 25 MG/DL (ref 6–20)
BUN/CREAT SERPL: 14 (ref 12–20)
CALCIUM SERPL-MCNC: 8 MG/DL (ref 8.5–10.1)
CHLORIDE SERPL-SCNC: 108 MMOL/L (ref 97–108)
CO2 SERPL-SCNC: 24 MMOL/L (ref 21–32)
CREAT SERPL-MCNC: 1.83 MG/DL (ref 0.7–1.3)
DATE LAST DOSE: ABNORMAL
DIFFERENTIAL METHOD BLD: ABNORMAL
EOSINOPHIL # BLD: 0.4 K/UL (ref 0–0.4)
EOSINOPHIL NFR BLD: 3 % (ref 0–7)
ERYTHROCYTE [DISTWIDTH] IN BLOOD BY AUTOMATED COUNT: 16 % (ref 11.5–14.5)
GLUCOSE BLD STRIP.AUTO-MCNC: 132 MG/DL (ref 65–100)
GLUCOSE BLD STRIP.AUTO-MCNC: 190 MG/DL (ref 65–100)
GLUCOSE BLD STRIP.AUTO-MCNC: 206 MG/DL (ref 65–100)
GLUCOSE BLD STRIP.AUTO-MCNC: 58 MG/DL (ref 65–100)
GLUCOSE BLD STRIP.AUTO-MCNC: 61 MG/DL (ref 65–100)
GLUCOSE BLD STRIP.AUTO-MCNC: 62 MG/DL (ref 65–100)
GLUCOSE BLD STRIP.AUTO-MCNC: 72 MG/DL (ref 65–100)
GLUCOSE BLD STRIP.AUTO-MCNC: 82 MG/DL (ref 65–100)
GLUCOSE BLD STRIP.AUTO-MCNC: 92 MG/DL (ref 65–100)
GLUCOSE SERPL-MCNC: 73 MG/DL (ref 65–100)
HCT VFR BLD AUTO: 32.4 % (ref 36.6–50.3)
HGB BLD-MCNC: 10.5 G/DL (ref 12.1–17)
IMM GRANULOCYTES # BLD AUTO: 0.1 K/UL (ref 0–0.04)
IMM GRANULOCYTES NFR BLD AUTO: 1 % (ref 0–0.5)
LYMPHOCYTES # BLD: 1.3 K/UL (ref 0.8–3.5)
LYMPHOCYTES NFR BLD: 11 % (ref 12–49)
MCH RBC QN AUTO: 26 PG (ref 26–34)
MCHC RBC AUTO-ENTMCNC: 32.4 G/DL (ref 30–36.5)
MCV RBC AUTO: 80.2 FL (ref 80–99)
MONOCYTES # BLD: 0.6 K/UL (ref 0–1)
MONOCYTES NFR BLD: 5 % (ref 5–13)
NEUTS SEG # BLD: 9.4 K/UL (ref 1.8–8)
NEUTS SEG NFR BLD: 79 % (ref 32–75)
NRBC # BLD: 0 K/UL (ref 0–0.01)
NRBC BLD-RTO: 0 PER 100 WBC
PLATELET # BLD AUTO: 392 K/UL (ref 150–400)
PMV BLD AUTO: 9.7 FL (ref 8.9–12.9)
POTASSIUM SERPL-SCNC: 4 MMOL/L (ref 3.5–5.1)
RBC # BLD AUTO: 4.04 M/UL (ref 4.1–5.7)
REPORTED DOSE,DOSE: ABNORMAL UNITS
REPORTED DOSE/TIME,TMG: ABNORMAL
SERVICE CMNT-IMP: ABNORMAL
SERVICE CMNT-IMP: NORMAL
SODIUM SERPL-SCNC: 136 MMOL/L (ref 136–145)
VANCOMYCIN TROUGH SERPL-MCNC: 18.2 UG/ML (ref 5–10)
WBC # BLD AUTO: 11.7 K/UL (ref 4.1–11.1)

## 2020-06-24 PROCEDURE — 74011250636 HC RX REV CODE- 250/636: Performed by: INTERNAL MEDICINE

## 2020-06-24 PROCEDURE — 88305 TISSUE EXAM BY PATHOLOGIST: CPT

## 2020-06-24 PROCEDURE — 83880 ASSAY OF NATRIURETIC PEPTIDE: CPT

## 2020-06-24 PROCEDURE — 77030002916 HC SUT ETHLN J&J -A: Performed by: PODIATRIST

## 2020-06-24 PROCEDURE — 87205 SMEAR GRAM STAIN: CPT

## 2020-06-24 PROCEDURE — 82962 GLUCOSE BLOOD TEST: CPT

## 2020-06-24 PROCEDURE — 70450 CT HEAD/BRAIN W/O DYE: CPT

## 2020-06-24 PROCEDURE — 74011250637 HC RX REV CODE- 250/637: Performed by: HOSPITALIST

## 2020-06-24 PROCEDURE — 74011000258 HC RX REV CODE- 258: Performed by: INTERNAL MEDICINE

## 2020-06-24 PROCEDURE — 74011250637 HC RX REV CODE- 250/637: Performed by: INTERNAL MEDICINE

## 2020-06-24 PROCEDURE — 76010000149 HC OR TIME 1 TO 1.5 HR: Performed by: PODIATRIST

## 2020-06-24 PROCEDURE — 74011250636 HC RX REV CODE- 250/636: Performed by: ANESTHESIOLOGY

## 2020-06-24 PROCEDURE — 87147 CULTURE TYPE IMMUNOLOGIC: CPT

## 2020-06-24 PROCEDURE — 77030011640 HC PAD GRND REM COVD -A: Performed by: PODIATRIST

## 2020-06-24 PROCEDURE — 71045 X-RAY EXAM CHEST 1 VIEW: CPT

## 2020-06-24 PROCEDURE — 76210000016 HC OR PH I REC 1 TO 1.5 HR: Performed by: PODIATRIST

## 2020-06-24 PROCEDURE — 85025 COMPLETE CBC W/AUTO DIFF WBC: CPT

## 2020-06-24 PROCEDURE — 80202 ASSAY OF VANCOMYCIN: CPT

## 2020-06-24 PROCEDURE — 77030020143 HC AIRWY LARYN INTUB CGAS -A: Performed by: NURSE ANESTHETIST, CERTIFIED REGISTERED

## 2020-06-24 PROCEDURE — 74011250636 HC RX REV CODE- 250/636: Performed by: HOSPITALIST

## 2020-06-24 PROCEDURE — 65270000029 HC RM PRIVATE

## 2020-06-24 PROCEDURE — 74011250637 HC RX REV CODE- 250/637: Performed by: ANESTHESIOLOGY

## 2020-06-24 PROCEDURE — 77030018836 HC SOL IRR NACL ICUM -A: Performed by: PODIATRIST

## 2020-06-24 PROCEDURE — 74011000250 HC RX REV CODE- 250: Performed by: HOSPITALIST

## 2020-06-24 PROCEDURE — 87186 SC STD MICRODIL/AGAR DIL: CPT

## 2020-06-24 PROCEDURE — 80048 BASIC METABOLIC PNL TOTAL CA: CPT

## 2020-06-24 PROCEDURE — 74011250636 HC RX REV CODE- 250/636: Performed by: NURSE ANESTHETIST, CERTIFIED REGISTERED

## 2020-06-24 PROCEDURE — 87077 CULTURE AEROBIC IDENTIFY: CPT

## 2020-06-24 PROCEDURE — 74011000250 HC RX REV CODE- 250: Performed by: INTERNAL MEDICINE

## 2020-06-24 PROCEDURE — 76060000033 HC ANESTHESIA 1 TO 1.5 HR: Performed by: PODIATRIST

## 2020-06-24 PROCEDURE — 88311 DECALCIFY TISSUE: CPT

## 2020-06-24 PROCEDURE — 87075 CULTR BACTERIA EXCEPT BLOOD: CPT

## 2020-06-24 PROCEDURE — 74011000250 HC RX REV CODE- 250: Performed by: ANESTHESIOLOGY

## 2020-06-24 PROCEDURE — 74011000250 HC RX REV CODE- 250: Performed by: NURSE ANESTHETIST, CERTIFIED REGISTERED

## 2020-06-24 PROCEDURE — 36415 COLL VENOUS BLD VENIPUNCTURE: CPT

## 2020-06-24 RX ORDER — HYDROMORPHONE HYDROCHLORIDE 1 MG/ML
.2-.5 INJECTION, SOLUTION INTRAMUSCULAR; INTRAVENOUS; SUBCUTANEOUS
Status: DISCONTINUED | OUTPATIENT
Start: 2020-06-24 | End: 2020-06-24 | Stop reason: HOSPADM

## 2020-06-24 RX ORDER — FUROSEMIDE 10 MG/ML
40 INJECTION INTRAMUSCULAR; INTRAVENOUS 2 TIMES DAILY
Status: DISCONTINUED | OUTPATIENT
Start: 2020-06-24 | End: 2020-06-26

## 2020-06-24 RX ORDER — OXYCODONE AND ACETAMINOPHEN 5; 325 MG/1; MG/1
1 TABLET ORAL
Status: DISCONTINUED | OUTPATIENT
Start: 2020-06-24 | End: 2020-06-24 | Stop reason: HOSPADM

## 2020-06-24 RX ORDER — SODIUM CHLORIDE 0.9 % (FLUSH) 0.9 %
5-40 SYRINGE (ML) INJECTION AS NEEDED
Status: DISCONTINUED | OUTPATIENT
Start: 2020-06-24 | End: 2020-06-24 | Stop reason: HOSPADM

## 2020-06-24 RX ORDER — INSULIN GLARGINE 100 [IU]/ML
10 INJECTION, SOLUTION SUBCUTANEOUS
Status: DISCONTINUED | OUTPATIENT
Start: 2020-06-24 | End: 2020-06-27

## 2020-06-24 RX ORDER — DIPHENHYDRAMINE HYDROCHLORIDE 50 MG/ML
12.5 INJECTION, SOLUTION INTRAMUSCULAR; INTRAVENOUS AS NEEDED
Status: DISCONTINUED | OUTPATIENT
Start: 2020-06-24 | End: 2020-06-24 | Stop reason: HOSPADM

## 2020-06-24 RX ORDER — FENTANYL CITRATE 50 UG/ML
25 INJECTION, SOLUTION INTRAMUSCULAR; INTRAVENOUS
Status: DISCONTINUED | OUTPATIENT
Start: 2020-06-24 | End: 2020-06-24 | Stop reason: HOSPADM

## 2020-06-24 RX ORDER — FUROSEMIDE 10 MG/ML
40 INJECTION INTRAMUSCULAR; INTRAVENOUS ONCE
Status: COMPLETED | OUTPATIENT
Start: 2020-06-24 | End: 2020-06-24

## 2020-06-24 RX ORDER — MIDAZOLAM HYDROCHLORIDE 1 MG/ML
0.5 INJECTION, SOLUTION INTRAMUSCULAR; INTRAVENOUS
Status: DISCONTINUED | OUTPATIENT
Start: 2020-06-24 | End: 2020-06-24 | Stop reason: HOSPADM

## 2020-06-24 RX ORDER — ONDANSETRON 2 MG/ML
INJECTION INTRAMUSCULAR; INTRAVENOUS AS NEEDED
Status: DISCONTINUED | OUTPATIENT
Start: 2020-06-24 | End: 2020-06-24 | Stop reason: HOSPADM

## 2020-06-24 RX ORDER — FENTANYL CITRATE 50 UG/ML
INJECTION, SOLUTION INTRAMUSCULAR; INTRAVENOUS AS NEEDED
Status: DISCONTINUED | OUTPATIENT
Start: 2020-06-24 | End: 2020-06-24 | Stop reason: HOSPADM

## 2020-06-24 RX ORDER — MORPHINE SULFATE 10 MG/ML
2 INJECTION, SOLUTION INTRAMUSCULAR; INTRAVENOUS
Status: DISCONTINUED | OUTPATIENT
Start: 2020-06-24 | End: 2020-06-24 | Stop reason: HOSPADM

## 2020-06-24 RX ORDER — IBUPROFEN 200 MG
1 TABLET ORAL DAILY
Status: DISCONTINUED | OUTPATIENT
Start: 2020-06-25 | End: 2020-07-02 | Stop reason: HOSPADM

## 2020-06-24 RX ORDER — SODIUM CHLORIDE, SODIUM LACTATE, POTASSIUM CHLORIDE, CALCIUM CHLORIDE 600; 310; 30; 20 MG/100ML; MG/100ML; MG/100ML; MG/100ML
25 INJECTION, SOLUTION INTRAVENOUS CONTINUOUS
Status: DISCONTINUED | OUTPATIENT
Start: 2020-06-24 | End: 2020-06-24 | Stop reason: HOSPADM

## 2020-06-24 RX ORDER — SODIUM CHLORIDE 0.9 % (FLUSH) 0.9 %
5-40 SYRINGE (ML) INJECTION EVERY 8 HOURS
Status: DISCONTINUED | OUTPATIENT
Start: 2020-06-24 | End: 2020-06-24 | Stop reason: HOSPADM

## 2020-06-24 RX ORDER — LIDOCAINE HYDROCHLORIDE 20 MG/ML
INJECTION, SOLUTION EPIDURAL; INFILTRATION; INTRACAUDAL; PERINEURAL AS NEEDED
Status: DISCONTINUED | OUTPATIENT
Start: 2020-06-24 | End: 2020-06-24 | Stop reason: HOSPADM

## 2020-06-24 RX ORDER — DEXTROSE 50 % IN WATER (D50W) INTRAVENOUS SYRINGE
25 ONCE
Status: COMPLETED | OUTPATIENT
Start: 2020-06-24 | End: 2020-06-24

## 2020-06-24 RX ORDER — INSULIN LISPRO 100 [IU]/ML
INJECTION, SOLUTION INTRAVENOUS; SUBCUTANEOUS EVERY 6 HOURS
Status: DISCONTINUED | OUTPATIENT
Start: 2020-06-24 | End: 2020-06-25

## 2020-06-24 RX ORDER — PROPOFOL 10 MG/ML
INJECTION, EMULSION INTRAVENOUS AS NEEDED
Status: DISCONTINUED | OUTPATIENT
Start: 2020-06-24 | End: 2020-06-24 | Stop reason: HOSPADM

## 2020-06-24 RX ORDER — PHENYLEPHRINE HCL IN 0.9% NACL 0.4MG/10ML
SYRINGE (ML) INTRAVENOUS AS NEEDED
Status: DISCONTINUED | OUTPATIENT
Start: 2020-06-24 | End: 2020-06-24 | Stop reason: HOSPADM

## 2020-06-24 RX ADMIN — Medication 200 MCG: at 17:56

## 2020-06-24 RX ADMIN — SODIUM CHLORIDE, SODIUM LACTATE, POTASSIUM CHLORIDE, AND CALCIUM CHLORIDE 25 ML/HR: 600; 310; 30; 20 INJECTION, SOLUTION INTRAVENOUS at 16:00

## 2020-06-24 RX ADMIN — DICYCLOMINE HYDROCHLORIDE 10 MG: 10 CAPSULE ORAL at 08:29

## 2020-06-24 RX ADMIN — Medication 3 AMPULE: at 16:00

## 2020-06-24 RX ADMIN — ONDANSETRON 2 MG: 2 INJECTION INTRAMUSCULAR; INTRAVENOUS at 10:34

## 2020-06-24 RX ADMIN — ATORVASTATIN CALCIUM 20 MG: 20 TABLET, FILM COATED ORAL at 08:28

## 2020-06-24 RX ADMIN — CLONIDINE HYDROCHLORIDE 0.1 MG: 0.1 TABLET ORAL at 08:29

## 2020-06-24 RX ADMIN — CARVEDILOL 12.5 MG: 12.5 TABLET, FILM COATED ORAL at 08:28

## 2020-06-24 RX ADMIN — PROPOFOL 200 MG: 10 INJECTION, EMULSION INTRAVENOUS at 17:34

## 2020-06-24 RX ADMIN — DEXTROSE MONOHYDRATE 25 G: 25 INJECTION, SOLUTION INTRAVENOUS at 18:57

## 2020-06-24 RX ADMIN — Medication 10 ML: at 23:40

## 2020-06-24 RX ADMIN — Medication 10 ML: at 02:27

## 2020-06-24 RX ADMIN — DICYCLOMINE HYDROCHLORIDE 10 MG: 10 CAPSULE ORAL at 23:39

## 2020-06-24 RX ADMIN — PREGABALIN 150 MG: 150 CAPSULE ORAL at 08:29

## 2020-06-24 RX ADMIN — VANCOMYCIN HYDROCHLORIDE 750 MG: 750 INJECTION, POWDER, LYOPHILIZED, FOR SOLUTION INTRAVENOUS at 08:39

## 2020-06-24 RX ADMIN — AMITRIPTYLINE HYDROCHLORIDE 25 MG: 25 TABLET, FILM COATED ORAL at 23:39

## 2020-06-24 RX ADMIN — CEFTRIAXONE 1 G: 1 INJECTION, POWDER, FOR SOLUTION INTRAMUSCULAR; INTRAVENOUS at 16:34

## 2020-06-24 RX ADMIN — NITROGLYCERIN 1 INCH: 20 OINTMENT TOPICAL at 23:39

## 2020-06-24 RX ADMIN — AZITHROMYCIN 500 MG: 500 INJECTION, POWDER, LYOPHILIZED, FOR SOLUTION INTRAVENOUS at 11:24

## 2020-06-24 RX ADMIN — Medication 200 MCG: at 17:52

## 2020-06-24 RX ADMIN — ONDANSETRON HYDROCHLORIDE 4 MG: 2 INJECTION, SOLUTION INTRAMUSCULAR; INTRAVENOUS at 18:33

## 2020-06-24 RX ADMIN — LORAZEPAM 1 MG: 2 INJECTION INTRAMUSCULAR; INTRAVENOUS at 03:00

## 2020-06-24 RX ADMIN — LIDOCAINE HYDROCHLORIDE 100 MG: 20 INJECTION, SOLUTION INTRAVENOUS at 17:32

## 2020-06-24 RX ADMIN — HEPARIN SODIUM 5000 UNITS: 5000 INJECTION INTRAVENOUS; SUBCUTANEOUS at 02:26

## 2020-06-24 RX ADMIN — FUROSEMIDE 40 MG: 10 INJECTION, SOLUTION INTRAMUSCULAR; INTRAVENOUS at 03:47

## 2020-06-24 RX ADMIN — FENTANYL CITRATE 50 MCG: 50 INJECTION, SOLUTION INTRAMUSCULAR; INTRAVENOUS at 18:26

## 2020-06-24 RX ADMIN — PROCHLORPERAZINE EDISYLATE 5 MG: 5 INJECTION INTRAMUSCULAR; INTRAVENOUS at 02:33

## 2020-06-24 RX ADMIN — DEXTROSE MONOHYDRATE 12.5 G: 500 INJECTION PARENTERAL at 16:05

## 2020-06-24 RX ADMIN — PHENYLEPHRINE HYDROCHLORIDE 40 MCG/MIN: 10 INJECTION INTRAVENOUS at 17:57

## 2020-06-24 RX ADMIN — FUROSEMIDE 40 MG: 10 INJECTION, SOLUTION INTRAMUSCULAR; INTRAVENOUS at 13:13

## 2020-06-24 RX ADMIN — AMLODIPINE BESYLATE 10 MG: 5 TABLET ORAL at 08:29

## 2020-06-24 RX ADMIN — LORAZEPAM 1 MG: 2 INJECTION INTRAMUSCULAR; INTRAVENOUS at 10:35

## 2020-06-24 RX ADMIN — FUROSEMIDE 40 MG: 10 INJECTION, SOLUTION INTRAMUSCULAR; INTRAVENOUS at 08:29

## 2020-06-24 RX ADMIN — Medication 10 ML: at 02:33

## 2020-06-24 NOTE — ROUTINE PROCESS
TRANSFER - IN REPORT:    Verbal report received from CATHY Forbes RN(name) on [employer]  being received from OR(unit) for routine post - op      Report consisted of patients Situation, Background, Assessment and   Recommendations(SBAR). Information from the following report(s) OR Summary was reviewed with the receiving nurse. Opportunity for questions and clarification was provided. Assessment completed upon patients arrival to unit and care assumed.

## 2020-06-24 NOTE — PERIOP NOTES
TRANSFER - IN REPORT:    Verbal report received from Lakisha FRAUSTO(name) on Floria Dancer  being received from Ortho Room [facility]240(unit) for ordered procedure      Report consisted of patients Situation, Background, Assessment and   Recommendations(SBAR). Information from the following report(s) SBAR, Kardex, Intake/Output, MAR, Recent Results, Med Rec Status, Cardiac Rhythm Sinus Tachy and Procedure Verification was reviewed with the receiving nurse. Opportunity for questions and clarification was provided. Assessment completed upon patients arrival to unit and care assumed.

## 2020-06-24 NOTE — ANESTHESIA POSTPROCEDURE EVALUATION
Procedure(s):  AMPUTATION SECOND DIGIT WITH PARTIAL FIRST AND SECOND METATARSAL. LEFT FOOT. general    Anesthesia Post Evaluation        Patient location during evaluation: PACU  Note status: Adequate. Level of consciousness: responsive to verbal stimuli and sleepy but conscious  Pain management: satisfactory to patient  Airway patency: patent  Anesthetic complications: no  Cardiovascular status: acceptable  Respiratory status: acceptable  Hydration status: acceptable  Comments: +Post-Anesthesia Evaluation and Assessment    Patient: Altaf Maurer MRN: [id number]  SSN: [id number]   YOB: 1965  Age: 48 y.o. Sex: male      Cardiovascular Function/Vital Signs    /75   Pulse 80   Temp 36.7 °C (98 °F)   Resp 12   Ht 5' 7\" (1.702 m)   Wt 65.8 kg (145 lb 1 oz)   SpO2 96%   BMI 22.72 kg/m²     Patient is status post Procedure(s):  AMPUTATION SECOND DIGIT WITH PARTIAL FIRST AND SECOND METATARSAL. LEFT FOOT. Nausea/Vomiting: Controlled. Postoperative hydration reviewed and adequate. Pain:  Pain Scale 1: Visual (06/24/20 1915)  Pain Intensity 1: 0 (06/24/20 1915)   Managed. Neurological Status:   Neuro (WDL): Exceptions to WDL (06/24/20 1915)   At baseline. Mental Status and Level of Consciousness: Arousable. Pulmonary Status:   O2 Device: Nasal cannula (06/24/20 1838)   Adequate oxygenation and airway patent. Complications related to anesthesia: None    Post-anesthesia assessment completed. No concerns. Signed By: Erasmo Hannah MD    6/24/2020  Post anesthesia nausea and vomiting:  controlled      INITIAL Post-op Vital signs:   Vitals Value Taken Time   /77 6/24/2020  7:30 PM   Temp 36.7 °C (98 °F) 6/24/2020  6:38 PM   Pulse 83 6/24/2020  7:31 PM   Resp 13 6/24/2020  7:31 PM   SpO2 95 % 6/24/2020  7:31 PM   Vitals shown include unvalidated device data.

## 2020-06-24 NOTE — ROUTINE PROCESS
Patient continues to be uncooperative, attempting to get OOB without assistance. Telemetry video monitor on and working. Earlier patient stumbled into bathroom stretching IV tubing, voided all over floor. Patient continues to hallucinate and have jerky movements. Speech very unclear and garbled. Bed alarm on at all times. This nurse and PCT have been in patient's room every few minutes as needed. Patient has been trying to get water to drink  from sink.   Reminded patient that he is NPO for surgery this evening at 5pm.    Crissy Patel RN

## 2020-06-24 NOTE — BRIEF OP NOTE
Brief Postoperative Note    Patient: Ricco Montelongo  YOB: 1965  MRN: [id number]    Date of Procedure: 6/24/2020     Pre-Op Diagnosis: NECROTIC TOE    Post-Op Diagnosis: Same    Procedure(s):  AMPUTATION SECOND DIGIT WITH PARTIAL FIRST AND SECOND METATARSAL.  LEFT FOOT    Surgeon(s):  Deidre Barroso DPM    Surgical Assistant: None    Anesthesia: General     Estimated Blood Loss (mL): 29VK    Complications: None    Specimens:   ID Type Source Tests Collected by Time Destination   1 : Bone from ulcer site, 2nd Metatarsal- LEFT FOOT Preservative Foot, left  Deidre Barroso DPM 6/24/2020 1800 Pathology   2 : bone from Ulcer Site, 1st metatarsal- LEFT FOOT Preservative Foot, left  Deidre Barroso, [address] 6/24/2020 1807 Pathology   3 : viable bone margin, 1st metatarsal LEFT FOOT Preservative Foot, left  Deidre aBrroso, [address] 6/24/2020 1811 Pathology   4 : viable bone margin, 2nd metatarsal LEFT FOOT Preservative Foot, left  Deidre Barroso DPM 6/24/2020 1813 Pathology   1 : Culture from Deep wound, LEFT foot Wound Foot, left ANAEROBIC/AEROBIC/GRAM Earlene Goltz, DPM 6/24/2020 1820 Microbiology        Implants: None    Drains: None    Findings: Nonviable bone second and first metatarsal left foot    Electronically Signed by Latha Colbert DPM on 6/24/2020 at 6:37 PM

## 2020-06-24 NOTE — PROGRESS NOTES
Hospitalist Progress Note    NAME: Dinora Alvarez   :  1965   MRN:  [id number]       Assessment / Plan:  SOB due to PNA and pulmonary edema  Diarrhea, intermittent. None today   CXR showed Extensive bilateral interstitial and patchy airspace disease. Differential diagnosis includes: Pulmonary vascular congestive changes and/or  infectious process. Repeat CXR showed interval worsening of diffuse interstitial and airspace opacities which again may be due to edema and/or pneumonia. Repeat probnp  procalcitonin 6.9  Con't empiric abx for PNA  Increase IV lasix to 40mg BID  Covid neg x2  Echo with EF 50-55%. Mild concentric hypertrophy    Heroin withdrawal symptoms  Heroin abuse   S/p methadone 10mg x1. Mildly sedated today. Calm in bed.  telesitter at bedside. Cont' IV ativan prn bentyl, prn promethazine and prn loperamide to help with withdrawal symptoms    CKD3   Osteomyelitis of Foot   XR L foot showed osteomyelitis of the distal second metatarsal and base of the proximal phalanx of the left second toe. Oblique distracted fracture of the base of the  proximal phalanx of the left second toe. Left great toe transmetatarsal amputation postoperative changes. Multiple ossific fragments adjacent to the residual distal first metatarsal. Residual great toe soft tissues are edematous. MR can be performed for further evaluation, as indicated. Cont' empiric abx for now  SHERI reviewed  Podiatry following for surgery today    Type 2 DM with neuropathy  BG 61, improved with breakfast.  Resume lower dose of lantus tonight   SSI/POC checks  cont' gabapentin     HTN   Continue Beta blocker and  CCB      Hyperlipidemia  Cont' statin         Code: Full  DVT prophylaxis: heparin     Subjective:     Chief Complaint / Reason for Physician Visit  Pt up in bed, calmed, mildly sedated. RRT called overnight for respiratory distress. He is currently on RA. Discussed with RN events overnight.      Review of Systems:  Symptom Y/N Comments  Symptom Y/N Comments   Fever/Chills    Chest Pain     Poor Appetite    Edema     Cough    Abdominal Pain     Sputum    Joint Pain     SOB/DE [place] CRUZ    Pruritis/Rash     Nausea/vomit    Tolerating PT/OT     Diarrhea    Tolerating Diet     Constipation    Other       Could NOT obtain due to: Mildly sedated     Objective:     VITALS:   Last 24hrs VS reviewed since prior progress note. Most recent are:  Patient Vitals for the past 24 hrs:   Temp Pulse Resp BP SpO2   06/24/20 0733 98.1 °F (36.7 °C) (!) 109 22 (!) 161/91 94 %   06/24/20 0429 97.5 °F (36.4 °C) (!) 107 18 (!) 162/104 96 %   06/24/20 0329 -- (!) 104 17 (!) 159/105 --   06/24/20 0309 -- (!) 107 -- (!) 165/108 --   06/24/20 0307 98.6 °F (37 °C) (!) 106 18 (!) 167/106 97 %   06/23/20 2333 98.1 °F (36.7 °C) 90 17 151/83 93 %   06/23/20 1955 97.6 °F (36.4 °C) 86 18 142/86 96 %   06/23/20 1542 98 °F (36.7 °C) 81 16 117/64 96 %   06/23/20 1235 97.5 °F (36.4 °C) 70 15 119/84 98 %   06/23/20 1231 -- -- -- -- 97 %   06/23/20 1225 -- -- -- -- 90 %   06/23/20 1009 97.8 °F (36.6 °C) 90 20 (!) 160/98 96 %       Intake/Output Summary (Last 24 hours) at 6/24/2020 0368  Last data filed at 6/23/2020 1345  Gross per 24 hour   Intake --   Output 650 ml   Net -650 ml        PHYSICAL EXAM:  General:          pt in bed, no acute distress    EENT:              Anicteric sclerae. Resp:               No accessory muscle use, breathing pattern regular. CTA b/l  CV:                  Tachycardic, sinus  GI:                   No distended   Neurologic:      sleeping in bed, wakes up easily to voice. Moving all exts  Psych:             Not anxious nor agitated  Skin:                No rashes.   No jaundice    Reviewed most current lab test results and cultures  YES  Reviewed most current radiology test results   YES  Review and summation of old records today    NO  Reviewed patient's current orders and MAR    YES  PMH/SH reviewed - no change compared to H&P  ________________________________________________________________________  Care Plan discussed with:    Comments   Patient x    Family      RN x    Care Manager     Consultant                        Multidiciplinary team rounds were held today with , nursing, pharmacist and clinical coordinator. Patient's plan of care was discussed; medications were reviewed and discharge planning was addressed. ________________________________________________________________________  Total NON critical care TIME:  35  Minutes    Total CRITICAL CARE TIME Spent:   Minutes non procedure based      Comments   >50% of visit spent in counseling and coordination of care     ________________________________________________________________________  Clarissa Uriarte MD     Procedures: see electronic medical records for all procedures/Xrays and details which were not copied into this note but were reviewed prior to creation of Plan. LABS:  I reviewed today's most current labs and imaging studies.   Pertinent labs include:  Recent Labs     06/24/20 0345 06/23/20 0418 06/22/20  0549   WBC 11.7* 9.8 7.9   HGB 10.5* 10.2* 11.1*   HCT 32.4* 32.3* 34.1*    368 373     Recent Labs     06/24/20 0345 06/23/20 0418 06/22/20  0549    139 138   K 4.0 4.3 4.2    109* 107   CO2 24 24 27   GLU 73 96 157*   BUN 25* 23* 21*   CREA 1.83* 1.80* 2.06*   CA 8.0* 8.0* 7.8*       Signed: Clarissa Uriarte MD

## 2020-06-24 NOTE — ANESTHESIA PREPROCEDURE EVALUATION
Relevant Problems   No relevant active problems       Anesthetic History   No history of anesthetic complications            Review of Systems / Medical History  Patient summary reviewed, nursing notes reviewed and pertinent labs reviewed    Pulmonary          Smoker      Comments: Current Every Day Smoker   Neuro/Psych   Within defined limits           Cardiovascular    Hypertension      CHF  Dysrhythmias : PVC      Exercise tolerance: >4 METS  Comments: Echo 6/22/20: The estimated ejection fraction is 50 - 55%  Tachycardia     GI/Hepatic/Renal         Renal disease: CRI       Endo/Other    Diabetes    Anemia     Other Findings   Comments: Hx drug abuse (heroin, cocaine)  Actively withdrawing from heroin  Code Melvin called because patient was hallucinating.            Physical Exam    Airway  Mallampati: IV    Neck ROM: normal range of motion   Mouth opening: Normal     Cardiovascular  Regular rate and rhythm,  S1 and S2 normal,  no murmur, click, rub, or gallop  Rhythm: regular  Rate: abnormal         Dental    Dentition: Poor dentition  Comments: Multiple missing teeth   Pulmonary  Breath sounds clear to auscultation               Abdominal  GI exam deferred       Other Findings            Anesthetic Plan    ASA: 3  Anesthesia type: general    Monitoring Plan: BIS      Induction: Intravenous  Anesthetic plan and risks discussed with: Patient      LMA OK

## 2020-06-24 NOTE — PROGRESS NOTES
Bedside shift change report given to JETT Banuelos (oncoming nurse) by Kaylin Mac (offgoing nurse). Report included the following information SBAR, Kardex, Procedure Summary, Intake/Output, MAR, Accordion, Recent Results and Med Rec Status.

## 2020-06-24 NOTE — PROGRESS NOTES
Pharmacy Automatic Renal Dosing Protocol - Antimicrobials    Indication for Antimicrobials: osteomyelitis, PNA     Current Regimen of Each Antimicrobial:  Vancomycin 750 mg IV every 16 hours (Start Date ; Day # 4)  Ceftriaxone 1 g every 24 hours (Start Date ; Day # 4)  Azithromycin 500 mg IV every 24 hours (Start Date ; Day # 4)    Previous Antimicrobial Therapy:  None    Goal Level: VANCOMYCIN TROUGH GOAL RANGE  15-20   (AUC: 400 - 600 mg/hr/Liter/day)     Date Dose & Interval Measured (mcg/mL) Extrapolated (mcg/mL)    @ 0846 750 mg every 16 hours 20.1     750 mg q16h 18.2            Date & time of next level:     Significant Cultures:    blood: ng pending    Radiology / Imaging results: (X-ray, CT scan or MRI):    CXR: Extensive bilateral interstitial and patchy airspace disease. Paralysis, amputations, malnutrition: None documented    Labs:  Recent Labs     20  0345 20  0418 20  0549   CREA 1.83* 1.80* 2.06*   BUN 25* 23* 21*   WBC 11.7* 9.8 7.9     Temp (24hrs), Av.9 °F (36.6 °C), Min:97.5 °F (36.4 °C), Max:98.6 °F (37 °C)    Creatinine Clearance (mL/min) or Dialysis: 43 mL/min    Impression/Plan:   · Vancomycin level within goal, continue current dosing  · Antimicrobial stop date 5 days CTX and azithromycin, TBD for vanc     Pharmacy will follow daily and adjust medications as appropriate for renal function and/or serum levels.     Thank you,  Orlin Oakes, PHARMD

## 2020-06-24 NOTE — PROGRESS NOTES
Bedside shift change report given to Marbella Vazquez RN (oncoming nurse) by Marin Shay RN (offgoing nurse). Report included the following information SBAR, Kardex, Procedure Summary, Intake/Output, MAR, Accordion, Recent Results and Med Rec Status.

## 2020-06-24 NOTE — PROGRESS NOTES
Problem: Falls - Risk of  Goal: *Absence of Falls  Description: Document Maria L Martines Fall Risk and appropriate interventions in the flowsheet. Outcome: Progressing Towards Goal  Note: Fall Risk Interventions:  Mobility Interventions: Assess mobility with egress test, Patient to call before getting OOB, Bed/chair exit alarm    Mentation Interventions: Adequate sleep, hydration, pain control, Door open when patient unattended    Medication Interventions: Assess postural VS orthostatic hypotension, Patient to call before getting OOB, Teach patient to arise slowly, Evaluate medications/consider consulting pharmacy    Elimination Interventions: Call light in reach, Urinal in reach              Problem: Patient Education: Go to Patient Education Activity  Goal: Patient/Family Education  Outcome: Progressing Towards Goal     Problem: Pressure Injury - Risk of  Goal: *Prevention of pressure injury  Description: Document Sarwat Scale and appropriate interventions in the flowsheet. Outcome: Progressing Towards Goal  Note: Pressure Injury Interventions:  Sensory Interventions: Assess changes in LOC, Float heels, Keep linens dry and wrinkle-free, Minimize linen layers    Moisture Interventions: Absorbent underpads    Activity Interventions: Increase time out of bed, Pressure redistribution bed/mattress(bed type), PT/OT evaluation    Mobility Interventions: Float heels, Pressure redistribution bed/mattress (bed type)    Nutrition Interventions: Document food/fluid/supplement intake                     Problem: Patient Education: Go to Patient Education Activity  Goal: Patient/Family Education  Outcome: Progressing Towards Goal     Problem: Diabetes Self-Management  Goal: *Disease process and treatment process  Description: Define diabetes and identify own type of diabetes; list 3 options for treating diabetes.   Outcome: Progressing Towards Goal  Goal: *Incorporating nutritional management into lifestyle  Description: Describe effect of type, amount and timing of food on blood glucose; list 3 methods for planning meals. Outcome: Progressing Towards Goal  Goal: *Incorporating physical activity into lifestyle  Description: State effect of exercise on blood glucose levels. Outcome: Progressing Towards Goal  Goal: *Developing strategies to promote health/change behavior  Description: Define the ABC's of diabetes; identify appropriate screenings, schedule and personal plan for screenings. Outcome: Progressing Towards Goal  Goal: *Using medications safely  Description: State effect of diabetes medications on diabetes; name diabetes medication taking, action and side effects. Outcome: Progressing Towards Goal  Goal: *Monitoring blood glucose, interpreting and using results  Description: Identify recommended blood glucose targets  and personal targets. Outcome: Progressing Towards Goal  Goal: *Prevention, detection, treatment of acute complications  Description: List symptoms of hyper- and hypoglycemia; describe how to treat low blood sugar and actions for lowering  high blood glucose level. Outcome: Progressing Towards Goal  Goal: *Prevention, detection and treatment of chronic complications  Description: Define the natural course of diabetes and describe the relationship of blood glucose levels to long term complications of diabetes.   Outcome: Progressing Towards Goal  Goal: *Developing strategies to address psychosocial issues  Description: Describe feelings about living with diabetes; identify support needed and support network  Outcome: Progressing Towards Goal  Goal: *Insulin pump training  Outcome: Progressing Towards Goal  Goal: *Sick day guidelines  Outcome: Progressing Towards Goal  Goal: *Patient Specific Goal (EDIT GOAL, INSERT TEXT)  Outcome: Progressing Towards Goal     Problem: Patient Education: Go to Patient Education Activity  Goal: Patient/Family Education  Outcome: Progressing Towards Goal     Problem: Heart Failure: Day 3  Goal: Off Pathway (Use only if patient is Off Pathway)  Outcome: Progressing Towards Goal  Goal: Activity/Safety  Outcome: Progressing Towards Goal  Goal: Diagnostic Test/Procedures  Outcome: Progressing Towards Goal  Goal: Nutrition/Diet  Outcome: Progressing Towards Goal  Goal: Discharge Planning  Outcome: Progressing Towards Goal  Goal: Medications  Outcome: Progressing Towards Goal  Goal: Respiratory  Outcome: Progressing Towards Goal  Goal: Treatments/Interventions/Procedures  Outcome: Progressing Towards Goal  Goal: Psychosocial  Outcome: Progressing Towards Goal  Goal: *Oxygen saturation within defined limits  Outcome: Progressing Towards Goal  Goal: *Hemodynamically stable  Outcome: Progressing Towards Goal  Goal: *Optimal pain control at patient's stated goal  Outcome: Progressing Towards Goal  Goal: *Anxiety reduced or absent  Outcome: Progressing Towards Goal  Goal: *Demonstrates progressive activity  Outcome: Progressing Towards Goal     Problem: Heart Failure: Day 4  Goal: Off Pathway (Use only if patient is Off Pathway)  Outcome: Progressing Towards Goal  Goal: Activity/Safety  Outcome: Progressing Towards Goal  Goal: Diagnostic Test/Procedures  Outcome: Progressing Towards Goal  Goal: Nutrition/Diet  Outcome: Progressing Towards Goal  Goal: Discharge Planning  Outcome: Progressing Towards Goal  Goal: Medications  Outcome: Progressing Towards Goal  Goal: Respiratory  Outcome: Progressing Towards Goal  Goal: Treatments/Interventions/Procedures  Outcome: Progressing Towards Goal  Goal: Psychosocial  Outcome: Progressing Towards Goal  Goal: *Oxygen saturation within defined limits  Outcome: Progressing Towards Goal  Goal: *Hemodynamically stable  Outcome: Progressing Towards Goal  Goal: *Optimal pain control at patient's stated goal  Outcome: Progressing Towards Goal  Goal: *Anxiety reduced or absent  Outcome: Progressing Towards Goal  Goal: *Demonstrates progressive activity  Outcome: Progressing Towards Goal     Problem: Heart Failure: Day 5  Goal: Off Pathway (Use only if patient is Off Pathway)  Outcome: Progressing Towards Goal  Goal: Activity/Safety  Outcome: Progressing Towards Goal  Goal: Diagnostic Test/Procedures  Outcome: Progressing Towards Goal  Goal: Nutrition/Diet  Outcome: Progressing Towards Goal  Goal: Discharge Planning  Outcome: Progressing Towards Goal  Goal: Medications  Outcome: Progressing Towards Goal  Goal: Respiratory  Outcome: Progressing Towards Goal  Goal: Treatments/Interventions/Procedures  Outcome: Progressing Towards Goal  Goal: Psychosocial  Outcome: Progressing Towards Goal     Problem: Heart Failure: Discharge Outcomes  Goal: *Demonstrates ability to perform prescribed activity without shortness of breath or discomfort  Outcome: Progressing Towards Goal  Goal: *Left ventricular function assessment completed prior to or during stay, or planned for post-discharge  Outcome: Progressing Towards Goal  Goal: *ACEI prescribed if LVEF less than 40% and no contraindications or ARB prescribed  Outcome: Progressing Towards Goal  Goal: *Verbalizes understanding and describes prescribed diet  Outcome: Progressing Towards Goal  Goal: *Verbalizes understanding/describes prescribed medications  Outcome: Progressing Towards Goal  Goal: *Describes available resources and support systems  Description: (eg: Home Health, Palliative Care, Advanced Medical Directive)  Outcome: Progressing Towards Goal  Goal: *Describes smoking cessation resources  Outcome: Progressing Towards Goal  Goal: *Understands and describes signs and symptoms to report to providers(Stroke Metric)  Outcome: Progressing Towards Goal  Goal: *Describes/verbalizes understanding of follow-up/return appt  Description: (eg: to physicians, diabetes treatment coordinator, and other resources  Outcome: Progressing Towards Goal  Goal: *Describes importance of continuing daily weights and changes to report to physician  Outcome: Progressing Towards Goal

## 2020-06-24 NOTE — PERIOP NOTES
1924 Pt. arouses to voice and stimulation, opens eyes for a few seconds, and looks at me, the rolls over pulling blanket up, then falling back to sleep.

## 2020-06-24 NOTE — PROGRESS NOTES
06/24/20 0307 06/24/20 0309 06/24/20 0329   Vital Signs   Temp 98.6 °F (37 °C)  --   --    Temp Source Oral  --   --    Pulse (Heart Rate) (!) 106 (!) 107 (!) 104   Heart Rate Source Monitor Monitor Monitor   Cardiac Rhythm Sinus Tach Sinus Tach Sinus Tach   Resp Rate 18  --  17   O2 Sat (%) 97 %  --   --    Level of Consciousness Alert  --   --    BP (!) 167/106 (!) 165/108 (!) 159/105   MAP (Monitor) 130 125 121   MAP (Calculated) 126 127 123   BP 1 Method Automatic  --  Automatic   BP 1 Location Right arm  --  Right arm   BP Patient Position At rest  --  At rest   MEWS Score 2  --   --      Rapid response was activated as the patient c/o shortness of breath and found to have crackles.  Patient is hypertensive and tachycardic at the time

## 2020-06-24 NOTE — PROGRESS NOTES
RAPID RESPONSE TEAM    Responded to rapid response @0324 for increased WOB and hypoxia. Covid negative. Was on RA; now on 3LNC with SpO2 97%. Coarse and bilateral crackles upon auscultation. Pt has hx of HF and pro-BNP elevated to 6,076; has been receiving IV lasix. 40mg IV lasix ONCE ordered per MD Amaury. Cele [place] also ordered per protocol. Has also been receiving IV abx for potential PNA; pro-calcitonin 2.03 down from 6.91. WBC 9. Afebrile. SBP 150s. Will follow up with CXR.   Gracy Watson RN  Ext. [phone]    Vitals w/ MEWS Score (last day)     Date/Time MEWS Score Pulse Resp Temp BP Level of Consciousness SpO2    06/24/20 0329  --  (!) 104  17  --  (!) 159/105  --  --    06/24/20 0309  --  (!) 107  --  --  (!) 165/108  --  --    06/24/20 0307  2  (!) 106  18  98.6 °F (37 °C)  (!) 167/106  Alert  97 %    06/23/20 2333  1  90  17  98.1 °F (36.7 °C)  151/83  Alert  93 %    06/23/20 1955  1  86  18  97.6 °F (36.4 °C)  142/86  Alert  96 %    06/23/20 1542  1  81  16  98 °F (36.7 °C)  117/64  Alert  96 %    06/23/20 1235  3  70  15  97.5 °F (36.4 °C)  119/84  (!) Responds to Pain  98 %    06/23/20 1231  --  --  --  --  --  --  97 %    06/23/20 1225  --  --  --  --  --  --  90 %    06/23/20 1009  1  90  20  97.8 °F (36.6 °C)  (!) 160/98  Alert  96 %    06/23/20 0803  1  89  20  97.6 °F (36.4 °C)  142/88  Alert  100 %    06/23/20 0258  1  97  18  98.2 °F (36.8 °C)  141/89  Alert  95 %

## 2020-06-24 NOTE — PROGRESS NOTES
Third response was called due to increased work of breathing. Alert response nurse at bedside reported patient is having bilateral crackles. Received Lasix 40 mg 1 dose in the morning. We will give 1 dose of Lasix 40 mg IV x1.

## 2020-06-24 NOTE — PERIOP NOTES
1550- SBAR IN NOTE - PT ARRIVED INTO PRE-OP HOLDING ROOM 17.  PT RESPONDING TO PAINFUL STIMULI ONLY. RESP EVEN AND UNLABORED. POX ON RA>93%. 1555- PT WOKE YELLING NEEDED TO \"PEE\" -PT CONNER STRONGLY AND TO COMMAND. URINAL GIVEN PT ID HIMSELF WITH NAME AND  ASKED WHEN WAS HIS SURGERY AND AFTER VOIDING RAPIDLY BACK TO SLEEP - AGAIN RESPONDING TO PAINFUL STIMULI ONLY. 1605 - FS =62 REPORTED TO DR. Michael Iraheta. / AMP D50 ADM IVP AS ORDERED.    1640- REPEAT FS = 92.  REPORT GIVEN TO DENZEL FRAUSTO.

## 2020-06-25 LAB
ANION GAP SERPL CALC-SCNC: 6 MMOL/L (ref 5–15)
BACTERIA SPEC CULT: NORMAL
BASOPHILS # BLD: 0.1 K/UL (ref 0–0.1)
BASOPHILS NFR BLD: 0 % (ref 0–1)
BUN SERPL-MCNC: 24 MG/DL (ref 6–20)
BUN/CREAT SERPL: 14 (ref 12–20)
CALCIUM SERPL-MCNC: 7.9 MG/DL (ref 8.5–10.1)
CHLORIDE SERPL-SCNC: 106 MMOL/L (ref 97–108)
CO2 SERPL-SCNC: 25 MMOL/L (ref 21–32)
CREAT SERPL-MCNC: 1.72 MG/DL (ref 0.7–1.3)
DIFFERENTIAL METHOD BLD: ABNORMAL
EOSINOPHIL # BLD: 0.2 K/UL (ref 0–0.4)
EOSINOPHIL NFR BLD: 1 % (ref 0–7)
ERYTHROCYTE [DISTWIDTH] IN BLOOD BY AUTOMATED COUNT: 16.2 % (ref 11.5–14.5)
GLUCOSE BLD STRIP.AUTO-MCNC: 181 MG/DL (ref 65–100)
GLUCOSE BLD STRIP.AUTO-MCNC: 267 MG/DL (ref 65–100)
GLUCOSE BLD STRIP.AUTO-MCNC: 283 MG/DL (ref 65–100)
GLUCOSE BLD STRIP.AUTO-MCNC: 89 MG/DL (ref 65–100)
GLUCOSE SERPL-MCNC: 66 MG/DL (ref 65–100)
HCT VFR BLD AUTO: 33.8 % (ref 36.6–50.3)
HGB BLD-MCNC: 11 G/DL (ref 12.1–17)
IMM GRANULOCYTES # BLD AUTO: 0.1 K/UL (ref 0–0.04)
IMM GRANULOCYTES NFR BLD AUTO: 1 % (ref 0–0.5)
LEFT ARM BP: 143 MMHG
LEFT ATA BP LEVEL: NORMAL
LYMPHOCYTES # BLD: 1.7 K/UL (ref 0.8–3.5)
LYMPHOCYTES NFR BLD: 12 % (ref 12–49)
MCH RBC QN AUTO: 26.1 PG (ref 26–34)
MCHC RBC AUTO-ENTMCNC: 32.5 G/DL (ref 30–36.5)
MCV RBC AUTO: 80.3 FL (ref 80–99)
MONOCYTES # BLD: 0.6 K/UL (ref 0–1)
MONOCYTES NFR BLD: 4 % (ref 5–13)
NEUTS SEG # BLD: 11.1 K/UL (ref 1.8–8)
NEUTS SEG NFR BLD: 82 % (ref 32–75)
NRBC # BLD: 0 K/UL (ref 0–0.01)
NRBC BLD-RTO: 0 PER 100 WBC
PLATELET # BLD AUTO: 422 K/UL (ref 150–400)
PMV BLD AUTO: 9.6 FL (ref 8.9–12.9)
POTASSIUM SERPL-SCNC: 3.7 MMOL/L (ref 3.5–5.1)
RBC # BLD AUTO: 4.21 M/UL (ref 4.1–5.7)
RIGHT ABI: 1.4
RIGHT ANTERIOR TIBIAL: 182 MMHG
RIGHT ARM BP: 140 MMHG
RIGHT ATA BP LEVEL: NORMAL
RIGHT POSTERIOR TIBIAL: 200 MMHG
RIGHT TBI: 1.11
RIGHT TOE PRESSURE: 159 MMHG
SERVICE CMNT-IMP: ABNORMAL
SERVICE CMNT-IMP: NORMAL
SERVICE CMNT-IMP: NORMAL
SODIUM SERPL-SCNC: 137 MMOL/L (ref 136–145)
WBC # BLD AUTO: 13.6 K/UL (ref 4.1–11.1)

## 2020-06-25 PROCEDURE — 80048 BASIC METABOLIC PNL TOTAL CA: CPT

## 2020-06-25 PROCEDURE — 74011250637 HC RX REV CODE- 250/637: Performed by: INTERNAL MEDICINE

## 2020-06-25 PROCEDURE — 74011636637 HC RX REV CODE- 636/637: Performed by: INTERNAL MEDICINE

## 2020-06-25 PROCEDURE — 36415 COLL VENOUS BLD VENIPUNCTURE: CPT

## 2020-06-25 PROCEDURE — 74011250636 HC RX REV CODE- 250/636: Performed by: INTERNAL MEDICINE

## 2020-06-25 PROCEDURE — 65270000029 HC RM PRIVATE

## 2020-06-25 PROCEDURE — 74011250636 HC RX REV CODE- 250/636: Performed by: HOSPITALIST

## 2020-06-25 PROCEDURE — 74011250637 HC RX REV CODE- 250/637: Performed by: EMERGENCY MEDICINE

## 2020-06-25 PROCEDURE — 74011000258 HC RX REV CODE- 258: Performed by: INTERNAL MEDICINE

## 2020-06-25 PROCEDURE — 74011250637 HC RX REV CODE- 250/637: Performed by: HOSPITALIST

## 2020-06-25 PROCEDURE — 94760 N-INVAS EAR/PLS OXIMETRY 1: CPT

## 2020-06-25 PROCEDURE — 82962 GLUCOSE BLOOD TEST: CPT

## 2020-06-25 PROCEDURE — 77010033678 HC OXYGEN DAILY

## 2020-06-25 PROCEDURE — 85025 COMPLETE CBC W/AUTO DIFF WBC: CPT

## 2020-06-25 RX ORDER — LORAZEPAM 2 MG/ML
0.5 INJECTION INTRAMUSCULAR
Status: DISCONTINUED | OUTPATIENT
Start: 2020-06-25 | End: 2020-06-25

## 2020-06-25 RX ORDER — LORAZEPAM 2 MG/ML
0.5 INJECTION INTRAMUSCULAR
Status: DISCONTINUED | OUTPATIENT
Start: 2020-06-25 | End: 2020-07-02 | Stop reason: HOSPADM

## 2020-06-25 RX ORDER — INSULIN LISPRO 100 [IU]/ML
INJECTION, SOLUTION INTRAVENOUS; SUBCUTANEOUS
Status: DISCONTINUED | OUTPATIENT
Start: 2020-06-25 | End: 2020-07-02 | Stop reason: HOSPADM

## 2020-06-25 RX ADMIN — INSULIN LISPRO 3 UNITS: 100 INJECTION, SOLUTION INTRAVENOUS; SUBCUTANEOUS at 21:58

## 2020-06-25 RX ADMIN — DICYCLOMINE HYDROCHLORIDE 10 MG: 10 CAPSULE ORAL at 17:48

## 2020-06-25 RX ADMIN — LORAZEPAM 1 MG: 2 INJECTION INTRAMUSCULAR; INTRAVENOUS at 05:39

## 2020-06-25 RX ADMIN — HEPARIN SODIUM 5000 UNITS: 5000 INJECTION INTRAVENOUS; SUBCUTANEOUS at 17:47

## 2020-06-25 RX ADMIN — HEPARIN SODIUM 5000 UNITS: 5000 INJECTION INTRAVENOUS; SUBCUTANEOUS at 02:27

## 2020-06-25 RX ADMIN — LORAZEPAM 0.5 MG: 2 INJECTION INTRAMUSCULAR; INTRAVENOUS at 21:43

## 2020-06-25 RX ADMIN — ATORVASTATIN CALCIUM 20 MG: 20 TABLET, FILM COATED ORAL at 08:17

## 2020-06-25 RX ADMIN — Medication 10 ML: at 21:53

## 2020-06-25 RX ADMIN — AMITRIPTYLINE HYDROCHLORIDE 25 MG: 25 TABLET, FILM COATED ORAL at 21:41

## 2020-06-25 RX ADMIN — NITROGLYCERIN 1 INCH: 20 OINTMENT TOPICAL at 17:07

## 2020-06-25 RX ADMIN — DICYCLOMINE HYDROCHLORIDE 10 MG: 10 CAPSULE ORAL at 21:41

## 2020-06-25 RX ADMIN — HEPARIN SODIUM 5000 UNITS: 5000 INJECTION INTRAVENOUS; SUBCUTANEOUS at 10:45

## 2020-06-25 RX ADMIN — NITROGLYCERIN 1 INCH: 20 OINTMENT TOPICAL at 08:19

## 2020-06-25 RX ADMIN — FUROSEMIDE 40 MG: 10 INJECTION, SOLUTION INTRAMUSCULAR; INTRAVENOUS at 17:47

## 2020-06-25 RX ADMIN — DICYCLOMINE HYDROCHLORIDE 10 MG: 10 CAPSULE ORAL at 08:17

## 2020-06-25 RX ADMIN — Medication 10 ML: at 13:22

## 2020-06-25 RX ADMIN — INSULIN LISPRO 5 UNITS: 100 INJECTION, SOLUTION INTRAVENOUS; SUBCUTANEOUS at 11:30

## 2020-06-25 RX ADMIN — CEFTRIAXONE 1 G: 1 INJECTION, POWDER, FOR SOLUTION INTRAMUSCULAR; INTRAVENOUS at 17:16

## 2020-06-25 RX ADMIN — PREGABALIN 150 MG: 150 CAPSULE ORAL at 08:17

## 2020-06-25 RX ADMIN — PREGABALIN 150 MG: 150 CAPSULE ORAL at 17:47

## 2020-06-25 RX ADMIN — DICYCLOMINE HYDROCHLORIDE 10 MG: 10 CAPSULE ORAL at 13:02

## 2020-06-25 RX ADMIN — ACETAMINOPHEN 650 MG: 325 TABLET, FILM COATED ORAL at 02:32

## 2020-06-25 RX ADMIN — ACETAMINOPHEN 650 MG: 325 TABLET, FILM COATED ORAL at 10:49

## 2020-06-25 RX ADMIN — CARVEDILOL 12.5 MG: 12.5 TABLET, FILM COATED ORAL at 08:16

## 2020-06-25 RX ADMIN — NITROGLYCERIN 1 INCH: 20 OINTMENT TOPICAL at 21:45

## 2020-06-25 RX ADMIN — VANCOMYCIN HYDROCHLORIDE 750 MG: 750 INJECTION, POWDER, LYOPHILIZED, FOR SOLUTION INTRAVENOUS at 02:27

## 2020-06-25 RX ADMIN — CARVEDILOL 12.5 MG: 12.5 TABLET, FILM COATED ORAL at 17:07

## 2020-06-25 RX ADMIN — INSULIN GLARGINE 10 UNITS: 100 INJECTION, SOLUTION SUBCUTANEOUS at 21:42

## 2020-06-25 RX ADMIN — INSULIN LISPRO 2 UNITS: 100 INJECTION, SOLUTION INTRAVENOUS; SUBCUTANEOUS at 17:09

## 2020-06-25 RX ADMIN — VANCOMYCIN HYDROCHLORIDE 750 MG: 750 INJECTION, POWDER, LYOPHILIZED, FOR SOLUTION INTRAVENOUS at 19:00

## 2020-06-25 RX ADMIN — FUROSEMIDE 40 MG: 10 INJECTION, SOLUTION INTRAMUSCULAR; INTRAVENOUS at 08:17

## 2020-06-25 RX ADMIN — LORAZEPAM 1 MG: 2 INJECTION INTRAMUSCULAR; INTRAVENOUS at 10:49

## 2020-06-25 RX ADMIN — AMLODIPINE BESYLATE 10 MG: 5 TABLET ORAL at 08:16

## 2020-06-25 RX ADMIN — Medication 10 ML: at 05:39

## 2020-06-25 RX ADMIN — AZITHROMYCIN 500 MG: 500 INJECTION, POWDER, LYOPHILIZED, FOR SOLUTION INTRAVENOUS at 13:24

## 2020-06-25 NOTE — PROGRESS NOTES
RAPID RESPONSE TEAM- Follow Up     Rounded on patient due to recent rapid response for hypoxia and increased WOB; also to assess patient s/p fall when ambulating to bathroom. Unknown LOC, but RN found patient awake when arriving into room. Patient verbalizes that he hit R side frontal area of head when he fell. VSS. Patient A&Ox3. Strength equal bilaterally. PERRLA. Patient denies N/V, dizziness, lightheadedness, or double vision. H&H stable. Has not had coags checked since 6/20. Starting subq heparin today after surgery. Spoke with Yoni Allison MD; CT head wo contrast ordered. No current increased WOB; on 3LNC. Lasix increased to 40mg IV BID. Pro BNP down to 5,202. Will follow up with CT results. Please call with any further questions or concerns.    Lemuel Samayoa RN  Ext. [phone]    Vitals w/ MEWS Score (last day)     Date/Time MEWS Score Pulse Resp Temp BP Level of Consciousness SpO2    06/24/20 2121  1  93  16  98 °F (36.7 °C)  147/87  Alert  97 %    06/24/20 2023  1  87  16  97.6 °F (36.4 °C)  121/82  Alert  97 %    06/24/20 2000  --  86  15  --  129/79  --  94 %    06/24/20 1945  1  85  16  98.1 °F (36.7 °C)  127/79  Alert  94 %    06/24/20 1930  --  84  14  --  125/77  --  95 %    06/24/20 1920  --  80  12  --  116/75  --  96 %    06/24/20 1915  --  79  9  --  116/73  --  97 %    06/24/20 1904  --  83  18  --  122/76  --  90 %    06/24/20 1855  --  80  17  --  123/76  --  97 %    06/24/20 1845  --  79  --  --  116/75  --  92 %    06/24/20 1838  --  79  15  98 °F (36.7 °C)  119/77  --  96 %    06/24/20 1550  3  90  16  97.9 °F (36.6 °C)  119/78  (!) Confused or Agitated  100 %    06/24/20 1242  1  91  18  98 °F (36.7 °C)  141/76  Alert  97 %    06/24/20 1200  --  93  --  --  --  --  --    06/24/20 0733  3  (!) 109  22  98.1 °F (36.7 °C)  (!) 161/91  Alert  94 %    06/24/20 0429  2  (!) 107  18  97.5 °F (36.4 °C)  (!) 162/104  Alert  96 %    06/24/20 0329  --  (!) 104  17  --  (!) 159/105 --  --    06/24/20 0309  --  (!) 107  --  --  (!) 165/108  --  --    06/24/20 0307  2  (!) 106  18  98.6 °F (37 °C)  (!) 167/106  Alert  97 %    06/23/20 2333  1  90  17  98.1 °F (36.7 °C)  151/83  Alert  93 %    06/23/20 1955  1  86  18  97.6 °F (36.4 °C)  142/86  Alert  96 %    06/23/20 1542  1  81  16  98 °F (36.7 °C)  117/64  Alert  96 %    06/23/20 1235  3  70  15  97.5 °F (36.4 °C)  119/84  (!) Responds to Pain  98 %    06/23/20 1231  --  --  --  --  --  --  97 %    06/23/20 1225  --  --  --  --  --  --  90 %    06/23/20 1009  1  90  20  97.8 °F (36.6 °C)  (!) 160/98  Alert  96 %    06/23/20 0803  1  89  20  97.6 °F (36.4 °C)  142/88  Alert  100 %    06/23/20 0258  1  97  18  98.2 °F (36.8 °C)  141/89  Alert  95 %

## 2020-06-25 NOTE — OP NOTES
[address]  OPERATIVE REPORT    Name:  Ariela Brown  MR#:  [id number]  :  1965  ACCOUNT #:  [de-identified]  DATE OF SERVICE:  2020    PREOPERATIVE DIAGNOSES:  1. Septic joint, second metatarsophalangeal joint of the left foot. 2.  Osteomyelitis, first metatarsal left foot. POSTOPERATIVE DIAGNOSES:  1. Septic joint, second metatarsophalangeal joint of the left foot. 2.  Osteomyelitis, first metatarsal left foot. PROCEDURE PERFORMED:  Amputation of second digit, left foot, with partial resection of the first and second metatarsal, also partial resection of viable proximal bone margins first and second metatarsal left foot. SURGEON:  Jorge Suggs MD    ASSISTANT:  None. ANESTHESIA:  General.    HEMOSTASIS:  Esmarch bandage at midfoot level, left. COMPLICATIONS:  None apparent. SPECIMENS REMOVED:  Pathology sent of dystrophic second metatarsal head and distal shaft as well as distal first metatarsal stump. Also viable bone margin resections from first and second metatarsal left foot. IMPLANTS:  None. INJECTABLES: 10 mL 0.5% marcaine plain left foot    ESTIMATED BLOOD LOSS:  30 mL approximately. OPERATIVE TECHNIQUE:  The patient was brought to the operating room and placed on the operating table in supine position. Anesthesiologist administered general anesthesia followed by local infiltrative block of the above-mentioned anesthesia. The left foot was then prepped and draped in the usual aseptic technique. Esmarch bandage was utilized on the left side and tied off at the midfoot level. Incision was placed over the dorsal second metatarsophalangeal joint, and this was lengthened a little bit in order to encompass the secondary tarsal shaft and midshaft region. A full thickness flap was created out with gentle retraction of the tissue edges and margins. [place]on elevator was utilized to expose the second metatarsal head.   A curvilinear incision was placed along the base of the second digit to remove and disarticulate the second digit. This was then placed in the back table. No purulent discharge is seen. No foul odor, dorsal necrotic extensor tendon was seen and excised back approximately 2 cm to viable bone tissue. Good bleeding tissue edges were appreciated. Bleeding tissue edges were excellent at this time frame. However, necrotic bone was encountered and confirmed under intraoperative fluoroscopy, also visually seen as weak, nonviable bone. This was resected distally and placed in a proper labelled container for pathology. 2.  Proximal bone suction was carried out with help of the fluoroscopy to gain a viable bone suction which was also tested with a [place] Hodgkin as well as visually handled to check for viable bone margins. The area was then cultured with antibiotic solution. The incision was then reapproximated and coapted with 4-0 nylon in simple interrupted fashion. Access was then gained towards the dorsal aspect of the first metatarsal where the incision was placed dorsally on the previous incision. The first metatarsal distal stump section was disarticulated, nonviable tissue was seen and this was sharply debrided down to good fat flap viable tissue. This was resected and placed in the container labelled bone resection from ulcer site. Next, a proximal viable suction was resected and sent to pathology as a viable bone margin. This area was also irrigated with antibiotic solution followed by sharp excision of a nonviable tissue. Good bleeding tissue edges were also appreciated here. Good bleeding bone marrow was seen at the first metatarsal, no purulence was seen, no sinus tracking proximally. The area was then irrigated one more time with antibiotic solution. The skin was reapproximated and coapted with 4-0 nylon.     Now the Esmarch was removed showing immediate hyperemic flush to digits 3 through 5 on the left with good pinking of the surgical margins. Dressings completed with sterile Adaptic, 4 x 4's, 4-inch Kerlix, and 3-inch Coban, left foot. It is noted also that deep tissue cultures were taken at the deep sight of the septic joint tissue second metatarsophalangeal joint, sent to Microbiology. The patient tolerated the above procedures and anesthesia well and was discharged from the operating room with vital signs stable and vascular status intact through the left foot. Prognosis for this patient is satisfactory.       Delona Severs, MD [id number]  D:  06/24/2020 18:44  T:  06/25/2020 3:59  JOB #:  [id number]

## 2020-06-25 NOTE — PERIOP NOTES
TRANSFER - OUT REPORT:    Verbal report given to Vandana Chance RN(name) on Smith Lopez  being transferred to [facility](unit) for routine progression of care       Report consisted of patients Situation, Background, Assessment and   Recommendations(SBAR). Information from the following report(s) OR Summary, Intake/Output and MAR was reviewed with the receiving nurse. Opportunity for questions and clarification was provided.       Patient transported with:   Monitor  O2 @ 3 liters  Registered Nurse

## 2020-06-25 NOTE — PROGRESS NOTES
Problem: Falls - Risk of  Goal: *Absence of Falls  Description: Document Austin Hernandez Fall Risk and appropriate interventions in the flowsheet. Outcome: Progressing Towards Goal  Note: Fall Risk Interventions:  Mobility Interventions: Bed/chair exit alarm, Patient to call before getting OOB, Communicate number of staff needed for ambulation/transfer    Mentation Interventions: Bed/chair exit alarm, Door open when patient unattended, Adequate sleep, hydration, pain control, Family/sitter at bedside, More frequent rounding, Reorient patient, Room close to nurse's station    Medication Interventions: Bed/chair exit alarm, Patient to call before getting OOB, Evaluate medications/consider consulting pharmacy, Teach patient to arise slowly    Elimination Interventions: Call light in reach, Bed/chair exit alarm, Patient to call for help with toileting needs, Stay With Me (per policy), Urinal in reach, Toileting schedule/hourly rounds    History of Falls Interventions: Bed/chair exit alarm, Consult care management for discharge planning, Door open when patient unattended, Evaluate medications/consider consulting pharmacy, Investigate reason for fall, Room close to nurse's station, Vital signs minimum Q4HRs X 24 hrs (comment for end date), Assess for delayed presentation/identification of injury for 48 hrs (comment for end date), Utilize gait belt for transfer/ambulation         Problem: Patient Education: Go to Patient Education Activity  Goal: Patient/Family Education  Outcome: Progressing Towards Goal     Problem: Pressure Injury - Risk of  Goal: *Prevention of pressure injury  Description: Document [place] Scale and appropriate interventions in the flowsheet.   Outcome: Progressing Towards Goal  Note: Pressure Injury Interventions:  Sensory Interventions: Keep linens dry and wrinkle-free, Maintain/enhance activity level, Minimize linen layers, Monitor skin under medical devices    Moisture Interventions: Contain wound drainage, Maintain skin hydration (lotion/cream), Minimize layers    Activity Interventions: Pressure redistribution bed/mattress(bed type), PT/OT evaluation    Mobility Interventions: HOB 30 degrees or less, Pressure redistribution bed/mattress (bed type), PT/OT evaluation    Nutrition Interventions: Document food/fluid/supplement intake                     Problem: Patient Education: Go to Patient Education Activity  Goal: Patient/Family Education  Outcome: Progressing Towards Goal     Problem: Diabetes Self-Management  Goal: *Disease process and treatment process  Description: Define diabetes and identify own type of diabetes; list 3 options for treating diabetes. Outcome: Progressing Towards Goal  Goal: *Incorporating nutritional management into lifestyle  Description: Describe effect of type, amount and timing of food on blood glucose; list 3 methods for planning meals. Outcome: Progressing Towards Goal  Goal: *Incorporating physical activity into lifestyle  Description: State effect of exercise on blood glucose levels. Outcome: Progressing Towards Goal  Goal: *Developing strategies to promote health/change behavior  Description: Define the ABC's of diabetes; identify appropriate screenings, schedule and personal plan for screenings. Outcome: Progressing Towards Goal  Goal: *Using medications safely  Description: State effect of diabetes medications on diabetes; name diabetes medication taking, action and side effects. Outcome: Progressing Towards Goal  Goal: *Monitoring blood glucose, interpreting and using results  Description: Identify recommended blood glucose targets  and personal targets. Outcome: Progressing Towards Goal  Goal: *Prevention, detection, treatment of acute complications  Description: List symptoms of hyper- and hypoglycemia; describe how to treat low blood sugar and actions for lowering  high blood glucose level.   Outcome: Progressing Towards Goal  Goal: *Prevention, detection and treatment of chronic complications  Description: Define the natural course of diabetes and describe the relationship of blood glucose levels to long term complications of diabetes.   Outcome: Progressing Towards Goal  Goal: *Developing strategies to address psychosocial issues  Description: Describe feelings about living with diabetes; identify support needed and support network  Outcome: Progressing Towards Goal  Goal: *Insulin pump training  Outcome: Progressing Towards Goal  Goal: *Sick day guidelines  Outcome: Progressing Towards Goal  Goal: *Patient Specific Goal (EDIT GOAL, INSERT TEXT)  Outcome: Progressing Towards Goal     Problem: Patient Education: Go to Patient Education Activity  Goal: Patient/Family Education  Outcome: Progressing Towards Goal     Problem: Heart Failure: Day 3  Goal: Off Pathway (Use only if patient is Off Pathway)  Outcome: Progressing Towards Goal  Goal: Activity/Safety  Outcome: Progressing Towards Goal  Goal: Diagnostic Test/Procedures  Outcome: Progressing Towards Goal  Goal: Nutrition/Diet  Outcome: Progressing Towards Goal  Goal: Discharge Planning  Outcome: Progressing Towards Goal  Goal: Medications  Outcome: Progressing Towards Goal  Goal: Respiratory  Outcome: Progressing Towards Goal  Goal: Treatments/Interventions/Procedures  Outcome: Progressing Towards Goal  Goal: Psychosocial  Outcome: Progressing Towards Goal  Goal: *Oxygen saturation within defined limits  Outcome: Progressing Towards Goal  Goal: *Hemodynamically stable  Outcome: Progressing Towards Goal  Goal: *Optimal pain control at patient's stated goal  Outcome: Progressing Towards Goal  Goal: *Anxiety reduced or absent  Outcome: Progressing Towards Goal  Goal: *Demonstrates progressive activity  Outcome: Progressing Towards Goal     Problem: Heart Failure: Day 4  Goal: Off Pathway (Use only if patient is Off Pathway)  Outcome: Progressing Towards Goal  Goal: Activity/Safety  Outcome: Progressing Towards Goal  Goal: Diagnostic Test/Procedures  Outcome: Progressing Towards Goal  Goal: Nutrition/Diet  Outcome: Progressing Towards Goal  Goal: Discharge Planning  Outcome: Progressing Towards Goal  Goal: Medications  Outcome: Progressing Towards Goal  Goal: Respiratory  Outcome: Progressing Towards Goal  Goal: Treatments/Interventions/Procedures  Outcome: Progressing Towards Goal  Goal: Psychosocial  Outcome: Progressing Towards Goal  Goal: *Oxygen saturation within defined limits  Outcome: Progressing Towards Goal  Goal: *Hemodynamically stable  Outcome: Progressing Towards Goal  Goal: *Optimal pain control at patient's stated goal  Outcome: Progressing Towards Goal  Goal: *Anxiety reduced or absent  Outcome: Progressing Towards Goal  Goal: *Demonstrates progressive activity  Outcome: Progressing Towards Goal     Problem: Heart Failure: Day 5  Goal: Off Pathway (Use only if patient is Off Pathway)  Outcome: Progressing Towards Goal  Goal: Activity/Safety  Outcome: Progressing Towards Goal  Goal: Diagnostic Test/Procedures  Outcome: Progressing Towards Goal  Goal: Nutrition/Diet  Outcome: Progressing Towards Goal  Goal: Discharge Planning  Outcome: Progressing Towards Goal  Goal: Medications  Outcome: Progressing Towards Goal  Goal: Respiratory  Outcome: Progressing Towards Goal  Goal: Treatments/Interventions/Procedures  Outcome: Progressing Towards Goal  Goal: Psychosocial  Outcome: Progressing Towards Goal     Problem: Heart Failure: Discharge Outcomes  Goal: *Demonstrates ability to perform prescribed activity without shortness of breath or discomfort  Outcome: Progressing Towards Goal  Goal: *Left ventricular function assessment completed prior to or during stay, or planned for post-discharge  Outcome: Progressing Towards Goal  Goal: *ACEI prescribed if LVEF less than 40% and no contraindications or ARB prescribed  Outcome: Progressing Towards Goal  Goal: *Verbalizes understanding and describes prescribed diet  Outcome: Progressing Towards Goal  Goal: *Verbalizes understanding/describes prescribed medications  Outcome: Progressing Towards Goal  Goal: *Describes available resources and support systems  Description: (eg: Home Health, Palliative Care, Advanced Medical Directive)  Outcome: Progressing Towards Goal  Goal: *Describes smoking cessation resources  Outcome: Progressing Towards Goal  Goal: *Understands and describes signs and symptoms to report to providers(Stroke Metric)  Outcome: Progressing Towards Goal  Goal: *Describes/verbalizes understanding of follow-up/return appt  Description: (eg: to physicians, diabetes treatment coordinator, and other resources  Outcome: Progressing Towards Goal  Goal: *Describes importance of continuing daily weights and changes to report to physician  Outcome: Progressing Towards Goal

## 2020-06-25 NOTE — PROGRESS NOTES
Verbal and bedside report given to [place] by Chad Meade. Report included SBAR, Kardex, Intake/Output, Mar and Recent results. Pt has a sitter in room.

## 2020-06-25 NOTE — PROGRESS NOTES
Patient arrived on unit at 20:23 from PACU. Patient fall 21:15. Harshad Collier, primary RN was in another room when she heard patient calling out. Patient was found prone on floor. Patient reported he was trying to go to the bathroom and he fell and hit his head. Code yellow called. Patient assisted back to bed with patient mobility team. Vital signs taken. Slight bleeding noted to new surgical site L foot, dressing reapplied by primary RN. RRT RNHarini and nursing supervisor at bedside. Hospitalist notified by RRT RN. CT head completed. Post Fall Documentation    Jasmin Dalal unwitnessed fall occurred on 6/24/20  (Date) at 21:15 (Time). The answers to the following questions summarize the fall: In the patient's own words:  · What were you attempting to do when you fell? \"trying to go to the bathroom\"  · Do you know why you fell? \"trying to go to the bathroom\"  · Do you have any pain/discomfort or any other complaints? \"my head\"  · Which part of your body made contact with the floor or other object? \"my head\" pt pointed to R side of head  Nurse:  [facility] Was this an assisted fall? no   Was fall witnessed? No   If witnessed, what part of the body made contact with the floor or other object? NA   Patients mental status after the fall/when found: Other alert and oriented x3 (disoriented to time)   Any apparent injury:  Bleeding from L foot   Immediate interventions for injury/suspected injury? Other RRT RN at bedside to assess and assist, dressing reinforced by primary RNHarshad   Patient assisted back to bed? Assist X2 and Mobility team   Name of provider notified and time, any comments? Hospitalist, Jimbo Rock, notified by RRT RN, Harini Tadeo, RN. Primary RNHarshad, to notify podiatry   Name of family member notified and time: primary RNHarshad, to notify    Document Immediate VS and physical assessment in flowsheets.     Document Neuro assessment every hour x 4 (for potential head injury or unwitnessed fall) in flowsheets.         Brook Underwood, charge RN

## 2020-06-25 NOTE — PROGRESS NOTES
Problem: Falls - Risk of  Goal: *Absence of Falls  Description: Document Ke Uriostegui Fall Risk and appropriate interventions in the flowsheet. Outcome: Progressing Towards Goal  Note: Fall Risk Interventions:  Mobility Interventions: Bed/chair exit alarm, Patient to call before getting OOB, Communicate number of staff needed for ambulation/transfer    Mentation Interventions: Bed/chair exit alarm, Door open when patient unattended, Adequate sleep, hydration, pain control, Family/sitter at bedside, More frequent rounding, Reorient patient, Room close to nurse's station    Medication Interventions: Bed/chair exit alarm, Patient to call before getting OOB, Evaluate medications/consider consulting pharmacy, Teach patient to arise slowly    Elimination Interventions: Call light in reach, Bed/chair exit alarm, Patient to call for help with toileting needs, Stay With Me (per policy), Urinal in reach, Toileting schedule/hourly rounds    History of Falls Interventions: Bed/chair exit alarm, Consult care management for discharge planning, Door open when patient unattended, Evaluate medications/consider consulting pharmacy, Investigate reason for fall, Room close to nurse's station, Vital signs minimum Q4HRs X 24 hrs (comment for end date), Assess for delayed presentation/identification of injury for 48 hrs (comment for end date), Utilize gait belt for transfer/ambulation         Problem: Patient Education: Go to Patient Education Activity  Goal: Patient/Family Education  Outcome: Progressing Towards Goal     Problem: Pressure Injury - Risk of  Goal: *Prevention of pressure injury  Description: Document Sarwat Scale and appropriate interventions in the flowsheet.   Outcome: Progressing Towards Goal  Note: Pressure Injury Interventions:  Sensory Interventions: Keep linens dry and wrinkle-free, Maintain/enhance activity level, Minimize linen layers, Monitor skin under medical devices    Moisture Interventions: Contain wound drainage, Maintain skin hydration (lotion/cream), Minimize layers    Activity Interventions: Pressure redistribution bed/mattress(bed type), PT/OT evaluation    Mobility Interventions: HOB 30 degrees or less, Pressure redistribution bed/mattress (bed type), PT/OT evaluation    Nutrition Interventions: Document food/fluid/supplement intake                     Problem: Patient Education: Go to Patient Education Activity  Goal: Patient/Family Education  Outcome: Progressing Towards Goal     Problem: Diabetes Self-Management  Goal: *Disease process and treatment process  Description: Define diabetes and identify own type of diabetes; list 3 options for treating diabetes. Outcome: Progressing Towards Goal  Goal: *Incorporating nutritional management into lifestyle  Description: Describe effect of type, amount and timing of food on blood glucose; list 3 methods for planning meals. Outcome: Progressing Towards Goal  Goal: *Incorporating physical activity into lifestyle  Description: State effect of exercise on blood glucose levels. Outcome: Progressing Towards Goal  Goal: *Developing strategies to promote health/change behavior  Description: Define the ABC's of diabetes; identify appropriate screenings, schedule and personal plan for screenings. Outcome: Progressing Towards Goal  Goal: *Using medications safely  Description: State effect of diabetes medications on diabetes; name diabetes medication taking, action and side effects. Outcome: Progressing Towards Goal  Goal: *Monitoring blood glucose, interpreting and using results  Description: Identify recommended blood glucose targets  and personal targets. Outcome: Progressing Towards Goal  Goal: *Prevention, detection, treatment of acute complications  Description: List symptoms of hyper- and hypoglycemia; describe how to treat low blood sugar and actions for lowering  high blood glucose level.   Outcome: Progressing Towards Goal  Goal: *Prevention, detection and treatment of chronic complications  Description: Define the natural course of diabetes and describe the relationship of blood glucose levels to long term complications of diabetes.   Outcome: Progressing Towards Goal  Goal: *Developing strategies to address psychosocial issues  Description: Describe feelings about living with diabetes; identify support needed and support network  Outcome: Progressing Towards Goal  Goal: *Insulin pump training  Outcome: Progressing Towards Goal  Goal: *Sick day guidelines  Outcome: Progressing Towards Goal  Goal: *Patient Specific Goal (EDIT GOAL, INSERT TEXT)  Outcome: Progressing Towards Goal     Problem: Patient Education: Go to Patient Education Activity  Goal: Patient/Family Education  Outcome: Progressing Towards Goal     Problem: Heart Failure: Day 3  Goal: Off Pathway (Use only if patient is Off Pathway)  Outcome: Progressing Towards Goal  Goal: Activity/Safety  Outcome: Progressing Towards Goal  Goal: Diagnostic Test/Procedures  Outcome: Progressing Towards Goal  Goal: Nutrition/Diet  Outcome: Progressing Towards Goal  Goal: Discharge Planning  Outcome: Progressing Towards Goal  Goal: Medications  Outcome: Progressing Towards Goal  Goal: Respiratory  Outcome: Progressing Towards Goal  Goal: Treatments/Interventions/Procedures  Outcome: Progressing Towards Goal  Goal: Psychosocial  Outcome: Progressing Towards Goal  Goal: *Oxygen saturation within defined limits  Outcome: Progressing Towards Goal  Goal: *Hemodynamically stable  Outcome: Progressing Towards Goal  Goal: *Optimal pain control at patient's stated goal  Outcome: Progressing Towards Goal  Goal: *Anxiety reduced or absent  Outcome: Progressing Towards Goal  Goal: *Demonstrates progressive activity  Outcome: Progressing Towards Goal     Problem: Heart Failure: Day 4  Goal: Off Pathway (Use only if patient is Off Pathway)  Outcome: Progressing Towards Goal  Goal: Activity/Safety  Outcome: Progressing Towards Goal  Goal: Diagnostic Test/Procedures  Outcome: Progressing Towards Goal  Goal: Nutrition/Diet  Outcome: Progressing Towards Goal  Goal: Discharge Planning  Outcome: Progressing Towards Goal  Goal: Medications  Outcome: Progressing Towards Goal  Goal: Respiratory  Outcome: Progressing Towards Goal  Goal: Treatments/Interventions/Procedures  Outcome: Progressing Towards Goal  Goal: Psychosocial  Outcome: Progressing Towards Goal  Goal: *Oxygen saturation within defined limits  Outcome: Progressing Towards Goal  Goal: *Hemodynamically stable  Outcome: Progressing Towards Goal  Goal: *Optimal pain control at patient's stated goal  Outcome: Progressing Towards Goal  Goal: *Anxiety reduced or absent  Outcome: Progressing Towards Goal  Goal: *Demonstrates progressive activity  Outcome: Progressing Towards Goal     Problem: Heart Failure: Day 5  Goal: Off Pathway (Use only if patient is Off Pathway)  Outcome: Progressing Towards Goal  Goal: Activity/Safety  Outcome: Progressing Towards Goal  Goal: Diagnostic Test/Procedures  Outcome: Progressing Towards Goal  Goal: Nutrition/Diet  Outcome: Progressing Towards Goal  Goal: Discharge Planning  Outcome: Progressing Towards Goal  Goal: Medications  Outcome: Progressing Towards Goal  Goal: Respiratory  Outcome: Progressing Towards Goal  Goal: Treatments/Interventions/Procedures  Outcome: Progressing Towards Goal  Goal: Psychosocial  Outcome: Progressing Towards Goal     Problem: Heart Failure: Discharge Outcomes  Goal: *Demonstrates ability to perform prescribed activity without shortness of breath or discomfort  Outcome: Progressing Towards Goal  Goal: *Left ventricular function assessment completed prior to or during stay, or planned for post-discharge  Outcome: Progressing Towards Goal  Goal: *ACEI prescribed if LVEF less than 40% and no contraindications or ARB prescribed  Outcome: Progressing Towards Goal  Goal: *Verbalizes understanding and describes prescribed diet  Outcome: Progressing Towards Goal  Goal: *Verbalizes understanding/describes prescribed medications  Outcome: Progressing Towards Goal  Goal: *Describes available resources and support systems  Description: (eg: Home Health, Palliative Care, Advanced Medical Directive)  Outcome: Progressing Towards Goal  Goal: *Describes smoking cessation resources  Outcome: Progressing Towards Goal  Goal: *Understands and describes signs and symptoms to report to providers(Stroke Metric)  Outcome: Progressing Towards Goal  Goal: *Describes/verbalizes understanding of follow-up/return appt  Description: (eg: to physicians, diabetes treatment coordinator, and other resources  Outcome: Progressing Towards Goal  Goal: *Describes importance of continuing daily weights and changes to report to physician  Outcome: Progressing Towards Goal

## 2020-06-25 NOTE — PROGRESS NOTES
General Surgery End of Shift Nursing Note    Bedside shift change report given to Arianne Rodriguez (oncoming nurse) by Khushboo Oliveira RN (offgoing nurse). Report included the following information SBAR, Kardex, Procedure Summary, Intake/Output and Quality Measures. Shift worked:   [id number]     Summary of shift:    Patient post amputation of 2nd toe with partial 1st and 2nd metatarsal resection. Patient also withdrawing from heroin. Patient fall an hour post arrival to unit. See notes. Tele sitter in room. Patient requires a lot of direction. Patient also stating that he was missing money from his belongings. Security called to unit. Security had patient's money that was placed in [address] prior to surgery. Issues for physician to address:   none     Number times ambulated in hallway past shift: 0    Number of times OOB to chair past shift: 0    Pain Management:  Current medication: tylenol  Patient states pain is manageable on current pain medication: NO    GI:    Current diet:  DIET CARDIAC Regular; Consistent Carb 2000kcal    Tolerating current diet: YES  Passing flatus: YES  Last Bowel Movement: several days ago   Appearance:     Respiratory:    Incentive Spirometer at bedside: YES  Patient instructed on use: YES    Patient Safety:    Falls Score: 6  Bed Alarm On? Yes  Sitter?  No    Marline Cranker

## 2020-06-25 NOTE — PROGRESS NOTES
Hospitalist Progress Note    NAME: Tej Nugent   :  1965   MRN:  [id number]       Assessment / Plan:  SOB due to PNA and pulmonary edema  Diarrhea, resolved  Covid 19 ruled out  CXR showed Extensive bilateral interstitial and patchy airspace disease. Differential diagnosis includes: Pulmonary vascular congestive changes and/or  infectious process. Repeat CXR showed interval worsening of diffuse interstitial and airspace opacities which again may be due to edema and/or pneumonia. probnp ~6K-->5K  procalcitonin 6.9  Completing empiric abx for PNA  Cont' IV lasix to 40mg BID, repeat CXR in the AM to ensure resolution of pulm edema  Covid neg x2   Echo with EF 50-55%. Mild concentric hypertrophy  Currently stable on RA    Fall   Sitter prn  Head CT neg.  AAox2  Avoid sedating meds  PT/OT    Heroin withdrawal symptoms  Heroin abuse   S/p methadone 10mg x1. Calmed in bed.  telesitter at bedside. Suffered a fall last night. He is at risk for further fall. Place sitter prn  Reduce IV ativan 0.5mg BID prn bentyl, prn promethazine and prn loperamide to help with withdrawal symptoms. He is 4 days out should be at the tail of it. CKD3   Septic joint, 2nd metatarsophalangeal joint of L foot  Osteomyelitis of 1st metatarsal L foot  XR L foot showed osteomyelitis of the distal second metatarsal and base of the proximal phalanx of the left second toe. Oblique distracted fracture of the base of the  proximal phalanx of the left second toe. Left great toe transmetatarsal amputation postoperative changes. Multiple ossific fragments adjacent to the residual distal first metatarsal. Residual great toe soft tissues are edematous. MR can be performed for further evaluation, as indicated. S/p amputation of second digit, left foot, with partial resection of the first and second metatarsal, also partial resection of viable proximal bone margins first and second metatarsal left foot.   Path pending  Gram stain and Wound cx so far growing staph species  Cont' empiric abx for now  Podiatry following    Type 2 DM with neuropathy  Cont' lower dose of lantus. RN reports pt has good PO intake today  SSI/POC checks  cont' gabapentin     HTN   Continue Beta blocker and  CCB      Hyperlipidemia  Cont' statin         Code: Full  DVT prophylaxis: heparin     Subjective:     Chief Complaint / Reason for Physician Visit  Pt sitting up in bed, tele sitter in room. He follows directions appropriately when asked to get back in bed but he is high risk for fall. Has good PO intake today. Overnight event noted and discussed with RN. Review of Systems:  Symptom Y/N Comments  Symptom Y/N Comments   Fever/Chills n   Chest Pain n    Poor Appetite    Edema     Cough    Abdominal Pain n    Sputum    Joint Pain     SOB/[place] n   Pruritis/Rash     Nausea/vomit    Tolerating PT/OT     Diarrhea    Tolerating Diet     Constipation    Other       Could NOT obtain due to:      Objective:     VITALS:   Last 24hrs VS reviewed since prior progress note.  Most recent are:  Patient Vitals for the past 24 hrs:   Temp Pulse Resp BP SpO2   06/25/20 1215 98.6 °F (37 °C) 83 16 108/62 100 %   06/25/20 0726 98.5 °F (36.9 °C) 94 16 120/83 94 %   06/25/20 0228 98.6 °F (37 °C) (!) 106 17 (!) 144/92 93 %   06/24/20 2339 98.8 °F (37.1 °C) (!) 106 17 151/87 92 %   06/24/20 2121 98 °F (36.7 °C) 93 16 147/87 97 %   06/24/20 2023 97.6 °F (36.4 °C) 87 16 121/82 97 %   06/24/20 2000 -- 86 15 129/79 94 %   06/24/20 1945 98.1 °F (36.7 °C) 85 16 127/79 94 %   06/24/20 1930 -- 84 14 125/77 95 %   06/24/20 1920 -- 80 12 116/75 96 %   06/24/20 1915 -- 79 9 116/73 97 %   06/24/20 1904 -- 83 18 122/76 90 %   06/24/20 1855 -- 80 17 123/76 97 %   06/24/20 1845 -- 79 -- 116/75 92 %   06/24/20 1838 98 °F (36.7 °C) 79 15 119/77 96 %   06/24/20 1550 97.9 °F (36.6 °C) 90 16 119/78 100 %       Intake/Output Summary (Last 24 hours) at 6/25/2020 1355  Last data filed at 6/25/2020 0962  Gross per 24 hour   Intake 740 ml   Output 1960 ml   Net -1220 ml        PHYSICAL EXAM:  General:          pt in bed, no acute distress    EENT:              Anicteric sclerae. Resp:               No accessory muscle use, breathing pattern regular. CTA b/l  CV:                  NSR, no murmur. GI:                   No distended   Neurologic:      AAOx2, follow commands. Moving all exts  Psych:             Not anxious nor agitated  Skin:                No rashes. No jaundice    Reviewed most current lab test results and cultures  YES  Reviewed most current radiology test results   YES  Review and summation of old records today    NO  Reviewed patient's current orders and MAR    YES  PMH/SH reviewed - no change compared to H&P  ________________________________________________________________________  Care Plan discussed with:    Comments   Patient x    Family      RN x    Care Manager     Consultant                        Multidiciplinary team rounds were held today with , nursing, pharmacist and clinical coordinator. Patient's plan of care was discussed; medications were reviewed and discharge planning was addressed. ________________________________________________________________________  Total NON critical care TIME:  35  Minutes    Total CRITICAL CARE TIME Spent:   Minutes non procedure based      Comments   >50% of visit spent in counseling and coordination of care     ________________________________________________________________________  Amy Thomas MD     Procedures: see electronic medical records for all procedures/Xrays and details which were not copied into this note but were reviewed prior to creation of Plan. LABS:  I reviewed today's most current labs and imaging studies.   Pertinent labs include:  Recent Labs     06/25/20  0336 06/24/20  0345 06/23/20  0418   WBC 13.6* 11.7* 9.8   HGB 11.0* 10.5* 10.2*   HCT 33.8* 32.4* 32.3*   * 392 368     Recent Labs     06/25/20  0336 06/24/20  0345 06/23/20  0418    136 139   K 3.7 4.0 4.3    108 109*   CO2 25 24 24   GLU 66 73 96   BUN 24* 25* 23*   CREA 1.72* 1.83* 1.80*   CA 7.9* 8.0* 8.0*       Signed: Nadia Sacks, MD

## 2020-06-25 NOTE — ROUTINE PROCESS
RAPID RESPONSE TEAM- Follow Up     No acute findings on CT head. CT Results (most recent):  Results from Hospital Encounter encounter on 06/19/20   CT HEAD WO CONT    Narrative EXAM: CT HEAD WO CONT    INDICATION: Fall and head injury. COMPARISON: None    TECHNIQUE: Noncontrast head CT. Coronal and sagittal reformats. CT dose  reduction was achieved through the use of a standardized protocol tailored for  this examination and automatic exposure control for dose modulation. FINDINGS: The ventricles and sulci are age-appropriate without hydrocephalus. There is no mass effect or midline shift. There is no intracranial hemorrhage or  extra-axial fluid collection. There is no abnormal area of decreased density to  suggest infarct. The calvarium is intact. The visualized paranasal sinuses and mastoid air cells  are clear. Impression IMPRESSION:     Normal noncontrast CT head.        Harini Tadeo RN  Ext. [phone]

## 2020-06-26 ENCOUNTER — APPOINTMENT (OUTPATIENT)
Dept: GENERAL RADIOLOGY | Age: 55
DRG: 951 | End: 2020-06-26
Attending: INTERNAL MEDICINE
Payer: MEDICAID

## 2020-06-26 LAB
ANION GAP SERPL CALC-SCNC: 4 MMOL/L (ref 5–15)
BACTERIA SPEC CULT: NORMAL
BASOPHILS # BLD: 0.1 K/UL (ref 0–0.1)
BASOPHILS NFR BLD: 1 % (ref 0–1)
BUN SERPL-MCNC: 28 MG/DL (ref 6–20)
BUN/CREAT SERPL: 14 (ref 12–20)
CALCIUM SERPL-MCNC: 7.9 MG/DL (ref 8.5–10.1)
CHLORIDE SERPL-SCNC: 106 MMOL/L (ref 97–108)
CO2 SERPL-SCNC: 26 MMOL/L (ref 21–32)
CREAT SERPL-MCNC: 1.94 MG/DL (ref 0.7–1.3)
DIFFERENTIAL METHOD BLD: ABNORMAL
EOSINOPHIL # BLD: 0.3 K/UL (ref 0–0.4)
EOSINOPHIL NFR BLD: 2 % (ref 0–7)
ERYTHROCYTE [DISTWIDTH] IN BLOOD BY AUTOMATED COUNT: 15.9 % (ref 11.5–14.5)
GLUCOSE BLD STRIP.AUTO-MCNC: 155 MG/DL (ref 65–100)
GLUCOSE BLD STRIP.AUTO-MCNC: 167 MG/DL (ref 65–100)
GLUCOSE BLD STRIP.AUTO-MCNC: 199 MG/DL (ref 65–100)
GLUCOSE BLD STRIP.AUTO-MCNC: 313 MG/DL (ref 65–100)
GLUCOSE SERPL-MCNC: 179 MG/DL (ref 65–100)
HCT VFR BLD AUTO: 30.5 % (ref 36.6–50.3)
HGB BLD-MCNC: 10 G/DL (ref 12.1–17)
IMM GRANULOCYTES # BLD AUTO: 0.1 K/UL (ref 0–0.04)
IMM GRANULOCYTES NFR BLD AUTO: 1 % (ref 0–0.5)
LYMPHOCYTES # BLD: 1.9 K/UL (ref 0.8–3.5)
LYMPHOCYTES NFR BLD: 18 % (ref 12–49)
MCH RBC QN AUTO: 26.5 PG (ref 26–34)
MCHC RBC AUTO-ENTMCNC: 32.8 G/DL (ref 30–36.5)
MCV RBC AUTO: 80.9 FL (ref 80–99)
MONOCYTES # BLD: 1 K/UL (ref 0–1)
MONOCYTES NFR BLD: 9 % (ref 5–13)
NEUTS SEG # BLD: 7.4 K/UL (ref 1.8–8)
NEUTS SEG NFR BLD: 69 % (ref 32–75)
NRBC # BLD: 0 K/UL (ref 0–0.01)
NRBC BLD-RTO: 0 PER 100 WBC
PLATELET # BLD AUTO: 370 K/UL (ref 150–400)
PMV BLD AUTO: 9.5 FL (ref 8.9–12.9)
POTASSIUM SERPL-SCNC: 4 MMOL/L (ref 3.5–5.1)
RBC # BLD AUTO: 3.77 M/UL (ref 4.1–5.7)
SERVICE CMNT-IMP: ABNORMAL
SERVICE CMNT-IMP: NORMAL
SODIUM SERPL-SCNC: 136 MMOL/L (ref 136–145)
WBC # BLD AUTO: 10.7 K/UL (ref 4.1–11.1)

## 2020-06-26 PROCEDURE — 94760 N-INVAS EAR/PLS OXIMETRY 1: CPT

## 2020-06-26 PROCEDURE — 97537 COMMUNITY/WORK REINTEGRATION: CPT

## 2020-06-26 PROCEDURE — 97535 SELF CARE MNGMENT TRAINING: CPT

## 2020-06-26 PROCEDURE — 74011250637 HC RX REV CODE- 250/637: Performed by: INTERNAL MEDICINE

## 2020-06-26 PROCEDURE — 74011636637 HC RX REV CODE- 636/637: Performed by: INTERNAL MEDICINE

## 2020-06-26 PROCEDURE — 80048 BASIC METABOLIC PNL TOTAL CA: CPT

## 2020-06-26 PROCEDURE — 82962 GLUCOSE BLOOD TEST: CPT

## 2020-06-26 PROCEDURE — 85025 COMPLETE CBC W/AUTO DIFF WBC: CPT

## 2020-06-26 PROCEDURE — 74011250637 HC RX REV CODE- 250/637: Performed by: HOSPITALIST

## 2020-06-26 PROCEDURE — 97165 OT EVAL LOW COMPLEX 30 MIN: CPT

## 2020-06-26 PROCEDURE — 65270000029 HC RM PRIVATE

## 2020-06-26 PROCEDURE — 36415 COLL VENOUS BLD VENIPUNCTURE: CPT

## 2020-06-26 PROCEDURE — 74011250636 HC RX REV CODE- 250/636: Performed by: INTERNAL MEDICINE

## 2020-06-26 PROCEDURE — 97530 THERAPEUTIC ACTIVITIES: CPT

## 2020-06-26 PROCEDURE — 71045 X-RAY EXAM CHEST 1 VIEW: CPT

## 2020-06-26 PROCEDURE — 74011250636 HC RX REV CODE- 250/636: Performed by: HOSPITALIST

## 2020-06-26 PROCEDURE — 97162 PT EVAL MOD COMPLEX 30 MIN: CPT

## 2020-06-26 PROCEDURE — 74011000258 HC RX REV CODE- 258: Performed by: INTERNAL MEDICINE

## 2020-06-26 PROCEDURE — 97116 GAIT TRAINING THERAPY: CPT

## 2020-06-26 PROCEDURE — 74011636637 HC RX REV CODE- 636/637: Performed by: HOSPITALIST

## 2020-06-26 RX ORDER — FUROSEMIDE 40 MG/1
40 TABLET ORAL DAILY
Status: DISCONTINUED | OUTPATIENT
Start: 2020-06-27 | End: 2020-06-28

## 2020-06-26 RX ADMIN — INSULIN LISPRO 2 UNITS: 100 INJECTION, SOLUTION INTRAVENOUS; SUBCUTANEOUS at 17:17

## 2020-06-26 RX ADMIN — VANCOMYCIN HYDROCHLORIDE 750 MG: 750 INJECTION, POWDER, LYOPHILIZED, FOR SOLUTION INTRAVENOUS at 08:24

## 2020-06-26 RX ADMIN — DICYCLOMINE HYDROCHLORIDE 10 MG: 10 CAPSULE ORAL at 08:16

## 2020-06-26 RX ADMIN — AMITRIPTYLINE HYDROCHLORIDE 25 MG: 25 TABLET, FILM COATED ORAL at 21:19

## 2020-06-26 RX ADMIN — Medication 10 ML: at 17:25

## 2020-06-26 RX ADMIN — Medication 10 ML: at 06:25

## 2020-06-26 RX ADMIN — HEPARIN SODIUM 5000 UNITS: 5000 INJECTION INTRAVENOUS; SUBCUTANEOUS at 09:17

## 2020-06-26 RX ADMIN — INSULIN LISPRO 7 UNITS: 100 INJECTION, SOLUTION INTRAVENOUS; SUBCUTANEOUS at 21:20

## 2020-06-26 RX ADMIN — DICYCLOMINE HYDROCHLORIDE 10 MG: 10 CAPSULE ORAL at 11:46

## 2020-06-26 RX ADMIN — CARVEDILOL 12.5 MG: 12.5 TABLET, FILM COATED ORAL at 08:16

## 2020-06-26 RX ADMIN — CEFTRIAXONE 1 G: 1 INJECTION, POWDER, FOR SOLUTION INTRAMUSCULAR; INTRAVENOUS at 17:23

## 2020-06-26 RX ADMIN — CARVEDILOL 12.5 MG: 12.5 TABLET, FILM COATED ORAL at 17:19

## 2020-06-26 RX ADMIN — DICYCLOMINE HYDROCHLORIDE 10 MG: 10 CAPSULE ORAL at 17:20

## 2020-06-26 RX ADMIN — NITROGLYCERIN 1 INCH: 20 OINTMENT TOPICAL at 08:18

## 2020-06-26 RX ADMIN — INSULIN LISPRO 2 UNITS: 100 INJECTION, SOLUTION INTRAVENOUS; SUBCUTANEOUS at 08:16

## 2020-06-26 RX ADMIN — DICYCLOMINE HYDROCHLORIDE 10 MG: 10 CAPSULE ORAL at 21:19

## 2020-06-26 RX ADMIN — PREGABALIN 150 MG: 150 CAPSULE ORAL at 18:00

## 2020-06-26 RX ADMIN — INSULIN LISPRO 2 UNITS: 100 INJECTION, SOLUTION INTRAVENOUS; SUBCUTANEOUS at 11:38

## 2020-06-26 RX ADMIN — HEPARIN SODIUM 5000 UNITS: 5000 INJECTION INTRAVENOUS; SUBCUTANEOUS at 06:25

## 2020-06-26 RX ADMIN — NITROGLYCERIN 1 INCH: 20 OINTMENT TOPICAL at 21:19

## 2020-06-26 RX ADMIN — AZITHROMYCIN 500 MG: 500 INJECTION, POWDER, LYOPHILIZED, FOR SOLUTION INTRAVENOUS at 11:40

## 2020-06-26 RX ADMIN — NITROGLYCERIN 1 INCH: 20 OINTMENT TOPICAL at 17:20

## 2020-06-26 RX ADMIN — INSULIN GLARGINE 10 UNITS: 100 INJECTION, SOLUTION SUBCUTANEOUS at 21:19

## 2020-06-26 RX ADMIN — AMLODIPINE BESYLATE 10 MG: 5 TABLET ORAL at 08:16

## 2020-06-26 RX ADMIN — Medication 10 ML: at 11:41

## 2020-06-26 RX ADMIN — PREGABALIN 150 MG: 150 CAPSULE ORAL at 08:16

## 2020-06-26 RX ADMIN — HEPARIN SODIUM 5000 UNITS: 5000 INJECTION INTRAVENOUS; SUBCUTANEOUS at 17:18

## 2020-06-26 RX ADMIN — ATORVASTATIN CALCIUM 20 MG: 20 TABLET, FILM COATED ORAL at 08:16

## 2020-06-26 NOTE — PROGRESS NOTES
Problem: Falls - Risk of  Goal: *Absence of Falls  Description: Document George Stovall Fall Risk and appropriate interventions in the flowsheet. Outcome: Progressing Towards Goal  Note: Fall Risk Interventions:  Mobility Interventions: Assess mobility with egress test    Mentation Interventions: More frequent rounding, Increase mobility, Adequate sleep, hydration, pain control    Medication Interventions: Teach patient to arise slowly, Patient to call before getting OOB    Elimination Interventions: Call light in reach    History of Falls Interventions:  Investigate reason for fall, Door open when patient unattended

## 2020-06-26 NOTE — PROGRESS NOTES
General Surgery End of Shift Nursing Note    Bedside shift change report given to Melissa Jesus RN (oncoming nurse) by Chace Cottrell RN RN (offgoing nurse). Report included the following information SBAR, Kardex, Procedure Summary, Intake/Output and Quality Measures. Shift worked:   7a to 7p     Summary of shift:  Dressing changed by Dr. Jordana Ray to foot. PT remains impulsive, when he wants something he hops out of bed quickly no safety awareness. Reinforced him moving  Slower,  Sitter D/C'd. Issues for physician to address:   none     Number times ambulated in hallway past shift: 2, with PT and cane    Number of times OOB to chair past shift: up ad melly with sitter in room    Pain Management:  Current medication: tylenol  Patient states pain is manageable on current pain medication: NO    GI:    Current diet:  DIET CARDIAC Regular; Consistent Carb 2000kcal    Tolerating current diet: YES  Passing flatus: YES  Last Bowel Movement: several days ago   Appearance:     Respiratory:    Incentive Spirometer at bedside: YES  Patient instructed on use: YES    Patient Safety:    Falls Score: 6  Bed Alarm On? Yes  Sitter?  No    Meera Dillon RN

## 2020-06-26 NOTE — PROGRESS NOTES
OCCUPATIONAL THERAPY EVALUATION/DISCHARGE  Patient: Gene Savage (78 y.o. male)  Date: 6/26/2020  Primary Diagnosis: Acute CHF (congestive heart failure) ([facility] [address] [I50.9]  Procedure(s) (LRB):  AMPUTATION SECOND DIGIT WITH PARTIAL FIRST AND SECOND METATARSAL. LEFT FOOT (Left) 2 Days Post-Op   Precautions:        ASSESSMENT  Based on the objective data described below, the patient presents with at his baseline for OT and was able to bathe and dress self with setup. Pt has been using a SPC due to his sciatica and he did have 2 small LOB after his great toe was removed. Pt was able to memo healing shoe and slipper sitting on EOb and was SBA to supervision for walking and functional transfers. Pt has no OT needs and will discharge from services. Pt to return home with girlfriend and no further OT needed. Current Level of Function (ADLs/self-care): pt is setup in this setting    Functional Outcome Measure: The patient scored 80/100 on the Barthel outcome measure which is indicative of no assist for ADLs . Other factors to consider for discharge: pt to return home with no further OT      PLAN :  Recommend with staff: Deandre Rodriguez for meals and walk to bathroom     Recommendation for discharge: (in order for the patient to meet his/her long term goals)  No skilled occupational therapy/ follow up rehabilitation needs identified at this time. This discharge recommendation:  Has been made in collaboration with the attending provider and/or case management    IF patient discharges home will need the following DME: none       SUBJECTIVE:   Patient stated I can take of myself .     OBJECTIVE DATA SUMMARY:   HISTORY:   Past Medical History:   Diagnosis Date    Diabetes ([facility] [address]     Hypertension      Past Surgical History:   Procedure Laterality Date    HX HEENT      HX ORTHOPAEDIC         Prior Level of Function/Environment/Context: pt lives with girlfriend and was independent with ADLs and used a cane  Expanded or extensive additional review of patient history:   Home Situation  Home Environment: Private residence  # Steps to Enter: 2  Rails to Enter: Yes  Hand Rails : Left  Wheelchair Ramp: No  One/Two Story Residence: One story  Living Alone: No  Support Systems: Spouse/Significant Other/Partner  Patient Expects to be Discharged to[de-identified] Private residence  Current DME Used/Available at Home: Cane, straight  Tub or Shower Type: Tub/Shower combination    Hand dominance: Right    EXAMINATION OF PERFORMANCE DEFICITS:  Cognitive/Behavioral Status:  Neurologic State: Alert  Orientation Level: Oriented X4  Cognition: Appropriate decision making; Appropriate for age attention/concentration; Appropriate safety awareness; Follows commands  Perception: Appears intact  Perseveration: No perseveration noted  Safety/Judgement: Awareness of environment;Good awareness of safety precautions    Skin: in good health     Edema: none    Hearing: Auditory  Auditory Impairment: None    Vision/Perceptual:            Intact                          Range of Motion:    AROM: Within functional limits  PROM: Within functional limits                      Strength:    Strength: Within functional limits                Coordination:  Coordination: Within functional limits  Fine Motor Skills-Upper: Left Intact; Right Intact    Gross Motor Skills-Upper: Left Intact; Right Intact    Tone & Sensation:    Tone: Normal  Sensation: Impaired(L foot poor sensation; intermittent L foot numbness from sci)                      Balance:  Sitting: Intact  Standing: Impaired  Standing - Static: Good  Standing - Dynamic : Fair;Occasional    Functional Mobility and Transfers for ADLs:  Bed Mobility:  Rolling: Independent  Supine to Sit: Independent  Sit to Supine: Independent  Scooting: Independent    Transfers:  Sit to Stand: Modified independent  Stand to Sit: Modified independent  Bed to Chair: Supervision(due to IV lines)  Bathroom Mobility: Supervision/set up  Toilet Transfer : Supervision    ADL Assessment:  Feeding: Independent    Oral Facial Hygiene/Grooming: Setup    Bathing: Setup    Upper Body Dressing: Setup    Lower Body Dressing: Setup    Toileting: Supervision              Cognitive Retraining  Safety/Judgement: Awareness of environment;Good awareness of safety precautions      Functional Measure:  Barthel Index:    Bathin  Bladder: 5  Bowels: 5  Groomin  Dressing: 10  Feeding: 10  Mobility: 10  Stairs: 10  Toilet Use: 10  Transfer (Bed to Chair and Back): 10  Total: 80/100        The Barthel ADL Index: Guidelines  1. The index should be used as a record of what a patient does, not as a record of what a patient could do. 2. The main aim is to establish degree of independence from any help, physical or verbal, however minor and for whatever reason. 3. The need for supervision renders the patient not independent. 4. A patient's performance should be established using the best available evidence. Asking the patient, friends/relatives and nurses are the usual sources, but direct observation and common sense are also important. However direct testing is not needed. 5. Usually the patient's performance over the preceding 24-48 hours is important, but occasionally longer periods will be relevant. 6. Middle categories imply that the patient supplies over 50 per cent of the effort. 7. Use of aids to be independent is allowed. Sasha Villa., Barthel, D.W. (1586). Functional evaluation: the Barthel Index. [address] [facility] (14)2. MANNIE Smith, Katie Purcell., Nazia Prim., [place], [address] [place] [address] (). Measuring the change indisability after inpatient rehabilitation; comparison of the responsiveness of the Barthel Index and Functional [place] Measure. Journal of Neurology, Neurosurgery, and Psychiatry, 66(4), 230-459. Carrie Steward, N.TREVER.A, TANIA Jefferson, & Lynn Iraheta, MAylinA. (2004.) Assessment of post-stroke quality of life in cost-effectiveness studies:  The usefulness of the Barthel Index and the EuroQoL-5D. Quality of Life Research, 15, 555-25         Occupational Therapy Evaluation Charge Determination   History Examination Decision-Making   LOW Complexity : Brief history review  LOW Complexity : 1-3 performance deficits relating to physical, cognitive , or psychosocial skils that result in activity limitations and / or participation restrictions  LOW Complexity : No comorbidities that affect functional and no verbal or physical assistance needed to complete eval tasks       Based on the above components, the patient evaluation is determined to be of the following complexity level: LOW   Pain Rating:  none    Activity Tolerance:   Good  Please refer to the flowsheet for vital signs taken during this treatment. After treatment patient left in no apparent distress:    Sitting in chair and Call bell within reach    COMMUNICATION/EDUCATION:   The patients plan of care was discussed with: Physical therapist and Registered nurse.      Thank you for this referral.  Rosalva Albert, OT  Time Calculation: 20 mins

## 2020-06-26 NOTE — PROGRESS NOTES
Orders received, chart reviewed and patient evaluated by physical therapy. Pending progression with skilled acute physical therapy, recommend:  No skilled physical therapy/ follow up rehabilitation needs identified at this time. But he could use a RW for home use due to intermittent sciatica which can cause his RLE to weaken and buckle. Recommend with nursing patient to complete as able in order to maintain strength, endurance and independence: OOB to chair 3x/day with supervision and ambulating with RW. Thank you for your assistance. Full evaluation to follow.      Lawanda Everett, PT

## 2020-06-26 NOTE — PROGRESS NOTES
Problem: Falls - Risk of  Goal: *Absence of Falls  Description: Document Hannah Minaya Fall Risk and appropriate interventions in the flowsheet. Outcome: Progressing Towards Goal  Note: Fall Risk Interventions:  Mobility Interventions: Patient to call before getting OOB    Mentation Interventions: Reorient patient    Medication Interventions: Evaluate medications/consider consulting pharmacy, Teach patient to arise slowly, Patient to call before getting OOB    Elimination Interventions: Call light in reach    History of Falls Interventions: Bed/chair exit alarm, Consult care management for discharge planning, Door open when patient unattended, Evaluate medications/consider consulting pharmacy, Investigate reason for fall, Room close to nurse's station, Vital signs minimum Q4HRs X 24 hrs (comment for end date), Assess for delayed presentation/identification of injury for 48 hrs (comment for end date), Utilize gait belt for transfer/ambulation         Problem: Patient Education: Go to Patient Education Activity  Goal: Patient/Family Education  Outcome: Progressing Towards Goal     Problem: Pressure Injury - Risk of  Goal: *Prevention of pressure injury  Description: Document Sarwat Scale and appropriate interventions in the flowsheet.   Outcome: Progressing Towards Goal  Note: Pressure Injury Interventions:  Sensory Interventions: Pressure redistribution bed/mattress (bed type)    Moisture Interventions: Absorbent underpads    Activity Interventions: Pressure redistribution bed/mattress(bed type)    Mobility Interventions: Pressure redistribution bed/mattress (bed type)    Nutrition Interventions: Document food/fluid/supplement intake

## 2020-06-26 NOTE — PROGRESS NOTES
Hospitalist Progress Note    NAME: Romario Calderon   :  1965   MRN:  [id number]       Assessment / Plan:  SOB due to PNA and pulmonary edema  Diarrhea, resolved  Covid 19 ruled out  CXR showed Extensive bilateral interstitial and patchy airspace disease. Differential diagnosis includes: Pulmonary vascular congestive changes and/or  infectious process. Repeat CXR showed interval worsening of diffuse interstitial and airspace opacities which again may be due to edema and/or pneumonia. probnp ~6K-->5K  procalcitonin 6.9  Completed empiric abx for PNA  Stop IV lasix as cr is creeping up. Pt stable on RA. Will repeat CXR in the AM to ensure resolution of pulm edema  Repeat probnp tomorrow  Plan to start PO lasix 40mg tomorrow pending cr. Will d/c HCTZ at discharge  Covid neg x2    Echo with EF 50-55%. Mild concentric hypertrophy  Currently stable on RA    Fall   Sitter prn  Head CT neg. AAOx3  Avoid sedating meds  PT/OT    Heroin withdrawal symptoms, resolved  Heroin abuse   S/p methadone 10mg x1. Calmed in bed.  telesitter at bedside. Suffered a fall last night. Fall precautions  Low dose rn ativan for agitation    CKD3   Septic joint, 2nd metatarsophalangeal joint of L foot  Osteomyelitis of 1st metatarsal L foot  XR L foot showed osteomyelitis of the distal second metatarsal and base of the proximal phalanx of the left second toe. Oblique distracted fracture of the base of the  proximal phalanx of the left second toe. Left great toe transmetatarsal amputation postoperative changes. Multiple ossific fragments adjacent to the residual distal first metatarsal. Residual great toe soft tissues are edematous. MR can be performed for further evaluation, as indicated. S/p amputation of second digit, left foot, with partial resection of the first and second metatarsal, also partial resection of viable proximal bone margins first and second metatarsal left foot.   Path pending  Gram stain and Wound cx so far growing staph species. Path pending  Cont' empiric abx for now : vancomycin/rocephin  Podiatry following    Type 2 DM with neuropathy  Cont' lower dose of lantus. SSI/POC checks  cont' gabapentin     HTN   Continue Beta blocker and  CCB      Hyperlipidemia  Cont' statin         Code: Full  DVT prophylaxis: heparin     Subjective:     Chief Complaint / Reason for Physician Visit  Pt in bed, NAD. No acute vent overnight. Vitals stable. No further symptoms of withdrawal.  mildly agitated overnight but easily reoriented per RN. Overnight event with night RN. Review of Systems:  Symptom Y/N Comments  Symptom Y/N Comments   Fever/Chills n   Chest Pain n    Poor Appetite    Edema     Cough    Abdominal Pain n    Sputum    Joint Pain     SOB/DE [place] CRUZ n   Pruritis/Rash     Nausea/vomit    Tolerating PT/OT     Diarrhea    Tolerating Diet     Constipation    Other       Could NOT obtain due to:      Objective:     VITALS:   Last 24hrs VS reviewed since prior progress note. Most recent are:  Patient Vitals for the past 24 hrs:   Temp Pulse Resp BP SpO2   06/26/20 0701 97.7 °F (36.5 °C) 95 16 140/88 100 %   06/26/20 0358 98.6 °F (37 °C) 97 16 137/82 100 %   06/25/20 2354 98.6 °F (37 °C) 91 17 128/83 99 %   06/25/20 2249 -- 90 -- -- --   06/25/20 2100 97.9 °F (36.6 °C) 84 16 120/71 100 %   06/25/20 1603 98 °F (36.7 °C) 84 16 122/64 100 %   06/25/20 1215 98.6 °F (37 °C) 83 16 108/62 100 %       Intake/Output Summary (Last 24 hours) at 6/26/2020 0745  Last data filed at 6/26/2020 0407  Gross per 24 hour   Intake 240 ml   Output 1415 ml   Net -1175 ml        PHYSICAL EXAM:  General:          pt in bed, no acute distress    EENT:              Anicteric sclerae. Resp:               No accessory muscle use, breathing pattern regular. CTA b/l  CV:                  NSR, no murmur. GI:                   No distended   Neurologic:      AAOx3, follow commands.   Moving all exts  Psych:             Not anxious nor agitated  Skin:                No rashes. No jaundice    Reviewed most current lab test results and cultures  YES  Reviewed most current radiology test results   YES  Review and summation of old records today    NO  Reviewed patient's current orders and MAR    YES  PMH/SH reviewed - no change compared to H&P  ________________________________________________________________________  Care Plan discussed with:    Comments   Patient x    Family      RN x    Care Manager     Consultant                        Multidiciplinary team rounds were held today with , nursing, pharmacist and clinical coordinator. Patient's plan of care was discussed; medications were reviewed and discharge planning was addressed. ________________________________________________________________________  Total NON critical care TIME:  35  Minutes    Total CRITICAL CARE TIME Spent:   Minutes non procedure based      Comments   >50% of visit spent in counseling and coordination of care     ________________________________________________________________________  Nadia Sacks, MD     Procedures: see electronic medical records for all procedures/Xrays and details which were not copied into this note but were reviewed prior to creation of Plan. LABS:  I reviewed today's most current labs and imaging studies.   Pertinent labs include:  Recent Labs     06/26/20  0503 06/25/20  0336 06/24/20  0345   WBC 10.7 13.6* 11.7*   HGB 10.0* 11.0* 10.5*   HCT 30.5* 33.8* 32.4*    422* 392     Recent Labs     06/26/20  0503 06/25/20  0336 06/24/20  0345    137 136   K 4.0 3.7 4.0    106 108   CO2 26 25 24   * 66 73   BUN 28* 24* 25*   CREA 1.94* 1.72* 1.83*   CA 7.9* 7.9* 8.0*       Signed: Nadia Sacks, MD

## 2020-06-26 NOTE — PROGRESS NOTES
Patient seen at bedside today, left foot doing well, no dehiscence, no erythema. He states he feels much better. Sterile dressing change carried out today, we'll leave this in place, clean, dry. May ambulate to tolerance only in surgical shoe only. Still limiting activity overall however with level foot elevation. Doing well, still awaiting confirmed culture, and mostly pathology biopsy results.

## 2020-06-26 NOTE — PROGRESS NOTES
PHYSICAL THERAPY EVALUATION/DISCHARGE  Patient: Zayda Lowery (68 y.o. male)  Date: 6/26/2020  Primary Diagnosis: Acute CHF (congestive heart failure) ([facility] [address] [I50.9]  Procedure(s) (LRB):  AMPUTATION SECOND DIGIT WITH PARTIAL FIRST AND SECOND METATARSAL. LEFT FOOT (Left) 2 Days Post-Op   Precautions:        ASSESSMENT  Based on the objective data described below, the patient presents with good strength, independent bed mobility and transfers, near independent gait skill and good activity tolerance. He was able to ambulate in quiles about 300 feet with cane with sba. Gait was steady with intermittent path deviations to R x 2. He would benefit from using a RW to better stabilize this deviation. Cleared 4 steps with rails independently. He was cooperative and followed all directions well. Functional Outcome Measure: The patient scored 80/100 on the Barthel outcome measure which is indicative of 20% functional impairment. Other factors to consider for discharge: independent PLOF with cane     Further skilled acute physical therapy is not indicated at this time. Will sign off. PLAN :  Recommendation for discharge: (in order for the patient to meet his/her long term goals)  No skilled physical therapy/ follow up rehabilitation needs identified at this time. This discharge recommendation:  Has been made in collaboration with the attending provider and/or case management    IF patient discharges home will need the following DME: rolling walker       SUBJECTIVE:   Patient stated I wish they would turn off my bed alarm.     OBJECTIVE DATA SUMMARY:   HISTORY:    Past Medical History:   Diagnosis Date    Diabetes ([facility] [address]     Hypertension      Past Surgical History:   Procedure Laterality Date    HX HEENT      HX ORTHOPAEDIC         Prior level of function: independent PLOF with cane  Personal factors and/or comorbidities impacting plan of care: substance abuse    Home Situation  Home Environment: Private residence  # Steps to Enter: 2  Rails to Enter: Yes  Hand Rails : Left  Wheelchair Ramp: No  One/Two Story Residence: One story  Living Alone: No  Support Systems: Spouse/Significant Other/Partner  Patient Expects to be Discharged to[de-identified] Private residence  Current DME Used/Available at Home: Cane, straight  Tub or Shower Type: Tub/Shower combination    EXAMINATION/PRESENTATION/DECISION MAKING:   Critical Behavior:  Neurologic State: Alert  Orientation Level: Oriented X4  Cognition: Appropriate decision making, Appropriate for age attention/concentration, Appropriate safety awareness, Follows commands  Safety/Judgement: Awareness of environment, Good awareness of safety precautions  Hearing: Auditory  Auditory Impairment: None  Skin:  L foot dressing is CDI  Edema: none  Range Of Motion:  AROM: Within functional limits           PROM: Within functional limits           Strength:    Strength: Within functional limits                    Tone & Sensation:   Tone: Normal              Sensation: Impaired(L foot poor sensation; intermittent L foot numbness from sci)               Coordination:  Coordination: Within functional limits  Vision:      Functional Mobility:  Bed Mobility:  Rolling: Independent  Supine to Sit: Independent  Sit to Supine: Independent  Scooting: Independent  Transfers:  Sit to Stand: Modified independent  Stand to Sit: Modified independent        Bed to Chair: Supervision(due to IV lines)              Balance:   Sitting: Intact  Standing: Impaired  Standing - Static: Good  Standing - Dynamic : Fair;Occasional  Ambulation/Gait Training:  Distance (ft): 300 Feet (ft)  Assistive Device: Gait belt;Cane, straight  Ambulation - Level of Assistance: Stand-by assistance     Gait Description (WDL): Exceptions to WDL  Gait Abnormalities: Antalgic; Path deviations(mild LOB R x 2)  Right Side Weight Bearing: Full  Left Side Weight Bearing: As tolerated(wears post op shoe)        Speed/Leslie: Pace decreased (<100 feet/min)           Interventions: Safety awareness training            Stairs:  Number of Stairs Trained: 4  Stairs - Level of Assistance: Modified independent   Rail Use: Both    Functional Measure:  Barthel Index:    Bathin  Bladder: 5  Bowels: 5  Groomin  Dressing: 10  Feeding: 10  Mobility: 10  Stairs: 10  Toilet Use: 10  Transfer (Bed to Chair and Back): 10  Total: 80/100       The Barthel ADL Index: Guidelines  1. The index should be used as a record of what a patient does, not as a record of what a patient could do. 2. The main aim is to establish degree of independence from any help, physical or verbal, however minor and for whatever reason. 3. The need for supervision renders the patient not independent. 4. A patient's performance should be established using the best available evidence. Asking the patient, friends/relatives and nurses are the usual sources, but direct observation and common sense are also important. However direct testing is not needed. 5. Usually the patient's performance over the preceding 24-48 hours is important, but occasionally longer periods will be relevant. 6. Middle categories imply that the patient supplies over 50 per cent of the effort. 7. Use of aids to be independent is allowed. Satya Broderick., Barthel, D.W. (4747). Functional evaluation: the Barthel Index. [address] [facility] (14)2. MANNIE Funk, Marizol Corona., Ai Cantu., [place], [address] [place] [address] (). Measuring the change indisability after inpatient rehabilitation; comparison of the responsiveness of the Barthel Index and Functional [place] Measure. Journal of Neurology, Neurosurgery, and Psychiatry, 66(4), 078-008. Gumaro Ceballos NKURTIS.A, TANIA Jefferson, & Norah Hogue, M.A. (2004.) Assessment of post-stroke quality of life in cost-effectiveness studies: The usefulness of the Barthel Index and the EuroQoL-5D.  Quality of Life Research, 15, 456-52        Physical Therapy Evaluation Charge Determination   History Examination Presentation Decision-Making   HIGH Complexity :3+ comorbidities / personal factors will impact the outcome/ POC  HIGH Complexity : 4+ Standardized tests and measures addressing body structure, function, activity limitation and / or participation in recreation  LOW Complexity : Stable, uncomplicated  Other outcome measures Barthel  LOW       Based on the above components, the patient evaluation is determined to be of the following complexity level: LOW       Activity Tolerance:   Good and SpO2 stable on RA  Please refer to the flowsheet for vital signs taken during this treatment. After treatment patient left in no apparent distress:   Sitting in chair, Call bell within reach and Caregiver / family present    COMMUNICATION/EDUCATION:   The patients plan of care was discussed with: Occupational therapist and Registered nurse. Fall prevention education was provided and the patient/caregiver indicated understanding., Patient/family have participated as able in goal setting and plan of care. and Patient/family agree to work toward stated goals and plan of care.     Thank you for this referral.  Gilles Guy, PT   Time Calculation: 33 mins

## 2020-06-26 NOTE — PROGRESS NOTES
General Surgery End of Shift Nursing Note    Bedside shift change report given to Mina Boo (oncoming nurse) by Sivan Lyn (offgoing nurse). Report included the following information SBAR and Kardex. Shift worked:   [id number]     Summary of shift:    Due meds given. No issues overnight. VS stable   Issues for physician to address:   none     Number times ambulated in hallway past shift: 0    Number of times OOB to chair past shift: 0      Pain Management:  Current medication:     Patient states pain is manageable on current pain medication: YES    GI:    Current diet:  DIET CARDIAC Regular; Consistent Carb 2000kcal    Tolerating current diet: YES  Passing flatus: YES  Last Bowel Movement: yesterday   Appearance:     [id number]}  Respiratory:    Incentive Spirometer at bedside: NO  Patient instructed on use: no      Patient Safety:    Falls Score: 3  Bed Alarm On? Yes  Sitter?  Yes    Mook Cardona

## 2020-06-27 LAB
ANION GAP SERPL CALC-SCNC: 6 MMOL/L (ref 5–15)
BACTERIA SPEC CULT: ABNORMAL
BASOPHILS # BLD: 0.1 K/UL (ref 0–0.1)
BASOPHILS NFR BLD: 1 % (ref 0–1)
BUN SERPL-MCNC: 33 MG/DL (ref 6–20)
BUN/CREAT SERPL: 17 (ref 12–20)
CALCIUM SERPL-MCNC: 8 MG/DL (ref 8.5–10.1)
CHLORIDE SERPL-SCNC: 104 MMOL/L (ref 97–108)
CO2 SERPL-SCNC: 24 MMOL/L (ref 21–32)
CREAT SERPL-MCNC: 1.99 MG/DL (ref 0.7–1.3)
DATE LAST DOSE: ABNORMAL
DIFFERENTIAL METHOD BLD: ABNORMAL
EOSINOPHIL # BLD: 0.3 K/UL (ref 0–0.4)
EOSINOPHIL NFR BLD: 3 % (ref 0–7)
ERYTHROCYTE [DISTWIDTH] IN BLOOD BY AUTOMATED COUNT: 15.9 % (ref 11.5–14.5)
GLUCOSE BLD STRIP.AUTO-MCNC: 131 MG/DL (ref 65–100)
GLUCOSE BLD STRIP.AUTO-MCNC: 138 MG/DL (ref 65–100)
GLUCOSE BLD STRIP.AUTO-MCNC: 225 MG/DL (ref 65–100)
GLUCOSE BLD STRIP.AUTO-MCNC: 270 MG/DL (ref 65–100)
GLUCOSE SERPL-MCNC: 258 MG/DL (ref 65–100)
GRAM STN SPEC: ABNORMAL
GRAM STN SPEC: ABNORMAL
HCT VFR BLD AUTO: 29.3 % (ref 36.6–50.3)
HGB BLD-MCNC: 9.4 G/DL (ref 12.1–17)
IMM GRANULOCYTES # BLD AUTO: 0.1 K/UL (ref 0–0.04)
IMM GRANULOCYTES NFR BLD AUTO: 1 % (ref 0–0.5)
LYMPHOCYTES # BLD: 1.9 K/UL (ref 0.8–3.5)
LYMPHOCYTES NFR BLD: 18 % (ref 12–49)
MCH RBC QN AUTO: 26.2 PG (ref 26–34)
MCHC RBC AUTO-ENTMCNC: 32.1 G/DL (ref 30–36.5)
MCV RBC AUTO: 81.6 FL (ref 80–99)
MONOCYTES # BLD: 0.9 K/UL (ref 0–1)
MONOCYTES NFR BLD: 9 % (ref 5–13)
NEUTS SEG # BLD: 7.2 K/UL (ref 1.8–8)
NEUTS SEG NFR BLD: 68 % (ref 32–75)
NRBC # BLD: 0 K/UL (ref 0–0.01)
NRBC BLD-RTO: 0 PER 100 WBC
PLATELET # BLD AUTO: 377 K/UL (ref 150–400)
PMV BLD AUTO: 9.7 FL (ref 8.9–12.9)
POTASSIUM SERPL-SCNC: 4.1 MMOL/L (ref 3.5–5.1)
RBC # BLD AUTO: 3.59 M/UL (ref 4.1–5.7)
REPORTED DOSE,DOSE: ABNORMAL UNITS
REPORTED DOSE/TIME,TMG: 900
SERVICE CMNT-IMP: ABNORMAL
SODIUM SERPL-SCNC: 134 MMOL/L (ref 136–145)
VANCOMYCIN TROUGH SERPL-MCNC: 18.3 UG/ML (ref 5–10)
WBC # BLD AUTO: 10.4 K/UL (ref 4.1–11.1)

## 2020-06-27 PROCEDURE — 74011636637 HC RX REV CODE- 636/637: Performed by: INTERNAL MEDICINE

## 2020-06-27 PROCEDURE — 74011250637 HC RX REV CODE- 250/637: Performed by: HOSPITALIST

## 2020-06-27 PROCEDURE — 85025 COMPLETE CBC W/AUTO DIFF WBC: CPT

## 2020-06-27 PROCEDURE — 65270000029 HC RM PRIVATE

## 2020-06-27 PROCEDURE — 80202 ASSAY OF VANCOMYCIN: CPT

## 2020-06-27 PROCEDURE — 74011250637 HC RX REV CODE- 250/637: Performed by: INTERNAL MEDICINE

## 2020-06-27 PROCEDURE — 74011636637 HC RX REV CODE- 636/637: Performed by: HOSPITALIST

## 2020-06-27 PROCEDURE — 74011000258 HC RX REV CODE- 258: Performed by: INTERNAL MEDICINE

## 2020-06-27 PROCEDURE — 82962 GLUCOSE BLOOD TEST: CPT

## 2020-06-27 PROCEDURE — 80048 BASIC METABOLIC PNL TOTAL CA: CPT

## 2020-06-27 PROCEDURE — 74011250636 HC RX REV CODE- 250/636: Performed by: INTERNAL MEDICINE

## 2020-06-27 PROCEDURE — 36415 COLL VENOUS BLD VENIPUNCTURE: CPT

## 2020-06-27 PROCEDURE — 74011250636 HC RX REV CODE- 250/636: Performed by: HOSPITALIST

## 2020-06-27 RX ORDER — INSULIN GLARGINE 100 [IU]/ML
15 INJECTION, SOLUTION SUBCUTANEOUS
Status: DISCONTINUED | OUTPATIENT
Start: 2020-06-27 | End: 2020-07-02

## 2020-06-27 RX ADMIN — INSULIN GLARGINE 15 UNITS: 100 INJECTION, SOLUTION SUBCUTANEOUS at 21:21

## 2020-06-27 RX ADMIN — PREGABALIN 150 MG: 150 CAPSULE ORAL at 17:11

## 2020-06-27 RX ADMIN — PREGABALIN 150 MG: 150 CAPSULE ORAL at 08:48

## 2020-06-27 RX ADMIN — ATORVASTATIN CALCIUM 20 MG: 20 TABLET, FILM COATED ORAL at 08:48

## 2020-06-27 RX ADMIN — Medication 10 ML: at 11:34

## 2020-06-27 RX ADMIN — DICYCLOMINE HYDROCHLORIDE 10 MG: 10 CAPSULE ORAL at 21:20

## 2020-06-27 RX ADMIN — DICYCLOMINE HYDROCHLORIDE 10 MG: 10 CAPSULE ORAL at 08:48

## 2020-06-27 RX ADMIN — VANCOMYCIN HYDROCHLORIDE 750 MG: 750 INJECTION, POWDER, LYOPHILIZED, FOR SOLUTION INTRAVENOUS at 00:48

## 2020-06-27 RX ADMIN — NITROGLYCERIN 1 INCH: 20 OINTMENT TOPICAL at 21:20

## 2020-06-27 RX ADMIN — DICYCLOMINE HYDROCHLORIDE 10 MG: 10 CAPSULE ORAL at 13:04

## 2020-06-27 RX ADMIN — INSULIN LISPRO 5 UNITS: 100 INJECTION, SOLUTION INTRAVENOUS; SUBCUTANEOUS at 08:47

## 2020-06-27 RX ADMIN — VANCOMYCIN HYDROCHLORIDE 750 MG: 750 INJECTION, POWDER, LYOPHILIZED, FOR SOLUTION INTRAVENOUS at 17:12

## 2020-06-27 RX ADMIN — DICYCLOMINE HYDROCHLORIDE 10 MG: 10 CAPSULE ORAL at 17:11

## 2020-06-27 RX ADMIN — NITROGLYCERIN 1 INCH: 20 OINTMENT TOPICAL at 16:21

## 2020-06-27 RX ADMIN — Medication 10 ML: at 06:19

## 2020-06-27 RX ADMIN — CARVEDILOL 12.5 MG: 12.5 TABLET, FILM COATED ORAL at 08:49

## 2020-06-27 RX ADMIN — AMLODIPINE BESYLATE 10 MG: 5 TABLET ORAL at 08:49

## 2020-06-27 RX ADMIN — AMITRIPTYLINE HYDROCHLORIDE 25 MG: 25 TABLET, FILM COATED ORAL at 21:20

## 2020-06-27 RX ADMIN — CARVEDILOL 12.5 MG: 12.5 TABLET, FILM COATED ORAL at 16:21

## 2020-06-27 RX ADMIN — CEFTRIAXONE 1 G: 1 INJECTION, POWDER, FOR SOLUTION INTRAMUSCULAR; INTRAVENOUS at 16:18

## 2020-06-27 RX ADMIN — HEPARIN SODIUM 5000 UNITS: 5000 INJECTION INTRAVENOUS; SUBCUTANEOUS at 02:58

## 2020-06-27 RX ADMIN — NITROGLYCERIN 1 INCH: 20 OINTMENT TOPICAL at 08:49

## 2020-06-27 RX ADMIN — HEPARIN SODIUM 5000 UNITS: 5000 INJECTION INTRAVENOUS; SUBCUTANEOUS at 17:11

## 2020-06-27 RX ADMIN — HEPARIN SODIUM 5000 UNITS: 5000 INJECTION INTRAVENOUS; SUBCUTANEOUS at 11:31

## 2020-06-27 RX ADMIN — INSULIN LISPRO 3 UNITS: 100 INJECTION, SOLUTION INTRAVENOUS; SUBCUTANEOUS at 16:20

## 2020-06-27 RX ADMIN — Medication 10 ML: at 21:20

## 2020-06-27 RX ADMIN — FUROSEMIDE 40 MG: 40 TABLET ORAL at 08:48

## 2020-06-27 NOTE — PROGRESS NOTES
Pharmacy Automatic Renal Dosing Protocol - Antimicrobials    Goal Level: VANCOMYCIN TROUGH GOAL RANGE  15-20   (AUC: 400 - 600 mg/hr/Liter/day)     Date Dose & Interval Measured (mcg/mL) Extrapolated (mcg/mL)   6/23 @ 0846 750 mg every 16 hours 20.1    6/24 08:31 750 mg q16h 18.2    6/26 00:20 750 mg q16h 18.3        Impression/Plan:   Vancomycin trough resulted therapeutic. Continue with the current regimen of 750 mg IV every 16 hours  Antimicrobial stop date 5 days CTX and azithromycin, TBD for vanc     Pharmacy will follow daily and adjust medications as appropriate for renal function and/or serum levels.     Thanks,  Jeaneth Jansen, PharmD

## 2020-06-27 NOTE — PROGRESS NOTES
Problem: Pressure Injury - Risk of  Goal: *Prevention of pressure injury  Description: Document Sarwat Scale and appropriate interventions in the flowsheet. Outcome: Progressing Towards Goal  Note: Pressure Injury Interventions:  Sensory Interventions: Pressure redistribution bed/mattress (bed type)    Moisture Interventions: Absorbent underpads, Maintain skin hydration (lotion/cream), Minimize layers    Activity Interventions: Increase time out of bed, Pressure redistribution bed/mattress(bed type), PT/OT evaluation    Mobility Interventions: HOB 30 degrees or less, Pressure redistribution bed/mattress (bed type), PT/OT evaluation    Nutrition Interventions: Document food/fluid/supplement intake                     Problem: Diabetes Self-Management  Goal: *Incorporating nutritional management into lifestyle  Description: Describe effect of type, amount and timing of food on blood glucose; list 3 methods for planning meals. Outcome: Progressing Towards Goal  Goal: *Monitoring blood glucose, interpreting and using results  Description: Identify recommended blood glucose targets  and personal targets.   Outcome: Progressing Towards Goal

## 2020-06-27 NOTE — PROGRESS NOTES
General Surgery End of Shift Nursing Note    Bedside shift change report given to Reuben Ramirez RN (oncoming nurse) by Carlton Campos RN RN (offgoing nurse). Report included the following information SBAR, Kardex, Procedure Summary, Intake/Output and Quality Measures. Shift worked:   7a to 7p     Summary of shift:  Dressing to Left foot changed due to break thru bleeding noted. Enc patient not to put weight on front of left foot. Lantus to begin tonight. Lab work in am.    Issues for physician to address:   none     Number times ambulated in hallway past shift: 2, with PT and cane    Number of times OOB to chair past shift: up ad melly with sitter in room    Pain Management:  Current medication: tylenol  Patient states pain is manageable on current pain medication: NO    GI:    Current diet:  DIET CARDIAC Regular; Consistent Carb 2000kcal  DIET ONE TIME MESSAGE  DIET NUTRITIONAL SUPPLEMENTS PM Snack; Other    Tolerating current diet: YES  Passing flatus: YES  Last Bowel Movement: several days ago   Appearance:     Respiratory:    Incentive Spirometer at bedside: YES  Patient instructed on use: YES    Patient Safety:    Falls Score: 6  Bed Alarm On? Yes  Sitter?  No    Francesca Piña RN

## 2020-06-27 NOTE — PROGRESS NOTES
General Surgery End of Shift Nursing Note    Bedside shift change report given to Freedom Caceres RN (oncoming nurse) by Huma Jimenez RN (offgoing nurse). Report included the following information SBAR, Kardex, Procedure Summary, Intake/Output and Quality Measures. Shift worked:   [id number]     Summary of shift:    Patient afebrile and vitals stable. IV antibiotics per scheduled MAR. No complaints of pain. Patient ambulating per self in room. No significant events this shift. Issues for physician to address:   none     Number times ambulated in hallway past shift: 0    Number of times OOB to chair past shift: 0    Pain Management:  Current medication: tylenol  Patient states pain is manageable on current pain medication: NO    GI:    Current diet:  DIET CARDIAC Regular; Consistent Carb 2000kcal  DIET ONE TIME MESSAGE  DIET NUTRITIONAL SUPPLEMENTS PM Snack; Other    Tolerating current diet: YES  Passing flatus: YES  Last Bowel Movement: several days ago   Appearance:     Respiratory:    Incentive Spirometer at bedside: YES  Patient instructed on use: YES    Patient Safety:    Falls Score: 6  Bed Alarm On? Yes  Sitter?  No    Chiquita Watters

## 2020-06-27 NOTE — PROGRESS NOTES
Initial Nutrition Assessment:    INTERVENTIONS/RECOMMENDATIONS:   · Meals/Snacks: General/healthful diet:  Continue CCD for BG management and Cardiac per CHF hx. Enc po. ASSESSMENT:   6/27:  Chart reviewed; med noted for acute CHF. Hx of Dm, substance abuse. Admitted with SOB. PO intake is good >75% of meals. BG mildly elevated. LOS screen. Diet Order: Cardiac, Consistent carb  % Eaten:    Patient Vitals for the past 72 hrs:   % Diet Eaten   06/26/20 1203 100 %   06/26/20 0807 100 %   06/25/20 0950 25 %      %Supplement Intake:    Pertinent Medications: [x]Reviewed []Other: lasix, lantus, humalog  Pertinent Labs: [x]Reviewed []Other: , -199  Food Allergies: [x]None []Other   Last BM: 6/26   [x]Active     []Hyperactive  []Hypoactive       [] Absent BS  Skin:    [x] Intact   [] Incision  [] Breakdown  [] Other:    Anthropometrics:   Height: 5' 7\" (170.2 cm) Weight: 66.9 kg (147 lb 7.8 oz)   IBW (%IBW):   ( ) UBW (%UBW):   (  %)   Last Weight Metrics:  Weight Loss Metrics 6/25/2020   Today's Wt 147 lb 7.8 oz   BMI 23.1 kg/m2       BMI: Body mass index is 23.1 kg/m². This BMI is indicative of:   []Underweight    [x]Normal    []Overweight    [] Obesity   [] Extreme Obesity (BMI>40)     Estimated Nutrition Needs (Based on):   1829 Kcals/day(BMR (1407) x 1. 3AF) , 80 g(1.2 g/kg bw) Protein  Carbohydrate:  At Least 130 g/day  Fluids: per Cardiology   NUTRITION DIAGNOSES:   Problem:  No nutritional diagnosis at this time      Etiology: related to       Signs/Symptoms: as evidenced by        NUTRITION INTERVENTIONS:  Meals/Snacks: General/healthful diet                  GOAL:   PO intake >50% of meals next 5-7 days    LEARNING NEEDS (Diet, Food/Nutrient-Drug Interaction):    [x] None Identified   [] Identified and Education Provided/Documented   [] Identified and Pt declined/was not appropriate     Cultureal, Judaism, OR Ethnic Dietary Needs:    [x] None Identified   [] Identified and Addressed     [x] Interdisciplinary Care Plan Reviewed/Documented    [x] Discharge Planning:   Continue CCD for BG management and Cradiac per CHf    MONITORING /EVALUATION:   Food/Nutrient Intake Outcomes:  Total energy intake  Physical Signs/Symptoms Outcomes: Weight/weight change    NUTRITION RISK:    [x] Patient At Nutritional Risk        [] Patient Not At Nutritional Risk    PT SEEN FOR:    []  MD Consult: []Calorie Count      []Diabetic Diet Education        []Diet Education     []Electrolyte Management     []General Nutrition Management and Supplements     []Management of Tube Feeding     []TPN Recommendations    []  RN Referral:  []MST score >=2     []Enteral/Parenteral Nutrition PTA     []Pregnant: Gestational DM or Multigestation     []Pressure Ulcer/Wound Care needs        []  Low BMI  [x]  [facility], [address]  Pager [phone]  Weekend Pager [phone]

## 2020-06-27 NOTE — PROGRESS NOTES
Problem: Falls - Risk of  Goal: *Absence of Falls  Description: Document Willy Clifford Fall Risk and appropriate interventions in the flowsheet. Outcome: Progressing Towards Goal  Note: Fall Risk Interventions:  Mobility Interventions: Assess mobility with egress test, Patient to call before getting OOB, PT Consult for mobility concerns, PT Consult for assist device competence    Mentation Interventions: More frequent rounding    Medication Interventions: Teach patient to arise slowly    Elimination Interventions: Call light in reach, Patient to call for help with toileting needs, Stay With Me (per policy)    History of Falls Interventions: Investigate reason for fall, Room close to nurse's station         Problem: Patient Education: Go to Patient Education Activity  Goal: Patient/Family Education  Outcome: Progressing Towards Goal     Problem: Pressure Injury - Risk of  Goal: *Prevention of pressure injury  Description: Document Sarwat Scale and appropriate interventions in the flowsheet. Outcome: Progressing Towards Goal  Note: Pressure Injury Interventions:  Sensory Interventions: Pressure redistribution bed/mattress (bed type)    Moisture Interventions: Absorbent underpads, Maintain skin hydration (lotion/cream), Minimize layers    Activity Interventions: Increase time out of bed, Pressure redistribution bed/mattress(bed type), PT/OT evaluation    Mobility Interventions: HOB 30 degrees or less, Pressure redistribution bed/mattress (bed type), PT/OT evaluation    Nutrition Interventions: Document food/fluid/supplement intake                     Problem: Patient Education: Go to Patient Education Activity  Goal: Patient/Family Education  Outcome: Progressing Towards Goal     Problem: Diabetes Self-Management  Goal: *Disease process and treatment process  Description: Define diabetes and identify own type of diabetes; list 3 options for treating diabetes.   Outcome: Progressing Towards Goal  Goal: *Incorporating nutritional management into lifestyle  Description: Describe effect of type, amount and timing of food on blood glucose; list 3 methods for planning meals. Outcome: Progressing Towards Goal  Goal: *Incorporating physical activity into lifestyle  Description: State effect of exercise on blood glucose levels. Outcome: Progressing Towards Goal  Goal: *Developing strategies to promote health/change behavior  Description: Define the ABC's of diabetes; identify appropriate screenings, schedule and personal plan for screenings. Outcome: Progressing Towards Goal  Goal: *Using medications safely  Description: State effect of diabetes medications on diabetes; name diabetes medication taking, action and side effects. Outcome: Progressing Towards Goal  Goal: *Monitoring blood glucose, interpreting and using results  Description: Identify recommended blood glucose targets  and personal targets. Outcome: Progressing Towards Goal  Goal: *Prevention, detection, treatment of acute complications  Description: List symptoms of hyper- and hypoglycemia; describe how to treat low blood sugar and actions for lowering  high blood glucose level. Outcome: Progressing Towards Goal  Goal: *Prevention, detection and treatment of chronic complications  Description: Define the natural course of diabetes and describe the relationship of blood glucose levels to long term complications of diabetes.   Outcome: Progressing Towards Goal  Goal: *Developing strategies to address psychosocial issues  Description: Describe feelings about living with diabetes; identify support needed and support network  Outcome: Progressing Towards Goal  Goal: *Insulin pump training  Outcome: Progressing Towards Goal  Goal: *Sick day guidelines  Outcome: Progressing Towards Goal  Goal: *Patient Specific Goal (EDIT GOAL, INSERT TEXT)  Outcome: Progressing Towards Goal     Problem: Patient Education: Go to Patient Education Activity  Goal: Patient/Family Education  Outcome: Progressing Towards Goal     Problem: Heart Failure: Day 3  Goal: Off Pathway (Use only if patient is Off Pathway)  Outcome: Progressing Towards Goal  Goal: Activity/Safety  Outcome: Progressing Towards Goal  Goal: Diagnostic Test/Procedures  Outcome: Progressing Towards Goal  Goal: Nutrition/Diet  Outcome: Progressing Towards Goal  Goal: Discharge Planning  Outcome: Progressing Towards Goal  Goal: Medications  Outcome: Progressing Towards Goal  Goal: Respiratory  Outcome: Progressing Towards Goal  Goal: Treatments/Interventions/Procedures  Outcome: Progressing Towards Goal  Goal: Psychosocial  Outcome: Progressing Towards Goal  Goal: *Oxygen saturation within defined limits  Outcome: Progressing Towards Goal  Goal: *Hemodynamically stable  Outcome: Progressing Towards Goal  Goal: *Optimal pain control at patient's stated goal  Outcome: Progressing Towards Goal  Goal: *Anxiety reduced or absent  Outcome: Progressing Towards Goal  Goal: *Demonstrates progressive activity  Outcome: Progressing Towards Goal     Problem: Heart Failure: Day 4  Goal: Off Pathway (Use only if patient is Off Pathway)  Outcome: Progressing Towards Goal  Goal: Activity/Safety  Outcome: Progressing Towards Goal  Goal: Diagnostic Test/Procedures  Outcome: Progressing Towards Goal  Goal: Nutrition/Diet  Outcome: Progressing Towards Goal  Goal: Discharge Planning  Outcome: Progressing Towards Goal  Goal: Medications  Outcome: Progressing Towards Goal  Goal: Respiratory  Outcome: Progressing Towards Goal  Goal: Treatments/Interventions/Procedures  Outcome: Progressing Towards Goal  Goal: Psychosocial  Outcome: Progressing Towards Goal  Goal: *Oxygen saturation within defined limits  Outcome: Progressing Towards Goal  Goal: *Hemodynamically stable  Outcome: Progressing Towards Goal  Goal: *Optimal pain control at patient's stated goal  Outcome: Progressing Towards Goal  Goal: *Anxiety reduced or absent  Outcome: Progressing Towards Goal  Goal: *Demonstrates progressive activity  Outcome: Progressing Towards Goal     Problem: Heart Failure: Day 5  Goal: Off Pathway (Use only if patient is Off Pathway)  Outcome: Progressing Towards Goal  Goal: Activity/Safety  Outcome: Progressing Towards Goal  Goal: Diagnostic Test/Procedures  Outcome: Progressing Towards Goal  Goal: Nutrition/Diet  Outcome: Progressing Towards Goal  Goal: Discharge Planning  Outcome: Progressing Towards Goal  Goal: Medications  Outcome: Progressing Towards Goal  Goal: Respiratory  Outcome: Progressing Towards Goal  Goal: Treatments/Interventions/Procedures  Outcome: Progressing Towards Goal  Goal: Psychosocial  Outcome: Progressing Towards Goal     Problem: Heart Failure: Discharge Outcomes  Goal: *Demonstrates ability to perform prescribed activity without shortness of breath or discomfort  Outcome: Progressing Towards Goal  Goal: *Left ventricular function assessment completed prior to or during stay, or planned for post-discharge  Outcome: Progressing Towards Goal  Goal: *ACEI prescribed if LVEF less than 40% and no contraindications or ARB prescribed  Outcome: Progressing Towards Goal  Goal: *Verbalizes understanding and describes prescribed diet  Outcome: Progressing Towards Goal  Goal: *Verbalizes understanding/describes prescribed medications  Outcome: Progressing Towards Goal  Goal: *Describes available resources and support systems  Description: (eg: Home Health, Palliative Care, Advanced Medical Directive)  Outcome: Progressing Towards Goal  Goal: *Describes smoking cessation resources  Outcome: Progressing Towards Goal  Goal: *Understands and describes signs and symptoms to report to providers(Stroke Metric)  Outcome: Progressing Towards Goal  Goal: *Describes/verbalizes understanding of follow-up/return appt  Description: (eg: to physicians, diabetes treatment coordinator, and other resources  Outcome: Progressing Towards Goal  Goal: *Describes importance of continuing daily weights and changes to report to physician  Outcome: Progressing Towards Goal

## 2020-06-27 NOTE — PROGRESS NOTES
Hospitalist Progress Note    NAME: Attila Alberto   :  1965   MRN:  [id number]       Assessment / Plan:  SOB due to PNA and pulmonary edema  Diarrhea, resolved  Covid 19 ruled out  CXR showed extensive bilateral interstitial and patchy airspace disease. Differential diagnosis includes: Pulmonary vascular congestive changes and/or  infectious process. Repeat CXR showed interval worsening of diffuse interstitial and airspace opacities which again may be due to edema and/or pneumonia. probnp ~6K-->5K, procalcitonin 6.9-->2.03  Completed empiric abx for PNA and IV lasix for pulmonary edema. Repeat CXR showed resolution of pulm edema  Start PO lasix 40mg daily. Stop HCTZ at dscharge  Covid neg x2    Currently stable on RA    Fall   Head CT neg. AAOx3  Avoid sedating meds  PT/OT    Heroin withdrawal symptoms, resolved  Heroin abuse, counseled  S/p methadone 10mg x1. Symptomatic treatment, now symptoms resolved. CKD3   Septic joint, 2nd metatarsophalangeal joint of L foot  Osteomyelitis of 1st metatarsal L foot  XR L foot showed osteomyelitis of the distal second metatarsal and base of the proximal phalanx of the left second toe. Oblique distracted fracture of the base of the  proximal phalanx of the left second toe. Left great toe transmetatarsal amputation postoperative changes. Multiple ossific fragments adjacent to the residual distal first metatarsal. Residual great toe soft tissues are edematous. MR can be performed for further evaluation, as indicated. S/p amputation of second digit, left foot, with partial resection of the first and second metatarsal, also partial resection of viable proximal bone margins first and second metatarsal left foot. Path pending  Gram stain and Wound cx so far growing staph species. Path pending  Cont' empiric abx for now : vancomycin/rocephin  Podiatry following    Type 2 DM with neuropathy  Hyperglycemic.   Noncompliance with dietary restrictions per RN's report. Increase lantus for better BG control, counseled on dietary restrictions  Cont' bagapentin  SSI    HTN   Continue Beta blocker and  CCB      Hyperlipidemia  Cont' statin         Code: Full  DVT prophylaxis: heparin     Subjective:     Chief Complaint / Reason for Physician Visit  Pt in bed, NAD. He states he \"feels good\". Noted some old blood on dressing change today per RN. Pt has been walking on his feet most of the night. Family has brought additional food for pt. Afebrile. Hoping to go home soon. Overnight event with night RN. Review of Systems:  Symptom Y/N Comments  Symptom Y/N Comments   Fever/Chills n   Chest Pain n    Poor Appetite    Edema     Cough    Abdominal Pain n    Sputum    Joint Pain     SOB/[place]UZ n   Pruritis/Rash     Nausea/vomit    Tolerating PT/OT     Diarrhea    Tolerating Diet     Constipation    Other       Could NOT obtain due to:      Objective:     VITALS:   Last 24hrs VS reviewed since prior progress note. Most recent are:  Patient Vitals for the past 24 hrs:   Temp Pulse Resp BP SpO2   06/27/20 0815 98.1 °F (36.7 °C) (!) 105 17 (!) 157/94 97 %   06/27/20 0300 97.8 °F (36.6 °C) 87 17 137/82 99 %   06/26/20 2310 98.9 °F (37.2 °C) 90 17 139/76 100 %   06/26/20 2000 -- 89 -- -- --   06/26/20 1949 99.1 °F (37.3 °C) 91 16 112/76 98 %   06/26/20 1509 98.7 °F (37.1 °C) 90 16 117/69 100 %   06/26/20 1329 98.8 °F (37.1 °C) 89 16 112/69 100 %       Intake/Output Summary (Last 24 hours) at 6/27/2020 7688  Last data filed at 6/26/2020 1608  Gross per 24 hour   Intake 968 ml   Output 350 ml   Net 618 ml        PHYSICAL EXAM:  General:          pt in bed, no acute distress    EENT:              Anicteric sclerae. Resp:               No accessory muscle use, breathing pattern regular. CTA b/l  CV:                  NSR, no murmur. GI:                   No distended   Neurologic:      AAOx3, follow commands.   Moving all exts  Psych:             Not anxious nor agitated  Skin: No rashes. No jaundice    Reviewed most current lab test results and cultures  YES  Reviewed most current radiology test results   YES  Review and summation of old records today    NO  Reviewed patient's current orders and MAR    YES  PMH/SH reviewed - no change compared to H&P  ________________________________________________________________________  Care Plan discussed with:    Comments   Patient x    Family      RN x    Care Manager     Consultant                        Multidiciplinary team rounds were held today with , nursing, pharmacist and clinical coordinator. Patient's plan of care was discussed; medications were reviewed and discharge planning was addressed. ________________________________________________________________________  Total NON critical care TIME:  35  Minutes    Total CRITICAL CARE TIME Spent:   Minutes non procedure based      Comments   >50% of visit spent in counseling and coordination of care     ________________________________________________________________________  Rica Felty, MD     Procedures: see electronic medical records for all procedures/Xrays and details which were not copied into this note but were reviewed prior to creation of Plan. LABS:  I reviewed today's most current labs and imaging studies.   Pertinent labs include:  Recent Labs     06/27/20  0020 06/26/20  0503 06/25/20  0336   WBC 10.4 10.7 13.6*   HGB 9.4* 10.0* 11.0*   HCT 29.3* 30.5* 33.8*    370 422*     Recent Labs     06/27/20  0020 06/26/20  0503 06/25/20  0336   * 136 137   K 4.1 4.0 3.7    106 106   CO2 24 26 25   * 179* 66   BUN 33* 28* 24*   CREA 1.99* 1.94* 1.72*   CA 8.0* 7.9* 7.9*       Signed: Rica Felty, MD

## 2020-06-28 LAB
ANION GAP SERPL CALC-SCNC: 4 MMOL/L (ref 5–15)
BASOPHILS # BLD: 0.1 K/UL (ref 0–0.1)
BASOPHILS NFR BLD: 1 % (ref 0–1)
BUN SERPL-MCNC: 37 MG/DL (ref 6–20)
BUN/CREAT SERPL: 17 (ref 12–20)
CALCIUM SERPL-MCNC: 8.2 MG/DL (ref 8.5–10.1)
CHLORIDE SERPL-SCNC: 104 MMOL/L (ref 97–108)
CO2 SERPL-SCNC: 26 MMOL/L (ref 21–32)
CREAT SERPL-MCNC: 2.12 MG/DL (ref 0.7–1.3)
DIFFERENTIAL METHOD BLD: ABNORMAL
EOSINOPHIL # BLD: 0.4 K/UL (ref 0–0.4)
EOSINOPHIL NFR BLD: 4 % (ref 0–7)
ERYTHROCYTE [DISTWIDTH] IN BLOOD BY AUTOMATED COUNT: 16.1 % (ref 11.5–14.5)
GLUCOSE BLD STRIP.AUTO-MCNC: 118 MG/DL (ref 65–100)
GLUCOSE BLD STRIP.AUTO-MCNC: 158 MG/DL (ref 65–100)
GLUCOSE BLD STRIP.AUTO-MCNC: 166 MG/DL (ref 65–100)
GLUCOSE BLD STRIP.AUTO-MCNC: 166 MG/DL (ref 65–100)
GLUCOSE SERPL-MCNC: 269 MG/DL (ref 65–100)
HCT VFR BLD AUTO: 30 % (ref 36.6–50.3)
HGB BLD-MCNC: 9.5 G/DL (ref 12.1–17)
IMM GRANULOCYTES # BLD AUTO: 0.1 K/UL (ref 0–0.04)
IMM GRANULOCYTES NFR BLD AUTO: 1 % (ref 0–0.5)
LYMPHOCYTES # BLD: 2 K/UL (ref 0.8–3.5)
LYMPHOCYTES NFR BLD: 20 % (ref 12–49)
MCH RBC QN AUTO: 26.2 PG (ref 26–34)
MCHC RBC AUTO-ENTMCNC: 31.7 G/DL (ref 30–36.5)
MCV RBC AUTO: 82.6 FL (ref 80–99)
MONOCYTES # BLD: 0.8 K/UL (ref 0–1)
MONOCYTES NFR BLD: 8 % (ref 5–13)
NEUTS SEG # BLD: 6.5 K/UL (ref 1.8–8)
NEUTS SEG NFR BLD: 66 % (ref 32–75)
NRBC # BLD: 0 K/UL (ref 0–0.01)
NRBC BLD-RTO: 0 PER 100 WBC
PLATELET # BLD AUTO: 376 K/UL (ref 150–400)
PMV BLD AUTO: 9.8 FL (ref 8.9–12.9)
POTASSIUM SERPL-SCNC: 4.1 MMOL/L (ref 3.5–5.1)
RBC # BLD AUTO: 3.63 M/UL (ref 4.1–5.7)
SERVICE CMNT-IMP: ABNORMAL
SODIUM SERPL-SCNC: 134 MMOL/L (ref 136–145)
WBC # BLD AUTO: 9.8 K/UL (ref 4.1–11.1)

## 2020-06-28 PROCEDURE — 94760 N-INVAS EAR/PLS OXIMETRY 1: CPT

## 2020-06-28 PROCEDURE — 80048 BASIC METABOLIC PNL TOTAL CA: CPT

## 2020-06-28 PROCEDURE — 36415 COLL VENOUS BLD VENIPUNCTURE: CPT

## 2020-06-28 PROCEDURE — 74011250637 HC RX REV CODE- 250/637: Performed by: HOSPITALIST

## 2020-06-28 PROCEDURE — 85025 COMPLETE CBC W/AUTO DIFF WBC: CPT

## 2020-06-28 PROCEDURE — 74011250636 HC RX REV CODE- 250/636: Performed by: HOSPITALIST

## 2020-06-28 PROCEDURE — 82962 GLUCOSE BLOOD TEST: CPT

## 2020-06-28 PROCEDURE — 65270000029 HC RM PRIVATE

## 2020-06-28 PROCEDURE — 74011250636 HC RX REV CODE- 250/636: Performed by: INTERNAL MEDICINE

## 2020-06-28 PROCEDURE — 74011000258 HC RX REV CODE- 258: Performed by: INTERNAL MEDICINE

## 2020-06-28 PROCEDURE — 74011636637 HC RX REV CODE- 636/637: Performed by: INTERNAL MEDICINE

## 2020-06-28 PROCEDURE — 74011250637 HC RX REV CODE- 250/637: Performed by: INTERNAL MEDICINE

## 2020-06-28 PROCEDURE — 74011636637 HC RX REV CODE- 636/637: Performed by: HOSPITALIST

## 2020-06-28 RX ORDER — POLYETHYLENE GLYCOL 3350 17 G/17G
17 POWDER, FOR SOLUTION ORAL DAILY
Status: DISCONTINUED | OUTPATIENT
Start: 2020-06-28 | End: 2020-07-02 | Stop reason: HOSPADM

## 2020-06-28 RX ADMIN — HEPARIN SODIUM 5000 UNITS: 5000 INJECTION INTRAVENOUS; SUBCUTANEOUS at 09:33

## 2020-06-28 RX ADMIN — Medication 10 ML: at 21:15

## 2020-06-28 RX ADMIN — CEFAZOLIN 1 G: 1 INJECTION, POWDER, FOR SOLUTION INTRAMUSCULAR; INTRAVENOUS at 16:10

## 2020-06-28 RX ADMIN — INSULIN GLARGINE 15 UNITS: 100 INJECTION, SOLUTION SUBCUTANEOUS at 21:14

## 2020-06-28 RX ADMIN — CARVEDILOL 12.5 MG: 12.5 TABLET, FILM COATED ORAL at 16:12

## 2020-06-28 RX ADMIN — AMLODIPINE BESYLATE 10 MG: 5 TABLET ORAL at 11:02

## 2020-06-28 RX ADMIN — PREGABALIN 150 MG: 150 CAPSULE ORAL at 17:05

## 2020-06-28 RX ADMIN — HEPARIN SODIUM 5000 UNITS: 5000 INJECTION INTRAVENOUS; SUBCUTANEOUS at 17:07

## 2020-06-28 RX ADMIN — NITROGLYCERIN 1 INCH: 20 OINTMENT TOPICAL at 16:12

## 2020-06-28 RX ADMIN — ONDANSETRON 2 MG: 2 INJECTION INTRAMUSCULAR; INTRAVENOUS at 07:55

## 2020-06-28 RX ADMIN — CARVEDILOL 12.5 MG: 12.5 TABLET, FILM COATED ORAL at 11:02

## 2020-06-28 RX ADMIN — PREGABALIN 150 MG: 150 CAPSULE ORAL at 11:02

## 2020-06-28 RX ADMIN — CEFAZOLIN 1 G: 1 INJECTION, POWDER, FOR SOLUTION INTRAMUSCULAR; INTRAVENOUS at 08:01

## 2020-06-28 RX ADMIN — CEFAZOLIN 1 G: 1 INJECTION, POWDER, FOR SOLUTION INTRAMUSCULAR; INTRAVENOUS at 23:45

## 2020-06-28 RX ADMIN — DICYCLOMINE HYDROCHLORIDE 10 MG: 10 CAPSULE ORAL at 21:10

## 2020-06-28 RX ADMIN — HEPARIN SODIUM 5000 UNITS: 5000 INJECTION INTRAVENOUS; SUBCUTANEOUS at 02:55

## 2020-06-28 RX ADMIN — NITROGLYCERIN 1 INCH: 20 OINTMENT TOPICAL at 09:35

## 2020-06-28 RX ADMIN — DICYCLOMINE HYDROCHLORIDE 10 MG: 10 CAPSULE ORAL at 11:12

## 2020-06-28 RX ADMIN — INSULIN LISPRO 2 UNITS: 100 INJECTION, SOLUTION INTRAVENOUS; SUBCUTANEOUS at 11:31

## 2020-06-28 RX ADMIN — AMITRIPTYLINE HYDROCHLORIDE 25 MG: 25 TABLET, FILM COATED ORAL at 21:11

## 2020-06-28 RX ADMIN — Medication 10 ML: at 06:36

## 2020-06-28 RX ADMIN — Medication 10 ML: at 11:10

## 2020-06-28 RX ADMIN — NITROGLYCERIN 1 INCH: 20 OINTMENT TOPICAL at 21:12

## 2020-06-28 RX ADMIN — Medication 10 ML: at 07:57

## 2020-06-28 RX ADMIN — Medication 10 ML: at 18:39

## 2020-06-28 RX ADMIN — DICYCLOMINE HYDROCHLORIDE 10 MG: 10 CAPSULE ORAL at 17:05

## 2020-06-28 RX ADMIN — ATORVASTATIN CALCIUM 20 MG: 20 TABLET, FILM COATED ORAL at 11:02

## 2020-06-28 RX ADMIN — ONDANSETRON 2 MG: 2 INJECTION INTRAMUSCULAR; INTRAVENOUS at 18:39

## 2020-06-28 NOTE — PROGRESS NOTES
Spoke with patient yesterday, he had had a dressing change completed due to walking too much and had expression of fluid on the external portion of the dressing. This was reinforced, I reapplied dressing late Friday, he was stable after this. I informed him to limit ambulation this weekend significantly, keep level bed rest. Wounds look excellent, no dehiscence, no erythema, responding well. We are waiting for the final pathology demarcation proximal bone results.

## 2020-06-28 NOTE — PROGRESS NOTES
Message sent to Dr. Edgard Stoll re:Pt vomited this morning over 500 ml. Medicated with Zofran with some relief, still feeling nauseous, per night nurse, he ate a lot last night. His has great bowel sounds, has had some loose stool overnight, his family brought hi some chicken from home yesterday. Wondering if that is the culprit? He has Compazine ordered if he needs it ,I just wanted to let you know.  No other complaints of

## 2020-06-28 NOTE — PROGRESS NOTES
Hospitalist Progress Note    NAME: Erica James   :  1965   MRN:  [id number]       Assessment / Plan:  SOB due to PNA and pulmonary edema  Diarrhea, resolved  Covid 19 ruled out  CXR showed extensive bilateral interstitial and patchy airspace disease. Differential diagnosis includes: Pulmonary vascular congestive changes and/or  infectious process. Repeat CXR showed interval worsening of diffuse interstitial and airspace opacities which again may be due to edema and/or pneumonia. probnp ~6K-->5K, procalcitonin 6.9-->2.03  Completed empiric abx for PNA and IV lasix for pulmonary edema. Repeat CXR showed resolution of pulm edema  Hold lasix as cr creeping up  Covid neg x2    Currently stable on RA    Nausea/vomiting  Having BM. No concerns of bowel obstruction  IV antiemetics prn  CLD, advance as tolerated    Fall   Head CT neg. AAOx3  Avoid sedating meds  PT/OT    Heroin withdrawal symptoms, resolved  Heroin abuse, counseled  S/p methadone 10mg x1. Symptomatic treatment, now symptoms resolved. CKD3   Septic joint, 2nd metatarsophalangeal joint of L foot  Osteomyelitis of 1st metatarsal L foot  XR L foot showed osteomyelitis of the distal second metatarsal and base of the proximal phalanx of the left second toe. Oblique distracted fracture of the base of the  proximal phalanx of the left second toe. Left great toe transmetatarsal amputation postoperative changes. Multiple ossific fragments adjacent to the residual distal first metatarsal. Residual great toe soft tissues are edematous. MR can be performed for further evaluation, as indicated. S/p amputation of second digit, left foot, with partial resection of the first and second metatarsal, also partial resection of viable proximal bone margins first and second metatarsal left foot. Gram stain and Wound cx so far growing scant MSSA.   Path pending  Was on empiric abx with: vancomycin/rocephin, will transition to IV cefazolin  Podiatry following    Type 2 DM with neuropathy  BG better with lantus adjusted. Will cont' current lantus dose. Counseled on dietary restrictions  Cont' bagapentin  SSI    HTN   Continue Beta blocker and  CCB      Hyperlipidemia  Cont' statin         Code: Full  DVT prophylaxis: heparin     Subjective:     Chief Complaint / Reason for Physician Visit  Pt in bed, NAD. Vomit this AM.  Sleeping in bed but wakes up to voice. No evidence of bleeding from wound. Overnight event with night RN. Review of Systems:  Symptom Y/N Comments  Symptom Y/N Comments   Fever/Chills n   Chest Pain n    Poor Appetite    Edema     Cough    Abdominal Pain n    Sputum    Joint Pain     SOB/DE LA CRUZ n   Pruritis/Rash     Nausea/vomit    Tolerating PT/OT     Diarrhea    Tolerating Diet     Constipation    Other       Could NOT obtain due to:      Objective:     VITALS:   Last 24hrs VS reviewed since prior progress note. Most recent are:  Patient Vitals for the past 24 hrs:   Temp Pulse Resp BP SpO2   06/28/20 0254 97.8 °F (36.6 °C) 98 17 136/85 96 %   06/27/20 2314 97.6 °F (36.4 °C) 93 17 118/78 96 %   06/27/20 1927 98 °F (36.7 °C) 95 17 145/82 98 %   06/27/20 1609 98 °F (36.7 °C) (!) 101 17 (!) 145/93 99 %   06/27/20 1123 98.6 °F (37 °C) 96 16 133/82 98 %   06/27/20 0815 98.1 °F (36.7 °C) (!) 105 17 (!) 157/94 97 %       Intake/Output Summary (Last 24 hours) at 6/28/2020 0714  Last data filed at 6/28/2020 6551  Gross per 24 hour   Intake 1380 ml   Output 400 ml   Net 980 ml        PHYSICAL EXAM:  General:          pt in bed, no acute distress    EENT:              Anicteric sclerae. Resp:               No accessory muscle use, breathing pattern regular. CTA b/l  CV:                  NSR, no murmur. GI:                   No distended   Neurologic:      AAOx3, follow commands. Moving all exts  Psych:             Not anxious nor agitated  Skin:                No rashes.   No jaundice    Reviewed most current lab test results and cultures YES  Reviewed most current radiology test results   YES  Review and summation of old records today    NO  Reviewed patient's current orders and MAR    YES  PMH/SH reviewed - no change compared to H&P  ________________________________________________________________________  Care Plan discussed with:    Comments   Patient x    Family      RN x    Care Manager     Consultant                        Multidiciplinary team rounds were held today with , nursing, pharmacist and clinical coordinator. Patient's plan of care was discussed; medications were reviewed and discharge planning was addressed. ________________________________________________________________________  Total NON critical care TIME:  35  Minutes    Total CRITICAL CARE TIME Spent:   Minutes non procedure based      Comments   >50% of visit spent in counseling and coordination of care     ________________________________________________________________________  Ramone Sahu MD     Procedures: see electronic medical records for all procedures/Xrays and details which were not copied into this note but were reviewed prior to creation of Plan. LABS:  I reviewed today's most current labs and imaging studies.   Pertinent labs include:  Recent Labs     06/28/20 0235 06/27/20  0020 06/26/20  0503   WBC 9.8 10.4 10.7   HGB 9.5* 9.4* 10.0*   HCT 30.0* 29.3* 30.5*    377 370     Recent Labs     06/28/20  0235 06/27/20  0020 06/26/20  0503   * 134* 136   K 4.1 4.1 4.0    104 106   CO2 26 24 26   * 258* 179*   BUN 37* 33* 28*   CREA 2.12* 1.99* 1.94*   CA 8.2* 8.0* 7.9*       Signed: Ramone Sahu MD

## 2020-06-28 NOTE — PROGRESS NOTES
IV x 3 attempts, last one in posterior right forearm, but after flushing Zofran with saline, a knot formed , will escalate IV start to charge nurse.

## 2020-06-28 NOTE — PROGRESS NOTES
General Surgery End of Shift Nursing Note    Bedside shift change report given to Claude Crock, RN (oncoming nurse) by Coral Mcgregor RN (offgoing nurse). Report included the following information SBAR, Kardex, Procedure Summary, Intake/Output and Quality Measures. Shift worked:   [id number]     Summary of shift:    Patient afebrile and vitals stable. IV antibiotics per scheduled MAR. No complaints of pain. Patient having new issues with vomiting and loose stools this AM.  Blood sugar checked at 166. Issues for physician to address:   none     Number times ambulated in hallway past shift: 0    Number of times OOB to chair past shift: 0    Pain Management:  Current medication: tylenol  Patient states pain is manageable on current pain medication: NO    GI:    Current diet:  DIET CARDIAC Regular; Consistent Carb 2000kcal  DIET ONE TIME MESSAGE  DIET NUTRITIONAL SUPPLEMENTS PM Snack; Other    Tolerating current diet: YES  Passing flatus: YES  Last Bowel Movement: several days ago   Appearance:     Respiratory:    Incentive Spirometer at bedside: YES  Patient instructed on use: YES    Patient Safety:    Falls Score: 6  Bed Alarm On? Yes  Sitter?  No    April John

## 2020-06-28 NOTE — PROGRESS NOTES
Problem: Falls - Risk of  Goal: *Absence of Falls  Description: Document Chandra Meade Fall Risk and appropriate interventions in the flowsheet. Outcome: Progressing Towards Goal  Note: Fall Risk Interventions:  Mobility Interventions: Assess mobility with egress test, Patient to call before getting OOB, PT Consult for mobility concerns, PT Consult for assist device competence    Mentation Interventions: Adequate sleep, hydration, pain control, More frequent rounding    Medication Interventions: Teach patient to arise slowly    Elimination Interventions: Call light in reach, Patient to call for help with toileting needs    History of Falls Interventions: Evaluate medications/consider consulting pharmacy, Assess for delayed presentation/identification of injury for 48 hrs (comment for end date)         Problem: Patient Education: Go to Patient Education Activity  Goal: Patient/Family Education  Outcome: Progressing Towards Goal     Problem: Pressure Injury - Risk of  Goal: *Prevention of pressure injury  Description: Document Sarwat Scale and appropriate interventions in the flowsheet.   Outcome: Progressing Towards Goal  Note: Pressure Injury Interventions:  Sensory Interventions: Pressure redistribution bed/mattress (bed type)    Moisture Interventions: Absorbent underpads, Maintain skin hydration (lotion/cream), Minimize layers    Activity Interventions: Increase time out of bed, Pressure redistribution bed/mattress(bed type), PT/OT evaluation    Mobility Interventions: HOB 30 degrees or less, Pressure redistribution bed/mattress (bed type), PT/OT evaluation    Nutrition Interventions: Document food/fluid/supplement intake                     Problem: Patient Education: Go to Patient Education Activity  Goal: Patient/Family Education  Outcome: Progressing Towards Goal     Problem: Diabetes Self-Management  Goal: *Disease process and treatment process  Description: Define diabetes and identify own type of diabetes; list 3 options for treating diabetes. Outcome: Progressing Towards Goal  Goal: *Incorporating nutritional management into lifestyle  Description: Describe effect of type, amount and timing of food on blood glucose; list 3 methods for planning meals. Outcome: Progressing Towards Goal  Goal: *Incorporating physical activity into lifestyle  Description: State effect of exercise on blood glucose levels. Outcome: Progressing Towards Goal  Goal: *Developing strategies to promote health/change behavior  Description: Define the ABC's of diabetes; identify appropriate screenings, schedule and personal plan for screenings. Outcome: Progressing Towards Goal  Goal: *Using medications safely  Description: State effect of diabetes medications on diabetes; name diabetes medication taking, action and side effects. Outcome: Progressing Towards Goal  Goal: *Monitoring blood glucose, interpreting and using results  Description: Identify recommended blood glucose targets  and personal targets. Outcome: Progressing Towards Goal  Goal: *Prevention, detection, treatment of acute complications  Description: List symptoms of hyper- and hypoglycemia; describe how to treat low blood sugar and actions for lowering  high blood glucose level. Outcome: Progressing Towards Goal  Goal: *Prevention, detection and treatment of chronic complications  Description: Define the natural course of diabetes and describe the relationship of blood glucose levels to long term complications of diabetes.   Outcome: Progressing Towards Goal  Goal: *Developing strategies to address psychosocial issues  Description: Describe feelings about living with diabetes; identify support needed and support network  Outcome: Progressing Towards Goal  Goal: *Insulin pump training  Outcome: Progressing Towards Goal  Goal: *Sick day guidelines  Outcome: Progressing Towards Goal  Goal: *Patient Specific Goal (EDIT GOAL, INSERT TEXT)  Outcome: Progressing Towards Goal     Problem: Patient Education: Go to Patient Education Activity  Goal: Patient/Family Education  Outcome: Progressing Towards Goal     Problem: Heart Failure: Day 3  Goal: Off Pathway (Use only if patient is Off Pathway)  Outcome: Progressing Towards Goal  Goal: Activity/Safety  Outcome: Progressing Towards Goal  Goal: Diagnostic Test/Procedures  Outcome: Progressing Towards Goal  Goal: Nutrition/Diet  Outcome: Progressing Towards Goal  Goal: Discharge Planning  Outcome: Progressing Towards Goal  Goal: Medications  Outcome: Progressing Towards Goal  Goal: Respiratory  Outcome: Progressing Towards Goal  Goal: Treatments/Interventions/Procedures  Outcome: Progressing Towards Goal  Goal: Psychosocial  Outcome: Progressing Towards Goal  Goal: *Oxygen saturation within defined limits  Outcome: Progressing Towards Goal  Goal: *Hemodynamically stable  Outcome: Progressing Towards Goal  Goal: *Optimal pain control at patient's stated goal  Outcome: Progressing Towards Goal  Goal: *Anxiety reduced or absent  Outcome: Progressing Towards Goal  Goal: *Demonstrates progressive activity  Outcome: Progressing Towards Goal     Problem: Heart Failure: Day 4  Goal: Off Pathway (Use only if patient is Off Pathway)  Outcome: Progressing Towards Goal  Goal: Activity/Safety  Outcome: Progressing Towards Goal  Goal: Diagnostic Test/Procedures  Outcome: Progressing Towards Goal  Goal: Nutrition/Diet  Outcome: Progressing Towards Goal  Goal: Discharge Planning  Outcome: Progressing Towards Goal  Goal: Medications  Outcome: Progressing Towards Goal  Goal: Respiratory  Outcome: Progressing Towards Goal  Goal: Treatments/Interventions/Procedures  Outcome: Progressing Towards Goal  Goal: Psychosocial  Outcome: Progressing Towards Goal  Goal: *Oxygen saturation within defined limits  Outcome: Progressing Towards Goal  Goal: *Hemodynamically stable  Outcome: Progressing Towards Goal  Goal: *Optimal pain control at patient's stated goal  Outcome: Progressing Towards Goal  Goal: *Anxiety reduced or absent  Outcome: Progressing Towards Goal  Goal: *Demonstrates progressive activity  Outcome: Progressing Towards Goal     Problem: Heart Failure: Day 5  Goal: Off Pathway (Use only if patient is Off Pathway)  Outcome: Progressing Towards Goal  Goal: Activity/Safety  Outcome: Progressing Towards Goal  Goal: Diagnostic Test/Procedures  Outcome: Progressing Towards Goal  Goal: Nutrition/Diet  Outcome: Progressing Towards Goal  Goal: Discharge Planning  Outcome: Progressing Towards Goal  Goal: Medications  Outcome: Progressing Towards Goal  Goal: Respiratory  Outcome: Progressing Towards Goal  Goal: Treatments/Interventions/Procedures  Outcome: Progressing Towards Goal  Goal: Psychosocial  Outcome: Progressing Towards Goal     Problem: Heart Failure: Discharge Outcomes  Goal: *Demonstrates ability to perform prescribed activity without shortness of breath or discomfort  Outcome: Progressing Towards Goal  Goal: *Left ventricular function assessment completed prior to or during stay, or planned for post-discharge  Outcome: Progressing Towards Goal  Goal: *ACEI prescribed if LVEF less than 40% and no contraindications or ARB prescribed  Outcome: Progressing Towards Goal  Goal: *Verbalizes understanding and describes prescribed diet  Outcome: Progressing Towards Goal  Goal: *Verbalizes understanding/describes prescribed medications  Outcome: Progressing Towards Goal  Goal: *Describes available resources and support systems  Description: (eg: Home Health, Palliative Care, Advanced Medical Directive)  Outcome: Progressing Towards Goal  Goal: *Describes smoking cessation resources  Outcome: Progressing Towards Goal  Goal: *Understands and describes signs and symptoms to report to providers(Stroke Metric)  Outcome: Progressing Towards Goal  Goal: *Describes/verbalizes understanding of follow-up/return appt  Description: (eg: to physicians, diabetes treatment coordinator, and other resources  Outcome: Progressing Towards Goal  Goal: *Describes importance of continuing daily weights and changes to report to physician  Outcome: Progressing Towards Goal

## 2020-06-28 NOTE — PROGRESS NOTES
Problem: Falls - Risk of  Goal: *Absence of Falls  Description: Document Nicole Dear Fall Risk and appropriate interventions in the flowsheet. Outcome: Progressing Towards Goal  Note: Fall Risk Interventions:  Mobility Interventions: Assess mobility with egress test, Utilize walker, cane, or other assistive device    Mentation Interventions: Adequate sleep, hydration, pain control    Medication Interventions: Teach patient to arise slowly    Elimination Interventions: Call light in reach    History of Falls Interventions:  Investigate reason for fall

## 2020-06-28 NOTE — PROGRESS NOTES
General Surgery End of Shift Nursing Note    Bedside shift change report given to Woods RN (oncoming nurse) by Angeles Dempsey RN (offgoing nurse). Report included the following information SBAR, Kardex, Procedure Summary, Intake/Output and Quality Measures. Shift worked:   7a to 7p     Summary of shift:  Diet reduced to clear liquids briefly today due to N/V this am,  Diet has been increased again to his normal food, he is still taking it easy with food today. .      Issues for physician to address:   none     Number times ambulated in hallway past shift: up ad melly    Number of times OOB to chair past shift: up ad melly     Pain Management:  Current medication: tylenol  Patient states pain is manageable on current pain medication: NO    GI:    Current diet:  DIET ONE TIME MESSAGE  DIET NUTRITIONAL SUPPLEMENTS PM Snack; Other  DIET DIABETIC CLEAR LIQUID    Tolerating current diet: YES  Passing flatus: YES  Last Bowel Movement: Today 6/28/20   Appearance:     Respiratory:    Incentive Spirometer at bedside: YES  Patient instructed on use: YES    Patient Safety:    Falls Score: 6  Bed Alarm On? Yes  Sitter?  No    Miriam Parham RN

## 2020-06-29 LAB
ANION GAP SERPL CALC-SCNC: 7 MMOL/L (ref 5–15)
BUN SERPL-MCNC: 34 MG/DL (ref 6–20)
BUN/CREAT SERPL: 18 (ref 12–20)
CALCIUM SERPL-MCNC: 8.8 MG/DL (ref 8.5–10.1)
CHLORIDE SERPL-SCNC: 106 MMOL/L (ref 97–108)
CO2 SERPL-SCNC: 19 MMOL/L (ref 21–32)
CREAT SERPL-MCNC: 1.93 MG/DL (ref 0.7–1.3)
GLUCOSE BLD STRIP.AUTO-MCNC: 111 MG/DL (ref 65–100)
GLUCOSE BLD STRIP.AUTO-MCNC: 158 MG/DL (ref 65–100)
GLUCOSE BLD STRIP.AUTO-MCNC: 190 MG/DL (ref 65–100)
GLUCOSE BLD STRIP.AUTO-MCNC: 284 MG/DL (ref 65–100)
GLUCOSE SERPL-MCNC: 119 MG/DL (ref 65–100)
POTASSIUM SERPL-SCNC: 4.9 MMOL/L (ref 3.5–5.1)
SERVICE CMNT-IMP: ABNORMAL
SODIUM SERPL-SCNC: 132 MMOL/L (ref 136–145)

## 2020-06-29 PROCEDURE — 65270000029 HC RM PRIVATE

## 2020-06-29 PROCEDURE — 74011250636 HC RX REV CODE- 250/636: Performed by: INTERNAL MEDICINE

## 2020-06-29 PROCEDURE — 74011250637 HC RX REV CODE- 250/637: Performed by: HOSPITALIST

## 2020-06-29 PROCEDURE — 80048 BASIC METABOLIC PNL TOTAL CA: CPT

## 2020-06-29 PROCEDURE — 36415 COLL VENOUS BLD VENIPUNCTURE: CPT

## 2020-06-29 PROCEDURE — 74011636637 HC RX REV CODE- 636/637: Performed by: INTERNAL MEDICINE

## 2020-06-29 PROCEDURE — 82962 GLUCOSE BLOOD TEST: CPT

## 2020-06-29 PROCEDURE — 74011250637 HC RX REV CODE- 250/637: Performed by: INTERNAL MEDICINE

## 2020-06-29 PROCEDURE — 74011250636 HC RX REV CODE- 250/636: Performed by: HOSPITALIST

## 2020-06-29 PROCEDURE — 74011000258 HC RX REV CODE- 258: Performed by: INTERNAL MEDICINE

## 2020-06-29 PROCEDURE — 74011636637 HC RX REV CODE- 636/637: Performed by: HOSPITALIST

## 2020-06-29 PROCEDURE — 94760 N-INVAS EAR/PLS OXIMETRY 1: CPT

## 2020-06-29 RX ORDER — FAMOTIDINE 40 MG/5ML
20 POWDER, FOR SUSPENSION ORAL 2 TIMES DAILY
Status: DISCONTINUED | OUTPATIENT
Start: 2020-06-29 | End: 2020-07-02 | Stop reason: HOSPADM

## 2020-06-29 RX ADMIN — CEFAZOLIN 1 G: 1 INJECTION, POWDER, FOR SOLUTION INTRAMUSCULAR; INTRAVENOUS at 17:44

## 2020-06-29 RX ADMIN — DICYCLOMINE HYDROCHLORIDE 10 MG: 10 CAPSULE ORAL at 09:55

## 2020-06-29 RX ADMIN — AMITRIPTYLINE HYDROCHLORIDE 25 MG: 25 TABLET, FILM COATED ORAL at 21:44

## 2020-06-29 RX ADMIN — NITROGLYCERIN 1 INCH: 20 OINTMENT TOPICAL at 10:01

## 2020-06-29 RX ADMIN — NITROGLYCERIN 1 INCH: 20 OINTMENT TOPICAL at 16:00

## 2020-06-29 RX ADMIN — HEPARIN SODIUM 5000 UNITS: 5000 INJECTION INTRAVENOUS; SUBCUTANEOUS at 09:54

## 2020-06-29 RX ADMIN — CEFAZOLIN 1 G: 1 INJECTION, POWDER, FOR SOLUTION INTRAMUSCULAR; INTRAVENOUS at 10:03

## 2020-06-29 RX ADMIN — CEFAZOLIN 1 G: 1 INJECTION, POWDER, FOR SOLUTION INTRAMUSCULAR; INTRAVENOUS at 23:31

## 2020-06-29 RX ADMIN — FAMOTIDINE 20 MG: 40 POWDER, FOR SUSPENSION ORAL at 22:25

## 2020-06-29 RX ADMIN — AMLODIPINE BESYLATE 10 MG: 5 TABLET ORAL at 09:55

## 2020-06-29 RX ADMIN — Medication 10 ML: at 21:46

## 2020-06-29 RX ADMIN — INSULIN GLARGINE 15 UNITS: 100 INJECTION, SOLUTION SUBCUTANEOUS at 21:44

## 2020-06-29 RX ADMIN — CARVEDILOL 12.5 MG: 12.5 TABLET, FILM COATED ORAL at 09:55

## 2020-06-29 RX ADMIN — DICYCLOMINE HYDROCHLORIDE 10 MG: 10 CAPSULE ORAL at 21:45

## 2020-06-29 RX ADMIN — Medication 10 ML: at 14:00

## 2020-06-29 RX ADMIN — ONDANSETRON 2 MG: 2 INJECTION INTRAMUSCULAR; INTRAVENOUS at 20:29

## 2020-06-29 RX ADMIN — INSULIN LISPRO 2 UNITS: 100 INJECTION, SOLUTION INTRAVENOUS; SUBCUTANEOUS at 07:30

## 2020-06-29 RX ADMIN — PREGABALIN 150 MG: 150 CAPSULE ORAL at 17:42

## 2020-06-29 RX ADMIN — HEPARIN SODIUM 5000 UNITS: 5000 INJECTION INTRAVENOUS; SUBCUTANEOUS at 16:00

## 2020-06-29 RX ADMIN — ATORVASTATIN CALCIUM 20 MG: 20 TABLET, FILM COATED ORAL at 09:55

## 2020-06-29 RX ADMIN — DICYCLOMINE HYDROCHLORIDE 10 MG: 10 CAPSULE ORAL at 17:42

## 2020-06-29 RX ADMIN — NITROGLYCERIN 1 INCH: 20 OINTMENT TOPICAL at 21:46

## 2020-06-29 RX ADMIN — INSULIN LISPRO 2 UNITS: 100 INJECTION, SOLUTION INTRAVENOUS; SUBCUTANEOUS at 17:42

## 2020-06-29 RX ADMIN — PREGABALIN 150 MG: 150 CAPSULE ORAL at 09:55

## 2020-06-29 RX ADMIN — DICYCLOMINE HYDROCHLORIDE 10 MG: 10 CAPSULE ORAL at 12:22

## 2020-06-29 RX ADMIN — INSULIN LISPRO 5 UNITS: 100 INJECTION, SOLUTION INTRAVENOUS; SUBCUTANEOUS at 12:22

## 2020-06-29 RX ADMIN — HEPARIN SODIUM 5000 UNITS: 5000 INJECTION INTRAVENOUS; SUBCUTANEOUS at 03:59

## 2020-06-29 RX ADMIN — CARVEDILOL 12.5 MG: 12.5 TABLET, FILM COATED ORAL at 17:44

## 2020-06-29 NOTE — PROGRESS NOTES
Hospitalist Progress Note    NAME: Linnea Salter   :  1965   MRN:  [id number]       Assessment / Plan:  SOB due to PNA and pulmonary edema  Diarrhea, resolved  Covid 19 ruled out  CXR showed extensive bilateral interstitial and patchy airspace disease. Differential diagnosis includes: Pulmonary vascular congestive changes and/or  infectious process. Repeat CXR showed interval worsening of diffuse interstitial and airspace opacities which again may be due to edema and/or pneumonia. probnp ~6K-->5K, procalcitonin 6.9-->2.03  Completed empiric abx for PNA and IV lasix for pulmonary edema. Repeat CXR showed resolution of pulm edema  Received IV lasix with resolution of pulmonary edema  Covid neg x2    Currently stable on RA    Nausea/vomiting, resolved. Tolerating po intake. Fall   Head CT neg. AAOx3  Avoid sedating meds  PT/OT    Heroin withdrawal symptoms, resolved  Heroin abuse, counseled  S/p methadone 10mg x1. Symptomatic treatment, now symptoms resolved. CKD3   Septic joint, 2nd metatarsophalangeal joint of L foot  Osteomyelitis of 1st metatarsal L foot  XR L foot showed osteomyelitis of the distal second metatarsal and base of the proximal phalanx of the left second toe. Oblique distracted fracture of the base of the  proximal phalanx of the left second toe. Left great toe transmetatarsal amputation postoperative changes. Multiple ossific fragments adjacent to the residual distal first metatarsal. Residual great toe soft tissues are edematous. MR can be performed for further evaluation, as indicated. S/p amputation of second digit, left foot, with partial resection of the first and second metatarsal, also partial resection of viable proximal bone margins first and second metatarsal left foot. Gram stain and Wound cx so far growing scant MSSA. Path pending  Was empiric abx with: vancomycin/rocephin, transitioned to IV cefazolin.   Can change to Keflex at discharge, pending bone path.  Podiatry following    Type 2 DM with neuropathy  BG better with lantus adjusted. Will cont' current lantus dose. Counseled on dietary restrictions  Cont' bagapentin  SSI    HTN   Continue Beta blocker and  CCB      Hyperlipidemia  Cont' statin         Code: Full  DVT prophylaxis: heparin     Subjective:     Chief Complaint / Reason for Physician Visit  Pt in bed, NAD. Seen walking in the hallway. Back to room eating breakfast.  Feeling better. Discussed with RN. Review of Systems:  Symptom Y/N Comments  Symptom Y/N Comments   Fever/Chills n   Chest Pain n    Poor Appetite    Edema     Cough    Abdominal Pain n    Sputum    Joint Pain     SOB/DE LA CRUZ n   Pruritis/Rash     Nausea/vomit    Tolerating PT/OT     Diarrhea    Tolerating Diet     Constipation    Other       Could NOT obtain due to:      Objective:     VITALS:   Last 24hrs VS reviewed since prior progress note. Most recent are:  Patient Vitals for the past 24 hrs:   Temp Pulse Resp BP SpO2   06/29/20 0301 97.7 °F (36.5 °C) 92 16 123/84 97 %   06/28/20 2223 98.4 °F (36.9 °C) 84 16 111/75 99 %   06/28/20 2020 98.5 °F (36.9 °C) 92 16 104/80 98 %   06/28/20 1557 98.1 °F (36.7 °C) 96 17 120/83 97 %   06/28/20 1453 -- 100 -- -- --   06/28/20 1200 -- (!) 106 -- -- --   06/28/20 1101 98 °F (36.7 °C) (!) 106 17 140/90 97 %   06/28/20 0935 -- (!) 105 -- 134/78 --   06/28/20 0809 98.8 °F (37.1 °C) (!) 101 17 146/85 98 %       Intake/Output Summary (Last 24 hours) at 6/29/2020 0807  Last data filed at 6/29/2020 5362  Gross per 24 hour   Intake 1710 ml   Output --   Net 1710 ml        PHYSICAL EXAM:  General:         pt eating breakfast in room, no acute distress    EENT:              Anicteric sclerae. Resp:               No accessory muscle use, breathing pattern regular. CTA b/l  CV:                  NSR, no murmur. GI:                   No distended   Neurologic:      AAOx3, follow commands.   Moving all exts  Psych:             Not anxious nor agitated  Skin:                No rashes. No jaundice    Reviewed most current lab test results and cultures  YES  Reviewed most current radiology test results   YES  Review and summation of old records today    NO  Reviewed patient's current orders and MAR    YES  PMH/SH reviewed - no change compared to H&P  ________________________________________________________________________  Care Plan discussed with:    Comments   Patient x    Family      RN x    Care Manager     Consultant                        Multidiciplinary team rounds were held today with , nursing, pharmacist and clinical coordinator. Patient's plan of care was discussed; medications were reviewed and discharge planning was addressed. ________________________________________________________________________  Total NON critical care TIME:  35  Minutes    Total CRITICAL CARE TIME Spent:   Minutes non procedure based      Comments   >50% of visit spent in counseling and coordination of care     ________________________________________________________________________  Mery Mckeon MD     Procedures: see electronic medical records for all procedures/Xrays and details which were not copied into this note but were reviewed prior to creation of Plan. LABS:  I reviewed today's most current labs and imaging studies.   Pertinent labs include:  Recent Labs     06/28/20  0235 06/27/20  0020   WBC 9.8 10.4   HGB 9.5* 9.4*   HCT 30.0* 29.3*    377     Recent Labs     06/29/20  0333 06/28/20  0235 06/27/20  0020   * 134* 134*   K 4.9 4.1 4.1    104 104   CO2 19* 26 24   * 269* 258*   BUN 34* 37* 33*   CREA 1.93* 2.12* 1.99*   CA 8.8 8.2* 8.0*       Signed: Mery Mckeon MD

## 2020-06-29 NOTE — PROGRESS NOTES
General Surgery End of Shift Nursing Note    Bedside shift change report given to Juan Carlos (oncoming nurse) by Luanne Romberg RN (offgoing nurse). Report included the following information SBAR, Kardex, Intake/Output, MAR and Recent Results.         Riddhi How

## 2020-06-29 NOTE — PROGRESS NOTES
Bedside shift change report given to [facility] (oncoming nurse) by [place] Patricia (offgoing nurse). Report included the following information SBAR, Kardex and MAR.

## 2020-06-29 NOTE — PROGRESS NOTES
The patient seen today- stable, alert and oriented x3, no distress. Dressing change. Overall, the wound stable, sutures intact. No erythema, decreased edema, good granular tissue seen at the distal suture site, no purulence, no foul odor. Waiting on pathology results. The need for home health nursing, strict limitation weightbearing upon discharge-discussed with patient thoroughly. The need for strict control blood sugars and lowering A1c, ceasing of tobacco use, discussed with patient. These factors certainly can promote further consultations in the foot, and overall.

## 2020-06-30 LAB
ANION GAP SERPL CALC-SCNC: 6 MMOL/L (ref 5–15)
BUN SERPL-MCNC: 40 MG/DL (ref 6–20)
BUN/CREAT SERPL: 20 (ref 12–20)
CALCIUM SERPL-MCNC: 8.5 MG/DL (ref 8.5–10.1)
CHLORIDE SERPL-SCNC: 105 MMOL/L (ref 97–108)
CO2 SERPL-SCNC: 23 MMOL/L (ref 21–32)
CREAT SERPL-MCNC: 2.02 MG/DL (ref 0.7–1.3)
GLUCOSE BLD STRIP.AUTO-MCNC: 111 MG/DL (ref 65–100)
GLUCOSE BLD STRIP.AUTO-MCNC: 128 MG/DL (ref 65–100)
GLUCOSE BLD STRIP.AUTO-MCNC: 149 MG/DL (ref 65–100)
GLUCOSE BLD STRIP.AUTO-MCNC: 75 MG/DL (ref 65–100)
GLUCOSE SERPL-MCNC: 98 MG/DL (ref 65–100)
POTASSIUM SERPL-SCNC: 4.9 MMOL/L (ref 3.5–5.1)
SERVICE CMNT-IMP: ABNORMAL
SERVICE CMNT-IMP: NORMAL
SODIUM SERPL-SCNC: 134 MMOL/L (ref 136–145)

## 2020-06-30 PROCEDURE — 65270000029 HC RM PRIVATE

## 2020-06-30 PROCEDURE — 74011250637 HC RX REV CODE- 250/637: Performed by: INTERNAL MEDICINE

## 2020-06-30 PROCEDURE — 74011636637 HC RX REV CODE- 636/637: Performed by: INTERNAL MEDICINE

## 2020-06-30 PROCEDURE — 82962 GLUCOSE BLOOD TEST: CPT

## 2020-06-30 PROCEDURE — 74011000258 HC RX REV CODE- 258: Performed by: INTERNAL MEDICINE

## 2020-06-30 PROCEDURE — 80048 BASIC METABOLIC PNL TOTAL CA: CPT

## 2020-06-30 PROCEDURE — 74011250637 HC RX REV CODE- 250/637: Performed by: HOSPITALIST

## 2020-06-30 PROCEDURE — 94760 N-INVAS EAR/PLS OXIMETRY 1: CPT

## 2020-06-30 PROCEDURE — 74011250636 HC RX REV CODE- 250/636: Performed by: HOSPITALIST

## 2020-06-30 PROCEDURE — 74011250636 HC RX REV CODE- 250/636: Performed by: INTERNAL MEDICINE

## 2020-06-30 PROCEDURE — 36415 COLL VENOUS BLD VENIPUNCTURE: CPT

## 2020-06-30 RX ORDER — FUROSEMIDE 10 MG/ML
40 INJECTION INTRAMUSCULAR; INTRAVENOUS DAILY
Status: DISCONTINUED | OUTPATIENT
Start: 2020-06-30 | End: 2020-07-01

## 2020-06-30 RX ADMIN — CEFAZOLIN 1 G: 1 INJECTION, POWDER, FOR SOLUTION INTRAMUSCULAR; INTRAVENOUS at 16:16

## 2020-06-30 RX ADMIN — PREGABALIN 150 MG: 150 CAPSULE ORAL at 17:38

## 2020-06-30 RX ADMIN — PREGABALIN 150 MG: 150 CAPSULE ORAL at 08:22

## 2020-06-30 RX ADMIN — AMLODIPINE BESYLATE 10 MG: 5 TABLET ORAL at 08:22

## 2020-06-30 RX ADMIN — DICYCLOMINE HYDROCHLORIDE 10 MG: 10 CAPSULE ORAL at 13:16

## 2020-06-30 RX ADMIN — FAMOTIDINE 20 MG: 40 POWDER, FOR SUSPENSION ORAL at 21:06

## 2020-06-30 RX ADMIN — NITROGLYCERIN 1 INCH: 20 OINTMENT TOPICAL at 08:25

## 2020-06-30 RX ADMIN — Medication 10 ML: at 09:52

## 2020-06-30 RX ADMIN — ATORVASTATIN CALCIUM 20 MG: 20 TABLET, FILM COATED ORAL at 08:22

## 2020-06-30 RX ADMIN — HEPARIN SODIUM 5000 UNITS: 5000 INJECTION INTRAVENOUS; SUBCUTANEOUS at 09:49

## 2020-06-30 RX ADMIN — HEPARIN SODIUM 5000 UNITS: 5000 INJECTION INTRAVENOUS; SUBCUTANEOUS at 01:32

## 2020-06-30 RX ADMIN — FAMOTIDINE 20 MG: 40 POWDER, FOR SUSPENSION ORAL at 09:49

## 2020-06-30 RX ADMIN — FUROSEMIDE 40 MG: 10 INJECTION, SOLUTION INTRAMUSCULAR; INTRAVENOUS at 09:51

## 2020-06-30 RX ADMIN — INSULIN GLARGINE 15 UNITS: 100 INJECTION, SOLUTION SUBCUTANEOUS at 21:08

## 2020-06-30 RX ADMIN — HEPARIN SODIUM 5000 UNITS: 5000 INJECTION INTRAVENOUS; SUBCUTANEOUS at 17:39

## 2020-06-30 RX ADMIN — Medication 10 ML: at 06:15

## 2020-06-30 RX ADMIN — DICYCLOMINE HYDROCHLORIDE 10 MG: 10 CAPSULE ORAL at 21:07

## 2020-06-30 RX ADMIN — CARVEDILOL 12.5 MG: 12.5 TABLET, FILM COATED ORAL at 16:19

## 2020-06-30 RX ADMIN — CEFAZOLIN 1 G: 1 INJECTION, POWDER, FOR SOLUTION INTRAMUSCULAR; INTRAVENOUS at 23:37

## 2020-06-30 RX ADMIN — NITROGLYCERIN 1 INCH: 20 OINTMENT TOPICAL at 16:19

## 2020-06-30 RX ADMIN — NITROGLYCERIN 1 INCH: 20 OINTMENT TOPICAL at 21:05

## 2020-06-30 RX ADMIN — CARVEDILOL 12.5 MG: 12.5 TABLET, FILM COATED ORAL at 08:22

## 2020-06-30 RX ADMIN — Medication 10 ML: at 13:16

## 2020-06-30 RX ADMIN — CEFAZOLIN 1 G: 1 INJECTION, POWDER, FOR SOLUTION INTRAMUSCULAR; INTRAVENOUS at 08:27

## 2020-06-30 RX ADMIN — DICYCLOMINE HYDROCHLORIDE 10 MG: 10 CAPSULE ORAL at 08:22

## 2020-06-30 RX ADMIN — AMITRIPTYLINE HYDROCHLORIDE 25 MG: 25 TABLET, FILM COATED ORAL at 21:07

## 2020-06-30 RX ADMIN — Medication 10 ML: at 21:09

## 2020-06-30 RX ADMIN — DICYCLOMINE HYDROCHLORIDE 10 MG: 10 CAPSULE ORAL at 17:38

## 2020-06-30 NOTE — PROGRESS NOTES
General Surgery End of Shift Nursing Note    Bedside shift change report given to Anna Goode (oncoming nurse) by Renée Liang (offgoing nurse). Report included the following information SBAR, Kardex, Intake/Output, MAR and Recent Results.         Radha Guardado

## 2020-06-30 NOTE — PROGRESS NOTES
Pathology is currently in Final Reading, and should be submitted tonight or tomorrow, Final Diagnosis.

## 2020-06-30 NOTE — PROGRESS NOTES
Spiritual Care Assessment/Progress Note  [facility]      NAME: Smith Lpoez      MRN: [id number]  AGE: 48 y.o. SEX: male  Mu-ism Affiliation: No preference   Language: English     6/30/2020     Total Time (in minutes): 5     Spiritual Assessment begun in [facility] GENERAL SURGERY through conversation with:         []Patient        [] Family    [] Friend(s)        Reason for Consult: Initial visit     Spiritual beliefs: (Please include comment if needed)     [] Identifies with a cookie tradition:         [] Supported by a cookie community:            [] Claims no spiritual orientation:           [] Seeking spiritual identity:                [] Adheres to an individual form of spirituality:           [x] Not able to assess:                           Identified resources for coping:      [] Prayer                               [] Music                  [] Guided Imagery     [] Family/friends                 [] Pet visits     [] Devotional reading                         [] Unknown     [] Other:                                            Interventions offered during this visit: (See comments for more details)                Plan of Care:     [] Support spiritual and/or cultural needs    [] Support AMD and/or advance care planning process      [] Support grieving process   [] Coordinate Rites and/or Rituals    [] Coordination with community clergy   [] No spiritual needs identified at this time   [] Detailed Plan of Care below (See Comments)  [] Make referral to Music Therapy  [] Make referral to Pet Therapy     [] Make referral to Addiction services  [] Make referral to [facility]  [] Make referral to Spiritual Care Partner  [] No future visits requested        [] Follow up visits as needed     Comments: Attempted Initial Spiritual Assessment, however pt was unable to be assessed at this time. Page Spiritual Care Services for any spiritual or emotional support needed. Savana Lemos MDiv. Staff   Request  Support/Spiritual Care Services via [facility]

## 2020-06-30 NOTE — PROGRESS NOTES
Bedside and Verbal shift change report given to Mary Gutierrez RN (oncoming nurse) by Daren Manuel RN (offgoing nurse). Report included the following information SBAR and Kardex.

## 2020-06-30 NOTE — PROGRESS NOTES
Hospitalist Progress Note    NAME: Gene Savage   :  1965   MRN:  [id number]       Assessment / Plan:  SOB due to PNA and pulmonary edema  Diarrhea, resolved  Covid 19 ruled out  CXR showed extensive bilateral interstitial and patchy airspace disease. Differential diagnosis includes: Pulmonary vascular congestive changes and/or  infectious process. Repeat CXR showed interval worsening of diffuse interstitial and airspace opacities which again may be due to edema and/or pneumonia. probnp ~6K-->5K, procalcitonin 6.9-->2.03  Completed empiric abx for PNA and IV lasix for pulmonary edema. Repeat CXR showed resolution of pulm edema  Received IV lasix with resolution of pulmonary edema  Covid neg x2    Currently stable on RA    B/l LE edema  Pt has b/l LE that is worst today. Will resume IV lasix 40mg daily. Can go home on PO lasix at discharge in place of HCTZ. Stop amlodipine  Discussed fluid and Na restrictions with patient. Heroin withdrawal symptoms, resolved  Heroin abuse, counseled  S/p methadone 10mg x1. Symptomatic treatment, now symptoms resolved. CKD3   Septic joint, 2nd metatarsophalangeal joint of L foot  Osteomyelitis of 1st metatarsal L foot  XR L foot showed osteomyelitis of the distal second metatarsal and base of the proximal phalanx of the left second toe. Oblique distracted fracture of the base of the  proximal phalanx of the left second toe. Left great toe transmetatarsal amputation postoperative changes. Multiple ossific fragments adjacent to the residual distal first metatarsal. Residual great toe soft tissues are edematous. MR can be performed for further evaluation, as indicated. S/p amputation of second digit, left foot, with partial resection of the first and second metatarsal, also partial resection of viable proximal bone margins first and second metatarsal left foot. Gram stain and Wound cx so far growing scant MSSA.   Path is still pending  Was empiric abx with: vancomycin/rocephin, transitioned to IV cefazolin. Can change to Keflex at discharge, pending bone path. Podiatry following    Type 2 DM with neuropathy  BG better with lantus adjusted. Will cont' current lantus dose. Counseled on dietary restrictions  Cont' bagapentin  SSI    HTN   Continue Beta blocker and  CCB      Hyperlipidemia  Cont' statin     Nausea/vomiting, resolved. Tolerating po intake. Fall 6/24  Head CT neg. AAOx3  Avoid sedating meds  PT/OT        Code: Full  DVT prophylaxis: heparin     Subjective:     Chief Complaint / Reason for Physician Visit  Pt in bed, NAD. C/o of b/l LE edema. Discussed with RN. Review of Systems:  Symptom Y/N Comments  Symptom Y/N Comments   Fever/Chills n   Chest Pain n    Poor Appetite    Edema     Cough    Abdominal Pain n    Sputum    Joint Pain     SOB/DE LA CRUZ n   Pruritis/Rash     Nausea/vomit    Tolerating PT/OT     Diarrhea    Tolerating Diet     Constipation    Other       Could NOT obtain due to:      Objective:     VITALS:   Last 24hrs VS reviewed since prior progress note. Most recent are:  Patient Vitals for the past 24 hrs:   Temp Pulse Resp BP SpO2   06/30/20 0717 98.7 °F (37.1 °C) 87 16 123/63 99 %   06/30/20 0328 98 °F (36.7 °C) 93 16 114/68 98 %   06/29/20 2329 98.2 °F (36.8 °C) 100 16 132/86 100 %   06/29/20 2032 97.8 °F (36.6 °C) 99 16 131/81 95 %   06/29/20 1058 98 °F (36.7 °C) (!) 101 17 144/87 96 %       Intake/Output Summary (Last 24 hours) at 6/30/2020 0831  Last data filed at 6/29/2020 2331  Gross per 24 hour   Intake 450 ml   Output --   Net 450 ml        PHYSICAL EXAM:  General:         pt eating breakfast in room, no acute distress    EENT:              Anicteric sclerae. Moist oral mucosa  Resp:               No accessory muscle use, breathing pattern regular. CTA b/l  CV:                  NSR, no murmur. +b/l LE edema  GI:                   No distended   Neurologic:      AAOx3, follow commands.   Moving all exts  Psych: Not anxious nor agitated  Skin:                No rashes. No jaundice    Reviewed most current lab test results and cultures  YES  Reviewed most current radiology test results   YES  Review and summation of old records today    NO  Reviewed patient's current orders and MAR    YES  PMH/SH reviewed - no change compared to H&P  ________________________________________________________________________  Care Plan discussed with:    Comments   Patient x    Family      RN x    Care Manager     Consultant                        Multidiciplinary team rounds were held today with , nursing, pharmacist and clinical coordinator. Patient's plan of care was discussed; medications were reviewed and discharge planning was addressed. ________________________________________________________________________  Total NON critical care TIME:  35  Minutes    Total CRITICAL CARE TIME Spent:   Minutes non procedure based      Comments   >50% of visit spent in counseling and coordination of care     ________________________________________________________________________  Patience Reddy MD     Procedures: see electronic medical records for all procedures/Xrays and details which were not copied into this note but were reviewed prior to creation of Plan. LABS:  I reviewed today's most current labs and imaging studies.   Pertinent labs include:  Recent Labs     06/28/20  0235   WBC 9.8   HGB 9.5*   HCT 30.0*        Recent Labs     06/30/20  0324 06/29/20  0333 06/28/20  0235   * 132* 134*   K 4.9 4.9 4.1    106 104   CO2 23 19* 26   GLU 98 119* 269*   BUN 40* 34* 37*   CREA 2.02* 1.93* 2.12*   CA 8.5 8.8 8.2*       Signed: Patience Reddy MD

## 2020-07-01 LAB
GLUCOSE BLD STRIP.AUTO-MCNC: 121 MG/DL (ref 65–100)
GLUCOSE BLD STRIP.AUTO-MCNC: 131 MG/DL (ref 65–100)
GLUCOSE BLD STRIP.AUTO-MCNC: 186 MG/DL (ref 65–100)
GLUCOSE BLD STRIP.AUTO-MCNC: 191 MG/DL (ref 65–100)
SERVICE CMNT-IMP: ABNORMAL

## 2020-07-01 PROCEDURE — 74011250637 HC RX REV CODE- 250/637: Performed by: HOSPITALIST

## 2020-07-01 PROCEDURE — 74011250636 HC RX REV CODE- 250/636: Performed by: HOSPITALIST

## 2020-07-01 PROCEDURE — 82962 GLUCOSE BLOOD TEST: CPT

## 2020-07-01 PROCEDURE — 74011250636 HC RX REV CODE- 250/636: Performed by: INTERNAL MEDICINE

## 2020-07-01 PROCEDURE — 94760 N-INVAS EAR/PLS OXIMETRY 1: CPT

## 2020-07-01 PROCEDURE — 65270000029 HC RM PRIVATE

## 2020-07-01 PROCEDURE — 74011250637 HC RX REV CODE- 250/637: Performed by: INTERNAL MEDICINE

## 2020-07-01 PROCEDURE — 74011636637 HC RX REV CODE- 636/637: Performed by: INTERNAL MEDICINE

## 2020-07-01 PROCEDURE — 74011000258 HC RX REV CODE- 258: Performed by: INTERNAL MEDICINE

## 2020-07-01 RX ORDER — FUROSEMIDE 40 MG/1
40 TABLET ORAL DAILY
Status: DISCONTINUED | OUTPATIENT
Start: 2020-07-02 | End: 2020-07-02 | Stop reason: HOSPADM

## 2020-07-01 RX ADMIN — DICYCLOMINE HYDROCHLORIDE 10 MG: 10 CAPSULE ORAL at 21:36

## 2020-07-01 RX ADMIN — INSULIN GLARGINE 15 UNITS: 100 INJECTION, SOLUTION SUBCUTANEOUS at 21:38

## 2020-07-01 RX ADMIN — FAMOTIDINE 20 MG: 40 POWDER, FOR SUSPENSION ORAL at 08:37

## 2020-07-01 RX ADMIN — DICYCLOMINE HYDROCHLORIDE 10 MG: 10 CAPSULE ORAL at 18:27

## 2020-07-01 RX ADMIN — CARVEDILOL 12.5 MG: 12.5 TABLET, FILM COATED ORAL at 08:27

## 2020-07-01 RX ADMIN — NITROGLYCERIN 1 INCH: 20 OINTMENT TOPICAL at 21:37

## 2020-07-01 RX ADMIN — CARVEDILOL 12.5 MG: 12.5 TABLET, FILM COATED ORAL at 16:15

## 2020-07-01 RX ADMIN — HEPARIN SODIUM 5000 UNITS: 5000 INJECTION INTRAVENOUS; SUBCUTANEOUS at 02:31

## 2020-07-01 RX ADMIN — FUROSEMIDE 40 MG: 10 INJECTION, SOLUTION INTRAMUSCULAR; INTRAVENOUS at 08:30

## 2020-07-01 RX ADMIN — NITROGLYCERIN 1 INCH: 20 OINTMENT TOPICAL at 08:27

## 2020-07-01 RX ADMIN — PREGABALIN 150 MG: 150 CAPSULE ORAL at 08:27

## 2020-07-01 RX ADMIN — AMITRIPTYLINE HYDROCHLORIDE 25 MG: 25 TABLET, FILM COATED ORAL at 21:36

## 2020-07-01 RX ADMIN — Medication 10 ML: at 08:32

## 2020-07-01 RX ADMIN — NITROGLYCERIN 1 INCH: 20 OINTMENT TOPICAL at 16:16

## 2020-07-01 RX ADMIN — CEFAZOLIN 1 G: 1 INJECTION, POWDER, FOR SOLUTION INTRAMUSCULAR; INTRAVENOUS at 23:48

## 2020-07-01 RX ADMIN — CEFAZOLIN 1 G: 1 INJECTION, POWDER, FOR SOLUTION INTRAMUSCULAR; INTRAVENOUS at 08:33

## 2020-07-01 RX ADMIN — FAMOTIDINE 20 MG: 40 POWDER, FOR SUSPENSION ORAL at 21:37

## 2020-07-01 RX ADMIN — Medication 10 ML: at 21:36

## 2020-07-01 RX ADMIN — Medication 10 ML: at 13:46

## 2020-07-01 RX ADMIN — DICYCLOMINE HYDROCHLORIDE 10 MG: 10 CAPSULE ORAL at 13:46

## 2020-07-01 RX ADMIN — DICYCLOMINE HYDROCHLORIDE 10 MG: 10 CAPSULE ORAL at 08:27

## 2020-07-01 RX ADMIN — ATORVASTATIN CALCIUM 20 MG: 20 TABLET, FILM COATED ORAL at 08:27

## 2020-07-01 RX ADMIN — PREGABALIN 150 MG: 150 CAPSULE ORAL at 18:27

## 2020-07-01 RX ADMIN — Medication 10 ML: at 05:59

## 2020-07-01 RX ADMIN — CEFAZOLIN 1 G: 1 INJECTION, POWDER, FOR SOLUTION INTRAMUSCULAR; INTRAVENOUS at 16:16

## 2020-07-01 NOTE — PROGRESS NOTES
Bedside and Verbal shift change report given to Preeti Metzger RN (oncoming nurse) by Trevon Krishnan RN (offgoing nurse). Report included the following information SBAR, Kardex, Intake/Output, MAR and Cardiac Rhythm NSR.

## 2020-07-01 NOTE — CDMP QUERY
Patient admitted with SOB . Documentation reflects \" Acute respiratory failure with hypoxia\" in IM progress note(s) dated 6/21. If possible, please specify in the progress notes and d/c summary if \"Acute respiratory failure with hypoxia\" was: 
 
Leo out after study  Still Suspected after study  Confirmed after study The medical record reflects the following: 
  Risk Factors: Hx DM; HTN; Heroin abuse; CKD 3 Clinical Indicators:  RR: 12-18; 98% O2 sat on RA. ..... pBNP: 6,588. .. [facility] Amador CXR: Extensive B/L interstitial and patchy airspace disease. ... [facility] Kekailash Amador Echo: Normal cavity size. Mild concentric hypertrophy. Low normal systolic function. Estimated left ventricular ejection fraction is 50 - 55%. Visually measured ejection fraction. Left ventricular diastolic dysfunction; Mild mitral valve regurgitation is present; Moderately dilated left atrium. ...ED noted: Normal breath sounds. No wheezing; Pulmonary effort is normal...noted per Pulm: No hypoxemia; No need for O2. .. COVID-19: Negative Treatment: CXR; IV Lasix; I/Os; Daily weight; Echo; COVID-19 R/o Thank you, Tushar Agnel [facility]

## 2020-07-01 NOTE — PROGRESS NOTES
I spoke with KATY Jordan, SABINA today, and patient is ready for discharge if the pathology Final report is negative at that viable bone margin resection. The report should be in today. Home wound care will be done upon discharge, and patient can see me within one week, in clinic.     Wound care:  -Cleanse with wound cleanser  -Apply Xeroform, and 4x4's  -Apply Kerlix and Coban  -Use surgical shoe for walking as tolerated, recommended limited walking until wound more stable    Dr. Madison Moyer  [phone]

## 2020-07-01 NOTE — PROGRESS NOTES
Hospitalist Progress Note    NAME: Attila Alberto   :  1965   MRN:  [id number]       Assessment / Plan:  SOB due to PNA and pulmonary edema  Diarrhea, resolved  Covid 19 ruled out  B/l LE edema  CXR showed extensive bilateral interstitial and patchy airspace disease. Differential diagnosis includes: Pulmonary vascular congestive changes and/or  infectious process. Repeat CXR showed interval worsening of diffuse interstitial and airspace opacities which again may be due to edema and/or pneumonia. probnp ~6K-->5K, procalcitonin 6.9-->2.03  Completed empiric abx for PNA and IV lasix  for pulmonary edema. Repeat CXR showed resolution of pulm edema  Cont' PO lasix. Amlodipine discontinued due to leg edema  Currently stable on RA    Heroin withdrawal symptoms, resolved  Heroin abuse, counseled  S/p methadone 10mg x1. Symptoms resolved    CKD3   Septic joint, 2nd metatarsophalangeal joint of L foot  Osteomyelitis of 1st metatarsal L foot  XR L foot showed osteomyelitis of the distal second metatarsal and base of the proximal phalanx of the left second toe. Oblique distracted fracture of the base of the  proximal phalanx of the left second toe. Left great toe transmetatarsal amputation postoperative changes. Multiple ossific fragments adjacent to the residual distal first metatarsal. Residual great toe soft tissues are edematous. MR can be performed for further evaluation, as indicated. S/p amputation of second digit, left foot, with partial resection of the first and second metatarsal, also partial resection of viable proximal bone margins first and second metatarsal left foot. Gram stain and wound cx so far growing scant MSSA. Path is still pending, can discharge once path is back. Discussed with Dr Melissa Rivas  Was empiric abx with: vancomycin/rocephin, transitioned to IV cefazolin. Can change to Keflex at discharge    Type 2 DM with neuropathy  BG better with lantus adjusted.   Will cont' current lantus dose.    Counseled on dietary restrictions  Cont' bagapentin  SSI    HTN   Continue Beta blocker and  CCB      Hyperlipidemia  Cont' statin     Nausea/vomiting, resolved. Tolerating po intake. Fall 6/24  Head CT neg. AAOx3  Avoid sedating meds  PT/OT        Code: Full  DVT prophylaxis: heparin     Subjective:     Chief Complaint / Reason for Physician Visit  Pt in bed, NAD. Eager to be discharge. Discussed with RN. Review of Systems:  Symptom Y/N Comments  Symptom Y/N Comments   Fever/Chills n   Chest Pain n    Poor Appetite    Edema     Cough    Abdominal Pain n    Sputum    Joint Pain     SOB/DE LA CRUZ n   Pruritis/Rash     Nausea/vomit    Tolerating PT/OT     Diarrhea    Tolerating Diet     Constipation    Other       Could NOT obtain due to:      Objective:     VITALS:   Last 24hrs VS reviewed since prior progress note. Most recent are:  Patient Vitals for the past 24 hrs:   Temp Pulse Resp BP SpO2   07/01/20 1535 98.6 °F (37 °C) 99 17 137/87 99 %   07/01/20 1217 98.2 °F (36.8 °C) 90 17 135/72 100 %   07/01/20 0746 98.5 °F (36.9 °C) 100 16 131/73 100 %   07/01/20 0338 97.3 °F (36.3 °C) 91 16 108/72 93 %   06/30/20 2300 98 °F (36.7 °C) 85 16 126/85 90 %   06/30/20 2035 98.5 °F (36.9 °C) 94 16 110/77 94 %   06/30/20 1619 -- 94 -- 116/72 --       Intake/Output Summary (Last 24 hours) at 7/1/2020 1551  Last data filed at 7/1/2020 0338  Gross per 24 hour   Intake 520 ml   Output 400 ml   Net 120 ml        PHYSICAL EXAM:  General:         pt eating breakfast in room, no acute distress    EENT:              Anicteric sclerae. Moist oral mucosa  Resp:               No accessory muscle use, breathing pattern regular. CTA b/l  CV:                  NSR, no murmur. +b/l LE edema  GI:                   No distended   Neurologic:      AAOx3, follow commands. Moving all exts  Psych:             Not anxious nor agitated  Skin:                No rashes.   No jaundice    Reviewed most current lab test results and cultures YES  Reviewed most current radiology test results   YES  Review and summation of old records today    NO  Reviewed patient's current orders and MAR    YES  PMH/SH reviewed - no change compared to H&P  ________________________________________________________________________  Care Plan discussed with:    Comments   Patient x    Family      RN x    Care Manager     Consultant                        Multidiciplinary team rounds were held today with , nursing, pharmacist and clinical coordinator. Patient's plan of care was discussed; medications were reviewed and discharge planning was addressed. ________________________________________________________________________  Total NON critical care TIME:  35  Minutes    Total CRITICAL CARE TIME Spent:   Minutes non procedure based      Comments   >50% of visit spent in counseling and coordination of care     ________________________________________________________________________  Kelly Gonzalez MD     Procedures: see electronic medical records for all procedures/Xrays and details which were not copied into this note but were reviewed prior to creation of Plan. LABS:  I reviewed today's most current labs and imaging studies. Pertinent labs include:  No results for input(s): WBC, HGB, HCT, PLT, HGBEXT, HCTEXT, PLTEXT, HGBEXT, HCTEXT, PLTEXT in the last 72 hours.   Recent Labs     06/30/20  0324 06/29/20  0333   * 132*   K 4.9 4.9    106   CO2 23 19*   GLU 98 119*   BUN 40* 34*   CREA 2.02* 1.93*   CA 8.5 8.8       Signed: Kelly Gonzalez MD

## 2020-07-01 NOTE — PROGRESS NOTES
DALIA:    Home with Family     CM: Ana Villafuerte is currently working with pt in the Gen Surg Unit. CM will follow up with MD to verify when pt will be ready for d/c and if there are any additional needs that the pt will need. CM will continue to follow.     Ana Villafuerte, KATY, [address] [place] [address]

## 2020-07-01 NOTE — PROGRESS NOTES
General Surgery End of Shift Nursing Note    Bedside shift change report given to Anna Goode (oncoming nurse) by Carlos Peace (offgoing nurse). Report included the following information SBAR, Kardex, Intake/Output, MAR and Recent Results.         Joselyn Pedroza

## 2020-07-01 NOTE — PROGRESS NOTES
Attempted Initial Spiritual Assessment, however pt was unable to be assessed at this time. No family present at time of visit. Page pastoral care for any spiritual or emotional support needed. Lieutenant Donna MDiv.  Staff   Request  Support/Spiritual Care Services via [address] [place] [address]

## 2020-07-01 NOTE — PROGRESS NOTES
Patient seen, resting well. I told him that pathology may have been so long to come back due to the size and slices of the submitted sample. Bandage changed, wound incisions look good, no dehiscence. Remain resting mainly, remain with surgical shoe left foot. I informed him that it is of utmost importance that we wait for a definitive pathology to return first, before granting discharge.

## 2020-07-02 VITALS
TEMPERATURE: 97.5 F | SYSTOLIC BLOOD PRESSURE: 115 MMHG | RESPIRATION RATE: 16 BRPM | HEIGHT: 67 IN | BODY MASS INDEX: 25.67 KG/M2 | OXYGEN SATURATION: 100 % | DIASTOLIC BLOOD PRESSURE: 79 MMHG | HEART RATE: 89 BPM | WEIGHT: 163.58 LBS

## 2020-07-02 LAB
GLUCOSE BLD STRIP.AUTO-MCNC: 112 MG/DL (ref 65–100)
GLUCOSE BLD STRIP.AUTO-MCNC: 85 MG/DL (ref 65–100)
SERVICE CMNT-IMP: ABNORMAL
SERVICE CMNT-IMP: NORMAL

## 2020-07-02 PROCEDURE — 94760 N-INVAS EAR/PLS OXIMETRY 1: CPT

## 2020-07-02 PROCEDURE — 74011000258 HC RX REV CODE- 258: Performed by: INTERNAL MEDICINE

## 2020-07-02 PROCEDURE — 74011250637 HC RX REV CODE- 250/637: Performed by: HOSPITALIST

## 2020-07-02 PROCEDURE — 74011250636 HC RX REV CODE- 250/636: Performed by: HOSPITALIST

## 2020-07-02 PROCEDURE — 74011250636 HC RX REV CODE- 250/636: Performed by: INTERNAL MEDICINE

## 2020-07-02 PROCEDURE — 82962 GLUCOSE BLOOD TEST: CPT

## 2020-07-02 PROCEDURE — 74011250637 HC RX REV CODE- 250/637: Performed by: INTERNAL MEDICINE

## 2020-07-02 RX ORDER — ALBUTEROL SULFATE 90 UG/1
1 AEROSOL, METERED RESPIRATORY (INHALATION)
Qty: 1 INHALER | Refills: 0 | Status: SHIPPED | OUTPATIENT
Start: 2020-07-02 | End: 2021-05-14 | Stop reason: SDUPTHER

## 2020-07-02 RX ORDER — INSULIN GLARGINE 100 [IU]/ML
12 INJECTION, SOLUTION SUBCUTANEOUS
Status: DISCONTINUED | OUTPATIENT
Start: 2020-07-02 | End: 2020-07-02 | Stop reason: HOSPADM

## 2020-07-02 RX ADMIN — FAMOTIDINE 20 MG: 40 POWDER, FOR SUSPENSION ORAL at 09:18

## 2020-07-02 RX ADMIN — HEPARIN SODIUM 5000 UNITS: 5000 INJECTION INTRAVENOUS; SUBCUTANEOUS at 09:18

## 2020-07-02 RX ADMIN — Medication 10 ML: at 06:02

## 2020-07-02 RX ADMIN — NITROGLYCERIN 1 INCH: 20 OINTMENT TOPICAL at 09:17

## 2020-07-02 RX ADMIN — DICYCLOMINE HYDROCHLORIDE 10 MG: 10 CAPSULE ORAL at 08:46

## 2020-07-02 RX ADMIN — ATORVASTATIN CALCIUM 20 MG: 20 TABLET, FILM COATED ORAL at 08:46

## 2020-07-02 RX ADMIN — DICYCLOMINE HYDROCHLORIDE 10 MG: 10 CAPSULE ORAL at 12:30

## 2020-07-02 RX ADMIN — CEFAZOLIN 1 G: 1 INJECTION, POWDER, FOR SOLUTION INTRAMUSCULAR; INTRAVENOUS at 08:46

## 2020-07-02 RX ADMIN — HEPARIN SODIUM 5000 UNITS: 5000 INJECTION INTRAVENOUS; SUBCUTANEOUS at 01:51

## 2020-07-02 RX ADMIN — POLYETHYLENE GLYCOL 3350 17 G: 17 POWDER, FOR SOLUTION ORAL at 08:46

## 2020-07-02 RX ADMIN — FUROSEMIDE 40 MG: 40 TABLET ORAL at 08:46

## 2020-07-02 RX ADMIN — CARVEDILOL 12.5 MG: 12.5 TABLET, FILM COATED ORAL at 08:46

## 2020-07-02 RX ADMIN — PREGABALIN 150 MG: 150 CAPSULE ORAL at 08:46

## 2020-07-02 NOTE — DISCHARGE SUMMARY
Discharge Summary      Name: Luis Kaplan  [id number]  YOB: 1965 (Age: 48 y.o.)   Date of Admission: 6/19/2020  Date of Discharge: 7/2/2020  Attending Physician: Louise Hooper MD    Discharge Diagnosis:   SOB due to PNA and pulmonary edema, not hypoxic  Diarrhea, resolved  Covid 19 ruled out  B/l LE edema  Heroin withdrawal symptoms, resolved  Heroin abuse, counseled  CKD3   Septic joint, 2nd metatarsophalangeal joint of L foot  Osteomyelitis of 1st metatarsal L foot    Consultations:  IP CONSULT TO PODIATRY    Brief Admission History/Reason for Admission Per Jm Wren MD:   %5 y.o man with DM, HTN, who initially presented to the ED due to his 2nd left toe being \"messed up\" and reportedly needing to be amputated. While here, he developed shortness of breath and was referred for admission. He states his dyspnea began about 2 weeks ago which coincides when he noticed that his toe was hurting him. He denies cough or fever. No leg swelling or chest pain. He was given IV furosemide and his dyspnea is improving. He denies a history of kidney or heart disease. he is a poor historian.  [address] Problems:   SOB due to PNA and pulmonary edema  Diarrhea, resolved  Covid 19 ruled out  B/l LE edema  CXR showed extensive bilateral interstitial and patchy airspace disease. Differential diagnosis includes: Pulmonary vascular congestive changes and/or  infectious process. Repeat CXR showed interval worsening of diffuse interstitial and airspace opacities which again may be due to edema and/or pneumonia. probnp ~6K-->5K, procalcitonin 6.9-->2.03  Completed empiric abx for PNA and IV lasix  for pulmonary edema. Repeat CXR showed resolution of pulm edema  Cont' HCTZ at discharge. Amlodipine discontinued due to leg edema  Currently stable on RA    Heroin withdrawal symptoms, resolved  Heroin abuse, counseled  S/p methadone 10mg x1. Symptoms resolved. Counseled on substance abuse.     CKD3   Septic joint, 2nd metatarsophalangeal joint of L foot  Osteomyelitis of 1st metatarsal L foot  XR L foot showed osteomyelitis of the distal second metatarsal and base of the proximal phalanx of the left second toe. Oblique distracted fracture of the base of the  proximal phalanx of the left second toe. Left great toe transmetatarsal amputation postoperative changes. Multiple ossific fragments adjacent to the residual distal first metatarsal. Residual great toe soft tissues are edematous. MR can be performed for further evaluation, as indicated. S/p amputation of second digit, left foot, with partial resection of the first and second metatarsal, also partial resection of viable proximal bone margins first and second metatarsal left foot. Gram stain and wound cx so far growing scant MSSA. Was empiric abx with: vancomycin/rocephin, transitioned to IV cefazolin which he completed inpt. .  Path showed   1. Bone from ulcer site of left 2nd metatarsal, excision:   Acute and chronic osteomyelitis and changes compatible with fracture callus   See comment   2. Bone from ulcer site of left 1st metatarsal, excision:   Chronic osteomyelitis and changes compatible with fracture callus   See comment   3. Viable bone margin of left 1st metatarsal, excision:   Bone with reactive and reparative changes, fibrosis, and rare chronic inflammation   No acute osteomyelitis identified   4. Viable bone margin of left 2nd metatarsal, excision:   Bone with reactive and reparative changes, fibrosis, and patchy chronic inflammation   No acute osteomyelitis identified   Discussed with bill uGerra to discharge to f/u in 1 week. His office will call pt to set up appnt.      Type 2 DM with neuropathy  Cont' home insulin regimen   Counseled on dietary restrictions  Cont' bagapentin     HTN   Continue home meds except amlodipine.     Hyperlipidemia  Cont' statin     Nausea/vomiting, resolved.   Tolerating po intake.      Fall 6/24  Head CT neg. AAOx3  Avoid sedating meds  PT/OT    Discharge Exam:  Patient seen and examined by me on discharge day. Pertinent Findings:  Visit Vitals  /79   Pulse 89   Temp 97.5 °F (36.4 °C)   Resp 16   Ht 5' 7\" (1.702 m)   Wt 74.2 kg (163 lb 9.3 oz)   SpO2 100%   BMI 25.62 kg/m²     Gen:    Not in distress  Chest: Clear lungs  CVS:   Regular rhythm. No edema  Abd:  Soft, not distended, not tender    Discharge/Recent Laboratory Results:  Recent Labs     06/30/20  0324   *   K 4.9      CO2 23   BUN 40*   GLU 98   CA 8.5     No results for input(s): HGB, HCT, WBC, PLT, HGBEXT, HCTEXT, PLTEXT, HGBEXT, HCTEXT, PLTEXT in the last 72 hours. Discharge Medications:  Current Discharge Medication List      START taking these medications    Details   albuterol (PROVENTIL HFA, VENTOLIN HFA, PROAIR HFA) 90 mcg/actuation inhaler Take 1 Puff by inhalation every six (6) hours as needed for Wheezing. Qty: 1 Inhaler, Refills: 0         CONTINUE these medications which have NOT CHANGED    Details   amitriptyline (ELAVIL) 25 mg tablet Take 1 Tab by mouth nightly. Qty: 30 Tab, Refills: 0    Associated Diagnoses: Diabetic polyneuropathy associated with type 2 diabetes mellitus (HCC)      pregabalin (Lyrica) 150 mg capsule Take 1 Cap by mouth two (2) times a day. Max Daily Amount: 300 mg. Qty: 60 Cap, Refills: 0    Associated Diagnoses: Diabetic polyneuropathy associated with type 2 diabetes mellitus (HCC)      carvediloL (COREG) 12.5 mg tablet Take 12.5 mg by mouth two (2) times daily (with meals). atorvastatin (LIPITOR) 20 mg tablet Take 20 mg by mouth daily. hydroCHLOROthiazide (MICROZIDE) 12.5 mg capsule Take 12.5 mg by mouth daily. lisinopriL (PRINIVIL, ZESTRIL) 10 mg tablet Take  by mouth daily. insulin glargine (Lantus U-100 Insulin) 100 unit/mL injection by SubCUTAneous route nightly.  30 units      dapagliflozin (Farxiga) 5 mg tab tablet Take 5 mg by mouth daily.         STOP taking these medications       amLODIPine (NORVASC) 10 mg tablet Comments:   Reason for Stopping:         cephALEXin (KEFLEX) 500 mg capsule Comments:   Reason for Stopping:               Patient Follow Up Instructions:    Activity: Activity as tolerated  Diet: Diabetic Diet  Code: Full    DISPOSITION:    Home with Family: x   Home with HH/PT/OT/RN:    SNF/LTC:    JUNE:    OTHER:          Follow up with:   PCP : Angela Hodgson MD  Follow-up Information     Follow up With Specialties Details Why Contact Shane Amaya MD [facility] In 5 days  [address]      Jayesh Barger DPM Podiatry In 1 week  [address] [place] [address]  [phone]              Total time in minutes spent coordinating this discharge (includes going over instructions, follow-up, prescriptions, and preparing report for sign off to her PCP) :  35 minutes

## 2020-07-02 NOTE — PROGRESS NOTES
DALIA:    [id number]  [facility] with Family    Late Note     CM spoke with podiatrist, via telephone regarding pt's d/c needs and plans. CM informed that pt will be in need of [facility] [id number] at the time of d/c. CM will speak with hospitalist in regards to [facility] [id number] needs. CM will inform pt of the following recommendations. CM will send referral to [facility] [id number] agency.     KATY Ventura, [address] [place] [address]

## 2020-07-02 NOTE — PROGRESS NOTES
General Surgery End of Shift Nursing Note    Bedside shift change report given to Kayli Espinosa (oncoming nurse) by Sunil Mcnally (offgoing nurse). Report included the following information SBAR, Kardex, Intake/Output, MAR and Recent Results.       Alaina Berg

## 2020-07-02 NOTE — PROGRESS NOTES
[facility] INPATIENT ENCOUNTER   DAILY PROGRESS NOTE    ADMISSION DATE:  7/22/2018  DATE:  7/24/2018  CURRENT HOSPITAL DAY:  Hospital Day: 3  ATTENDING PHYSICIAN:  Meme Conte MD  CODE STATUS:  Full Resuscitation    CHIEF COMPLAINT: Abdominal pain      ACTIVE PROBLEMS:    Patient Active Problem List   Diagnosis   • Shoulder pain   • Prolactinoma (CMS/HCC)   • Hyperlipidemia   • Coronary artery disease due to calcified coronary lesion   • NSVT (nonsustained ventricular tachycardia) (CMS/[facility])       INTERVAL HISTORY:     Shravan Aceves is a 61 year old male patient admitted with Hypokalemia [E87.6]  Hypomagnesemia [E83.42]  Alcohol abuse [F10.10]  Atrial fibrillation with RVR (CMS/[facility]) [I48.91]  Acute pancreatitis, unspecified complication status, unspecified pancreatitis type [K85.90].       Patient states abdominal pain is improved from yesterday. Liquid diet did cause some abdominal distention. No nausea or vomiting. Denies diarrhea.     HPI  Patient is a 61-year-old male with a history of hypertension, CAD, hyperlipidemia and alcohol abuse presents for evaluation of abdominal pain as well as chest pain. Patient states abdominal pain began approximately 3 days ago that initially started with intermittent cramping that radiated throughout his tests. Associated symptoms include dry heaving. He denied fevers or chills. Patient thought he was constipated so took Mylanta which resulted in diarrhea. He denied overt GI bleeding. In addition patient states he has lost 15 pounds in the last 2-3 months. Patient does have a history of alcohol abuse. He typically drinks a 6 pack of beer per day and a half pint of liquor per day. He does note recently has cut back to \"2 or 3 beers and a few shots\" per day.    Upon admission patient was found to have elevated lipase, elevated liver enzymes, elevated INR and new onset of A. fib with RVR. Patient denies a previous history of pancreatitis. Denies family history of pancreatic  Problem: Falls - Risk of  Goal: *Absence of Falls  Description: Document [place] Fall Risk and appropriate interventions in the flowsheet. Outcome: Progressing Towards Goal  Note: Fall Risk Interventions:  Mobility Interventions: Communicate number of staff needed for ambulation/transfer    Mentation Interventions: Adequate sleep, hydration, pain control, Door open when patient unattended, Room close to nurse's station    Medication Interventions: Patient to call before getting OOB    Elimination Interventions: Call light in reach    History of Falls Interventions: Bed/chair exit alarm, Door open when patient unattended         Problem: Patient Education: Go to Patient Education Activity  Goal: Patient/Family Education  Outcome: Progressing Towards Goal     Problem: Pressure Injury - Risk of  Goal: *Prevention of pressure injury  Description: Document [place] Scale and appropriate interventions in the flowsheet. Outcome: Progressing Towards Goal  Note: Pressure Injury Interventions:  Sensory Interventions: Assess changes in LOC, Keep linens dry and wrinkle-free, Maintain/enhance activity level, Minimize linen layers, Pressure redistribution bed/mattress (bed type)    Moisture Interventions: Absorbent underpads, Minimize layers    Activity Interventions: Increase time out of bed, Pressure redistribution bed/mattress(bed type)    Mobility Interventions: HOB 30 degrees or less, Pressure redistribution bed/mattress (bed type)    Nutrition Interventions: Document food/fluid/supplement intake    Friction and Shear Interventions: HOB 30 degrees or less, Minimize layers                Problem: Patient Education: Go to Patient Education Activity  Goal: Patient/Family Education  Outcome: Progressing Towards Goal     Problem: Diabetes Self-Management  Goal: *Disease process and treatment process  Description: Define diabetes and identify own type of diabetes; list 3 options for treating diabetes.   Outcome: Progressing Towards Goal  Goal: *Incorporating nutritional management into lifestyle  Description: Describe effect of type, amount and timing of food on blood glucose; list 3 methods for planning meals. Outcome: Progressing Towards Goal  Goal: *Incorporating physical activity into lifestyle  Description: State effect of exercise on blood glucose levels. Outcome: Progressing Towards Goal  Goal: *Developing strategies to promote health/change behavior  Description: Define the ABC's of diabetes; identify appropriate screenings, schedule and personal plan for screenings. Outcome: Progressing Towards Goal  Goal: *Using medications safely  Description: State effect of diabetes medications on diabetes; name diabetes medication taking, action and side effects. Outcome: Progressing Towards Goal  Goal: *Monitoring blood glucose, interpreting and using results  Description: Identify recommended blood glucose targets  and personal targets. Outcome: Progressing Towards Goal  Goal: *Prevention, detection, treatment of acute complications  Description: List symptoms of hyper- and hypoglycemia; describe how to treat low blood sugar and actions for lowering  high blood glucose level. Outcome: Progressing Towards Goal  Goal: *Prevention, detection and treatment of chronic complications  Description: Define the natural course of diabetes and describe the relationship of blood glucose levels to long term complications of diabetes.   Outcome: Progressing Towards Goal  Goal: *Developing strategies to address psychosocial issues  Description: Describe feelings about living with diabetes; identify support needed and support network  Outcome: Progressing Towards Goal  Goal: *Insulin pump training  Outcome: Progressing Towards Goal  Goal: *Sick day guidelines  Outcome: Progressing Towards Goal  Goal: *Patient Specific Goal (EDIT GOAL, INSERT TEXT)  Outcome: Progressing Towards Goal     Problem: Patient Education: Go to Patient Education malignancy. No recent abdominal trauma/procedures. No recent medication changes.   MEDICATIONS:    The medication list was reviewed today.     HISTORIES:     I have reviewed the past medical history, family history, social history, medications and allergies listed in the medical record as obtained by my nursing staff and support staff and agree with their documentation.    OBJECTIVE:    VITAL SIGNS:     Vital Last Value 24 Hour Range   Temperature 98.1 °F (36.7 °C) (07/24/18 0509) Temp  Min: 98.1 °F (36.7 °C)  Max: 98.3 °F (36.8 °C)   Pulse 91 (07/24/18 0818) Pulse  Min: 87  Max: 94   Respiratory 18 (07/24/18 0509) Resp  Min: 16  Max: 18   Non-Invasive  Blood Pressure 136/76 (07/24/18 0818) BP  Min: 116/63  Max: 136/76   Pulse Oximetry 92 % (07/24/18 0509) SpO2  Min: 90 %  Max: 92 %     Vital Today Admitted   Weight 69.7 kg (07/24/18 0509) Weight: 65.9 kg (07/22/18 0917)   Height N/A Height: 5' 10\" (177.8 cm) (07/22/18 0917)   BMI N/A BMI (Calculated): 20.89 (07/22/18 0917)     INTAKE/OUTPUT:    No intake or output data in the 24 hours ending 07/24/18 1322      PHYSICAL EXAM:    General: Thin adult male. NAD.  Neurologic: Alert. Normal mood and affect  Respiratory: Respiratory effort normal.   Cardiovascular: Regular rate and rhythm.  Extremities: No swelling or tenderness.  Gastrointestinal: slightly firm. Nontender. Bowel sounds present.    LABORATORY DATA:    Lab Results   Component Value Date    SODIUM 141 07/24/2018    POTASSIUM 4.0 07/24/2018    CHLORIDE 111 (H) 07/24/2018    CO2 22 07/24/2018    BUN 8 07/24/2018    CREATININE 0.65 (L) 07/24/2018    GLUCOSE 88 07/24/2018     Lab Results   Component Value Date    WBC 9.1 07/24/2018    HCT 33.4 (L) 07/24/2018    HGB 10.8 (L) 07/24/2018     (L) 07/24/2018     INR (no units)   Date Value   07/24/2018 1.8     Lab Results   Component Value Date    AST 27 07/24/2018    GPT 12 07/24/2018    ALKPT 123 (H) 07/24/2018    BILIRUBIN 1.3 (H) 07/24/2018        IMAGING  Activity  Goal: Patient/Family Education  Outcome: Progressing Towards Goal     Problem: Heart Failure: Day 3  Goal: Off Pathway (Use only if patient is Off Pathway)  Outcome: Progressing Towards Goal  Goal: Activity/Safety  Outcome: Progressing Towards Goal  Goal: Diagnostic Test/Procedures  Outcome: Progressing Towards Goal  Goal: Nutrition/Diet  Outcome: Progressing Towards Goal  Goal: Discharge Planning  Outcome: Progressing Towards Goal  Goal: Medications  Outcome: Progressing Towards Goal  Goal: Respiratory  Outcome: Progressing Towards Goal  Goal: Treatments/Interventions/Procedures  Outcome: Progressing Towards Goal  Goal: Psychosocial  Outcome: Progressing Towards Goal  Goal: *Oxygen saturation within defined limits  Outcome: Progressing Towards Goal  Goal: *Hemodynamically stable  Outcome: Progressing Towards Goal  Goal: *Optimal pain control at patient's stated goal  Outcome: Progressing Towards Goal  Goal: *Anxiety reduced or absent  Outcome: Progressing Towards Goal  Goal: *Demonstrates progressive activity  Outcome: Progressing Towards Goal     Problem: Heart Failure: Day 4  Goal: Off Pathway (Use only if patient is Off Pathway)  Outcome: Progressing Towards Goal  Goal: Activity/Safety  Outcome: Progressing Towards Goal  Goal: Diagnostic Test/Procedures  Outcome: Progressing Towards Goal  Goal: Nutrition/Diet  Outcome: Progressing Towards Goal  Goal: Discharge Planning  Outcome: Progressing Towards Goal  Goal: Medications  Outcome: Progressing Towards Goal  Goal: Respiratory  Outcome: Progressing Towards Goal  Goal: Treatments/Interventions/Procedures  Outcome: Progressing Towards Goal  Goal: Psychosocial  Outcome: Progressing Towards Goal  Goal: *Oxygen saturation within defined limits  Outcome: Progressing Towards Goal  Goal: *Hemodynamically stable  Outcome: Progressing Towards Goal  Goal: *Optimal pain control at patient's stated goal  Outcome: Progressing Towards Goal  Goal: *Anxiety reduced or STUDIES:    IMPRESSION: CT angio chest abdomen pelvis with and without contrast  Fusiform dilatation of the aortic sinus measuring 38 x 37 x 37 mm and  fusiform dilatation of the ascending thoracic aorta measuring 43 x 42 mm in  true outer diameter. There is no evidence for dissection. No evidence for  mesenteric ischemia although there are scattered air-fluid levels in mildly  prominent loops of small bowel and potential collapse of distal loops.  Earlier partial small bowel obstruction could have a similar appearance.  Portal venous thrombosis and splenic vein thrombosis. There is a cystic  lesion measuring about 23 mm just superior to the pancreas body there is an abnormal adjacent lymph node measuring about 16 mm. An additional more simple cyst may be associated with pancreatic duct. Findings are concerning for potential neoplasm.     IMPRESSION:   ultrasound liver gallbladder pancreas  1. Findings concerning for possible cavernous transformation of the portal  vein. No flow is noted in the main portal vein. Serpiginous structures are  noted in the nichole hepatis. Portal vein thrombus as was also noted on  recent CT.  2. Thickened heterogeneous gallbladder wall with hyperemia or  pericholecystic fluid. Cholecystitis is not entirely excluded. Correlate  with symptoms. HIDA scan can be considered for further evaluation if  necessary.  3. Heterogeneous masslike appearance of the head of the pancreas may be  related to pancreatitis, cystic pancreatic neoplasm or pseudocyst  formation. This is also noted on prior CT.    ENDOSCOPY:  Reports a normal colonoscopy in 2009                           ASSESSMENT:     61-year-old male with a history of hypertension, CAD, hyperlipidemia and alcohol abuse presents for evaluation of abdominal pain as well as chest pain.      1. Acute pancreatitis             - Etiology likely EtOH abuse. Elevated liver enzymes with ultrasound revealing gallbladder sludge, CBD is normal.               - Complicated by portal vein thrombosis and splenic vein thrombosis and pancreatic cystic lesion.   2. Pancreatic cystic lesion             - consider sequelae of acute pancreatitis may be developing pseudocyst. There are associated lymph nodes. Cannot exclude neoplasm. Of note unexplained weight loss 15-20 pounds.   3. Elevated liver enzymes              - EtOH abuse. No biliary obstruction noted on US. Liver normal in echotexture. PVT.   4. Portal vein thrombosis/splenic vein thrombosis             - Likely secondary to pancreatitis   5. New-onset A. fib with RVR             - converted to sinus  6. Coagulopathy             - Presented with INR 2.5. Not on anticoagulation.   7. Coronary artery disease  8. Alcohol abuse              - CIWA  9. Anemia              - No outward GI bleeding    ANTIPLATELET / ANTICOAGULATION:  MEDICATION:  Continue Aspirin  Continue Enoxaparin    PLAN:    1. Continue clear liquid diet today. IV fluids. Pain control. P.r.n. antiemetics  2. Await MRCP for further evaluation of pancreatic cystic lesion   3. Therapeutic Lovenox started   4. CIWA     Thank you for involving us in the care of this patient.     Tamiko VILLEGAS  [facility]                    ATTENDING ADDENDUM  I saw and evaluated the patient. I discussed the management with the PA. I reviewed the PA's note and agree with the documented findings.    Cavernous malformation of portal vein suggests chronic thrombus    Dariusz Valadez MD     absent  Outcome: Progressing Towards Goal  Goal: *Demonstrates progressive activity  Outcome: Progressing Towards Goal     Problem: Heart Failure: Day 5  Goal: Off Pathway (Use only if patient is Off Pathway)  Outcome: Progressing Towards Goal  Goal: Activity/Safety  Outcome: Progressing Towards Goal  Goal: Diagnostic Test/Procedures  Outcome: Progressing Towards Goal  Goal: Nutrition/Diet  Outcome: Progressing Towards Goal  Goal: Discharge Planning  Outcome: Progressing Towards Goal  Goal: Medications  Outcome: Progressing Towards Goal  Goal: Respiratory  Outcome: Progressing Towards Goal  Goal: Treatments/Interventions/Procedures  Outcome: Progressing Towards Goal  Goal: Psychosocial  Outcome: Progressing Towards Goal     Problem: Heart Failure: Discharge Outcomes  Goal: *Demonstrates ability to perform prescribed activity without shortness of breath or discomfort  Outcome: Progressing Towards Goal  Goal: *Left ventricular function assessment completed prior to or during stay, or planned for post-discharge  Outcome: Progressing Towards Goal  Goal: *ACEI prescribed if LVEF less than 40% and no contraindications or ARB prescribed  Outcome: Progressing Towards Goal  Goal: *Verbalizes understanding and describes prescribed diet  Outcome: Progressing Towards Goal  Goal: *Verbalizes understanding/describes prescribed medications  Outcome: Progressing Towards Goal  Goal: *Describes available resources and support systems  Description: (eg: Home Health, Palliative Care, Advanced Medical Directive)  Outcome: Progressing Towards Goal  Goal: *Describes smoking cessation resources  Outcome: Progressing Towards Goal  Goal: *Understands and describes signs and symptoms to report to providers(Stroke Metric)  Outcome: Progressing Towards Goal  Goal: *Describes/verbalizes understanding of follow-up/return appt  Description: (eg: to physicians, diabetes treatment coordinator, and other resources  Outcome: Progressing Towards Goal  Goal: *Describes importance of continuing daily weights and changes to report to physician  Outcome: Progressing Towards Goal

## 2020-07-02 NOTE — PROGRESS NOTES
I left a message with Dr. Geena Crowe this AM regarding this pathology case and am waiting for the reply as to the Final Pathology confirmation on this patient. Surgically, intra-op, he looked excellent and viable, but I am still waiting on that final pathology result. Dr. Geena Crowe should have an answer today.

## 2020-07-02 NOTE — PROGRESS NOTES
Hospitalist Progress Note    NAME: Robin Cárdenas   :  1965   MRN:  [id number]       Assessment / Plan:  SOB due to PNA and pulmonary edema  Diarrhea, resolved  Covid 19 ruled out  B/l LE edema  CXR showed extensive bilateral interstitial and patchy airspace disease. Differential diagnosis includes: Pulmonary vascular congestive changes and/or  infectious process. Repeat CXR showed interval worsening of diffuse interstitial and airspace opacities which again may be due to edema and/or pneumonia. probnp ~6K-->5K, procalcitonin 6.9-->2.03  Completed empiric abx for PNA and IV lasix  for pulmonary edema. Repeat CXR showed resolution of pulm edema  Cont' PO lasix. Amlodipine discontinued due to leg edema  Currently stable on RA    Heroin withdrawal symptoms, resolved  Heroin abuse, counseled  S/p methadone 10mg x1. Symptoms resolved    CKD3   Septic joint, 2nd metatarsophalangeal joint of L foot  Osteomyelitis of 1st metatarsal L foot  XR L foot showed osteomyelitis of the distal second metatarsal and base of the proximal phalanx of the left second toe. Oblique distracted fracture of the base of the  proximal phalanx of the left second toe. Left great toe transmetatarsal amputation postoperative changes. Multiple ossific fragments adjacent to the residual distal first metatarsal. Residual great toe soft tissues are edematous. MR can be performed for further evaluation, as indicated. S/p amputation of second digit, left foot, with partial resection of the first and second metatarsal, also partial resection of viable proximal bone margins first and second metatarsal left foot. Gram stain and wound cx so far growing scant MSSA. Path is still pending, can discharge once path is back. Discussed with Dr Melissa Milner  Was empiric abx with: vancomycin/rocephin, transitioned to IV cefazolin. Will not need abx at discharge. Type 2 DM with neuropathy  BG better with lantus adjusted. Will cont' current lantus dose. Counseled on dietary restrictions  Cont' bagapentin  SSI    HTN   Continue Beta blocker and  CCB      Hyperlipidemia  Cont' statin     Nausea/vomiting, resolved. Tolerating po intake. Fall 6/24  Head CT neg. AAOx3  Avoid sedating meds  PT/OT        Code: Full  DVT prophylaxis: heparin     Subjective:     Chief Complaint / Reason for Physician Visit  Pt in bed, NAD. Discussed with RN. Review of Systems:  Symptom Y/N Comments  Symptom Y/N Comments   Fever/Chills n   Chest Pain n    Poor Appetite    Edema     Cough    Abdominal Pain n    Sputum    Joint Pain     SOB/[place] n   Pruritis/Rash     Nausea/vomit    Tolerating PT/OT     Diarrhea    Tolerating Diet     Constipation    Other       Could NOT obtain due to:      Objective:     VITALS:   Last 24hrs VS reviewed since prior progress note. Most recent are:  Patient Vitals for the past 24 hrs:   Temp Pulse Resp BP SpO2   07/02/20 0742 98 °F (36.7 °C) (!) 101 16 144/90 98 %   07/02/20 0356 98.1 °F (36.7 °C) 94 17 (!) 151/93 94 %   07/01/20 2348 98.1 °F (36.7 °C) 99 17 134/78 99 %   07/01/20 2008 98.2 °F (36.8 °C) 97 17 133/75 91 %   07/01/20 1535 98.6 °F (37 °C) 99 17 137/87 99 %   07/01/20 1217 98.2 °F (36.8 °C) 90 17 135/72 100 %       Intake/Output Summary (Last 24 hours) at 7/2/2020 1022  Last data filed at 7/2/2020 0846  Gross per 24 hour   Intake 1370 ml   Output --   Net 1370 ml        PHYSICAL EXAM:  General:         pt eating breakfast in room, no acute distress    EENT:              Anicteric sclerae. Moist oral mucosa  Resp:               No accessory muscle use, breathing pattern regular. CTA b/l  CV:                  NSR, no murmur. +b/l LE edema  GI:                   No distended   Neurologic:      AAOx3, follow commands. Moving all exts  Psych:             Not anxious nor agitated  Skin:                No rashes.   No jaundice    Reviewed most current lab test results and cultures  YES  Reviewed most current radiology test results YES  Review and summation of old records today    NO  Reviewed patient's current orders and MAR    YES  PMH/SH reviewed - no change compared to H&P  ________________________________________________________________________  Care Plan discussed with:    Comments   Patient x    Family      RN x    Care Manager     Consultant                        Multidiciplinary team rounds were held today with , nursing, pharmacist and clinical coordinator. Patient's plan of care was discussed; medications were reviewed and discharge planning was addressed. ________________________________________________________________________  Total NON critical care TIME:  35  Minutes    Total CRITICAL CARE TIME Spent:   Minutes non procedure based      Comments   >50% of visit spent in counseling and coordination of care     ________________________________________________________________________  Leonard Church MD     Procedures: see electronic medical records for all procedures/Xrays and details which were not copied into this note but were reviewed prior to creation of Plan. LABS:  I reviewed today's most current labs and imaging studies. Pertinent labs include:  No results for input(s): WBC, HGB, HCT, PLT, HGBEXT, HCTEXT, PLTEXT, HGBEXT, HCTEXT, PLTEXT in the last 72 hours.   Recent Labs     06/30/20  0324   *   K 4.9      CO2 23   GLU 98   BUN 40*   CREA 2.02*   CA 8.5       Signed: Leonard Church MD

## 2020-07-03 ENCOUNTER — PATIENT OUTREACH (OUTPATIENT)
Dept: FAMILY MEDICINE CLINIC | Age: 55
End: 2020-07-03

## 2020-07-03 NOTE — PROGRESS NOTES
Patient contacted regarding recent discharge and COVID-19 risk. Discussed COVID-19 related testing which was available at this time. Test results were negative. Patient informed of results, if available? Already reported    Care Transition Nurse/ Ambulatory Care Manager contacted the patient by telephone to perform post discharge assessment. Verified name and  with patient as identifiers. Patient has following risk factors of: heart failure and diabetes. CTN/ACM reviewed discharge instructions, medical action plan and red flags related to discharge diagnosis. Reviewed and educated them on any new and changed medications related to discharge diagnosis. Advised obtaining a 90-day supply of all daily and as-needed medications. Education provided regarding infection prevention, and signs and symptoms of COVID-19 and when to seek medical attention with patient who verbalized understanding. Discussed exposure protocols and quarantine from [address] you at higher risk for severe illness  and given an opportunity for questions and concerns. The patient agrees to contact the COVID-19 hotline [phone] or PCP office for questions related to their healthcare. CTN/ACM provided contact information for future reference. From CDC: Are you at higher risk for severe illness?  Wash your hands often.  Avoid close contact (6 feet, which is about two arm lengths) with people who are sick.  Put distance between yourself and other people if COVID-19 is spreading in your community.  Clean and disinfect frequently touched surfaces.  Avoid all cruise travel and non-essential air travel.  Call your healthcare professional if you have concerns about COVID-19 and your underlying condition or if you are sick.     For more information on steps you can take to protect yourself, see CDC's How to Protect Yourself      Patient/family/caregiver given information for [facility] and agrees to enroll no  Patient's preferred e-mail:  n/a  Patient's preferred phone number: n/a  Based on Loop alert triggers, patient will be contacted by nurse care manager for worsening symptoms. Plan for follow-up call in 7-14 days based on severity of symptoms and risk factors.

## 2020-07-08 ENCOUNTER — VIRTUAL VISIT (OUTPATIENT)
Dept: INTERNAL MEDICINE CLINIC | Age: 55
End: 2020-07-08

## 2020-07-08 DIAGNOSIS — Z79.899 ENCOUNTER FOR LONG-TERM (CURRENT) USE OF OTHER MEDICATIONS: Primary | ICD-10-CM

## 2020-07-08 DIAGNOSIS — E11.42 DIABETIC POLYNEUROPATHY ASSOCIATED WITH TYPE 2 DIABETES MELLITUS (HCC): ICD-10-CM

## 2020-07-08 RX ORDER — PREGABALIN 150 MG/1
150 CAPSULE ORAL 2 TIMES DAILY
Qty: 60 CAP | Refills: 0 | Status: SHIPPED | OUTPATIENT
Start: 2020-07-08 | End: 2020-07-29 | Stop reason: SDUPTHER

## 2020-07-08 RX ORDER — AMITRIPTYLINE HYDROCHLORIDE 25 MG/1
25 TABLET, FILM COATED ORAL
Qty: 30 TAB | Refills: 0 | Status: SHIPPED | OUTPATIENT
Start: 2020-07-08 | End: 2020-07-29 | Stop reason: SDUPTHER

## 2020-07-08 NOTE — PROGRESS NOTES
Chief Complaint   Patient presents with    Surgical Follow-up     Patient following up from surgery. 1. Have you been to the ER, urgent care clinic since your last visit? Hospitalized since your last visit? No    2. Have you seen or consulted any other health care providers outside of the [address] [place] [address] since your last visit? Include any pap smears or colon screening.  No

## 2020-07-12 NOTE — PROGRESS NOTES
Gay Dover is a 48 y.o. male who was seen by synchronous (real-time) audio-video technology on 7/8/2020 for Surgical Follow-up (Patient following up from surgery. )        Assessment & Plan:   Diagnoses and all orders for this visit:    1. Diabetic polyneuropathy associated with type 2 diabetes mellitus (HCC)  -     amitriptyline (ELAVIL) 25 mg tablet; Take 1 Tab by mouth nightly. -     pregabalin (Lyrica) 150 mg capsule; Take 1 Cap by mouth two (2) times a day. Max Daily Amount: 300 mg.     have discussed the diagnosis with the patient and the intended plan as seen in the  orders above. The patient understands and agrees with the plan. All questions were answered concerning  future plans  Patient labs and/or xrays were reviewed  Past records were reviewed. Office visit advised within the next month. Subjective:     72-year-old established male patient with diabetic neuropathy for medication refill evaluation. Patient has diabetic neuropathy involving the feet. At initial visit patient requests refill of amitriptyline and pregabalin but he failed to leave pharmacy information. He had amputation of a toe in the interim. Otherwise condition is stable. Prior to Admission medications    Medication Sig Start Date End Date Taking? Authorizing Provider   amitriptyline (ELAVIL) 25 mg tablet Take 1 Tab by mouth nightly. 7/8/20  Yes Sultana Calero MD   pregabalin (Lyrica) 150 mg capsule Take 1 Cap by mouth two (2) times a day. Max Daily Amount: 300 mg. 7/8/20  Yes Sultana Calero MD   albuterol (PROVENTIL HFA, VENTOLIN HFA, PROAIR HFA) 90 mcg/actuation inhaler Take 1 Puff by inhalation every six (6) hours as needed for Wheezing. 7/2/20  Yes Kayli Pena MD   atorvastatin (LIPITOR) 20 mg tablet Take 20 mg by mouth daily. Yes Provider, Historical   hydroCHLOROthiazide (MICROZIDE) 12.5 mg capsule Take 12.5 mg by mouth daily.    Yes Provider, Historical   insulin glargine (Lantus U-100 Insulin) 100 unit/mL injection by SubCUTAneous route nightly. 30 units   Yes Provider, Historical   pregabalin (LYRICA) 150 mg capsule TAKE 1 CAPSULE BY MOUTH TWICE A DAY 4/22/20  Yes Phyllis Prado NP   insulin glargine (LANTUS) 100 unit/mL injection 30 Units by SubCUTAneous route nightly. Patient taking differently: 30 Units by SubCUTAneous route nightly. PRN IG  GLUCOSE  > 200 3/10/20  Yes Phyllis Prado NP   BD INSULIN SYRINGE ULTRA-FINE 1 mL 31 gauge x 5/16 syrg USE AS DIRECTED FOR INJECTING INSULIN 3/10/20  Yes Phyllis Prado NP   dapagliflozin (FARXIGA) 5 mg tab tablet Take 1 Tab by mouth daily. 3/10/20  Phyllis Brown NP   Blood-Glucose Meter (TRUE METRIX GLUCOSE METER) misc 1 Device by Does Not Apply route two (2) times a day. 3/10/20  Phyllis Brown NP   glucose blood VI test strips (TRUE METRIX GLUCOSE TEST STRIP) strip Test BS 2 times a day 3/10/20  Phyllis Brown NP   lisinopriL (PRINIVIL, ZESTRIL) 10 mg tablet Take 1 Tab by mouth daily. 3/10/20  Yes Phyllis Prado NP   carvediloL (COREG) 12.5 mg tablet Take 1 Tab by mouth two (2) times a day. 3/10/20  Phyllis Brown NP   amitriptyline (ELAVIL) 25 mg tablet take 1 tablet by mouth NIGHTLY 3/10/20  Yes Phyllis Prado NP   hydroCHLOROthiazide (HYDRODIURIL) 25 mg tablet Take 1 Tab by mouth daily. 3/10/20  Yes Phyllis Prado NP   carvediloL (COREG) 12.5 mg tablet Take 12.5 mg by mouth two (2) times daily (with meals). Provider, Historical   lisinopriL (PRINIVIL, ZESTRIL) 10 mg tablet Take  by mouth daily. Provider, Historical   dapagliflozin (Farxiga) 5 mg tab tablet Take 5 mg by mouth daily. Provider, Historical   lancets misc Check BS 2 times a day 3/10/20   Hong Lanius A, NP   amLODIPine (NORVASC) 10 mg tablet Take 1 Tab by mouth daily.  3/10/20   Hong DURÁN NP   atorvastatin (LIPITOR) 20 mg tablet Take 1 Tab by mouth every evening. 5/23/19   Jean-Claude Hoskins NP     Reviewed PmHx, RxHx, FmHx, SocHx, AllgHx and updated and dated in the chart. ROS  Pertinent positives and negatives are listed in HPI    Objective:   No flowsheet data found. [INSTRUCTIONS:  \"[x]\" Indicates a positive item  \"[]\" Indicates a negative item  -- DELETE ALL ITEMS NOT EXAMINED]    Constitutional: [x] Appears well-developed and well-nourished [x] No apparent distress      [] Abnormal -     Mental status: [x] Alert and awake  [x] Oriented to person/place/time [x] Able to follow commands    [] Abnormal -     Eyes:   EOM    [x]  Normal    [] Abnormal -   Sclera  [x]  Normal    [] Abnormal -          Discharge [x]  None visible   [] Abnormal -     HENT: [x] Normocephalic, atraumatic  [] Abnormal -   [x] Mouth/Throat: Mucous membranes are moist    External Ears [x] Normal  [] Abnormal -    Neck: [x] No visualized mass [] Abnormal -     Pulmonary/Chest: [x] Respiratory effort normal   [x] No visualized signs of difficulty breathing or respiratory distress        [] Abnormal -      Musculoskeletal:   [] Normal gait with no signs of ataxia         [x] Normal range of motion of neck        [] Abnormal -     Neurological:        [x] No Facial Asymmetry (Cranial nerve 7 motor function) (limited exam due to video visit)          [x] No gaze palsy        [] Abnormal -          Skin:        [x] No significant exanthematous lesions or discoloration noted on facial skin         [] Abnormal -            Psychiatric:       [x] Normal Affect [] Abnormal -        [x] No Hallucinations    Other pertinent observable physical exam findings:-        We discussed the expected course, resolution and complications of the diagnosis(es) in detail. Medication risks, benefits, costs, interactions, and alternatives were discussed as indicated. I advised him to contact the office if his condition worsens, changes or fails to improve as anticipated. He expressed understanding with the diagnosis(es) and plan.        Omaira Young, who was evaluated through a patient-initiated, synchronous (real-time) audio-video encounter, and/or his healthcare decision maker, is aware that it is a billable service, with coverage as determined by his insurance carrier. He provided verbal consent to proceed: Yes, and patient identification was verified. It was conducted pursuant to the emergency declaration under the [address] [place] [address], [address] [place] [address] and the [employer]. A caregiver was present when appropriate. Ability to conduct physical exam was limited. I was in the office. The patient was in the office.       Riky Barahona MD

## 2020-07-29 ENCOUNTER — PATIENT OUTREACH (OUTPATIENT)
Dept: FAMILY MEDICINE CLINIC | Age: 55
End: 2020-07-29

## 2020-07-29 DIAGNOSIS — E11.42 DIABETIC POLYNEUROPATHY ASSOCIATED WITH TYPE 2 DIABETES MELLITUS (HCC): ICD-10-CM

## 2020-07-29 DIAGNOSIS — I10 ESSENTIAL HYPERTENSION: ICD-10-CM

## 2020-07-29 RX ORDER — AMLODIPINE BESYLATE 10 MG/1
10 TABLET ORAL DAILY
Qty: 30 TAB | Refills: 5 | Status: ON HOLD | OUTPATIENT
Start: 2020-07-29 | End: 2021-03-17 | Stop reason: SDUPTHER

## 2020-07-29 RX ORDER — PREGABALIN 150 MG/1
150 CAPSULE ORAL 2 TIMES DAILY
Qty: 60 CAP | Refills: 0 | Status: SHIPPED | OUTPATIENT
Start: 2020-07-29 | End: 2021-03-17

## 2020-07-29 RX ORDER — AMITRIPTYLINE HYDROCHLORIDE 25 MG/1
25 TABLET, FILM COATED ORAL
Qty: 30 TAB | Refills: 0 | Status: SHIPPED | OUTPATIENT
Start: 2020-07-29 | End: 2021-04-30

## 2020-07-29 NOTE — PROGRESS NOTES
Patient resolved from Transition of Care episode on 7/29/20. ACM/CTN was unsuccessful at contacting this patient today. Patient/family was provided the following resources and education related to COVID-19 during the initial call:                         Signs, symptoms and red flags related to COVID-19            CDC exposure and quarantine guidelines            [employer] exposure contact - [phone]            Contact for their local Department of Health                 Patient has not had any additional ED or hospital visits. No further outreach scheduled with this CTN/ACM. Episode of Care resolved. Patient has this CTN/ACM contact information if future needs arise.

## 2021-01-18 ENCOUNTER — HOSPITAL ENCOUNTER (EMERGENCY)
Age: 56
Discharge: HOME | End: 2021-01-19
Attending: EMERGENCY MEDICINE
Payer: MEDICAID

## 2021-01-18 DIAGNOSIS — I10 ESSENTIAL HYPERTENSION: ICD-10-CM

## 2021-01-18 DIAGNOSIS — S39.012A STRAIN OF LUMBAR REGION, INITIAL ENCOUNTER: ICD-10-CM
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
## 2021-01-18 DIAGNOSIS — G44.209 TENSION HEADACHE: ICD-10-CM

## 2021-01-18 RX ORDER — IBUPROFEN 400 MG/1
800 TABLET ORAL
Status: COMPLETED | OUTPATIENT
Start: 2021-01-18 | End: 2021-01-19

## 2021-01-18 RX ORDER — DIAZEPAM 5 MG/1
10 TABLET ORAL
Status: COMPLETED | OUTPATIENT
Start: 2021-01-18 | End: 2021-01-19

## 2021-01-19 ENCOUNTER — APPOINTMENT (OUTPATIENT)
Dept: GENERAL RADIOLOGY | Age: 56
End: 2021-01-19
Attending: EMERGENCY MEDICINE
Payer: MEDICAID

## 2021-01-19 VITALS
HEART RATE: 90 BPM | DIASTOLIC BLOOD PRESSURE: 114 MMHG | WEIGHT: 173 LBS | OXYGEN SATURATION: 96 % | BODY MASS INDEX: 27.15 KG/M2 | HEIGHT: 67 IN | TEMPERATURE: 97.2 F | RESPIRATION RATE: 18 BRPM | SYSTOLIC BLOOD PRESSURE: 166 MMHG

## 2021-01-19 PROCEDURE — 74011250637 HC RX REV CODE- 250/637: Performed by: EMERGENCY MEDICINE

## 2021-01-19 PROCEDURE — 72100 X-RAY EXAM L-S SPINE 2/3 VWS: CPT

## 2021-01-19 PROCEDURE — 99284 EMERGENCY DEPT VISIT MOD MDM: CPT

## 2021-01-19 RX ORDER — METHOCARBAMOL 750 MG/1
750 TABLET, FILM COATED ORAL 3 TIMES DAILY
Qty: 15 TAB | Refills: 0 | Status: SHIPPED | OUTPATIENT
Start: 2021-01-19 | End: 2021-01-19 | Stop reason: SDUPTHER

## 2021-01-19 RX ORDER — DICLOFENAC SODIUM 75 MG/1
75 TABLET, DELAYED RELEASE ORAL 2 TIMES DAILY
Qty: 10 TAB | Refills: 0 | Status: SHIPPED | OUTPATIENT
Start: 2021-01-19 | End: 2021-01-24

## 2021-01-19 RX ORDER — METHOCARBAMOL 750 MG/1
750 TABLET, FILM COATED ORAL 3 TIMES DAILY
Qty: 15 TAB | Refills: 0 | Status: SHIPPED | OUTPATIENT
Start: 2021-01-19 | End: 2021-01-24

## 2021-01-19 RX ORDER — CARVEDILOL 12.5 MG/1
12.5 TABLET ORAL
Status: COMPLETED | OUTPATIENT
Start: 2021-01-19 | End: 2021-01-19

## 2021-01-19 RX ORDER — DICLOFENAC SODIUM 75 MG/1
75 TABLET, DELAYED RELEASE ORAL 2 TIMES DAILY
Qty: 10 TAB | Refills: 0 | Status: SHIPPED | OUTPATIENT
Start: 2021-01-19 | End: 2021-01-19 | Stop reason: SDUPTHER

## 2021-01-19 RX ORDER — AMLODIPINE BESYLATE 5 MG/1
10 TABLET ORAL
Status: COMPLETED | OUTPATIENT
Start: 2021-01-19 | End: 2021-01-19

## 2021-01-19 RX ADMIN — AMLODIPINE BESYLATE 10 MG: 5 TABLET ORAL at 01:12

## 2021-01-19 RX ADMIN — IBUPROFEN 800 MG: 400 TABLET, FILM COATED ORAL at 00:03

## 2021-01-19 RX ADMIN — CARVEDILOL 12.5 MG: 12.5 TABLET, FILM COATED ORAL at 01:12

## 2021-01-19 RX ADMIN — DIAZEPAM 10 MG: 5 TABLET ORAL at 00:03
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
## 2021-01-19 NOTE — ED NOTES
Patient  given copy of dc instructions and 2 script(s). Patient  verbalized understanding of instructions and script (s). Patient given a current medication reconciliation form and verbalized understanding of their medications. Patient  verbalized understanding of the importance of discussing medications with  his or her physician or clinic they will be following up with. Patient alert and oriented and in no acute distress. Patient discharged home  with wife driving home.

## 2021-01-20 ENCOUNTER — PATIENT OUTREACH (OUTPATIENT)
Dept: CASE MANAGEMENT | Age: 56
End: 2021-01-20
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
## 2021-03-05 ENCOUNTER — APPOINTMENT (OUTPATIENT)
Dept: GENERAL RADIOLOGY | Age: 56
DRG: 194 | End: 2021-03-05
Attending: EMERGENCY MEDICINE
Payer: MEDICAID

## 2021-03-05 ENCOUNTER — HOSPITAL ENCOUNTER (INPATIENT)
Age: 56
LOS: 12 days | Discharge: HOME | DRG: 194 | End: 2021-03-17
Attending: EMERGENCY MEDICINE | Admitting: STUDENT IN AN ORGANIZED HEALTH CARE EDUCATION/TRAINING PROGRAM
Payer: MEDICAID

## 2021-03-05 ENCOUNTER — APPOINTMENT (OUTPATIENT)
Dept: CT IMAGING | Age: 56
DRG: 194 | End: 2021-03-05
Attending: STUDENT IN AN ORGANIZED HEALTH CARE EDUCATION/TRAINING PROGRAM
Payer: MEDICAID

## 2021-03-05 DIAGNOSIS — I10 ESSENTIAL HYPERTENSION: ICD-10-CM

## 2021-03-05 DIAGNOSIS — R60.9 PERIPHERAL EDEMA: ICD-10-CM

## 2021-03-05 DIAGNOSIS — I50.43 ACUTE ON CHRONIC COMBINED SYSTOLIC AND DIASTOLIC ACC/AHA STAGE C CONGESTIVE HEART FAILURE (HCC): ICD-10-CM

## 2021-03-05 DIAGNOSIS — N18.30 STAGE 3 CHRONIC KIDNEY DISEASE, UNSPECIFIED WHETHER STAGE 3A OR 3B CKD (HCC): ICD-10-CM

## 2021-03-05 DIAGNOSIS — J90 PLEURAL EFFUSION, BILATERAL: ICD-10-CM

## 2021-03-05 DIAGNOSIS — I16.0 HYPERTENSIVE URGENCY: ICD-10-CM

## 2021-03-05 DIAGNOSIS — I50.9 ACUTE ON CHRONIC CONGESTIVE HEART FAILURE, UNSPECIFIED HEART FAILURE TYPE (HCC): Primary | ICD-10-CM

## 2021-03-05 DIAGNOSIS — Z86.39 HISTORY OF DIABETES MELLITUS: ICD-10-CM

## 2021-03-05 DIAGNOSIS — E78.2 MIXED HYPERLIPIDEMIA: ICD-10-CM

## 2021-03-05 LAB
ALBUMIN SERPL-MCNC: 1.8 G/DL (ref 3.5–5)
ALBUMIN/GLOB SERPL: 0.3 {RATIO} (ref 1.1–2.2)
ALP SERPL-CCNC: 107 U/L (ref 45–117)
ALT SERPL-CCNC: 13 U/L (ref 12–78)
ANION GAP SERPL CALC-SCNC: 6 MMOL/L (ref 5–15)
AST SERPL-CCNC: 43 U/L (ref 15–37)
BASOPHILS # BLD: 0 K/UL (ref 0–0.1)
BASOPHILS NFR BLD: 0 % (ref 0–1)
BILIRUB SERPL-MCNC: 0.3 MG/DL (ref 0.2–1)
BNP SERPL-MCNC: ABNORMAL PG/ML
BUN SERPL-MCNC: 23 MG/DL (ref 6–20)
BUN/CREAT SERPL: 9 (ref 12–20)
CALCIUM SERPL-MCNC: 8.2 MG/DL (ref 8.5–10.1)
CHLORIDE SERPL-SCNC: 110 MMOL/L (ref 97–108)
CO2 SERPL-SCNC: 26 MMOL/L (ref 21–32)
CREAT SERPL-MCNC: 2.66 MG/DL (ref 0.7–1.3)
DIFFERENTIAL METHOD BLD: ABNORMAL
EOSINOPHIL # BLD: 0.1 K/UL (ref 0–0.4)
EOSINOPHIL NFR BLD: 1 % (ref 0–7)
ERYTHROCYTE [DISTWIDTH] IN BLOOD BY AUTOMATED COUNT: 15.7 % (ref 11.5–14.5)
GLOBULIN SER CALC-MCNC: 5.2 G/DL (ref 2–4)
GLUCOSE SERPL-MCNC: 147 MG/DL (ref 65–100)
HCT VFR BLD AUTO: 36.1 % (ref 36.6–50.3)
HGB BLD-MCNC: 11.3 G/DL (ref 12.1–17)
IMM GRANULOCYTES # BLD AUTO: 0 K/UL (ref 0–0.04)
IMM GRANULOCYTES NFR BLD AUTO: 0 % (ref 0–0.5)
LYMPHOCYTES # BLD: 0.9 K/UL (ref 0.8–3.5)
LYMPHOCYTES NFR BLD: 11 % (ref 12–49)
MCH RBC QN AUTO: 25.8 PG (ref 26–34)
MCHC RBC AUTO-ENTMCNC: 31.3 G/DL (ref 30–36.5)
MCV RBC AUTO: 82.4 FL (ref 80–99)
MONOCYTES # BLD: 0.5 K/UL (ref 0–1)
MONOCYTES NFR BLD: 6 % (ref 5–13)
NEUTS SEG # BLD: 6.1 K/UL (ref 1.8–8)
NEUTS SEG NFR BLD: 82 % (ref 32–75)
NRBC # BLD: 0 K/UL (ref 0–0.01)
NRBC BLD-RTO: 0 PER 100 WBC
PLATELET # BLD AUTO: 353 K/UL (ref 150–400)
PMV BLD AUTO: 10.3 FL (ref 8.9–12.9)
POTASSIUM SERPL-SCNC: 3.3 MMOL/L (ref 3.5–5.1)
PROT SERPL-MCNC: 7 G/DL (ref 6.4–8.2)
RBC # BLD AUTO: 4.38 M/UL (ref 4.1–5.7)
SODIUM SERPL-SCNC: 142 MMOL/L (ref 136–145)
TROPONIN I SERPL-MCNC: <0.05 NG/ML
WBC # BLD AUTO: 7.6 K/UL (ref 4.1–11.1)

## 2021-03-05 PROCEDURE — 84484 ASSAY OF TROPONIN QUANT: CPT

## 2021-03-05 PROCEDURE — 71045 X-RAY EXAM CHEST 1 VIEW: CPT

## 2021-03-05 PROCEDURE — 81001 URINALYSIS AUTO W/SCOPE: CPT

## 2021-03-05 PROCEDURE — 83880 ASSAY OF NATRIURETIC PEPTIDE: CPT

## 2021-03-05 PROCEDURE — 80307 DRUG TEST PRSMV CHEM ANLYZR: CPT

## 2021-03-05 PROCEDURE — 96374 THER/PROPH/DIAG INJ IV PUSH: CPT

## 2021-03-05 PROCEDURE — 74011250636 HC RX REV CODE- 250/636: Performed by: STUDENT IN AN ORGANIZED HEALTH CARE EDUCATION/TRAINING PROGRAM

## 2021-03-05 PROCEDURE — 93005 ELECTROCARDIOGRAM TRACING: CPT

## 2021-03-05 PROCEDURE — 99284 EMERGENCY DEPT VISIT MOD MDM: CPT

## 2021-03-05 PROCEDURE — 74176 CT ABD & PELVIS W/O CONTRAST: CPT

## 2021-03-05 PROCEDURE — 80053 COMPREHEN METABOLIC PANEL: CPT

## 2021-03-05 PROCEDURE — 85025 COMPLETE CBC W/AUTO DIFF WBC: CPT

## 2021-03-05 PROCEDURE — 36415 COLL VENOUS BLD VENIPUNCTURE: CPT

## 2021-03-05 PROCEDURE — 65660000000 HC RM CCU STEPDOWN

## 2021-03-05 RX ORDER — FUROSEMIDE 10 MG/ML
80 INJECTION INTRAMUSCULAR; INTRAVENOUS ONCE
Status: COMPLETED | OUTPATIENT
Start: 2021-03-05 | End: 2021-03-05

## 2021-03-05 RX ORDER — NITROGLYCERIN 0.4 MG/1
0.4 TABLET SUBLINGUAL AS NEEDED
Status: DISCONTINUED | OUTPATIENT
Start: 2021-03-05 | End: 2021-03-17 | Stop reason: HOSPADM

## 2021-03-05 RX ORDER — ACETAMINOPHEN 325 MG/1
650 TABLET ORAL
Status: DISCONTINUED | OUTPATIENT
Start: 2021-03-05 | End: 2021-03-17 | Stop reason: HOSPADM

## 2021-03-05 RX ADMIN — FUROSEMIDE 80 MG: 10 INJECTION, SOLUTION INTRAMUSCULAR; INTRAVENOUS at 22:06

## 2021-03-06 ENCOUNTER — APPOINTMENT (OUTPATIENT)
Dept: NON INVASIVE DIAGNOSTICS | Age: 56
DRG: 194 | End: 2021-03-06
Attending: NURSE PRACTITIONER
Payer: MEDICAID

## 2021-03-06 LAB
ALBUMIN SERPL-MCNC: 1.8 G/DL (ref 3.5–5)
ALBUMIN/GLOB SERPL: 0.3 {RATIO} (ref 1.1–2.2)
ALP SERPL-CCNC: 110 U/L (ref 45–117)
ALT SERPL-CCNC: 13 U/L (ref 12–78)
AMPHET UR QL SCN: NEGATIVE
ANION GAP SERPL CALC-SCNC: 5 MMOL/L (ref 5–15)
APPEARANCE UR: CLEAR
AST SERPL-CCNC: 32 U/L (ref 15–37)
ATRIAL RATE: 96 BPM
BACTERIA URNS QL MICRO: NEGATIVE /HPF
BARBITURATES UR QL SCN: NEGATIVE
BASOPHILS # BLD: 0 K/UL (ref 0–0.1)
BASOPHILS NFR BLD: 0 % (ref 0–1)
BENZODIAZ UR QL: NEGATIVE
BILIRUB SERPL-MCNC: 0.4 MG/DL (ref 0.2–1)
BILIRUB UR QL: NEGATIVE
BUN SERPL-MCNC: 23 MG/DL (ref 6–20)
BUN/CREAT SERPL: 9 (ref 12–20)
CALCIUM SERPL-MCNC: 8.5 MG/DL (ref 8.5–10.1)
CALCULATED P AXIS, ECG09: 33 DEGREES
CALCULATED R AXIS, ECG10: -14 DEGREES
CALCULATED T AXIS, ECG11: -165 DEGREES
CANNABINOIDS UR QL SCN: NEGATIVE
CHLORIDE SERPL-SCNC: 111 MMOL/L (ref 97–108)
CO2 SERPL-SCNC: 25 MMOL/L (ref 21–32)
COCAINE UR QL SCN: POSITIVE
COLOR UR: ABNORMAL
CREAT SERPL-MCNC: 2.6 MG/DL (ref 0.7–1.3)
DIAGNOSIS, 93000: NORMAL
DIFFERENTIAL METHOD BLD: ABNORMAL
DRUG SCRN COMMENT,DRGCM: ABNORMAL
ECHO AO ROOT DIAM: 3.51 CM
ECHO EST RA PRESSURE: 10 MMHG
ECHO LA MAJOR AXIS: 4.58 CM
ECHO LA MINOR AXIS: 2.41 CM
ECHO LV INTERNAL DIMENSION DIASTOLIC MMODE: 5.21 CM
ECHO LV INTERNAL DIMENSION DIASTOLIC: 4.87 CM (ref 4.2–5.9)
ECHO LV INTERNAL DIMENSION SYSTOLIC MMODE: 4.26 CM
ECHO LV INTERNAL DIMENSION SYSTOLIC: 3.94 CM
ECHO LV IVSD MMODE: 1.93 CM
ECHO LV IVSD: 1.51 CM (ref 0.6–1)
ECHO LV IVSS MMODE: 2.24 CM
ECHO LV IVSS: 2.04 CM
ECHO LV MASS 2D: 327.3 G (ref 88–224)
ECHO LV MASS INDEX 2D: 172.1 G/M2 (ref 49–115)
ECHO LV POSTERIOR WALL DIASTOLIC MMODE: 1.9 CM
ECHO LV POSTERIOR WALL DIASTOLIC: 1.6 CM (ref 0.6–1)
ECHO LV POSTERIOR WALL SYSTOLIC: 2.29 CM
ECHO LVOT DIAM: 2.12 CM
ECHO MV A VELOCITY: 48.68 CM/S
ECHO MV E DECELERATION TIME (DT): 149.27 MS
ECHO MV E VELOCITY: 64.94 CM/S
ECHO MV E/A RATIO: 1.33
ECHO PV MAX VELOCITY: 67.47 CM/S
ECHO PV PEAK INSTANTANEOUS GRADIENT SYSTOLIC: 1.82 MMHG
ECHO RIGHT VENTRICULAR SYSTOLIC PRESSURE (RVSP): 46.31 MMHG
ECHO RV INTERNAL DIMENSION: 2.27 CM
ECHO TV REGURGITANT MAX VELOCITY: 301.27 CM/S
ECHO TV REGURGITANT PEAK GRADIENT: 36.31 MMHG
EOSINOPHIL # BLD: 0.1 K/UL (ref 0–0.4)
EOSINOPHIL NFR BLD: 2 % (ref 0–7)
EPITH CASTS URNS QL MICRO: ABNORMAL /LPF
ERYTHROCYTE [DISTWIDTH] IN BLOOD BY AUTOMATED COUNT: 15.8 % (ref 11.5–14.5)
GLOBULIN SER CALC-MCNC: 5.2 G/DL (ref 2–4)
GLUCOSE BLD STRIP.AUTO-MCNC: 132 MG/DL (ref 65–100)
GLUCOSE BLD STRIP.AUTO-MCNC: 141 MG/DL (ref 65–100)
GLUCOSE BLD STRIP.AUTO-MCNC: 192 MG/DL (ref 65–100)
GLUCOSE BLD STRIP.AUTO-MCNC: 97 MG/DL (ref 65–100)
GLUCOSE SERPL-MCNC: 131 MG/DL (ref 65–100)
GLUCOSE UR STRIP.AUTO-MCNC: NEGATIVE MG/DL
HCT VFR BLD AUTO: 37.4 % (ref 36.6–50.3)
HGB BLD-MCNC: 11.6 G/DL (ref 12.1–17)
HGB UR QL STRIP: ABNORMAL
HYALINE CASTS URNS QL MICRO: ABNORMAL /LPF (ref 0–5)
IMM GRANULOCYTES # BLD AUTO: 0 K/UL (ref 0–0.04)
IMM GRANULOCYTES NFR BLD AUTO: 0 % (ref 0–0.5)
KETONES UR QL STRIP.AUTO: NEGATIVE MG/DL
LEUKOCYTE ESTERASE UR QL STRIP.AUTO: NEGATIVE
LYMPHOCYTES # BLD: 0.9 K/UL (ref 0.8–3.5)
LYMPHOCYTES NFR BLD: 11 % (ref 12–49)
MAGNESIUM SERPL-MCNC: 2.1 MG/DL (ref 1.6–2.4)
MCH RBC QN AUTO: 25.6 PG (ref 26–34)
MCHC RBC AUTO-ENTMCNC: 31 G/DL (ref 30–36.5)
MCV RBC AUTO: 82.4 FL (ref 80–99)
METHADONE UR QL: NEGATIVE
MONOCYTES # BLD: 0.6 K/UL (ref 0–1)
MONOCYTES NFR BLD: 8 % (ref 5–13)
NEUTS SEG # BLD: 6.4 K/UL (ref 1.8–8)
NEUTS SEG NFR BLD: 79 % (ref 32–75)
NITRITE UR QL STRIP.AUTO: NEGATIVE
NRBC # BLD: 0 K/UL (ref 0–0.01)
NRBC BLD-RTO: 0 PER 100 WBC
OPIATES UR QL: POSITIVE
P-R INTERVAL, ECG05: 108 MS
PCP UR QL: NEGATIVE
PH UR STRIP: 6.5 [PH] (ref 5–8)
PLATELET # BLD AUTO: 361 K/UL (ref 150–400)
PMV BLD AUTO: 10.1 FL (ref 8.9–12.9)
POTASSIUM SERPL-SCNC: 3.5 MMOL/L (ref 3.5–5.1)
PROT SERPL-MCNC: 7 G/DL (ref 6.4–8.2)
PROT UR STRIP-MCNC: 100 MG/DL
Q-T INTERVAL, ECG07: 352 MS
QRS DURATION, ECG06: 86 MS
QTC CALCULATION (BEZET), ECG08: 444 MS
RBC # BLD AUTO: 4.54 M/UL (ref 4.1–5.7)
RBC #/AREA URNS HPF: ABNORMAL /HPF (ref 0–5)
SERVICE CMNT-IMP: ABNORMAL
SERVICE CMNT-IMP: NORMAL
SODIUM SERPL-SCNC: 141 MMOL/L (ref 136–145)
SP GR UR REFRACTOMETRY: 1.01 (ref 1–1.03)
UA: UC IF INDICATED,UAUC: ABNORMAL
UROBILINOGEN UR QL STRIP.AUTO: 0.2 EU/DL (ref 0.2–1)
VENTRICULAR RATE, ECG03: 96 BPM
WBC # BLD AUTO: 8.1 K/UL (ref 4.1–11.1)
WBC URNS QL MICRO: ABNORMAL /HPF (ref 0–4)

## 2021-03-06 PROCEDURE — 74011250637 HC RX REV CODE- 250/637: Performed by: NURSE PRACTITIONER

## 2021-03-06 PROCEDURE — 85025 COMPLETE CBC W/AUTO DIFF WBC: CPT

## 2021-03-06 PROCEDURE — 74011000250 HC RX REV CODE- 250: Performed by: NURSE PRACTITIONER

## 2021-03-06 PROCEDURE — 93306 TTE W/DOPPLER COMPLETE: CPT

## 2021-03-06 PROCEDURE — 74011250637 HC RX REV CODE- 250/637: Performed by: INTERNAL MEDICINE

## 2021-03-06 PROCEDURE — 74011636637 HC RX REV CODE- 636/637: Performed by: NURSE PRACTITIONER

## 2021-03-06 PROCEDURE — 80053 COMPREHEN METABOLIC PANEL: CPT

## 2021-03-06 PROCEDURE — 83735 ASSAY OF MAGNESIUM: CPT

## 2021-03-06 PROCEDURE — 65660000000 HC RM CCU STEPDOWN

## 2021-03-06 PROCEDURE — 82962 GLUCOSE BLOOD TEST: CPT

## 2021-03-06 PROCEDURE — 99223 1ST HOSP IP/OBS HIGH 75: CPT | Performed by: INTERNAL MEDICINE

## 2021-03-06 PROCEDURE — 36415 COLL VENOUS BLD VENIPUNCTURE: CPT

## 2021-03-06 PROCEDURE — 74011250636 HC RX REV CODE- 250/636: Performed by: NURSE PRACTITIONER

## 2021-03-06 PROCEDURE — 2709999900 HC NON-CHARGEABLE SUPPLY

## 2021-03-06 PROCEDURE — 93306 TTE W/DOPPLER COMPLETE: CPT | Performed by: INTERNAL MEDICINE

## 2021-03-06 RX ORDER — IPRATROPIUM BROMIDE AND ALBUTEROL SULFATE 2.5; .5 MG/3ML; MG/3ML
3 SOLUTION RESPIRATORY (INHALATION)
Status: DISCONTINUED | OUTPATIENT
Start: 2021-03-06 | End: 2021-03-17 | Stop reason: HOSPADM

## 2021-03-06 RX ORDER — ONDANSETRON 2 MG/ML
4 INJECTION INTRAMUSCULAR; INTRAVENOUS
Status: DISCONTINUED | OUTPATIENT
Start: 2021-03-06 | End: 2021-03-17 | Stop reason: HOSPADM

## 2021-03-06 RX ORDER — INSULIN LISPRO 100 [IU]/ML
INJECTION, SOLUTION INTRAVENOUS; SUBCUTANEOUS
Status: DISCONTINUED | OUTPATIENT
Start: 2021-03-06 | End: 2021-03-17 | Stop reason: HOSPADM

## 2021-03-06 RX ORDER — ASPIRIN 81 MG/1
81 TABLET ORAL DAILY
Status: DISCONTINUED | OUTPATIENT
Start: 2021-03-06 | End: 2021-03-17 | Stop reason: HOSPADM

## 2021-03-06 RX ORDER — AMLODIPINE BESYLATE 5 MG/1
10 TABLET ORAL DAILY
Status: DISCONTINUED | OUTPATIENT
Start: 2021-03-06 | End: 2021-03-17 | Stop reason: HOSPADM

## 2021-03-06 RX ORDER — LABETALOL HYDROCHLORIDE 5 MG/ML
10 INJECTION, SOLUTION INTRAVENOUS
Status: DISCONTINUED | OUTPATIENT
Start: 2021-03-06 | End: 2021-03-17 | Stop reason: HOSPADM

## 2021-03-06 RX ORDER — BUMETANIDE 0.25 MG/ML
1 INJECTION INTRAMUSCULAR; INTRAVENOUS 2 TIMES DAILY
Status: DISCONTINUED | OUTPATIENT
Start: 2021-03-06 | End: 2021-03-10

## 2021-03-06 RX ORDER — ATORVASTATIN CALCIUM 20 MG/1
20 TABLET, FILM COATED ORAL
Status: DISCONTINUED | OUTPATIENT
Start: 2021-03-06 | End: 2021-03-17 | Stop reason: HOSPADM

## 2021-03-06 RX ORDER — LOSARTAN POTASSIUM 50 MG/1
50 TABLET ORAL DAILY
Status: DISCONTINUED | OUTPATIENT
Start: 2021-03-06 | End: 2021-03-07

## 2021-03-06 RX ORDER — ACETAMINOPHEN 325 MG/1
650 TABLET ORAL
Status: DISCONTINUED | OUTPATIENT
Start: 2021-03-06 | End: 2021-03-17 | Stop reason: HOSPADM

## 2021-03-06 RX ORDER — DEXTROSE 50 % IN WATER (D50W) INTRAVENOUS SYRINGE
12.5-25 AS NEEDED
Status: DISCONTINUED | OUTPATIENT
Start: 2021-03-06 | End: 2021-03-17 | Stop reason: HOSPADM

## 2021-03-06 RX ORDER — HEPARIN SODIUM 5000 [USP'U]/ML
5000 INJECTION, SOLUTION INTRAVENOUS; SUBCUTANEOUS EVERY 8 HOURS
Status: DISCONTINUED | OUTPATIENT
Start: 2021-03-06 | End: 2021-03-17 | Stop reason: HOSPADM

## 2021-03-06 RX ORDER — CARVEDILOL 12.5 MG/1
12.5 TABLET ORAL 2 TIMES DAILY WITH MEALS
Status: DISCONTINUED | OUTPATIENT
Start: 2021-03-06 | End: 2021-03-07

## 2021-03-06 RX ORDER — ACETAMINOPHEN 650 MG/1
650 SUPPOSITORY RECTAL
Status: DISCONTINUED | OUTPATIENT
Start: 2021-03-06 | End: 2021-03-17 | Stop reason: HOSPADM

## 2021-03-06 RX ORDER — MAGNESIUM SULFATE 100 %
4 CRYSTALS MISCELLANEOUS AS NEEDED
Status: DISCONTINUED | OUTPATIENT
Start: 2021-03-06 | End: 2021-03-17 | Stop reason: HOSPADM

## 2021-03-06 RX ORDER — IBUPROFEN 200 MG
1 TABLET ORAL DAILY
Status: DISCONTINUED | OUTPATIENT
Start: 2021-03-06 | End: 2021-03-13

## 2021-03-06 RX ADMIN — LABETALOL HYDROCHLORIDE 10 MG: 5 INJECTION INTRAVENOUS at 04:17

## 2021-03-06 RX ADMIN — ATORVASTATIN CALCIUM 20 MG: 20 TABLET, FILM COATED ORAL at 23:12

## 2021-03-06 RX ADMIN — AMLODIPINE BESYLATE 10 MG: 5 TABLET ORAL at 12:14

## 2021-03-06 RX ADMIN — HEPARIN SODIUM 5000 UNITS: 5000 INJECTION INTRAVENOUS; SUBCUTANEOUS at 23:12

## 2021-03-06 RX ADMIN — ASPIRIN 81 MG: 81 TABLET, COATED ORAL at 08:04

## 2021-03-06 RX ADMIN — CARVEDILOL 12.5 MG: 12.5 TABLET, FILM COATED ORAL at 07:55

## 2021-03-06 RX ADMIN — LABETALOL HYDROCHLORIDE 10 MG: 5 INJECTION INTRAVENOUS at 07:54

## 2021-03-06 RX ADMIN — INSULIN LISPRO 2 UNITS: 100 INJECTION, SOLUTION INTRAVENOUS; SUBCUTANEOUS at 09:23

## 2021-03-06 RX ADMIN — BUMETANIDE 1 MG: 0.25 INJECTION, SOLUTION INTRAMUSCULAR; INTRAVENOUS at 08:04

## 2021-03-06 NOTE — PROGRESS NOTES
TRANSFER - IN REPORT:    Verbal report received from [facility] on Ebbie Flank  being received from ED (unit) for routine progression of care      Report consisted of patients Situation, Background, Assessment and   Recommendations(SBAR). Information from the following report(s) SBAR, Kardex and Recent Results was reviewed with the receiving nurse. Opportunity for questions and clarification was provided. Assessment completed upon patients arrival to unit and care assumed.
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
## 2021-03-06 NOTE — ED NOTES
TRANSFER - OUT REPORT:    Verbal report given to Mike Joseph RN(name) on Felisha Flores  being transferred to 2219(unit) for routine progression of care       Report consisted of patients Situation, Background, Assessment and   Recommendations(SBAR). Information from the following report(s) ED Summary was reviewed with the receiving nurse. Opportunity for questions and clarification was provided.       Patient transported with:   [employer]
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
## 2021-03-06 NOTE — ACP (ADVANCE CARE PLANNING)
Advance Care Planning Note      NAME: Florentino Guzman   :  1965   MRN:  [id number]     Date/Time:  3/6/2021 6:09 AM    Active Diagnoses:  Hospital Problems  Date Reviewed: 2020          Codes Class Noted POA    CHF exacerbation ([facility] [address] ICD-10-CM: I50.9  ICD-9-CM: 428.0  3/5/2021 Unknown              These active diagnoses are of sufficient risk that focused discussion on advance care planning is indicated in order to allow the patient to thoughtfully consider personal goals of care, and if situations arise that prevent the ability to personally give input, to ensure appropriate representation of their personal desires for different levels and aggressiveness of care. Discussion:   Code status addressed and wants to be a Full Code. Patient wants central line and vasopressors if needed. Patient would also want a feeding tube, if needed, for nutritional support. Patient  would like to assign Fitz Grijalva (fiancee), [phone] and Antonimartinmaurisio Julio (daughter), [phone] as the surrogate decision maker. Persons present and participating in discussion: Yessica Rubin NP    Time Spent:   Total time spent face-to-face in education and discussion:   20  minutes.          Girish Pickett NP   Hospitalist

## 2021-03-06 NOTE — PROGRESS NOTES
Reason for Admission: Heart Failure                    RUR Score: 28%         PCP: First and Last name:  Luis Bailon MD     Name of Practice:   Are you a current patient: Yes/No:Yes   Approximate date of last visit:The patient stated that it has been several months since he had a PCP visit     Can you do a virtual visit with your PCP: Yes             Resources/supports as identified by patient/family:     The patient lives in a house with his significant other, Harper Ramos (phone: [phone]). The patient has 2 daughters that live in [place], [address] [place] [address] and 1 son in [place]. His significant other is his primary support                 Top Challenges facing patient (as identified by patient/family and CM): Finances/Medication cost? The patient currently has no income. He reported that he was recently released from senior living after being incarcerated for a few months. He reports that his Medicaid has not changed from [facility] Medicaid to [facility], [facility].. He said that he has been unable to get his medications for this reason and he has gone about 1 month with no medications. Transportation? The patient has not been driving, his significant other assists with al transportation needs               Support system or lack thereof? The patient's significant other and children are supportive                      Living arrangements? The patient lives in a house with his significant other, Harper Ramos (phone: [phone])               Self-care/ADLs/Cognition? The patient reports that he is independent in his ADLs. He uses a cane when ambulating. Current Advanced Directive/Advance Care Plan:  Full Code      Healthcare Decision Maker: The patient would prefer that his significant other, Harper Ramos (phone: [phone]), make decisions for him.  He is also okay with his daughter, Danis Swenson (phone: [phone]), being updated on his medical condition    Click here to complete HealthCare Decision Makers including selection of the Healthcare Decision Maker Relationship (ie \"Primary\")                         Plan for utilizing home health: Unknown at this time, PT/OT consults are pending                    Transition of Care Plan:   CM met with the patient at bedside to discuss dispo plan. The patient resides in a house with his significant other, Froy Keen. He reports that they have been together for 10 years. He has 2 daughters that live in [place], [place] and 1 son in [place]. His family is supportive. The patient reports that he was incarcerated for a few months and was released about 1 month ago. He reports that his Medicaid, when he was incarcerated, changed to [facility] Medicaid and it has not yet changed back to [facility] Medicaid since his release. He stated that because of this he has not had any medications for about 1 month. He is independent in his ADLs and he ambulates using a cane. The patient does not drive and his significant other assists with transportation needs. Of note, the patient has a significant substance abuse history. He reports that he smokes heroin or cocaine regularly. He used to inject heroin but stopped IVDU about 1.5 years ago. He reports that he smoked heroin yesterday prior to admission. He is eager to receive treatment and he has a CM with [facility]. He is on the wait-list at [facility] for inpatient substance abuse treatment. He does not drink ETOH and he reports smoking about 1 pack of cigarettes per day. He reports having a court date in [place] on 4/1/21 from a previous arrest for possession. PT/OT consults are pending  on the patient. CM will continue to follow the patient for dispo needs. Care Management Interventions  PCP Verified by CM: Yes(The patient has not seen his PCP for several months )  Mode of Transport at Discharge:  Other (see comment)(The patient's significant other will assist with d/c transportation )  Transition of Care Consult (CM Consult): Discharge Planning  MyChart Signup: No  Discharge Durable Medical Equipment: No  Physical Therapy Consult: Yes  Occupational Therapy Consult: Yes  Speech Therapy Consult: No  Current Support Network: Other(The patient lives with his significant other, Shelley Baltazar)  Confirm Follow Up Transport: Friends  The Patient and/or Patient Representative was Provided with a Choice of Provider and Agrees with the Discharge Plan?: Yes  Name of the Patient Representative Who was Provided with a Choice of Provider and Agrees with the Discharge Plan: Lio Laurent    Resource Information Provided?: No    Laurel Schmidt LCSW

## 2021-03-06 NOTE — PROGRESS NOTES
Hospitalist Progress Note    NAME: Chely Mondragon   :  1965   MRN:  [id number]     I reviewed pertinent labs and imaging, and discussed /agreed on the plan of care with Dr. Jihan Andrews.    Assessment / Plan:  CHF exacerbation, POA  Pulmonary edema, POA  CXR: Impression: Pulmonary edema. EKG: Sinus rhythm with short NM with premature supraventricular complexes   Nonspecific T wave abnormality   pro-BNP >35,000  Bumex, Coreg, Losartan  Cardiology input appreciated   Echo results pending  LLQ pain, POA  Abdominal distention, POA  Bilateral leg weakness, POA  CT Abd/Pelv WO contrast: Third spacing with soft tissue edema, bilateral pleural effusions, and ascites. Retained foreign bodies in the skin as described. Small air pocket associated with the distal stomach. It is difficult to determine if this is intraluminal or extraluminal without oral contrast. Recommend repeat study with oral contrast  PT/OT eval and treat  monitor  CKD stage 3, POA (Crea 1.8-2.66, GFR 26 )   baseline Creatinine 2.0   Avoid Nephrotoxic Medications  Hypertension, POA   Norvasc daily   Coreg BID    Monitor  DM 2, POA   Sliding scale coverage   diabetic diet    Glucose monitoring  Hyperlipidemia, POA   Lipitor daily  Peripheral neuropathy, POA  Nicotine abuse, POA   Nicotine Patch  Substance abuse, POA (heroin)   Monitor for withdrawal   + tox screen for cocaine and opiates  Osteomyelitis, POA (S/P toe amputation #1,2 & 5 digit, left foot           25.0 - 29.9 Overweight / Body mass index is 27.11 kg/m². Code status: Full  Prophylaxis: Hep SQ  Recommended Disposition: TBD     Subjective:     Chief Complaint / Reason for Physician Visit  Patient sitting on the side of the bed. Agitated about his caloric intake only being 1200 calories a day. He admits to heroine abuse for chronic back pain. .  Discussed with RN events overnight.      Review of Systems:  Symptom Y/N Comments  Symptom Y/N Comments   Fever/Chills n   Chest Pain n    Poor Appetite n   Edema y    Cough n   Abdominal Pain     Sputum n   Joint Pain     SOB/DE LA CRUZ n   Pruritis/Rash n    Nausea/vomit n   Tolerating PT/OT     Diarrhea n   Tolerating Diet y    Constipation n   Other       Could NOT obtain due to:      Objective:     VITALS:   Last 24hrs VS reviewed since prior progress note. Most recent are:  Patient Vitals for the past 24 hrs:   Temp Pulse Resp BP SpO2   03/06/21 1539 98 °F (36.7 °C) 82 18 (!) 124/105 97 %   03/06/21 1530 -- -- -- (!) 171/111 --   03/06/21 1036 97.7 °F (36.5 °C) 95 18 (!) 163/108 100 %   03/06/21 1003 -- -- -- (!) 171/111 --   03/06/21 0754 -- 90 -- (!) 166/106 --   03/06/21 0718 98.3 °F (36.8 °C) 98 18 (!) 166/106 96 %   03/06/21 0555 -- 87 -- (!) 162/95 96 %   03/06/21 0333 98.3 °F (36.8 °C) (!) 102 15 (!) 178/126 97 %   03/06/21 0331 -- -- -- (!) 178/126 95 %   03/06/21 0023 97.7 °F (36.5 °C) (!) 101 12 (!) 163/92 96 %   03/06/21 0001 -- 93 10 (!) 161/96 98 %   03/05/21 2345 -- 96 10 (!) 174/98 99 %   03/05/21 2330 -- 94 (!) 0 (!) 172/106 98 %   03/05/21 2300 -- 94 15 (!) 152/104 95 %   03/05/21 2200 -- 92 15 (!) 176/109 99 %   03/05/21 2100 -- 91 14 (!) 168/108 99 %   03/05/21 2055 -- -- -- -- 100 %   03/05/21 1913 98.6 °F (37 °C) 93 18 (!) 175/110 93 %       Intake/Output Summary (Last 24 hours) at 3/6/2021 1704  Last data filed at 3/6/2021 0959  Gross per 24 hour   Intake --   Output 3500 ml   Net -3500 ml        I had a face to face encounter and independently examined this patient on 3/6/2021, as outlined below:  PHYSICAL EXAM:  General:  Alert, cooperative, no acute distress    EENT:   Anicteric sclerae. normocephalic  Resp:  CTA bilaterally, no wheezing or rales. No accessory muscle use  CV:  Regular  rhythm,  + bilateral pedal  edema  GI:  Soft, Non distended, Non tender. +Bowel sounds  Neurologic:  Alert and oriented X 3, normal speech,   Psych:   Fair insight. Not anxious nor agitated  Skin:  No rashes.   No jaundice    Reviewed most current lab test results and cultures  YES  Reviewed most current radiology test results   YES  Review and summation of old records today    NO  Reviewed patient's current orders and MAR    YES  PMH/SH reviewed - no change compared to H&P  ________________________________________________________________________  Care Plan discussed with:    Comments   Patient y    Family      RN y    Care Manager     Consultant                        Multidiciplinary team rounds were held today with , nursing, pharmacist and clinical coordinator. Patient's plan of care was discussed; medications were reviewed and discharge planning was addressed. ________________________________________________________________________    ________________________________________________________________________  Laz Patel NP     Procedures: see electronic medical records for all procedures/Xrays and details which were not copied into this note but were reviewed prior to creation of Plan. LABS:  I reviewed today's most current labs and imaging studies.   Pertinent labs include:  Recent Labs     03/06/21  0324 03/05/21 1933   WBC 8.1 7.6   HGB 11.6* 11.3*   HCT 37.4 36.1*    353     Recent Labs     03/06/21  0324 03/05/21 1933    142   K 3.5 3.3*   * 110*   CO2 25 26   * 147*   BUN 23* 23*   CREA 2.60* 2.66*   CA 8.5 8.2*   MG 2.1  --    ALB 1.8* 1.8*   TBILI 0.4 0.3   ALT 13 13       Signed: Laz Patel NP

## 2021-03-06 NOTE — PROGRESS NOTES
Problem: Falls - Risk of  Goal: *Absence of Falls  Description: Document Wai Jacobson Fall Risk and appropriate interventions in the flowsheet.   Outcome: Progressing Towards Goal  Note: Fall Risk Interventions:  Mobility Interventions: Assess mobility with egress test, Bed/chair exit alarm, Patient to call before getting OOB         Medication Interventions: Assess postural VS orthostatic hypotension, Bed/chair exit alarm, Evaluate medications/consider consulting pharmacy, Patient to call before getting OOB                   Problem: Patient Education: Go to Patient Education Activity  Goal: Patient/Family Education  Outcome: Progressing Towards Goal     Problem: Patient Education: Go to Patient Education Activity  Goal: Patient/Family Education  Outcome: Progressing Towards Goal     Problem: Heart Failure: Day 1  Goal: Off Pathway (Use only if patient is Off Pathway)  Outcome: Progressing Towards Goal  Goal: Activity/Safety  Outcome: Progressing Towards Goal  Goal: Consults, if ordered  Outcome: Progressing Towards Goal  Goal: Diagnostic Test/Procedures  Outcome: Progressing Towards Goal  Goal: Nutrition/Diet  Outcome: Progressing Towards Goal  Goal: Discharge Planning  Outcome: Progressing Towards Goal  Goal: Medications  Outcome: Progressing Towards Goal  Goal: Respiratory  Outcome: Progressing Towards Goal  Goal: Treatments/Interventions/Procedures  Outcome: Progressing Towards Goal  Goal: Psychosocial  Outcome: Progressing Towards Goal  Goal: *Oxygen saturation within defined limits  Outcome: Progressing Towards Goal  Goal: *Hemodynamically stable  Outcome: Progressing Towards Goal  Goal: *Optimal pain control at patient's stated goal  Outcome: Progressing Towards Goal  Goal: *Anxiety reduced or absent  Outcome: Progressing Towards Goal     Problem: Heart Failure: Day 2  Goal: Off Pathway (Use only if patient is Off Pathway)  Outcome: Progressing Towards Goal  Goal: Activity/Safety  Outcome: Progressing Towards Goal  Goal: Consults, if ordered  Outcome: Progressing Towards Goal  Goal: Diagnostic Test/Procedures  Outcome: Progressing Towards Goal  Goal: Nutrition/Diet  Outcome: Progressing Towards Goal  Goal: Discharge Planning  Outcome: Progressing Towards Goal  Goal: Medications  Outcome: Progressing Towards Goal  Goal: Respiratory  Outcome: Progressing Towards Goal  Goal: Treatments/Interventions/Procedures  Outcome: Progressing Towards Goal  Goal: Psychosocial  Outcome: Progressing Towards Goal  Goal: *Oxygen saturation within defined limits  Outcome: Progressing Towards Goal  Goal: *Hemodynamically stable  Outcome: Progressing Towards Goal  Goal: *Optimal pain control at patient's stated goal  Outcome: Progressing Towards Goal  Goal: *Anxiety reduced or absent  Outcome: Progressing Towards Goal  Goal: *Demonstrates progressive activity  Outcome: Progressing Towards Goal     Problem: Heart Failure: Day 3  Goal: Off Pathway (Use only if patient is Off Pathway)  Outcome: Progressing Towards Goal  Goal: Activity/Safety  Outcome: Progressing Towards Goal  Goal: Diagnostic Test/Procedures  Outcome: Progressing Towards Goal  Goal: Nutrition/Diet  Outcome: Progressing Towards Goal  Goal: Discharge Planning  Outcome: Progressing Towards Goal  Goal: Medications  Outcome: Progressing Towards Goal  Goal: Respiratory  Outcome: Progressing Towards Goal  Goal: Treatments/Interventions/Procedures  Outcome: Progressing Towards Goal  Goal: Psychosocial  Outcome: Progressing Towards Goal  Goal: *Oxygen saturation within defined limits  Outcome: Progressing Towards Goal  Goal: *Hemodynamically stable  Outcome: Progressing Towards Goal  Goal: *Optimal pain control at patient's stated goal  Outcome: Progressing Towards Goal  Goal: *Anxiety reduced or absent  Outcome: Progressing Towards Goal  Goal: *Demonstrates progressive activity  Outcome: Progressing Towards Goal     Problem: Heart Failure: Day 4  Goal: Off Pathway (Use only if patient is Off Pathway)  Outcome: Progressing Towards Goal  Goal: Activity/Safety  Outcome: Progressing Towards Goal  Goal: Diagnostic Test/Procedures  Outcome: Progressing Towards Goal  Goal: Nutrition/Diet  Outcome: Progressing Towards Goal  Goal: Discharge Planning  Outcome: Progressing Towards Goal  Goal: Medications  Outcome: Progressing Towards Goal  Goal: Respiratory  Outcome: Progressing Towards Goal  Goal: Treatments/Interventions/Procedures  Outcome: Progressing Towards Goal  Goal: Psychosocial  Outcome: Progressing Towards Goal  Goal: *Oxygen saturation within defined limits  Outcome: Progressing Towards Goal  Goal: *Hemodynamically stable  Outcome: Progressing Towards Goal  Goal: *Optimal pain control at patient's stated goal  Outcome: Progressing Towards Goal  Goal: *Anxiety reduced or absent  Outcome: Progressing Towards Goal  Goal: *Demonstrates progressive activity  Outcome: Progressing Towards Goal     Problem: Heart Failure: Day 5  Goal: Off Pathway (Use only if patient is Off Pathway)  Outcome: Progressing Towards Goal  Goal: Activity/Safety  Outcome: Progressing Towards Goal  Goal: Diagnostic Test/Procedures  Outcome: Progressing Towards Goal  Goal: Nutrition/Diet  Outcome: Progressing Towards Goal  Goal: Discharge Planning  Outcome: Progressing Towards Goal  Goal: Medications  Outcome: Progressing Towards Goal  Goal: Respiratory  Outcome: Progressing Towards Goal  Goal: Treatments/Interventions/Procedures  Outcome: Progressing Towards Goal  Goal: Psychosocial  Outcome: Progressing Towards Goal     Problem: Heart Failure: Discharge Outcomes  Goal: *Demonstrates ability to perform prescribed activity without shortness of breath or discomfort  Outcome: Progressing Towards Goal  Goal: *Left ventricular function assessment completed prior to or during stay, or planned for post-discharge  Outcome: Progressing Towards Goal  Goal: *ACEI prescribed if LVEF less than 40% and no contraindications or ARB prescribed  Outcome: Progressing Towards Goal  Goal: *Verbalizes understanding and describes prescribed diet  Outcome: Progressing Towards Goal  Goal: *Verbalizes understanding/describes prescribed medications  Outcome: Progressing Towards Goal  Goal: *Describes available resources and support systems  Description: (eg: Home Health, Palliative Care, Advanced Medical Directive)  Outcome: Progressing Towards Goal  Goal: *Describes smoking cessation resources  Outcome: Progressing Towards Goal  Goal: *Understands and describes signs and symptoms to report to providers(Stroke Metric)  Outcome: Progressing Towards Goal  Goal: *Describes/verbalizes understanding of follow-up/return appt  Description: (eg: to physicians, diabetes treatment coordinator, and other resources  Outcome: Progressing Towards Goal  Goal: *Describes importance of continuing daily weights and changes to report to physician  Outcome: Progressing Towards Goal

## 2021-03-06 NOTE — PROGRESS NOTES
End of Shift Note          Shift worked:  1900     Shift summary and any significant changes:          Concerns for physician to address:      Zone phone for oncoming shift:          Activity:  Activity Level: Up with Assistance  Number times ambulated in hallways past shift: 0  Number of times OOB to chair past shift: 0    Cardiac:   Cardiac Monitoring: Yes      Cardiac Rhythm: Normal sinus rhythm, Sinus tachycardia    Access:   Current line(s): PIV     Genitourinary:   Urinary status: voiding and external catheter    Respiratory:   O2 Device: Room air  Chronic home O2 use?: NO  Incentive spirometer at bedside: N/A     GI:  Last Bowel Movement Date: 03/05/21  Current diet:  DIET DIABETIC WITH OPTIONS Consistent Carb 1200kcal; Regular  Passing flatus: YES  Tolerating current diet: NO         Pain Management:   Patient states pain is manageable on current regimen: YES    Skin:  [place] Score: 19  Interventions: increase time out of bed and PT/OT consult    Patient Safety:  Fall Score:  Total Score: 2  Interventions: bed/chair alarm, gripper socks and pt to call before getting OOB       Length of Stay:  Expected LOS: - - -  Actual LOS: Matthew Aguilar, RN
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
## 2021-03-06 NOTE — PROGRESS NOTES
Physical therapy:    Orders received and chart reviewed. Attempted evaluation. Pt received supine in bed and is lethargic and intermittently falling asleep/closing eyes during session. Pt's BP elevated 170/111 at rest. Session aborted due to elevated BP and increased lethargy. Will continue to follow.  Thank you    Benedicto Alba, PT, DPT

## 2021-03-06 NOTE — PROGRESS NOTES
Occupational Therapy    Acknowledge OT orders and completed chart review. Nursing cleared for therapy. Patient initially requesting to get out of bed however then drowsy and falling asleep. Patient BP elevated in supine 170/111. Aborted OT eval and will continue to follow for OT eval as appropriate.        Thank you,    Gabbi Salinas, OT

## 2021-03-07 LAB
ALBUMIN SERPL-MCNC: 1.6 G/DL (ref 3.5–5)
ALBUMIN/GLOB SERPL: 0.3 {RATIO} (ref 1.1–2.2)
ALP SERPL-CCNC: 99 U/L (ref 45–117)
ALT SERPL-CCNC: 11 U/L (ref 12–78)
ANION GAP SERPL CALC-SCNC: 4 MMOL/L (ref 5–15)
AST SERPL-CCNC: 29 U/L (ref 15–37)
BASOPHILS # BLD: 0 K/UL (ref 0–0.1)
BASOPHILS NFR BLD: 0 % (ref 0–1)
BILIRUB SERPL-MCNC: 0.3 MG/DL (ref 0.2–1)
BUN SERPL-MCNC: 25 MG/DL (ref 6–20)
BUN/CREAT SERPL: 9 (ref 12–20)
CALCIUM SERPL-MCNC: 8 MG/DL (ref 8.5–10.1)
CHLORIDE SERPL-SCNC: 110 MMOL/L (ref 97–108)
CO2 SERPL-SCNC: 28 MMOL/L (ref 21–32)
CREAT SERPL-MCNC: 2.76 MG/DL (ref 0.7–1.3)
DIFFERENTIAL METHOD BLD: ABNORMAL
EOSINOPHIL # BLD: 0.1 K/UL (ref 0–0.4)
EOSINOPHIL NFR BLD: 1 % (ref 0–7)
ERYTHROCYTE [DISTWIDTH] IN BLOOD BY AUTOMATED COUNT: 15.9 % (ref 11.5–14.5)
GLOBULIN SER CALC-MCNC: 4.9 G/DL (ref 2–4)
GLUCOSE BLD STRIP.AUTO-MCNC: 117 MG/DL (ref 65–100)
GLUCOSE BLD STRIP.AUTO-MCNC: 126 MG/DL (ref 65–100)
GLUCOSE BLD STRIP.AUTO-MCNC: 132 MG/DL (ref 65–100)
GLUCOSE BLD STRIP.AUTO-MCNC: 147 MG/DL (ref 65–100)
GLUCOSE SERPL-MCNC: 104 MG/DL (ref 65–100)
HCT VFR BLD AUTO: 35.8 % (ref 36.6–50.3)
HGB BLD-MCNC: 11 G/DL (ref 12.1–17)
IMM GRANULOCYTES # BLD AUTO: 0 K/UL (ref 0–0.04)
IMM GRANULOCYTES NFR BLD AUTO: 0 % (ref 0–0.5)
LYMPHOCYTES # BLD: 1 K/UL (ref 0.8–3.5)
LYMPHOCYTES NFR BLD: 14 % (ref 12–49)
MAGNESIUM SERPL-MCNC: 2 MG/DL (ref 1.6–2.4)
MCH RBC QN AUTO: 25.3 PG (ref 26–34)
MCHC RBC AUTO-ENTMCNC: 30.7 G/DL (ref 30–36.5)
MCV RBC AUTO: 82.3 FL (ref 80–99)
MONOCYTES # BLD: 0.5 K/UL (ref 0–1)
MONOCYTES NFR BLD: 7 % (ref 5–13)
NEUTS SEG # BLD: 5.4 K/UL (ref 1.8–8)
NEUTS SEG NFR BLD: 78 % (ref 32–75)
NRBC # BLD: 0 K/UL (ref 0–0.01)
NRBC BLD-RTO: 0 PER 100 WBC
PLATELET # BLD AUTO: 356 K/UL (ref 150–400)
PMV BLD AUTO: 10.4 FL (ref 8.9–12.9)
POTASSIUM SERPL-SCNC: 3.3 MMOL/L (ref 3.5–5.1)
PROT SERPL-MCNC: 6.5 G/DL (ref 6.4–8.2)
RBC # BLD AUTO: 4.35 M/UL (ref 4.1–5.7)
SERVICE CMNT-IMP: ABNORMAL
SODIUM SERPL-SCNC: 142 MMOL/L (ref 136–145)
WBC # BLD AUTO: 7 K/UL (ref 4.1–11.1)

## 2021-03-07 PROCEDURE — 97165 OT EVAL LOW COMPLEX 30 MIN: CPT

## 2021-03-07 PROCEDURE — 97161 PT EVAL LOW COMPLEX 20 MIN: CPT

## 2021-03-07 PROCEDURE — 97116 GAIT TRAINING THERAPY: CPT

## 2021-03-07 PROCEDURE — 74011250637 HC RX REV CODE- 250/637: Performed by: INTERNAL MEDICINE

## 2021-03-07 PROCEDURE — 74011000250 HC RX REV CODE- 250: Performed by: NURSE PRACTITIONER

## 2021-03-07 PROCEDURE — 74011250637 HC RX REV CODE- 250/637: Performed by: NURSE PRACTITIONER

## 2021-03-07 PROCEDURE — 74011250636 HC RX REV CODE- 250/636: Performed by: NURSE PRACTITIONER

## 2021-03-07 PROCEDURE — 94640 AIRWAY INHALATION TREATMENT: CPT

## 2021-03-07 PROCEDURE — 80053 COMPREHEN METABOLIC PANEL: CPT

## 2021-03-07 PROCEDURE — 83735 ASSAY OF MAGNESIUM: CPT

## 2021-03-07 PROCEDURE — 77030029684 HC NEB SM VOL KT MONA -A

## 2021-03-07 PROCEDURE — 36415 COLL VENOUS BLD VENIPUNCTURE: CPT

## 2021-03-07 PROCEDURE — 82962 GLUCOSE BLOOD TEST: CPT

## 2021-03-07 PROCEDURE — 97530 THERAPEUTIC ACTIVITIES: CPT

## 2021-03-07 PROCEDURE — 65660000000 HC RM CCU STEPDOWN

## 2021-03-07 PROCEDURE — 85025 COMPLETE CBC W/AUTO DIFF WBC: CPT

## 2021-03-07 PROCEDURE — 74011636637 HC RX REV CODE- 636/637: Performed by: NURSE PRACTITIONER

## 2021-03-07 PROCEDURE — 2709999900 HC NON-CHARGEABLE SUPPLY

## 2021-03-07 PROCEDURE — 99233 SBSQ HOSP IP/OBS HIGH 50: CPT | Performed by: INTERNAL MEDICINE

## 2021-03-07 RX ORDER — POTASSIUM CHLORIDE 20 MEQ/1
20 TABLET, EXTENDED RELEASE ORAL DAILY
Status: DISCONTINUED | OUTPATIENT
Start: 2021-03-07 | End: 2021-03-16

## 2021-03-07 RX ORDER — CARVEDILOL 12.5 MG/1
25 TABLET ORAL 2 TIMES DAILY WITH MEALS
Status: DISCONTINUED | OUTPATIENT
Start: 2021-03-07 | End: 2021-03-17 | Stop reason: HOSPADM

## 2021-03-07 RX ORDER — HYDRALAZINE HYDROCHLORIDE 25 MG/1
25 TABLET, FILM COATED ORAL 3 TIMES DAILY
Status: DISCONTINUED | OUTPATIENT
Start: 2021-03-07 | End: 2021-03-10

## 2021-03-07 RX ORDER — CLONIDINE HYDROCHLORIDE 0.1 MG/1
0.1 TABLET ORAL 2 TIMES DAILY
Status: DISCONTINUED | OUTPATIENT
Start: 2021-03-07 | End: 2021-03-17 | Stop reason: HOSPADM

## 2021-03-07 RX ORDER — HYDRALAZINE HYDROCHLORIDE 25 MG/1
25 TABLET, FILM COATED ORAL 2 TIMES DAILY
Status: DISCONTINUED | OUTPATIENT
Start: 2021-03-07 | End: 2021-03-07

## 2021-03-07 RX ADMIN — BUMETANIDE 1 MG: 0.25 INJECTION, SOLUTION INTRAMUSCULAR; INTRAVENOUS at 09:12

## 2021-03-07 RX ADMIN — HYDRALAZINE HYDROCHLORIDE 25 MG: 25 TABLET, FILM COATED ORAL at 09:11

## 2021-03-07 RX ADMIN — HEPARIN SODIUM 5000 UNITS: 5000 INJECTION INTRAVENOUS; SUBCUTANEOUS at 22:01

## 2021-03-07 RX ADMIN — HEPARIN SODIUM 5000 UNITS: 5000 INJECTION INTRAVENOUS; SUBCUTANEOUS at 14:36

## 2021-03-07 RX ADMIN — ATORVASTATIN CALCIUM 20 MG: 20 TABLET, FILM COATED ORAL at 22:01

## 2021-03-07 RX ADMIN — CLONIDINE HYDROCHLORIDE 0.1 MG: 0.1 TABLET ORAL at 14:36

## 2021-03-07 RX ADMIN — IPRATROPIUM BROMIDE AND ALBUTEROL SULFATE 3 ML: .5; 3 SOLUTION RESPIRATORY (INHALATION) at 19:49

## 2021-03-07 RX ADMIN — HEPARIN SODIUM 5000 UNITS: 5000 INJECTION INTRAVENOUS; SUBCUTANEOUS at 06:03

## 2021-03-07 RX ADMIN — ASPIRIN 81 MG: 81 TABLET, COATED ORAL at 09:11

## 2021-03-07 RX ADMIN — CARVEDILOL 25 MG: 12.5 TABLET, FILM COATED ORAL at 18:09

## 2021-03-07 RX ADMIN — AMLODIPINE BESYLATE 10 MG: 5 TABLET ORAL at 09:11

## 2021-03-07 RX ADMIN — BUMETANIDE 1 MG: 0.25 INJECTION, SOLUTION INTRAMUSCULAR; INTRAVENOUS at 18:09

## 2021-03-07 RX ADMIN — HYDRALAZINE HYDROCHLORIDE 25 MG: 25 TABLET, FILM COATED ORAL at 22:01

## 2021-03-07 RX ADMIN — HYDRALAZINE HYDROCHLORIDE 25 MG: 25 TABLET, FILM COATED ORAL at 18:09

## 2021-03-07 RX ADMIN — POTASSIUM CHLORIDE 20 MEQ: 20 TABLET, EXTENDED RELEASE ORAL at 18:26

## 2021-03-07 RX ADMIN — LABETALOL HYDROCHLORIDE 10 MG: 5 INJECTION INTRAVENOUS at 14:36

## 2021-03-07 RX ADMIN — LABETALOL HYDROCHLORIDE 10 MG: 5 INJECTION INTRAVENOUS at 09:12

## 2021-03-07 RX ADMIN — INSULIN LISPRO 2 UNITS: 100 INJECTION, SOLUTION INTRAVENOUS; SUBCUTANEOUS at 12:26

## 2021-03-07 NOTE — PROGRESS NOTES
3/7/2021 8:40 AM    Admit Date: 3/5/2021    Admit Diagnosis: CHF exacerbation ([facility] [I50.9]    Subjective:     Vale Fontenot denies chest pain, chest pressure/discomfort, dyspnea, palpitations, irregular heart beats, near-syncope, syncope, fatigue, orthopnea, paroxysmal nocturnal dyspnea, exertional chest pressure/discomfort, claudication, lower extremity edema. More awake and responsive this morning. Per pt he has not been taking meds for 2 months.      Visit Vitals  BP (!) 171/97 (BP 1 Location: Left upper arm, BP Patient Position: At rest;Lying right side)   Pulse 83   Temp 98.2 °F (36.8 °C)   Resp 16   Ht 5' 7\" (1.702 m)   Wt 209 lb 3.5 oz (94.9 kg)   SpO2 100%   BMI 32.77 kg/m²     Current Facility-Administered Medications   Medication Dose Route Frequency    carvediloL (COREG) tablet 25 mg  25 mg Oral BID WITH MEALS    hydrALAZINE (APRESOLINE) tablet 25 mg  25 mg Oral BID    ondansetron (ZOFRAN) injection 4 mg  4 mg IntraVENous Q6H PRN    acetaminophen (TYLENOL) tablet 650 mg  650 mg Oral Q6H PRN    acetaminophen (TYLENOL) suppository 650 mg  650 mg Rectal Q6H PRN    bumetanide (BUMEX) injection 1 mg  1 mg IntraVENous BID    aspirin delayed-release tablet 81 mg  81 mg Oral DAILY    insulin lispro (HUMALOG) injection   SubCUTAneous AC&HS    glucose chewable tablet 16 g  4 Tab Oral PRN    dextrose (D50W) injection syrg 12.5-25 g  12.5-25 g IntraVENous PRN    glucagon (GLUCAGEN) injection 1 mg  1 mg IntraMUSCular PRN    labetaloL (NORMODYNE;TRANDATE) injection 10 mg  10 mg IntraVENous Q4H PRN    heparin (porcine) injection 5,000 Units  5,000 Units SubCUTAneous Q8H    nicotine (NICODERM CQ) 21 mg/24 hr patch 1 Patch  1 Patch TransDERmal DAILY    amLODIPine (NORVASC) tablet 10 mg  10 mg Oral DAILY    albuterol-ipratropium (DUO-NEB) 2.5 MG-0.5 MG/3 ML  3 mL Nebulization Q6H PRN    atorvastatin (LIPITOR) tablet 20 mg  20 mg Oral QHS    nitroglycerin (NITROSTAT) tablet 0.4 mg  0.4 mg SubLINGual PRN    acetaminophen (TYLENOL) tablet 650 mg  650 mg Oral Q6H PRN         Objective:      Visit Vitals  BP (!) 171/97 (BP 1 Location: Left upper arm, BP Patient Position: At rest;Lying right side)   Pulse 83   Temp 98.2 °F (36.8 °C)   Resp 16   Ht 5' 7\" (1.702 m)   Wt 209 lb 3.5 oz (94.9 kg)   SpO2 100%   BMI 32.77 kg/m²       Physical Exam:  Resting comfortably. No resp distress.    Extremities: extremities normal, atraumatic, no cyanosis or edema  Heart: regular rate and rhythm, S1, S2 normal, no murmur, click, rub or gallop  Lungs: clear to auscultation bilaterally  Neurologic: Grossly normal    Data Review:   Labs:    Recent Results (from the past 24 hour(s))   GLUCOSE, POC    Collection Time: 03/06/21 11:28 AM   Result Value Ref Range    Glucose (POC) 97 65 - 100 mg/dL    Performed by Nevaeh Bennett    GLUCOSE, POC    Collection Time: 03/06/21  4:09 PM   Result Value Ref Range    Glucose (POC) 192 (H) 65 - 100 mg/dL    Performed by Nevaeh Bennett    ECHO ADULT COMPLETE    Collection Time: 03/06/21  4:19 PM   Result Value Ref Range    IVSd 1.51 (A) 0.6 - 1.0 cm    IVSs 2.04 cm    LVIDd 4.87 4.2 - 5.9 cm    LVIDs 3.94 cm    LVOT d 2.12 cm    LVPWd 1.60 (A) 0.6 - 1.0 cm    LVPWs 2.29 cm    IVSd (M-mode) 1.93 (A) cm    IVSs (M-mode) 2.24 cm    LVIDd (M-mode) 5.21 cm    LVIDs (M-mode) 4.26 cm    LVPWd (M-mode) 1.90 (A) cm    RVIDd 2.27 cm    RVSP 46.31 mmHg    Left Atrium Major Axis 4.58 cm    Est. RA Pressure 10.00 mmHg    MV A Darius 48.68 cm/s    Mitral Valve E Wave Deceleration Time 149.27 ms    MV E Darius 64.94 cm/s    Pulmonic Valve Systolic Peak Instantaneous Gradient 1.82 mmHg    Pulmonic Valve Max Velocity 67.47 cm/s    Triscuspid Valve Regurgitation Peak Gradient 36.31 mmHg    TR Max Velocity 301.27 cm/s    Ao Root D 3.51 cm    MV E/A 1.33     LV Mass .3 88 - 224 g    LV Mass AL Index 172.1 49 - 115 g/m2    Left Atrium Minor Axis 2.41 cm   GLUCOSE, POC    Collection Time: 03/06/21 10:24 PM Result Value Ref Range    Glucose (POC) 132 (H) 65 - 100 mg/dL    Performed by Amelie Hernandez RN    CBC WITH AUTOMATED DIFF    Collection Time: 03/07/21  2:05 AM   Result Value Ref Range    WBC 7.0 4.1 - 11.1 K/uL    RBC 4.35 4.10 - 5.70 M/uL    HGB 11.0 (L) 12.1 - 17.0 g/dL    HCT 35.8 (L) 36.6 - 50.3 %    MCV 82.3 80.0 - 99.0 FL    MCH 25.3 (L) 26.0 - 34.0 PG    MCHC 30.7 30.0 - 36.5 g/dL    RDW 15.9 (H) 11.5 - 14.5 %    PLATELET 759 587 - 423 K/uL    MPV 10.4 8.9 - 12.9 FL    NRBC 0.0 0  WBC    ABSOLUTE NRBC 0.00 0.00 - 0.01 K/uL    NEUTROPHILS 78 (H) 32 - 75 %    LYMPHOCYTES 14 12 - 49 %    MONOCYTES 7 5 - 13 %    EOSINOPHILS 1 0 - 7 %    BASOPHILS 0 0 - 1 %    IMMATURE GRANULOCYTES 0 0.0 - 0.5 %    ABS. NEUTROPHILS 5.4 1.8 - 8.0 K/UL    ABS. LYMPHOCYTES 1.0 0.8 - 3.5 K/UL    ABS. MONOCYTES 0.5 0.0 - 1.0 K/UL    ABS. EOSINOPHILS 0.1 0.0 - 0.4 K/UL    ABS. BASOPHILS 0.0 0.0 - 0.1 K/UL    ABS. IMM. GRANS. 0.0 0.00 - 0.04 K/UL    DF AUTOMATED     MAGNESIUM    Collection Time: 03/07/21  2:05 AM   Result Value Ref Range    Magnesium 2.0 1.6 - 2.4 mg/dL   METABOLIC PANEL, COMPREHENSIVE    Collection Time: 03/07/21  2:05 AM   Result Value Ref Range    Sodium 142 136 - 145 mmol/L    Potassium 3.3 (L) 3.5 - 5.1 mmol/L    Chloride 110 (H) 97 - 108 mmol/L    CO2 28 21 - 32 mmol/L    Anion gap 4 (L) 5 - 15 mmol/L    Glucose 104 (H) 65 - 100 mg/dL    BUN 25 (H) 6 - 20 MG/DL    Creatinine 2.76 (H) 0.70 - 1.30 MG/DL    BUN/Creatinine ratio 9 (L) 12 - 20      GFR est AA 29 (L) >60 ml/min/1.73m2    GFR est non-AA 24 (L) >60 ml/min/1.73m2    Calcium 8.0 (L) 8.5 - 10.1 MG/DL    Bilirubin, total 0.3 0.2 - 1.0 MG/DL    ALT (SGPT) 11 (L) 12 - 78 U/L    AST (SGOT) 29 15 - 37 U/L    Alk.  phosphatase 99 45 - 117 U/L    Protein, total 6.5 6.4 - 8.2 g/dL    Albumin 1.6 (L) 3.5 - 5.0 g/dL    Globulin 4.9 (H) 2.0 - 4.0 g/dL    A-G Ratio 0.3 (L) 1.1 - 2.2     GLUCOSE, POC    Collection Time: 03/07/21  7:19 AM   Result Value Ref Range    Glucose (POC) 117 (H) 65 - 100 mg/dL    Performed by Quirino Michaels        Telemetry: SR      Assessment:     Active Problems:    CHF exacerbation ([address] (3/5/2021)        Plan:     1. SOB, h/o cardiomyopathy: normal stress test last year. EF improved on last Echo. Presentation most likely due to med non compliance and elevated BP. Echo noted. Now on meds. Add hydralazine. Avoid ACEi/ARB due to renal insuff. Increase coreg dose. 2. HTN: BP remains elevated. On norvasc. Med changes as above. 3. CKD: follow.

## 2021-03-07 NOTE — PROGRESS NOTES
End of Shift Note    Bedside shift change report given to Linn Jauregui  (oncoming nurse) by Kerline Yancey (offgoing nurse). Report included the following information SBAR and Kardex    Shift worked:  [id number]     Shift summary and any significant changes:    none   Concerns for physician to address: none   Zone phone for oncoming shift:  [phone]     Activity:  Activity Level: Up with Assistance  Number times ambulated in hallways past shift: 0  Number of times OOB to chair past shift: 0    Cardiac:   Cardiac Monitoring: Yes      Cardiac Rhythm: Normal sinus rhythm    Access:   Current line(s): PIV     Genitourinary:   Urinary status: external catheter    Respiratory:   O2 Device: Room air  Chronic home O2 use?: NO  Incentive spirometer at bedside: YES     GI:  Last Bowel Movement Date: 03/05/21  Current diet:  DIET DIABETIC WITH OPTIONS Consistent Carb 1200kcal; Regular; FR 1500ML  DIET ONE TIME MESSAGE  Passing flatus: YES  Tolerating current diet: YES       Pain Management:   Patient states pain is manageable on current regimen: YES    Skin:  Sarwat Score: 19  Interventions: increase time out of bed    Patient Safety:  Fall Score:  Total Score: 3  Interventions: bed/chair alarm  High Fall Risk: Yes    Length of Stay:  Expected LOS: - - -  Actual LOS:   [facility]

## 2021-03-07 NOTE — PROGRESS NOTES
Problem: Falls - Risk of  Goal: *Absence of Falls  Description: Document Jose Green Fall Risk and appropriate interventions in the flowsheet.   Outcome: Progressing Towards Goal  Note: Fall Risk Interventions:  Mobility Interventions: Bed/chair exit alarm    Mentation Interventions: Adequate sleep, hydration, pain control    Medication Interventions: Bed/chair exit alarm    Elimination Interventions: Bed/chair exit alarm, Call light in reach              Problem: Patient Education: Go to Patient Education Activity  Goal: Patient/Family Education  Outcome: Progressing Towards Goal     Problem: Patient Education: Go to Patient Education Activity  Goal: Patient/Family Education  Outcome: Progressing Towards Goal
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
## 2021-03-07 NOTE — PROGRESS NOTES
Hospitalist Progress Note    NAME: Cristela Wynn   :  1965   MRN:  [id number]     I reviewed pertinent labs and imaging, and discussed /agreed on the plan of care with Dr. Mitchel Green.      Assessment / Plan:  CHF Exacerbation POA  Pulmonary Edema POA   CXR 3/5/21:  Impression: Pulmonary edema. ProBNP > 35,000 on 3/5/21   Troponin < 0.05     Continue Bumex    Continue Coreg BID    Losartan daily    1500ml fluid Restriction    Daily Weight     Strict I&O's    Cardiology input appreciated  Hypertension (uncontrolled) POA   Norvasc daily   Coreg BID    Hydralazine 25 mg TID    Clonidine 0.1 mg BID    Monitor  LLQ pain, POA (Improved)  Abdominal distention, POA   Bilateral leg weakness, POA ( improved)  CT Abd/Pelv WO contrast: Third spacing with soft tissue edema, bilateral pleural effusions, and ascites. Retained foreign bodies in the skin as described. Small air pocket associated with the distal stomach. It is difficult to determine if this is intraluminal or extraluminal without oral contrast. Recommend repeat study with oral contrast  Per PT no recommendations at this time. monitor  CKD stage 3   Creatinine at baseline 2.0    Creatinine this am 2.7    Monitor    Avoid Nephrotoxic medications  Type 2 Diabetes POA    Sliding scale coverage    Diabetic Diet    Glucose Monitoring  Hyperlipidemia POA   Lipitor daily  Peripheral Neuropathy POA   secondary to diabetes  Hx of Left great toe, #2, and  5 th toe amputation  Hx of MRSA  Nicotine Abuse POA   Nicotine Patch  Substance Abuse POA (heroin)   Monitor for withdrawal   + toxicology screen for cocaine and opiates    30.0 - 39.9 Obese / Body mass index is 32.77 kg/m². Code status: Full  Prophylaxis: Hep SQ  Recommended Disposition: Home w/Family     Subjective:     Chief Complaint / Reason for Physician Visit  Patient agitated concerning 1200 calorie a day diet. His blood pressure remains uncontrolled. Medication adjustments made.  PT/OT evaluated patient. Discussed with RN events overnight. Review of Systems:  Symptom Y/N Comments  Symptom Y/N Comments   Fever/Chills n   Chest Pain n    Poor Appetite n   Edema y Bilateral LE   Cough n   Abdominal Pain     Sputum n   Joint Pain     SOB/DE LA CRUZ n   Pruritis/Rash n    Nausea/vomit n   Tolerating PT/OT     Diarrhea n   Tolerating Diet y    Constipation n   Other       Could NOT obtain due to:      Objective:     VITALS:   Last 24hrs VS reviewed since prior progress note. Most recent are:  Patient Vitals for the past 24 hrs:   Temp Pulse Resp BP SpO2   03/07/21 1453 98.3 °F (36.8 °C) 77 21 (!) 170/93 99 %   03/07/21 1436 -- 84 -- (!) 170/94 --   03/07/21 1043 98.3 °F (36.8 °C) 77 12 (!) 160/97 100 %   03/07/21 0911 -- 87 16 (!) 169/107 96 %   03/07/21 0658 98.2 °F (36.8 °C) 83 16 (!) 171/97 100 %   03/07/21 0209 98.2 °F (36.8 °C) 80 17 (!) 139/93 100 %   03/06/21 2247 98.2 °F (36.8 °C) 84 18 (!) 154/99 98 %   03/06/21 1948 -- -- -- -- 98 %   03/06/21 1927 98.2 °F (36.8 °C) 89 18 (!) 150/85 99 %   03/06/21 1539 98 °F (36.7 °C) 82 18 (!) 124/105 97 %   03/06/21 1530 -- -- -- (!) 171/111 --       Intake/Output Summary (Last 24 hours) at 3/7/2021 1512  Last data filed at 3/7/2021 0531  Gross per 24 hour   Intake 375 ml   Output 700 ml   Net -325 ml        I had a face to face encounter and independently examined this patient on 3/7/2021, as outlined below:  PHYSICAL EXAM:  General:  Alert, cooperative, no acute distress, obese  EENT:  Anicteric sclerae. Normocephalic  Resp:  Diminished bilateral lower bases,  No accessory muscle use  CV:  Regular  rhythm,  No edema  GI:  Soft, Non distended, Non tender. +Bowel sounds  Neurologic:  Alert and oriented X 3, normal speech,   Psych:   Fair insight. Not anxious nor agitated  Skin:  No rashes.   No jaundice    Reviewed most current lab test results and cultures  YES  Reviewed most current radiology test results   YES  Review and summation of old records today NO  Reviewed patient's current orders and MAR    YES  PMH/SH reviewed - no change compared to H&P  ________________________________________________________________________  Care Plan discussed with:    Comments   Patient y    Family      RN y    Care Manager     Consultant                        Multidiciplinary team rounds were held today with , nursing, pharmacist and clinical coordinator. Patient's plan of care was discussed; medications were reviewed and discharge planning was addressed. ________________________________________________________________________    ________________________________________________________________________  Ellender Lobe, NP     Procedures: see electronic medical records for all procedures/Xrays and details which were not copied into this note but were reviewed prior to creation of Plan. LABS:  I reviewed today's most current labs and imaging studies.   Pertinent labs include:  Recent Labs     03/07/21 0205 03/06/21 0324 03/05/21 1933   WBC 7.0 8.1 7.6   HGB 11.0* 11.6* 11.3*   HCT 35.8* 37.4 36.1*    361 353     Recent Labs     03/07/21  0205 03/06/21  0324 03/05/21 1933    141 142   K 3.3* 3.5 3.3*   * 111* 110*   CO2 28 25 26   * 131* 147*   BUN 25* 23* 23*   CREA 2.76* 2.60* 2.66*   CA 8.0* 8.5 8.2*   MG 2.0 2.1  --    ALB 1.6* 1.8* 1.8*   TBILI 0.3 0.4 0.3   ALT 11* 13 13       Signed: Rishabh Nava, NP
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
## 2021-03-08 PROBLEM — I16.0 HYPERTENSIVE URGENCY: Status: ACTIVE | Noted: 2021-03-08

## 2021-03-08 PROBLEM — N18.9 CKD (CHRONIC KIDNEY DISEASE): Status: ACTIVE | Noted: 2021-03-08

## 2021-03-08 PROBLEM — I50.43 ACUTE ON CHRONIC COMBINED SYSTOLIC AND DIASTOLIC ACC/AHA STAGE C CONGESTIVE HEART FAILURE (HCC): Status: ACTIVE | Noted: 2021-03-08

## 2021-03-08 LAB
ALBUMIN SERPL-MCNC: 1.6 G/DL (ref 3.5–5)
ALBUMIN/GLOB SERPL: 0.3 {RATIO} (ref 1.1–2.2)
ALP SERPL-CCNC: 113 U/L (ref 45–117)
ALT SERPL-CCNC: 12 U/L (ref 12–78)
ANION GAP SERPL CALC-SCNC: 6 MMOL/L (ref 5–15)
AST SERPL-CCNC: 24 U/L (ref 15–37)
BILIRUB SERPL-MCNC: 0.2 MG/DL (ref 0.2–1)
BUN SERPL-MCNC: 29 MG/DL (ref 6–20)
BUN/CREAT SERPL: 11 (ref 12–20)
CALCIUM SERPL-MCNC: 7.8 MG/DL (ref 8.5–10.1)
CHLORIDE SERPL-SCNC: 108 MMOL/L (ref 97–108)
CO2 SERPL-SCNC: 25 MMOL/L (ref 21–32)
CREAT SERPL-MCNC: 2.68 MG/DL (ref 0.7–1.3)
ERYTHROCYTE [DISTWIDTH] IN BLOOD BY AUTOMATED COUNT: 15.9 % (ref 11.5–14.5)
GLOBULIN SER CALC-MCNC: 4.9 G/DL (ref 2–4)
GLUCOSE BLD STRIP.AUTO-MCNC: 119 MG/DL (ref 65–100)
GLUCOSE BLD STRIP.AUTO-MCNC: 138 MG/DL (ref 65–100)
GLUCOSE BLD STRIP.AUTO-MCNC: 139 MG/DL (ref 65–100)
GLUCOSE BLD STRIP.AUTO-MCNC: 193 MG/DL (ref 65–100)
GLUCOSE SERPL-MCNC: 149 MG/DL (ref 65–100)
HCT VFR BLD AUTO: 34.6 % (ref 36.6–50.3)
HGB BLD-MCNC: 10.9 G/DL (ref 12.1–17)
MCH RBC QN AUTO: 25.6 PG (ref 26–34)
MCHC RBC AUTO-ENTMCNC: 31.5 G/DL (ref 30–36.5)
MCV RBC AUTO: 81.4 FL (ref 80–99)
NRBC # BLD: 0 K/UL (ref 0–0.01)
NRBC BLD-RTO: 0 PER 100 WBC
PLATELET # BLD AUTO: 348 K/UL (ref 150–400)
PMV BLD AUTO: 10.5 FL (ref 8.9–12.9)
POTASSIUM SERPL-SCNC: 3.7 MMOL/L (ref 3.5–5.1)
PROT SERPL-MCNC: 6.5 G/DL (ref 6.4–8.2)
RBC # BLD AUTO: 4.25 M/UL (ref 4.1–5.7)
SERVICE CMNT-IMP: ABNORMAL
SODIUM SERPL-SCNC: 139 MMOL/L (ref 136–145)
WBC # BLD AUTO: 7.8 K/UL (ref 4.1–11.1)

## 2021-03-08 PROCEDURE — 74011000250 HC RX REV CODE- 250: Performed by: NURSE PRACTITIONER

## 2021-03-08 PROCEDURE — 74011250637 HC RX REV CODE- 250/637: Performed by: NURSE PRACTITIONER

## 2021-03-08 PROCEDURE — 74011250637 HC RX REV CODE- 250/637: Performed by: INTERNAL MEDICINE

## 2021-03-08 PROCEDURE — 65660000000 HC RM CCU STEPDOWN

## 2021-03-08 PROCEDURE — 94640 AIRWAY INHALATION TREATMENT: CPT

## 2021-03-08 PROCEDURE — 36415 COLL VENOUS BLD VENIPUNCTURE: CPT

## 2021-03-08 PROCEDURE — APPSS60 APP SPLIT SHARED TIME 46-60 MINUTES: Performed by: NURSE PRACTITIONER

## 2021-03-08 PROCEDURE — 74011250636 HC RX REV CODE- 250/636: Performed by: NURSE PRACTITIONER

## 2021-03-08 PROCEDURE — 82962 GLUCOSE BLOOD TEST: CPT

## 2021-03-08 PROCEDURE — 80053 COMPREHEN METABOLIC PANEL: CPT

## 2021-03-08 PROCEDURE — 85027 COMPLETE CBC AUTOMATED: CPT

## 2021-03-08 RX ORDER — METHADONE HYDROCHLORIDE 10 MG/1
10 TABLET ORAL
Status: DISPENSED | OUTPATIENT
Start: 2021-03-08 | End: 2021-03-09

## 2021-03-08 RX ORDER — METHADONE HYDROCHLORIDE 10 MG/1
20 TABLET ORAL ONCE
Status: COMPLETED | OUTPATIENT
Start: 2021-03-08 | End: 2021-03-08

## 2021-03-08 RX ORDER — ISOSORBIDE MONONITRATE 30 MG/1
30 TABLET, EXTENDED RELEASE ORAL DAILY
Status: DISCONTINUED | OUTPATIENT
Start: 2021-03-08 | End: 2021-03-09

## 2021-03-08 RX ORDER — HYDROXYZINE 25 MG/1
25 TABLET, FILM COATED ORAL ONCE
Status: COMPLETED | OUTPATIENT
Start: 2021-03-08 | End: 2021-03-08

## 2021-03-08 RX ADMIN — HYDRALAZINE HYDROCHLORIDE 25 MG: 25 TABLET, FILM COATED ORAL at 09:15

## 2021-03-08 RX ADMIN — METHADONE HYDROCHLORIDE 20 MG: 10 TABLET ORAL at 10:16

## 2021-03-08 RX ADMIN — CLONIDINE HYDROCHLORIDE 0.1 MG: 0.1 TABLET ORAL at 17:29

## 2021-03-08 RX ADMIN — HEPARIN SODIUM 5000 UNITS: 5000 INJECTION INTRAVENOUS; SUBCUTANEOUS at 22:00

## 2021-03-08 RX ADMIN — ASPIRIN 81 MG: 81 TABLET, COATED ORAL at 09:20

## 2021-03-08 RX ADMIN — POTASSIUM CHLORIDE 20 MEQ: 20 TABLET, EXTENDED RELEASE ORAL at 09:20

## 2021-03-08 RX ADMIN — BUMETANIDE 1 MG: 0.25 INJECTION, SOLUTION INTRAMUSCULAR; INTRAVENOUS at 17:29

## 2021-03-08 RX ADMIN — HEPARIN SODIUM 5000 UNITS: 5000 INJECTION INTRAVENOUS; SUBCUTANEOUS at 14:52

## 2021-03-08 RX ADMIN — HYDRALAZINE HYDROCHLORIDE 25 MG: 25 TABLET, FILM COATED ORAL at 17:29

## 2021-03-08 RX ADMIN — LABETALOL HYDROCHLORIDE 10 MG: 5 INJECTION INTRAVENOUS at 08:02

## 2021-03-08 RX ADMIN — HEPARIN SODIUM 5000 UNITS: 5000 INJECTION INTRAVENOUS; SUBCUTANEOUS at 05:58

## 2021-03-08 RX ADMIN — CARVEDILOL 25 MG: 12.5 TABLET, FILM COATED ORAL at 09:16

## 2021-03-08 RX ADMIN — HYDRALAZINE HYDROCHLORIDE 25 MG: 25 TABLET, FILM COATED ORAL at 22:17

## 2021-03-08 RX ADMIN — IPRATROPIUM BROMIDE AND ALBUTEROL SULFATE 3 ML: .5; 3 SOLUTION RESPIRATORY (INHALATION) at 07:49

## 2021-03-08 RX ADMIN — ISOSORBIDE MONONITRATE 30 MG: 30 TABLET, EXTENDED RELEASE ORAL at 14:52

## 2021-03-08 RX ADMIN — AMLODIPINE BESYLATE 10 MG: 5 TABLET ORAL at 09:19

## 2021-03-08 RX ADMIN — CLONIDINE HYDROCHLORIDE 0.1 MG: 0.1 TABLET ORAL at 09:15

## 2021-03-08 RX ADMIN — ATORVASTATIN CALCIUM 20 MG: 20 TABLET, FILM COATED ORAL at 22:00

## 2021-03-08 RX ADMIN — HYDROXYZINE HYDROCHLORIDE 25 MG: 25 TABLET, FILM COATED ORAL at 01:13

## 2021-03-08 RX ADMIN — IPRATROPIUM BROMIDE AND ALBUTEROL SULFATE 3 ML: .5; 3 SOLUTION RESPIRATORY (INHALATION) at 17:09

## 2021-03-08 RX ADMIN — CARVEDILOL 25 MG: 12.5 TABLET, FILM COATED ORAL at 17:29

## 2021-03-08 RX ADMIN — ONDANSETRON 4 MG: 2 INJECTION INTRAMUSCULAR; INTRAVENOUS at 00:50

## 2021-03-08 NOTE — PROGRESS NOTES
0700: Bedside shift change report given to Marva Rocha RN (oncoming nurse) by Tristan Gomes RN (offgoing nurse). Report included the following information SBAR.     0920: Pt refusing to let me put pulse ox sticker on and refusing bumex.

## 2021-03-08 NOTE — PROGRESS NOTES
Hospitalist Progress Note    NAME: Khoa Rivera   :  1965   MRN:  [id number]     I reviewed pertinent labs and imaging, and discussed /agreed on the plan of care with Dr. Junior Latif / Plan:  CHF Exacerbation POA  Pulmonary Edema POA   CXR 3/5/21:  Impression: Pulmonary edema. ProBNP > 35,000 on 3/5/21   Troponin < 0.05     Continue Bumex    Continue Coreg BID    Losartan daily    1500ml fluid Restriction    Daily Weight     Strict I&O's    Cardiology input appreciated  Hypertension (uncontrolled) POA   Norvasc daily   Coreg BID    Hydralazine 25 mg TID    Clonidine 0.1 mg BID    Monitor  LLQ pain, POA (Improved)  Abdominal distention, POA   Bilateral leg weakness, POA ( improved)  CT Abd/Pelv WO contrast: Third spacing with soft tissue edema, bilateral pleural effusions, and ascites. Retained foreign bodies in the skin as described. Small air pocket associated with the distal stomach. It is difficult to determine if this is intraluminal or extraluminal without oral contrast. Recommend repeat study with oral contrast  Per PT no recommendations at this time. monitor  CKD stage 3   Creatinine at baseline 2.0    Creatinine this am 2.6 slight decrease from previous day    Monitor    Avoid Nephrotoxic medications  Type 2 Diabetes POA    Sliding scale coverage    Diabetic Diet    Glucose Monitoring  Hyperlipidemia POA   Lipitor daily  Peripheral Neuropathy POA   secondary to diabetes  Hx of Left great toe, #2, and  5 th toe amputation  Hx of MRSA  Nicotine Abuse POA   Nicotine Patch  Substance Abuse POA (heroin)   Last used Heroine on Friday 3/5/21 reported by patient   + toxicology screen for cocaine and opiates   Substance abuse protocol: received 30 mg of methadone on 3/8/21   He may receive 15 mg methadone daily x 2 doses   He states he has been going to [facility] and is to start an in patient rehab.    Case management input appreciated    30.0 - 39.9 Obese / Body mass index is 33.52 kg/m². Code status: Full  Prophylaxis: Hep SQ  Recommended Disposition: Possible in patient rehab for substance abuse     Subjective:     Chief Complaint / Reason for Physician Visit  Patient admits  He injects heroine. His last dose was Friday March 5. Discussed possible inpatient Rehab. Patient states he would like to quit and know it is \"hurting him\"  Discussed with RN events overnight. Review of Systems:  Symptom Y/N Comments  Symptom Y/N Comments   Fever/Chills n   Chest Pain n    Poor Appetite n   Edema y Bilatera LE   Cough n   Abdominal Pain     Sputum n   Joint Pain     SOB/DE [place] CRUZ n   Pruritis/Rash n    Nausea/vomit n   Tolerating PT/OT     Diarrhea n   Tolerating Diet y    Constipation n   Other       Could NOT obtain due to:      Objective:     VITALS:   Last 24hrs VS reviewed since prior progress note. Most recent are:  Patient Vitals for the past 24 hrs:   Temp Pulse Resp BP SpO2   03/08/21 1631 98.4 °F (36.9 °C) 78 20 (!) 144/84 93 %   03/08/21 1452 -- 72 -- (!) 132/96 --   03/08/21 1041 98 °F (36.7 °C) 79 18 138/88 95 %   03/08/21 0914 -- 78 -- (!) 153/93 --   03/08/21 0802 -- 90 -- (!) 162/103 --   03/08/21 0750 97.7 °F (36.5 °C) 83 18 (!) 162/96 95 %   03/08/21 0334 97.3 °F (36.3 °C) 79 18 (!) 146/85 95 %   03/07/21 2248 97.3 °F (36.3 °C) 83 22 (!) 144/80 95 %   03/07/21 2201 -- 78 -- (!) 152/96 --   03/07/21 2001 97.5 °F (36.4 °C) 72 13 (!) 139/90 100 %   03/07/21 1951 -- -- -- -- 99 %       Intake/Output Summary (Last 24 hours) at 3/8/2021 1849  Last data filed at 3/8/2021 1518  Gross per 24 hour   Intake 418 ml   Output 1600 ml   Net -1182 ml        I had a face to face encounter and independently examined this patient on 3/8/2021, as outlined below:  PHYSICAL EXAM:  General:  Alert, cooperative, no acute distress, obese  EENT:   Anicteric sclerae. normocephalic  Resp:   Diminished, no wheezing or rales.   No accessory muscle use  CV:  Regular  rhythm,  No edema  GI:  Soft, Non distended, Non tender. +Bowel sounds  Neurologic:  Alert and oriented X 3, normal speech,   Psych:   Good insight. Not anxious nor agitated  Skin:  No rashes. No jaundice    Reviewed most current lab test results and cultures  YES  Reviewed most current radiology test results   YES  Review and summation of old records today    NO  Reviewed patient's current orders and MAR    YES  PMH/SH reviewed - no change compared to H&P  ________________________________________________________________________  Care Plan discussed with:    Comments   Patient y    Family      RN y    Care Manager y    Consultant                        Multidiciplinary team rounds were held today with , nursing, pharmacist and clinical coordinator. Patient's plan of care was discussed; medications were reviewed and discharge planning was addressed. ________________________________________________________________________    ________________________________________________________________________  Sue Flores NP     Procedures: see electronic medical records for all procedures/Xrays and details which were not copied into this note but were reviewed prior to creation of Plan. LABS:  I reviewed today's most current labs and imaging studies.   Pertinent labs include:  Recent Labs     03/08/21  0350 03/07/21  0205 03/06/21  0324   WBC 7.8 7.0 8.1   HGB 10.9* 11.0* 11.6*   HCT 34.6* 35.8* 37.4    356 361     Recent Labs     03/08/21  0350 03/07/21  0205 03/06/21  0324    142 141   K 3.7 3.3* 3.5    110* 111*   CO2 25 28 25   * 104* 131*   BUN 29* 25* 23*   CREA 2.68* 2.76* 2.60*   CA 7.8* 8.0* 8.5   MG  --  2.0 2.1   ALB 1.6* 1.6* 1.8*   TBILI 0.2 0.3 0.4   ALT 12 11* 13       Signed: Sue Flores, NP

## 2021-03-08 NOTE — INTERDISCIPLINARY ROUNDS
[address] [facility]    Cardiopulmonary Care Interdisciplinary Rounds were held today to discuss patient's plan of care and outcomes. The following members were present: NP/Physician, Pharmacy, Nursing and Case Management.     PLAN OF CARE:   Continue current treatment plan    Expected Length of Stay:  - - -

## 2021-03-08 NOTE — PROGRESS NOTES
End of Shift Note    Bedside shift change report given to Elio Olivo (oncoming nurse) by Donny Timmons (offgoing nurse). Report included the following information SBAR and Kardex    Shift worked:  [id number]     Shift summary and any significant changes:     0050 Patient states that he is nauseous related to opioid and cocaine withdrawal. He is visibly anxious. Prn Zofran given and NP notified via [employer]. 0015 order for Hydroxyzine 25 mg given     Concerns for physician to address:       Zone phone for oncoming shift:          Activity:  Activity Level: Up with Assistance  Number times ambulated in hallways past shift: 0  Number of times OOB to chair past shift: 5    Cardiac:   Cardiac Monitoring: Yes      Cardiac Rhythm: Normal sinus rhythm    Access:   Current line(s): PIV     Genitourinary:   Urinary status: voiding and external catheter    Respiratory:   O2 Device: Room air  Chronic home O2 use?: NO  Incentive spirometer at bedside: NO     GI:  Last Bowel Movement Date: 03/07/21  Current diet:  DIET ONE TIME MESSAGE  DIET DIABETIC CONSISTENT CARB Regular  DIET ONE TIME MESSAGE  DIET ONE TIME MESSAGE  Passing flatus: YES  Tolerating current diet: YES       Pain Management:   Patient states pain is manageable on current regimen: YES    Skin:  Sarwat Score: 19  Interventions: increase time out of bed, PT/OT consult and internal/external urinary devices    Patient Safety:  Fall Score:  Total Score: 3  Interventions: bed/chair alarm, assistive device (walker, cane, etc), gripper socks, pt to call before getting OOB and stay with me (per policy)  High Fall Risk: Yes    Length of Stay:  Expected LOS: - - -  Actual LOS: [address]
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
## 2021-03-08 NOTE — PROGRESS NOTES
Problem: Falls - Risk of  Goal: *Absence of Falls  Description: Document Ragena Glad Fall Risk and appropriate interventions in the flowsheet. Outcome: Progressing Towards Goal  Note: Fall Risk Interventions:  Mobility Interventions: Bed/chair exit alarm, Communicate number of staff needed for ambulation/transfer, Patient to call before getting OOB, Strengthening exercises (ROM-active/passive)    Mentation Interventions: Adequate sleep, hydration, pain control, Bed/chair exit alarm, Door open when patient unattended, Increase mobility, More frequent rounding, Room close to nurse's station, Update white board    Medication Interventions: Bed/chair exit alarm, Patient to call before getting OOB, Teach patient to arise slowly    Elimination Interventions: Bed/chair exit alarm, Call light in reach, Patient to call for help with toileting needs, Toileting schedule/hourly rounds              Problem: Falls - Risk of  Goal: *Absence of Falls  Description: Document Ragena Glad Fall Risk and appropriate interventions in the flowsheet.   Outcome: Progressing Towards Goal  Note: Fall Risk Interventions:  Mobility Interventions: Bed/chair exit alarm, Communicate number of staff needed for ambulation/transfer, Patient to call before getting OOB, Strengthening exercises (ROM-active/passive)    Mentation Interventions: Adequate sleep, hydration, pain control, Bed/chair exit alarm, Door open when patient unattended, Increase mobility, More frequent rounding, Room close to nurse's station, Update white board    Medication Interventions: Bed/chair exit alarm, Patient to call before getting OOB, Teach patient to arise slowly    Elimination Interventions: Bed/chair exit alarm, Call light in reach, Patient to call for help with toileting needs, Toileting schedule/hourly rounds

## 2021-03-08 NOTE — PROGRESS NOTES
Transition of Care Plan:    Disposition: Home  Follow up appointments: PCP  DME needed: none  Transportation at Discharge: Significant other to provide transportation   [address] or means to access home: Significant other has access   Medicare letter: n/a  Caregiver Contact:  Rosauranegargautam Dudley phone: [phone]  Discharge Caregiver contacted prior to discharge? CM may need to assist with medication cost - to address at d/c.    CM attempted to contact significant other x 2- 1:30pm, 3:00pm, no answer. Left message to return call. CM reviewed chart.     [address] [facility] [employer]  Phone: [phone]

## 2021-03-08 NOTE — PROGRESS NOTES
Received notification from bedside RN about patient with regards to: reports being anxious requesting medication to manage symptoms. Patient very lethargic on first 24 hours of admission, admits to using Heroin.   VS: /80, HR 83, O2 sat 95% on RA, Temp 97.3    Intervention given: Hydroxyzine 25 mg x 1 dose ordered

## 2021-03-08 NOTE — PROGRESS NOTES
Problem: Falls - Risk of  Goal: *Absence of Falls  Description: Document Bhaskar Love Fall Risk and appropriate interventions in the flowsheet. Outcome: Progressing Towards Goal  Note: Fall Risk Interventions:  Mobility Interventions: Bed/chair exit alarm, Communicate number of staff needed for ambulation/transfer, OT consult for ADLs, Patient to call before getting OOB, PT Consult for mobility concerns, PT Consult for assist device competence, Strengthening exercises (ROM-active/passive)    Mentation Interventions: Adequate sleep, hydration, pain control, Bed/chair exit alarm, Door open when patient unattended, More frequent rounding, Room close to nurse's station, Toileting rounds, Update white board    Medication Interventions: Bed/chair exit alarm, Patient to call before getting OOB, Teach patient to arise slowly    Elimination Interventions: Bed/chair exit alarm, Call light in reach, Patient to call for help with toileting needs, Stay With Me (per policy), Toilet paper/wipes in reach, Toileting schedule/hourly rounds, Urinal in reach              Problem: Patient Education: Go to Patient Education Activity  Goal: Patient/Family Education  Outcome: Progressing Towards Goal     Problem: Risk for Spread of Infection  Goal: Prevent transmission of infectious organism to others  Description: Prevent the transmission of infectious organisms to other patients, staff members, and visitors.   Outcome: Progressing Towards Goal     Problem: Patient Education:  Go to Education Activity  Goal: Patient/Family Education  Outcome: Progressing Towards Goal

## 2021-03-08 NOTE — PROGRESS NOTES
0700: Bedside shift change report given to Ny Dixon (oncoming nurse) by Anthony Chavez RN (offgoing nurse). Report included the following information SBAR and Kardex. 1400: Called Justina NP to notify of patient's uncontrolled HTN and discontent with calorie restriction. 1720: Patient wants roll with dinner and asked for a sandwich because he hasn't had much to eat today before the restriction was lifted. Sent one time order for dinner roll and sandwich. 1900: End of Shift Note    Bedside shift change report given to [facility] [address] (oncoming nurse) by Fermín Cárdenas (offgoing nurse). Report included the following information SBAR and Kardex    Shift worked:       Shift summary and any significant changes:    BP medications added/modified. Calorie restriction lifted. PT worked with pt; pt steady gait -- assist d/t cords/catheter. Concerns for physician to address: Continue monitoring edema and HTN   Zone phone for oncoming shift:       Activity:  Activity Level: Up with Assistance  Number times ambulated in hallways past shift: 0  Number of times OOB to chair past shift: 2    Cardiac:   Cardiac Monitoring: Yes      Cardiac Rhythm: Normal sinus rhythm    Access:   Current line(s): PIV     Genitourinary:   Urinary status: voiding and external catheter    Respiratory:   O2 Device: Room air  Chronic home O2 use?: NO  Incentive spirometer at bedside: NO     GI:  Last Bowel Movement Date: 03/07/21  Current diet:  DIET ONE TIME MESSAGE  DIET DIABETIC CONSISTENT CARB Regular  DIET ONE TIME MESSAGE  DIET ONE TIME MESSAGE  Passing flatus: YES  Tolerating current diet: YES       Pain Management:   Patient states pain is manageable on current regimen: YES    Skin:  Sarwat Score: 19  Interventions: increase time out of bed, PT/OT consult and internal/external urinary devices    Patient Safety:  Fall Score:  Total Score: 3  Interventions: bed/chair alarm, gripper socks and pt to call before getting OOB  High Fall Risk: Yes    Length of Stay:  Expected LOS: - - -  Actual LOS: 2      Ned Pimentel

## 2021-03-08 NOTE — PROGRESS NOTES
[address] [place] [address]  [phone]      Cardiology Progress Note      3/8/2021 10:03 AM    Admit Date: 3/5/2021    Admit Diagnosis:   CHF exacerbation ([facility] [I50.9]    Interval History/Subjective:     Marleni Garces is a 55 y.o. male admitted with CHF exacerbation ([facility] [I50.9]    -BP elevated  -creat steady at 2.68  -weight up 5#?; I/O incomplete  -Mr. Glez December is not feeling well this morning. He has SOB. No current pain. RN notes indicate the patient refusing bumex.      Visit Vitals  /88   Pulse 79   Temp 98 °F (36.7 °C)   Resp 18   Ht 5' 7\" (1.702 m)   Wt 97.1 kg (214 lb)   SpO2 95%   BMI 33.52 kg/m²       Current Facility-Administered Medications   Medication Dose Route Frequency    methadone (DOLOPHINE) tablet 10 mg  10 mg Oral ONCE PRN    [START ON 3/9/2021] methadone (DOLOPHINE) tablet 15 mg  15 mg Oral DAILY    isosorbide mononitrate ER (IMDUR) tablet 30 mg  30 mg Oral DAILY    carvediloL (COREG) tablet 25 mg  25 mg Oral BID WITH MEALS    hydrALAZINE (APRESOLINE) tablet 25 mg  25 mg Oral TID    cloNIDine HCL (CATAPRES) tablet 0.1 mg  0.1 mg Oral BID    potassium chloride (K-DUR, KLOR-CON) SR tablet 20 mEq  20 mEq Oral DAILY    ondansetron (ZOFRAN) injection 4 mg  4 mg IntraVENous Q6H PRN    acetaminophen (TYLENOL) tablet 650 mg  650 mg Oral Q6H PRN    acetaminophen (TYLENOL) suppository 650 mg  650 mg Rectal Q6H PRN    bumetanide (BUMEX) injection 1 mg  1 mg IntraVENous BID    aspirin delayed-release tablet 81 mg  81 mg Oral DAILY    insulin lispro (HUMALOG) injection   SubCUTAneous AC&HS    glucose chewable tablet 16 g  4 Tab Oral PRN    dextrose (D50W) injection syrg 12.5-25 g  12.5-25 g IntraVENous PRN    glucagon (GLUCAGEN) injection 1 mg  1 mg IntraMUSCular PRN    labetaloL (NORMODYNE;TRANDATE) injection 10 mg  10 mg IntraVENous Q4H PRN    heparin (porcine) injection 5,000 Units  5,000 Units SubCUTAneous Q8H    nicotine (NICODERM CQ) 21 mg/24 hr patch 1 Patch  1 Patch TransDERmal DAILY    amLODIPine (NORVASC) tablet 10 mg  10 mg Oral DAILY    albuterol-ipratropium (DUO-NEB) 2.5 MG-0.5 MG/3 ML  3 mL Nebulization Q6H PRN    atorvastatin (LIPITOR) tablet 20 mg  20 mg Oral QHS    nitroglycerin (NITROSTAT) tablet 0.4 mg  0.4 mg SubLINGual PRN    acetaminophen (TYLENOL) tablet 650 mg  650 mg Oral Q6H PRN       Objective:      Physical Exam:  General Appearance:  obese adult AAM sitting on side of bed with labored breathing  Chest:   dim bases  Cardiovascular:  Regular rate and rhythm, no murmur. Abdomen:   Soft, non-tender, bowel sounds are active. Extremities: weak BLE pulses; 1+ pitting edema  Skin:  Warm and dry. thickened BLE     Data Review:   Recent Labs     03/08/21  0350 03/07/21  0205 03/06/21  0324   WBC 7.8 7.0 8.1   HGB 10.9* 11.0* 11.6*   HCT 34.6* 35.8* 37.4    356 361     Recent Labs     03/08/21  0350 03/07/21  0205 03/06/21  0324    142 141   K 3.7 3.3* 3.5    110* 111*   CO2 25 28 25   * 104* 131*   BUN 29* 25* 23*   CREA 2.68* 2.76* 2.60*   CA 7.8* 8.0* 8.5   MG  --  2.0 2.1   ALB 1.6* 1.6* 1.8*   TBILI 0.2 0.3 0.4   ALT 12 11* 13       Recent Labs     03/05/21  1933   TROIQ <0.05         Intake/Output Summary (Last 24 hours) at 3/8/2021 1120  Last data filed at 3/7/2021 2248  Gross per 24 hour   Intake 300 ml   Output 900 ml   Net -600 ml        Telemetry: SR  EKG:SR; t wave inv lateral leads  ECHO:  EF 35-40%; mod concentric hypertrophy; mild TR; mild pHTN  Cxray: edema    Assessment:     Active Problems:    DM (diabetes mellitus), type 2 ([facility] (2/28/2020)      [facility] exacerbation ([facility] (3/5/2021)      Acute on chronic combined systolic and diastolic ACC/AHA stage C congestive heart failure ([facility] (3/8/2021)      Hypertensive urgency (3/8/2021)      CKD (chronic kidney disease) (3/8/2021)        Plan:       Hypertensive urgency:   BP elevated. Non compliance. + cocaine and opiates.  Has been needing PRN labetalol  · Continue amlodipine, coreg, clonidine, hydralazine  · Add imdur  · Per nursing notes, refused bumex this morning       Acute on chronic combined systolic and diastolic heart failure:  2/2 hypertensive urgency. Troponin negative.  +cocaine and opiates   · EF 35-40%  · HTN uncontrolled, as above  · BB. No ACEi/ARB due to CKD  · Continue bumex (although patient refused this morning)      CKD:  Creat steady  · neprhrology not currently following. Creat steady.   · Avoid ACEi/ARB      Drug abuse:  +cocaine and opiates   · Continue to   · Any withdrawal treatment per hospitalist       Ada Black NP  DNP, RN, AGACNP-BC

## 2021-03-08 NOTE — PROGRESS NOTES
1600: TRANSFER - OUT REPORT:    Verbal report given to Rafael Magallanes 81Gina, RN(name) on Shayan Smith  being transferred to Neuro(unit) for routine progression of care       Report consisted of patients Situation, Background, Assessment and   Recommendations(SBAR). Information from the following report(s) SBAR was reviewed with the receiving nurse. Lines:   Peripheral IV 03/05/21 Left Hand (Active)   Site Assessment Clean, dry, & intact 03/08/21 0750   Phlebitis Assessment 0 03/08/21 0750   Infiltration Assessment 0 03/08/21 0750   Dressing Status Clean, dry, & intact 03/08/21 0750   Dressing Type Transparent;Tape 03/08/21 0750   Hub Color/Line Status Pink;Flushed 03/08/21 0750   Action Taken Open ports on tubing capped 03/07/21 0209   Alcohol Cap Used Yes 03/07/21 1400        Opportunity for questions and clarification was provided.       Patient transported with:   Registered Nurse  Tech    1630: Selena Terry notified of pt's transfer to room [phone] 65 and given room phone number

## 2021-03-08 NOTE — PROGRESS NOTES
Pt arrived, refusing to have bed alarm in place, despite the safety education, pt still refusing. States he will sign refusal. I did ask patient to ambulate so that I could see how he walks, he was up with steady gait to bathroom and back to chair with no assistance needed.

## 2021-03-09 LAB
ALBUMIN SERPL-MCNC: 1.7 G/DL (ref 3.5–5)
ALBUMIN/GLOB SERPL: 0.4 {RATIO} (ref 1.1–2.2)
ALP SERPL-CCNC: 93 U/L (ref 45–117)
ALT SERPL-CCNC: 12 U/L (ref 12–78)
ANION GAP SERPL CALC-SCNC: 2 MMOL/L (ref 5–15)
AST SERPL-CCNC: 28 U/L (ref 15–37)
BILIRUB SERPL-MCNC: 0.3 MG/DL (ref 0.2–1)
BNP SERPL-MCNC: ABNORMAL PG/ML
BUN SERPL-MCNC: 38 MG/DL (ref 6–20)
BUN/CREAT SERPL: 14 (ref 12–20)
CALCIUM SERPL-MCNC: 8 MG/DL (ref 8.5–10.1)
CHLORIDE SERPL-SCNC: 111 MMOL/L (ref 97–108)
CO2 SERPL-SCNC: 29 MMOL/L (ref 21–32)
COMMENT, HOLDF: NORMAL
CREAT SERPL-MCNC: 2.81 MG/DL (ref 0.7–1.3)
ERYTHROCYTE [DISTWIDTH] IN BLOOD BY AUTOMATED COUNT: 15.9 % (ref 11.5–14.5)
GLOBULIN SER CALC-MCNC: 4.8 G/DL (ref 2–4)
GLUCOSE BLD STRIP.AUTO-MCNC: 110 MG/DL (ref 65–100)
GLUCOSE BLD STRIP.AUTO-MCNC: 138 MG/DL (ref 65–100)
GLUCOSE SERPL-MCNC: 129 MG/DL (ref 65–100)
HCT VFR BLD AUTO: 34.4 % (ref 36.6–50.3)
HGB BLD-MCNC: 10.6 G/DL (ref 12.1–17)
MAGNESIUM SERPL-MCNC: 2.1 MG/DL (ref 1.6–2.4)
MCH RBC QN AUTO: 25.1 PG (ref 26–34)
MCHC RBC AUTO-ENTMCNC: 30.8 G/DL (ref 30–36.5)
MCV RBC AUTO: 81.3 FL (ref 80–99)
NRBC # BLD: 0 K/UL (ref 0–0.01)
NRBC BLD-RTO: 0 PER 100 WBC
PLATELET # BLD AUTO: 338 K/UL (ref 150–400)
PMV BLD AUTO: 10.1 FL (ref 8.9–12.9)
POTASSIUM SERPL-SCNC: 4.2 MMOL/L (ref 3.5–5.1)
PROT SERPL-MCNC: 6.5 G/DL (ref 6.4–8.2)
RBC # BLD AUTO: 4.23 M/UL (ref 4.1–5.7)
SAMPLES BEING HELD,HOLD: NORMAL
SERVICE CMNT-IMP: ABNORMAL
SERVICE CMNT-IMP: ABNORMAL
SODIUM SERPL-SCNC: 142 MMOL/L (ref 136–145)
WBC # BLD AUTO: 5.8 K/UL (ref 4.1–11.1)

## 2021-03-09 PROCEDURE — 83880 ASSAY OF NATRIURETIC PEPTIDE: CPT

## 2021-03-09 PROCEDURE — 94760 N-INVAS EAR/PLS OXIMETRY 1: CPT

## 2021-03-09 PROCEDURE — 80053 COMPREHEN METABOLIC PANEL: CPT

## 2021-03-09 PROCEDURE — 83735 ASSAY OF MAGNESIUM: CPT

## 2021-03-09 PROCEDURE — 74011250637 HC RX REV CODE- 250/637: Performed by: NURSE PRACTITIONER

## 2021-03-09 PROCEDURE — 65660000000 HC RM CCU STEPDOWN

## 2021-03-09 PROCEDURE — 74011250636 HC RX REV CODE- 250/636: Performed by: NURSE PRACTITIONER

## 2021-03-09 PROCEDURE — APPSS45 APP SPLIT SHARED TIME 31-45 MINUTES: Performed by: NURSE PRACTITIONER

## 2021-03-09 PROCEDURE — 74011250637 HC RX REV CODE- 250/637: Performed by: STUDENT IN AN ORGANIZED HEALTH CARE EDUCATION/TRAINING PROGRAM

## 2021-03-09 PROCEDURE — 82962 GLUCOSE BLOOD TEST: CPT

## 2021-03-09 PROCEDURE — 36415 COLL VENOUS BLD VENIPUNCTURE: CPT

## 2021-03-09 PROCEDURE — 94640 AIRWAY INHALATION TREATMENT: CPT

## 2021-03-09 PROCEDURE — 74011250637 HC RX REV CODE- 250/637: Performed by: INTERNAL MEDICINE

## 2021-03-09 PROCEDURE — 85027 COMPLETE CBC AUTOMATED: CPT

## 2021-03-09 PROCEDURE — 99232 SBSQ HOSP IP/OBS MODERATE 35: CPT | Performed by: INTERNAL MEDICINE

## 2021-03-09 PROCEDURE — 74011000250 HC RX REV CODE- 250: Performed by: NURSE PRACTITIONER

## 2021-03-09 RX ORDER — ISOSORBIDE MONONITRATE 30 MG/1
60 TABLET, EXTENDED RELEASE ORAL DAILY
Status: DISCONTINUED | OUTPATIENT
Start: 2021-03-10 | End: 2021-03-17 | Stop reason: HOSPADM

## 2021-03-09 RX ADMIN — ACETAMINOPHEN 650 MG: 325 TABLET ORAL at 08:12

## 2021-03-09 RX ADMIN — METHADONE HYDROCHLORIDE 15 MG: 10 TABLET ORAL at 16:48

## 2021-03-09 RX ADMIN — ISOSORBIDE MONONITRATE 30 MG: 30 TABLET, EXTENDED RELEASE ORAL at 08:12

## 2021-03-09 RX ADMIN — BUMETANIDE 1 MG: 0.25 INJECTION, SOLUTION INTRAMUSCULAR; INTRAVENOUS at 16:49

## 2021-03-09 RX ADMIN — CLONIDINE HYDROCHLORIDE 0.1 MG: 0.1 TABLET ORAL at 08:13

## 2021-03-09 RX ADMIN — HYDRALAZINE HYDROCHLORIDE 25 MG: 25 TABLET, FILM COATED ORAL at 22:26

## 2021-03-09 RX ADMIN — ATORVASTATIN CALCIUM 20 MG: 20 TABLET, FILM COATED ORAL at 22:26

## 2021-03-09 RX ADMIN — ASPIRIN 81 MG: 81 TABLET, COATED ORAL at 08:12

## 2021-03-09 RX ADMIN — IPRATROPIUM BROMIDE AND ALBUTEROL SULFATE 3 ML: .5; 3 SOLUTION RESPIRATORY (INHALATION) at 08:55

## 2021-03-09 RX ADMIN — CARVEDILOL 25 MG: 12.5 TABLET, FILM COATED ORAL at 08:12

## 2021-03-09 RX ADMIN — CLONIDINE HYDROCHLORIDE 0.1 MG: 0.1 TABLET ORAL at 16:48

## 2021-03-09 RX ADMIN — HEPARIN SODIUM 5000 UNITS: 5000 INJECTION INTRAVENOUS; SUBCUTANEOUS at 22:26

## 2021-03-09 RX ADMIN — CARVEDILOL 25 MG: 12.5 TABLET, FILM COATED ORAL at 16:48

## 2021-03-09 RX ADMIN — HEPARIN SODIUM 5000 UNITS: 5000 INJECTION INTRAVENOUS; SUBCUTANEOUS at 06:33

## 2021-03-09 RX ADMIN — BUMETANIDE 1 MG: 0.25 INJECTION, SOLUTION INTRAMUSCULAR; INTRAVENOUS at 08:12

## 2021-03-09 RX ADMIN — HYDRALAZINE HYDROCHLORIDE 25 MG: 25 TABLET, FILM COATED ORAL at 10:41

## 2021-03-09 RX ADMIN — HYDRALAZINE HYDROCHLORIDE 25 MG: 25 TABLET, FILM COATED ORAL at 16:48

## 2021-03-09 RX ADMIN — POTASSIUM CHLORIDE 20 MEQ: 20 TABLET, EXTENDED RELEASE ORAL at 08:12

## 2021-03-09 RX ADMIN — AMLODIPINE BESYLATE 10 MG: 5 TABLET ORAL at 08:12
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
## 2021-03-09 NOTE — PROGRESS NOTES
Hospitalist Progress Note    NAME: Lydia Moctezuma   :  1965   MRN:  [id number]       Assessment / Plan:  Acute systolic CHF exacerbation  Bilateral pitting edema  Pulmonary Edema  -BNP on admission  35,000, will repeat tomorrow.  -Patient lost nearly 10 pounds by diuresis. We will keep diuresing. Patient dry weight is around 185 to 190 pounds.  -Cardiology on board, appreciate input. Hypertension (uncontrolled) POA  Continue Norvasc daily Coreg BID Hydralazine 25 mg TID Clonidine 0.1 mg BID    LLQ pain, POA (Improved)  Abdominal distention, POA   Bilateral leg weakness, POA ( improved)  CT Abd/Pelv WO contrast: Third spacing with soft tissue edema, bilateral pleural effusions, and ascites. Retained foreign bodies in the skin as described. Small air pocket associated with the distal stomach. It is difficult to determine if this is intraluminal or extraluminal without oral contrast. Recommend repeat study with oral contrast  Per PT no recommendations at this time. Monitor    CKD stage 3  -BUN/creatinine-29/2.68, patient is refusing labs today, counseling done, patient agreed for the last.  We will follow up on the BMP. Continue diuresis. Type 2 Diabetes POA    Sliding scale coverage    Diabetic Diet    Glucose Monitoring    Hyperlipidemia POA   Lipitor daily  Peripheral Neuropathy POA   secondary to diabetes  Hx of Left great toe, #2, and  5 th toe amputation  Hx of MRSA  Nicotine Abuse POA   Nicotine Patch  Substance Abuse POA (heroin)   Last used Heroine on Friday 3/5/21 reported by patient   + toxicology screen for cocaine and opiates   Substance abuse protocol: received 30 mg of methadone on 3/8/21   He may receive 15 mg methadone daily x 2 doses   He states he has been going to [facility] and is to start an in patient rehab. Case management input appreciated    Patient-if kidney function stabilized and edema improved then can be discharged in 1 to 2 days.     30.0 - 39.9 Obese / Body mass index is 32.35 kg/m². Code status: Full  Prophylaxis: Hep SQ  Recommended Disposition: Possible in patient rehab for substance abuse     Subjective:     Chief Complaint / Reason for Physician Visit  Patient seen today, says that he is able to ambulate in the room but not in the hallway. Patient baseline is that he can walk at least half a block before getting short of breath. Edema is improved but still present. No cough, no other complaints. Review of Systems:  Symptom Y/N Comments  Symptom Y/N Comments   Fever/Chills n   Chest Pain n    Poor Appetite n   Edema y Bilatera LE   Cough n   Abdominal Pain     Sputum n   Joint Pain     SOB/[place] CRU[place] Y  on exertion  Pruritis/Rash n    Nausea/vomit n   Tolerating PT/OT     Diarrhea n   Tolerating Diet y    Constipation n   Other       Could NOT obtain due to:      Objective:     VITALS:   Last 24hrs VS reviewed since prior progress note. Most recent are:  Patient Vitals for the past 24 hrs:   Temp Pulse Resp BP SpO2   03/09/21 1052 97.5 °F (36.4 °C) 82 -- (!) 158/82 --   03/09/21 0712 97.4 °F (36.3 °C) 75 19 (!) 148/92 95 %   03/09/21 0325 97.9 °F (36.6 °C) 77 18 (!) 150/87 96 %   03/09/21 0005 98 °F (36.7 °C) 74 18 (!) 140/82 93 %   03/08/21 2200 -- 72 -- 137/78 --   03/08/21 2007 97.7 °F (36.5 °C) 70 -- 118/72 98 %   03/08/21 1631 98.4 °F (36.9 °C) 78 20 (!) 144/84 93 %       Intake/Output Summary (Last 24 hours) at 3/9/2021 1527  Last data filed at 3/9/2021 2072  Gross per 24 hour   Intake 125 ml   Output 700 ml   Net -575 ml        I had a face to face encounter and independently examined this patient on 3/9/2021, as outlined below:  PHYSICAL EXAM:  General:  Alert, cooperative, no acute distress, obese  EENT:   Anicteric sclerae. normocephalic  Resp:  Breath sounds equal bilaterally. No accessory muscle use  CV:  Regular  rhythm,  No edema  GI:  Soft, Non distended, Non tender. +Bowel sounds  Neurologic:  Alert and oriented X 3, normal speech,   Psych:   Good insight. Not anxious nor agitated  Skin:  No rashes. No jaundice    Reviewed most current lab test results and cultures  YES  Reviewed most current radiology test results   YES  Review and summation of old records today    NO  Reviewed patient's current orders and MAR    YES  PMH/SH reviewed - no change compared to H&P  ________________________________________________________________________  Care Plan discussed with:    Comments   Patient y    Family      RN y    Care Manager y    Consultant                        Multidiciplinary team rounds were held today with , nursing, pharmacist and clinical coordinator. Patient's plan of care was discussed; medications were reviewed and discharge planning was addressed. ________________________________________________________________________    ________________________________________________________________________  Geri Mendiola MD     Procedures: see electronic medical records for all procedures/Xrays and details which were not copied into this note but were reviewed prior to creation of Plan. LABS:  I reviewed today's most current labs and imaging studies.   Pertinent labs include:  Recent Labs     03/08/21  0350 03/07/21  0205   WBC 7.8 7.0   HGB 10.9* 11.0*   HCT 34.6* 35.8*    356     Recent Labs     03/08/21  0350 03/07/21  0205    142   K 3.7 3.3*    110*   CO2 25 28   * 104*   BUN 29* 25*   CREA 2.68* 2.76*   CA 7.8* 8.0*   MG  --  2.0   ALB 1.6* 1.6*   TBILI 0.2 0.3   ALT 12 11*       Signed: Geri Mendiola MD

## 2021-03-09 NOTE — PROGRESS NOTES
Bedside shift change report given to Shiv Aguirre RN (oncoming nurse) to JETT Manzanares (offgoing nurse). Report included the following information  Shift worked:  7p-7a   Shift summary and any significant changes:      up most of the night, refused withdrawal sypmts and coping meds, refused labs         Concerns for physician to address:     Zone phone for oncoming shift:         Patient Information  Lydia Moctezuma  55 y.o.  3/5/2021  8:28 PM by Loren Levi MD. Lydia Moctezuma was admitted from second floor     Problem List       Patient Active Problem List     Diagnosis Date Noted    Acute on chronic combined systolic and diastolic ACC/AHA stage C congestive heart failure ([address] 03/08/2021    Hypertensive urgency 03/08/2021    CKD (chronic kidney disease) 03/08/2021    CHF exacerbation ([address] 03/05/2021    Acute CHF (congestive heart failure) ([address] 53/03/8010    Metabolic encephalopathy 94/84/7182    Osteomyelitis of left foot ([address] 02/28/2020    MRSA bacteremia 02/28/2020    Secondary hyperaldosteronism ([address] 02/28/2020    Hypokalemia 02/28/2020    DM (diabetes mellitus), type 2 ([address] 02/28/2020    HTN (hypertension) 02/28/2020    Cardiomyopathy ([address] 98/65/7322    Acute systolic heart failure ([address] 02/21/2020    Substance abuse ([address] 02/21/2020    Severe sepsis ([address] 02/19/2020           Past Medical History:   Diagnosis Date    Acute systolic heart failure ([address] 2/21/2020    Cardiomyopathy ([address] 2/21/2020    Diabetes ([address]      Encounter to establish care with new doctor 10/11/2018    Hypertension      Neuropathy      Sciatica      Substance abuse ([address] 2/21/2020         Core Measures:     CHF:Y        Activity:  Activity Level:  Up with Assistance  Number times ambulated in hallways past shift: 0  Number of times OOB to chair past shift: 1     Cardiac:   Cardiac Monitoring: Y   Cardiac Rhythm: Normal sinus rhythm     Access:   Current line(s):PIV     Genitourinary:   Urinary status:voiding        Respiratory:   O2 Device: Room air  Chronic home O2 use?: N  Incentive spirometer at bedside: N     GI:  Last Bowel Movement Date: 03/07/21  Current diet:  DIET ONE TIME MESSAGE  DIET DIABETIC CONSISTENT CARB Regular  DIET ONE TIME MESSAGE  DIET NUTRITIONAL SUPPLEMENTS Breakfast, Dinner; Glucerna Shake     Pain Management:   Patient states pain is manageable on current regimen: N/A     Skin:  Sarwat Score: 19  Interventions:     Patient Safety:  Fall Score: Total Score: 3  Interventions: refused bed alarm, signed form after education  High Fall Risk:  Yes     DVT prophylaxis:  DVT prophylaxis Med-   DVT prophylaxis SCD or BERTO-     Wounds: (If Applicable)  Wounds- pointer finger old burn scar     Active Consults:  IP CONSULT TO HOSPITALIST  IP CONSULT TO CARDIOLOGY     Length of Stay:  Expected LOS: 4d 0h  Actual LOS: 3  Discharge Plan:         Shruthi Paige RN

## 2021-03-09 NOTE — PROGRESS NOTES
End of Shift Note    Bedside shift change report given to Ramona/Holly by Cal Lenz RN (offgoing nurse). Report included the following information  Shift worked:  3-7   Shift summary and any significant changes:     admit to [facility]       Concerns for physician to address:     Zone phone for oncoming shift:        Patient Information  Felisha Flores  55 y.o.  3/5/2021  8:28 PM by Lola Wall MD. Felisha Flores was admitted from second floor    Problem List  Patient Active Problem List    Diagnosis Date Noted    Acute on chronic combined systolic and diastolic ACC/AHA stage C congestive heart failure ([address] 03/08/2021    Hypertensive urgency 03/08/2021    CKD (chronic kidney disease) 03/08/2021    CHF exacerbation ([address] 03/05/2021    Acute CHF (congestive heart failure) ([address] 73/04/3883    Metabolic encephalopathy 33/52/5726    Osteomyelitis of left foot ([address] 02/28/2020    MRSA bacteremia 02/28/2020    Secondary hyperaldosteronism ([address] 02/28/2020    Hypokalemia 02/28/2020    DM (diabetes mellitus), type 2 ([address] 02/28/2020    HTN (hypertension) 02/28/2020    Cardiomyopathy ([address] 51/23/9873    Acute systolic heart failure ([address] 02/21/2020    Substance abuse ([address] 02/21/2020    Severe sepsis ([address] 02/19/2020     Past Medical History:   Diagnosis Date    Acute systolic heart failure ([address] 2/21/2020    Cardiomyopathy ([address] 2/21/2020    Diabetes ([address]     Encounter to establish care with new doctor 10/11/2018    Hypertension     Neuropathy     Sciatica     Substance abuse ([address] 2/21/2020       Core Measures:    CHF:Y      Activity:  Activity Level:  Up with Assistance  Number times ambulated in hallways past shift: 0  Number of times OOB to chair past shift: 1    Cardiac:   Cardiac Monitoring: Y   Cardiac Rhythm: Normal sinus rhythm    Access:   Current line(s):PIV    Genitourinary:   Urinary status:voiding      Respiratory:   O2 Device: Room air  Chronic home O2 use?: N  Incentive spirometer at bedside: N       GI:  Last Bowel Movement Date: 03/07/21  Current diet:  DIET ONE TIME MESSAGE  DIET DIABETIC CONSISTENT CARB Regular  DIET ONE TIME MESSAGE  DIET NUTRITIONAL SUPPLEMENTS Breakfast, Dinner; Glucerna Shake    Pain Management:   Patient states pain is manageable on current regimen: N/A    Skin:  Sarwat Score: 19  Interventions:     Patient Safety:  Fall Score:  Total Score: 3  Interventions: refused bed alarm, signed form after education  High Fall Risk: Yes    DVT prophylaxis:  DVT prophylaxis Med-   DVT prophylaxis SCD or BERTO-    Wounds: (If Applicable)  Wounds- pointer finger old burn scar    Active Consults:  IP CONSULT TO HOSPITALIST  IP CONSULT TO CARDIOLOGY    Length of Stay:  Expected LOS: 4d 0h  Actual LOS: 3  Discharge Plan:home when stable       Almas Rob RN

## 2021-03-09 NOTE — PROGRESS NOTES
DAILA:    Home with family  Family to transport      CM: Eddy Barahona is currently working with pt in the Neuro Unit. CM spoke with pt's pre-trial officer: Rl Segovia ([phone]), via telephone. It was reported that pt has an court trial case in April and she is requesting documents of pt's hospitalization. SABINA sent clinicals to [facility], via fax to: [phone]. CM will continue to follow.     Eddy Barahona, KATY, [address] [place] [address]

## 2021-03-09 NOTE — PROGRESS NOTES
Patient refused daily weight and Accu-Chek. Educated and encouraged the patient. JETT Mcdonnell made aware.

## 2021-03-09 NOTE — PROGRESS NOTES
[address] [place] [address], [address] [place] [address] Ne, [address] [facility]  [phone]      Cardiology Progress Note      3/9/2021 11AM    Admit Date: 3/5/2021    Admit Diagnosis:   CHF exacerbation ([facility] [I50.9]    Interval History/Subjective:     Mary Kate Blankenship is a 55 y.o. male admitted with CHF exacerbation ([facility] [I50.9]    -BP slightly elevated  -no labs (patient refused)  -no weight (patient refused); I/O incomplete  -Mr. Diogenes Seo is not very talkative. He denies chest pain. States breathing is better.    Fell asleep during exam (while sitting up in chair)    Visit Vitals  BP (!) 158/82 (BP 1 Location: Left upper arm, BP Patient Position: Sitting)   Pulse 82   Temp 97.5 °F (36.4 °C)   Resp 19   Ht 5' 7\" (1.702 m)   Wt 97.1 kg (214 lb)   SpO2 95%   BMI 33.52 kg/m²       Current Facility-Administered Medications   Medication Dose Route Frequency    methadone (DOLOPHINE) tablet 15 mg  15 mg Oral DAILY    isosorbide mononitrate ER (IMDUR) tablet 30 mg  30 mg Oral DAILY    carvediloL (COREG) tablet 25 mg  25 mg Oral BID WITH MEALS    hydrALAZINE (APRESOLINE) tablet 25 mg  25 mg Oral TID    cloNIDine HCL (CATAPRES) tablet 0.1 mg  0.1 mg Oral BID    potassium chloride (K-DUR, KLOR-CON) SR tablet 20 mEq  20 mEq Oral DAILY    ondansetron (ZOFRAN) injection 4 mg  4 mg IntraVENous Q6H PRN    acetaminophen (TYLENOL) tablet 650 mg  650 mg Oral Q6H PRN    acetaminophen (TYLENOL) suppository 650 mg  650 mg Rectal Q6H PRN    bumetanide (BUMEX) injection 1 mg  1 mg IntraVENous BID    aspirin delayed-release tablet 81 mg  81 mg Oral DAILY    insulin lispro (HUMALOG) injection   SubCUTAneous AC&HS    glucose chewable tablet 16 g  4 Tab Oral PRN    dextrose (D50W) injection syrg 12.5-25 g  12.5-25 g IntraVENous PRN    glucagon (GLUCAGEN) injection 1 mg  1 mg IntraMUSCular PRN    labetaloL (NORMODYNE;TRANDATE) injection 10 mg  10 mg IntraVENous Q4H PRN    heparin (porcine) injection 5,000 Units  5,000 Units SubCUTAneous Q8H    nicotine (NICODERM CQ) 21 mg/24 hr patch 1 Patch  1 Patch TransDERmal DAILY    amLODIPine (NORVASC) tablet 10 mg  10 mg Oral DAILY    albuterol-ipratropium (DUO-NEB) 2.5 MG-0.5 MG/3 ML  3 mL Nebulization Q6H PRN    atorvastatin (LIPITOR) tablet 20 mg  20 mg Oral QHS    nitroglycerin (NITROSTAT) tablet 0.4 mg  0.4 mg SubLINGual PRN    acetaminophen (TYLENOL) tablet 650 mg  650 mg Oral Q6H PRN       Objective:      Physical Exam:  General Appearance:  obese adult AAM sitting in chair rocking back and forth in NAD  Chest:   dim bases  Cardiovascular:  Regular rate and rhythm, no murmur. Abdomen:   Soft, non-tender, bowel sounds are active. Extremities: weak BLE pulses; 1+ pitting edema  Skin:  Warm and dry. thickened BLE     Data Review:   Recent Labs     03/08/21  0350 03/07/21  0205   WBC 7.8 7.0   HGB 10.9* 11.0*   HCT 34.6* 35.8*    356     Recent Labs     03/08/21  0350 03/07/21  0205    142   K 3.7 3.3*    110*   CO2 25 28   * 104*   BUN 29* 25*   CREA 2.68* 2.76*   CA 7.8* 8.0*   MG  --  2.0   ALB 1.6* 1.6*   TBILI 0.2 0.3   ALT 12 11*       No results for input(s): TROIQ, CPK, CKMB in the last 72 hours. Intake/Output Summary (Last 24 hours) at 3/9/2021 1130  Last data filed at 3/9/2021 3749  Gross per 24 hour   Intake 243 ml   Output 700 ml   Net -457 ml        Telemetry: SR  EKG:SR; t wave inv lateral leads  ECHO:  EF 35-40%; mod concentric hypertrophy; mild TR; mild pHTN  Cxray: edema    Assessment:     Active Problems:    DM (diabetes mellitus), type 2 ([facility] (2/28/2020)      CHF exacerbation ([facility] (3/5/2021)      Acute on chronic combined systolic and diastolic ACC/AHA stage C congestive heart failure ([facility] (3/8/2021)      Hypertensive urgency (3/8/2021)      CKD (chronic kidney disease) (3/8/2021)        Plan:       Hypertensive urgency:   BP slightly elevated. Non compliance. + cocaine and opiates.  Has been needing PRN labetalol  · Continue amlodipine, coreg, clonidine, hydralazine  · increase imdur      Acute on chronic combined systolic and diastolic heart failure:  2/2 hypertensive urgency. Troponin negative.  +cocaine and opiates   · EF 35-40%  · HTN improving   · BB. No ACEi/ARB due to CKD  · Continue bumex       CKD:  Creat steady  · neprhrology not currently following. Creat steady. · Avoid ACEi/ARB      Drug abuse:  +cocaine and opiates   · Continue to   · Any withdrawal treatment per hospitalist         Noted that patient refusing labs, weights, and occasional medicines. Kelsey Corcoran NP  DNP, RN, [facility]    3/9/2021         Patient seen, examined by me personally. Plan discussed as detailed. Agree with note as outlined by  NP. I confirm findings in history and physical exam. My independent exam reveals : Physical Exam   Constitutional: He appears well-developed. HENT:   Head: Normocephalic. Eyes: Pupils are equal, round, and reactive to light. Cardiovascular: Normal heart sounds. Pulmonary/Chest: He is in respiratory distress. He has no wheezes. He has no rales. Musculoskeletal:         General: No edema. Skin: Skin is warm. Psychiatric: His behavior is normal.        No additional findings noted. Agree with plan as outlined above with modifications as noted.      Manda Barahona MD

## 2021-03-10 LAB
ANION GAP SERPL CALC-SCNC: 5 MMOL/L (ref 5–15)
APPEARANCE UR: CLEAR
BACTERIA URNS QL MICRO: NEGATIVE /HPF
BILIRUB UR QL: NEGATIVE
BUN SERPL-MCNC: 39 MG/DL (ref 6–20)
BUN/CREAT SERPL: 15 (ref 12–20)
CALCIUM SERPL-MCNC: 8.3 MG/DL (ref 8.5–10.1)
CHLORIDE SERPL-SCNC: 108 MMOL/L (ref 97–108)
CO2 SERPL-SCNC: 29 MMOL/L (ref 21–32)
COLOR UR: ABNORMAL
CREAT SERPL-MCNC: 2.6 MG/DL (ref 0.7–1.3)
CREAT UR-MCNC: 30.3 MG/DL
EPITH CASTS URNS QL MICRO: ABNORMAL /LPF
GLUCOSE BLD STRIP.AUTO-MCNC: 100 MG/DL (ref 65–100)
GLUCOSE BLD STRIP.AUTO-MCNC: 111 MG/DL (ref 65–100)
GLUCOSE BLD STRIP.AUTO-MCNC: 131 MG/DL (ref 65–100)
GLUCOSE BLD STRIP.AUTO-MCNC: 135 MG/DL (ref 65–100)
GLUCOSE SERPL-MCNC: 139 MG/DL (ref 65–100)
GLUCOSE UR STRIP.AUTO-MCNC: NEGATIVE MG/DL
HGB UR QL STRIP: NEGATIVE
HYALINE CASTS URNS QL MICRO: ABNORMAL /LPF (ref 0–5)
KETONES UR QL STRIP.AUTO: NEGATIVE MG/DL
LEUKOCYTE ESTERASE UR QL STRIP.AUTO: NEGATIVE
NITRITE UR QL STRIP.AUTO: NEGATIVE
PH UR STRIP: 6.5 [PH] (ref 5–8)
POTASSIUM SERPL-SCNC: 4 MMOL/L (ref 3.5–5.1)
PROT UR STRIP-MCNC: 300 MG/DL
PROT UR-MCNC: 216 MG/DL (ref 0–11.9)
PROT/CREAT UR-RTO: 7.1
RBC #/AREA URNS HPF: ABNORMAL /HPF (ref 0–5)
SERVICE CMNT-IMP: ABNORMAL
SERVICE CMNT-IMP: NORMAL
SODIUM SERPL-SCNC: 142 MMOL/L (ref 136–145)
SP GR UR REFRACTOMETRY: 1.01 (ref 1–1.03)
UROBILINOGEN UR QL STRIP.AUTO: 0.2 EU/DL (ref 0.2–1)
WBC URNS QL MICRO: ABNORMAL /HPF (ref 0–4)

## 2021-03-10 PROCEDURE — 74011000250 HC RX REV CODE- 250: Performed by: NURSE PRACTITIONER

## 2021-03-10 PROCEDURE — 65660000000 HC RM CCU STEPDOWN

## 2021-03-10 PROCEDURE — 74011250637 HC RX REV CODE- 250/637: Performed by: NURSE PRACTITIONER

## 2021-03-10 PROCEDURE — 80048 BASIC METABOLIC PNL TOTAL CA: CPT

## 2021-03-10 PROCEDURE — P9045 ALBUMIN (HUMAN), 5%, 250 ML: HCPCS | Performed by: STUDENT IN AN ORGANIZED HEALTH CARE EDUCATION/TRAINING PROGRAM

## 2021-03-10 PROCEDURE — 94640 AIRWAY INHALATION TREATMENT: CPT

## 2021-03-10 PROCEDURE — 74011000250 HC RX REV CODE- 250: Performed by: STUDENT IN AN ORGANIZED HEALTH CARE EDUCATION/TRAINING PROGRAM

## 2021-03-10 PROCEDURE — 84156 ASSAY OF PROTEIN URINE: CPT

## 2021-03-10 PROCEDURE — 74011250637 HC RX REV CODE- 250/637: Performed by: STUDENT IN AN ORGANIZED HEALTH CARE EDUCATION/TRAINING PROGRAM

## 2021-03-10 PROCEDURE — 74011250636 HC RX REV CODE- 250/636: Performed by: STUDENT IN AN ORGANIZED HEALTH CARE EDUCATION/TRAINING PROGRAM

## 2021-03-10 PROCEDURE — 82962 GLUCOSE BLOOD TEST: CPT

## 2021-03-10 PROCEDURE — 94760 N-INVAS EAR/PLS OXIMETRY 1: CPT

## 2021-03-10 PROCEDURE — 74011250637 HC RX REV CODE- 250/637: Performed by: INTERNAL MEDICINE

## 2021-03-10 PROCEDURE — 36415 COLL VENOUS BLD VENIPUNCTURE: CPT

## 2021-03-10 PROCEDURE — 99232 SBSQ HOSP IP/OBS MODERATE 35: CPT | Performed by: INTERNAL MEDICINE

## 2021-03-10 PROCEDURE — 81001 URINALYSIS AUTO W/SCOPE: CPT

## 2021-03-10 PROCEDURE — 74011250636 HC RX REV CODE- 250/636: Performed by: NURSE PRACTITIONER

## 2021-03-10 PROCEDURE — P9047 ALBUMIN (HUMAN), 25%, 50ML: HCPCS | Performed by: STUDENT IN AN ORGANIZED HEALTH CARE EDUCATION/TRAINING PROGRAM

## 2021-03-10 PROCEDURE — APPSS45 APP SPLIT SHARED TIME 31-45 MINUTES: Performed by: NURSE PRACTITIONER

## 2021-03-10 RX ORDER — ALBUMIN HUMAN 250 G/1000ML
50 SOLUTION INTRAVENOUS ONCE
Status: COMPLETED | OUTPATIENT
Start: 2021-03-10 | End: 2021-03-10

## 2021-03-10 RX ORDER — BUMETANIDE 1 MG/1
1 TABLET ORAL 2 TIMES DAILY
Status: DISCONTINUED | OUTPATIENT
Start: 2021-03-10 | End: 2021-03-10

## 2021-03-10 RX ORDER — HYDRALAZINE HYDROCHLORIDE 50 MG/1
50 TABLET, FILM COATED ORAL 3 TIMES DAILY
Status: DISCONTINUED | OUTPATIENT
Start: 2021-03-10 | End: 2021-03-15

## 2021-03-10 RX ORDER — ALBUMIN HUMAN 50 G/1000ML
50 SOLUTION INTRAVENOUS ONCE
Status: COMPLETED | OUTPATIENT
Start: 2021-03-10 | End: 2021-03-10

## 2021-03-10 RX ORDER — BUMETANIDE 0.25 MG/ML
1 INJECTION INTRAMUSCULAR; INTRAVENOUS 2 TIMES DAILY
Status: DISCONTINUED | OUTPATIENT
Start: 2021-03-10 | End: 2021-03-11

## 2021-03-10 RX ADMIN — ATORVASTATIN CALCIUM 20 MG: 20 TABLET, FILM COATED ORAL at 21:04

## 2021-03-10 RX ADMIN — ASPIRIN 81 MG: 81 TABLET, COATED ORAL at 10:18

## 2021-03-10 RX ADMIN — HEPARIN SODIUM 5000 UNITS: 5000 INJECTION INTRAVENOUS; SUBCUTANEOUS at 21:04

## 2021-03-10 RX ADMIN — BUMETANIDE 1 MG: 1 TABLET ORAL at 10:18

## 2021-03-10 RX ADMIN — ALBUMIN (HUMAN) 50 G: 0.25 INJECTION, SOLUTION INTRAVENOUS at 12:08

## 2021-03-10 RX ADMIN — HYDRALAZINE HYDROCHLORIDE 50 MG: 50 TABLET, FILM COATED ORAL at 21:04

## 2021-03-10 RX ADMIN — IPRATROPIUM BROMIDE AND ALBUTEROL SULFATE 3 ML: .5; 3 SOLUTION RESPIRATORY (INHALATION) at 06:46

## 2021-03-10 RX ADMIN — ALBUMIN (HUMAN) 50 G: 12.5 INJECTION, SOLUTION INTRAVENOUS at 11:03

## 2021-03-10 RX ADMIN — HYDRALAZINE HYDROCHLORIDE 50 MG: 50 TABLET, FILM COATED ORAL at 18:27

## 2021-03-10 RX ADMIN — POTASSIUM CHLORIDE 20 MEQ: 20 TABLET, EXTENDED RELEASE ORAL at 10:19

## 2021-03-10 RX ADMIN — CARVEDILOL 25 MG: 12.5 TABLET, FILM COATED ORAL at 10:18

## 2021-03-10 RX ADMIN — CLONIDINE HYDROCHLORIDE 0.1 MG: 0.1 TABLET ORAL at 18:27

## 2021-03-10 RX ADMIN — CARVEDILOL 25 MG: 12.5 TABLET, FILM COATED ORAL at 18:27

## 2021-03-10 RX ADMIN — ISOSORBIDE MONONITRATE 60 MG: 30 TABLET, EXTENDED RELEASE ORAL at 10:19

## 2021-03-10 RX ADMIN — CLONIDINE HYDROCHLORIDE 0.1 MG: 0.1 TABLET ORAL at 10:19

## 2021-03-10 RX ADMIN — METHADONE HYDROCHLORIDE 15 MG: 10 TABLET ORAL at 10:18

## 2021-03-10 RX ADMIN — HYDRALAZINE HYDROCHLORIDE 25 MG: 25 TABLET, FILM COATED ORAL at 10:19

## 2021-03-10 RX ADMIN — HEPARIN SODIUM 5000 UNITS: 5000 INJECTION INTRAVENOUS; SUBCUTANEOUS at 13:55

## 2021-03-10 RX ADMIN — BUMETANIDE 1 MG: 0.25 INJECTION, SOLUTION INTRAMUSCULAR; INTRAVENOUS at 18:27

## 2021-03-10 RX ADMIN — AMLODIPINE BESYLATE 10 MG: 5 TABLET ORAL at 10:18

## 2021-03-10 NOTE — PROGRESS NOTES
Bedside shift change report given to Landen Esposito by Abraham Zhang RN (offgoing nurse). Report included the following information  Shift worked: 7p-7a   Shift summary and any significant changes:      none         Concerns for physician to address:  none   Zone phone for oncoming shift:   [phone]      Patient Information  Lyubov Taylor  55 y.o.  3/5/2021  8:28 PM by Suad Martinez MD. Lyubov Taylor was admitted from second floor     Problem List       Patient Active Problem List     Diagnosis Date Noted    Acute on chronic combined systolic and diastolic ACC/AHA stage C congestive heart failure ([address] 03/08/2021    Hypertensive urgency 03/08/2021    CKD (chronic kidney disease) 03/08/2021    CHF exacerbation ([address] 03/05/2021    Acute CHF (congestive heart failure) ([address] 94/02/4356    Metabolic encephalopathy 22/61/3967    Osteomyelitis of left foot ([address] 02/28/2020    MRSA bacteremia 02/28/2020    Secondary hyperaldosteronism ([address] 02/28/2020    Hypokalemia 02/28/2020    DM (diabetes mellitus), type 2 ([address] 02/28/2020    HTN (hypertension) 02/28/2020    Cardiomyopathy ([address] 71/50/6515    Acute systolic heart failure ([address] 02/21/2020    Substance abuse ([address] 02/21/2020    Severe sepsis ([address] 02/19/2020           Past Medical History:   Diagnosis Date    Acute systolic heart failure ([address] 2/21/2020    Cardiomyopathy ([address] 2/21/2020    Diabetes ([address]      Encounter to establish care with new doctor 10/11/2018    Hypertension      Neuropathy      Sciatica      Substance abuse ([address] 2/21/2020         Core Measures:     CHF:Y        Activity:  Activity Level:  Up with Assistance  Number times ambulated in hallways past shift: 0  Number of times OOB to chair past shift: 1     Cardiac:   Cardiac Monitoring: Y   Cardiac Rhythm: Normal sinus rhythm     Access:   Current line(s):PIV     Genitourinary:   Urinary status:voiding        Respiratory:   O2 Device: Room air  Chronic home O2 use?: N  Incentive spirometer at bedside: N     GI:  Last Bowel Movement Date: 03/07/21  Current diet:  DIET ONE TIME MESSAGE  DIET DIABETIC CONSISTENT CARB Regular  DIET ONE TIME MESSAGE  DIET NUTRITIONAL SUPPLEMENTS Breakfast, Dinner; Glucerna Shake     Pain Management:   Patient states pain is manageable on current regimen: N/A     Skin:  Sarwat Score: 19  Interventions:     Patient Safety:  Fall Score: Total Score: 3  Interventions: refused bed alarm, signed form after education  High Fall Risk:  Yes     DVT prophylaxis:  DVT prophylaxis Med-   DVT prophylaxis SCD or BERTO-     Wounds: (If Applicable)  Wounds- pointer finger old burn scar     Active Consults:  IP CONSULT TO HOSPITALIST  IP CONSULT TO CARDIOLOGY     Length of Stay:  Expected LOS: 4d 0h  Actual LOS: 3  Discharge Plan:home when stable         Ann Barba RN                                                Revision History

## 2021-03-10 NOTE — PROGRESS NOTES
Bedside shift change report given to Holly RN by Kasey Harris RN. Report included the following information SBAR, ED Summary, Intake/Output, MAR, Recent Results and Cardiac Rhythm NSR.     0715 PCT reported that patient pulled PIV out of his L hand.  Patient reports that he \"doesn't know how it happed\".
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
## 2021-03-10 NOTE — PROGRESS NOTES
Spiritual Care Assessment/Progress Note  [facility]      NAME: Chandra Whiteside      MRN: [id number]  AGE: 55 y.o.  SEX: male  Rastafari Affiliation: Congregational   Language: English     3/10/2021     Total Time (in minutes): 8     Spiritual Assessment begun in [facility] through conversation with:         [x]Patient        [] Family    [] Friend(s)        Reason for Consult: Initial/Spiritual assessment, patient floor     Spiritual beliefs: (Please include comment if needed)     [x] Identifies with a cookie tradition:         [] Supported by a cookie community:            [] Claims no spiritual orientation:           [] Seeking spiritual identity:                [] Adheres to an individual form of spirituality:           [] Not able to assess:                           Identified resources for coping:      [x] Prayer                               [] Music                  [] Guided Imagery     [x] Family/friends                 [] Pet visits     [] Devotional reading                         [] Unknown     [] Other:                                            Interventions offered during this visit: (See comments for more details)    Patient Interventions: Affirmation of emotions/emotional suffering, Affirmation of cookie, Iconic (affirming the presence of God/Higher Power), Initial/Spiritual assessment, patient floor, Prayer (assurance of)           Plan of Care:     [x] Support spiritual and/or cultural needs    [] Support AMD and/or advance care planning process      [] Support grieving process   [] Coordinate Rites and/or Rituals    [] Coordination with community clergy   [] No spiritual needs identified at this time   [] Detailed Plan of Care below (See Comments)  [] Make referral to Music Therapy  [] Make referral to Pet Therapy     [] Make referral to Addiction services  [] Make referral to [facility]  [] Make referral to Spiritual Care Partner  [] No future visits requested [x] Follow up upon further referrals     Comments:   Initial visit on Neuro for spiritual assessment. Patient's fiance was present. Patient seemed preoccupied with ordering his lunch. He shared he has good spiritual support and expressed reliance on God. He expressed spiritual needs or concerns, but was receptive to assurance of prayer. Provided bedside presence, affirmation of cookie, and advised him of ongoing availability of pastoral support.       Jacob Lepe, GIL, [facility], Staff [address]    [address] Paging Service  [phone])

## 2021-03-10 NOTE — PROGRESS NOTES
Hospitalist Progress Note    NAME: Alaina Rivera   :  1965   MRN:  [id number]       Assessment / Plan:  Acute systolic CHF exacerbation  Bilateral pitting edema  Pulmonary Edema  Proteinuria  Anasarca    -BNP improved to 17,239  -Patient lost nearly 10 pounds by diuresis. We will keep diuresing. Patient dry weight is around 185 to 190 pounds.  -Cardiology on board, appreciate input. -Nephrology on board-appreciate input.  -Random protein  216, creatinine 30, protein/creatinine ratio-7.1.  -Giving albumin IV today for better diuresis. Hypertension (uncontrolled) POA  Continue Norvasc daily Coreg BID Hydralazine 25 mg TID Clonidine 0.1 mg BID    LLQ pain, POA (Improved)  Abdominal distention, POA   Bilateral leg weakness, POA ( improved)  CT Abd/Pelv WO contrast: Third spacing with soft tissue edema, bilateral pleural effusions, and ascites. Retained foreign bodies in the skin as described. Small air pocket associated with the distal stomach. It is difficult to determine if this is intraluminal or extraluminal without oral contrast. Recommend repeat study with oral contrast  Per PT no recommendations at this time. Monitor    CKD stage 4  -BUN/creatinine-29/2.60, slightly better than yesterday. 6.  Nephrology on board-appreciate input. Started back on IV Bumex for further diuresing. Type 2 Diabetes POA    Sliding scale coverage    Diabetic Diet    Glucose Monitoring    Hyperlipidemia POA   Lipitor daily  Peripheral Neuropathy POA   secondary to diabetes  Hx of Left great toe, #2, and  5 th toe amputation  Hx of MRSA  Nicotine Abuse POA   Nicotine Patch  Substance Abuse POA (heroin)   Last used Heroine on Friday 3/5/21 reported by patient   + toxicology screen for cocaine and opiates   Substance abuse protocol: received 30 mg of methadone on 3/8/21   He may receive 15 mg methadone daily x 2 doses   He states he has been going to [facility] and is to start an in patient rehab.    Case management input appreciated      30.0 - 39.9 Obese / Body mass index is 32.15 kg/m². Code status: Full  Prophylaxis: Hep SQ  Recommended Disposition: Possible in patient rehab for substance abuse     Subjective:     Chief Complaint / Reason for Physician Visit  Patient seen today, well in the leg and the abdomen slightly better than admission but still significant. Patient is not needing any oxygen on resting, can ambulate only in the room not in the hallway because of shortness of breath. No acute events overnight. Review of Systems:  Symptom Y/N Comments  Symptom Y/N Comments   Fever/Chills n   Chest Pain n    Poor Appetite n   Edema y Bilatera LE   Cough n   Abdominal Pain     Sputum n   Joint Pain     SOB/[place] Y  on exertion  Pruritis/Rash n    Nausea/vomit n   Tolerating PT/OT     Diarrhea n   Tolerating Diet y    Constipation n   Other       Could NOT obtain due to:      Objective:     VITALS:   Last 24hrs VS reviewed since prior progress note. Most recent are:  Patient Vitals for the past 24 hrs:   Temp Pulse Resp BP SpO2   03/10/21 1525 97.5 °F (36.4 °C) 65 16 125/75 99 %   03/10/21 1135 97.8 °F (36.6 °C) 75 16 (!) 155/89 98 %   03/10/21 1127 97.9 °F (36.6 °C) 75 20 (!) 150/79 96 %   03/10/21 0737 97.6 °F (36.4 °C) 77 24 (!) 155/88 96 %   03/10/21 0647 -- -- -- -- 95 %   03/10/21 0300 97.8 °F (36.6 °C) 77 16 (!) 156/87 95 %   03/09/21 2321 97.9 °F (36.6 °C) 77 18 (!) 140/87 97 %   03/09/21 2038 97.6 °F (36.4 °C) 70 16 (!) 148/85 98 %     No intake or output data in the 24 hours ending 03/10/21 3338     I had a face to face encounter and independently examined this patient on 3/10/2021, as outlined below:  PHYSICAL EXAM:  General:  Alert, cooperative, no acute distress, obese  EENT:   Anicteric sclerae. normocephalic  Resp:  Breath sounds equal bilaterally.   No accessory muscle use  CV:  Regular  rhythm,  2+ edema bilateral lower extremity up to thigh, abdomen swollen secondary to ascites  GI:  Soft, Non distended, Non tender. +Bowel sounds  Neurologic:  Alert and oriented X 3, normal speech,   Psych:   Good insight. Not anxious nor agitated  Skin:  No rashes. No jaundice    Reviewed most current lab test results and cultures  YES  Reviewed most current radiology test results   YES  Review and summation of old records today    NO  Reviewed patient's current orders and MAR    YES  PMH/SH reviewed - no change compared to H&P  ________________________________________________________________________  Care Plan discussed with:    Comments   Patient y    Family      RN y    Care Manager y    Consultant                        Multidiciplinary team rounds were held today with , nursing, pharmacist and clinical coordinator. Patient's plan of care was discussed; medications were reviewed and discharge planning was addressed. ________________________________________________________________________    ________________________________________________________________________  Anamaria Bunch MD     Procedures: see electronic medical records for all procedures/Xrays and details which were not copied into this note but were reviewed prior to creation of Plan. LABS:  I reviewed today's most current labs and imaging studies.   Pertinent labs include:  Recent Labs     03/09/21 1740 03/08/21  0350   WBC 5.8 7.8   HGB 10.6* 10.9*   HCT 34.4* 34.6*    348     Recent Labs     03/10/21  1005 03/09/21  1740 03/08/21  0350    142 139   K 4.0 4.2 3.7    111* 108   CO2 29 29 25   * 129* 149*   BUN 39* 38* 29*   CREA 2.60* 2.81* 2.68*   CA 8.3* 8.0* 7.8*   MG  --  2.1  --    ALB  --  1.7* 1.6*   TBILI  --  0.3 0.2   ALT  --  12 12       Signed: Anamaria Bunch MD

## 2021-03-10 NOTE — PROGRESS NOTES
1530: Bedside shift change report given to Ran RN (oncoming nurse) by Karin Banuelos RN (offgoing nurse). Report included the following information SBAR and Kardex .
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
## 2021-03-10 NOTE — PROGRESS NOTES
[address] [place] [address]  [phone]      Cardiology Progress Note      3/10/2021 1PM    Admit Date: 3/5/2021    Admit Diagnosis:   CHF exacerbation ([facility] [I50.9]    Interval History/Subjective:     Mary Kate Blankenship is a 55 y.o. male admitted with CHF exacerbation ([facility] [I50.9]    -BP slightly elevated  -creat down to 2.60; proBNP decreased to 6[address]  -no weight today, but down 8# yesterday; I/O incomplete  -Mr. Diogenes Seo is more interactive today. He states he's starting to feel better. No pain at this time. Breathing is improving. Visitor present at bedside.    Visit Vitals  BP (!) 155/89   Pulse 75   Temp 97.8 °F (36.6 °C)   Resp 16   Ht 5' 7\" (1.702 m)   Wt 93.7 kg (206 lb 9.1 oz)   SpO2 98%   BMI 32.35 kg/m²       Current Facility-Administered Medications   Medication Dose Route Frequency    bumetanide (BUMEX) tablet 1 mg  1 mg Oral BID    isosorbide mononitrate ER (IMDUR) tablet 60 mg  60 mg Oral DAILY    carvediloL (COREG) tablet 25 mg  25 mg Oral BID WITH MEALS    hydrALAZINE (APRESOLINE) tablet 25 mg  25 mg Oral TID    cloNIDine HCL (CATAPRES) tablet 0.1 mg  0.1 mg Oral BID    potassium chloride (K-DUR, KLOR-CON) SR tablet 20 mEq  20 mEq Oral DAILY    ondansetron (ZOFRAN) injection 4 mg  4 mg IntraVENous Q6H PRN    acetaminophen (TYLENOL) tablet 650 mg  650 mg Oral Q6H PRN    acetaminophen (TYLENOL) suppository 650 mg  650 mg Rectal Q6H PRN    aspirin delayed-release tablet 81 mg  81 mg Oral DAILY    insulin lispro (HUMALOG) injection   SubCUTAneous AC&HS    glucose chewable tablet 16 g  4 Tab Oral PRN    dextrose (D50W) injection syrg 12.5-25 g  12.5-25 g IntraVENous PRN    glucagon (GLUCAGEN) injection 1 mg  1 mg IntraMUSCular PRN    labetaloL (NORMODYNE;TRANDATE) injection 10 mg  10 mg IntraVENous Q4H PRN    heparin (porcine) injection 5,000 Units  5,000 Units SubCUTAneous Q8H    nicotine (NICODERM CQ) 21 mg/24 hr patch 1 Patch  1 Patch TransDERmal DAILY    amLODIPine (NORVASC) tablet 10 mg  10 mg Oral DAILY    albuterol-ipratropium (DUO-NEB) 2.5 MG-0.5 MG/3 ML  3 mL Nebulization Q6H PRN    atorvastatin (LIPITOR) tablet 20 mg  20 mg Oral QHS    nitroglycerin (NITROSTAT) tablet 0.4 mg  0.4 mg SubLINGual PRN    acetaminophen (TYLENOL) tablet 650 mg  650 mg Oral Q6H PRN       Objective:      Physical Exam:  General Appearance:  obese adult AAM sitting on side of bed in NAD  Chest:   basilar crackles   Cardiovascular:  Regular rate and rhythm, no murmur. Abdomen:   Soft, non-tender, bowel sounds are active. Extremities: weak BLE pulses; 2+ pitting edema  Skin:  Warm and dry. thickened BLE     Data Review:   Recent Labs     03/09/21  1740 03/08/21  0350   WBC 5.8 7.8   HGB 10.6* 10.9*   HCT 34.4* 34.6*    348     Recent Labs     03/10/21  1005 03/09/21  1740 03/08/21  0350    142 139   K 4.0 4.2 3.7    111* 108   CO2 29 29 25   * 129* 149*   BUN 39* 38* 29*   CREA 2.60* 2.81* 2.68*   CA 8.3* 8.0* 7.8*   MG  --  2.1  --    ALB  --  1.7* 1.6*   TBILI  --  0.3 0.2   ALT  --  12 12       No results for input(s): TROIQ, CPK, CKMB in the last 72 hours. No intake or output data in the 24 hours ending 03/10/21 1320     Telemetry: SR  EKG:SR; t wave inv lateral leads  ECHO:  EF 35-40%; mod concentric hypertrophy; mild TR; mild pHTN  Cxray: edema    Assessment:     Principal Problem:    Hypertensive urgency (3/8/2021)    Active Problems:    DM [facility]diabetes mellitus), [facility] 2 [facility] (2/28/2020)      CHF exacerbation ([facility] (3/5/2021)      Acute on chronic combined systolic and diastolic ACC/AHA stage C congestive heart failure ([facility] (3/8/2021)      CKD (chronic kidney disease) (3/8/2021)        Plan:       Hypertensive urgency:   BP slightly elevated. Non compliance. + cocaine and opiates.  Has been needing PRN labetalol  · Continue amlodipine, coreg, clonidine, hydralazine, imdur   · Increase hydralazine       Acute on chronic combined systolic and diastolic heart failure:  2/2 hypertensive urgency. Troponin negative.  +cocaine and opiates   · EF 35-40%  · HTN improving   · BB. No ACEi/ARB due to CKD  · Continue bumex - switched to PO earlier today by hospitalist       CKD:  Creat steady  · neprhrology consulted today   · Avoid ACEi/ARB      Drug abuse:  +cocaine and opiates   · Continue to   · Any withdrawal treatment per hospitalist         Gray Baez NP  DNP, RN, [facility] Cardiology    3/10/2021         Patient seen, examined by me personally. Plan discussed as detailed. Agree with note as outlined by  NP. I confirm findings in history and physical exam. My independent exam reveals : Physical Exam   Constitutional: He is oriented to person, place, and time. He appears well-developed. HENT:   Head: Normocephalic. Neck: Neck supple. Cardiovascular: Normal heart sounds. Exam reveals no gallop. Pulmonary/Chest: Effort normal and breath sounds normal.   Abdominal: He exhibits distension. There is no abdominal tenderness. Musculoskeletal:         General: Edema present. Neurological: He is alert and oriented to person, place, and time. Skin: Skin is warm and dry. Nursing note and vitals reviewed. Still has significant edema, ascites. Nephrology recommendations noted. No additional findings noted. Agree with plan as outlined above with modifications as noted.      Jordana Marte MD

## 2021-03-11 LAB
ANION GAP SERPL CALC-SCNC: 3 MMOL/L (ref 5–15)
BUN SERPL-MCNC: 42 MG/DL (ref 6–20)
BUN/CREAT SERPL: 15 (ref 12–20)
CALCIUM SERPL-MCNC: 8.3 MG/DL (ref 8.5–10.1)
CHLORIDE SERPL-SCNC: 109 MMOL/L (ref 97–108)
CO2 SERPL-SCNC: 28 MMOL/L (ref 21–32)
CREAT SERPL-MCNC: 2.88 MG/DL (ref 0.7–1.3)
GLUCOSE BLD STRIP.AUTO-MCNC: 106 MG/DL (ref 65–100)
GLUCOSE BLD STRIP.AUTO-MCNC: 118 MG/DL (ref 65–100)
GLUCOSE BLD STRIP.AUTO-MCNC: 126 MG/DL (ref 65–100)
GLUCOSE BLD STRIP.AUTO-MCNC: 129 MG/DL (ref 65–100)
GLUCOSE SERPL-MCNC: 118 MG/DL (ref 65–100)
MAGNESIUM SERPL-MCNC: 2.1 MG/DL (ref 1.6–2.4)
POTASSIUM SERPL-SCNC: 4.5 MMOL/L (ref 3.5–5.1)
SERVICE CMNT-IMP: ABNORMAL
SODIUM SERPL-SCNC: 140 MMOL/L (ref 136–145)

## 2021-03-11 PROCEDURE — 86038 ANTINUCLEAR ANTIBODIES: CPT

## 2021-03-11 PROCEDURE — 74011250636 HC RX REV CODE- 250/636: Performed by: NURSE PRACTITIONER

## 2021-03-11 PROCEDURE — 82962 GLUCOSE BLOOD TEST: CPT

## 2021-03-11 PROCEDURE — 99232 SBSQ HOSP IP/OBS MODERATE 35: CPT | Performed by: INTERNAL MEDICINE

## 2021-03-11 PROCEDURE — 74011000250 HC RX REV CODE- 250: Performed by: INTERNAL MEDICINE

## 2021-03-11 PROCEDURE — 74011250637 HC RX REV CODE- 250/637: Performed by: NURSE PRACTITIONER

## 2021-03-11 PROCEDURE — 65660000000 HC RM CCU STEPDOWN

## 2021-03-11 PROCEDURE — 94760 N-INVAS EAR/PLS OXIMETRY 1: CPT

## 2021-03-11 PROCEDURE — 74011250636 HC RX REV CODE- 250/636: Performed by: STUDENT IN AN ORGANIZED HEALTH CARE EDUCATION/TRAINING PROGRAM

## 2021-03-11 PROCEDURE — APPSS45 APP SPLIT SHARED TIME 31-45 MINUTES: Performed by: NURSE PRACTITIONER

## 2021-03-11 PROCEDURE — 83735 ASSAY OF MAGNESIUM: CPT

## 2021-03-11 PROCEDURE — 84165 PROTEIN E-PHORESIS SERUM: CPT

## 2021-03-11 PROCEDURE — 2709999900 HC NON-CHARGEABLE SUPPLY

## 2021-03-11 PROCEDURE — 83883 ASSAY NEPHELOMETRY NOT SPEC: CPT

## 2021-03-11 PROCEDURE — 80048 BASIC METABOLIC PNL TOTAL CA: CPT

## 2021-03-11 PROCEDURE — 74011250637 HC RX REV CODE- 250/637: Performed by: INTERNAL MEDICINE

## 2021-03-11 PROCEDURE — P9047 ALBUMIN (HUMAN), 25%, 50ML: HCPCS | Performed by: STUDENT IN AN ORGANIZED HEALTH CARE EDUCATION/TRAINING PROGRAM

## 2021-03-11 PROCEDURE — 36415 COLL VENOUS BLD VENIPUNCTURE: CPT

## 2021-03-11 RX ORDER — BUMETANIDE 0.25 MG/ML
2 INJECTION INTRAMUSCULAR; INTRAVENOUS 2 TIMES DAILY
Status: DISCONTINUED | OUTPATIENT
Start: 2021-03-11 | End: 2021-03-13

## 2021-03-11 RX ORDER — ALBUMIN HUMAN 250 G/1000ML
50 SOLUTION INTRAVENOUS ONCE
Status: COMPLETED | OUTPATIENT
Start: 2021-03-11 | End: 2021-03-11

## 2021-03-11 RX ORDER — ALBUMIN HUMAN 250 G/1000ML
50 SOLUTION INTRAVENOUS ONCE
Status: DISCONTINUED | OUTPATIENT
Start: 2021-03-11 | End: 2021-03-11

## 2021-03-11 RX ADMIN — HYDRALAZINE HYDROCHLORIDE 50 MG: 50 TABLET, FILM COATED ORAL at 09:24

## 2021-03-11 RX ADMIN — HEPARIN SODIUM 5000 UNITS: 5000 INJECTION INTRAVENOUS; SUBCUTANEOUS at 21:00

## 2021-03-11 RX ADMIN — HEPARIN SODIUM 5000 UNITS: 5000 INJECTION INTRAVENOUS; SUBCUTANEOUS at 05:19

## 2021-03-11 RX ADMIN — HYDRALAZINE HYDROCHLORIDE 50 MG: 50 TABLET, FILM COATED ORAL at 16:58

## 2021-03-11 RX ADMIN — ASPIRIN 81 MG: 81 TABLET, COATED ORAL at 09:24

## 2021-03-11 RX ADMIN — CLONIDINE HYDROCHLORIDE 0.1 MG: 0.1 TABLET ORAL at 18:55

## 2021-03-11 RX ADMIN — ALBUMIN (HUMAN) 50 G: 0.25 INJECTION, SOLUTION INTRAVENOUS at 10:16

## 2021-03-11 RX ADMIN — POTASSIUM CHLORIDE 20 MEQ: 20 TABLET, EXTENDED RELEASE ORAL at 09:24

## 2021-03-11 RX ADMIN — AMLODIPINE BESYLATE 10 MG: 5 TABLET ORAL at 09:24

## 2021-03-11 RX ADMIN — ISOSORBIDE MONONITRATE 60 MG: 30 TABLET, EXTENDED RELEASE ORAL at 09:24

## 2021-03-11 RX ADMIN — BUMETANIDE 2 MG: 0.25 INJECTION, SOLUTION INTRAMUSCULAR; INTRAVENOUS at 09:25

## 2021-03-11 RX ADMIN — BUMETANIDE 2 MG: 0.25 INJECTION, SOLUTION INTRAMUSCULAR; INTRAVENOUS at 18:55

## 2021-03-11 RX ADMIN — CLONIDINE HYDROCHLORIDE 0.1 MG: 0.1 TABLET ORAL at 09:24

## 2021-03-11 RX ADMIN — CARVEDILOL 25 MG: 12.5 TABLET, FILM COATED ORAL at 16:58

## 2021-03-11 RX ADMIN — HYDRALAZINE HYDROCHLORIDE 50 MG: 50 TABLET, FILM COATED ORAL at 21:01

## 2021-03-11 RX ADMIN — HEPARIN SODIUM 5000 UNITS: 5000 INJECTION INTRAVENOUS; SUBCUTANEOUS at 13:28

## 2021-03-11 RX ADMIN — ATORVASTATIN CALCIUM 20 MG: 20 TABLET, FILM COATED ORAL at 21:01

## 2021-03-11 RX ADMIN — CARVEDILOL 25 MG: 12.5 TABLET, FILM COATED ORAL at 09:24

## 2021-03-11 NOTE — PROGRESS NOTES
Hospitalist Progress Note    NAME: Jacob Alfaro   :  1965   MRN:  [id number]       Assessment / Plan:  Acute systolic CHF exacerbation  Bilateral pitting edema  Pulmonary Edema  Proteinuria-nephrotic range. Anasarca    -BNP improved to 17,239  -Patient lost nearly 10 pounds by diuresis. We will keep diuresing. Patient dry weight is around 185 to 190 pounds.  -Cardiology on board, appreciate input. -Nephrology on board-appreciate input.  -Random protein  216, creatinine 30, protein/creatinine ratio-7.1.  -Gave the albumin IV today again, increase the Bumex to 2 mg IV twice daily as per nephrology. BMP in a.m.  -VIRIDIANA, C3/C4, light chain ending. Hypertension (uncontrolled) POA  Controlled on Norvasc daily Coreg BID Hydralazine 25 mg TID Clonidine 0.1 mg BID    LLQ pain, POA (Improved)  Abdominal distention, POA   Bilateral leg weakness, POA ( improved)  CT Abd/Pelv WO contrast: Third spacing with soft tissue edema, bilateral pleural effusions, and ascites. Retained foreign bodies in the skin as described. Small air pocket associated with the distal stomach. It is difficult to determine if this is intraluminal or extraluminal without oral contrast. Recommend repeat study with oral contrast  Per PT no recommendations at this time. Monitor    CKD stage 4  -BUN/creatinine-42/2.88, nephrology on board. Diuresis increased with Bumex 2 mg IV twice daily. Appreciate the input from nephrology.     Type 2 Diabetes POA    Sliding scale coverage    Diabetic Diet    Glucose Monitoring    Hyperlipidemia POA   Lipitor daily  Peripheral Neuropathy POA   secondary to diabetes  Hx of Left great toe, #2, and  5 th toe amputation  Hx of MRSA  Nicotine Abuse POA   Nicotine Patch  Substance Abuse POA (heroin)   Last used Heroine on Friday 3/5/21 reported by patient   + toxicology screen for cocaine and opiates   Substance abuse protocol: received 30 mg of methadone on 3/8/21   He may receive 15 mg methadone daily x 2 doses   He states he has been going to [facility] and is to start an in patient rehab. Case management input appreciated      30.0 - 39.9 Obese / Body mass index is 32.15 kg/m². Code status: Full  Prophylaxis: Hep SQ  Recommended Disposition: Possible in patient rehab for substance abuse     Subjective:     Chief Complaint / Reason for Physician Visit  Patient seen today, ambulating in the hallway without any difficulty. Shortness of breath is improved, swelling also improving. No complaints today. Review of Systems:  Symptom Y/N Comments  Symptom Y/N Comments   Fever/Chills n   Chest Pain n    Poor Appetite n   Edema y Bilatera LE   Cough n   Abdominal Pain     Sputum n   Joint Pain     SOB/DE LA CRUZ Y  improving  Pruritis/Rash n    Nausea/vomit n   Tolerating PT/OT     Diarrhea n   Tolerating Diet y    Constipation n   Other       Could NOT obtain due to:      Objective:     VITALS:   Last 24hrs VS reviewed since prior progress note. Most recent are:  Patient Vitals for the past 24 hrs:   Temp Pulse Resp BP SpO2   03/11/21 1302 97.3 °F (36.3 °C) 73 18 135/77 96 %   03/11/21 1200 -- 76 -- -- --   03/11/21 0742 97.8 °F (36.6 °C) 72 17 (!) 148/80 99 %   03/11/21 0309 96.8 °F (36 °C) 77 18 134/79 98 %   03/10/21 2330 97.2 °F (36.2 °C) 69 18 126/70 99 %   03/10/21 2039 97.3 °F (36.3 °C) 69 18 126/72 100 %     No intake or output data in the 24 hours ending 03/11/21 1556     I had a face to face encounter and independently examined this patient on 3/11/2021, as outlined below:  PHYSICAL EXAM:  General:  Alert, cooperative, no acute distress, obese  EENT:   Anicteric sclerae. normocephalic  Resp:  Breath sounds equal bilaterally. No accessory muscle use  CV:  Regular  rhythm,  2+ edema bilateral lower extremity up to thigh, abdomen swollen secondary to ascites  GI:  Soft, Non distended, Non tender. +Bowel sounds  Neurologic:  Alert and oriented X 3, normal speech,   Psych:   Good insight.  Not anxious nor agitated  Skin:  No rashes. No jaundice    Reviewed most current lab test results and cultures  YES  Reviewed most current radiology test results   YES  Review and summation of old records today    NO  Reviewed patient's current orders and MAR    YES  PMH/SH reviewed - no change compared to H&P  ________________________________________________________________________  Care Plan discussed with:    Comments   Patient y    Family      RN y    Care Manager y    Consultant                        Multidiciplinary team rounds were held today with , nursing, pharmacist and clinical coordinator. Patient's plan of care was discussed; medications were reviewed and discharge planning was addressed. ________________________________________________________________________    ________________________________________________________________________  Dmitry Erickson MD     Procedures: see electronic medical records for all procedures/Xrays and details which were not copied into this note but were reviewed prior to creation of Plan. LABS:  I reviewed today's most current labs and imaging studies.   Pertinent labs include:  Recent Labs     03/09/21  1740   WBC 5.8   HGB 10.6*   HCT 34.4*        Recent Labs     03/11/21  0345 03/10/21  1005 03/09/21  1740    142 142   K 4.5 4.0 4.2   * 108 111*   CO2 28 29 29   * 139* 129*   BUN 42* 39* 38*   CREA 2.88* 2.60* 2.81*   CA 8.3* 8.3* 8.0*   MG 2.1  --  2.1   ALB  --   --  1.7*   TBILI  --   --  0.3   ALT  --   --  12       Signed: Dmitry Erickson MD

## 2021-03-11 NOTE — PROGRESS NOTES
DALIA:    Medical Stability  Home with Family      CM informed by pt's nurse that pt is not medically stable to d/c, due to his kidney functions. Once medically stable pt will return home with family. CM will enter potential d/c delay.     KATY Saucedo, [address] [place] [address]

## 2021-03-11 NOTE — PROGRESS NOTES
0700-End of Shift Note    Bedside shift change report given to JETT Martin(oncoming nurse) by Suellen Sauceda RN (offgoing nurse). Report included the following information SBAR, Intake/Output, Recent Results, Med Rec Status, Quality Measures and Dual Neuro Assessment    Shift worked:  7a-7p     Shift summary and any significant changes:     NA     Concerns for physician to address:  NA     Zone phone for oncoming shift:   [phone]       Activity:  Activity Level: Up ad melly(Patient does not call for assistance and refused bed alarm. )  Number times ambulated in hallways past shift: 2  Number of times OOB to chair past shift: 5    Cardiac:   Cardiac Monitoring: No      Cardiac Rhythm: Normal sinus rhythm    Access:   Current line(s): PIV     Genitourinary:   Urinary status: voiding    Respiratory:   O2 Device: Room air  Chronic home O2 use?: NO  Incentive spirometer at bedside: NO     GI:  Last Bowel Movement Date: 03/10/21  Current diet:  DIET ONE TIME MESSAGE  DIET DIABETIC CONSISTENT CARB Regular  DIET ONE TIME MESSAGE  DIET NUTRITIONAL SUPPLEMENTS Breakfast, Dinner; Glucerna Shake  DIET ONE TIME MESSAGE  DIET ONE TIME MESSAGE  Passing flatus: YES  Tolerating current diet: YES  % Diet Eaten: 100 %    Pain Management:   Patient states pain is manageable on current regimen: YES    Skin:  Sarwat Score: 20  Interventions: increase time out of bed    Patient Safety:  Fall Score:  Total Score: 2  Interventions: bed/chair alarm  High Fall Risk: Yes    Length of Stay:  Expected LOS: 4d 0h  Actual LOS: 6      Suellen Sauceda RN

## 2021-03-11 NOTE — PROGRESS NOTES
[address] [place] [address]  [phone]      Cardiology Progress Note      3/11/2021 11AM    Admit Date: 3/5/2021    Admit Diagnosis:   CHF exacerbation ([facility] [I50.9]    Interval History/Subjective:     Florentino Guzman is a 55 y.o. male admitted with CHF exacerbation ([facility] [I50.9]    -VSS  -creat steady;   -weight down 1#; I/O not documented   -Mr. Earl John is feeling great today. No c/o pain or SOB at this time. Visitor at bedside.      Visit Vitals  BP (!) 148/80   Pulse 72   Temp 97.8 °F (36.6 °C)   Resp 17   Ht 5' 7\" (1.702 m)   Wt 93.1 kg (205 lb 4 oz)   SpO2 99%   BMI 32.15 kg/m²       Current Facility-Administered Medications   Medication Dose Route Frequency    bumetanide (BUMEX) injection 2 mg  2 mg IntraVENous BID    hydrALAZINE (APRESOLINE) tablet 50 mg  50 mg Oral TID    isosorbide mononitrate ER (IMDUR) tablet 60 mg  60 mg Oral DAILY    carvediloL (COREG) tablet 25 mg  25 mg Oral BID WITH MEALS    cloNIDine HCL (CATAPRES) tablet 0.1 mg  0.1 mg Oral BID    potassium chloride (K-DUR, KLOR-CON) SR tablet 20 mEq  20 mEq Oral DAILY    ondansetron (ZOFRAN) injection 4 mg  4 mg IntraVENous Q6H PRN    acetaminophen (TYLENOL) tablet 650 mg  650 mg Oral Q6H PRN    acetaminophen (TYLENOL) suppository 650 mg  650 mg Rectal Q6H PRN    aspirin delayed-release tablet 81 mg  81 mg Oral DAILY    insulin lispro (HUMALOG) injection   SubCUTAneous AC&HS    glucose chewable tablet 16 g  4 Tab Oral PRN    dextrose (D50W) injection syrg 12.5-25 g  12.5-25 g IntraVENous PRN    glucagon (GLUCAGEN) injection 1 mg  1 mg IntraMUSCular PRN    labetaloL (NORMODYNE;TRANDATE) injection 10 mg  10 mg IntraVENous Q4H PRN    heparin (porcine) injection 5,000 Units  5,000 Units SubCUTAneous Q8H    nicotine (NICODERM CQ) 21 mg/24 hr patch 1 Patch  1 Patch TransDERmal DAILY    amLODIPine (NORVASC) tablet 10 mg  10 mg Oral DAILY    albuterol-ipratropium (DUO-NEB) 2.5 MG-0.5 MG/3 ML  3 mL Nebulization Q6H PRN    atorvastatin (LIPITOR) tablet 20 mg  20 mg Oral QHS    nitroglycerin (NITROSTAT) tablet 0.4 mg  0.4 mg SubLINGual PRN    acetaminophen (TYLENOL) tablet 650 mg  650 mg Oral Q6H PRN       Objective:      Physical Exam:  General Appearance:  obese adult AAM sitting in chair in NAD  Chest:   clear/dim  Cardiovascular:  Regular rate and rhythm, no murmur. Abdomen:   Soft, non-tender, bowel sounds are active. Extremities: weak BLE pulses; 2+ pitting edema up to waist   Skin:  Warm and dry. thickened BLE     Data Review:   Recent Labs     03/09/21  1740   WBC 5.8   HGB 10.6*   HCT 34.4*        Recent Labs     03/11/21  0345 03/10/21  1005 03/09/21  1740    142 142   K 4.5 4.0 4.2   * 108 111*   CO2 28 29 29   * 139* 129*   BUN 42* 39* 38*   CREA 2.88* 2.60* 2.81*   CA 8.3* 8.3* 8.0*   MG 2.1  --  2.1   ALB  --   --  1.7*   TBILI  --   --  0.3   ALT  --   --  12       No results for input(s): TROIQ, CPK, CKMB in the last 72 hours. No intake or output data in the 24 hours ending 03/11/21 1142     Telemetry: disconnected   EKG:SR; t wave inv lateral leads  ECHO:  EF 35-40%; mod concentric hypertrophy; mild TR; mild pHTN  Cxray: edema    Assessment:     Principal Problem:    Hypertensive urgency (3/8/2021)    Active Problems:    DM (diabetes mellitus), type 2 ([facility] (2/28/2020)      CHF exacerbation ([facility] (3/5/2021)      Acute on chronic combined systolic and diastolic ACC/AHA stage C congestive heart failure ([facility] (3/8/2021)      CKD (chronic kidney disease) (3/8/2021)        Plan:       Hypertensive urgency:   improved. Non compliance. + cocaine and opiates. · Continue amlodipine, coreg, clonidine, hydralazine, imdur       Acute on chronic combined systolic and diastolic heart failure: improving. 2/2 hypertensive urgency. Troponin negative.  +cocaine and opiates. EF 35-40%   · HTN improved  · BB.   No ACEi/ARB due to CKD  · Continue bumex - increased by nephrology CKD:  Creat steady  · neprhrology consult reviewed   · Avoid ACEi/ARB      Drug abuse:  +cocaine and opiates   · Continue to   · Any withdrawal treatment per hospitalist         Davy Rodriguez NP DNP, RN, [facility] Cardiology    3/11/2021         Patient seen, examined by me personally. Plan discussed as detailed. Agree with note as outlined by  NP. I confirm findings in history and physical exam. My independent exam reveals : Physical Exam   Constitutional: He is oriented to person, place, and time. He appears well-developed and well-nourished. Cardiovascular: Normal heart sounds. Pulmonary/Chest: Effort normal and breath sounds normal. He has no rales. Abdominal: He exhibits distension. Musculoskeletal:         General: Edema present. Neurological: He is alert and oriented to person, place, and time. Skin: Skin is warm and dry. Psychiatric: He has a normal mood and affect. Feeling better. Still has significant volume. Continue diuresis. No additional findings noted. Agree with plan as outlined above with modifications as noted.      Ayse Pelletier MD

## 2021-03-11 NOTE — PROGRESS NOTES
NAME: Tracy Le        :  1965        MRN:  [id number]                        Assessment   :                                               Plan:  ROSEMARY on CKD 3b vs progressive CKD  Probable DM nephropathy  Proteinuria/grade 3 albuminuria  Nephrotic   Anasarca  PSA (cocaine, opiates)     HTN  DM2  Anemia   Acute on chronic CHF  HCV  Creatinine ~ 2 at previous baseline and is now stable at ~ 2.7     I suspect progressive CKD, now stage 4      Very low Albumin (spot urine protein:creatinine ratio -- 7 grams) -- likely DM nephropathy; HCV +; broaden serologic w/u     Continue IV bumex (increase dose to 2 mg IV bid)     Getting IV albumin         Subjective:     Chief Complaint:  oob eating breakfast. No sob. A lot of edema. Not too talkative. We discussed the above. Review of Systems:    Symptom Y/N Comments  Symptom Y/N Comments   Fever/Chills    Chest Pain     Poor Appetite    Edema     Cough    Abdominal Pain     Sputum    Joint Pain     SOB/DE LA CRUZ    Pruritis/Rash     Nausea/vomit    Tolerating PT/OT     Diarrhea    Tolerating Diet     Constipation    Other       Could not obtain due to:      Objective:     VITALS:   Last 24hrs VS reviewed since prior progress note.  Most recent are:  Visit Vitals  /79 (BP 1 Location: Left upper arm, BP Patient Position: Sitting)   Pulse 77   Temp 96.8 °F (36 °C)   Resp 18   Ht 5' 7\" (1.702 m)   Wt 93.1 kg (205 lb 4 oz)   SpO2 98%   BMI 32.15 kg/m²     No intake or output data in the 24 hours ending 21 0700   Telemetry Reviewed:     PHYSICAL EXAM:  General: NAD      Lab Data Reviewed: (see below)    Medications Reviewed: (see below)    PMH/SH reviewed - no change compared to H&P  ________________________________________________________________________  Care Plan discussed with:  Patient     Family      RN     Care Manager                    Consultant:          Comments   >50% of visit spent in counseling and coordination of care       ________________________________________________________________________  Vivian Herzog MD     Procedures: see electronic medical records for all procedures/Xrays and details which  were not copied into this note but were reviewed prior to creation of Plan. LABS:  Recent Labs     03/09/21  1740   WBC 5.8   HGB 10.6*   HCT 34.4*        Recent Labs     03/11/21  0345 03/10/21  1005 03/09/21  1740    142 142   K 4.5 4.0 4.2   * 108 111*   CO2 28 29 29   BUN 42* 39* 38*   CREA 2.88* 2.60* 2.81*   * 139* 129*   CA 8.3* 8.3* 8.0*   MG 2.1  --  2.1     Recent Labs     03/09/21  1740   AP 93   TP 6.5   ALB 1.7*   GLOB 4.8*     No results for input(s): INR, PTP, APTT, INREXT in the last 72 hours. No results for input(s): FE, TIBC, PSAT, FERR in the last 72 hours. No results found for: FOL, RBCF   No results for input(s): PH, PCO2, PO2 in the last 72 hours. No results for input(s): CPK, CKMB in the last 72 hours.     No lab exists for component: TROPONINI  No components found for: [place]  Lab Results   Component Value Date/Time    Color YELLOW/STRAW 03/10/2021 02:56 PM    Appearance CLEAR 03/10/2021 02:56 PM    Specific gravity 1.011 03/10/2021 02:56 PM    pH (UA) 6.5 03/10/2021 02:56 PM    Protein 300 (A) 03/10/2021 02:56 PM    Glucose Negative 03/10/2021 02:56 PM    Ketone Negative 03/10/2021 02:56 PM    Bilirubin Negative 03/10/2021 02:56 PM    Urobilinogen 0.2 03/10/2021 02:56 PM    Nitrites Negative 03/10/2021 02:56 PM    Leukocyte Esterase Negative 03/10/2021 02:56 PM    Epithelial cells FEW 03/10/2021 02:56 PM    Bacteria Negative 03/10/2021 02:56 PM    WBC 0-4 03/10/2021 02:56 PM    RBC 0-5 03/10/2021 02:56 PM       MEDICATIONS:  Current Facility-Administered Medications   Medication Dose Route Frequency    hydrALAZINE (APRESOLINE) tablet 50 mg  50 mg Oral TID    bumetanide (BUMEX) injection 1 mg  1 mg IntraVENous BID    isosorbide mononitrate ER (IMDUR) tablet 60 mg  60 mg Oral DAILY    carvediloL (COREG) tablet 25 mg  25 mg Oral BID WITH MEALS    cloNIDine HCL (CATAPRES) tablet 0.1 mg  0.1 mg Oral BID    potassium chloride (K-DUR, KLOR-CON) SR tablet 20 mEq  20 mEq Oral DAILY    ondansetron (ZOFRAN) injection 4 mg  4 mg IntraVENous Q6H PRN    acetaminophen (TYLENOL) tablet 650 mg  650 mg Oral Q6H PRN    acetaminophen (TYLENOL) suppository 650 mg  650 mg Rectal Q6H PRN    aspirin delayed-release tablet 81 mg  81 mg Oral DAILY    insulin lispro (HUMALOG) injection   SubCUTAneous AC&HS    glucose chewable tablet 16 g  4 Tab Oral PRN    dextrose (D50W) injection syrg 12.5-25 g  12.5-25 g IntraVENous PRN    glucagon (GLUCAGEN) injection 1 mg  1 mg IntraMUSCular PRN    labetaloL (NORMODYNE;TRANDATE) injection 10 mg  10 mg IntraVENous Q4H PRN    heparin (porcine) injection 5,000 Units  5,000 Units SubCUTAneous Q8H    nicotine (NICODERM CQ) 21 mg/24 hr patch 1 Patch  1 Patch TransDERmal DAILY    amLODIPine (NORVASC) tablet 10 mg  10 mg Oral DAILY    albuterol-ipratropium (DUO-NEB) 2.5 MG-0.5 MG/3 ML  3 mL Nebulization Q6H PRN    atorvastatin (LIPITOR) tablet 20 mg  20 mg Oral QHS    nitroglycerin (NITROSTAT) tablet 0.4 mg  0.4 mg SubLINGual PRN    acetaminophen (TYLENOL) tablet 650 mg  650 mg Oral Q6H PRN

## 2021-03-12 LAB
ANA SER QL: NEGATIVE
ANION GAP SERPL CALC-SCNC: 5 MMOL/L (ref 5–15)
BUN SERPL-MCNC: 48 MG/DL (ref 6–20)
BUN/CREAT SERPL: 17 (ref 12–20)
CALCIUM SERPL-MCNC: 8.8 MG/DL (ref 8.5–10.1)
CHLORIDE SERPL-SCNC: 109 MMOL/L (ref 97–108)
CO2 SERPL-SCNC: 26 MMOL/L (ref 21–32)
CREAT SERPL-MCNC: 2.89 MG/DL (ref 0.7–1.3)
GLUCOSE BLD STRIP.AUTO-MCNC: 106 MG/DL (ref 65–100)
GLUCOSE BLD STRIP.AUTO-MCNC: 133 MG/DL (ref 65–100)
GLUCOSE BLD STRIP.AUTO-MCNC: 137 MG/DL (ref 65–100)
GLUCOSE BLD STRIP.AUTO-MCNC: 141 MG/DL (ref 65–100)
GLUCOSE SERPL-MCNC: 111 MG/DL (ref 65–100)
HIV 1+2 AB+HIV1 P24 AG SERPL QL IA: NONREACTIVE
HIV12 RESULT COMMENT, HHIVC: NORMAL
KAPPA LC FREE SER-MCNC: 241.5 MG/L (ref 3.3–19.4)
KAPPA LC FREE/LAMBDA FREE SER: 2.08 {RATIO} (ref 0.26–1.65)
LAMBDA LC FREE SERPL-MCNC: 116.1 MG/L (ref 5.7–26.3)
MAGNESIUM SERPL-MCNC: 2.2 MG/DL (ref 1.6–2.4)
POTASSIUM SERPL-SCNC: 4.5 MMOL/L (ref 3.5–5.1)
SERVICE CMNT-IMP: ABNORMAL
SODIUM SERPL-SCNC: 140 MMOL/L (ref 136–145)

## 2021-03-12 PROCEDURE — 94760 N-INVAS EAR/PLS OXIMETRY 1: CPT

## 2021-03-12 PROCEDURE — 74011250636 HC RX REV CODE- 250/636: Performed by: NURSE PRACTITIONER

## 2021-03-12 PROCEDURE — 82962 GLUCOSE BLOOD TEST: CPT

## 2021-03-12 PROCEDURE — 87389 HIV-1 AG W/HIV-1&-2 AB AG IA: CPT

## 2021-03-12 PROCEDURE — 99232 SBSQ HOSP IP/OBS MODERATE 35: CPT | Performed by: INTERNAL MEDICINE

## 2021-03-12 PROCEDURE — 36415 COLL VENOUS BLD VENIPUNCTURE: CPT

## 2021-03-12 PROCEDURE — APPSS45 APP SPLIT SHARED TIME 31-45 MINUTES: Performed by: NURSE PRACTITIONER

## 2021-03-12 PROCEDURE — 82595 ASSAY OF CRYOGLOBULIN: CPT

## 2021-03-12 PROCEDURE — P9047 ALBUMIN (HUMAN), 25%, 50ML: HCPCS | Performed by: STUDENT IN AN ORGANIZED HEALTH CARE EDUCATION/TRAINING PROGRAM

## 2021-03-12 PROCEDURE — 74011250636 HC RX REV CODE- 250/636: Performed by: STUDENT IN AN ORGANIZED HEALTH CARE EDUCATION/TRAINING PROGRAM

## 2021-03-12 PROCEDURE — 65660000000 HC RM CCU STEPDOWN

## 2021-03-12 PROCEDURE — 80048 BASIC METABOLIC PNL TOTAL CA: CPT

## 2021-03-12 PROCEDURE — 74011000250 HC RX REV CODE- 250: Performed by: INTERNAL MEDICINE

## 2021-03-12 PROCEDURE — 74011250637 HC RX REV CODE- 250/637: Performed by: INTERNAL MEDICINE

## 2021-03-12 PROCEDURE — 83735 ASSAY OF MAGNESIUM: CPT

## 2021-03-12 PROCEDURE — 74011250637 HC RX REV CODE- 250/637: Performed by: NURSE PRACTITIONER

## 2021-03-12 PROCEDURE — 74011636637 HC RX REV CODE- 636/637: Performed by: NURSE PRACTITIONER

## 2021-03-12 RX ORDER — ALBUMIN HUMAN 250 G/1000ML
25 SOLUTION INTRAVENOUS ONCE
Status: COMPLETED | OUTPATIENT
Start: 2021-03-12 | End: 2021-03-12

## 2021-03-12 RX ADMIN — INSULIN LISPRO 2 UNITS: 100 INJECTION, SOLUTION INTRAVENOUS; SUBCUTANEOUS at 11:26

## 2021-03-12 RX ADMIN — CLONIDINE HYDROCHLORIDE 0.1 MG: 0.1 TABLET ORAL at 17:40

## 2021-03-12 RX ADMIN — ATORVASTATIN CALCIUM 20 MG: 20 TABLET, FILM COATED ORAL at 22:29

## 2021-03-12 RX ADMIN — ISOSORBIDE MONONITRATE 60 MG: 30 TABLET, EXTENDED RELEASE ORAL at 09:41

## 2021-03-12 RX ADMIN — CARVEDILOL 25 MG: 12.5 TABLET, FILM COATED ORAL at 17:40

## 2021-03-12 RX ADMIN — HYDRALAZINE HYDROCHLORIDE 50 MG: 50 TABLET, FILM COATED ORAL at 17:40

## 2021-03-12 RX ADMIN — BUMETANIDE 2 MG: 0.25 INJECTION, SOLUTION INTRAMUSCULAR; INTRAVENOUS at 09:40

## 2021-03-12 RX ADMIN — ALBUMIN (HUMAN) 25 G: 0.25 INJECTION, SOLUTION INTRAVENOUS at 13:59

## 2021-03-12 RX ADMIN — ASPIRIN 81 MG: 81 TABLET, COATED ORAL at 09:40

## 2021-03-12 RX ADMIN — CARVEDILOL 25 MG: 12.5 TABLET, FILM COATED ORAL at 09:41

## 2021-03-12 RX ADMIN — HEPARIN SODIUM 5000 UNITS: 5000 INJECTION INTRAVENOUS; SUBCUTANEOUS at 05:11

## 2021-03-12 RX ADMIN — CLONIDINE HYDROCHLORIDE 0.1 MG: 0.1 TABLET ORAL at 09:41

## 2021-03-12 RX ADMIN — POTASSIUM CHLORIDE 20 MEQ: 20 TABLET, EXTENDED RELEASE ORAL at 09:41

## 2021-03-12 RX ADMIN — BUMETANIDE 2 MG: 0.25 INJECTION, SOLUTION INTRAMUSCULAR; INTRAVENOUS at 17:43

## 2021-03-12 RX ADMIN — AMLODIPINE BESYLATE 10 MG: 5 TABLET ORAL at 09:41

## 2021-03-12 RX ADMIN — HYDRALAZINE HYDROCHLORIDE 50 MG: 50 TABLET, FILM COATED ORAL at 09:41

## 2021-03-12 RX ADMIN — HEPARIN SODIUM 5000 UNITS: 5000 INJECTION INTRAVENOUS; SUBCUTANEOUS at 22:29

## 2021-03-12 RX ADMIN — HYDRALAZINE HYDROCHLORIDE 50 MG: 50 TABLET, FILM COATED ORAL at 22:29

## 2021-03-12 RX ADMIN — HEPARIN SODIUM 5000 UNITS: 5000 INJECTION INTRAVENOUS; SUBCUTANEOUS at 14:00

## 2021-03-12 NOTE — PROGRESS NOTES
Comprehensive Nutrition Assessment    Type and Reason for Visit: Initial, RD nutrition re-screen/LOS    Nutrition Recommendations/Plan:   · Continue CCD for BG management. · RD discontinued the Glucerna Shakes per pt request.  · Please document % meals and supplements consumed in flowsheet I/O's under intake. Nutrition Assessment:     3/12: Chart reviewed; med noted for CHF exacerbation, HTN urgency. Hx of DM, CKD, substance abuse. RD visited with pt at bedside, reports excellent appetite and does not wish to receive the [place]; RD honored request and discontinued. No acute nutrition dx at this time. No data found. Last Weight Metric  Weight Loss Metrics 3/10/2021 1/18/2021 7/1/2020 4/14/2020 3/10/2020 2/28/2020 8/9/2019   Today's Wt 205 lb 4 oz 173 lb 163 lb 9.3 oz 160 lb 152 lb 6.4 oz 153 lb 154 lb 9.6 oz   BMI 32.15 kg/m2 27.1 kg/m2 25.62 kg/m2 24.33 kg/m2 23.17 kg/m2 23.26 kg/m2 24.95 kg/m2     Estimated Daily Nutrient Needs:  Energy (kcal): 3800 (BMR 1724 x 1. 3AF); Weight Used for Energy Requirements: Current  Protein (g): 93 (1.0 g/kg bw); Weight Used for Protein Requirements: Current  Fluid (ml/day): 2200 ml/day; Method Used for Fluid Requirements: 1 ml/kcal    Nutrition Related Findings:  BM: 3/11; Meds: lipitor, KCl, coreg; Labs: Creat 2.89, H/H 10.6/34.4      Wounds:    None       Current Nutrition Therapies:  DIET ONE TIME MESSAGE  DIET DIABETIC CONSISTENT CARB Regular  DIET ONE TIME MESSAGE  DIET ONE TIME MESSAGE  DIET ONE TIME MESSAGE    Anthropometric Measures:  · Height:  5' 7\" (170.2 cm)  · Current Body Wt:  93.1 kg (205 lb 4 oz)   · Ideal Body Wt:  148 lbs:  138.7 %   · BMI Category:  Obese class 1 (BMI 30.0-34. 9)       Nutrition Diagnosis:   No nutrition diagnosis at this time     Nutrition Interventions:   Food and/or Nutrient Delivery: Continue current diet, Discontinue oral nutrition supplement  Nutrition Education and Counseling: No recommendations at this time  Coordination of Nutrition Care: No recommendation at this time    Goals:  Trend PO intake at least 75% of meals next 5-7 days       Nutrition Monitoring and Evaluation:   Behavioral-Environmental Outcomes: None identified  Food/Nutrient Intake Outcomes: Food and nutrient intake  Physical Signs/Symptoms Outcomes: Biochemical data, Weight    Discharge Planning:    Continue current diet     Electronically signed by Chucky Molina RD on 3/12/2021 at 12:51 PM

## 2021-03-12 NOTE — PROGRESS NOTES
0700-End of Shift Note    Bedside shift change report given to Tonya Watters (oncoming nurse) by Barb Bonilla RN (offgoing nurse). Report included the following information SBAR, Intake/Output, Recent Results, Med Rec Status, Cardiac Rhythm NSR, Alarm Parameters  and Dual Neuro Assessment    Shift worked:  7a-7p     Shift summary and any significant changes:    Patient became more hypervigilant and paranoid. Concerns for physician to address:  I think patient might need to be further evaluated for any psychiatry concern      Zone phone for oncoming shift:   [phone]       Activity:  Activity Level: Up with Assistance  Number times ambulated in hallways past shift: 5  Number of times OOB to chair past shift: 5    Cardiac:   Cardiac Monitoring: Refused      Cardiac Rhythm: Normal sinus rhythm    Access:   Current line(s): PIV     Genitourinary:   Urinary status: voiding    Respiratory:   O2 Device: Room air  Chronic home O2 use?: NO  Incentive spirometer at bedside: NO     GI:  Last Bowel Movement Date: 03/11/21  Current diet:  DIET ONE TIME MESSAGE  DIET DIABETIC CONSISTENT CARB Regular  DIET ONE TIME MESSAGE  DIET NUTRITIONAL SUPPLEMENTS Breakfast, Dinner; Glucerna Shake  DIET ONE TIME MESSAGE  DIET ONE TIME MESSAGE  Passing flatus: YES  Tolerating current diet: YES  % Diet Eaten: 100 %    Pain Management:   Patient states pain is manageable on current regimen: YES    Skin:  Sarwat Score: 21  Interventions: increase time out of bed    Patient Safety:  Fall Score:  Total Score: 2  Interventions: bed/chair alarm  High Fall Risk: Yes    Length of Stay:  Expected LOS: 4d 0h  Actual LOS: 7      Barb Bonilla RN

## 2021-03-12 NOTE — PROGRESS NOTES
Hospitalist Progress Note    NAME: Felisha Flores   :  1965   MRN:  [id number]       Assessment / Plan:  Acute systolic CHF exacerbation  Bilateral pitting edema  Pulmonary Edema  Proteinuria-nephrotic range. Anasarca    -BNP improved to 17,239   -Slight improvement in the edema, patient can walk in the hallway without getting short of breath which is the baseline.  -Patient lost nearly 10 pounds by diuresis. We will keep diuresing. Patient dry weight is around 185 to 190 pounds. Last check was 2 days ago, will see how much patient weight today.  -Cardiology on board, appreciate input. -Nephrology on board-appreciate input.  -Random protein  216, creatinine 30, protein/creatinine ratio-7.1.  -Gave the albumin IV again today for better diuresis. On Bumex 2 mg IV twice daily. -VIRIDIANA, C3/C4, light chain ending.  -Case management consulted for help with the medications on discharge. Hypertension (uncontrolled) POA  Controlled on Norvasc daily Coreg BID Hydralazine 25 mg TID Clonidine 0.1 mg BID    LLQ pain, POA (Improved)  Abdominal distention, POA   Bilateral leg weakness, POA ( improved)  CT Abd/Pelv WO contrast: Third spacing with soft tissue edema, bilateral pleural effusions, and ascites. Retained foreign bodies in the skin as described. Small air pocket associated with the distal stomach. It is difficult to determine if this is intraluminal or extraluminal without oral contrast. Recommend repeat study with oral contrast  Per PT no recommendations at this time. Monitor    Acute kidney injury on CKD stage 4  -BUN/creatinine-88/2.89, this may be the new baseline. we will looking into. BMP in a.m., currently on Bumex 2 mg IV twice daily, patient the recommendations from nephrology.     Type 2 Diabetes POA    Sliding scale coverage    Diabetic Diet    Glucose Monitoring    Hyperlipidemia POA   Lipitor daily  Peripheral Neuropathy POA   secondary to diabetes  Hx of Left great toe, #2, and  5 th toe amputation  Hx of MRSA  Nicotine Abuse POA   Nicotine Patch  Substance Abuse POA (heroin)   Last used Heroine on Friday 3/5/21 reported by patient   + toxicology screen for cocaine and opiates   Substance abuse protocol: received 30 mg of methadone on 3/8/21   He may receive 15 mg methadone daily x 2 doses   He states he has been going to [facility] and is to start an in patient rehab. Case management input appreciated      30.0 - 39.9 Obese / Body mass index is 32.15 kg/m². Code status: Full  Prophylaxis: Hep SQ  Recommended Disposition: Possible in patient rehab for substance abuse     Subjective:     Chief Complaint / Reason for Physician Visit  Patient seen today, ambulating in the hallway without any difficulty. Shortness of breath is improved, swelling also improving. No complaints today. Review of Systems:  Symptom Y/N Comments  Symptom Y/N Comments   Fever/Chills n   Chest Pain n    Poor Appetite n   Edema y Bilatera LE   Cough n   Abdominal Pain     Sputum n   Joint Pain     SOB/[place] Y  improving  Pruritis/Rash n    Nausea/vomit n   Tolerating PT/OT     Diarrhea n   Tolerating Diet y    Constipation n   Other       Could NOT obtain due to:      Objective:     VITALS:   Last 24hrs VS reviewed since prior progress note.  Most recent are:  Patient Vitals for the past 24 hrs:   Temp Pulse Resp BP SpO2   03/12/21 1113 97.9 °F (36.6 °C) 78 18 (!) 140/84 98 %   03/12/21 0800 -- 78 -- -- --   03/12/21 0700 97.4 °F (36.3 °C) 75 18 (!) 144/80 97 %   03/12/21 0000 -- 78 -- -- --   03/11/21 2316 98.3 °F (36.8 °C) 75 18 131/76 100 %   03/11/21 2101 -- 74 -- 136/84 --   03/11/21 1950 98.3 °F (36.8 °C) 74 18 136/84 99 %   03/11/21 1658 98.6 °F (37 °C) 74 18 134/81 99 %   03/11/21 1600 -- 72 -- -- --   03/11/21 1302 97.3 °F (36.3 °C) 73 18 135/77 96 %     No intake or output data in the 24 hours ending 03/12/21 1214     I had a face to face encounter and independently examined this patient on 3/12/2021, as outlined below:  PHYSICAL EXAM:  General:  Alert, cooperative, no acute distress, obese  EENT:   Anicteric sclerae. normocephalic  Resp:  Breath sounds equal bilaterally. No accessory muscle use  CV:  Regular  rhythm, tense edema bilateral lower extremity up to thigh, abdomen swollen secondary to ascites but soft. GI:  Soft, distended, Non tender. +Bowel sounds  Neurologic:  Alert and oriented X 3, normal speech,   Psych:   Good insight. Not anxious nor agitated  Skin:  No rashes. No jaundice    Reviewed most current lab test results and cultures  YES  Reviewed most current radiology test results   YES  Review and summation of old records today    NO  Reviewed patient's current orders and MAR    YES  PMH/SH reviewed - no change compared to H&P  ________________________________________________________________________  Care Plan discussed with:    Comments   Patient y    Family      RN y    Care Manager y    Consultant                        Multidiciplinary team rounds were held today with , nursing, pharmacist and clinical coordinator. Patient's plan of care was discussed; medications were reviewed and discharge planning was addressed. ________________________________________________________________________    ________________________________________________________________________  Bulmaro Hernandes MD     Procedures: see electronic medical records for all procedures/Xrays and details which were not copied into this note but were reviewed prior to creation of Plan. LABS:  I reviewed today's most current labs and imaging studies.   Pertinent labs include:  Recent Labs     03/09/21  1740   WBC 5.8   HGB 10.6*   HCT 34.4*        Recent Labs     03/12/21  0238 03/11/21  0345 03/10/21  1005 03/09/21  1740    140 142 142   K 4.5 4.5 4.0 4.2   * 109* 108 111*   CO2 26 28 29 29   * 118* 139* 129*   BUN 48* 42* 39* 38*   CREA 2.89* 2.88* 2.60* 2.81*   CA 8.8 8.3* 8.3* 8.0*   MG 2.2 2.1  --  2.1   ALB  --   --   --  1.7*   TBILI  --   --   --  0.3   ALT  --   --   --  12       Signed: José Miguel Deluca MD

## 2021-03-12 NOTE — PROGRESS NOTES
[address] [place] [address], [address] [facility], [address] [facility]  [phone]      Cardiology Progress Note      3/12/2021 1115AM    Admit Date: 3/5/2021    Admit Diagnosis:   CHF exacerbation ([facility] [I50.9]    Interval History/Subjective:     Lukas Cassidy is a 55 y.o. male admitted with CHF exacerbation ([facility] [I50.9]    -VSS  -creat steady;   -no weight; I/O not documented   -Mr. Daisy Peacock is feeling very good today. No c/o pain or SOB at this time.       Visit Vitals  BP (!) 140/84   Pulse 78   Temp 97.9 °F (36.6 °C)   Resp 18   Ht 5' 7\" (1.702 m)   Wt 93.1 kg (205 lb 4 oz)   SpO2 98%   BMI 32.15 kg/m²       Current Facility-Administered Medications   Medication Dose Route Frequency    bumetanide (BUMEX) injection 2 mg  2 mg IntraVENous BID    hydrALAZINE (APRESOLINE) tablet 50 mg  50 mg Oral TID    isosorbide mononitrate ER (IMDUR) tablet 60 mg  60 mg Oral DAILY    carvediloL (COREG) tablet 25 mg  25 mg Oral BID WITH MEALS    cloNIDine HCL (CATAPRES) tablet 0.1 mg  0.1 mg Oral BID    potassium chloride (K-DUR, KLOR-CON) SR tablet 20 mEq  20 mEq Oral DAILY    ondansetron (ZOFRAN) injection 4 mg  4 mg IntraVENous Q6H PRN    acetaminophen (TYLENOL) tablet 650 mg  650 mg Oral Q6H PRN    acetaminophen (TYLENOL) suppository 650 mg  650 mg Rectal Q6H PRN    aspirin delayed-release tablet 81 mg  81 mg Oral DAILY    insulin lispro (HUMALOG) injection   SubCUTAneous AC&HS    glucose chewable tablet 16 g  4 Tab Oral PRN    dextrose (D50W) injection syrg 12.5-25 g  12.5-25 g IntraVENous PRN    glucagon (GLUCAGEN) injection 1 mg  1 mg IntraMUSCular PRN    labetaloL (NORMODYNE;TRANDATE) injection 10 mg  10 mg IntraVENous Q4H PRN    heparin (porcine) injection 5,000 Units  5,000 Units SubCUTAneous Q8H    nicotine (NICODERM CQ) 21 mg/24 hr patch 1 Patch  1 Patch TransDERmal DAILY    amLODIPine (NORVASC) tablet 10 mg  10 mg Oral DAILY    albuterol-ipratropium (DUO-NEB) 2.5 MG-0.5 MG/3 ML  3 mL Nebulization Q6H PRN    atorvastatin (LIPITOR) tablet 20 mg  20 mg Oral QHS    nitroglycerin (NITROSTAT) tablet 0.4 mg  0.4 mg SubLINGual PRN    acetaminophen (TYLENOL) tablet 650 mg  650 mg Oral Q6H PRN       Objective:      Physical Exam:  General Appearance:  obese adult AAM sitting in chair in NAD  Chest:   clear/dim  Cardiovascular:  Regular rate and rhythm, no murmur. Abdomen:   Soft, non-tender, bowel sounds are active. Extremities: weak BLE pulses; 2+ pitting edema up to waist   Skin:  Warm and dry. thickened BLE     Data Review:   Recent Labs     03/09/21  1740   WBC 5.8   HGB 10.6*   HCT 34.4*        Recent Labs     03/12/21  0238 03/11/21  0345 03/10/21  1005 03/09/21  1740    140 142 142   K 4.5 4.5 4.0 4.2   * 109* 108 111*   CO2 26 28 29 29   * 118* 139* 129*   BUN 48* 42* 39* 38*   CREA 2.89* 2.88* 2.60* 2.81*   CA 8.8 8.3* 8.3* 8.0*   MG 2.2 2.1  --  2.1   ALB  --   --   --  1.7*   TBILI  --   --   --  0.3   ALT  --   --   --  12       No results for input(s): TROIQ, CPK, CKMB in the last 72 hours. No intake or output data in the 24 hours ending 03/12/21 1156     Telemetry: SR  EKG:SR; t wave inv lateral leads  ECHO:  EF 35-40%; mod concentric hypertrophy; mild TR; mild pHTN  Cxray: edema    Assessment:     Principal Problem:    Hypertensive urgency (3/8/2021)    Active Problems:    DM (diabetes mellitus), type 2 ([facility] (2/28/2020)      CHF exacerbation ([facility] (3/5/2021)      Acute on chronic combined systolic and diastolic ACC/AHA stage C congestive heart failure ([facility] (3/8/2021)      CKD (chronic kidney disease) (3/8/2021)        Plan:       Hypertensive urgency:   improved. Non compliance. + cocaine and opiates. · Continue amlodipine, coreg, clonidine, hydralazine, imdur       Acute on chronic combined systolic and diastolic heart failure: improving. 2/2 hypertensive urgency. Troponin negative.  +cocaine and opiates. EF 35-40%   · HTN improved  · BB.   No ACEi/ARB due to CKD  · Continue bumex - increased by nephrology       CKD:  Creat steady  · neprhrology notes reviewed   · Avoid ACEi/ARB      Drug abuse:  +cocaine and opiates   · Continue to   · Any withdrawal treatment per hospitalist         Amada Goode NP  DNP, RN, [facility] Cardiology    3/12/2021         Patient seen, examined by me personally. Plan discussed as detailed. Agree with note as outlined by  NP. I confirm findings in history and physical exam. My independent exam reveals : Physical Exam   Constitutional: He is oriented to person, place, and time. He appears well-developed and well-nourished. HENT:   Head: Normocephalic. Neck: Neck supple. Cardiovascular: Normal heart sounds. Pulmonary/Chest: Effort normal and breath sounds normal. He has no wheezes. He has no rales. Musculoskeletal:         General: Edema present. Neurological: He is alert and oriented to person, place, and time. Psychiatric: He has a normal mood and affect. Nursing note and vitals reviewed. No additional findings noted. Agree with plan as outlined above with modifications as noted.      Jhoana Resendez MD

## 2021-03-12 NOTE — PROGRESS NOTES
0700-End of Shift Note    Bedside shift change report given to [facility], RN(oncoming nurse) by Cammie Don RN (offgoing nurse). Report included the following information SBAR, Intake/Output, Recent Results, Med Rec Status, Quality Measures and Dual Neuro Assessment    Shift worked:  DAYS     Shift summary and any significant changes:    -BUMEX GIVEN AS SCHEDULED  -LEG EDEMA 2+     Concerns for physician to address:  NONE     Zone phone for oncoming shift:   [phone]       Activity:  Activity Level: Up with Assistance  Number times ambulated in hallways past shift: 2  Number of times OOB to chair past shift: 5    Cardiac:   Cardiac Monitoring: No      Cardiac Rhythm: Normal sinus rhythm    Access:   Current line(s): PIV     Genitourinary:   Urinary status: voiding    Respiratory:   O2 Device: Room air  Chronic home O2 use?: NO  Incentive spirometer at bedside: NO     GI:  Last Bowel Movement Date: 03/10/21  Current diet:  DIET ONE TIME MESSAGE  DIET DIABETIC CONSISTENT CARB Regular  DIET ONE TIME MESSAGE  DIET NUTRITIONAL SUPPLEMENTS Breakfast, Dinner; Glucerna Shake  DIET ONE TIME MESSAGE  DIET ONE TIME MESSAGE  Passing flatus: YES  Tolerating current diet: YES  % Diet Eaten: 100 %    Pain Management:   Patient states pain is manageable on current regimen: YES    Skin:  Sarwat Score: 21  Interventions: increase time out of bed    Patient Safety:  Fall Score:  Total Score: 2  Interventions: bed/chair alarm  High Fall Risk: Yes    Length of Stay:  Expected LOS: 4d 0h  Actual LOS: [address] Luis Eisenberg RN

## 2021-03-12 NOTE — PROGRESS NOTES
0700-End of Shift Note    Bedside shift change report given to Mercy Sharif RN(oncoming nurse) by Adair Jensen RN (offgoing nurse). Report included the following information SBAR, Intake/Output, Recent Results, Med Rec Status, Quality Measures and Dual Neuro Assessment    Shift worked:  DAYS     Shift summary and any significant changes:    -BUMEX GIVEN AS SCHEDULED  -LEG EDEMA 3+, NONPITTING  -DAILY WEIGHT TAKEN     Concerns for physician to address:  NONE     Zone phone for oncoming shift:   [phone]       Activity:  Activity Level: Up with Assistance  Number times ambulated in hallways past shift: 2  Number of times OOB to chair past shift: 5    Cardiac:   Cardiac Monitoring: No      Cardiac Rhythm: Normal sinus rhythm    Access:   Current line(s): PIV     Genitourinary:   Urinary status: voiding    Respiratory:   O2 Device: Room air  Chronic home O2 use?: NO  Incentive spirometer at bedside: NO     GI:  Last Bowel Movement Date: 03/11/21  Current diet:  DIET ONE TIME MESSAGE  DIET DIABETIC CONSISTENT CARB Regular  DIET ONE TIME MESSAGE  DIET ONE TIME MESSAGE  DIET ONE TIME MESSAGE  Passing flatus: YES  Tolerating current diet: YES  % Diet Eaten: 100 %    Pain Management:   Patient states pain is manageable on current regimen: YES    Skin:  Sarwat Score: 21  Interventions: increase time out of bed    Patient Safety:  Fall Score:  Total Score: 2  Interventions: bed/chair alarm  High Fall Risk: Yes    Length of Stay:  Expected LOS: 4d 0h  Actual LOS: [address] [place] [address], RN

## 2021-03-12 NOTE — PROGRESS NOTES
Transition of Care Plan:    Disposition: Home with family when medically stable   Follow up appointments:will follow up with PCP at d/c  DME needed:No recommendations at this time   Transportation at [id number]  [facility] to transport   [address] or means to access home:   Pt will have keys at d/c      Medicare letter:2nd  Medicare Letter   Caregiver Contact:Dasia MUSTAFA-[phone]  Discharge Caregiver contacted prior to discharge? Family will be contacted at d/c              CM staff case with pt's nurse and it was reported that pt is still residing at [id number] [place] [address] due to poor kidney function and swelling in his leg. Pt is waiting to be seen by nephrologist.  CM will enter delay for pt.     KATY Dobbins, [address] [place] [address]

## 2021-03-12 NOTE — PROGRESS NOTES
NAME: Shayan Smith        :  1965        MRN:  [id number]                        Assessment   :                                               Plan:  ROSEMARY on CKD 3b vs progressive CKD  Probable DM nephropathy  Proteinuria/grade 3 albuminuria  Nephrotic   Anasarca  PSA (cocaine, opiates)     HTN  DM2  Anemia   Acute on chronic CHF  HCV  Creatinine ~ 2 at previous baseline and is now stable at ~ 2.9     I suspect progressive CKD, now stage 4      Very low Albumin (spot urine protein:creatinine ratio -- 7 grams) -- likely DM nephropathy; HCV +; broadened serologic w/u (pending)     Continue IV bumex (increased dose to 2 mg IV bid on 3/11); unfortunately, no accurate I&O or weight?     Daily weight, strict I&O, low salt diet, fluid restrict         Subjective:     Chief Complaint:  oob sitting on couch. No sob. A lot of edema. Not too talkative. We discussed the above. Review of Systems:    Symptom Y/N Comments  Symptom Y/N Comments   Fever/Chills    Chest Pain     Poor Appetite    Edema     Cough    Abdominal Pain     Sputum    Joint Pain     SOB/DE LA CRUZ    Pruritis/Rash     Nausea/vomit    Tolerating PT/OT     Diarrhea    Tolerating Diet     Constipation    Other       Could not obtain due to:      Objective:     VITALS:   Last 24hrs VS reviewed since prior progress note.  Most recent are:  Visit Vitals  /76 (BP 1 Location: Left arm, BP Patient Position: Sitting)   Pulse 78   Temp 98.3 °F (36.8 °C)   Resp 18   Ht 5' 7\" (1.702 m)   Wt 93.1 kg (205 lb 4 oz)   SpO2 100%   BMI 32.15 kg/m²     No intake or output data in the 24 hours ending 21 0743   Telemetry Reviewed:     PHYSICAL EXAM:  General: NAD      Lab Data Reviewed: (see below)    Medications Reviewed: (see below)    PMH/SH reviewed - no change compared to H&P  ________________________________________________________________________  Care Plan discussed with:  Patient Family      RN     Care Manager                    Consultant:          Comments   >50% of visit spent in counseling and coordination of care       ________________________________________________________________________  Abiodun Hamm MD     Procedures: see electronic medical records for all procedures/Xrays and details which  were not copied into this note but were reviewed prior to creation of Plan. LABS:  Recent Labs     03/09/21  1740   WBC 5.8   HGB 10.6*   HCT 34.4*        Recent Labs     03/12/21  0238 03/11/21  0345 03/10/21  1005 03/09/21  1740    140 142 142   K 4.5 4.5 4.0 4.2   * 109* 108 111*   CO2 26 28 29 29   BUN 48* 42* 39* 38*   CREA 2.89* 2.88* 2.60* 2.81*   * 118* 139* 129*   CA 8.8 8.3* 8.3* 8.0*   MG 2.2 2.1  --  2.1     Recent Labs     03/09/21  1740   AP 93   TP 6.5   ALB 1.7*   GLOB 4.8*     No results for input(s): INR, PTP, APTT, INREXT, INREXT in the last 72 hours. No results for input(s): FE, TIBC, PSAT, FERR in the last 72 hours. No results found for: FOL, RBCF   No results for input(s): PH, PCO2, PO2 in the last 72 hours. No results for input(s): CPK, CKMB in the last 72 hours.     No lab exists for component: TROPONINI  No components found for: [place]  Lab Results   Component Value Date/Time    Color YELLOW/STRAW 03/10/2021 02:56 PM    Appearance CLEAR 03/10/2021 02:56 PM    Specific gravity 1.011 03/10/2021 02:56 PM    pH (UA) 6.5 03/10/2021 02:56 PM    Protein 300 (A) 03/10/2021 02:56 PM    Glucose Negative 03/10/2021 02:56 PM    Ketone Negative 03/10/2021 02:56 PM    Bilirubin Negative 03/10/2021 02:56 PM    Urobilinogen 0.2 03/10/2021 02:56 PM    Nitrites Negative 03/10/2021 02:56 PM    Leukocyte Esterase Negative 03/10/2021 02:56 PM    Epithelial cells FEW 03/10/2021 02:56 PM    Bacteria Negative 03/10/2021 02:56 PM    WBC 0-4 03/10/2021 02:56 PM    RBC 0-5 03/10/2021 02:56 PM       MEDICATIONS:  Current Facility-Administered Medications Medication Dose Route Frequency    bumetanide (BUMEX) injection 2 mg  2 mg IntraVENous BID    hydrALAZINE (APRESOLINE) tablet 50 mg  50 mg Oral TID    isosorbide mononitrate ER (IMDUR) tablet 60 mg  60 mg Oral DAILY    carvediloL (COREG) tablet 25 mg  25 mg Oral BID WITH MEALS    cloNIDine HCL (CATAPRES) tablet 0.1 mg  0.1 mg Oral BID    potassium chloride (K-DUR, KLOR-CON) SR tablet 20 mEq  20 mEq Oral DAILY    ondansetron (ZOFRAN) injection 4 mg  4 mg IntraVENous Q6H PRN    acetaminophen (TYLENOL) tablet 650 mg  650 mg Oral Q6H PRN    acetaminophen (TYLENOL) suppository 650 mg  650 mg Rectal Q6H PRN    aspirin delayed-release tablet 81 mg  81 mg Oral DAILY    insulin lispro (HUMALOG) injection   SubCUTAneous AC&HS    glucose chewable tablet 16 g  4 Tab Oral PRN    dextrose (D50W) injection syrg 12.5-25 g  12.5-25 g IntraVENous PRN    glucagon (GLUCAGEN) injection 1 mg  1 mg IntraMUSCular PRN    labetaloL (NORMODYNE;TRANDATE) injection 10 mg  10 mg IntraVENous Q4H PRN    heparin (porcine) injection 5,000 Units  5,000 Units SubCUTAneous Q8H    nicotine (NICODERM CQ) 21 mg/24 hr patch 1 Patch  1 Patch TransDERmal DAILY    amLODIPine (NORVASC) tablet 10 mg  10 mg Oral DAILY    albuterol-ipratropium (DUO-NEB) 2.5 MG-0.5 MG/3 ML  3 mL Nebulization Q6H PRN    atorvastatin (LIPITOR) tablet 20 mg  20 mg Oral QHS    nitroglycerin (NITROSTAT) tablet 0.4 mg  0.4 mg SubLINGual PRN    acetaminophen (TYLENOL) tablet 650 mg  650 mg Oral Q6H PRN

## 2021-03-13 LAB
ANION GAP SERPL CALC-SCNC: 4 MMOL/L (ref 5–15)
BUN SERPL-MCNC: 57 MG/DL (ref 6–20)
BUN/CREAT SERPL: 18 (ref 12–20)
CALCIUM SERPL-MCNC: 8.4 MG/DL (ref 8.5–10.1)
CHLORIDE SERPL-SCNC: 110 MMOL/L (ref 97–108)
CO2 SERPL-SCNC: 24 MMOL/L (ref 21–32)
CREAT SERPL-MCNC: 3.23 MG/DL (ref 0.7–1.3)
GLUCOSE BLD STRIP.AUTO-MCNC: 102 MG/DL (ref 65–100)
GLUCOSE BLD STRIP.AUTO-MCNC: 123 MG/DL (ref 65–100)
GLUCOSE BLD STRIP.AUTO-MCNC: 126 MG/DL (ref 65–100)
GLUCOSE BLD STRIP.AUTO-MCNC: 156 MG/DL (ref 65–100)
GLUCOSE SERPL-MCNC: 122 MG/DL (ref 65–100)
MAGNESIUM SERPL-MCNC: 2.1 MG/DL (ref 1.6–2.4)
POTASSIUM SERPL-SCNC: 5 MMOL/L (ref 3.5–5.1)
SERVICE CMNT-IMP: ABNORMAL
SODIUM SERPL-SCNC: 138 MMOL/L (ref 136–145)

## 2021-03-13 PROCEDURE — 74011250636 HC RX REV CODE- 250/636: Performed by: NURSE PRACTITIONER

## 2021-03-13 PROCEDURE — 36415 COLL VENOUS BLD VENIPUNCTURE: CPT

## 2021-03-13 PROCEDURE — 82962 GLUCOSE BLOOD TEST: CPT

## 2021-03-13 PROCEDURE — 74011250637 HC RX REV CODE- 250/637: Performed by: NURSE PRACTITIONER

## 2021-03-13 PROCEDURE — 74011000250 HC RX REV CODE- 250: Performed by: INTERNAL MEDICINE

## 2021-03-13 PROCEDURE — 65660000000 HC RM CCU STEPDOWN

## 2021-03-13 PROCEDURE — 74011250637 HC RX REV CODE- 250/637: Performed by: INTERNAL MEDICINE

## 2021-03-13 PROCEDURE — 74011636637 HC RX REV CODE- 636/637: Performed by: NURSE PRACTITIONER

## 2021-03-13 PROCEDURE — 83735 ASSAY OF MAGNESIUM: CPT

## 2021-03-13 PROCEDURE — 80048 BASIC METABOLIC PNL TOTAL CA: CPT

## 2021-03-13 RX ADMIN — HEPARIN SODIUM 5000 UNITS: 5000 INJECTION INTRAVENOUS; SUBCUTANEOUS at 05:43

## 2021-03-13 RX ADMIN — ASPIRIN 81 MG: 81 TABLET, COATED ORAL at 08:00

## 2021-03-13 RX ADMIN — CARVEDILOL 25 MG: 12.5 TABLET, FILM COATED ORAL at 17:34

## 2021-03-13 RX ADMIN — ISOSORBIDE MONONITRATE 60 MG: 30 TABLET, EXTENDED RELEASE ORAL at 08:00

## 2021-03-13 RX ADMIN — ATORVASTATIN CALCIUM 20 MG: 20 TABLET, FILM COATED ORAL at 22:01

## 2021-03-13 RX ADMIN — CLONIDINE HYDROCHLORIDE 0.1 MG: 0.1 TABLET ORAL at 17:34

## 2021-03-13 RX ADMIN — INSULIN LISPRO 2 UNITS: 100 INJECTION, SOLUTION INTRAVENOUS; SUBCUTANEOUS at 17:34

## 2021-03-13 RX ADMIN — HEPARIN SODIUM 5000 UNITS: 5000 INJECTION INTRAVENOUS; SUBCUTANEOUS at 22:01

## 2021-03-13 RX ADMIN — BUMETANIDE 1 MG/HR: 0.25 INJECTION INTRAMUSCULAR; INTRAVENOUS at 18:03

## 2021-03-13 RX ADMIN — POTASSIUM CHLORIDE 20 MEQ: 20 TABLET, EXTENDED RELEASE ORAL at 08:00

## 2021-03-13 RX ADMIN — HYDRALAZINE HYDROCHLORIDE 50 MG: 50 TABLET, FILM COATED ORAL at 17:34

## 2021-03-13 RX ADMIN — HYDRALAZINE HYDROCHLORIDE 50 MG: 50 TABLET, FILM COATED ORAL at 08:00

## 2021-03-13 RX ADMIN — CLONIDINE HYDROCHLORIDE 0.1 MG: 0.1 TABLET ORAL at 08:00

## 2021-03-13 RX ADMIN — AMLODIPINE BESYLATE 10 MG: 5 TABLET ORAL at 08:01

## 2021-03-13 RX ADMIN — HEPARIN SODIUM 5000 UNITS: 5000 INJECTION INTRAVENOUS; SUBCUTANEOUS at 15:14

## 2021-03-13 RX ADMIN — CARVEDILOL 25 MG: 12.5 TABLET, FILM COATED ORAL at 07:57

## 2021-03-13 RX ADMIN — BUMETANIDE 1 MG/HR: 0.25 INJECTION INTRAMUSCULAR; INTRAVENOUS at 08:48

## 2021-03-13 RX ADMIN — HYDRALAZINE HYDROCHLORIDE 50 MG: 50 TABLET, FILM COATED ORAL at 22:01

## 2021-03-13 NOTE — PROGRESS NOTES
Hospitalist Progress Note    NAME: Nasir Aragon   :  1965   MRN:  [id number]       Assessment / Plan:  Acute systolic CHF exacerbation  Bilateral pitting edema  Pulmonary Edema  Nephrotic syndrome  Anasarca    -BNP improved to 17,239   -No change in leg edema and abdomen swelling over the last 4 days. Patient has been on Bumex 2 mg IV twice daily along with albumin but no significant diuresis. Patient has been started on Bumex drip as per nephrology recommendations. Appreciate the input from the nephrology. Plan is if no significant diuresis achieved then likely heading to hemodialysis.  -Lost nearly 10 pounds since admission but stagnant over the last 3 to 4 days.  -Cardiology on board, appreciate input. -Nephrology on board-appreciate input.  -Random protein  216, creatinine 30, protein/creatinine ratio-7.1.  -Gave the albumin IV again today for better diuresis. On Bumex 2 mg IV twice daily. -VIRIDIANA negative, C3/C ending. .  -Kappa/lambda ratio-2.08, free kappa light chain to 41.5, free lambda light chain 116.1.  -Case management consulted for help with the medications on discharge. Hypertension (uncontrolled) POA  Controlled on Norvasc daily Coreg BID Hydralazine 25 mg TID Clonidine 0.1 mg BID    LLQ pain, POA (Improved)  Abdominal distention, POA   Bilateral leg weakness, POA ( improved)  CT Abd/Pelv WO contrast: Third spacing with soft tissue edema, bilateral pleural effusions, and ascites. Retained foreign bodies in the skin as described. Small air pocket associated with the distal stomach. It is difficult to determine if this is intraluminal or extraluminal without oral contrast. Recommend repeat study with oral contrast  Per PT no recommendations at this time. Monitor    Acute kidney injury on CKD stage 4  -BUN/creatinine-BUN/creatinine-57/3.23, worsening.  -On Bumex drip, nephrology on board. -BMP in a.m.     Type 2 Diabetes POA    Sliding scale coverage    Diabetic Diet    Glucose Monitoring    Hyperlipidemia POA   Lipitor daily  Peripheral Neuropathy POA   secondary to diabetes  Hx of Left great toe, #2, and  5 th toe amputation  Hx of MRSA  Nicotine Abuse POA   Nicotine Patch  Substance Abuse POA (heroin)   Last used Heroine on Friday 3/5/21 reported by patient   + toxicology screen for cocaine and opiates   Substance abuse protocol: received 30 mg of methadone on 3/8/21   He may receive 15 mg methadone daily x 2 doses   He states he has been going to [facility] and is to start an in patient rehab. Case management input appreciated      30.0 - 39.9 Obese / Body mass index is 32.49 kg/m². Code status: Full  Prophylaxis: Hep SQ  Recommended Disposition: Possible in patient rehab for substance abuse     Subjective:     Chief Complaint / Reason for Physician Visit  Patient was sitting today on the couch slightly drowsy. Swelling still the same, on room air without any oxygen supplementation. No acute events overnight. Review of Systems:  Symptom Y/N Comments  Symptom Y/N Comments   Fever/Chills n   Chest Pain n    Poor Appetite n   Edema y Bilatera LE   Cough n   Abdominal Pain     Sputum n   Joint Pain     SOB/DE LA CRUZ Y  improving  Pruritis/Rash n    Nausea/vomit n   Tolerating PT/OT     Diarrhea n   Tolerating Diet y    Constipation n   Other       Could NOT obtain due to:      Objective:     VITALS:   Last 24hrs VS reviewed since prior progress note.  Most recent are:  Patient Vitals for the past 24 hrs:   Temp Pulse Resp BP SpO2   03/13/21 1202 98.4 °F (36.9 °C) 78 18 (!) 148/67 98 %   03/13/21 0758 98.6 °F (37 °C) 78 18 (!) 144/78 99 %   03/13/21 0400 -- 74 -- -- --   03/13/21 0359 98.2 °F (36.8 °C) 74 20 (!) 142/77 97 %   03/13/21 0000 -- 74 -- -- --   03/12/21 2348 98.1 °F (36.7 °C) 74 20 138/71 97 %   03/12/21 2000 -- 74 -- -- --   03/12/21 1935 98 °F (36.7 °C) 74 18 128/66 97 %   03/12/21 1600 -- 82 -- -- --   03/12/21 1503 97.6 °F (36.4 °C) 80 18 (!) 142/79 97 %     No intake or output data in the 24 hours ending 03/13/21 1404     I had a face to face encounter and independently examined this patient on 3/13/2021, as outlined below:  PHYSICAL EXAM:  General:  Alert, cooperative, no acute distress, obese  EENT:   Anicteric sclerae. normocephalic  Resp:  Breath sounds equal bilaterally. No accessory muscle use  CV:  Regular  rhythm, tense edema bilateral lower extremity up to thigh, abdomen swollen secondary to ascites but soft. GI:  Soft, distended, Non tender. +Bowel sounds  Neurologic:  Alert and oriented X 3, normal speech,   Psych:   Good insight. Not anxious nor agitated  Skin:  No rashes. No jaundice    Reviewed most current lab test results and cultures  YES  Reviewed most current radiology test results   YES  Review and summation of old records today    NO  Reviewed patient's current orders and MAR    YES  PMH/ reviewed - no change compared to H&P  ________________________________________________________________________  Care Plan discussed with:    Comments   Patient y    Family      RN y    Care Manager y    Consultant                        Multidiciplinary team rounds were held today with , nursing, pharmacist and clinical coordinator. Patient's plan of care was discussed; medications were reviewed and discharge planning was addressed. ________________________________________________________________________    ________________________________________________________________________  Ramírez Stewart MD     Procedures: see electronic medical records for all procedures/Xrays and details which were not copied into this note but were reviewed prior to creation of Plan. LABS:  I reviewed today's most current labs and imaging studies. Pertinent labs include:  No results for input(s): WBC, HGB, HCT, PLT, HGBEXT, HCTEXT, PLTEXT, HGBEXT, HCTEXT, PLTEXT in the last 72 hours.   Recent Labs     03/13/21  0256 03/12/21  0238 03/11/21  0345    140 140   K 5.0 4.5 4.5 * 109* 109*   CO2 24 26 28   * 111* 118*   BUN 57* 48* 42*   CREA 3.23* 2.89* 2.88*   CA 8.4* 8.8 8.3*   MG 2.1 2.2 2.1       Signed: Brandy Noyola MD

## 2021-03-13 NOTE — PROGRESS NOTES
End of Shift Note    Bedside shift change report given to Ramona (oncoming nurse) by Kasey Easley RN (offgoing nurse).  Report included the following information SBAR    Shift worked:  7a-7p   Shift summary and any significant changes:     Patient started on bumex gtt - educated on continuous status; per MD note, HD may be necessary if unable to diurese;        Concerns for physician to address:  none   Zone phone for oncoming shift:   [phone]     Patient Information  Lio Laurent  56 y.o.  3/5/2021  8:28 PM by Philipp Conn MD. Lio Laurent was admitted from Home    Problem List  Patient Active Problem List    Diagnosis Date Noted   • Acute on chronic combined systolic and diastolic ACC/AHA stage C congestive heart failure (HCC) 03/08/2021   • Hypertensive urgency 03/08/2021   • CKD (chronic kidney disease) 03/08/2021   • CHF exacerbation ([facility]) 03/05/2021   • Acute CHF (congestive heart failure) ([facility]) 06/20/2020   • Metabolic encephalopathy 02/28/2020   • Osteomyelitis of left foot ([facility]) 02/28/2020   • MRSA bacteremia 02/28/2020   • Secondary hyperaldosteronism ([facility]) 02/28/2020   • Hypokalemia 02/28/2020   • DM (diabetes mellitus), type 2 ([facility]) 02/28/2020   • HTN (hypertension) 02/28/2020   • Cardiomyopathy ([facility]) 02/21/2020   • Acute systolic heart failure ([facility]) 02/21/2020   • Substance abuse ([facility]) 02/21/2020   • Severe sepsis ([facility]) 02/19/2020     Past Medical History:   Diagnosis Date   • Acute systolic heart failure ([facility]) 2/21/2020   • Cardiomyopathy ([facility]) 2/21/2020   • Diabetes ([facility])    • Encounter to establish care with new doctor 10/11/2018   • Hypertension    • Neuropathy    • Sciatica    • Substance abuse ([facility]) 2/21/2020       Core Measures:  CVA: No Not applicable  CHF:Yes Yes  PNA:No Not applicable    Activity:  Activity Level: Up ad melly(refusing bed alarm - form signed)  Number times ambulated in hallways past shift: 2  Number of times OOB to chair past shift: 5    Cardiac:   Cardiac  Monitoring: Yes      Cardiac Rhythm: Normal sinus rhythm    Access:   Current line(s): PIV       Genitourinary:   Urinary status: voiding      Respiratory:   O2 Device: Room air  Chronic home O2 use?: NO  Incentive spirometer at bedside: N/A       GI:  Last Bowel Movement Date: 03/12/21  Current diet:  DIET ONE TIME MESSAGE  DIET DIABETIC CONSISTENT CARB Regular  DIET ONE TIME MESSAGE  DIET ONE TIME MESSAGE  DIET ONE TIME MESSAGE  Passing flatus: YES  Tolerating current diet: YES  % Diet Eaten: 100 %    Pain Management:   Patient states pain is manageable on current regimen: YES    Skin:  Sarwat Score: 18  Interventions: increase time out of bed and PT/OT consult    Patient Safety:  Fall Score:  Total Score: 2  Interventions: gripper socks and pt to call before getting OOB  High Fall Risk: Yes    DVT prophylaxis:  DVT prophylaxis Med- Yes  DVT prophylaxis SCD or BERTO- No     Wounds: (If Applicable)  Wounds- No      Active Consults:  IP CONSULT TO HOSPITALIST  IP CONSULT TO CARDIOLOGY  IP CONSULT TO NEPHROLOGY    Length of Stay:  Expected LOS: 4d 0h  Actual LOS: 8  Discharge Plan: Yes Home      Karen Garrett RN

## 2021-03-13 NOTE — PROGRESS NOTES
NAME: Alaina Rivera        :  1965        MRN:  [id number]                        Assessment   :                                               Plan:  ROSEMARY on  progressive CKD    Probable DM nephropathy  Proteinuria/grade 3 albuminuria  Nephrotic   Anasarca  PSA (cocaine, opiates)     HTN  DM2  Anemia   Acute on chronic CHF  HCV  Creatinine worse today with needed diuresis attempt -- now 3.2     Very low Albumin (spot urine protein:creatinine ratio -- 7 grams) -- likely DM nephropathy; HCV +; neg VIRIDIANA, flc ratio 2.1 (unlikely to be of significance), HIV neg; cryo and spep pending     Continue IV bumex (increased dose to 2 mg IV bid on 3/11); no accurate I&O; weight done evening of 3/12 was no better; will change to bumex gtt (1 mg/hr) and if it doesn't work to [place] or if creatinine continues to worsen with needed diuresis, then he will need HD     Daily weight, strict I&O, low salt diet, fluid restrict         Subjective:     Chief Complaint:  oob sitting in chair. No sob. A lot of edema - not appreciably better. Not too talkative. Poor historian. We discussed the above. Review of Systems:    Symptom Y/N Comments  Symptom Y/N Comments   Fever/Chills    Chest Pain     Poor Appetite    Edema     Cough    Abdominal Pain     Sputum    Joint Pain     SOB/DE [place] CRUZ    Pruritis/Rash     Nausea/vomit    Tolerating PT/OT     Diarrhea    Tolerating Diet     Constipation    Other       Could not obtain due to:      Objective:     VITALS:   Last 24hrs VS reviewed since prior progress note.  Most recent are:  Visit Vitals  BP (!) 142/77 (BP 1 Location: Left upper arm, BP Patient Position: Sitting)   Pulse 74   Temp 98.2 °F (36.8 °C)   Resp 20   Ht 5' 7\" (1.702 m)   Wt 94.1 kg (207 lb 7.3 oz)   SpO2 97%   BMI 32.49 kg/m²     No intake or output data in the 24 hours ending 21 0658   Telemetry Reviewed:     PHYSICAL EXAM:  General: NAD      Lab Data Reviewed: (see below)    Medications Reviewed: (see below)    PMH/SH reviewed - no change compared to H&P  ________________________________________________________________________  Care Plan discussed with:  Patient     Family      RN     Care Manager                    Consultant:          Comments   >50% of visit spent in counseling and coordination of care       ________________________________________________________________________  Cherrie Boyle MD     Procedures: see electronic medical records for all procedures/Xrays and details which  were not copied into this note but were reviewed prior to creation of Plan. LABS:  No results for input(s): WBC, HGB, HCT, PLT, HGBEXT, HCTEXT, PLTEXT, HGBEXT, HCTEXT, PLTEXT in the last 72 hours. Recent Labs     03/13/21  0256 03/12/21  0238 03/11/21  0345    140 140   K 5.0 4.5 4.5   * 109* 109*   CO2 24 26 28   BUN 57* 48* 42*   CREA 3.23* 2.89* 2.88*   * 111* 118*   CA 8.4* 8.8 8.3*   MG 2.1 2.2 2.1     No results for input(s): AP, TBIL, TP, ALB, GLOB, GGT, AML, LPSE in the last 72 hours. No lab exists for component: SGOT, GPT, AMYP, HLPSE  No results for input(s): INR, PTP, APTT, INREXT, INREXT in the last 72 hours. No results for input(s): FE, TIBC, PSAT, FERR in the last 72 hours. No results found for: FOL, RBCF   No results for input(s): PH, PCO2, PO2 in the last 72 hours. No results for input(s): CPK, CKMB in the last 72 hours.     No lab exists for component: TROPONINI  No components found for: [place]  Lab Results   Component Value Date/Time    Color YELLOW/STRAW 03/10/2021 02:56 PM    Appearance CLEAR 03/10/2021 02:56 PM    Specific gravity 1.011 03/10/2021 02:56 PM    pH (UA) 6.5 03/10/2021 02:56 PM    Protein 300 (A) 03/10/2021 02:56 PM    Glucose Negative 03/10/2021 02:56 PM    Ketone Negative 03/10/2021 02:56 PM    Bilirubin Negative 03/10/2021 02:56 PM    Urobilinogen 0.2 03/10/2021 02:56 PM    Nitrites Negative 03/10/2021 02:56 PM    Leukocyte Esterase Negative 03/10/2021 02:56 PM    Epithelial cells FEW 03/10/2021 02:56 PM    Bacteria Negative 03/10/2021 02:56 PM    WBC 0-4 03/10/2021 02:56 PM    RBC 0-5 03/10/2021 02:56 PM       MEDICATIONS:  Current Facility-Administered Medications   Medication Dose Route Frequency    bumetanide (BUMEX) injection 2 mg  2 mg IntraVENous BID    hydrALAZINE (APRESOLINE) tablet 50 mg  50 mg Oral TID    isosorbide mononitrate ER (IMDUR) tablet 60 mg  60 mg Oral DAILY    carvediloL (COREG) tablet 25 mg  25 mg Oral BID WITH MEALS    cloNIDine HCL (CATAPRES) tablet 0.1 mg  0.1 mg Oral BID    potassium chloride (K-DUR, KLOR-CON) SR tablet 20 mEq  20 mEq Oral DAILY    ondansetron (ZOFRAN) injection 4 mg  4 mg IntraVENous Q6H PRN    acetaminophen (TYLENOL) tablet 650 mg  650 mg Oral Q6H PRN    acetaminophen (TYLENOL) suppository 650 mg  650 mg Rectal Q6H PRN    aspirin delayed-release tablet 81 mg  81 mg Oral DAILY    insulin lispro (HUMALOG) injection   SubCUTAneous AC&HS    glucose chewable tablet 16 g  4 Tab Oral PRN    dextrose (D50W) injection syrg 12.5-25 g  12.5-25 g IntraVENous PRN    glucagon (GLUCAGEN) injection 1 mg  1 mg IntraMUSCular PRN    labetaloL (NORMODYNE;TRANDATE) injection 10 mg  10 mg IntraVENous Q4H PRN    heparin (porcine) injection 5,000 Units  5,000 Units SubCUTAneous Q8H    nicotine (NICODERM CQ) 21 mg/24 hr patch 1 Patch  1 Patch TransDERmal DAILY    amLODIPine (NORVASC) tablet 10 mg  10 mg Oral DAILY    albuterol-ipratropium (DUO-NEB) 2.5 MG-0.5 MG/3 ML  3 mL Nebulization Q6H PRN    atorvastatin (LIPITOR) tablet 20 mg  20 mg Oral QHS    nitroglycerin (NITROSTAT) tablet 0.4 mg  0.4 mg SubLINGual PRN    acetaminophen (TYLENOL) tablet 650 mg  650 mg Oral Q6H PRN

## 2021-03-14 LAB
ANION GAP SERPL CALC-SCNC: 5 MMOL/L (ref 5–15)
BUN SERPL-MCNC: 67 MG/DL (ref 6–20)
BUN/CREAT SERPL: 20 (ref 12–20)
CALCIUM SERPL-MCNC: 8.8 MG/DL (ref 8.5–10.1)
CHLORIDE SERPL-SCNC: 106 MMOL/L (ref 97–108)
CO2 SERPL-SCNC: 27 MMOL/L (ref 21–32)
COMMENT, HOLDF: NORMAL
CREAT SERPL-MCNC: 3.33 MG/DL (ref 0.7–1.3)
GLUCOSE BLD STRIP.AUTO-MCNC: 102 MG/DL (ref 65–100)
GLUCOSE BLD STRIP.AUTO-MCNC: 120 MG/DL (ref 65–100)
GLUCOSE BLD STRIP.AUTO-MCNC: 138 MG/DL (ref 65–100)
GLUCOSE BLD STRIP.AUTO-MCNC: 150 MG/DL (ref 65–100)
GLUCOSE SERPL-MCNC: 127 MG/DL (ref 65–100)
POTASSIUM SERPL-SCNC: 4.4 MMOL/L (ref 3.5–5.1)
SAMPLES BEING HELD,HOLD: NORMAL
SERVICE CMNT-IMP: ABNORMAL
SODIUM SERPL-SCNC: 138 MMOL/L (ref 136–145)

## 2021-03-14 PROCEDURE — 99232 SBSQ HOSP IP/OBS MODERATE 35: CPT | Performed by: INTERNAL MEDICINE

## 2021-03-14 PROCEDURE — 74011636637 HC RX REV CODE- 636/637: Performed by: NURSE PRACTITIONER

## 2021-03-14 PROCEDURE — 36415 COLL VENOUS BLD VENIPUNCTURE: CPT

## 2021-03-14 PROCEDURE — 74011000250 HC RX REV CODE- 250: Performed by: INTERNAL MEDICINE

## 2021-03-14 PROCEDURE — 65660000000 HC RM CCU STEPDOWN

## 2021-03-14 PROCEDURE — 74011250637 HC RX REV CODE- 250/637: Performed by: NURSE PRACTITIONER

## 2021-03-14 PROCEDURE — 82962 GLUCOSE BLOOD TEST: CPT

## 2021-03-14 PROCEDURE — 80048 BASIC METABOLIC PNL TOTAL CA: CPT

## 2021-03-14 PROCEDURE — 74011250637 HC RX REV CODE- 250/637: Performed by: INTERNAL MEDICINE

## 2021-03-14 PROCEDURE — 74011250636 HC RX REV CODE- 250/636: Performed by: NURSE PRACTITIONER

## 2021-03-14 RX ADMIN — ASPIRIN 81 MG: 81 TABLET, COATED ORAL at 08:10

## 2021-03-14 RX ADMIN — HEPARIN SODIUM 5000 UNITS: 5000 INJECTION INTRAVENOUS; SUBCUTANEOUS at 15:24

## 2021-03-14 RX ADMIN — CARVEDILOL 25 MG: 12.5 TABLET, FILM COATED ORAL at 17:27

## 2021-03-14 RX ADMIN — HYDRALAZINE HYDROCHLORIDE 50 MG: 50 TABLET, FILM COATED ORAL at 15:24

## 2021-03-14 RX ADMIN — ISOSORBIDE MONONITRATE 60 MG: 30 TABLET, EXTENDED RELEASE ORAL at 08:10

## 2021-03-14 RX ADMIN — AMLODIPINE BESYLATE 10 MG: 5 TABLET ORAL at 08:10

## 2021-03-14 RX ADMIN — ATORVASTATIN CALCIUM 20 MG: 20 TABLET, FILM COATED ORAL at 21:51

## 2021-03-14 RX ADMIN — CARVEDILOL 25 MG: 12.5 TABLET, FILM COATED ORAL at 08:10

## 2021-03-14 RX ADMIN — HEPARIN SODIUM 5000 UNITS: 5000 INJECTION INTRAVENOUS; SUBCUTANEOUS at 21:51

## 2021-03-14 RX ADMIN — INSULIN LISPRO 2 UNITS: 100 INJECTION, SOLUTION INTRAVENOUS; SUBCUTANEOUS at 17:27

## 2021-03-14 RX ADMIN — HYDRALAZINE HYDROCHLORIDE 50 MG: 50 TABLET, FILM COATED ORAL at 08:10

## 2021-03-14 RX ADMIN — CLONIDINE HYDROCHLORIDE 0.1 MG: 0.1 TABLET ORAL at 08:10

## 2021-03-14 RX ADMIN — HEPARIN SODIUM 5000 UNITS: 5000 INJECTION INTRAVENOUS; SUBCUTANEOUS at 05:53

## 2021-03-14 RX ADMIN — HYDRALAZINE HYDROCHLORIDE 50 MG: 50 TABLET, FILM COATED ORAL at 21:51

## 2021-03-14 RX ADMIN — BUMETANIDE 1 MG/HR: 0.25 INJECTION INTRAMUSCULAR; INTRAVENOUS at 15:24

## 2021-03-14 RX ADMIN — CLONIDINE HYDROCHLORIDE 0.1 MG: 0.1 TABLET ORAL at 17:27

## 2021-03-14 NOTE — PROGRESS NOTES
NAME: Chely Mondragon        :  1965        MRN:  [id number]                        Assessment   :                                               Plan:  ROSEMARY on  progressive CKD    Probable DM nephropathy  Proteinuria/grade 3 albuminuria  Nephrotic   Anasarca  PSA (cocaine, opiates)     HTN  DM2  Anemia   Acute on chronic CHF  HCV  Creatinine worse today with needed diuresis attempt -- now 3.2 to 3.3     Very low Albumin (spot urine protein:creatinine ratio -- 7 grams) -- likely DM nephropathy; HCV +; neg VIRIDIANA, flc ratio 2.1 (unlikely to be of significance), HIV neg; cryo and spep pending     Weight is down 10 pounds; no I&O    On bumex gtt (1 mg/hr) since 3/13 (I have discussed that he might need HD/UF; continue bumex gtt for now)     Daily weight, strict I&O, low salt diet, fluid restrict         Subjective:     Chief Complaint:  oob sitting in chair. No sob. A lot of edema - a tad better. Not too talkative. Poor historian. We discussed the above. Review of Systems:    Symptom Y/N Comments  Symptom Y/N Comments   Fever/Chills    Chest Pain     Poor Appetite    Edema     Cough    Abdominal Pain     Sputum    Joint Pain     SOB/DE [place] CRUZ    Pruritis/Rash     Nausea/vomit    Tolerating PT/OT     Diarrhea    Tolerating Diet     Constipation    Other       Could not obtain due to:      Objective:     VITALS:   Last 24hrs VS reviewed since prior progress note.  Most recent are:  Visit Vitals  BP (!) 144/71   Pulse 80   Temp 97.8 °F (36.6 °C)   Resp 18   Ht 5' 7\" (1.702 m)   Wt 94.1 kg (207 lb 7.3 oz)   SpO2 92%   BMI 32.49 kg/m²     No intake or output data in the 24 hours ending 21 8088   Telemetry Reviewed:     PHYSICAL EXAM:  General: NAD      Lab Data Reviewed: (see below)    Medications Reviewed: (see below)    PMH/SH reviewed - no change compared to H&P  ________________________________________________________________________  Care Plan discussed with:  Patient     Family      RN     Care Manager                    Consultant:          Comments   >50% of visit spent in counseling and coordination of care       ________________________________________________________________________  Danyell Vaz MD     Procedures: see electronic medical records for all procedures/Xrays and details which  were not copied into this note but were reviewed prior to creation of Plan. LABS:  No results for input(s): WBC, HGB, HCT, PLT, HGBEXT, HCTEXT, PLTEXT, HGBEXT, HCTEXT, PLTEXT in the last 72 hours. Recent Labs     03/14/21  0321 03/13/21  0256 03/12/21  0238    138 140   K 4.4 5.0 4.5    110* 109*   CO2 27 24 26   BUN 67* 57* 48*   CREA 3.33* 3.23* 2.89*   * 122* 111*   CA 8.8 8.4* 8.8   MG  --  2.1 2.2     No results for input(s): AP, TBIL, TP, ALB, GLOB, GGT, AML, LPSE in the last 72 hours. No lab exists for component: SGOT, GPT, AMYP, HLPSE  No results for input(s): INR, PTP, APTT, INREXT, INREXT in the last 72 hours. No results for input(s): FE, TIBC, PSAT, FERR in the last 72 hours. No results found for: FOL, RBCF   No results for input(s): PH, PCO2, PO2 in the last 72 hours. No results for input(s): CPK, CKMB in the last 72 hours.     No lab exists for component: TROPONINI  No components found for: [place]  Lab Results   Component Value Date/Time    Color YELLOW/STRAW 03/10/2021 02:56 PM    Appearance CLEAR 03/10/2021 02:56 PM    Specific gravity 1.011 03/10/2021 02:56 PM    pH (UA) 6.5 03/10/2021 02:56 PM    Protein 300 (A) 03/10/2021 02:56 PM    Glucose Negative 03/10/2021 02:56 PM    Ketone Negative 03/10/2021 02:56 PM    Bilirubin Negative 03/10/2021 02:56 PM    Urobilinogen 0.2 03/10/2021 02:56 PM    Nitrites Negative 03/10/2021 02:56 PM    Leukocyte Esterase Negative 03/10/2021 02:56 PM    Epithelial cells FEW 03/10/2021 02:56 PM    Bacteria Negative 03/10/2021 02:56 PM    WBC 0-4 03/10/2021 02:56 PM    RBC 0-5 03/10/2021 02:56 PM       MEDICATIONS:  Current Facility-Administered Medications   Medication Dose Route Frequency    bumetanide (BUMEX) 0.25 mg/mL infusion  1 mg/hr IntraVENous CONTINUOUS    hydrALAZINE (APRESOLINE) tablet 50 mg  50 mg Oral TID    isosorbide mononitrate ER (IMDUR) tablet 60 mg  60 mg Oral DAILY    carvediloL (COREG) tablet 25 mg  25 mg Oral BID WITH MEALS    cloNIDine HCL (CATAPRES) tablet 0.1 mg  0.1 mg Oral BID    [Held by provider] potassium chloride (K-DUR, KLOR-CON) SR tablet 20 mEq  20 mEq Oral DAILY    ondansetron (ZOFRAN) injection 4 mg  4 mg IntraVENous Q6H PRN    acetaminophen (TYLENOL) tablet 650 mg  650 mg Oral Q6H PRN    acetaminophen (TYLENOL) suppository 650 mg  650 mg Rectal Q6H PRN    aspirin delayed-release tablet 81 mg  81 mg Oral DAILY    insulin lispro (HUMALOG) injection   SubCUTAneous AC&HS    glucose chewable tablet 16 g  4 Tab Oral PRN    dextrose (D50W) injection syrg 12.5-25 g  12.5-25 g IntraVENous PRN    glucagon (GLUCAGEN) injection 1 mg  1 mg IntraMUSCular PRN    labetaloL (NORMODYNE;TRANDATE) injection 10 mg  10 mg IntraVENous Q4H PRN    heparin (porcine) injection 5,000 Units  5,000 Units SubCUTAneous Q8H    amLODIPine (NORVASC) tablet 10 mg  10 mg Oral DAILY    albuterol-ipratropium (DUO-NEB) 2.5 MG-0.5 MG/3 ML  3 mL Nebulization Q6H PRN    atorvastatin (LIPITOR) tablet 20 mg  20 mg Oral QHS    nitroglycerin (NITROSTAT) tablet 0.4 mg  0.4 mg SubLINGual PRN    acetaminophen (TYLENOL) tablet 650 mg  650 mg Oral Q6H PRN

## 2021-03-14 NOTE — PROGRESS NOTES
End of Shift Note    Bedside shift change report given to Cecile Oglesby RN (oncoming nurse) by Arian Ji RN (offgoing nurse). Report included the following information     Shift worked:  7p-7a   Shift summary and any significant changes:     Pt tolerated Bumex infusing. Concerns for physician to address:     Zone phone for oncoming shift:   [phone]     Patient Information  Ac Espino  55 y.o.  3/5/2021  8:28 PM by Sage Malin MD. Ac Espino was admitted from     Problem List  Patient Active Problem List    Diagnosis Date Noted    Acute on chronic combined systolic and diastolic ACC/AHA stage C congestive heart failure ([address] 03/08/2021    Hypertensive urgency 03/08/2021    CKD (chronic kidney disease) 03/08/2021    CHF exacerbation ([address] 03/05/2021    Acute CHF (congestive heart failure) ([address] 10/41/9665    Metabolic encephalopathy 64/69/5472    Osteomyelitis of left foot ([address] 02/28/2020    MRSA bacteremia 02/28/2020    Secondary hyperaldosteronism ([address] 02/28/2020    Hypokalemia 02/28/2020    DM (diabetes mellitus), type 2 ([address] 02/28/2020    HTN (hypertension) 02/28/2020    Cardiomyopathy ([address] 88/51/9291    Acute systolic heart failure ([address] 02/21/2020    Substance abuse ([address] 02/21/2020    Severe sepsis ([address] 02/19/2020     Past Medical History:   Diagnosis Date    Acute systolic heart failure ([address] 2/21/2020    Cardiomyopathy ([address] 2/21/2020    Diabetes ([address]     Encounter to establish care with new doctor 10/11/2018    Hypertension     Neuropathy     Sciatica     Substance abuse ([address] 2/21/2020       Core Measures:  CVA: No  CHF:Yes  PNA:No    Activity:  Activity Level: Up ad melly  Number times ambulated in hallways past shift: 0  Number of times OOB to chair past shift: 0    Cardiac:   Cardiac Monitoring:Yes     Cardiac Rhythm: Normal sinus rhythm    Access:   Current line(s): PIV  22 RFA  Central Line? No  PICC LINE?  No    Genitourinary:   Urinary status: Continent  Urinary Catheter? No    Respiratory:   O2 Device: Room air  Chronic home O2 use?:No  Incentive spirometer at bedside: No       GI:  Last Bowel Movement Date: 03/14/21  Current diet:  DIET ONE TIME MESSAGE  DIET DIABETIC CONSISTENT CARB Regular  DIET ONE TIME MESSAGE  DIET ONE TIME MESSAGE  DIET ONE TIME MESSAGE  Passing flatus: Yes  Tolerating current diet: Yes  % Diet Eaten: 100 %    Pain Management:   Patient states pain is manageable on current regimen: No complaints of pain    Skin:  Sarwat Score: 22  Interventions: turning and repositioning    Patient Safety:  Fall Score:  Total Score: 2  Interventions:bed low, wheels locked, alarm on, call bell in reach  High Fall Risk: Yes    DVT prophylaxis:  DVT prophylaxis Med- Heparin  DVT prophylaxis SCD or BERTO- No    Wounds: (If Applicable)  Wounds- No  Location    Active Consults:  IP CONSULT TO HOSPITALIST  IP CONSULT TO CARDIOLOGY  IP CONSULT TO NEPHROLOGY    Length of Stay:  Expected LOS: 4d 0h  Actual LOS: 9  Discharge Plan:      Ubaldo Torres RN

## 2021-03-14 NOTE — PROGRESS NOTES
Cardiology Progress Note      3/14/2021 4:06 PM    Admit Date: 3/5/2021    Admit Diagnosis: CHF exacerbation ([address] [I50.9]      Subjective:     Joy Brown is feeling better. On bumex drip, diuresing. Creatinine slightly up.     Visit Vitals  /88 (BP 1 Location: Left upper arm, BP Patient Position: At rest)   Pulse 76   Temp 98.4 °F (36.9 °C)   Resp 20   Ht 5' 7\" (1.702 m)   Wt 197 lb (89.4 kg)   SpO2 95%   BMI 30.85 kg/m²       Current Facility-Administered Medications   Medication Dose Route Frequency    bumetanide (BUMEX) 0.25 mg/mL infusion  1 mg/hr IntraVENous CONTINUOUS    hydrALAZINE (APRESOLINE) tablet 50 mg  50 mg Oral TID    isosorbide mononitrate ER (IMDUR) tablet 60 mg  60 mg Oral DAILY    carvediloL (COREG) tablet 25 mg  25 mg Oral BID WITH MEALS    cloNIDine HCL (CATAPRES) tablet 0.1 mg  0.1 mg Oral BID    [Held by provider] potassium chloride (K-DUR, KLOR-CON) SR tablet 20 mEq  20 mEq Oral DAILY    ondansetron (ZOFRAN) injection 4 mg  4 mg IntraVENous Q6H PRN    acetaminophen (TYLENOL) tablet 650 mg  650 mg Oral Q6H PRN    acetaminophen (TYLENOL) suppository 650 mg  650 mg Rectal Q6H PRN    aspirin delayed-release tablet 81 mg  81 mg Oral DAILY    insulin lispro (HUMALOG) injection   SubCUTAneous AC&HS    glucose chewable tablet 16 g  4 Tab Oral PRN    dextrose (D50W) injection syrg 12.5-25 g  12.5-25 g IntraVENous PRN    glucagon (GLUCAGEN) injection 1 mg  1 mg IntraMUSCular PRN    labetaloL (NORMODYNE;TRANDATE) injection 10 mg  10 mg IntraVENous Q4H PRN    heparin (porcine) injection 5,000 Units  5,000 Units SubCUTAneous Q8H    amLODIPine (NORVASC) tablet 10 mg  10 mg Oral DAILY    albuterol-ipratropium (DUO-NEB) 2.5 MG-0.5 MG/3 ML  3 mL Nebulization Q6H PRN    atorvastatin (LIPITOR) tablet 20 mg  20 mg Oral QHS    nitroglycerin (NITROSTAT) tablet 0.4 mg  0.4 mg SubLINGual PRN    acetaminophen (TYLENOL) tablet 650 mg  650 mg Oral Q6H PRN         Objective: Physical Exam:  Visit Vitals  /88 (BP 1 Location: Left upper arm, BP Patient Position: At rest)   Pulse 76   Temp 98.4 °F (36.9 °C)   Resp 20   Ht 5' 7\" (1.702 m)   Wt 197 lb (89.4 kg)   SpO2 95%   BMI 30.85 kg/m²     General Appearance:  Well developed, well nourished,alert and oriented x 3, and individual in no acute distress. Ears/Nose/Mouth/Throat:   Hearing grossly normal.         Neck: Supple. Chest:   Lungs clear to auscultation bilaterally. Cardiovascular:  Regular rate and rhythm, S1, S2 normal, no murmur. Abdomen:   Soft, non-tender, bowel sounds are active. Extremities: 1+ edema bilaterally. Skin: Warm and dry. Data Review:   Labs:    Recent Results (from the past 24 hour(s))   GLUCOSE, POC    Collection Time: 03/13/21  4:21 PM   Result Value Ref Range    Glucose (POC) 156 (H) 65 - 100 mg/dL    Performed by Andrew Alaniz (PCT)    GLUCOSE, POC    Collection Time: 03/13/21  9:29 PM   Result Value Ref Range    Glucose (POC) 123 (H) 65 - 100 mg/dL    Performed by Emely Gaviria PCT    METABOLIC PANEL, BASIC    Collection Time: 03/14/21  3:21 AM   Result Value Ref Range    Sodium 138 136 - 145 mmol/L    Potassium 4.4 3.5 - 5.1 mmol/L    Chloride 106 97 - 108 mmol/L    CO2 27 21 - 32 mmol/L    Anion gap 5 5 - 15 mmol/L    Glucose 127 (H) 65 - 100 mg/dL    BUN 67 (H) 6 - 20 MG/DL    Creatinine 3.33 (H) 0.70 - 1.30 MG/DL    BUN/Creatinine ratio 20 12 - 20      GFR est AA 23 (L) >60 ml/min/1.73m2    GFR est non-AA 19 (L) >60 ml/min/1.73m2    Calcium 8.8 8.5 - 10.1 MG/DL   SAMPLES BEING HELD    Collection Time: 03/14/21  3:21 AM   Result Value Ref Range    SAMPLES BEING HELD 1LAV     COMMENT        Add-on orders for these samples will be processed based on acceptable specimen integrity and analyte stability, which may vary by analyte.    GLUCOSE, POC    Collection Time: 03/14/21  6:12 AM   Result Value Ref Range    Glucose (POC) 102 (H) 65 - 100 mg/dL    Performed by [employer] Kristin Benson PCT    GLUCOSE, POC    Collection Time: 03/14/21 11:21 AM   Result Value Ref Range    Glucose (POC) 120 (H) 65 - 100 mg/dL    Performed by Mario Rivera (PCT)        Telemetry: normal sinus rhythm      Assessment:     Hospital Problems  Date Reviewed: 6/24/2020          Codes Class Noted POA    Acute on chronic combined systolic and diastolic ACC/AHA stage C congestive heart failure ([facility] ICD-10-CM: I50.43  ICD-9-CM: 428.43, 428.0  3/8/2021 Yes        * (Principal) Hypertensive urgency ICD-10-CM: I16.0  ICD-9-CM: 401.9  3/8/2021 Yes        CKD (chronic kidney disease) ICD-10-CM: N18.9  ICD-9-CM: 585.9  3/8/2021 Yes        CHF exacerbation ([facility] ICD-10-CM: I50.9  ICD-9-CM: 428.0  3/5/2021 Unknown        DM (diabetes mellitus), type 2 ([facility] ICD-10-CM: E11.9  ICD-9-CM: 250.00  2/28/2020 Yes              Plan:     Continue diuresis per nephrology. BP better.     Kourtney Chadwick MD

## 2021-03-14 NOTE — PROGRESS NOTES
End of Shift Note    Bedside shift change report given to Holly (oncoming nurse) by Mayra Bustamante RN (offgoing nurse).   Report included the following information SBAR    Shift worked:  7a-7p   Shift summary and any significant changes:     Patient weight down to 197lbs; bumex gtt on hold for 3 hours this afternoon d/t unable to start IV - MD aware; plan for HD if patient unable to diurese        Concerns for physician to address:  none   Zone phone for oncoming shift:   [phone]     Patient Information  Cristela Wynn  55 y.o.  3/5/2021  8:28 PM by Colin Fnich MD. Cristela Wynn was admitted from Home    Problem List  Patient Active Problem List    Diagnosis Date Noted    Acute on chronic combined systolic and diastolic ACC/AHA stage C congestive heart failure ([address] 03/08/2021    Hypertensive urgency 03/08/2021    CKD (chronic kidney disease) 03/08/2021    CHF exacerbation ([address] 03/05/2021    Acute CHF (congestive heart failure) ([address] 30/12/6894    Metabolic encephalopathy 49/95/0573    Osteomyelitis of left foot ([address] 02/28/2020    MRSA bacteremia 02/28/2020    Secondary hyperaldosteronism ([address] 02/28/2020    Hypokalemia 02/28/2020    DM (diabetes mellitus), type 2 ([address] 02/28/2020    HTN (hypertension) 02/28/2020    Cardiomyopathy ([address] 49/17/3057    Acute systolic heart failure ([address] 02/21/2020    Substance abuse ([address] 02/21/2020    Severe sepsis ([address] 02/19/2020     Past Medical History:   Diagnosis Date    Acute systolic heart failure ([address] 2/21/2020    Cardiomyopathy ([address] 2/21/2020    Diabetes ([address]     Encounter to establish care with new doctor 10/11/2018    Hypertension     Neuropathy     Sciatica     Substance abuse ([address] 2/21/2020       Core Measures:  CVA: No Not applicable  CHF:Yes Yes  PNA:No Not applicable    Activity:  Activity Level: Up ad melly  Number times ambulated in hallways past shift: 0  Number of times OOB to chair past shift: 5    Cardiac:   Cardiac Monitoring: Yes      Cardiac Rhythm: Normal sinus rhythm    Access:   Current line(s): PIV       Genitourinary:   Urinary status: voiding      Respiratory:   O2 Device: Room air  Chronic home O2 use?: NO  Incentive spirometer at bedside: N/A       GI:  Last Bowel Movement Date: 03/14/21  Current diet:  DIET ONE TIME MESSAGE  DIET DIABETIC CONSISTENT CARB Regular  DIET ONE TIME MESSAGE  DIET ONE TIME MESSAGE  DIET ONE TIME MESSAGE  Passing flatus: YES  Tolerating current diet: YES  % Diet Eaten: 100 %    Pain Management:   Patient states pain is manageable on current regimen: YES    Skin:  Sarwat Score: 20  Interventions: increase time out of bed and PT/OT consult    Patient Safety:  Fall Score:  Total Score: 2  Interventions: bed/chair alarm, assistive device (walker, cane, etc) and gripper socks  High Fall Risk: Yes    DVT prophylaxis:  DVT prophylaxis Med- Yes  DVT prophylaxis SCD or BERTO- No     Wounds: (If Applicable)  Wounds- No      Active Consults:  IP CONSULT TO HOSPITALIST  IP CONSULT TO CARDIOLOGY  IP CONSULT TO NEPHROLOGY    Length of Stay:  Expected LOS: 4d 0h  Actual LOS: 9  Discharge Plan: Yes Home      Jenny Almazan RN

## 2021-03-14 NOTE — PROGRESS NOTES
Hospitalist Progress Note    NAME: Lio Laurent   :  1965   MRN:  [id number]       Assessment / Plan:  Acute systolic CHF exacerbation  Bilateral pitting edema  Pulmonary Edema  Nephrotic syndrome  Anasarca    -Patient has lost 10 pounds over the last 3 to 4 days, current weight is around 197 pounds.  Creatinine is 3.33 slightly increasing, nephrology on board.  Continue the Bumex drip and if creatinine keep on increasing then may need hemodialysis.  Patient is notified about the plan.  -During admission was around 215 pounds.  -Cardiology on board, appreciate input.  -Nephrology on board-appreciate input.  -Random protein  216, creatinine 30, protein/creatinine ratio-7.1.  -Gave the albumin IV again today for better diuresis.  On Bumex 2 mg IV twice daily.  -VIRIDIANA negative, C3/C ending..  -Kappa/lambda ratio-2.08, free kappa light chain to 41.5, free lambda light chain 116.1.  -Case management consulted for help with the medications on discharge.    Hypertension (uncontrolled) POA  Controlled on Norvasc daily Coreg BID Hydralazine 25 mg TID Clonidine 0.1 mg BID    LLQ pain, POA (Improved)  Abdominal distention, POA   Bilateral leg weakness, POA ( improved)  CT Abd/Pelv WO contrast: Third spacing with soft tissue edema, bilateral pleural effusions, and ascites. Retained foreign bodies in the skin as described. Small air pocket associated with the distal stomach. It is difficult to determine if this is intraluminal or extraluminal without oral contrast. Recommend repeat study with oral contrast  Per PT no recommendations at this time.  Monitor    Acute kidney injury on CKD stage 4  -Pain is 3.33, worsening, plan is to diurese on Bumex drip if creatinine Kepone worsening then may be heading to hemodialysis.  -Nephrology on board-appreciate input.m.    Type 2 Diabetes POA    Sliding scale coverage    Diabetic Diet    Glucose Monitoring    Hyperlipidemia POA   Lipitor daily  Peripheral Neuropathy POA    secondary to diabetes  Hx of Left great toe, #2, and  5 th toe amputation  Hx of MRSA  Nicotine Abuse POA   Nicotine Patch  Substance Abuse POA (heroin)   Last used Heroine on Friday 3/5/21 reported by patient   + toxicology screen for cocaine and opiates   Substance abuse protocol: received 30 mg of methadone on 3/8/21   He may receive 15 mg methadone daily x 2 doses   He states he has been going to [facility] and is to start an in patient rehab. Case management input appreciated      30.0 - 39.9 Obese / Body mass index is 30.85 kg/m². Code status: Full  Prophylaxis: Hep SQ  Recommended Disposition: Possible in patient rehab for substance abuse     Subjective:     Chief Complaint / Reason for Physician Visit  Seen today, way on the scale which shows weight around 197 pounds. Patient belly is soft but still has lot of edema. Legs are still tense with edema. No shortness of breath on exertion. Objective:     VITALS:   Last 24hrs VS reviewed since prior progress note. Most recent are:  Patient Vitals for the past 24 hrs:   Temp Pulse Resp BP SpO2   03/14/21 1133 97.6 °F (36.4 °C) 70 20 (!) 124/92 98 %   03/14/21 0739 97.8 °F (36.6 °C) 79 20 (!) 148/80 96 %   03/14/21 0425 97.8 °F (36.6 °C) 80 18 (!) 144/71 92 %   03/14/21 0400 -- 78 -- -- --   03/14/21 0000 -- 80 -- -- --   03/13/21 2351 98.3 °F (36.8 °C) 85 18 133/70 93 %   03/13/21 2000 -- 82 -- -- --   03/13/21 1839 98.8 °F (37.1 °C) 82 20 136/74 94 %   03/13/21 1520 98.4 °F (36.9 °C) 78 18 130/68 96 %     No intake or output data in the 24 hours ending 03/14/21 7628     I had a face to face encounter and independently examined this patient on 3/14/2021, as outlined below:  PHYSICAL EXAM:  General:  Alert, cooperative, no acute distress, obese  EENT:   Anicteric sclerae. normocephalic  Resp:  Breath sounds equal bilaterally.   No accessory muscle use  CV:  Regular  rhythm, tense edema bilateral lower extremity up to thigh, abdomen swollen secondary to ascites but soft. GI:  Soft, distended, Non tender. +Bowel sounds  Neurologic:  Alert and oriented X 3, normal speech,   Psych:   Good insight. Not anxious nor agitated  Skin:  No rashes. No jaundice    Reviewed most current lab test results and cultures  YES  Reviewed most current radiology test results   YES  Review and summation of old records today    NO  Reviewed patient's current orders and MAR    YES  PMH/SH reviewed - no change compared to H&P  ________________________________________________________________________  Care Plan discussed with:    Comments   Patient y    Family      RN y    Care Manager y    Consultant                        Multidiciplinary team rounds were held today with , nursing, pharmacist and clinical coordinator. Patient's plan of care was discussed; medications were reviewed and discharge planning was addressed. ________________________________________________________________________    ________________________________________________________________________  Jasper Vásquez MD     Procedures: see electronic medical records for all procedures/Xrays and details which were not copied into this note but were reviewed prior to creation of Plan. LABS:  I reviewed today's most current labs and imaging studies. Pertinent labs include:  No results for input(s): WBC, HGB, HCT, PLT, HGBEXT, HCTEXT, PLTEXT, HGBEXT, HCTEXT, PLTEXT in the last 72 hours.   Recent Labs     03/14/21  0321 03/13/21  0256 03/12/21  0238    138 140   K 4.4 5.0 4.5    110* 109*   CO2 27 24 26   * 122* 111*   BUN 67* 57* 48*   CREA 3.33* 3.23* 2.89*   CA 8.8 8.4* 8.8   MG  --  2.1 2.2       Signed: Jasper Vásquez MD

## 2021-03-15 LAB
ANION GAP SERPL CALC-SCNC: 8 MMOL/L (ref 5–15)
BUN SERPL-MCNC: 79 MG/DL (ref 6–20)
BUN/CREAT SERPL: 22 (ref 12–20)
CALCIUM SERPL-MCNC: 9 MG/DL (ref 8.5–10.1)
CHLORIDE SERPL-SCNC: 103 MMOL/L (ref 97–108)
CO2 SERPL-SCNC: 26 MMOL/L (ref 21–32)
CREAT SERPL-MCNC: 3.59 MG/DL (ref 0.7–1.3)
GLUCOSE BLD STRIP.AUTO-MCNC: 116 MG/DL (ref 65–100)
GLUCOSE BLD STRIP.AUTO-MCNC: 133 MG/DL (ref 65–100)
GLUCOSE BLD STRIP.AUTO-MCNC: 148 MG/DL (ref 65–100)
GLUCOSE BLD STRIP.AUTO-MCNC: 170 MG/DL (ref 65–100)
GLUCOSE BLD STRIP.AUTO-MCNC: 173 MG/DL (ref 65–100)
GLUCOSE SERPL-MCNC: 140 MG/DL (ref 65–100)
POTASSIUM SERPL-SCNC: 4.3 MMOL/L (ref 3.5–5.1)
SERVICE CMNT-IMP: ABNORMAL
SODIUM SERPL-SCNC: 137 MMOL/L (ref 136–145)

## 2021-03-15 PROCEDURE — 74011250637 HC RX REV CODE- 250/637: Performed by: NURSE PRACTITIONER

## 2021-03-15 PROCEDURE — 74011250637 HC RX REV CODE- 250/637: Performed by: INTERNAL MEDICINE

## 2021-03-15 PROCEDURE — 74011000250 HC RX REV CODE- 250: Performed by: INTERNAL MEDICINE

## 2021-03-15 PROCEDURE — 36415 COLL VENOUS BLD VENIPUNCTURE: CPT

## 2021-03-15 PROCEDURE — 82962 GLUCOSE BLOOD TEST: CPT

## 2021-03-15 PROCEDURE — 74011250636 HC RX REV CODE- 250/636: Performed by: INTERNAL MEDICINE

## 2021-03-15 PROCEDURE — 74011636637 HC RX REV CODE- 636/637: Performed by: NURSE PRACTITIONER

## 2021-03-15 PROCEDURE — 99232 SBSQ HOSP IP/OBS MODERATE 35: CPT | Performed by: INTERNAL MEDICINE

## 2021-03-15 PROCEDURE — P9047 ALBUMIN (HUMAN), 25%, 50ML: HCPCS | Performed by: INTERNAL MEDICINE

## 2021-03-15 PROCEDURE — APPSS45 APP SPLIT SHARED TIME 31-45 MINUTES: Performed by: NURSE PRACTITIONER

## 2021-03-15 PROCEDURE — 74011250636 HC RX REV CODE- 250/636: Performed by: NURSE PRACTITIONER

## 2021-03-15 PROCEDURE — 2709999900 HC NON-CHARGEABLE SUPPLY

## 2021-03-15 PROCEDURE — 80048 BASIC METABOLIC PNL TOTAL CA: CPT

## 2021-03-15 PROCEDURE — 65660000000 HC RM CCU STEPDOWN

## 2021-03-15 RX ORDER — ALBUMIN HUMAN 250 G/1000ML
12.5 SOLUTION INTRAVENOUS EVERY 12 HOURS
Status: DISCONTINUED | OUTPATIENT
Start: 2021-03-15 | End: 2021-03-17 | Stop reason: HOSPADM

## 2021-03-15 RX ADMIN — CARVEDILOL 25 MG: 12.5 TABLET, FILM COATED ORAL at 17:18

## 2021-03-15 RX ADMIN — ALBUMIN (HUMAN) 12.5 G: 0.25 INJECTION, SOLUTION INTRAVENOUS at 21:24

## 2021-03-15 RX ADMIN — ALBUMIN (HUMAN) 12.5 G: 0.25 INJECTION, SOLUTION INTRAVENOUS at 13:18

## 2021-03-15 RX ADMIN — ATORVASTATIN CALCIUM 20 MG: 20 TABLET, FILM COATED ORAL at 21:31

## 2021-03-15 RX ADMIN — ISOSORBIDE MONONITRATE 60 MG: 30 TABLET, EXTENDED RELEASE ORAL at 09:21

## 2021-03-15 RX ADMIN — HYDRALAZINE HYDROCHLORIDE 75 MG: 50 TABLET, FILM COATED ORAL at 21:31

## 2021-03-15 RX ADMIN — HEPARIN SODIUM 5000 UNITS: 5000 INJECTION INTRAVENOUS; SUBCUTANEOUS at 21:32

## 2021-03-15 RX ADMIN — CARVEDILOL 25 MG: 12.5 TABLET, FILM COATED ORAL at 09:21

## 2021-03-15 RX ADMIN — AMLODIPINE BESYLATE 10 MG: 5 TABLET ORAL at 09:21

## 2021-03-15 RX ADMIN — INSULIN LISPRO 2 UNITS: 100 INJECTION, SOLUTION INTRAVENOUS; SUBCUTANEOUS at 17:17

## 2021-03-15 RX ADMIN — CLONIDINE HYDROCHLORIDE 0.1 MG: 0.1 TABLET ORAL at 09:21

## 2021-03-15 RX ADMIN — HEPARIN SODIUM 5000 UNITS: 5000 INJECTION INTRAVENOUS; SUBCUTANEOUS at 13:18

## 2021-03-15 RX ADMIN — HYDRALAZINE HYDROCHLORIDE 75 MG: 50 TABLET, FILM COATED ORAL at 17:18

## 2021-03-15 RX ADMIN — HYDRALAZINE HYDROCHLORIDE 50 MG: 50 TABLET, FILM COATED ORAL at 09:21

## 2021-03-15 RX ADMIN — BUMETANIDE 1 MG/HR: 0.25 INJECTION INTRAMUSCULAR; INTRAVENOUS at 18:04

## 2021-03-15 RX ADMIN — BUMETANIDE 1 MG/HR: 0.25 INJECTION INTRAMUSCULAR; INTRAVENOUS at 01:48

## 2021-03-15 RX ADMIN — HEPARIN SODIUM 5000 UNITS: 5000 INJECTION INTRAVENOUS; SUBCUTANEOUS at 09:20

## 2021-03-15 RX ADMIN — ASPIRIN 81 MG: 81 TABLET, COATED ORAL at 09:21

## 2021-03-15 NOTE — PROGRESS NOTES
-PATIENT FOUND OUTSIDE OF HOSPITAL SMOKING CIGARETTE. PATIENT DENIES THIS OCCURRENCE.   -PATIENT THEN FOUND IN LOBBY, AGITATED WHEN ASKED TO GO BACK TO ROOM  -7-11 BIG BITE HOTDOG ALSO FOUND IN PATIENTS ROOM  -PATIENT ALSO ATE PART-TART B

## 2021-03-15 NOTE — PROGRESS NOTES
NAME: Chandra Whiteside        :  1965        MRN:  [id number]                        Assessment   :                                               Plan:  ROSEMARY on  progressive CKD    Probable DM nephropathy  Proteinuria/grade 3 albuminuria  Nephrotic   Anasarca  PSA (cocaine, opiates)     HTN  DM2  Anemia   Acute on chronic CHF  HCV  Creatinine worse today with needed diuresis attempt -- now 3.2 to 3.6     Very low Albumin (spot urine protein:creatinine ratio -- 7 grams) -- likely DM nephropathy; HCV +; neg VIRIDIANA, flc ratio 2.1 (unlikely to be of significance), HIV neg, SPEP (-); cryo  pending     Weight is down 10 pounds; no I&O    On bumex gtt (1 mg/hr) since 3/13 (I have discussed that he might need HD/UF; continue bumex gtt for now)--adding iv albumin  Since 1.6     Daily weight, strict I&O, low salt diet, fluid restrict    Not sure what the longterm goal is if pt still actively doing drugs. Now with worsening renal failure in the setting of nephrotic syndrome. Last  Admission indicated he was using heroin. He may have to live with the edema. If creatinine worse tomorrow, stopping hep gtt             Subjective:     Chief Complaint:  oob lying on the sofa in the room--\"I'm all fu!!! up\"    Review of Systems:    Symptom Y/N Comments  Symptom Y/N Comments   Fever/Chills    Chest Pain     Poor Appetite    Edema     Cough    Abdominal Pain     Sputum    Joint Pain     SOB/DE [place] CRUZ    Pruritis/Rash     Nausea/vomit    Tolerating PT/OT     Diarrhea    Tolerating Diet     Constipation    Other       Could not obtain due to:      Objective:     VITALS:   Last 24hrs VS reviewed since prior progress note.  Most recent are:  Visit Vitals  /67   Pulse 76   Temp 98.8 °F (37.1 °C)   Resp 18   Ht 5' 7\" (1.702 m)   Wt 89.4 kg (197 lb)   SpO2 94%   BMI 30.85 kg/m²       Intake/Output Summary (Last 24 hours) at 3/15/2021 1234  Last data filed at 3/15/2021 1201  Gross per 24 hour   Intake --   Output 975 ml   Net -975 ml      Telemetry Reviewed:     PHYSICAL EXAM:  General: NAD  No rales  (+) edema      Lab Data Reviewed: (see below)    Medications Reviewed: (see below)    PMH/SH reviewed - no change compared to H&P  ________________________________________________________________________  Care Plan discussed with:  Patient     Family      RN     Care Manager                    Consultant:          Comments   >50% of visit spent in counseling and coordination of care       ________________________________________________________________________  Italo Garcia MD     Procedures: see electronic medical records for all procedures/Xrays and details which  were not copied into this note but were reviewed prior to creation of Plan. LABS:  No results for input(s): WBC, HGB, HCT, PLT, HGBEXT, HCTEXT, PLTEXT, HGBEXT, HCTEXT, PLTEXT in the last 72 hours. Recent Labs     03/15/21  0352 03/14/21  0321 03/13/21  0256    138 138   K 4.3 4.4 5.0    106 110*   CO2 26 27 24   BUN 79* 67* 57*   CREA 3.59* 3.33* 3.23*   * 127* 122*   CA 9.0 8.8 8.4*   MG  --   --  2.1     No results for input(s): AP, TBIL, TP, ALB, GLOB, GGT, AML, LPSE in the last 72 hours. No lab exists for component: SGOT, GPT, AMYP, HLPSE  No results for input(s): INR, PTP, APTT, INREXT, INREXT in the last 72 hours. No results for input(s): FE, TIBC, PSAT, FERR in the last 72 hours. No results found for: FOL, RBCF   No results for input(s): PH, PCO2, PO2 in the last 72 hours. No results for input(s): CPK, CKMB in the last 72 hours.     No lab exists for component: TROPONINI  No components found for: [place]  Lab Results   Component Value Date/Time    Color YELLOW/STRAW 03/10/2021 02:56 PM    Appearance CLEAR 03/10/2021 02:56 PM    Specific gravity 1.011 03/10/2021 02:56 PM    pH (UA) 6.5 03/10/2021 02:56 PM    Protein 300 (A) 03/10/2021 02:56 PM    Glucose Negative 03/10/2021 02:56 PM    Ketone Negative 03/10/2021 02:56 PM    Bilirubin Negative 03/10/2021 02:56 PM    Urobilinogen 0.2 03/10/2021 02:56 PM    Nitrites Negative 03/10/2021 02:56 PM    Leukocyte Esterase Negative 03/10/2021 02:56 PM    Epithelial cells FEW 03/10/2021 02:56 PM    Bacteria Negative 03/10/2021 02:56 PM    WBC 0-4 03/10/2021 02:56 PM    RBC 0-5 03/10/2021 02:56 PM       MEDICATIONS:  Current Facility-Administered Medications   Medication Dose Route Frequency    hydrALAZINE (APRESOLINE) tablet 75 mg  75 mg Oral TID    albumin human 25% (BUMINATE) solution 12.5 g  12.5 g IntraVENous Q12H    bumetanide (BUMEX) 0.25 mg/mL infusion  1 mg/hr IntraVENous CONTINUOUS    isosorbide mononitrate ER (IMDUR) tablet 60 mg  60 mg Oral DAILY    carvediloL (COREG) tablet 25 mg  25 mg Oral BID WITH MEALS    cloNIDine HCL (CATAPRES) tablet 0.1 mg  0.1 mg Oral BID    [Held by provider] potassium chloride (K-DUR, KLOR-CON) SR tablet 20 mEq  20 mEq Oral DAILY    ondansetron (ZOFRAN) injection 4 mg  4 mg IntraVENous Q6H PRN    acetaminophen (TYLENOL) tablet 650 mg  650 mg Oral Q6H PRN    acetaminophen (TYLENOL) suppository 650 mg  650 mg Rectal Q6H PRN    aspirin delayed-release tablet 81 mg  81 mg Oral DAILY    insulin lispro (HUMALOG) injection   SubCUTAneous AC&HS    glucose chewable tablet 16 g  4 Tab Oral PRN    dextrose (D50W) injection syrg 12.5-25 g  12.5-25 g IntraVENous PRN    glucagon (GLUCAGEN) injection 1 mg  1 mg IntraMUSCular PRN    labetaloL (NORMODYNE;TRANDATE) injection 10 mg  10 mg IntraVENous Q4H PRN    heparin (porcine) injection 5,000 Units  5,000 Units SubCUTAneous Q8H    amLODIPine (NORVASC) tablet 10 mg  10 mg Oral DAILY    albuterol-ipratropium (DUO-NEB) 2.5 MG-0.5 MG/3 ML  3 mL Nebulization Q6H PRN    atorvastatin (LIPITOR) tablet 20 mg  20 mg Oral QHS    nitroglycerin (NITROSTAT) tablet 0.4 mg  0.4 mg SubLINGual PRN    acetaminophen (TYLENOL) tablet 650 mg  650 mg Oral Q6H PRN

## 2021-03-15 NOTE — PROGRESS NOTES
Readdressed patient's refusal of bed alarm (fall preventions).  Patient stated he understands the risk and benefits and wants to continue with bed alarm refusal.

## 2021-03-15 NOTE — PROGRESS NOTES
End of Shift Note    Bedside shift change report given to NAHID RN (oncoming nurse) by Meseret Randhawa RN (offgoing nurse).  Report included the following information SBAR    Shift worked: DAYS   Shift summary and any significant changes:    -PATIENT LEFT FLOOR AND FOUND SMOKING A CIGARETTE-> PATIENT DENIES OCCURRENCE  -PATIENT AGITATED WHEN ASKED TO RETURN TO ROOM  -PATIENT WAS IRRITABLE AFTER THE INCIDENT  -PATIENT ALSO ATE 7-ELEVEN BIG BITE HOTDOG     Concerns for physician to address: NONE   [facility] phone for oncoming shift:  [phone]     Patient Information  Lio Laurent  56 y.o.  3/5/2021  8:28 PM by Philipp Conn MD. Lio Laurent was admitted from Home    Problem List  Patient Active Problem List    Diagnosis Date Noted   • Acute on chronic combined systolic and diastolic ACC/AHA stage C congestive heart failure (HCC) 03/08/2021   • Hypertensive urgency 03/08/2021   • CKD (chronic kidney disease) 03/08/2021   • CHF exacerbation ([facility]) 03/05/2021   • Acute CHF (congestive heart failure) ([facility]) 06/20/2020   • Metabolic encephalopathy 02/28/2020   • Osteomyelitis of left foot ([facility]) 02/28/2020   • MRSA bacteremia 02/28/2020   • Secondary hyperaldosteronism ([facility]) 02/28/2020   • Hypokalemia 02/28/2020   • DM (diabetes mellitus), type 2 ([facility]) 02/28/2020   • HTN (hypertension) 02/28/2020   • Cardiomyopathy ([facility]) 02/21/2020   • Acute systolic heart failure ([facility]) 02/21/2020   • Substance abuse ([facility]) 02/21/2020   • Severe sepsis ([facility]) 02/19/2020     Past Medical History:   Diagnosis Date   • Acute systolic heart failure ([facility]) 2/21/2020   • Cardiomyopathy ([facility]) 2/21/2020   • Diabetes ([facility])    • Encounter to establish care with new doctor 10/11/2018   • Hypertension    • Neuropathy    • Sciatica    • Substance abuse ([facility]) 2/21/2020       Core Measures:  CVA: No Not applicable  CHF:Yes Yes  PNA:No Not applicable    Activity:  Activity Level: Up ad melly  Number times ambulated in hallways past shift: 0  Number of times  OOB to chair past shift: 5    Cardiac:   Cardiac Monitoring: Yes      Cardiac Rhythm: Normal sinus rhythm    Access:   Current line(s): PIV       Genitourinary:   Urinary status: voiding      Respiratory:   O2 Device: Room air  Chronic home O2 use?: NO  Incentive spirometer at bedside: N/A       GI:  Last Bowel Movement Date: 03/14/21  Current diet:  DIET ONE TIME MESSAGE  DIET DIABETIC CONSISTENT CARB Regular  DIET ONE TIME MESSAGE  DIET ONE TIME MESSAGE  DIET ONE TIME MESSAGE  DIET ONE TIME MESSAGE  Passing flatus: YES  Tolerating current diet: YES  % Diet Eaten: 100 %    Pain Management:   Patient states pain is manageable on current regimen: YES    Skin:  Sarwat Score: 20  Interventions: increase time out of bed and PT/OT consult    Patient Safety:  Fall Score:  Total Score: 2  Interventions: bed/chair alarm, assistive device (walker, cane, etc) and gripper socks  High Fall Risk: Yes    DVT prophylaxis:  DVT prophylaxis Med- Yes  DVT prophylaxis SCD or BERTO- No     Wounds: (If Applicable)  Wounds- No      Active Consults:  IP CONSULT TO HOSPITALIST  IP CONSULT TO CARDIOLOGY  IP CONSULT TO NEPHROLOGY    Length of Stay:  Expected LOS: 4d 0h  Actual LOS: 10  Discharge Plan: Yes Lalita Kulkarni RN

## 2021-03-15 NOTE — PROGRESS NOTES
Hospitalist Progress Note    NAME: Lukas Cassidy   :  1965   MRN:  [id number]       Assessment / Plan:  Acute systolic CHF exacerbation  Bilateral pitting edema  Pulmonary Edema  Nephrotic syndrome  Anasarca    -Patient has lost 10 pounds over the last 3 to 4 days, current weight is around 197 pounds. Creatinine is 3.6, increasing. On Bumex drip. As per nephrology patient may be heading towards hemodialysis. If the creatinine increased tomorrow then likely stopping the drip. Patient may have to live with the same edema.  -During admission was around 215 pounds.  -Cardiology on board, appreciate input. -Nephrology on board-appreciate input.  -Random protein  216, creatinine 30, protein/creatinine ratio-7.1.  -Gave the albumin IV again today for better diuresis. On Bumex 2 mg IV twice daily. -VIRIDIANA negative, C3/C ending. .  -Kappa/lambda ratio-2.08, free kappa light chain to 41.5, free lambda light chain 116.1.  -Case management consulted for help with the medications on discharge. Hypertension (uncontrolled) POA  Controlled on Norvasc daily Coreg BID Hydralazine 25 mg TID Clonidine 0.1 mg BID    LLQ pain, POA (Improved)  Abdominal distention, POA   Bilateral leg weakness, POA ( improved)  CT Abd/Pelv WO contrast: Third spacing with soft tissue edema, bilateral pleural effusions, and ascites. Retained foreign bodies in the skin as described. Small air pocket associated with the distal stomach. It is difficult to determine if this is intraluminal or extraluminal without oral contrast. Recommend repeat study with oral contrast  Per PT no recommendations at this time. Monitor    Acute kidney injury on CKD stage 4  -Creatinine 3.6, plan updated above. -Nephrology on board-appreciate input. m.     Type 2 Diabetes POA    Sliding scale coverage    Diabetic Diet    Glucose Monitoring    Hyperlipidemia POA   Lipitor daily  Peripheral Neuropathy POA   secondary to diabetes  Hx of Left great toe, #2, and  5 th toe amputation  Hx of MRSA  Nicotine Abuse POA   Nicotine Patch  Substance Abuse POA (heroin)   Last used Heroine on Friday 3/5/21 reported by patient   + toxicology screen for cocaine and opiates   Substance abuse protocol: received 30 mg of methadone on 3/8/21   He may receive 15 mg methadone daily x 2 doses   He states he has been going to [facility] and is to start an in patient rehab. Case management input appreciated      30.0 - 39.9 Obese / Body mass index is 30.85 kg/m². Code status: Full  Prophylaxis: Hep SQ  Recommended Disposition: Possible in patient rehab for substance abuse     Subjective:     Chief Complaint / Reason for Physician Visit  Seen today, way on the scale which shows weight around 197 pounds. Patient belly is soft but still has lot of edema. Legs are still tense with edema. No shortness of breath on exertion. Objective:     VITALS:   Last 24hrs VS reviewed since prior progress note. Most recent are:  Patient Vitals for the past 24 hrs:   Temp Pulse Resp BP SpO2   03/15/21 1444 98.2 °F (36.8 °C) 75 16 124/65 99 %   03/15/21 1200 98.8 °F (37.1 °C) 76 18 127/67 94 %   03/15/21 0800 -- 86 -- -- --   03/15/21 0705 98.9 °F (37.2 °C) 98 20 (!) 148/85 90 %   03/15/21 0350 97.7 °F (36.5 °C) 84 20 (!) 150/73 94 %   03/15/21 0005 98 °F (36.7 °C) 79 20 (!) 140/72 97 %   03/14/21 1927 97.3 °F (36.3 °C) 76 18 134/74 96 %       Intake/Output Summary (Last 24 hours) at 3/15/2021 1631  Last data filed at 3/15/2021 1444  Gross per 24 hour   Intake --   Output 1225 ml   Net -1225 ml        I had a face to face encounter and independently examined this patient on 3/15/2021, as outlined below:  PHYSICAL EXAM:  General:  Alert, cooperative, no acute distress, obese  EENT:   Anicteric sclerae. normocephalic  Resp:  Breath sounds equal bilaterally. No accessory muscle use  CV:  Regular  rhythm, tense edema bilateral lower extremity up to thigh, abdomen swollen secondary to ascites but soft.   GI:  Soft, distended, Non tender. +Bowel sounds  Neurologic:  Alert and oriented X 3, normal speech,   Psych:   Good insight. Not anxious nor agitated  Skin:  No rashes. No jaundice    Reviewed most current lab test results and cultures  YES  Reviewed most current radiology test results   YES  Review and summation of old records today    NO  Reviewed patient's current orders and MAR    YES  PMH/SH reviewed - no change compared to H&P  ________________________________________________________________________  Care Plan discussed with:    Comments   Patient y    Family      RN y    Care Manager y    Consultant  y                      Multidiciplinary team rounds were held today with , nursing, pharmacist and clinical coordinator. Patient's plan of care was discussed; medications were reviewed and discharge planning was addressed. ________________________________________________________________________    ________________________________________________________________________  Jason Cohn MD     Procedures: see electronic medical records for all procedures/Xrays and details which were not copied into this note but were reviewed prior to creation of Plan. LABS:  I reviewed today's most current labs and imaging studies. Pertinent labs include:  No results for input(s): WBC, HGB, HCT, PLT, HGBEXT, HCTEXT, PLTEXT, HGBEXT, HCTEXT, PLTEXT in the last 72 hours.   Recent Labs     03/15/21  0352 03/14/21  0321 03/13/21  0256    138 138   K 4.3 4.4 5.0    106 110*   CO2 26 27 24   * 127* 122*   BUN 79* 67* 57*   CREA 3.59* 3.33* 3.23*   CA 9.0 8.8 8.4*   MG  --   --  2.1       Signed: Jason Cohn MD

## 2021-03-15 NOTE — PROGRESS NOTES
Transition of Care Plan:    Disposition:Home with Family   Follow up appointments:Pt will follow up with recommended physicians as needed   DME needed:N/A  [place]portation [place] [id number]  [facility] will assist with transport at the time of d/c   Keys or means to access home:      Family will provide keys to enter the home   IM Medicare letter:N/A  Caregiver Contact:Shelley BaltazarQxflqhqj-[phone]  Discharge Caregiver contacted prior to discharge? Russell Marquez will be contacted at the time of d/c. CM will staff case with MD to verify if pt is ready for d/c on today. Pt will return home with family at the time of d/c. CM will continue to follow.     Cesar Ayoub, KATY, [address] [place] [address]

## 2021-03-15 NOTE — PROGRESS NOTES
End of Shift Note    Bedside shift change report given to [facility] (oncoming nurse) by Abilio Alcazar RN (offgoing nurse).   Report included the following information SBAR    Shift worked:  7p-7a   Shift summary and any significant changes:     none       Concerns for physician to address:  none   [employer] phone for oncoming shift:   [phone]     Patient Information  Lukas Cassidy  55 y.o.  3/5/2021  8:28 PM by Quynh Leslie MD. Lukas Cassidy was admitted from Home    Problem List  Patient Active Problem List    Diagnosis Date Noted    Acute on chronic combined systolic and diastolic ACC/AHA stage C congestive heart failure ([address] 03/08/2021    Hypertensive urgency 03/08/2021    CKD (chronic kidney disease) 03/08/2021    CHF exacerbation ([address] 03/05/2021    Acute CHF (congestive heart failure) ([address] 51/33/1983    Metabolic encephalopathy 26/64/8320    Osteomyelitis of left foot ([address] 02/28/2020    MRSA bacteremia 02/28/2020    Secondary hyperaldosteronism ([address] 02/28/2020    Hypokalemia 02/28/2020    DM (diabetes mellitus), type 2 ([address] 02/28/2020    HTN (hypertension) 02/28/2020    Cardiomyopathy ([address] 68/92/4797    Acute systolic heart failure ([address] 02/21/2020    Substance abuse ([address] 02/21/2020    Severe sepsis ([address] 02/19/2020     Past Medical History:   Diagnosis Date    Acute systolic heart failure ([address] 2/21/2020    Cardiomyopathy ([address] 2/21/2020    Diabetes ([address]     Encounter to establish care with new doctor 10/11/2018    Hypertension     Neuropathy     Sciatica     Substance abuse ([address] 2/21/2020       Core Measures:  CVA: No Not applicable  CHF:Yes Yes  PNA:No Not applicable    Activity:  Activity Level: Up ad melly  Number times ambulated in hallways past shift: 0  Number of times OOB to chair past shift: 5    Cardiac:   Cardiac Monitoring: Yes      Cardiac Rhythm: Normal sinus rhythm    Access:   Current line(s): PIV       Genitourinary:   Urinary status: voiding      Respiratory:   O2 Device: Room air  Chronic home O2 use?: NO  Incentive spirometer at bedside: N/A       GI:  Last Bowel Movement Date: 03/14/21  Current diet:  DIET ONE TIME MESSAGE  DIET DIABETIC CONSISTENT CARB Regular  DIET ONE TIME MESSAGE  DIET ONE TIME MESSAGE  DIET ONE TIME MESSAGE  Passing flatus: YES  Tolerating current diet: YES  % Diet Eaten: 100 %    Pain Management:   Patient states pain is manageable on current regimen: YES    Skin:  Sarwat Score: 20  Interventions: increase time out of bed and PT/OT consult    Patient Safety:  Fall Score:  Total Score: 2  Interventions: bed/chair alarm, assistive device (walker, cane, etc) and gripper socks  High Fall Risk: Yes    DVT prophylaxis:  DVT prophylaxis Med- Yes  DVT prophylaxis SCD or BERTO- No     Wounds: (If Applicable)  Wounds- No      Active Consults:  IP CONSULT TO HOSPITALIST  IP CONSULT TO CARDIOLOGY  IP CONSULT TO NEPHROLOGY    Length of Stay:  Expected LOS: 4d 0h  Actual LOS: 10  Discharge Plan: Yes Home      Cathy Torres RN

## 2021-03-15 NOTE — PROGRESS NOTES
[address] [place] [address]  [phone]      Cardiology Progress Note      3/15/2021 1030AM    Admit Date: 3/5/2021    Admit Diagnosis:   CHF exacerbation ([facility] [I50.9]    Interval History/Subjective:     Nancy Mares is a 55 y.o. male admitted with CHF exacerbation ([facility] [I50.9]    -BP slightly elevated  -creat up to 3.59   -no weight; I/O incomplete   -Mr. Josue Messer is feeling good today.   He denies pain or SOB     Visit Vitals  BP (!) 148/85   Pulse 86   Temp 98.9 °F (37.2 °C)   Resp 20   Ht 5' 7\" (1.702 m)   Wt 89.4 kg (197 lb)   SpO2 90%   BMI 30.85 kg/m²       Current Facility-Administered Medications   Medication Dose Route Frequency    hydrALAZINE (APRESOLINE) tablet 75 mg  75 mg Oral TID    bumetanide (BUMEX) 0.25 mg/mL infusion  1 mg/hr IntraVENous CONTINUOUS    isosorbide mononitrate ER (IMDUR) tablet 60 mg  60 mg Oral DAILY    carvediloL (COREG) tablet 25 mg  25 mg Oral BID WITH MEALS    cloNIDine HCL (CATAPRES) tablet 0.1 mg  0.1 mg Oral BID    [Held by provider] potassium chloride (K-DUR, KLOR-CON) SR tablet 20 mEq  20 mEq Oral DAILY    ondansetron (ZOFRAN) injection 4 mg  4 mg IntraVENous Q6H PRN    acetaminophen (TYLENOL) tablet 650 mg  650 mg Oral Q6H PRN    acetaminophen (TYLENOL) suppository 650 mg  650 mg Rectal Q6H PRN    aspirin delayed-release tablet 81 mg  81 mg Oral DAILY    insulin lispro (HUMALOG) injection   SubCUTAneous AC&HS    glucose chewable tablet 16 g  4 Tab Oral PRN    dextrose (D50W) injection syrg 12.5-25 g  12.5-25 g IntraVENous PRN    glucagon (GLUCAGEN) injection 1 mg  1 mg IntraMUSCular PRN    labetaloL (NORMODYNE;TRANDATE) injection 10 mg  10 mg IntraVENous Q4H PRN    heparin (porcine) injection 5,000 Units  5,000 Units SubCUTAneous Q8H    amLODIPine (NORVASC) tablet 10 mg  10 mg Oral DAILY    albuterol-ipratropium (DUO-NEB) 2.5 MG-0.5 MG/3 ML  3 mL Nebulization Q6H PRN    atorvastatin (LIPITOR) tablet 20 mg  20 mg Oral QHS  nitroglycerin (NITROSTAT) tablet 0.4 mg  0.4 mg SubLINGual PRN    acetaminophen (TYLENOL) tablet 650 mg  650 mg Oral Q6H PRN       Objective:      Physical Exam:  General Appearance:  obese adult AAM sitting in chair in NAD   Chest:   clear/dim  Cardiovascular:  Regular rate and rhythm, no murmur. Abdomen:   Soft, non-tender, bowel sounds are active. Extremities: weak BLE pulses; 1+ pitting edema BLE  Skin:  Warm and dry. thickened BLE     Data Review:   No results for input(s): WBC, HGB, HCT, PLT, HGBEXT, HCTEXT, PLTEXT, HGBEXT, HCTEXT, PLTEXT in the last 72 hours. Recent Labs     03/15/21  0352 03/14/21  0321 03/13/21  0256    138 138   K 4.3 4.4 5.0    106 110*   CO2 26 27 24   * 127* 122*   BUN 79* 67* 57*   CREA 3.59* 3.33* 3.23*   CA 9.0 8.8 8.4*   MG  --   --  2.1       No results for input(s): TROIQ, CPK, CKMB in the last 72 hours. Intake/Output Summary (Last 24 hours) at 3/15/2021 1126  Last data filed at 3/14/2021 2023  Gross per 24 hour   Intake --   Output 200 ml   Net -200 ml        Telemetry: SR  EKG:SR; t wave inv lateral leads  ECHO:  EF 35-40%; mod concentric hypertrophy; mild TR; mild pHTN  Cxray: edema    Assessment:     Principal Problem:    Hypertensive urgency (3/8/2021)    Active Problems:    DM (diabetes mellitus), type 2 ([facility] (2/28/2020)      [facility] ([facility] (3/5/2021)      Acute on chronic combined systolic and diastolic ACC/AHA stage C congestive heart failure ([facility] (3/8/2021)      CKD (chronic kidney disease) (3/8/2021)        Plan:       Hypertensive urgency:   improved. Non compliance. + cocaine and opiates. · Continue amlodipine, coreg, clonidine, hydralazine, imdur   · Increase hydralazine       Acute on chronic combined systolic and diastolic heart failure: improving. 2/2 hypertensive urgency. Troponin negative.  +cocaine and opiates. EF 35-40%   · HTN improved  · BB.   No ACEi/ARB due to CKD  · On bumex drip per renal      CKD:  Creat worsening   · neprhrology notes reviewed   · Avoid ACEi/ARB      Drug abuse:  +cocaine and opiates   · Continue to   · Any withdrawal treatment per hospitalist         Sunshine Abdul NP  DNP, RN, [facility] Cardiology    3/15/2021         Patient seen, examined by me personally. Plan discussed as detailed. Agree with note as outlined by  NP with modifications as noted. My independent physical exam reveals : Physical Exam   Constitutional: He is oriented to person, place, and time. He appears well-developed and well-nourished. HENT:   Head: Normocephalic. Cardiovascular: Normal heart sounds. Exam reveals no gallop and no friction rub. Pulmonary/Chest: Effort normal and breath sounds normal.   Musculoskeletal:         General: Edema present. Neurological: He is alert and oriented to person, place, and time. Psychiatric: He has a normal mood and affect. No additional findings noted. Agree with plan as outlined above with modifications as noted.      Kourtney Chadwick MD

## 2021-03-16 LAB
ALBUMIN SERPL ELPH-MCNC: 2.8 G/DL (ref 2.9–4.4)
ALBUMIN SERPL-MCNC: 2.9 G/DL (ref 3.5–5)
ALBUMIN/GLOB SERPL: 0.6 {RATIO} (ref 1.1–2.2)
ALBUMIN/GLOB SERPL: 0.7 {RATIO} (ref 0.7–1.7)
ALP SERPL-CCNC: 109 U/L (ref 45–117)
ALPHA1 GLOB SERPL ELPH-MCNC: 0.3 G/DL (ref 0–0.4)
ALPHA2 GLOB SERPL ELPH-MCNC: 1 G/DL (ref 0.4–1)
ALT SERPL-CCNC: 16 U/L (ref 12–78)
ANION GAP SERPL CALC-SCNC: 5 MMOL/L (ref 5–15)
AST SERPL-CCNC: 24 U/L (ref 15–37)
B-GLOBULIN SERPL ELPH-MCNC: 1.1 G/DL (ref 0.7–1.3)
BILIRUB SERPL-MCNC: 0.3 MG/DL (ref 0.2–1)
BUN SERPL-MCNC: 80 MG/DL (ref 6–20)
BUN/CREAT SERPL: 21 (ref 12–20)
CALCIUM SERPL-MCNC: 8.9 MG/DL (ref 8.5–10.1)
CHLORIDE SERPL-SCNC: 103 MMOL/L (ref 97–108)
CO2 SERPL-SCNC: 29 MMOL/L (ref 21–32)
CREAT SERPL-MCNC: 3.74 MG/DL (ref 0.7–1.3)
CRYOGLOB SER QL 1D COLD INC: NORMAL
GAMMA GLOB SERPL ELPH-MCNC: 1.4 G/DL (ref 0.4–1.8)
GLOBULIN SER CALC-MCNC: 3.9 G/DL (ref 2.2–3.9)
GLOBULIN SER CALC-MCNC: 4.9 G/DL (ref 2–4)
GLUCOSE BLD STRIP.AUTO-MCNC: 117 MG/DL (ref 65–100)
GLUCOSE BLD STRIP.AUTO-MCNC: 140 MG/DL (ref 65–100)
GLUCOSE BLD STRIP.AUTO-MCNC: 149 MG/DL (ref 65–100)
GLUCOSE SERPL-MCNC: 133 MG/DL (ref 65–100)
M PROTEIN SERPL ELPH-MCNC: ABNORMAL G/DL
PHOSPHATE SERPL-MCNC: 5.8 MG/DL (ref 2.6–4.7)
POTASSIUM SERPL-SCNC: 3.9 MMOL/L (ref 3.5–5.1)
PROT SERPL-MCNC: 6.7 G/DL (ref 6–8.5)
PROT SERPL-MCNC: 7.8 G/DL (ref 6.4–8.2)
SERVICE CMNT-IMP: ABNORMAL
SODIUM SERPL-SCNC: 137 MMOL/L (ref 136–145)

## 2021-03-16 PROCEDURE — P9047 ALBUMIN (HUMAN), 25%, 50ML: HCPCS | Performed by: INTERNAL MEDICINE

## 2021-03-16 PROCEDURE — 74011636637 HC RX REV CODE- 636/637: Performed by: NURSE PRACTITIONER

## 2021-03-16 PROCEDURE — 80053 COMPREHEN METABOLIC PANEL: CPT

## 2021-03-16 PROCEDURE — 94760 N-INVAS EAR/PLS OXIMETRY 1: CPT

## 2021-03-16 PROCEDURE — 74011250637 HC RX REV CODE- 250/637: Performed by: INTERNAL MEDICINE

## 2021-03-16 PROCEDURE — APPSS45 APP SPLIT SHARED TIME 31-45 MINUTES: Performed by: NURSE PRACTITIONER

## 2021-03-16 PROCEDURE — 84100 ASSAY OF PHOSPHORUS: CPT

## 2021-03-16 PROCEDURE — 36415 COLL VENOUS BLD VENIPUNCTURE: CPT

## 2021-03-16 PROCEDURE — 74011250637 HC RX REV CODE- 250/637: Performed by: NURSE PRACTITIONER

## 2021-03-16 PROCEDURE — 74011250636 HC RX REV CODE- 250/636: Performed by: NURSE PRACTITIONER

## 2021-03-16 PROCEDURE — 74011000250 HC RX REV CODE- 250: Performed by: INTERNAL MEDICINE

## 2021-03-16 PROCEDURE — 82962 GLUCOSE BLOOD TEST: CPT

## 2021-03-16 PROCEDURE — 65660000000 HC RM CCU STEPDOWN

## 2021-03-16 PROCEDURE — 99232 SBSQ HOSP IP/OBS MODERATE 35: CPT | Performed by: INTERNAL MEDICINE

## 2021-03-16 PROCEDURE — 74011250636 HC RX REV CODE- 250/636: Performed by: INTERNAL MEDICINE

## 2021-03-16 RX ADMIN — CARVEDILOL 25 MG: 12.5 TABLET, FILM COATED ORAL at 16:18

## 2021-03-16 RX ADMIN — HYDRALAZINE HYDROCHLORIDE 75 MG: 50 TABLET, FILM COATED ORAL at 09:20

## 2021-03-16 RX ADMIN — HYDRALAZINE HYDROCHLORIDE 75 MG: 50 TABLET, FILM COATED ORAL at 22:22

## 2021-03-16 RX ADMIN — BUMETANIDE 1 MG/HR: 0.25 INJECTION INTRAMUSCULAR; INTRAVENOUS at 03:21

## 2021-03-16 RX ADMIN — POTASSIUM CHLORIDE 20 MEQ: 20 TABLET, EXTENDED RELEASE ORAL at 09:21

## 2021-03-16 RX ADMIN — CARVEDILOL 25 MG: 12.5 TABLET, FILM COATED ORAL at 09:21

## 2021-03-16 RX ADMIN — HEPARIN SODIUM 5000 UNITS: 5000 INJECTION INTRAVENOUS; SUBCUTANEOUS at 13:33

## 2021-03-16 RX ADMIN — CLONIDINE HYDROCHLORIDE 0.1 MG: 0.1 TABLET ORAL at 09:21

## 2021-03-16 RX ADMIN — CLONIDINE HYDROCHLORIDE 0.1 MG: 0.1 TABLET ORAL at 17:45

## 2021-03-16 RX ADMIN — INSULIN LISPRO 2 UNITS: 100 INJECTION, SOLUTION INTRAVENOUS; SUBCUTANEOUS at 12:21

## 2021-03-16 RX ADMIN — HEPARIN SODIUM 5000 UNITS: 5000 INJECTION INTRAVENOUS; SUBCUTANEOUS at 06:14

## 2021-03-16 RX ADMIN — HEPARIN SODIUM 5000 UNITS: 5000 INJECTION INTRAVENOUS; SUBCUTANEOUS at 22:22

## 2021-03-16 RX ADMIN — ASPIRIN 81 MG: 81 TABLET, COATED ORAL at 09:21

## 2021-03-16 RX ADMIN — ISOSORBIDE MONONITRATE 60 MG: 30 TABLET, EXTENDED RELEASE ORAL at 09:21

## 2021-03-16 RX ADMIN — ATORVASTATIN CALCIUM 20 MG: 20 TABLET, FILM COATED ORAL at 22:22

## 2021-03-16 RX ADMIN — HYDRALAZINE HYDROCHLORIDE 75 MG: 50 TABLET, FILM COATED ORAL at 16:18

## 2021-03-16 RX ADMIN — AMLODIPINE BESYLATE 10 MG: 5 TABLET ORAL at 09:20

## 2021-03-16 RX ADMIN — ALBUMIN (HUMAN) 12.5 G: 0.25 INJECTION, SOLUTION INTRAVENOUS at 09:21

## 2021-03-16 NOTE — PROGRESS NOTES
Hospitalist Progress Note    NAME: Titi Riley   :  1965   MRN:  [id number]       Assessment / Plan:  Acute on Chronic systolic & diastolic CHF exacerbation POA  Causing Bilateral pitting edema, Pulmonary Edema POA  Nephrotic syndrome  Anasarca  Medication  Non compliance POA    -Patient has lost 10 pounds over the last 3 to 4 days, current weight is around 197 pounds. Creatinine is 3.7, increasing. On Bumex drip. As per nephrology patient may be heading towards hemodialysis but not a good OP term dialysis candidate due to poor lifestyle. If the creatinine increased tomorrow then likely stopping the drip. Patient may have to live with the same edema.  -During admission was around 215 pounds.  -Cardiology on board, appreciate input. -Nephrology on board-appreciate input.  -Random protein  216, creatinine 30, protein/creatinine ratio-7.1.  -Gave the albumin IV again today for better diuresis. On Bumex 2 mg IV twice daily. -VIRIDIANA negative, C3/C ending. .  -Kappa/lambda ratio-2.08, free kappa light chain to 41.5, free lambda light chain 116.1.  -Case management consulted for help with the medications on discharge. Hypertension (uncontrolled) POA  Controlled on Norvasc daily Coreg BID Hydralazine 25 mg TID Clonidine 0.1 mg BID    LLQ pain, POA (Improved)  Abdominal distention, POA   Bilateral leg weakness, POA ( improved)  CT Abd/Pelv WO contrast: Third spacing with soft tissue edema, bilateral pleural effusions, and ascites. Retained foreign bodies in the skin as described. Small air pocket associated with the distal stomach. It is difficult to determine if this is intraluminal or extraluminal without oral contrast. Recommend repeat study with oral contrast  Per PT no recommendations at this time. Monitor    Acute kidney injury on CKD stage 4  -Creatinine 3.6, plan updated above. -Nephrology on board-appreciate input. m.     Type 2 Diabetes POA    Sliding scale coverage    Diabetic Diet Glucose Monitoring    Hyperlipidemia POA   Lipitor daily  Peripheral Neuropathy POA   secondary to diabetes  Hx of Left great toe, #2, and  5 th toe amputation  Hx of MRSA  Nicotine Abuse POA   Nicotine Patch  Substance Abuse POA (heroin)   Last used Heroine on Friday 3/5/21 reported by patient   + toxicology screen for cocaine and opiates   Substance abuse protocol: received 30 mg of methadone on 3/8/21   He may receive 15 mg methadone daily x 2 doses   He states he has been going to [facility] and is to start an in patient rehab. Case management input appreciated      30.0 - 39.9 Obese / Body mass index is 30.39 kg/m². Code status: Full  Prophylaxis: Hep SQ  Recommended Disposition: Home with Medication coverage once cleared by nephrology- outpatient substance abuse rehab options per CM     Subjective:     Chief Complaint / Reason for Physician Visit  Seen today, way on the scale which shows weight around 197 pounds. Patient belly is soft but still has lot of edema. Legs are still tense with edema. No shortness of breath on exertion. Objective:     VITALS:   Last 24hrs VS reviewed since prior progress note.  Most recent are:  Patient Vitals for the past 24 hrs:   Temp Pulse Resp BP SpO2   03/16/21 1547 -- 82 -- -- --   03/16/21 1532 98.4 °F (36.9 °C) 84 20 (!) 144/78 96 %   03/16/21 1200 -- 78 -- -- --   03/16/21 1158 97.8 °F (36.6 °C) 88 20 (!) 148/76 96 %   03/16/21 0800 -- 85 -- -- --   03/16/21 0742 98 °F (36.7 °C) 84 18 (!) 151/79 94 %   03/16/21 0258 97.9 °F (36.6 °C) 82 18 (!) 150/82 94 %   03/15/21 2230 98.2 °F (36.8 °C) 81 18 (!) 137/58 99 %   03/15/21 2025 97.9 °F (36.6 °C) 79 18 128/60 97 %   03/15/21 1600 -- 80 -- -- --       Intake/Output Summary (Last 24 hours) at 3/16/2021 1556  Last data filed at 3/16/2021 1032  Gross per 24 hour   Intake 840 ml   Output 3120 ml   Net -2280 ml        I had a face to face encounter and independently examined this patient on 3/16/2021, as outlined below:  PHYSICAL EXAM:  General:  Alert, cooperative, no acute distress, obese  EENT:   Anicteric sclerae. normocephalic  Resp:  Breath sounds equal bilaterally. No accessory muscle use  CV:  Regular  rhythm, tense edema bilateral lower extremity up to thigh, abdomen swollen secondary to ascites but soft. GI:  Soft, distended, Non tender. +Bowel sounds  Neurologic:  Alert and oriented X 3, normal speech,   Psych:   Good insight. Not anxious nor agitated  Skin:  No rashes. No jaundice    Reviewed most current lab test results and cultures  YES  Reviewed most current radiology test results   YES  Review and summation of old records today    NO  Reviewed patient's current orders and MAR    YES  PMH/ reviewed - no change compared to H&P  ________________________________________________________________________  Care Plan discussed with:    Comments   Patient x    Family      RN x    Care Manager x    Consultant  x                     x Multidiciplinary team rounds were held today with , nursing, pharmacist and clinical coordinator. Patient's plan of care was discussed; medications were reviewed and discharge planning was addressed. ________________________________________________________________________  Total Time: 26 mins  ________________________________________________________________________  Sole Chan MD     Procedures: see electronic medical records for all procedures/Xrays and details which were not copied into this note but were reviewed prior to creation of Plan. LABS:  I reviewed today's most current labs and imaging studies. Pertinent labs include:  No results for input(s): WBC, HGB, HCT, PLT, HGBEXT, HCTEXT, PLTEXT, HGBEXT, HCTEXT, PLTEXT in the last 72 hours.   Recent Labs     03/16/21  0305 03/15/21  0352 03/14/21  0321    137 138   K 3.9 4.3 4.4    103 106   CO2 29 26 27   * 140* 127*   BUN 80* 79* 67*   CREA 3.74* 3.59* 3.33*   CA 8.9 9.0 8.8   PHOS 5.8*  --   -- ALB 2.9*  --   --    TBILI 0.3  --   --    ALT 16  --   --        Signed: Lata Ackerman MD

## 2021-03-16 NOTE — PROGRESS NOTES
End of Shift Note    Bedside shift change report given to Wei Cormier (oncoming nurse) by Bryant Sandifer, RN (offgoing nurse).   Report included the following information SBAR    Shift worked: DAYS   Shift summary and any significant changes:    -PATIENT TOOK 2 LAPS IN VARGAS TODAY  -PATIENT BUMEX D/C  -PATIENT AGITATED WITH DIET CHANGE  -PATIENT REFUSED PM INSULIN     Concerns for physician to address: NONE   Zone phone for oncoming shift:  [phone]     Patient Information  Shayan Smith  55 y.o.  3/5/2021  8:28 PM by Charlene Aiken MD. Shayan Smith was admitted from Home    Problem List  Patient Active Problem List    Diagnosis Date Noted    Acute on chronic combined systolic and diastolic ACC/AHA stage C congestive heart failure ([address] 03/08/2021    Hypertensive urgency 03/08/2021    CKD (chronic kidney disease) 03/08/2021    CHF exacerbation ([address] 03/05/2021    Acute CHF (congestive heart failure) ([address] 49/01/5144    Metabolic encephalopathy 14/55/5946    Osteomyelitis of left foot ([address] 02/28/2020    MRSA bacteremia 02/28/2020    Secondary hyperaldosteronism ([address] 02/28/2020    Hypokalemia 02/28/2020    DM (diabetes mellitus), type 2 ([address] 02/28/2020    HTN (hypertension) 02/28/2020    Cardiomyopathy ([address] 82/01/3196    Acute systolic heart failure ([address] 02/21/2020    Substance abuse ([address] 02/21/2020    Severe sepsis ([address] 02/19/2020     Past Medical History:   Diagnosis Date    Acute systolic heart failure ([address] 2/21/2020    Cardiomyopathy ([address] 2/21/2020    Diabetes ([address]     Encounter to establish care with new doctor 10/11/2018    Hypertension     Neuropathy     Sciatica     Substance abuse ([address] 2/21/2020       Core Measures:  CVA: No Not applicable  CHF:Yes Yes  PNA:No Not applicable    Activity:  Activity Level: Up ad melly  Number times ambulated in hallways past shift: 0  Number of times OOB to chair past shift: 5    Cardiac:   Cardiac Monitoring: Yes      Cardiac Rhythm: Normal sinus rhythm    Access:   Current line(s): PIV       Genitourinary:   Urinary status: voiding      Respiratory:   O2 Device: Room air  Chronic home O2 use?: NO  Incentive spirometer at bedside: N/A       GI:  Last Bowel Movement Date: 03/14/21  Current diet:  DIET ONE TIME MESSAGE  DIET ONE TIME MESSAGE  DIET ONE TIME MESSAGE  DIET ONE TIME MESSAGE  DIET ONE TIME MESSAGE  DIET DIABETIC CONSISTENT CARB Regular; 70-70-70 (House)  Passing flatus: YES  Tolerating current diet: YES  % Diet Eaten: 100 %    Pain Management:   Patient states pain is manageable on current regimen: YES    Skin:  Sarwat Score: 21  Interventions: increase time out of bed and PT/OT consult    Patient Safety:  Fall Score:  Total Score: 2  Interventions: bed/chair alarm, assistive device (walker, cane, etc) and gripper socks  High Fall Risk: Yes    DVT prophylaxis:  DVT prophylaxis Med- Yes  DVT prophylaxis SCD or BERTO- No     Wounds: (If Applicable)  Wounds- No      Active Consults:  IP CONSULT TO HOSPITALIST  IP CONSULT TO CARDIOLOGY  IP CONSULT TO NEPHROLOGY    Length of Stay:  Expected LOS: 4d 0h  Actual LOS: 11  Discharge Plan: Yes Bobbi Merino RN

## 2021-03-16 NOTE — PROGRESS NOTES
End of Shift Note    Bedside shift change report given to [facility] RN oncoming nurse) by Kehinde Martinez (offgoing nurse). Report included the following information SBAR, Kardex, MAR and Recent Results    Shift worked:  night   Shift summary and any significant changes:     1) patient questioned need to check evening BG because \"I haven't eaten since 8 this morning\". Reminded him I had just heated up a hot dog and baked beans for him and they were gone.        Concerns for physician to address:  none   Zone phone for oncoming shift:   none     Patient Information  Dima Torres  55 y.o.  3/5/2021  8:28 PM by Sera Walker MD. Dima Torres was admitted from Home    Problem List  Patient Active Problem List    Diagnosis Date Noted    Acute on chronic combined systolic and diastolic ACC/AHA stage C congestive heart failure ([address] 03/08/2021    Hypertensive urgency 03/08/2021    CKD (chronic kidney disease) 03/08/2021    CHF exacerbation ([address] 03/05/2021    Acute CHF (congestive heart failure) ([address] 14/18/8783    Metabolic encephalopathy 54/14/9581    Osteomyelitis of left foot ([address] 02/28/2020    MRSA bacteremia 02/28/2020    Secondary hyperaldosteronism ([address] 02/28/2020    Hypokalemia 02/28/2020    DM (diabetes mellitus), type 2 ([address] 02/28/2020    HTN (hypertension) 02/28/2020    Cardiomyopathy ([address] 87/43/1662    Acute systolic heart failure ([address] 02/21/2020    Substance abuse ([address] 02/21/2020    Severe sepsis ([address] 02/19/2020     Past Medical History:   Diagnosis Date    Acute systolic heart failure ([address] 2/21/2020    Cardiomyopathy ([address] 2/21/2020    Diabetes ([address]     Encounter to establish care with new doctor 10/11/2018    Hypertension     Neuropathy     Sciatica     Substance abuse ([address] 2/21/2020       Core Measures:  CVA: No Not applicable  CHF:Yes Yes  PNA:No Not applicable    Activity:  Activity Level: Up ad melly  Number times ambulated in hallways past shift: 0  Number of times OOB to chair past shift: 2    Cardiac:   Cardiac Monitoring: Yes      Cardiac Rhythm: Normal sinus rhythm    Access:   Current line(s): PIV   Central Line? No   PICC LINE? No     Genitourinary:   Urinary status: voiding  Urinary Catheter? No     Respiratory:   O2 Device: Room air  Chronic home O2 use?: NO  Incentive spirometer at bedside: NO       GI:  Last Bowel Movement Date: 03/14/21  Current diet:  DIET ONE TIME MESSAGE  DIET DIABETIC CONSISTENT CARB Regular  DIET ONE TIME MESSAGE  DIET ONE TIME MESSAGE  DIET ONE TIME MESSAGE  DIET ONE TIME MESSAGE  Passing flatus: YES  Tolerating current diet: YES  % Diet Eaten: 100 %    Pain Management:   Patient states pain is manageable on current regimen: YES    Skin:  Sarwat Score: 21  Interventions: increase time out of bed    Patient Safety:  Fall Score: Total Score: 2  Interventions: gripper socks  High Fall Risk: Yes    DVT prophylaxis:  DVT prophylaxis Med- Yes  DVT prophylaxis SCD or BERTO- No     Wounds: (If Applicable)  Wounds- No  Location none    Active Consults:  IP CONSULT TO HOSPITALIST  IP CONSULT TO CARDIOLOGY  IP CONSULT TO NEPHROLOGY    Length of Stay:  Expected LOS: 4d 0h  Actual LOS: 11  Discharge Plan: Yes CM following. Plan is d/c home when stable.   Still monitoring kidney issues, nephrologist has not signed off      Love Mena no

## 2021-03-16 NOTE — PROGRESS NOTES
NAME: Chely Mondragon        :  1965        MRN:  [id number]                        Assessment   :                                               Plan:  ROSEMARY on  progressive CKD    Probable DM nephropathy  Proteinuria/grade 3 albuminuria  Nephrotic   Anasarca  PSA (cocaine, opiates)     HTN  DM2  Anemia   Acute on chronic CHF  HCV  Creatinine worse today with needed diuresis attempt -- now 3.2 to 3.6     Very low Albumin (spot urine protein:creatinine ratio -- 7 grams) -- likely DM nephropathy; HCV +; neg VIRIDIANA, flc ratio 2.1 (unlikely to be of significance), HIV neg, SPEP (-); cryo  (-)    On bumex gtt (1 mg/hr) since 3/13 (I have discussed that he might need HD/UF; continue bumex gtt for now)--added iv albumin  Since 1.6     Daily weight, strict I&O, low salt diet, fluid restrict    Not sure what the longterm goal is if pt still actively doing drugs. Now with worsening renal failure in the setting of nephrotic syndrome. Last  Admission indicated he was using heroin. He may have to live with the edema. stopping hep gtt. Given his lifestyle poor HD candidate. Subjective:     Chief Complaint:  I'm better today    Review of Systems:    Symptom Y/N Comments  Symptom Y/N Comments   Fever/Chills    Chest Pain     Poor Appetite    Edema     Cough    Abdominal Pain     Sputum    Joint Pain     SOB/DE [place] CRUZ    Pruritis/Rash     Nausea/vomit    Tolerating PT/OT     Diarrhea    Tolerating Diet     Constipation    Other       Could not obtain due to:      Objective:     VITALS:   Last 24hrs VS reviewed since prior progress note.  Most recent are:  Visit Vitals  BP (!) 148/76 (BP 1 Location: Left upper arm, BP Patient Position: Sitting)   Pulse 78   Temp 97.8 °F (36.6 °C)   Resp 20   Ht 5' 7\" (1.702 m)   Wt 88 kg (194 lb 0.1 oz)   SpO2 96%   BMI 30.39 kg/m²       Intake/Output Summary (Last 24 hours) at 3/16/2021 1356  Last data filed at 3/16/2021 1032  Gross per 24 hour   Intake 840 ml   Output 3370 ml   Net -2530 ml      Telemetry Reviewed:     PHYSICAL EXAM:  General: NAD  No rales  (+) edema      Lab Data Reviewed: (see below)    Medications Reviewed: (see below)    PMH/SH reviewed - no change compared to H&P  ________________________________________________________________________  Care Plan discussed with:  Patient     Family      RN     Care Manager                    Consultant:          Comments   >50% of visit spent in counseling and coordination of care       ________________________________________________________________________  Elaine Ambrose MD     Procedures: see electronic medical records for all procedures/Xrays and details which  were not copied into this note but were reviewed prior to creation of Plan. LABS:  No results for input(s): WBC, HGB, HCT, PLT, HGBEXT, HCTEXT, PLTEXT, HGBEXT, HCTEXT, PLTEXT in the last 72 hours. Recent Labs     03/16/21  0305 03/15/21  0352 03/14/21  0321    137 138   K 3.9 4.3 4.4    103 106   CO2 29 26 27   BUN 80* 79* 67*   CREA 3.74* 3.59* 3.33*   * 140* 127*   CA 8.9 9.0 8.8   PHOS 5.8*  --   --      Recent Labs     03/16/21  0305      TP 7.8   ALB 2.9*   GLOB 4.9*     No results for input(s): INR, PTP, APTT, INREXT, INREXT in the last 72 hours. No results for input(s): FE, TIBC, PSAT, FERR in the last 72 hours. No results found for: FOL, RBCF   No results for input(s): PH, PCO2, PO2 in the last 72 hours. No results for input(s): CPK, CKMB in the last 72 hours.     No lab exists for component: TROPONINI  No components found for: [place]  Lab Results   Component Value Date/Time    Color YELLOW/STRAW 03/10/2021 02:56 PM    Appearance CLEAR 03/10/2021 02:56 PM    Specific gravity 1.011 03/10/2021 02:56 PM    pH (UA) 6.5 03/10/2021 02:56 PM    Protein 300 (A) 03/10/2021 02:56 PM    Glucose Negative 03/10/2021 02:56 PM    Ketone Negative 03/10/2021 02:56 PM Bilirubin Negative 03/10/2021 02:56 PM    Urobilinogen 0.2 03/10/2021 02:56 PM    Nitrites Negative 03/10/2021 02:56 PM    Leukocyte Esterase Negative 03/10/2021 02:56 PM    Epithelial cells FEW 03/10/2021 02:56 PM    Bacteria Negative 03/10/2021 02:56 PM    WBC 0-4 03/10/2021 02:56 PM    RBC 0-5 03/10/2021 02:56 PM       MEDICATIONS:  Current Facility-Administered Medications   Medication Dose Route Frequency    hydrALAZINE (APRESOLINE) tablet 75 mg  75 mg Oral TID    albumin human 25% (BUMINATE) solution 12.5 g  12.5 g IntraVENous Q12H    bumetanide (BUMEX) 0.25 mg/mL infusion  1 mg/hr IntraVENous CONTINUOUS    isosorbide mononitrate ER (IMDUR) tablet 60 mg  60 mg Oral DAILY    carvediloL (COREG) tablet 25 mg  25 mg Oral BID WITH MEALS    cloNIDine HCL (CATAPRES) tablet 0.1 mg  0.1 mg Oral BID    potassium chloride (K-DUR, KLOR-CON) SR tablet 20 mEq  20 mEq Oral DAILY    ondansetron (ZOFRAN) injection 4 mg  4 mg IntraVENous Q6H PRN    acetaminophen (TYLENOL) tablet 650 mg  650 mg Oral Q6H PRN    acetaminophen (TYLENOL) suppository 650 mg  650 mg Rectal Q6H PRN    aspirin delayed-release tablet 81 mg  81 mg Oral DAILY    insulin lispro (HUMALOG) injection   SubCUTAneous AC&HS    glucose chewable tablet 16 g  4 Tab Oral PRN    dextrose (D50W) injection syrg 12.5-25 g  12.5-25 g IntraVENous PRN    glucagon (GLUCAGEN) injection 1 mg  1 mg IntraMUSCular PRN    labetaloL (NORMODYNE;TRANDATE) injection 10 mg  10 mg IntraVENous Q4H PRN    heparin (porcine) injection 5,000 Units  5,000 Units SubCUTAneous Q8H    amLODIPine (NORVASC) tablet 10 mg  10 mg Oral DAILY    albuterol-ipratropium (DUO-NEB) 2.5 MG-0.5 MG/3 ML  3 mL Nebulization Q6H PRN    atorvastatin (LIPITOR) tablet 20 mg  20 mg Oral QHS    nitroglycerin (NITROSTAT) tablet 0.4 mg  0.4 mg SubLINGual PRN    acetaminophen (TYLENOL) tablet 650 mg  650 mg Oral Q6H PRN

## 2021-03-16 NOTE — PROGRESS NOTES
[address] [facility], [place], [address] [place] [address] Ne, [address] [facility]  [phone]      Cardiology Progress Note      3/16/2021 1040AM    Admit Date: 3/5/2021    Admit Diagnosis:   CHF exacerbation ([facility] [address] [I50.9]    Interval History/Subjective:     Stiven Arteaga is a 55 y.o. male admitted with CHF exacerbation ([facility] [I50.9]    -BP slightly elevated  -creat up to 3.74  -weight down 3# yesterday  -Mr. Kaleta Osgood is feeling good today.   He denies pain or SOB     Visit Vitals  BP (!) 151/79 (BP 1 Location: Left upper arm, BP Patient Position: Sitting)   Pulse 85   Temp 98 °F (36.7 °C)   Resp 18   Ht 5' 7\" (1.702 m)   Wt 88 kg (194 lb 0.1 oz)   SpO2 94%   BMI 30.39 kg/m²       Current Facility-Administered Medications   Medication Dose Route Frequency    hydrALAZINE (APRESOLINE) tablet 75 mg  75 mg Oral TID    albumin human 25% (BUMINATE) solution 12.5 g  12.5 g IntraVENous Q12H    bumetanide (BUMEX) 0.25 mg/mL infusion  1 mg/hr IntraVENous CONTINUOUS    isosorbide mononitrate ER (IMDUR) tablet 60 mg  60 mg Oral DAILY    carvediloL (COREG) tablet 25 mg  25 mg Oral BID WITH MEALS    cloNIDine HCL (CATAPRES) tablet 0.1 mg  0.1 mg Oral BID    potassium chloride (K-DUR, KLOR-CON) SR tablet 20 mEq  20 mEq Oral DAILY    ondansetron (ZOFRAN) injection 4 mg  4 mg IntraVENous Q6H PRN    acetaminophen (TYLENOL) tablet 650 mg  650 mg Oral Q6H PRN    acetaminophen (TYLENOL) suppository 650 mg  650 mg Rectal Q6H PRN    aspirin delayed-release tablet 81 mg  81 mg Oral DAILY    insulin lispro (HUMALOG) injection   SubCUTAneous AC&HS    glucose chewable tablet 16 g  4 Tab Oral PRN    dextrose (D50W) injection syrg 12.5-25 g  12.5-25 g IntraVENous PRN    glucagon (GLUCAGEN) injection 1 mg  1 mg IntraMUSCular PRN    labetaloL (NORMODYNE;TRANDATE) injection 10 mg  10 mg IntraVENous Q4H PRN    heparin (porcine) injection 5,000 Units  5,000 Units SubCUTAneous Q8H    amLODIPine (NORVASC) tablet 10 mg  10 mg Oral DAILY    albuterol-ipratropium (DUO-NEB) 2.5 MG-0.5 MG/3 ML  3 mL Nebulization Q6H PRN    atorvastatin (LIPITOR) tablet 20 mg  20 mg Oral QHS    nitroglycerin (NITROSTAT) tablet 0.4 mg  0.4 mg SubLINGual PRN    acetaminophen (TYLENOL) tablet 650 mg  650 mg Oral Q6H PRN       Objective:      Physical Exam:  General Appearance:  obese adult AAM sitting in chair in NAD   Chest:   slight basilar crackles   Cardiovascular:  Regular rate and rhythm, no murmur. Abdomen:   Soft, non-tender, bowel sounds are active. Extremities: weak BLE pulses; 1-2+ pitting edema BLE  Skin:  Warm and dry. thickened BLE     Data Review:   No results for input(s): WBC, HGB, HCT, PLT, HGBEXT, HCTEXT, PLTEXT, HGBEXT, HCTEXT, PLTEXT in the last 72 hours. Recent Labs     03/16/21  0305 03/15/21  0352 03/14/21  0321    137 138   K 3.9 4.3 4.4    103 106   CO2 29 26 27   * 140* 127*   BUN 80* 79* 67*   CREA 3.74* 3.59* 3.33*   CA 8.9 9.0 8.8   PHOS 5.8*  --   --    ALB 2.9*  --   --    TBILI 0.3  --   --    ALT 16  --   --        No results for input(s): TROIQ, CPK, CKMB in the last 72 hours. Intake/Output Summary (Last 24 hours) at 3/16/2021 1137  Last data filed at 3/16/2021 1032  Gross per 24 hour   Intake 840 ml   Output 3895 ml   Net -3055 ml        Telemetry: SR  EKG:SR; t wave inv lateral leads  ECHO:  EF 35-40%; mod concentric hypertrophy; mild TR; mild pHTN  Cxray: edema    Assessment:     Principal Problem:    Hypertensive urgency (3/8/2021)    Active Problems:    DM (diabetes mellitus), type 2 ([facility] [address] (2/28/2020)      CHF exacerbation ([facility] [address] (3/5/2021)      Acute on chronic combined systolic and diastolic ACC/AHA stage C congestive heart failure ([facility] [address] (3/8/2021)      CKD (chronic kidney disease) (3/8/2021)        Plan:       Hypertensive urgency:   improved. Non compliance. + cocaine and opiates.    · Continue amlodipine, coreg, clonidine, hydralazine, imdur   · Slightly elevated this morning likely 2/2 holding of clonidine last night - changed hold parameters       Acute on chronic combined systolic and diastolic heart failure: improving. 2/2 hypertensive urgency. Troponin negative.  +cocaine and opiates. EF 35-40%   · HTN improved  · BB. No ACEi/ARB due to CKD  · On bumex drip per renal      CKD:  Creat worsening   · neprhrology notes reviewed   · Avoid ACEi/ARB      Drug abuse:  +cocaine and opiates   · Continue to   · Any withdrawal treatment per hospitalist         No additional recommendations at this time. Will see again if asked. Please call with questions. John Sierra, MARY  DNP, RN, [facility]    3/16/2021         Patient seen, examined by me personally. Plan discussed as detailed. Agree with note as outlined by  NP with modifications as noted. My independent physical exam reveals : Physical Exam   Constitutional: He is oriented to person, place, and time. He appears well-developed and well-nourished. Neck: Neck supple. Cardiovascular:   Murmur heard. Pulmonary/Chest: Effort normal and breath sounds normal.   Musculoskeletal:         General: Edema present. Neurological: He is alert and oriented to person, place, and time. Psychiatric: He has a normal mood and affect. No additional findings noted. Agree with plan as outlined above with modifications as noted.      Paulino Gipson MD

## 2021-03-17 VITALS
RESPIRATION RATE: 18 BRPM | HEIGHT: 67 IN | DIASTOLIC BLOOD PRESSURE: 85 MMHG | OXYGEN SATURATION: 96 % | BODY MASS INDEX: 29.58 KG/M2 | SYSTOLIC BLOOD PRESSURE: 149 MMHG | TEMPERATURE: 98 F | HEART RATE: 76 BPM | WEIGHT: 188.49 LBS

## 2021-03-17 LAB
AMPHET UR QL SCN: NEGATIVE
ANION GAP SERPL CALC-SCNC: 5 MMOL/L (ref 5–15)
BARBITURATES UR QL SCN: NEGATIVE
BENZODIAZ UR QL: NEGATIVE
BUN SERPL-MCNC: 84 MG/DL (ref 6–20)
BUN/CREAT SERPL: 23 (ref 12–20)
CALCIUM SERPL-MCNC: 8.9 MG/DL (ref 8.5–10.1)
CANNABINOIDS UR QL SCN: NEGATIVE
CHLORIDE SERPL-SCNC: 102 MMOL/L (ref 97–108)
CO2 SERPL-SCNC: 31 MMOL/L (ref 21–32)
COCAINE UR QL SCN: POSITIVE
CREAT SERPL-MCNC: 3.67 MG/DL (ref 0.7–1.3)
DRUG SCRN COMMENT,DRGCM: ABNORMAL
GLUCOSE BLD STRIP.AUTO-MCNC: 112 MG/DL (ref 65–100)
GLUCOSE BLD STRIP.AUTO-MCNC: 150 MG/DL (ref 65–100)
GLUCOSE SERPL-MCNC: 113 MG/DL (ref 65–100)
INR PPP: 1.1 (ref 0.9–1.1)
METHADONE UR QL: NEGATIVE
OPIATES UR QL: POSITIVE
PCP UR QL: NEGATIVE
POTASSIUM SERPL-SCNC: 4.1 MMOL/L (ref 3.5–5.1)
PROTHROMBIN TIME: 11.9 SEC (ref 9–11.1)
SERVICE CMNT-IMP: ABNORMAL
SERVICE CMNT-IMP: ABNORMAL
SODIUM SERPL-SCNC: 138 MMOL/L (ref 136–145)

## 2021-03-17 PROCEDURE — 74011250636 HC RX REV CODE- 250/636: Performed by: NURSE PRACTITIONER

## 2021-03-17 PROCEDURE — 85610 PROTHROMBIN TIME: CPT

## 2021-03-17 PROCEDURE — P9047 ALBUMIN (HUMAN), 25%, 50ML: HCPCS | Performed by: INTERNAL MEDICINE

## 2021-03-17 PROCEDURE — 94760 N-INVAS EAR/PLS OXIMETRY 1: CPT

## 2021-03-17 PROCEDURE — 80048 BASIC METABOLIC PNL TOTAL CA: CPT

## 2021-03-17 PROCEDURE — 82962 GLUCOSE BLOOD TEST: CPT

## 2021-03-17 PROCEDURE — 80307 DRUG TEST PRSMV CHEM ANLYZR: CPT

## 2021-03-17 PROCEDURE — 74011250637 HC RX REV CODE- 250/637: Performed by: NURSE PRACTITIONER

## 2021-03-17 PROCEDURE — 74011250636 HC RX REV CODE- 250/636: Performed by: INTERNAL MEDICINE

## 2021-03-17 PROCEDURE — 74011636637 HC RX REV CODE- 636/637: Performed by: NURSE PRACTITIONER

## 2021-03-17 PROCEDURE — 74011250637 HC RX REV CODE- 250/637: Performed by: INTERNAL MEDICINE

## 2021-03-17 PROCEDURE — 36415 COLL VENOUS BLD VENIPUNCTURE: CPT

## 2021-03-17 RX ORDER — ATORVASTATIN CALCIUM 20 MG/1
20 TABLET, FILM COATED ORAL EVERY EVENING
Qty: 30 TAB | Refills: 0 | Status: SHIPPED | OUTPATIENT
Start: 2021-03-17 | End: 2021-12-03

## 2021-03-17 RX ORDER — CLONIDINE HYDROCHLORIDE 0.1 MG/1
0.1 TABLET ORAL 2 TIMES DAILY
Qty: 60 TAB | Refills: 0 | Status: SHIPPED | OUTPATIENT
Start: 2021-03-17 | End: 2021-06-04

## 2021-03-17 RX ORDER — HYDRALAZINE HYDROCHLORIDE 25 MG/1
75 TABLET, FILM COATED ORAL 3 TIMES DAILY
Qty: 90 TAB | Refills: 0 | Status: SHIPPED | OUTPATIENT
Start: 2021-03-17 | End: 2021-03-17

## 2021-03-17 RX ORDER — BUMETANIDE 2 MG/1
2 TABLET ORAL 3 TIMES DAILY
Qty: 90 TAB | Refills: 0 | Status: ON HOLD | OUTPATIENT
Start: 2021-03-17 | End: 2021-04-30 | Stop reason: SDUPTHER

## 2021-03-17 RX ORDER — BUMETANIDE 2 MG/1
2 TABLET ORAL 3 TIMES DAILY
Qty: 90 TAB | Refills: 0 | Status: SHIPPED | OUTPATIENT
Start: 2021-03-17 | End: 2021-03-17 | Stop reason: SDUPTHER

## 2021-03-17 RX ORDER — NITROGLYCERIN 0.4 MG/1
0.4 TABLET SUBLINGUAL AS NEEDED
Qty: 30 TAB | Refills: 0 | Status: ON HOLD | OUTPATIENT
Start: 2021-03-17 | End: 2021-06-04 | Stop reason: SDUPTHER

## 2021-03-17 RX ORDER — CLONIDINE HYDROCHLORIDE 0.1 MG/1
0.1 TABLET ORAL 2 TIMES DAILY
Qty: 60 TAB | Refills: 0 | Status: SHIPPED | OUTPATIENT
Start: 2021-03-17 | End: 2021-03-17

## 2021-03-17 RX ORDER — NITROGLYCERIN 0.4 MG/1
0.4 TABLET SUBLINGUAL AS NEEDED
Qty: 30 TAB | Refills: 0 | Status: SHIPPED | OUTPATIENT
Start: 2021-03-17 | End: 2021-03-17

## 2021-03-17 RX ORDER — ASPIRIN 81 MG/1
81 TABLET ORAL DAILY
Qty: 30 TAB | Refills: 0 | Status: SHIPPED | OUTPATIENT
Start: 2021-03-18 | End: 2021-03-17

## 2021-03-17 RX ORDER — ASPIRIN 81 MG/1
81 TABLET ORAL DAILY
Qty: 30 TAB | Refills: 0 | Status: SHIPPED | OUTPATIENT
Start: 2021-03-18 | End: 2021-12-03

## 2021-03-17 RX ORDER — AMLODIPINE BESYLATE 10 MG/1
10 TABLET ORAL DAILY
Qty: 30 TAB | Refills: 0 | Status: SHIPPED | OUTPATIENT
Start: 2021-03-17 | End: 2021-04-30

## 2021-03-17 RX ORDER — CARVEDILOL 12.5 MG/1
12.5 TABLET ORAL 2 TIMES DAILY
Qty: 30 TAB | Refills: 0 | Status: SHIPPED | OUTPATIENT
Start: 2021-03-17 | End: 2021-06-04

## 2021-03-17 RX ORDER — HYDRALAZINE HYDROCHLORIDE 25 MG/1
75 TABLET, FILM COATED ORAL 3 TIMES DAILY
Qty: 90 TAB | Refills: 0 | Status: SHIPPED | OUTPATIENT
Start: 2021-03-17 | End: 2021-03-18

## 2021-03-17 RX ORDER — ISOSORBIDE MONONITRATE 60 MG/1
60 TABLET, EXTENDED RELEASE ORAL DAILY
Qty: 30 TAB | Refills: 0 | Status: SHIPPED | OUTPATIENT
Start: 2021-03-18 | End: 2021-12-03

## 2021-03-17 RX ORDER — ISOSORBIDE MONONITRATE 60 MG/1
60 TABLET, EXTENDED RELEASE ORAL DAILY
Qty: 30 TAB | Refills: 0 | Status: SHIPPED | OUTPATIENT
Start: 2021-03-18 | End: 2021-03-17

## 2021-03-17 RX ADMIN — HEPARIN SODIUM 5000 UNITS: 5000 INJECTION INTRAVENOUS; SUBCUTANEOUS at 13:16

## 2021-03-17 RX ADMIN — ISOSORBIDE MONONITRATE 60 MG: 30 TABLET, EXTENDED RELEASE ORAL at 09:44

## 2021-03-17 RX ADMIN — HYDRALAZINE HYDROCHLORIDE 75 MG: 50 TABLET, FILM COATED ORAL at 09:45

## 2021-03-17 RX ADMIN — CARVEDILOL 25 MG: 12.5 TABLET, FILM COATED ORAL at 09:44

## 2021-03-17 RX ADMIN — INSULIN LISPRO 2 UNITS: 100 INJECTION, SOLUTION INTRAVENOUS; SUBCUTANEOUS at 12:13

## 2021-03-17 RX ADMIN — ALBUMIN (HUMAN) 12.5 G: 0.25 INJECTION, SOLUTION INTRAVENOUS at 02:28

## 2021-03-17 RX ADMIN — ASPIRIN 81 MG: 81 TABLET, COATED ORAL at 09:44

## 2021-03-17 RX ADMIN — HEPARIN SODIUM 5000 UNITS: 5000 INJECTION INTRAVENOUS; SUBCUTANEOUS at 05:43

## 2021-03-17 RX ADMIN — ALBUMIN (HUMAN) 12.5 G: 0.25 INJECTION, SOLUTION INTRAVENOUS at 09:44

## 2021-03-17 RX ADMIN — CLONIDINE HYDROCHLORIDE 0.1 MG: 0.1 TABLET ORAL at 09:44

## 2021-03-17 RX ADMIN — AMLODIPINE BESYLATE 10 MG: 5 TABLET ORAL at 09:44

## 2021-03-17 NOTE — PROGRESS NOTES
Writer was asked to draw labs on patient as ordered. Writer suspects that patient is under influence of something. Patient observed nodding and extremely lethargic. Patient refused labs and vitals earlier in shift and was more alert at that time. Patient refused medical saff in room several times throughout shift. As shift continued, Patient then poked head out of door( about 4am) and made staff aware that he was ready for medical staff to enter room.

## 2021-03-17 NOTE — PROGRESS NOTES
Discharge instructions were given to patient and wife at bedside. All of patients questions were answered and discharge instruction were gone over in full. Patient is discharging home with his wife and is being wheeled downstairs for discharge via myself. Patient pharmacy information has also been verified prior to printing discharge instructions.

## 2021-03-17 NOTE — PROGRESS NOTES
End of Shift Note     Bedside shift change report given to Nkechi Byers RN (oncoming nurse) by Elder Coughlin RN (offgoing nurse). Report included the following information      Shift worked: 7p-7a   Shift summary and any significant changes:     Pt was extremely lethargic.  Refused vitals at one point then came around.      Concerns for physician to address: Concerned about substance use in hospital   Zone phone for oncoming shift:  [phone]      Patient Information  Alaina Rivera  55 y.o.  3/5/2021  8:28 PM by Triston Morton MD. Alaina Rivera was admitted from Home     Problem List       Patient Active Problem List     Diagnosis Date Noted    Acute on chronic combined systolic and diastolic ACC/AHA stage C congestive heart failure ([address] 03/08/2021    Hypertensive urgency 03/08/2021    CKD (chronic kidney disease) 03/08/2021    CHF exacerbation ([address] 03/05/2021    Acute CHF (congestive heart failure) ([address] 21/62/3878    Metabolic encephalopathy 45/30/1388    Osteomyelitis of left foot ([address] 02/28/2020    MRSA bacteremia 02/28/2020    Secondary hyperaldosteronism ([address] 02/28/2020    Hypokalemia 02/28/2020    DM (diabetes mellitus), type 2 ([address] 02/28/2020    HTN (hypertension) 02/28/2020    Cardiomyopathy ([address] 54/24/9161    Acute systolic heart failure ([address] 02/21/2020    Substance abuse ([address] 02/21/2020    Severe sepsis ([address] 02/19/2020           Past Medical History:   Diagnosis Date    Acute systolic heart failure ([address] 2/21/2020    Cardiomyopathy ([address] 2/21/2020    Diabetes ([address]      Encounter to establish care with new doctor 10/11/2018    Hypertension      Neuropathy      Sciatica      Substance abuse ([address] 2/21/2020         Core Measures:  CVA: No   CHF:Yes   PNA:No      Activity:  Activity Level: Up ad melly  Number times ambulated in hallways past shift: 0  Number of times OOB to chair past shift: 0     Cardiac:   Cardiac Monitoring: Yes      Cardiac Rhythm: Normal sinus rhythm     Access:   Current line(s): PIV 24 RH         Genitourinary:   Urinary status: voiding        Respiratory:   O2 Device: Room air  Chronic home O2 use?: NO  Incentive spirometer at bedside: No     GI:  Last Bowel Movement Date: 03/14/21  Current diet:    DIET DIABETIC CONSISTENT CARB Regular; 70-70-70 (House)  Passing flatus: YES  Tolerating current diet: YES  % Diet Eaten: 100 %     Pain Management:   Patient states pain is manageable on current regimen: no complaints of pain     Skin:  Sarwat Score: 21  Interventions: increase time out of bed and PT/OT consult    Patient Safety:  Fall Score: Total Score: 2  Interventions: bed/chair alarm, assistive device (walker, cane, etc) and gripper socks  High Fall Risk:  Yes     DVT prophylaxis:  DVT prophylaxis Med- Yes  DVT prophylaxis SCD or BERTO- No      Wounds: (If Applicable)  Wounds- legs dark, leathery, with scabs        Active Consults:  IP CONSULT TO HOSPITALIST  IP CONSULT TO CARDIOLOGY  IP CONSULT TO NEPHROLOGY     Length of Stay:  Expected LOS: 4d 0h  Actual LOS: 11  Discharge Plan: Yes Home      JETT Greene

## 2021-03-17 NOTE — PROGRESS NOTES
Transition of Care Plan:    RUR:35%  Disposition: Home with Family   Follow up appointments: Follow up with physician   DME needed:N/A  Trans[place] [id number]  [facility]   [address] or means to access home:   Family will have keys     IM Medicare letter:Letter to be provided by SABINA   Caregiver Contact:Shelley HOODXEIILL-[phone]  Discharge Caregiver contacted prior to discharge? Family will be contacted when d/c      UPDATE: 2:18PM    CM aware that pt will be d/c on today and transition home with family. CM attempted to contact pt's girlfriend: Courtney Merida, via telephone, however, attempts was unsuccessful. CM left voicemail informing Shelley of pt's d/c.  CM completed room visit with pt, and it was reported that Courtney Merida is on her way to pick of pt from [facility]. SABINA completed the needs of the pt at this time. KATY Berg, [address] [employer]         SABINA staffed case with clinical care team during IDRs, that pt will be ready for d/c between today and 3/18/21, depending on pt's urine output and nephrologist to sign off. SABINA informed by nursing staff that nephrologist will be contacted to verify if pt is medically stable to d/c. CM will continue to follow.     KATY Berg, [address] [place] [address]

## 2021-03-17 NOTE — PROGRESS NOTES
NAME: Ronnie Wakefield        :  1965        MRN:  [id number]                        Assessment   :                                               Plan:  ROSEMARY on  progressive CKD    Probable DM nephropathy  Proteinuria/grade 3 albuminuria  Nephrotic   Anasarca  PSA (cocaine, opiates)     HTN  DM2  Anemia   Acute on chronic CHF  HCV  Renal function poor but stable  He would be a poor HD candidate given his drug use  Note that urine is (+) cocaine/opiates still-or he is using while in hospital-notified today that pt left his hospital room and went outside to car earlier in week  I have no plans to initiate hd  UOP is adequate  I would send him out on bumex 2 mg po q8 hours             Subjective:     Chief Complaint:  Can I go home? Review of Systems:    Symptom Y/N Comments  Symptom Y/N Comments   Fever/Chills    Chest Pain     Poor Appetite    Edema     Cough    Abdominal Pain     Sputum    Joint Pain     SOB/DE [place] CRUZ    Pruritis/Rash     Nausea/vomit    Tolerating PT/OT     Diarrhea    Tolerating Diet     Constipation    Other       Could not obtain due to:      Objective:     VITALS:   Last 24hrs VS reviewed since prior progress note.  Most recent are:  Visit Vitals  BP (!) 149/85   Pulse 76   Temp 98 °F (36.7 °C)   Resp 18   Ht 5' 7\" (1.702 m)   Wt 85.5 kg (188 lb 7.9 oz)   SpO2 96%   BMI 29.52 kg/m²       Intake/Output Summary (Last 24 hours) at 3/17/2021 1307  Last data filed at 3/17/2021 0944  Gross per 24 hour   Intake --   Output 1050 ml   Net -1050 ml      Telemetry Reviewed:     PHYSICAL EXAM:  General: NAD  No rales  (+) edema      Lab Data Reviewed: (see below)    Medications Reviewed: (see below)    PMH/SH reviewed - no change compared to H&P  ________________________________________________________________________  Care Plan discussed with:  Patient     Family      RN     Care Manager                    Consultant: Comments   >50% of visit spent in counseling and coordination of care       ________________________________________________________________________  Pavan Henry MD     Procedures: see electronic medical records for all procedures/Xrays and details which  were not copied into this note but were reviewed prior to creation of Plan. LABS:  No results for input(s): WBC, HGB, HCT, PLT, HGBEXT, HCTEXT, PLTEXT, HGBEXT, HCTEXT, PLTEXT in the last 72 hours. Recent Labs     03/17/21  0536 03/16/21  0305 03/15/21  0352    137 137   K 4.1 3.9 4.3    103 103   CO2 31 29 26   BUN 84* 80* 79*   CREA 3.67* 3.74* 3.59*   * 133* 140*   CA 8.9 8.9 9.0   PHOS  --  5.8*  --      Recent Labs     03/16/21 0305      TP 7.8   ALB 2.9*   GLOB 4.9*     Recent Labs     03/17/21  0536   INR 1.1   PTP 11.9*      No results for input(s): FE, TIBC, PSAT, FERR in the last 72 hours. No results found for: FOL, RBCF   No results for input(s): PH, PCO2, PO2 in the last 72 hours. No results for input(s): CPK, CKMB in the last 72 hours.     No lab exists for component: TROPONINI  No components found for: [place]  Lab Results   Component Value Date/Time    Color YELLOW/STRAW 03/10/2021 02:56 PM    Appearance CLEAR 03/10/2021 02:56 PM    Specific gravity 1.011 03/10/2021 02:56 PM    pH (UA) 6.5 03/10/2021 02:56 PM    Protein 300 (A) 03/10/2021 02:56 PM    Glucose Negative 03/10/2021 02:56 PM    Ketone Negative 03/10/2021 02:56 PM    Bilirubin Negative 03/10/2021 02:56 PM    Urobilinogen 0.2 03/10/2021 02:56 PM    Nitrites Negative 03/10/2021 02:56 PM    Leukocyte Esterase Negative 03/10/2021 02:56 PM    Epithelial cells FEW 03/10/2021 02:56 PM    Bacteria Negative 03/10/2021 02:56 PM    WBC 0-4 03/10/2021 02:56 PM    RBC 0-5 03/10/2021 02:56 PM       MEDICATIONS:  Current Facility-Administered Medications   Medication Dose Route Frequency    hydrALAZINE (APRESOLINE) tablet 75 mg  75 mg Oral TID    albumin human 25% (BUMINATE) solution 12.5 g  12.5 g IntraVENous Q12H    isosorbide mononitrate ER (IMDUR) tablet 60 mg  60 mg Oral DAILY    carvediloL (COREG) tablet 25 mg  25 mg Oral BID WITH MEALS    cloNIDine HCL (CATAPRES) tablet 0.1 mg  0.1 mg Oral BID    ondansetron (ZOFRAN) injection 4 mg  4 mg IntraVENous Q6H PRN    acetaminophen (TYLENOL) tablet 650 mg  650 mg Oral Q6H PRN    acetaminophen (TYLENOL) suppository 650 mg  650 mg Rectal Q6H PRN    aspirin delayed-release tablet 81 mg  81 mg Oral DAILY    insulin lispro (HUMALOG) injection   SubCUTAneous AC&HS    glucose chewable tablet 16 g  4 Tab Oral PRN    dextrose (D50W) injection syrg 12.5-25 g  12.5-25 g IntraVENous PRN    glucagon (GLUCAGEN) injection 1 mg  1 mg IntraMUSCular PRN    labetaloL (NORMODYNE;TRANDATE) injection 10 mg  10 mg IntraVENous Q4H PRN    heparin (porcine) injection 5,000 Units  5,000 Units SubCUTAneous Q8H    amLODIPine (NORVASC) tablet 10 mg  10 mg Oral DAILY    albuterol-ipratropium (DUO-NEB) 2.5 MG-0.5 MG/3 ML  3 mL Nebulization Q6H PRN    atorvastatin (LIPITOR) tablet 20 mg  20 mg Oral QHS    nitroglycerin (NITROSTAT) tablet 0.4 mg  0.4 mg SubLINGual PRN    acetaminophen (TYLENOL) tablet 650 mg  650 mg Oral Q6H PRN

## 2021-03-17 NOTE — DISCHARGE INSTRUCTIONS
HOSPITALIST DISCHARGE INSTRUCTIONS    NAME: Ivy Scheuermann   :  1965   MRN:  [id number]     Date/Time:  3/17/2021 2:31 PM    ADMIT DATE: 3/5/2021     DISCHARGE DATE: 3/17/2021     DISCHARGE DIAGNOSIS:  Acute on Chronic systolic & diastolic CHF exacerbation POA  Causing Bilateral pitting edema, Pulmonary Edema POA  Nephrotic syndrome  Anasarca  Medication  Non compliance POA  Hypertension (uncontrolled) POA    LLQ pain, POA (Improved)  Abdominal distention, POA   Bilateral leg weakness, POA ( improved)  Acute kidney injury on CKD stage 4- cont Bumex 2 mg TID as recommended by nephrology- Cr stable & good urine output in past 48 hrs  Type 2 Diabetes POA  Hyperlipidemia POA-cont Lipitor   Peripheral Neuropathy POA  Hx of Left great toe, #2, and  5 th toe amputation  Hx of MRSA  Nicotine Abuse POA  Substance Abuse POA (heroin)- cessation counseling given, Lauraine Holter states he has been going to [facility] and is to start an in patient rehab.       Principal Problem:    Hypertensive urgency (3/8/2021)    Active Problems:    DM (diabetes mellitus), type 2 ([address] (2020)      CHF exacerbation ([facility] [address] (3/5/2021)      Acute on chronic combined systolic and diastolic ACC/AHA stage C congestive heart failure ([address] (3/8/2021)      CKD (chronic kidney disease) (3/8/2021)         MEDICATIONS:  As per medication reconciliation  list  · It is important that you take the medication exactly as they are prescribed. · Keep your medication in the bottles provided by the pharmacist and keep a list of the medication names, dosages, and times to be taken in your wallet. · Do not take other medications without consulting your doctor.      Pain Management: per above medications    What to do at Home    Recommended diet:  Cardiac Diet, Diabetic Diet and Low fat, Low cholesterol    Recommended activity: Activity as tolerated    If you have questions regarding the hospital related prescriptions or hospital related issues please call [facility] tom [facility] at . If you experience any of the following symptoms then please call your primary care physician or return to the emergency room if you cannot get hold of your doctor:  Fever, chills, nausea, vomiting, diarrhea, change in mentation, falling, bleeding, shortness of breath,     Follow Up:  Dr. Milla Busby MD  you are to call and set up an appointment to see them in 7-10 days. Information obtained by :  I understand that if any problems occur once I am at home I am to contact my physician. I understand and acknowledge receipt of the instructions indicated above.                                                                                                                                            Physician's or R.N.'s Signature                                                                  Date/Time                                                                                                                                              Patient or Representative Signature                                                          Date/Time
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
## 2021-03-18 ENCOUNTER — PATIENT OUTREACH (OUTPATIENT)
Dept: CASE MANAGEMENT | Age: 56
End: 2021-03-18

## 2021-03-18 RX ORDER — HYDRALAZINE HYDROCHLORIDE 50 MG/1
50 TABLET, FILM COATED ORAL 3 TIMES DAILY
Qty: 90 TAB | Refills: 0 | Status: SHIPPED | OUTPATIENT
Start: 2021-03-18 | End: 2021-04-30

## 2021-03-19 ENCOUNTER — PATIENT OUTREACH (OUTPATIENT)
Dept: CASE MANAGEMENT | Age: 56
End: 2021-03-19

## 2021-03-19 NOTE — PROGRESS NOTES
Patient contacted regarding Seymour Osgood. Discussed COVID-19 related testing which was not done at this time. Test results were not done. Care Transition Nurse contacted the family by telephone to perform post discharge assessment. Call within 2 business days of discharge: Yes Verified name and  with family as identifiers. Provided introduction to self, and explanation of the CTN/ACM role, and reason for call due to risk factors for infection and/or exposure to COVID-19. Symptoms reviewed with family who verbalized the following symptoms: no worsening symptoms      Due to no new or worsening symptoms encounter was not routed to provider for escalation. Discussed follow-up appointments. If no appointment was previously scheduled, appointment scheduling offered:  [facility] follow up appointment(s):   Future Appointments   Date Time Provider Darlene Orona   3/24/2021 10:15 AM Luis Bailon MD [facility]     Non-BS follow up appointment(s): none     Advance Care Planning:   Does patient have an Advance Directive:  patient declined education. Patient has following risk factors of: heart failure, diabetes and chronic kidney disease. CTN reviewed discharge instructions, medical action plan and red flags such as increased shortness of breath, increasing fever and signs of decompensation with family who verbalized understanding. Discussed exposure protocols and quarantine with CDC Guidelines What to do if you are sick with coronavirus disease .  Family was given an opportunity for questions and concerns. The family agrees to contact the [facility] exposure line [phone], [facility] department [address]  [phone] and PCP office for questions related to their healthcare. CTN provided contact information for future needs.     Reviewed and educated family on any new and changed medications related to discharge diagnosis     Was patient discharged with a pulse oximeter? no   Patient/family/caregiver given information for [employer] and agrees to enroll no  14 day call based on severity of symptoms and risk factors.

## 2021-03-30 ENCOUNTER — OFFICE VISIT (OUTPATIENT)
Dept: INTERNAL MEDICINE CLINIC | Age: 56
End: 2021-03-30
Payer: MEDICAID

## 2021-03-30 VITALS
OXYGEN SATURATION: 94 % | WEIGHT: 180.5 LBS | DIASTOLIC BLOOD PRESSURE: 96 MMHG | HEART RATE: 103 BPM | SYSTOLIC BLOOD PRESSURE: 184 MMHG | TEMPERATURE: 98.7 F | HEIGHT: 67 IN | RESPIRATION RATE: 24 BRPM | BODY MASS INDEX: 28.33 KG/M2

## 2021-03-30 DIAGNOSIS — I42.0 DILATED CARDIOMYOPATHY (HCC): ICD-10-CM

## 2021-03-30 DIAGNOSIS — Z79.4 TYPE 2 DIABETES MELLITUS WITH OTHER CIRCULATORY COMPLICATION, WITH LONG-TERM CURRENT USE OF INSULIN (HCC): ICD-10-CM

## 2021-03-30 DIAGNOSIS — I50.43 ACUTE ON CHRONIC COMBINED SYSTOLIC AND DIASTOLIC ACC/AHA STAGE C CONGESTIVE HEART FAILURE (HCC): Primary | ICD-10-CM

## 2021-03-30 DIAGNOSIS — E11.59 TYPE 2 DIABETES MELLITUS WITH OTHER CIRCULATORY COMPLICATION, WITH LONG-TERM CURRENT USE OF INSULIN (HCC): ICD-10-CM

## 2021-03-30 DIAGNOSIS — I50.9 ACUTE ON CHRONIC CONGESTIVE HEART FAILURE, UNSPECIFIED HEART FAILURE TYPE (HCC): ICD-10-CM

## 2021-03-30 DIAGNOSIS — N18.4 CKD (CHRONIC KIDNEY DISEASE) STAGE 4, GFR 15-29 ML/MIN (HCC): ICD-10-CM

## 2021-03-30 PROCEDURE — 99214 OFFICE O/P EST MOD 30 MIN: CPT | Performed by: FAMILY MEDICINE

## 2021-03-30 NOTE — PROGRESS NOTES
1. Have you been to the ER, urgent care clinic since your last visit? Hospitalized since your last visit? Yes Where: [facility]    2. Have you seen or consulted any other health care providers outside of the [address] [place] [address] since your last visit? Include any pap smears or colon screening. No     Patient is here for a follow up from being in the hospital for heart failure. Experiencing leg pain, hard time breathing and needs medication refilled.

## 2021-04-01 ENCOUNTER — PATIENT OUTREACH (OUTPATIENT)
Dept: CASE MANAGEMENT | Age: 56
End: 2021-04-01

## 2021-04-01 NOTE — PROGRESS NOTES
Patient resolved from Transition of Care episode on 3/17  Patient/family has been provided the following resources and education related to COVID-19:                         Signs, symptoms and red flags related to COVID-19            CDC exposure and quarantine guidelines            [facility] exposure contact - [phone]            Contact for their local Department of Health                 Patient currently reports that the following symptoms have improved:  all symptoms improved     No further outreach scheduled with this CTN/ACM. Episode of Care resolved. Patient has this CTN/ACM contact information if future needs arise.
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
## 2021-04-26 ENCOUNTER — APPOINTMENT (OUTPATIENT)
Dept: CT IMAGING | Age: 56
End: 2021-04-26
Attending: EMERGENCY MEDICINE
Payer: COMMERCIAL

## 2021-04-26 ENCOUNTER — HOSPITAL ENCOUNTER (EMERGENCY)
Age: 56
Discharge: OTHER INSTITUTION | End: 2021-04-27
Attending: EMERGENCY MEDICINE
Payer: COMMERCIAL

## 2021-04-26 DIAGNOSIS — N19 UREMIA: ICD-10-CM

## 2021-04-26 DIAGNOSIS — E87.5 ACUTE HYPERKALEMIA: ICD-10-CM

## 2021-04-26 DIAGNOSIS — N17.9 ACUTE RENAL FAILURE, UNSPECIFIED ACUTE RENAL FAILURE TYPE (HCC): Primary | ICD-10-CM

## 2021-04-26 LAB
ALBUMIN SERPL-MCNC: 2.6 G/DL (ref 3.5–5)
ALBUMIN/GLOB SERPL: 0.5 {RATIO} (ref 1.1–2.2)
ALP SERPL-CCNC: 200 U/L (ref 45–117)
ALT SERPL-CCNC: 79 U/L (ref 12–78)
AMORPH CRY URNS QL MICRO: ABNORMAL
ANION GAP SERPL CALC-SCNC: 11 MMOL/L (ref 5–15)
APPEARANCE UR: ABNORMAL
AST SERPL-CCNC: 61 U/L (ref 15–37)
BACTERIA URNS QL MICRO: NEGATIVE /HPF
BASOPHILS # BLD: 0 K/UL (ref 0–0.1)
BASOPHILS NFR BLD: 0 % (ref 0–1)
BILIRUB SERPL-MCNC: 0.2 MG/DL (ref 0.2–1)
BILIRUB UR QL CFM: NEGATIVE
BNP SERPL-MCNC: ABNORMAL PG/ML (ref 0–125)
BUN SERPL-MCNC: 91 MG/DL (ref 6–20)
BUN/CREAT SERPL: 16 (ref 12–20)
CALCIUM SERPL-MCNC: 8.4 MG/DL (ref 8.5–10.1)
CHLORIDE SERPL-SCNC: 106 MMOL/L (ref 97–108)
CO2 SERPL-SCNC: 18 MMOL/L (ref 21–32)
COLOR UR: ABNORMAL
CREAT SERPL-MCNC: 5.71 MG/DL (ref 0.7–1.3)
DIFFERENTIAL METHOD BLD: ABNORMAL
EOSINOPHIL # BLD: 0.2 K/UL (ref 0–0.4)
EOSINOPHIL NFR BLD: 2 % (ref 0–7)
EPITH CASTS URNS QL MICRO: ABNORMAL /LPF
ERYTHROCYTE [DISTWIDTH] IN BLOOD BY AUTOMATED COUNT: 19.1 % (ref 11.5–14.5)
GLOBULIN SER CALC-MCNC: 5.4 G/DL (ref 2–4)
GLUCOSE BLD STRIP.AUTO-MCNC: 218 MG/DL (ref 65–100)
GLUCOSE SERPL-MCNC: 220 MG/DL (ref 65–100)
GLUCOSE UR STRIP.AUTO-MCNC: 500 MG/DL
HCT VFR BLD AUTO: 29.7 % (ref 36.6–50.3)
HGB BLD-MCNC: 9.4 G/DL (ref 12.1–17)
HGB UR QL STRIP: NEGATIVE
HYALINE CASTS URNS QL MICRO: ABNORMAL /LPF (ref 0–5)
IMM GRANULOCYTES # BLD AUTO: 0.1 K/UL (ref 0–0.04)
IMM GRANULOCYTES NFR BLD AUTO: 1 % (ref 0–0.5)
KETONES UR QL STRIP.AUTO: NEGATIVE MG/DL
LEUKOCYTE ESTERASE UR QL STRIP.AUTO: NEGATIVE
LYMPHOCYTES # BLD: 1.1 K/UL (ref 0.8–3.5)
LYMPHOCYTES NFR BLD: 17 % (ref 12–49)
MCH RBC QN AUTO: 26.3 PG (ref 26–34)
MCHC RBC AUTO-ENTMCNC: 31.6 G/DL (ref 30–36.5)
MCV RBC AUTO: 83 FL (ref 80–99)
MONOCYTES # BLD: 0.7 K/UL (ref 0–1)
MONOCYTES NFR BLD: 10 % (ref 5–13)
NEUTS SEG # BLD: 4.4 K/UL (ref 1.8–8)
NEUTS SEG NFR BLD: 70 % (ref 32–75)
NITRITE UR QL STRIP.AUTO: NEGATIVE
NRBC # BLD: 0 K/UL (ref 0–0.01)
NRBC BLD-RTO: 0 PER 100 WBC
PH UR STRIP: 5 [PH] (ref 5–8)
PLATELET # BLD AUTO: 261 K/UL (ref 150–400)
PMV BLD AUTO: 10.8 FL (ref 8.9–12.9)
POTASSIUM SERPL-SCNC: 6.5 MMOL/L (ref 3.5–5.1)
POTASSIUM SERPL-SCNC: 6.9 MMOL/L (ref 3.5–5.1)
PROT SERPL-MCNC: 8 G/DL (ref 6.4–8.2)
PROT UR STRIP-MCNC: >300 MG/DL
RBC # BLD AUTO: 3.58 M/UL (ref 4.1–5.7)
RBC #/AREA URNS HPF: ABNORMAL /HPF (ref 0–5)
SERVICE CMNT-IMP: ABNORMAL
SODIUM SERPL-SCNC: 135 MMOL/L (ref 136–145)
SP GR UR REFRACTOMETRY: 1.02 (ref 1–1.03)
TROPONIN I SERPL-MCNC: 0.52 NG/ML
TROPONIN I SERPL-MCNC: 0.53 NG/ML
UA: UC IF INDICATED,UAUC: ABNORMAL
UROBILINOGEN UR QL STRIP.AUTO: 0.2 EU/DL (ref 0.2–1)
WBC # BLD AUTO: 6.4 K/UL (ref 4.1–11.1)
WBC URNS QL MICRO: ABNORMAL /HPF (ref 0–4)

## 2021-04-26 PROCEDURE — 36415 COLL VENOUS BLD VENIPUNCTURE: CPT

## 2021-04-26 PROCEDURE — 84484 ASSAY OF TROPONIN QUANT: CPT

## 2021-04-26 PROCEDURE — 74011250636 HC RX REV CODE- 250/636: Performed by: EMERGENCY MEDICINE

## 2021-04-26 PROCEDURE — 82962 GLUCOSE BLOOD TEST: CPT

## 2021-04-26 PROCEDURE — 71250 CT THORAX DX C-: CPT

## 2021-04-26 PROCEDURE — 74176 CT ABD & PELVIS W/O CONTRAST: CPT

## 2021-04-26 PROCEDURE — 85025 COMPLETE CBC W/AUTO DIFF WBC: CPT

## 2021-04-26 PROCEDURE — 74011250637 HC RX REV CODE- 250/637: Performed by: EMERGENCY MEDICINE

## 2021-04-26 PROCEDURE — 74011636637 HC RX REV CODE- 636/637: Performed by: EMERGENCY MEDICINE

## 2021-04-26 PROCEDURE — 77030029684 HC NEB SM VOL KT MONA -A

## 2021-04-26 PROCEDURE — 74011000250 HC RX REV CODE- 250: Performed by: EMERGENCY MEDICINE

## 2021-04-26 PROCEDURE — 96375 TX/PRO/DX INJ NEW DRUG ADDON: CPT

## 2021-04-26 PROCEDURE — 80053 COMPREHEN METABOLIC PANEL: CPT

## 2021-04-26 PROCEDURE — 81001 URINALYSIS AUTO W/SCOPE: CPT

## 2021-04-26 PROCEDURE — 93005 ELECTROCARDIOGRAM TRACING: CPT

## 2021-04-26 PROCEDURE — 83880 ASSAY OF NATRIURETIC PEPTIDE: CPT

## 2021-04-26 PROCEDURE — 84132 ASSAY OF SERUM POTASSIUM: CPT

## 2021-04-26 PROCEDURE — 94640 AIRWAY INHALATION TREATMENT: CPT

## 2021-04-26 RX ORDER — DEXTROSE 50 % IN WATER (D50W) INTRAVENOUS SYRINGE
50
Status: COMPLETED | OUTPATIENT
Start: 2021-04-26 | End: 2021-04-26

## 2021-04-26 RX ORDER — ALBUMIN HUMAN 50 G/1000ML
25 SOLUTION INTRAVENOUS ONCE
Status: COMPLETED | OUTPATIENT
Start: 2021-04-26 | End: 2021-04-27

## 2021-04-26 RX ORDER — CALCIUM GLUCONATE 94 MG/ML
1 INJECTION, SOLUTION INTRAVENOUS
Status: COMPLETED | OUTPATIENT
Start: 2021-04-26 | End: 2021-04-26

## 2021-04-26 RX ORDER — ALBUTEROL SULFATE 2.5 MG/.5ML
10 SOLUTION RESPIRATORY (INHALATION)
Status: COMPLETED | OUTPATIENT
Start: 2021-04-26 | End: 2021-04-26

## 2021-04-26 RX ORDER — BUMETANIDE 0.25 MG/ML
2 INJECTION INTRAMUSCULAR; INTRAVENOUS ONCE
Status: COMPLETED | OUTPATIENT
Start: 2021-04-26 | End: 2021-04-26

## 2021-04-26 RX ORDER — SODIUM POLYSTYRENE SULFONATE 15 G/60ML
15 SUSPENSION ORAL; RECTAL
Status: COMPLETED | OUTPATIENT
Start: 2021-04-26 | End: 2021-04-26

## 2021-04-26 RX ADMIN — BUMETANIDE 2 MG: 0.25 INJECTION INTRAMUSCULAR; INTRAVENOUS at 22:02

## 2021-04-26 RX ADMIN — ALBUTEROL SULFATE 10 MG: 2.5 SOLUTION RESPIRATORY (INHALATION) at 20:56

## 2021-04-26 RX ADMIN — DEXTROSE MONOHYDRATE 25 G: 500 INJECTION PARENTERAL at 20:53

## 2021-04-26 RX ADMIN — SODIUM POLYSTYRENE SULFONATE 15 G: 15 SUSPENSION ORAL; RECTAL at 22:00

## 2021-04-26 RX ADMIN — HUMAN INSULIN 10 UNITS: 100 INJECTION, SOLUTION SUBCUTANEOUS at 20:48

## 2021-04-26 RX ADMIN — CALCIUM GLUCONATE 1 G: 98 INJECTION, SOLUTION INTRAVENOUS at 20:43

## 2021-04-26 NOTE — ED TRIAGE NOTES
Patient presents to the ED via EMS with c/o generalized body aches x2 days. EMS reports picking patient up from [place]. Pt reports vomiting and denies diarrhea. Pt reports not taking his suboxone today due to concerns about his BP. Pt capillary blood glucose was 354 en route.

## 2021-04-26 NOTE — ED NOTES
Pt presents to ED complaining of bilateral leg pain and swelling worsening for a few days. Pt reports he has been staying at [facility]. Pt reports they did not give him his medications today because his blood pressure was either too high or too low. Pt presents with difficulty ambulating, bilateral pitting edema, and reports he gained 10 pounds in one day. Pt is alert and oriented x 4, RR even and unlabored, skin is warm and dry. Assessment completed and pt updated on plan of care. Call bell in reach. Emergency Department Nursing Plan of Care       The Nursing Plan of Care is developed from the Nursing assessment and Emergency Department Attending provider initial evaluation. The plan of care may be reviewed in the ED Provider note.     The Plan of Care was developed with the following considerations:   Patient / Family readiness to learn indicated by:verbalized understanding  Persons(s) to be included in education: patient  Barriers to Learning/Limitations:No    Signed     Amanda Goins RN    4/26/2021   6:21 PM
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
## 2021-04-26 NOTE — ED NOTES
Bedside and Verbal shift change report given to Nj Landis RN (oncoming nurse) by Gladys Schwartz RN (offgoing nurse). Report included the following information SBAR.

## 2021-04-27 ENCOUNTER — APPOINTMENT (OUTPATIENT)
Dept: GENERAL RADIOLOGY | Age: 56
End: 2021-04-27
Attending: INTERNAL MEDICINE
Payer: MEDICAID

## 2021-04-27 ENCOUNTER — HOSPITAL ENCOUNTER (OUTPATIENT)
Age: 56
Setting detail: OBSERVATION
Discharge: HOME | End: 2021-04-30
Attending: EMERGENCY MEDICINE | Admitting: INTERNAL MEDICINE
Payer: MEDICAID

## 2021-04-27 VITALS
RESPIRATION RATE: 13 BRPM | WEIGHT: 180 LBS | DIASTOLIC BLOOD PRESSURE: 70 MMHG | OXYGEN SATURATION: 97 % | SYSTOLIC BLOOD PRESSURE: 110 MMHG | HEART RATE: 75 BPM | TEMPERATURE: 97.3 F | BODY MASS INDEX: 27.28 KG/M2 | HEIGHT: 68 IN

## 2021-04-27 DIAGNOSIS — F19.10 SUBSTANCE ABUSE (HCC): Chronic | ICD-10-CM

## 2021-04-27 DIAGNOSIS — I10 ESSENTIAL HYPERTENSION: ICD-10-CM

## 2021-04-27 DIAGNOSIS — I42.0 DILATED CARDIOMYOPATHY (HCC): Chronic | ICD-10-CM

## 2021-04-27 DIAGNOSIS — Z79.4 TYPE 2 DIABETES MELLITUS WITH OTHER CIRCULATORY COMPLICATION, WITH LONG-TERM CURRENT USE OF INSULIN (HCC): Chronic | ICD-10-CM

## 2021-04-27 DIAGNOSIS — E11.59 TYPE 2 DIABETES MELLITUS WITH OTHER CIRCULATORY COMPLICATION, WITH LONG-TERM CURRENT USE OF INSULIN (HCC): Chronic | ICD-10-CM

## 2021-04-27 DIAGNOSIS — R77.8 ELEVATED TROPONIN: ICD-10-CM

## 2021-04-27 DIAGNOSIS — N18.5 STAGE 5 CHRONIC KIDNEY DISEASE NOT ON CHRONIC DIALYSIS (HCC): ICD-10-CM

## 2021-04-27 DIAGNOSIS — E87.5 ACUTE HYPERKALEMIA: Primary | ICD-10-CM

## 2021-04-27 PROBLEM — I42.9 CARDIOMYOPATHY (HCC): Chronic | Status: ACTIVE | Noted: 2020-02-21

## 2021-04-27 PROBLEM — N18.9 ACUTE ON CHRONIC KIDNEY FAILURE (HCC): Status: ACTIVE | Noted: 2021-04-27

## 2021-04-27 PROBLEM — N17.9 ACUTE ON CHRONIC KIDNEY FAILURE (HCC): Status: ACTIVE | Noted: 2021-04-27

## 2021-04-27 PROBLEM — E11.9 DM (DIABETES MELLITUS), TYPE 2 (HCC): Chronic | Status: ACTIVE | Noted: 2020-02-28

## 2021-04-27 LAB
AMPHET UR QL SCN: NEGATIVE
ANION GAP SERPL CALC-SCNC: 13 MMOL/L (ref 5–15)
ANION GAP SERPL CALC-SCNC: 9 MMOL/L (ref 5–15)
ANION GAP SERPL CALC-SCNC: 9 MMOL/L (ref 5–15)
ATRIAL RATE: 78 BPM
BARBITURATES UR QL SCN: NEGATIVE
BENZODIAZ UR QL: NEGATIVE
BUN SERPL-MCNC: 90 MG/DL (ref 6–20)
BUN SERPL-MCNC: 93 MG/DL (ref 6–20)
BUN SERPL-MCNC: 93 MG/DL (ref 6–20)
BUN/CREAT SERPL: 15 (ref 12–20)
BUN/CREAT SERPL: 16 (ref 12–20)
BUN/CREAT SERPL: 16 (ref 12–20)
CALCIUM SERPL-MCNC: 8 MG/DL (ref 8.5–10.1)
CALCIUM SERPL-MCNC: 8.3 MG/DL (ref 8.5–10.1)
CALCIUM SERPL-MCNC: 8.4 MG/DL (ref 8.5–10.1)
CALCULATED P AXIS, ECG09: 8 DEGREES
CALCULATED R AXIS, ECG10: -4 DEGREES
CALCULATED T AXIS, ECG11: -143 DEGREES
CANNABINOIDS UR QL SCN: NEGATIVE
CHLORIDE SERPL-SCNC: 108 MMOL/L (ref 97–108)
CHLORIDE SERPL-SCNC: 111 MMOL/L (ref 97–108)
CHLORIDE SERPL-SCNC: 111 MMOL/L (ref 97–108)
CK MB CFR SERPL CALC: 5.5 % (ref 0–2.5)
CK MB SERPL-MCNC: 6.4 NG/ML (ref 5–25)
CK SERPL-CCNC: 117 U/L (ref 39–308)
CO2 SERPL-SCNC: 16 MMOL/L (ref 21–32)
CO2 SERPL-SCNC: 17 MMOL/L (ref 21–32)
CO2 SERPL-SCNC: 17 MMOL/L (ref 21–32)
COCAINE UR QL SCN: NEGATIVE
CREAT SERPL-MCNC: 5.77 MG/DL (ref 0.7–1.3)
CREAT SERPL-MCNC: 5.83 MG/DL (ref 0.7–1.3)
CREAT SERPL-MCNC: 5.99 MG/DL (ref 0.7–1.3)
DIAGNOSIS, 93000: NORMAL
DRUG SCRN COMMENT,DRGCM: NORMAL
GLUCOSE BLD STRIP.AUTO-MCNC: 150 MG/DL (ref 65–100)
GLUCOSE BLD STRIP.AUTO-MCNC: 170 MG/DL (ref 65–100)
GLUCOSE BLD STRIP.AUTO-MCNC: 179 MG/DL (ref 65–100)
GLUCOSE BLD STRIP.AUTO-MCNC: 250 MG/DL (ref 65–100)
GLUCOSE SERPL-MCNC: 167 MG/DL (ref 65–100)
GLUCOSE SERPL-MCNC: 172 MG/DL (ref 65–100)
GLUCOSE SERPL-MCNC: 181 MG/DL (ref 65–100)
METHADONE UR QL: NEGATIVE
OPIATES UR QL: NEGATIVE
P-R INTERVAL, ECG05: 138 MS
PCP UR QL: NEGATIVE
POTASSIUM SERPL-SCNC: 5.8 MMOL/L (ref 3.5–5.1)
POTASSIUM SERPL-SCNC: 6.3 MMOL/L (ref 3.5–5.1)
POTASSIUM SERPL-SCNC: 6.5 MMOL/L (ref 3.5–5.1)
POTASSIUM SERPL-SCNC: 6.7 MMOL/L (ref 3.5–5.1)
Q-T INTERVAL, ECG07: 382 MS
QRS DURATION, ECG06: 96 MS
QTC CALCULATION (BEZET), ECG08: 435 MS
SERVICE CMNT-IMP: ABNORMAL
SODIUM SERPL-SCNC: 137 MMOL/L (ref 136–145)
TROPONIN I SERPL-MCNC: 0.45 NG/ML
VENTRICULAR RATE, ECG03: 78 BPM

## 2021-04-27 PROCEDURE — 99284 EMERGENCY DEPT VISIT MOD MDM: CPT

## 2021-04-27 PROCEDURE — 80048 BASIC METABOLIC PNL TOTAL CA: CPT

## 2021-04-27 PROCEDURE — 82962 GLUCOSE BLOOD TEST: CPT

## 2021-04-27 PROCEDURE — 74011000250 HC RX REV CODE- 250: Performed by: INTERNAL MEDICINE

## 2021-04-27 PROCEDURE — G0378 HOSPITAL OBSERVATION PER HR: HCPCS

## 2021-04-27 PROCEDURE — 99218 HC RM OBSERVATION: CPT

## 2021-04-27 PROCEDURE — 74011250636 HC RX REV CODE- 250/636: Performed by: EMERGENCY MEDICINE

## 2021-04-27 PROCEDURE — 74011250637 HC RX REV CODE- 250/637: Performed by: INTERNAL MEDICINE

## 2021-04-27 PROCEDURE — APPSS180 APP SPLIT SHARED TIME > 60 MINUTES: Performed by: NURSE PRACTITIONER

## 2021-04-27 PROCEDURE — 80307 DRUG TEST PRSMV CHEM ANLYZR: CPT

## 2021-04-27 PROCEDURE — 74011250636 HC RX REV CODE- 250/636: Performed by: INTERNAL MEDICINE

## 2021-04-27 PROCEDURE — 96365 THER/PROPH/DIAG IV INF INIT: CPT

## 2021-04-27 PROCEDURE — 65660000001 HC RM ICU INTERMED STEPDOWN

## 2021-04-27 PROCEDURE — 74011636637 HC RX REV CODE- 636/637: Performed by: INTERNAL MEDICINE

## 2021-04-27 PROCEDURE — 36415 COLL VENOUS BLD VENIPUNCTURE: CPT

## 2021-04-27 PROCEDURE — 99223 1ST HOSP IP/OBS HIGH 75: CPT | Performed by: INTERNAL MEDICINE

## 2021-04-27 PROCEDURE — 84484 ASSAY OF TROPONIN QUANT: CPT

## 2021-04-27 PROCEDURE — P9045 ALBUMIN (HUMAN), 5%, 250 ML: HCPCS | Performed by: EMERGENCY MEDICINE

## 2021-04-27 PROCEDURE — 96372 THER/PROPH/DIAG INJ SC/IM: CPT

## 2021-04-27 PROCEDURE — 84132 ASSAY OF SERUM POTASSIUM: CPT

## 2021-04-27 PROCEDURE — 94761 N-INVAS EAR/PLS OXIMETRY MLT: CPT

## 2021-04-27 PROCEDURE — 96375 TX/PRO/DX INJ NEW DRUG ADDON: CPT

## 2021-04-27 PROCEDURE — 99285 EMERGENCY DEPT VISIT HI MDM: CPT

## 2021-04-27 PROCEDURE — 96374 THER/PROPH/DIAG INJ IV PUSH: CPT

## 2021-04-27 PROCEDURE — 82550 ASSAY OF CK (CPK): CPT

## 2021-04-27 PROCEDURE — 71045 X-RAY EXAM CHEST 1 VIEW: CPT

## 2021-04-27 PROCEDURE — 74011000258 HC RX REV CODE- 258: Performed by: INTERNAL MEDICINE

## 2021-04-27 RX ORDER — POLYETHYLENE GLYCOL 3350 17 G/17G
17 POWDER, FOR SOLUTION ORAL DAILY
Status: DISCONTINUED | OUTPATIENT
Start: 2021-04-27 | End: 2021-04-30 | Stop reason: HOSPADM

## 2021-04-27 RX ORDER — ACETAMINOPHEN 650 MG/1
650 SUPPOSITORY RECTAL
Status: DISCONTINUED | OUTPATIENT
Start: 2021-04-27 | End: 2021-04-30 | Stop reason: HOSPADM

## 2021-04-27 RX ORDER — BUPRENORPHINE AND NALOXONE 8; 2 MG/1; MG/1
0.5 FILM, SOLUBLE BUCCAL; SUBLINGUAL 2 TIMES DAILY
Status: DISCONTINUED | OUTPATIENT
Start: 2021-04-27 | End: 2021-04-30 | Stop reason: HOSPADM

## 2021-04-27 RX ORDER — CARVEDILOL 3.12 MG/1
3.12 TABLET ORAL 2 TIMES DAILY
Status: DISCONTINUED | OUTPATIENT
Start: 2021-04-27 | End: 2021-04-29

## 2021-04-27 RX ORDER — ATORVASTATIN CALCIUM 20 MG/1
20 TABLET, FILM COATED ORAL EVERY EVENING
Status: DISCONTINUED | OUTPATIENT
Start: 2021-04-27 | End: 2021-04-30 | Stop reason: HOSPADM

## 2021-04-27 RX ORDER — HEPARIN SODIUM 5000 [USP'U]/ML
5000 INJECTION, SOLUTION INTRAVENOUS; SUBCUTANEOUS EVERY 8 HOURS
Status: DISCONTINUED | OUTPATIENT
Start: 2021-04-27 | End: 2021-04-27

## 2021-04-27 RX ORDER — SODIUM POLYSTYRENE SULFONATE 15 G/60ML
45 SUSPENSION ORAL; RECTAL
Status: COMPLETED | OUTPATIENT
Start: 2021-04-27 | End: 2021-04-27

## 2021-04-27 RX ORDER — BISACODYL 5 MG
5 TABLET, DELAYED RELEASE (ENTERIC COATED) ORAL DAILY PRN
Status: DISCONTINUED | OUTPATIENT
Start: 2021-04-27 | End: 2021-04-30 | Stop reason: HOSPADM

## 2021-04-27 RX ORDER — INSULIN GLARGINE 100 [IU]/ML
15 INJECTION, SOLUTION SUBCUTANEOUS
Status: DISCONTINUED | OUTPATIENT
Start: 2021-04-27 | End: 2021-04-29

## 2021-04-27 RX ORDER — MAGNESIUM SULFATE 100 %
4 CRYSTALS MISCELLANEOUS AS NEEDED
Status: DISCONTINUED | OUTPATIENT
Start: 2021-04-27 | End: 2021-04-30 | Stop reason: HOSPADM

## 2021-04-27 RX ORDER — SODIUM CHLORIDE 9 MG/ML
125 INJECTION, SOLUTION INTRAVENOUS CONTINUOUS
Status: DISCONTINUED | OUTPATIENT
Start: 2021-04-27 | End: 2021-04-27

## 2021-04-27 RX ORDER — ASPIRIN 81 MG/1
81 TABLET ORAL DAILY
Status: DISCONTINUED | OUTPATIENT
Start: 2021-04-27 | End: 2021-04-30 | Stop reason: HOSPADM

## 2021-04-27 RX ORDER — SODIUM CHLORIDE 0.9 % (FLUSH) 0.9 %
5-40 SYRINGE (ML) INJECTION EVERY 8 HOURS
Status: DISCONTINUED | OUTPATIENT
Start: 2021-04-27 | End: 2021-04-30 | Stop reason: HOSPADM

## 2021-04-27 RX ORDER — AMITRIPTYLINE HYDROCHLORIDE 25 MG/1
25 TABLET, FILM COATED ORAL
Status: DISCONTINUED | OUTPATIENT
Start: 2021-04-27 | End: 2021-04-29

## 2021-04-27 RX ORDER — BUPRENORPHINE HYDROCHLORIDE, NALOXONE HYDROCHLORIDE 8; 2 MG/1; MG/1
1 FILM, SOLUBLE BUCCAL; SUBLINGUAL 2 TIMES DAILY
COMMUNITY
End: 2021-10-10

## 2021-04-27 RX ORDER — DEXTROSE 50 % IN WATER (D50W) INTRAVENOUS SYRINGE
25-50 AS NEEDED
Status: DISCONTINUED | OUTPATIENT
Start: 2021-04-27 | End: 2021-04-30 | Stop reason: HOSPADM

## 2021-04-27 RX ORDER — BUMETANIDE 0.25 MG/ML
2 INJECTION INTRAMUSCULAR; INTRAVENOUS ONCE
Status: COMPLETED | OUTPATIENT
Start: 2021-04-27 | End: 2021-04-27

## 2021-04-27 RX ORDER — ACETAMINOPHEN 325 MG/1
650 TABLET ORAL
Status: DISCONTINUED | OUTPATIENT
Start: 2021-04-27 | End: 2021-04-30 | Stop reason: HOSPADM

## 2021-04-27 RX ORDER — CLONIDINE HYDROCHLORIDE 0.1 MG/1
0.1 TABLET ORAL 2 TIMES DAILY
Status: DISCONTINUED | OUTPATIENT
Start: 2021-04-27 | End: 2021-04-29

## 2021-04-27 RX ORDER — INSULIN LISPRO 100 [IU]/ML
INJECTION, SOLUTION INTRAVENOUS; SUBCUTANEOUS
Status: DISCONTINUED | OUTPATIENT
Start: 2021-04-27 | End: 2021-04-30 | Stop reason: HOSPADM

## 2021-04-27 RX ORDER — BUPRENORPHINE AND NALOXONE 8; 2 MG/1; MG/1
1 FILM, SOLUBLE BUCCAL; SUBLINGUAL DAILY
Status: DISCONTINUED | OUTPATIENT
Start: 2021-04-27 | End: 2021-04-27

## 2021-04-27 RX ORDER — HEPARIN SODIUM 5000 [USP'U]/ML
5000 INJECTION, SOLUTION INTRAVENOUS; SUBCUTANEOUS EVERY 12 HOURS
Status: DISCONTINUED | OUTPATIENT
Start: 2021-04-27 | End: 2021-04-30 | Stop reason: HOSPADM

## 2021-04-27 RX ORDER — ONDANSETRON 2 MG/ML
4 INJECTION INTRAMUSCULAR; INTRAVENOUS
Status: DISCONTINUED | OUTPATIENT
Start: 2021-04-27 | End: 2021-04-30 | Stop reason: HOSPADM

## 2021-04-27 RX ORDER — PROMETHAZINE HYDROCHLORIDE 25 MG/1
12.5 TABLET ORAL
Status: DISCONTINUED | OUTPATIENT
Start: 2021-04-27 | End: 2021-04-30 | Stop reason: HOSPADM

## 2021-04-27 RX ORDER — SODIUM CHLORIDE 0.9 % (FLUSH) 0.9 %
5-40 SYRINGE (ML) INJECTION AS NEEDED
Status: DISCONTINUED | OUTPATIENT
Start: 2021-04-27 | End: 2021-04-30 | Stop reason: HOSPADM

## 2021-04-27 RX ADMIN — Medication 10 ML: at 15:20

## 2021-04-27 RX ADMIN — ASPIRIN 81 MG: 81 TABLET, COATED ORAL at 09:17

## 2021-04-27 RX ADMIN — ALBUMIN (HUMAN) 25 G: 12.5 INJECTION, SOLUTION INTRAVENOUS at 01:11

## 2021-04-27 RX ADMIN — INSULIN LISPRO 3 UNITS: 100 INJECTION, SOLUTION INTRAVENOUS; SUBCUTANEOUS at 09:17

## 2021-04-27 RX ADMIN — BUMETANIDE 2 MG: 0.25 INJECTION, SOLUTION INTRAMUSCULAR; INTRAVENOUS at 05:40

## 2021-04-27 RX ADMIN — Medication 10 ML: at 21:46

## 2021-04-27 RX ADMIN — INSULIN GLARGINE 15 UNITS: 100 INJECTION, SOLUTION SUBCUTANEOUS at 21:46

## 2021-04-27 RX ADMIN — SODIUM BICARBONATE: 84 INJECTION, SOLUTION INTRAVENOUS at 15:15

## 2021-04-27 RX ADMIN — SODIUM POLYSTYRENE SULFONATE 45 G: 15 SUSPENSION ORAL; RECTAL at 06:30

## 2021-04-27 RX ADMIN — HEPARIN SODIUM 5000 UNITS: 5000 INJECTION INTRAVENOUS; SUBCUTANEOUS at 19:57

## 2021-04-27 RX ADMIN — Medication 10 ML: at 09:38

## 2021-04-27 RX ADMIN — HEPARIN SODIUM 5000 UNITS: 5000 INJECTION INTRAVENOUS; SUBCUTANEOUS at 09:17

## 2021-04-27 RX ADMIN — AMITRIPTYLINE HYDROCHLORIDE 25 MG: 25 TABLET, FILM COATED ORAL at 21:45

## 2021-04-27 RX ADMIN — ATORVASTATIN CALCIUM 20 MG: 20 TABLET, FILM COATED ORAL at 17:53

## 2021-04-27 RX ADMIN — CALCIUM GLUCONATE 1 G: 98 INJECTION, SOLUTION INTRAVENOUS at 05:40

## 2021-04-27 RX ADMIN — SODIUM CHLORIDE 75 ML/HR: 9 INJECTION, SOLUTION INTRAVENOUS at 09:16

## 2021-04-27 RX ADMIN — BUPRENORPHINE AND NALOXONE 0.5 FILM: 8; 2 FILM BUCCAL; SUBLINGUAL at 21:45
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
## 2021-04-27 NOTE — ED NOTES
Accidental validation of 0500 vitals. Pt BP cuff had slipped down arm giving nonvalid BP reading.   Replaced BP cuff and repositioned

## 2021-04-27 NOTE — ED NOTES
Report given to [facility] SILAS DENG RN. They were informed of patient chief complaint, current status, orders completed (to include IV access/medications/radiology testing), outstanding orders that still need to be completed, and the treatment plan. Ensured no questions or concerns regarding the patient prior to departure.

## 2021-04-27 NOTE — CONSULTS
[facility] Consult Note        NAME: Akin Good       :  1965       MRN:  [id number]     Date/Time: 2021    Risk of deterioration: medium       Assessment:    Plan:  ROSEMARY/CKD IV/hyperkalemia  Drug addiction/on suboxone  CMO  NRP likely due to diabetes  DM Creatinine was 2.45 on -now up to 6 with hyperkalemia  ? Ischemic atn from hypotension  PT states only thing new was the suboxone and that he \"took a whole strip-it was too much\"  Denies other drug use at the time--note, urine drug was (-) for cocaine-first time in years  Medical management of hyperkalemia  Ivf/change to bicarb  Note 300 cc urine retention-straight cath/johnston prn  No acute need for hd-this is acute and should  Improve. No hydro on ct  I am not inclined to do a renal biopsy for the NRP       Asked to see for ROSEMARY on CKD IV. Labs with dr. Kim Miller on  showed a creatinine of 2.54-only new med is suboxone. PT was in a 30 d IP rehab-[facility] and got sick. Concerned he took too much suboxone. \"I took a whole strip. On the  Street you wouldn't take that much\"   Subjective:     Chief Complaint:  Had n/v    Review of Systems: (+) n/V, no diarrhea, cp,sob/f/c    Objective:     VITALS:   Last 24hrs VS reviewed since prior progress note.  Most recent are:  Visit Vitals  /77   Pulse 80   Temp 98.1 °F (36.7 °C)   Resp 12   Ht 5' 8\" (1.727 m)   Wt 81.6 kg (179 lb 14.3 oz)   SpO2 96%   BMI 27.35 kg/m²     SpO2 Readings from Last 6 Encounters:   21 96%   21 97%   21 94%   21 96%   21 96%   20 100%    O2 Flow Rate (L/min): 2 l/min       Intake/Output Summary (Last 24 hours) at 2021 1436  Last data filed at 2021 1410  Gross per 24 hour   Intake --   Output 300 ml   Net -300 ml        Telemetry Reviewed       PHYSICAL EXAM:    General   well developed, well nourished, appears stated age, in no acute distress  EENT  Normocephalic, Atraumatic,  EOMI  Respiratory   Clear To Auscultation bilaterally  Cardiology  Regular Rate and Rythmn  Abdominal  Soft, non-tender, non-distended  Extremities  No clubbing, cyanosis, or edema. Pulses intact.               Lab Data Reviewed: (see below)    Medications Reviewed: (see below)    PMH/SH reviewed - no change compared to H&P  ___________________________________________________        ___________________________________________________    Attending Physician: Alphonso Hernandez MD     ____________________________________________________  MEDICATIONS:  Current Facility-Administered Medications   Medication Dose Route Frequency    insulin glargine (LANTUS) injection 15 Units  15 Units SubCUTAneous QHS    sodium chloride (NS) flush 5-40 mL  5-40 mL IntraVENous Q8H    sodium chloride (NS) flush 5-40 mL  5-40 mL IntraVENous PRN    acetaminophen (TYLENOL) tablet 650 mg  650 mg Oral Q6H PRN    Or    acetaminophen (TYLENOL) suppository 650 mg  650 mg Rectal Q6H PRN    polyethylene glycol (MIRALAX) packet 17 g  17 g Oral DAILY    bisacodyL (DULCOLAX) tablet 5 mg  5 mg Oral DAILY PRN    promethazine (PHENERGAN) tablet 12.5 mg  12.5 mg Oral Q6H PRN    Or    ondansetron (ZOFRAN) injection 4 mg  4 mg IntraVENous Q6H PRN    glucose chewable tablet 16 g  4 Tab Oral PRN    dextrose (D50W) injection syrg 12.5-25 g  25-50 mL IntraVENous PRN    glucagon (GLUCAGEN) injection 1 mg  1 mg IntraMUSCular PRN    insulin lispro (HUMALOG) injection   SubCUTAneous AC&HS    amitriptyline (ELAVIL) tablet 25 mg  25 mg Oral QHS    [Held by provider] aspirin delayed-release tablet 81 mg  81 mg Oral DAILY    atorvastatin (LIPITOR) tablet 20 mg  20 mg Oral QPM    [Held by provider] carvediloL (COREG) tablet 3.125 mg  3.125 mg Oral BID    [Held by provider] cloNIDine HCL (CATAPRES) tablet 0.1 mg  0.1 mg Oral BID    buprenorphine-naloxone (SUBOXONE) 8mg-2mg SL film  1 Film SubLINGual DAILY    heparin (porcine) injection 5,000 Units  5,000 Units SubCUTAneous Q12H    [START ON 4/28/2021] sodium zirconium cyclosilicate (LOKELMA) powder packet 10 g  10 g Oral DAILY    0.9% sodium chloride infusion  125 mL/hr IntraVENous CONTINUOUS     Current Outpatient Medications   Medication Sig    hydrALAZINE (APRESOLINE) 50 mg tablet Take 1 Tab by mouth three (3) times daily.  amLODIPine (NORVASC) 10 mg tablet Take 1 Tab by mouth daily.  atorvastatin (LIPITOR) 20 mg tablet Take 1 Tab by mouth every evening.  carvediloL (COREG) 12.5 mg tablet Take 1 Tab by mouth two (2) times a day.  bumetanide (BUMEX) 2 mg tablet Take 1 Tab by mouth three (3) times daily.  nitroglycerin (NITROSTAT) 0.4 mg SL tablet 1 Tab by SubLINGual route as needed for Chest Pain. Up to 3 doses.  isosorbide mononitrate ER (IMDUR) 60 mg CR tablet Take 1 Tab by mouth daily.  cloNIDine HCL (CATAPRES) 0.1 mg tablet Take 1 Tab by mouth two (2) times a day.  aspirin delayed-release 81 mg tablet Take 1 Tab by mouth daily.  amitriptyline (ELAVIL) 25 mg tablet Take 1 Tab by mouth nightly.  albuterol (PROVENTIL HFA, VENTOLIN HFA, PROAIR HFA) 90 mcg/actuation inhaler Take 1 Puff by inhalation every six (6) hours as needed for Wheezing.  insulin glargine (Lantus U-100 Insulin) 100 unit/mL injection by SubCUTAneous route nightly. 30 units    dapagliflozin (Farxiga) 5 mg tab tablet Take 5 mg by mouth daily.  insulin glargine (LANTUS) 100 unit/mL injection 30 Units by SubCUTAneous route nightly. (Patient taking differently: 30 Units by SubCUTAneous route nightly. PRN IG  GLUCOSE  > 200)    BD INSULIN SYRINGE ULTRA-FINE 1 mL 31 gauge x 5/16 syrg USE AS DIRECTED FOR INJECTING INSULIN    dapagliflozin (FARXIGA) 5 mg tab tablet Take 1 Tab by mouth daily.  Blood-Glucose Meter (TRUE METRIX GLUCOSE METER) misc 1 Device by Does Not Apply route two (2) times a day.     glucose blood VI test strips (TRUE METRIX GLUCOSE TEST STRIP) strip Test BS 2 times a day    lancets misc Check BS 2 times a day    amitriptyline (ELAVIL) 25 mg tablet take 1 tablet by mouth NIGHTLY        LABS:  Recent Labs     04/26/21  1825   WBC 6.4   HGB 9.4*   HCT 29.7*        Recent Labs     04/27/21  1130 04/27/21  0250 04/27/21  0126 04/27/21  0030    137  --  137   K 5.8* 6.3* 6.5* 6.7*   * 111*  --  108   CO2 17* 17*  --  16*   BUN 93* 90*  --  93*   CREA 5.99* 5.83*  --  5.77*   * 181*  --  172*   CA 8.3* 8.0*  --  8.4*     Recent Labs     04/26/21 1825   ALT 79*   *   TBILI 0.2   TP 8.0   ALB 2.6*   GLOB 5.4*     No results for input(s): INR, PTP, APTT, INREXT in the last 72 hours. No results for input(s): FE, TIBC, PSAT, FERR in the last 72 hours. No results for input(s): PH, PCO2, PO2 in the last 72 hours.   Recent Labs     04/27/21  0250 04/26/21 1952 04/26/21 1825     --   --    CKNDX 5.5*  --   --    TROIQ 0.45* 0.52* 0.53*     Lab Results   Component Value Date/Time    Glucose (POC) 150 (H) 04/27/2021 11:43 AM    Glucose (POC) 250 (H) 04/27/2021 08:01 AM    Glucose (POC) 218 (H) 04/26/2021 08:45 PM    Glucose (POC) 150 (H) 03/17/2021 11:24 AM    Glucose (POC) 112 (H) 03/17/2021 06:42 AM

## 2021-04-27 NOTE — PROGRESS NOTES
TRANSFER - IN REPORT:    Verbal report received from Sofiya(name) on Jaison Lee  being received from ED(unit) for routine progression of care      Report consisted of patients Situation, Background, Assessment and   Recommendations(SBAR). Information from the following report(s) SBAR, Kardex, ED Summary, Procedure Summary, Intake/Output, MAR, Accordion, Recent Results, Med Rec Status and Cardiac Rhythm NSR was reviewed with the receiving nurse. Opportunity for questions and clarification was provided. Assessment completed upon patients arrival to unit and care assumed. Darlene Santiago regarding patients Suboxone order. Patient takes 1/2 of the film twice a day, we have it currently as 1 film daily. Dr. Silviano Santiago gave new orders for 1/2 suboxone film 8mg-2mg SL film BID.

## 2021-04-27 NOTE — CONSULTS
[facility]     Please note that this patient is well-known to [facility] and we are continuing his/her outpatient care/initiating his current episode of inpatient care. Date of  Admission: 4/27/2021  2:14 AM     Admission type:Emergency    Consult for: trop and ekg changes  Consult by: hospitalist      Subjective:     Maeve Mayes is a 55 y.o. male with PMH CKD, HFrEF, DM, HTN, drug abuse who was admitted for Hyperkalemia [E87.5]. Per ED provider note Maeve Mayes presented to the [facility] ED with c/o bodyaches and swelling. Patient transferred from [facility] to [facility] and admitted for ROSEMARY/hyperkalemia and elevated troponin. Cardiology consulted for troponin and ekg changes. On assessment, Maeve Mayes endorses the above. He denies chest pain, SOB, palpitations. He states he's been 27 days clean of drug use. Maeve Mayes  follows with Dr. Hugo Raygoza for cardiology. Last ECHO 03/21 with EF 35-40%; mod concentric hypertrophy. Stress 02/20 low risk    Cardiac risk factors: smoking/ tobacco exposure, diabetes mellitus, male gender, hypertension.       Patient Active Problem List    Diagnosis Date Noted    Hyperkalemia 04/27/2021    Acute on chronic kidney failure ([address] 04/27/2021    Elevated troponin 04/27/2021    Acute on chronic combined systolic and diastolic ACC/AHA stage C congestive heart failure ([address] 03/08/2021    Hypertensive urgency 03/08/2021    CKD (chronic kidney disease) 03/08/2021    CHF exacerbation ([address] 03/05/2021    Acute CHF (congestive heart failure) ([address] 37/49/4094    Metabolic encephalopathy 18/75/6112    Osteomyelitis of left foot ([address] 02/28/2020    MRSA bacteremia 02/28/2020    Secondary hyperaldosteronism ([address] 02/28/2020    Hypokalemia 02/28/2020    DM (diabetes mellitus), type 2 ([address] 02/28/2020    HTN (hypertension) 02/28/2020    Cardiomyopathy ([address] 34/20/9483    Acute systolic heart failure ([facility] 02/21/2020    Substance abuse ([facility] 02/21/2020    Severe sepsis ([facility] 02/19/2020      Kali Alfred MD  Past Medical History:   Diagnosis Date    Acute systolic heart failure ([facility] 2/21/2020    Cardiomyopathy ([facility] 2/21/2020    Diabetes ([facility]     Encounter to establish care with new doctor 10/11/2018    Hypertension     Neuropathy     Sciatica     Substance abuse ([facility] 2/21/2020      Social History     Socioeconomic History    Marital status: SINGLE     Spouse name: Not on file    Number of children: Not on file    Years of education: Not on file    Highest education level: Not on file   Tobacco Use    Smoking status: Current Every Day Smoker    Smokeless tobacco: Former User    Tobacco comment: 8-9 cigs/day   Substance and Sexual Activity    Alcohol use: Not Currently    Drug use: Not Currently     Types: Marijuana, Cocaine, Heroin     Comment: Pt is in rehab    Sexual activity: Yes     Partners: Female     Birth control/protection: None   Social History Narrative    ** Merged History Encounter **          No Known Allergies   Family History   Problem Relation Age of Onset    Diabetes Mother     Diabetes Father       Current Facility-Administered Medications   Medication Dose Route Frequency    insulin glargine (LANTUS) injection 15 Units  15 Units SubCUTAneous QHS    sodium chloride (NS) flush 5-40 mL  5-40 mL IntraVENous Q8H    sodium chloride (NS) flush 5-40 mL  5-40 mL IntraVENous PRN    acetaminophen (TYLENOL) tablet 650 mg  650 mg Oral Q6H PRN    Or    acetaminophen (TYLENOL) suppository 650 mg  650 mg Rectal Q6H PRN    polyethylene glycol (MIRALAX) packet 17 g  17 g Oral DAILY    bisacodyL (DULCOLAX) tablet 5 mg  5 mg Oral DAILY PRN    promethazine (PHENERGAN) tablet 12.5 mg  12.5 mg Oral Q6H PRN    Or    ondansetron (ZOFRAN) injection 4 mg  4 mg IntraVENous Q6H PRN    glucose chewable tablet 16 g  4 Tab Oral PRN    dextrose (D50W) injection syrg 12.5-25 g  25-50 mL IntraVENous PRN    glucagon (GLUCAGEN) injection 1 mg  1 mg IntraMUSCular PRN    insulin lispro (HUMALOG) injection   SubCUTAneous AC&HS    amitriptyline (ELAVIL) tablet 25 mg  25 mg Oral QHS    aspirin delayed-release tablet 81 mg  81 mg Oral DAILY    atorvastatin (LIPITOR) tablet 20 mg  20 mg Oral QPM    [Held by provider] carvediloL (COREG) tablet 3.125 mg  3.125 mg Oral BID    [Held by provider] cloNIDine HCL (CATAPRES) tablet 0.1 mg  0.1 mg Oral BID    buprenorphine-naloxone (SUBOXONE) 8mg-2mg SL film  1 Film SubLINGual DAILY    heparin (porcine) injection 5,000 Units  5,000 Units SubCUTAneous Q12H    [START ON 4/28/2021] sodium zirconium cyclosilicate (LOKELMA) powder packet 10 g  10 g Oral DAILY    0.9% sodium chloride infusion  75 mL/hr IntraVENous CONTINUOUS     Current Outpatient Medications   Medication Sig    hydrALAZINE (APRESOLINE) 50 mg tablet Take 1 Tab by mouth three (3) times daily.  amLODIPine (NORVASC) 10 mg tablet Take 1 Tab by mouth daily.  atorvastatin (LIPITOR) 20 mg tablet Take 1 Tab by mouth every evening.  carvediloL (COREG) 12.5 mg tablet Take 1 Tab by mouth two (2) times a day.  bumetanide (BUMEX) 2 mg tablet Take 1 Tab by mouth three (3) times daily.  nitroglycerin (NITROSTAT) 0.4 mg SL tablet 1 Tab by SubLINGual route as needed for Chest Pain. Up to 3 doses.  isosorbide mononitrate ER (IMDUR) 60 mg CR tablet Take 1 Tab by mouth daily.  cloNIDine HCL (CATAPRES) 0.1 mg tablet Take 1 Tab by mouth two (2) times a day.  aspirin delayed-release 81 mg tablet Take 1 Tab by mouth daily.  amitriptyline (ELAVIL) 25 mg tablet Take 1 Tab by mouth nightly.  albuterol (PROVENTIL HFA, VENTOLIN HFA, PROAIR HFA) 90 mcg/actuation inhaler Take 1 Puff by inhalation every six (6) hours as needed for Wheezing.  insulin glargine (Lantus U-100 Insulin) 100 unit/mL injection by SubCUTAneous route nightly.  30 units    dapagliflozin (Farxiga) 5 mg tab tablet Take 5 mg by mouth daily.  insulin glargine (LANTUS) 100 unit/mL injection 30 Units by SubCUTAneous route nightly. (Patient taking differently: 30 Units by SubCUTAneous route nightly. PRN IG  GLUCOSE  > 200)    BD INSULIN SYRINGE ULTRA-FINE 1 mL 31 gauge x 5/16 syrg USE AS DIRECTED FOR INJECTING INSULIN    dapagliflozin (FARXIGA) 5 mg tab tablet Take 1 Tab by mouth daily.  Blood-Glucose Meter (TRUE METRIX GLUCOSE METER) misc 1 Device by Does Not Apply route two (2) times a day.  glucose blood VI test strips (TRUE METRIX GLUCOSE TEST STRIP) strip Test BS 2 times a day    lancets misc Check BS 2 times a day    amitriptyline (ELAVIL) 25 mg tablet take 1 tablet by mouth NIGHTLY        Review of Symptoms:   11 systems reviewed, negative other than as stated in the HPI        Objective:      Visit Vitals  /71   Pulse 85   Temp 98.1 °F (36.7 °C)   Resp 18   Ht 5' 8\" (1.727 m)   Wt 81.6 kg (179 lb 14.3 oz)   SpO2 97%   BMI 27.35 kg/m²       Physical Assessment:   General Appearance:  alert, cooperative, well nourished, well developed AAM resting on stretcher in NAD; appears stated age  Eyes: sclera anicteric  Mouth/Throat: moist mucous membranes; oral pharynx clear  Neck: supple; + JVD  Pulmonary:  clear to auscultation bilaterally, dim bases; good effort  Cardiovascular: regular rate and rhythm; no murmur, split s2  Abdomen: soft, non-tender, non-distended; bowel sounds normal  Musculoskeletal: no swelling or deformity; moves all extremities  Extremities: 1+ edema BLE; palpable distal pulses   Skin: warm and dry;   Thickened skin BLE   Neuro: grossly normal other than drowsy/falling asleep during assessment  Psych: normal mood and affect given the setting      Data Review:   Recent Labs     04/26/21 1825   WBC 6.4   HGB 9.4*   HCT 29.7*        Recent Labs     04/27/21  1130 04/27/21  0250 04/27/21  0126 04/27/21  0030 04/26/21  1825 04/26/21  1825    137  --  137  --  135*   K 5.8* 6.3* 6.5* 6.7*   < > 6.9*   * 111*  --  108  --  106   CO2 17* 17*  --  16*  --  18*   * 181*  --  172*  --  220*   BUN 93* 90*  --  93*  --  91*   CREA 5.99* 5.83*  --  5.77*  --  5.71*   CA 8.3* 8.0*  --  8.4*  --  8.4*   ALB  --   --   --   --   --  2.6*   TBILI  --   --   --   --   --  0.2   ALT  --   --   --   --   --  79*    < > = values in this interval not displayed. Recent Labs     04/27/21  0250 04/26/21  1952 04/26/21  1825   TROIQ 0.45* 0.52* 0.53*     --   --    CKMB 6.4*  --   --        No intake or output data in the 24 hours ending 04/27/21 1258     Cardiographics    Telemetry:SR  ECG: SR with some worsening non-specific ST and T wave changes inferolateral leads   Echocardiogram: last as above   CT chest/abd: \"Small right pleural transudate  Bibasilar subsegmental atelectasis  Constipation, cholelithiasis, emphysema  No focal process identified in the chest, abdomen, or pelvis  Nonspecific ascites\"       Assessment:       Active Problems:    Cardiomyopathy ([facility] (2/21/2020)      Substance abuse ([facility] (2/21/2020)      DM (diabetes mellitus), type 2 ([facility] (2/28/2020)      Hyperkalemia (4/27/2021)      Acute on chronic kidney failure ([address] (4/27/2021)      Elevated troponin (4/27/2021)         Plan:     Ignacio Ca is a 55 y.o. male who presented to [facility] with body aches and swelling and was transferred to [facility] for hyperkalemia of 6.9. creatinine 5.83; troponin 0.53/0.52/0.45; proBNP 78179; Hg 9.4. Drug screen ordered. EKG with some nonspecific worsening of TWI and ST depression inferolateral leads. Some hypotension on arrival.    · Mild non-specific troponin and mild EKG changes. Revisit ischemic work up when hyperkalemia and ROSEMARY more improved. Continue ASA and statin. · Chronic HFpEF. Appears mildly overloaded, but that may be stemming from kidneys. Did receive 2mg of bumex in the ED, but now receiving IVF.   Will defer fluid management to nephrology due to patient's ROSEMARY on CKD  · HTN: Hypotensive earlier, slightly improved. Hold antihypertensives for now. Per chart, patient had stopped taking his home meds due to lower BPs. · Hyperkalemia with renal failure:  Per renal and hospitalist   · Drug abuse history:  Patient states clean for 27 days. Urine drug screen pending           Thank you for consulting [id number], NP  DNP, RN, AGACNP-BC    Patient seen and examined by me with the above nurse practitioner. I personally performed all components of the history, physical, and medical decision making and agree with the assessment and plan with minor modifications as noted. Today the patient presents with progressive renal failure and hyperkalemia, with incidentally noted elevated troponin which is flat without chest pain. Evidence of mild volume overload. General PE  Gen:  NAD  Mental Status - Alert. General Appearance - Not in acute distress. HEENT:  PERRL, no carotid bruits or JVD  Chest and Lung Exam   Inspection: Accessory muscles - No use of accessory muscles in breathing. Auscultation:   Breath sounds: - Normal.   Cardiovascular   Inspection: Jugular vein - Bilateral - Inspection Normal.   Palpation/Percussion:   Apical Impulse: - Normal.   Auscultation: Rhythm - Regular. Heart Sounds - S1 WNL and S2 WNL. No S3 or S4. Murmurs & Other Heart Sounds: Auscultation of the heart reveals - No Murmurs. Peripheral Vascular   Upper Extremity: Inspection - Bilateral - No Cyanotic nailbeds or Digital clubbing. Lower Extremity:   Palpation: Edema - Bilateral -trace brawny edema. Abdomen:   Soft, non-tender, bowel sounds are active. Neuro: A&O times 3, CN and motor grossly WNL    Treat progressive renal failure and hyperkalemia as per nephrology. Troponin elevation may relate to cardiomyopathy, new and progressive ESRD with some volume overload. Need to eventually rule out ischemia-timing and modality per Dr. Leonela Rosales. Thanks for the consult. We appreciate the opportunity to participate in this patient's care.

## 2021-04-27 NOTE — ED NOTES
Straight cathed pt for urine as pt has been unable to provide a urine specimen this morning. 300 mL output obtained. Urine sample sent to lab.

## 2021-04-27 NOTE — ED NOTES
TRANSFER - OUT REPORT:    Verbal report given to Abdelrahman Conti RN on Julieta Muñoz  being transferred to [facility] ED for routine progression of care       Report consisted of patients Situation, Background, Assessment and   Recommendations(SBAR). Information from the following report(s) SBAR, ED Summary, [facility] and Recent Results was reviewed with the receiving nurse. Lines:   Peripheral IV 04/26/21 Right Antecubital (Active)   Site Assessment Clean, dry, & intact 04/26/21 1833   Phlebitis Assessment 0 04/26/21 1833   Infiltration Assessment 0 04/26/21 1833   Dressing Status Clean, dry, & intact 04/26/21 1833   Dressing Type Transparent 04/26/21 1833   Hub Color/Line Status Pink;Patent; Flushed 04/26/21 1833   Action Taken Blood drawn 04/26/21 1833        Opportunity for questions and clarification was provided.       Patient transported with:  IV saline lock, EMS, EMTALA, pt belongings

## 2021-04-27 NOTE — PROGRESS NOTES
Pt seen & examined briefly during my afternoon rounds. Pt appears to stable & comfortable in bed in ER 16  Cont IVF,Lokemia PO daily  as per Nephrology  Hold BP meds  Check Urine Drug screen  Repeat CXR per cardiology  Follow Nephrology & Cardiology as consulted on admission early AM today. Downgrade to [facility] tele [facility] Renal telemetry bed.           DO NOT BILL

## 2021-04-27 NOTE — ED NOTES
Instructed pt to drink kayexalate. Pt understands and finished 1st kayexalate. Asks to be given a minute before taking the other two.

## 2021-04-27 NOTE — PROGRESS NOTES
Assumed care of pt. Hospitalist at bedside speaking w/ pt. Pt refusing IV fluids until he receives his breakfast tray. 0830 Pt ambulated to bedside commode for BM by another RN. Pt provided breakfast tray     0944 Pt medicated per orders. Pt ambulated to bedside commode for BM. Pt diaphoretic, reporting increased leg pain but denies any other sx at this time. 1123 Pt ambulated to bedside commode. During ambulation, IV ripped out. Will place new IV    1145 Pt aware that urine sample needed. Pt agreed to straight cath if no urine sample by 1245    1500 Visitor at bedside    1520 When this RN entered the room pt resting on edge of bed, in no distress, on room air. Pt sats are 100% on RA. Will continue to monitor     1655 TRANSFER - OUT REPORT:    Verbal report given to Nakul Dias (name) on Angelica Leventhal  being [place] [facility] [place] ([place]) for routine progression of care       Report consisted of patients Situation, Background, Assessment and   Recommendations(SBAR). Information from the following report(s) SBAR, Kardex, ED Summary, [facility] and Recent Results was reviewed with the receiving nurse. Lines:   Peripheral IV 04/27/21 Right Antecubital (Active)   Site Assessment Clean, dry, & intact 04/27/21 1145   Phlebitis Assessment 0 04/27/21 1145   Infiltration Assessment 0 04/27/21 1145   Dressing Status Clean, dry, & intact 04/27/21 1145   Dressing Type Transparent 04/27/21 1145   Hub Color/Line Status Pink;Patent; Flushed; Infusing 04/27/21 1145   Action Taken Blood drawn 04/27/21 1145   Alcohol Cap Used No 04/27/21 1145        Opportunity for questions and clarification was provided.       Patient transported with:   [employer]
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
## 2021-04-27 NOTE — PROGRESS NOTES
PT note:     Orders received and acknowledged. Chart reviewed and noted PT orders to start 4/28/21 at 0000. Will defer and follow up tomorrow for PT evaluation.      Rowan Lynn, PT, DPT

## 2021-04-28 ENCOUNTER — APPOINTMENT (OUTPATIENT)
Dept: NUCLEAR MEDICINE | Age: 56
End: 2021-04-28
Attending: NURSE PRACTITIONER
Payer: MEDICAID

## 2021-04-28 ENCOUNTER — HOSPITAL ENCOUNTER (OUTPATIENT)
Dept: NON INVASIVE DIAGNOSTICS | Age: 56
Discharge: HOME | End: 2021-04-28
Attending: NURSE PRACTITIONER
Payer: MEDICAID

## 2021-04-28 LAB
ALBUMIN SERPL-MCNC: 2.4 G/DL (ref 3.5–5)
ALBUMIN/GLOB SERPL: 0.5 {RATIO} (ref 1.1–2.2)
ALP SERPL-CCNC: 210 U/L (ref 45–117)
ALT SERPL-CCNC: 53 U/L (ref 12–78)
ANION GAP SERPL CALC-SCNC: 9 MMOL/L (ref 5–15)
AST SERPL-CCNC: 35 U/L (ref 15–37)
BASOPHILS # BLD: 0 K/UL (ref 0–0.1)
BASOPHILS NFR BLD: 0 % (ref 0–1)
BILIRUB SERPL-MCNC: 0.1 MG/DL (ref 0.2–1)
BUN SERPL-MCNC: 96 MG/DL (ref 6–20)
BUN/CREAT SERPL: 17 (ref 12–20)
CALCIUM SERPL-MCNC: 7.8 MG/DL (ref 8.5–10.1)
CHLORIDE SERPL-SCNC: 110 MMOL/L (ref 97–108)
CO2 SERPL-SCNC: 19 MMOL/L (ref 21–32)
CREAT SERPL-MCNC: 5.74 MG/DL (ref 0.7–1.3)
DIFFERENTIAL METHOD BLD: ABNORMAL
EOSINOPHIL # BLD: 0.2 K/UL (ref 0–0.4)
EOSINOPHIL NFR BLD: 2 % (ref 0–7)
ERYTHROCYTE [DISTWIDTH] IN BLOOD BY AUTOMATED COUNT: 19.4 % (ref 11.5–14.5)
GLOBULIN SER CALC-MCNC: 5.1 G/DL (ref 2–4)
GLUCOSE BLD STRIP.AUTO-MCNC: 123 MG/DL (ref 65–100)
GLUCOSE BLD STRIP.AUTO-MCNC: 128 MG/DL (ref 65–100)
GLUCOSE BLD STRIP.AUTO-MCNC: 131 MG/DL (ref 65–100)
GLUCOSE BLD STRIP.AUTO-MCNC: 151 MG/DL (ref 65–100)
GLUCOSE SERPL-MCNC: 145 MG/DL (ref 65–100)
HCT VFR BLD AUTO: 26.3 % (ref 36.6–50.3)
HGB BLD-MCNC: 8.4 G/DL (ref 12.1–17)
IMM GRANULOCYTES # BLD AUTO: 0.1 K/UL (ref 0–0.04)
IMM GRANULOCYTES NFR BLD AUTO: 2 % (ref 0–0.5)
LYMPHOCYTES # BLD: 1.4 K/UL (ref 0.8–3.5)
LYMPHOCYTES NFR BLD: 17 % (ref 12–49)
MCH RBC QN AUTO: 26.3 PG (ref 26–34)
MCHC RBC AUTO-ENTMCNC: 31.9 G/DL (ref 30–36.5)
MCV RBC AUTO: 82.4 FL (ref 80–99)
MONOCYTES # BLD: 1 K/UL (ref 0–1)
MONOCYTES NFR BLD: 12 % (ref 5–13)
NEUTS SEG # BLD: 5.4 K/UL (ref 1.8–8)
NEUTS SEG NFR BLD: 67 % (ref 32–75)
NRBC # BLD: 0 K/UL (ref 0–0.01)
NRBC BLD-RTO: 0 PER 100 WBC
PLATELET # BLD AUTO: 262 K/UL (ref 150–400)
PMV BLD AUTO: 10.9 FL (ref 8.9–12.9)
POTASSIUM SERPL-SCNC: 5.1 MMOL/L (ref 3.5–5.1)
PROT SERPL-MCNC: 7.5 G/DL (ref 6.4–8.2)
RBC # BLD AUTO: 3.19 M/UL (ref 4.1–5.7)
SERVICE CMNT-IMP: ABNORMAL
SODIUM SERPL-SCNC: 138 MMOL/L (ref 136–145)
WBC # BLD AUTO: 8.2 K/UL (ref 4.1–11.1)

## 2021-04-28 PROCEDURE — 36415 COLL VENOUS BLD VENIPUNCTURE: CPT

## 2021-04-28 PROCEDURE — 97161 PT EVAL LOW COMPLEX 20 MIN: CPT

## 2021-04-28 PROCEDURE — 97116 GAIT TRAINING THERAPY: CPT

## 2021-04-28 PROCEDURE — 80053 COMPREHEN METABOLIC PANEL: CPT

## 2021-04-28 PROCEDURE — 99218 HC RM OBSERVATION: CPT

## 2021-04-28 PROCEDURE — APPSS45 APP SPLIT SHARED TIME 31-45 MINUTES: Performed by: NURSE PRACTITIONER

## 2021-04-28 PROCEDURE — 78452 HT MUSCLE IMAGE SPECT MULT: CPT

## 2021-04-28 PROCEDURE — 85025 COMPLETE CBC W/AUTO DIFF WBC: CPT

## 2021-04-28 PROCEDURE — 82962 GLUCOSE BLOOD TEST: CPT

## 2021-04-28 PROCEDURE — 77010033678 HC OXYGEN DAILY

## 2021-04-28 PROCEDURE — 74011250636 HC RX REV CODE- 250/636: Performed by: INTERNAL MEDICINE

## 2021-04-28 PROCEDURE — 99233 SBSQ HOSP IP/OBS HIGH 50: CPT | Performed by: INTERNAL MEDICINE

## 2021-04-28 PROCEDURE — 74011636637 HC RX REV CODE- 636/637: Performed by: INTERNAL MEDICINE

## 2021-04-28 PROCEDURE — 96372 THER/PROPH/DIAG INJ SC/IM: CPT

## 2021-04-28 PROCEDURE — 65660000001 HC RM ICU INTERMED STEPDOWN

## 2021-04-28 PROCEDURE — 74011250637 HC RX REV CODE- 250/637: Performed by: INTERNAL MEDICINE

## 2021-04-28 PROCEDURE — 74011000250 HC RX REV CODE- 250: Performed by: INTERNAL MEDICINE

## 2021-04-28 PROCEDURE — 93017 CV STRESS TEST TRACING ONLY: CPT

## 2021-04-28 PROCEDURE — G0378 HOSPITAL OBSERVATION PER HR: HCPCS

## 2021-04-28 RX ADMIN — CARVEDILOL 3.12 MG: 3.12 TABLET, FILM COATED ORAL at 08:54

## 2021-04-28 RX ADMIN — SODIUM ZIRCONIUM CYCLOSILICATE 10 G: 10 POWDER, FOR SUSPENSION ORAL at 11:00

## 2021-04-28 RX ADMIN — EPOETIN ALFA-EPBX 10000 UNITS: 10000 INJECTION, SOLUTION INTRAVENOUS; SUBCUTANEOUS at 21:50

## 2021-04-28 RX ADMIN — CLONIDINE HYDROCHLORIDE 0.1 MG: 0.1 TABLET ORAL at 08:54

## 2021-04-28 RX ADMIN — CARVEDILOL 3.12 MG: 3.12 TABLET, FILM COATED ORAL at 17:19

## 2021-04-28 RX ADMIN — SODIUM BICARBONATE: 84 INJECTION, SOLUTION INTRAVENOUS at 01:28

## 2021-04-28 RX ADMIN — BUPRENORPHINE AND NALOXONE 1 FILM: 8; 2 FILM BUCCAL; SUBLINGUAL at 08:55

## 2021-04-28 RX ADMIN — HEPARIN SODIUM 5000 UNITS: 5000 INJECTION INTRAVENOUS; SUBCUTANEOUS at 08:54

## 2021-04-28 RX ADMIN — INSULIN GLARGINE 15 UNITS: 100 INJECTION, SOLUTION SUBCUTANEOUS at 21:49

## 2021-04-28 RX ADMIN — BUPRENORPHINE AND NALOXONE 1 FILM: 8; 2 FILM BUCCAL; SUBLINGUAL at 17:19

## 2021-04-28 RX ADMIN — Medication 10 ML: at 21:54

## 2021-04-28 RX ADMIN — AMITRIPTYLINE HYDROCHLORIDE 25 MG: 25 TABLET, FILM COATED ORAL at 21:48

## 2021-04-28 RX ADMIN — CLONIDINE HYDROCHLORIDE 0.1 MG: 0.1 TABLET ORAL at 17:19

## 2021-04-28 RX ADMIN — HEPARIN SODIUM 5000 UNITS: 5000 INJECTION INTRAVENOUS; SUBCUTANEOUS at 19:53

## 2021-04-28 RX ADMIN — ATORVASTATIN CALCIUM 20 MG: 20 TABLET, FILM COATED ORAL at 17:19

## 2021-04-28 NOTE — PROGRESS NOTES
[facility], [address] [place] [address]  [phone]      Cardiology Progress Note      4/28/2021 1:50 PM    Admit Date: 4/27/2021    Admit Diagnosis:   Hyperkalemia [E87.5]    Interval History/Subjective:     Isma Yu is a 55 y.o. male with PMH CKD, HFrEF, DM, HTN, drug abuse who was admitted for Hyperkalemia [E87.5]. -BP elevated; tachy  -K 5.1; Hg down to 8.4; creat steady around 5.74  -weight up 3#; I/O slightly negative  -Mr. Eileen Mix is feeling better today. No further aches or pains.   No SOb    Visit Vitals  BP (!) 151/84 (BP 1 Location: Left upper arm, BP Patient Position: At rest;Sitting)   Pulse 95   Temp 98 °F (36.7 °C)   Resp 22   Ht 5' 8\" (1.727 m)   Wt 82.6 kg (182 lb 1.6 oz)   SpO2 96%   BMI 27.69 kg/m²       Current Facility-Administered Medications   Medication Dose Route Frequency    epoetin reagan-epbx (RETACRIT) injection 10,000 Units  10,000 Units SubCUTAneous EVERY WED & SAT    insulin glargine (LANTUS) injection 15 Units  15 Units SubCUTAneous QHS    sodium chloride (NS) flush 5-40 mL  5-40 mL IntraVENous Q8H    sodium chloride (NS) flush 5-40 mL  5-40 mL IntraVENous PRN    acetaminophen (TYLENOL) tablet 650 mg  650 mg Oral Q6H PRN    Or    acetaminophen (TYLENOL) suppository 650 mg  650 mg Rectal Q6H PRN    polyethylene glycol (MIRALAX) packet 17 g  17 g Oral DAILY    bisacodyL (DULCOLAX) tablet 5 mg  5 mg Oral DAILY PRN    promethazine (PHENERGAN) tablet 12.5 mg  12.5 mg Oral Q6H PRN    Or    ondansetron (ZOFRAN) injection 4 mg  4 mg IntraVENous Q6H PRN    glucose chewable tablet 16 g  4 Tab Oral PRN    dextrose (D50W) injection syrg 12.5-25 g  25-50 mL IntraVENous PRN    glucagon (GLUCAGEN) injection 1 mg  1 mg IntraMUSCular PRN    insulin lispro (HUMALOG) injection   SubCUTAneous AC&HS    amitriptyline (ELAVIL) tablet 25 mg  25 mg Oral QHS    [Held by provider] aspirin delayed-release tablet 81 mg  81 mg Oral DAILY    atorvastatin (LIPITOR) tablet 20 mg  20 mg Oral QPM    carvediloL (COREG) tablet 3.125 mg  3.125 mg Oral BID    cloNIDine HCL (CATAPRES) tablet 0.1 mg  0.1 mg Oral BID    heparin (porcine) injection 5,000 Units  5,000 Units SubCUTAneous Q12H    sodium zirconium cyclosilicate (LOKELMA) powder packet 10 g  10 g Oral DAILY    sodium bicarbonate (8.4%) 150 mEq in sterile water 1,000 mL infusion   IntraVENous CONTINUOUS    buprenorphine-naloxone (SUBOXONE) 8mg-2mg SL film  1 Film SubLINGual BID       Objective:      Physical Exam:   General Appearance:  drowsy, cooperative, well nourished, well developed AAM resting in bed in NAD; appears stated age  Eyes: sclera anicteric  Mouth/Throat: moist mucous membranes; oral pharynx clear  Neck: supple; + JVD  Pulmonary:  clear to auscultation bilaterally, dim bases; good effort  Cardiovascular: regular rate and rhythm; no murmur, split s2  Abdomen: soft, non-tender, non-distended; bowel sounds normal  Musculoskeletal: no swelling or deformity; moves all extremities  Extremities: 1+ edema BLE; palpable distal pulses   Skin: warm and dry; Thickened skin BLE   Neuro: grossly normal other than drowsy/falling asleep during assessment  Psych: normal mood and affect given the setting       Data Review:   Recent Labs     04/28/21  0020 04/26/21  1825   WBC 8.2 6.4   HGB 8.4* 9.4*   HCT 26.3* 29.7*    261     Recent Labs     04/28/21  0020 04/27/21  1130 04/27/21  0250 04/26/21  1825 04/26/21  1825    137 137   < > 135*   K 5.1 5.8* 6.3*   < > 6.9*   * 111* 111*   < > 106   CO2 19* 17* 17*   < > 18*   * 167* 181*   < > 220*   BUN 96* 93* 90*   < > 91*   CREA 5.74* 5.99* 5.83*   < > 5.71*   CA 7.8* 8.3* 8.0*   < > 8.4*   ALB 2.4*  --   --   --  2.6*   TBILI 0.1*  --   --   --  0.2   ALT 53  --   --   --  79*    < > = values in this interval not displayed.        Recent Labs     04/27/21  0250 04/26/21 1952 04/26/21  1825   TROIQ 0.45* 0.52* 0.53*     --   --    CKMB 6.4*  --   -- Intake/Output Summary (Last 24 hours) at 4/28/2021 1402  Last data filed at 4/28/2021 1051  Gross per 24 hour   Intake 2940 ml   Output 3625 ml   Net -685 ml        Telemetry:SR   ECG: SR with some worsening non-specific ST and T wave changes inferolateral leads   CT chest/abd: \"Small right pleural transudate  Bibasilar subsegmental atelectasis  Constipation, cholelithiasis, emphysema  No focal process identified in the chest, abdomen, or pelvis  Nonspecific ascites\"          Assessment:     Active Problems:    Cardiomyopathy ([facility] (2/21/2020)      Substance abuse ([facility] (2/21/2020)      DM (diabetes mellitus), type 2 ([facility] (2/28/2020)      Hyperkalemia (4/27/2021)      Acute on chronic kidney failure ([facility] (4/27/2021)      Elevated troponin (4/27/2021)        Plan:     Elevated troponin:  Mild and non-specific ~0.50. In setting of ROSEMARY on CKD. Mild EKG changes. · Continue ASA and statin  · BB restarting today now that BP improved  · Stress test today- results pending       Chronic HFpEF:  Appears mildly overloaded in setting of ROSEMARY on CKD  · Received bumex in the ED and then IVF per nephrology         HTN:  BP improved. · Restarting some home meds today      Hyperkalemia:  Improving.  K 5.1  · Per nephrology and hospitalist      ROSEMARY on CKD:  Creat steady around 5.7  · Nephrology following        Drug abuse history:  Drug screen negative  · Encouraged continued drug cessation        John Koenig NP  DNP, RN, AGACNP-BC      [facility]    4/28/2021         Patient seen, examined by me personally. Plan discussed as detailed. Agree with note as outlined by  NP with modifications as noted. My independent physical exam reveals : Physical Exam   Constitutional: He is oriented to person, place, and time. He appears well-developed and well-nourished. HENT:   Head: Normocephalic. Eyes: Conjunctivae are normal.   Cardiovascular: Normal rate and regular rhythm.    No murmur heard.  Pulmonary/Chest: Effort normal and breath sounds normal. No respiratory distress. He has no wheezes. Musculoskeletal:         General: No edema. Neurological: He is alert and oriented to person, place, and time. Skin: Skin is warm and dry. Psychiatric: He has a normal mood and affect. Nursing note and vitals reviewed. Stress test . He is ESRD and near dialysis. No additional findings noted. Agree with plan as outlined above with modifications as noted.      Ilya Orozco MD

## 2021-04-28 NOTE — PROGRESS NOTES
End of Shift Note    Bedside shift change report given to Heather Kolb (oncoming nurse) by Ruth Anderson (offgoing nurse). Report included the following information SBAR and Kardex    Shift worked:  5670-3335     Shift summary and any significant changes:     2000 Patient requesting to take suboxone closer to 2200.     2030 Patient placed on 2L NC for low O2 sats while sleeping    BP progressively elevated throughout the night. Concerns for physician to address:  BP     Zone phone for oncoming shift:          Activity:  Activity Level: Up with Assistance  Number times ambulated in hallways past shift: 0  Number of times OOB to chair past shift: 0    Cardiac:   Cardiac Monitoring: Yes      Cardiac Rhythm: Sinus tachycardia    Access:   Current line(s): PIV     Genitourinary:   Urinary status: voiding    Respiratory:   O2 Device: None (Room air)  Chronic home O2 use?: NO  Incentive spirometer at bedside: NO     GI:  Last Bowel Movement Date: 04/26/21  Current diet:  DIET DIABETIC CONSISTENT CARB Regular; 70-70-70 (House)  Passing flatus: YES  Tolerating current diet: YES       Pain Management:   Patient states pain is manageable on current regimen: YES    Skin:  Sarwat Score: 18  Interventions: increase time out of bed and PT/OT consult    Patient Safety:  Fall Score:  Total Score: 3  Interventions: bed/chair alarm, assistive device (walker, cane, etc), gripper socks, pt to call before getting OOB and stay with me (per policy)       Length of Stay:  Expected LOS: 3d 2h  Actual LOS: 0      Ruth Anderson
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
## 2021-04-28 NOTE — PROGRESS NOTES
0700 Bedside shift change report given to JETT Cuevas (oncoming nurse) by Shelley Renee RN (offgoing nurse). Report included the following information SBAR, Kardex, Intake/Output, MAR, Recent Results and Cardiac Rhythm NSR/Sinus Tach.     9945 Spoke with Natalio Reynolds from cardiology. Plan for pt to be NPO effective midnight tonight for stress test in AM.     0945 Pt ambulating in hallway with PT.    1205 Pt off the floor with nuclear medicine. 1615 Pt resting quietly in chair at this time. VSS. Will continue to monitor. 1900 End of Shift Note    Bedside shift change report given to Marcelle Severs, RN (oncoming nurse) by Meera Dunlap (offgoing nurse). Report included the following information SBAR, Kardex, Intake/Output, MAR, Recent Results and Cardiac Rhythm NSR/ST    Shift worked:  1185-4807     Shift summary and any significant changes:     Pt restarted on BP meds, pt to remain NPO after midnight for stress test tomorrow      Concerns for physician to address:  Less restrictive diet     Zone phone for oncoming shift:           Activity:  Activity Level: Up with Assistance  Number times ambulated in hallways past shift: 1  Number of times OOB to chair past shift: 3    Cardiac:   Cardiac Monitoring: Yes      Cardiac Rhythm: Normal sinus rhythm    Access:   Current line(s): PIV     Genitourinary:   Urinary status: voiding    Respiratory:   O2 Device: None (Room air)  Chronic home O2 use?: NO  Incentive spirometer at bedside: NO     GI:  Last Bowel Movement Date: 04/26/21  Current diet:  DIET DIABETIC CONSISTENT CARB Regular; 70-70-70 (House)  DIET NPO Except Meds  DIET ONE TIME MESSAGE  Passing flatus: YES  Tolerating current diet: NO       Pain Management:   Patient states pain is manageable on current regimen: YES    Skin:  Sarwat Score: 19  Interventions: increase time out of bed    Patient Safety:  Fall Score:  Total Score: 3  Interventions: gripper socks and pt to call before getting OOB  High Fall Risk: Yes    Length of Stay:  Expected LOS: 3d 2h  Actual LOS: [facility]

## 2021-04-28 NOTE — PROGRESS NOTES
[facility] Progress Note        NAME: Isma Yu       :  1965       MRN:  [id number]     Date/Time: 2021    Risk of deterioration: medium       Assessment:    Plan:  ROSEMARY/CKD IV/hyperkalemia  Drug addiction/on suboxone  CMO  NRP likely due to diabetes  DM  Anemic Creatinine was 2.45 on ? Ischemic atn from hypotension  Medical management of hyperkalemia  Ivf/change to bicarb, decrease rate  Note 300 cc urine retention-straight cath/johnston prn  No acute need for hd-this is acute and should  Improve. No hydro on ct  I am not inclined to do a renal biopsy for the NRP  k 5.1-on lokelma, likely stop tomorrow  Epo, check iron studies     Subjective:     Chief Complaint:  Good night    Review of Systems: no trouble passing urine    Objective:     VITALS:   Last 24hrs VS reviewed since prior progress note. Most recent are:  Visit Vitals  BP (!) 151/84 (BP 1 Location: Left upper arm, BP Patient Position: At rest;Sitting)   Pulse 94   Temp 98 °F (36.7 °C)   Resp 22   Ht 5' 8\" (1.727 m)   Wt 82.6 kg (182 lb 1.6 oz)   SpO2 96%   BMI 27.69 kg/m²     SpO2 Readings from Last 6 Encounters:   21 96%   21 97%   21 94%   21 96%   21 96%   20 100%    O2 Flow Rate (L/min): 2 l/min       Intake/Output Summary (Last 24 hours) at 2021 1116  Last data filed at 2021 0918  Gross per 24 hour   Intake 2601.67 ml   Output 3250 ml   Net -648. 33 ml        Telemetry Reviewed       PHYSICAL EXAM:    General   well developed, well nourished, appears stated age, in no acute distress  EENT  Normocephalic, Atraumatic,  EOMI  Respiratory   Clear To Auscultation bilaterally  Cardiology  Regular Rate and Rythmn  Abdominal  Soft, non-tender, non-distended  Extremities  No clubbing, cyanosis, or edema. Pulses intact.               Lab Data Reviewed: (see below)    Medications Reviewed: (see below)    PMH/SH reviewed - no change compared to H&P  ___________________________________________________        ___________________________________________________    Attending Physician: Monse Fontana MD     ____________________________________________________  MEDICATIONS:  Current Facility-Administered Medications   Medication Dose Route Frequency    epoetin reagan-epbx (RETACRIT) injection 10,000 Units  10,000 Units SubCUTAneous EVERY WED & SAT    insulin glargine (LANTUS) injection 15 Units  15 Units SubCUTAneous QHS    sodium chloride (NS) flush 5-40 mL  5-40 mL IntraVENous Q8H    sodium chloride (NS) flush 5-40 mL  5-40 mL IntraVENous PRN    acetaminophen (TYLENOL) tablet 650 mg  650 mg Oral Q6H PRN    Or    acetaminophen (TYLENOL) suppository 650 mg  650 mg Rectal Q6H PRN    polyethylene glycol (MIRALAX) packet 17 g  17 g Oral DAILY    bisacodyL (DULCOLAX) tablet 5 mg  5 mg Oral DAILY PRN    promethazine (PHENERGAN) tablet 12.5 mg  12.5 mg Oral Q6H PRN    Or    ondansetron (ZOFRAN) injection 4 mg  4 mg IntraVENous Q6H PRN    glucose chewable tablet 16 g  4 Tab Oral PRN    dextrose (D50W) injection syrg 12.5-25 g  25-50 mL IntraVENous PRN    glucagon (GLUCAGEN) injection 1 mg  1 mg IntraMUSCular PRN    insulin lispro (HUMALOG) injection   SubCUTAneous AC&HS    amitriptyline (ELAVIL) tablet 25 mg  25 mg Oral QHS    [Held by provider] aspirin delayed-release tablet 81 mg  81 mg Oral DAILY    atorvastatin (LIPITOR) tablet 20 mg  20 mg Oral QPM    carvediloL (COREG) tablet 3.125 mg  3.125 mg Oral BID    cloNIDine HCL (CATAPRES) tablet 0.1 mg  0.1 mg Oral BID    heparin (porcine) injection 5,000 Units  5,000 Units SubCUTAneous Q12H    sodium zirconium cyclosilicate (LOKELMA) powder packet 10 g  10 g Oral DAILY    sodium bicarbonate (8.4%) 150 mEq in sterile water 1,000 mL infusion   IntraVENous CONTINUOUS    buprenorphine-naloxone (SUBOXONE) 8mg-2mg SL film  1 Film SubLINGual BID        LABS:  Recent Labs     04/28/21  0020 04/26/21 1825   WBC 8.2 6.4   HGB 8.4* 9.4*   HCT 26.3* 29.7*    261     Recent Labs     04/28/21  0020 04/27/21  1130 04/27/21 0250    137 137   K 5.1 5.8* 6.3*   * 111* 111*   CO2 19* 17* 17*   BUN 96* 93* 90*   CREA 5.74* 5.99* 5.83*   * 167* 181*   CA 7.8* 8.3* 8.0*     Recent Labs     04/28/21 0020 04/26/21 1825   ALT 53 79*   * 200*   TBILI 0.1* 0.2   TP 7.5 8.0   ALB 2.4* 2.6*   GLOB 5.1* 5.4*     No results for input(s): INR, PTP, APTT, INREXT, INREXT in the last 72 hours. No results for input(s): FE, TIBC, PSAT, FERR in the last 72 hours. No results for input(s): PH, PCO2, PO2 in the last 72 hours.   Recent Labs     04/27/21  0250 04/26/21 1952 04/26/21 1825     --   --    CKNDX 5.5*  --   --    TROIQ 0.45* 0.52* 0.53*     Lab Results   Component Value Date/Time    Glucose (POC) 151 (H) 04/28/2021 10:55 AM    Glucose (POC) 123 (H) 04/28/2021 07:42 AM    Glucose (POC) 170 (H) 04/27/2021 09:39 PM    Glucose (POC) 179 (H) 04/27/2021 04:45 PM    Glucose (POC) 150 (H) 04/27/2021 11:43 AM
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
## 2021-04-28 NOTE — PROGRESS NOTES
Orders received, chart reviewed and patient evaluated by physical therapy. Pending progression with skilled acute physical therapy, recommend:  Physical therapy at least 2 days/week in the home vs none, pending progress in the acute setting. Recommend with nursing OOB to chair 3x/day and walking daily with staffvand Hand held assist. Thank you for completing as able in order to maintain patient strength, endurance and independence. Full evaluation to follow.

## 2021-04-28 NOTE — PROGRESS NOTES
Pt injected for resting Nuc Med cardiac study. Pt may eat and drink today. Scan will be done around 1 pm today.

## 2021-04-28 NOTE — PROGRESS NOTES
Problem: Falls - Risk of  Goal: *Absence of Falls  Description: Document Diego Bertrand Fall Risk and appropriate interventions in the flowsheet. Outcome: Progressing Towards Goal  Note: Fall Risk Interventions:  Mobility Interventions: Bed/chair exit alarm, Communicate number of staff needed for ambulation/transfer, OT consult for ADLs, Patient to call before getting OOB, PT Consult for mobility concerns, PT Consult for assist device competence, Strengthening exercises (ROM-active/passive)         Medication Interventions: Bed/chair exit alarm, Patient to call before getting OOB, Teach patient to arise slowly    Elimination Interventions: Bed/chair exit alarm, Call light in reach, Patient to call for help with toileting needs, Stay With Me (per policy), Toilet paper/wipes in reach, Toileting schedule/hourly rounds, Urinal in reach              Problem: Patient Education: Go to Patient Education Activity  Goal: Patient/Family Education  Outcome: Progressing Towards Goal     Problem: Pressure Injury - Risk of  Goal: *Prevention of pressure injury  Description: Document Sarwat Scale and appropriate interventions in the flowsheet.   Outcome: Progressing Towards Goal  Note: Pressure Injury Interventions:  Sensory Interventions: Assess changes in LOC, Assess need for specialty bed, Check visual cues for pain, Discuss PT/OT consult with provider, Float heels, Keep linens dry and wrinkle-free, Maintain/enhance activity level, Minimize linen layers, Pressure redistribution bed/mattress (bed type)    Moisture Interventions: Absorbent underpads, Assess need for specialty bed, Maintain skin hydration (lotion/cream), Minimize layers, Moisture barrier, Offer toileting Q_hr    Activity Interventions: Assess need for specialty bed, PT/OT evaluation, Increase time out of bed, Pressure redistribution bed/mattress(bed type)    Mobility Interventions: Assess need for specialty bed, Float heels, HOB 30 degrees or less, Pressure redistribution bed/mattress (bed type), PT/OT evaluation    Nutrition Interventions: Document food/fluid/supplement intake, Offer support with meals,snacks and hydration    Friction and Shear Interventions: HOB 30 degrees or less, Lift sheet, Minimize layers                Problem: Patient Education: Go to Patient Education Activity  Goal: Patient/Family Education  Outcome: Progressing Towards Goal     Problem: Risk for Spread of Infection  Goal: Prevent transmission of infectious organism to others  Description: Prevent the transmission of infectious organisms to other patients, staff members, and visitors.   Outcome: Progressing Towards Goal     Problem: Patient Education:  Go to Education Activity  Goal: Patient/Family Education  Outcome: Progressing Towards Goal

## 2021-04-29 VITALS — WEIGHT: 170 LBS | BODY MASS INDEX: 25.85 KG/M2

## 2021-04-29 LAB
ANION GAP SERPL CALC-SCNC: 8 MMOL/L (ref 5–15)
BACTERIA SPEC CULT: ABNORMAL
BACTERIA SPEC CULT: ABNORMAL
BUN SERPL-MCNC: 90 MG/DL (ref 6–20)
BUN/CREAT SERPL: 20 (ref 12–20)
CALCIUM SERPL-MCNC: 8.9 MG/DL (ref 8.5–10.1)
CHLORIDE SERPL-SCNC: 110 MMOL/L (ref 97–108)
CO2 SERPL-SCNC: 22 MMOL/L (ref 21–32)
CREAT SERPL-MCNC: 4.51 MG/DL (ref 0.7–1.3)
FERRITIN SERPL-MCNC: 271 NG/ML (ref 26–388)
GLUCOSE BLD STRIP.AUTO-MCNC: 130 MG/DL (ref 65–100)
GLUCOSE BLD STRIP.AUTO-MCNC: 131 MG/DL (ref 65–100)
GLUCOSE BLD STRIP.AUTO-MCNC: 282 MG/DL (ref 65–100)
GLUCOSE BLD STRIP.AUTO-MCNC: 61 MG/DL (ref 65–100)
GLUCOSE BLD STRIP.AUTO-MCNC: 62 MG/DL (ref 65–100)
GLUCOSE BLD STRIP.AUTO-MCNC: 79 MG/DL (ref 65–100)
GLUCOSE BLD STRIP.AUTO-MCNC: 93 MG/DL (ref 65–100)
GLUCOSE SERPL-MCNC: 67 MG/DL (ref 65–100)
IRON SATN MFR SERPL: 12 % (ref 20–50)
IRON SERPL-MCNC: 41 UG/DL (ref 35–150)
PHOSPHATE SERPL-MCNC: 7.1 MG/DL (ref 2.6–4.7)
POTASSIUM SERPL-SCNC: 4.7 MMOL/L (ref 3.5–5.1)
SERVICE CMNT-IMP: ABNORMAL
SERVICE CMNT-IMP: NORMAL
SERVICE CMNT-IMP: NORMAL
SODIUM SERPL-SCNC: 140 MMOL/L (ref 136–145)
STRESS BASELINE DIAS BP: 99 MMHG
STRESS BASELINE HR: 86 BPM
STRESS BASELINE SYS BP: 174 MMHG
STRESS ESTIMATED WORKLOAD: 1 METS
STRESS EXERCISE DUR MIN: NORMAL
STRESS O2 SAT PEAK: 98 %
STRESS O2 SAT REST: 96 %
STRESS PEAK DIAS BP: 98 MMHG
STRESS PEAK SYS BP: 175 MMHG
STRESS PERCENT HR ACHIEVED: 56 %
STRESS POST PEAK HR: 92 BPM
STRESS RATE PRESSURE PRODUCT: NORMAL BPM*MMHG
STRESS ST DEPRESSION: 0 MM
STRESS ST ELEVATION: 0 MM
STRESS TARGET HR: 165 BPM
TIBC SERPL-MCNC: 335 UG/DL (ref 250–450)
URATE SERPL-MCNC: 8.2 MG/DL (ref 3.5–7.2)

## 2021-04-29 PROCEDURE — 74011000250 HC RX REV CODE- 250: Performed by: INTERNAL MEDICINE

## 2021-04-29 PROCEDURE — 96372 THER/PROPH/DIAG INJ SC/IM: CPT

## 2021-04-29 PROCEDURE — 36415 COLL VENOUS BLD VENIPUNCTURE: CPT

## 2021-04-29 PROCEDURE — 80048 BASIC METABOLIC PNL TOTAL CA: CPT

## 2021-04-29 PROCEDURE — 74011250636 HC RX REV CODE- 250/636: Performed by: NURSE PRACTITIONER

## 2021-04-29 PROCEDURE — 74011250637 HC RX REV CODE- 250/637: Performed by: INTERNAL MEDICINE

## 2021-04-29 PROCEDURE — 99232 SBSQ HOSP IP/OBS MODERATE 35: CPT | Performed by: INTERNAL MEDICINE

## 2021-04-29 PROCEDURE — 84550 ASSAY OF BLOOD/URIC ACID: CPT

## 2021-04-29 PROCEDURE — 82962 GLUCOSE BLOOD TEST: CPT

## 2021-04-29 PROCEDURE — 96375 TX/PRO/DX INJ NEW DRUG ADDON: CPT

## 2021-04-29 PROCEDURE — 84100 ASSAY OF PHOSPHORUS: CPT

## 2021-04-29 PROCEDURE — 65660000001 HC RM ICU INTERMED STEPDOWN

## 2021-04-29 PROCEDURE — 74011636637 HC RX REV CODE- 636/637: Performed by: INTERNAL MEDICINE

## 2021-04-29 PROCEDURE — 83540 ASSAY OF IRON: CPT

## 2021-04-29 PROCEDURE — 99218 HC RM OBSERVATION: CPT

## 2021-04-29 PROCEDURE — 82728 ASSAY OF FERRITIN: CPT

## 2021-04-29 PROCEDURE — 74011250637 HC RX REV CODE- 250/637: Performed by: NURSE PRACTITIONER

## 2021-04-29 PROCEDURE — G0378 HOSPITAL OBSERVATION PER HR: HCPCS

## 2021-04-29 PROCEDURE — 74011250636 HC RX REV CODE- 250/636: Performed by: INTERNAL MEDICINE

## 2021-04-29 PROCEDURE — APPSS45 APP SPLIT SHARED TIME 31-45 MINUTES: Performed by: NURSE PRACTITIONER

## 2021-04-29 RX ORDER — CARVEDILOL 12.5 MG/1
12.5 TABLET ORAL 2 TIMES DAILY
Status: DISCONTINUED | OUTPATIENT
Start: 2021-04-29 | End: 2021-04-30 | Stop reason: HOSPADM

## 2021-04-29 RX ORDER — TRAZODONE HYDROCHLORIDE 100 MG/1
100 TABLET ORAL
Status: DISCONTINUED | OUTPATIENT
Start: 2021-04-29 | End: 2021-04-30 | Stop reason: HOSPADM

## 2021-04-29 RX ORDER — ALLOPURINOL 300 MG/1
300 TABLET ORAL DAILY
Status: DISCONTINUED | OUTPATIENT
Start: 2021-04-30 | End: 2021-04-30 | Stop reason: HOSPADM

## 2021-04-29 RX ORDER — CLONIDINE HYDROCHLORIDE 0.1 MG/1
0.2 TABLET ORAL 2 TIMES DAILY
Status: DISCONTINUED | OUTPATIENT
Start: 2021-04-29 | End: 2021-04-30 | Stop reason: HOSPADM

## 2021-04-29 RX ORDER — PREGABALIN 100 MG/1
100 CAPSULE ORAL 2 TIMES DAILY
Status: DISCONTINUED | OUTPATIENT
Start: 2021-04-29 | End: 2021-04-30 | Stop reason: HOSPADM

## 2021-04-29 RX ORDER — TRAZODONE HYDROCHLORIDE 100 MG/1
100 TABLET ORAL
COMMUNITY
End: 2021-12-02

## 2021-04-29 RX ORDER — SODIUM CHLORIDE 0.9 % (FLUSH) 0.9 %
10 SYRINGE (ML) INJECTION AS NEEDED
Status: DISCONTINUED | OUTPATIENT
Start: 2021-04-29 | End: 2021-05-02 | Stop reason: HOSPADM

## 2021-04-29 RX ORDER — INSULIN GLARGINE 100 [IU]/ML
10 INJECTION, SOLUTION SUBCUTANEOUS
Status: DISCONTINUED | OUTPATIENT
Start: 2021-04-29 | End: 2021-04-30 | Stop reason: HOSPADM

## 2021-04-29 RX ORDER — PREGABALIN 100 MG/1
100 CAPSULE ORAL 3 TIMES DAILY
Status: DISCONTINUED | OUTPATIENT
Start: 2021-04-29 | End: 2021-04-29 | Stop reason: DRUGHIGH

## 2021-04-29 RX ORDER — PREGABALIN 100 MG/1
100 CAPSULE ORAL 3 TIMES DAILY
Status: ON HOLD | COMMUNITY
End: 2021-06-04 | Stop reason: SDUPTHER

## 2021-04-29 RX ADMIN — CARVEDILOL 9.38 MG: 6.25 TABLET, FILM COATED ORAL at 12:05

## 2021-04-29 RX ADMIN — REGADENOSON 0.4 MG: 0.08 INJECTION, SOLUTION INTRAVENOUS at 09:51

## 2021-04-29 RX ADMIN — BUPRENORPHINE AND NALOXONE 1 FILM: 8; 2 FILM BUCCAL; SUBLINGUAL at 17:27

## 2021-04-29 RX ADMIN — HEPARIN SODIUM 5000 UNITS: 5000 INJECTION INTRAVENOUS; SUBCUTANEOUS at 08:18

## 2021-04-29 RX ADMIN — INSULIN LISPRO 3 UNITS: 100 INJECTION, SOLUTION INTRAVENOUS; SUBCUTANEOUS at 17:27

## 2021-04-29 RX ADMIN — PREGABALIN 100 MG: 100 CAPSULE ORAL at 20:20

## 2021-04-29 RX ADMIN — CARVEDILOL 12.5 MG: 12.5 TABLET, FILM COATED ORAL at 17:27

## 2021-04-29 RX ADMIN — CARVEDILOL 3.12 MG: 3.12 TABLET, FILM COATED ORAL at 08:18

## 2021-04-29 RX ADMIN — SODIUM BICARBONATE: 84 INJECTION, SOLUTION INTRAVENOUS at 20:00

## 2021-04-29 RX ADMIN — Medication 10 ML: at 06:04

## 2021-04-29 RX ADMIN — CLONIDINE HYDROCHLORIDE 0.1 MG: 0.1 TABLET ORAL at 08:17

## 2021-04-29 RX ADMIN — BUPRENORPHINE AND NALOXONE 1 FILM: 8; 2 FILM BUCCAL; SUBLINGUAL at 08:18

## 2021-04-29 RX ADMIN — TRAZODONE HYDROCHLORIDE 100 MG: 100 TABLET ORAL at 21:40

## 2021-04-29 RX ADMIN — INSULIN GLARGINE 10 UNITS: 100 INJECTION, SOLUTION SUBCUTANEOUS at 21:39

## 2021-04-29 RX ADMIN — ATORVASTATIN CALCIUM 20 MG: 20 TABLET, FILM COATED ORAL at 17:27

## 2021-04-29 RX ADMIN — Medication 10 ML: at 14:44

## 2021-04-29 RX ADMIN — CLONIDINE HYDROCHLORIDE 0.2 MG: 0.1 TABLET ORAL at 17:27

## 2021-04-29 RX ADMIN — Medication 10 ML: at 21:40

## 2021-04-29 RX ADMIN — IRON SUCROSE 200 MG: 20 INJECTION, SOLUTION INTRAVENOUS at 14:44

## 2021-04-29 RX ADMIN — HEPARIN SODIUM 5000 UNITS: 5000 INJECTION INTRAVENOUS; SUBCUTANEOUS at 20:20

## 2021-04-29 RX ADMIN — Medication 10 ML: at 09:51

## 2021-04-29 NOTE — PROGRESS NOTES
Problem: Falls - Risk of  Goal: *Absence of Falls  Description: Document Breana Schulte Fall Risk and appropriate interventions in the flowsheet. Outcome: Progressing Towards Goal  Note: Fall Risk Interventions:  Mobility Interventions: Bed/chair exit alarm, OT consult for ADLs, Patient to call before getting OOB, PT Consult for mobility concerns, Strengthening exercises (ROM-active/passive)         Medication Interventions: Assess postural VS orthostatic hypotension, Evaluate medications/consider consulting pharmacy, Patient to call before getting OOB, Teach patient to arise slowly    Elimination Interventions: Call light in reach, Patient to call for help with toileting needs, Toileting schedule/hourly rounds              Problem: Patient Education: Go to Patient Education Activity  Goal: Patient/Family Education  Outcome: Progressing Towards Goal     Problem: Pressure Injury - Risk of  Goal: *Prevention of pressure injury  Description: Document Sarwat Scale and appropriate interventions in the flowsheet. Outcome: Progressing Towards Goal  Note: Pressure Injury Interventions:  Sensory Interventions: Assess changes in LOC, Float heels, Keep linens dry and wrinkle-free, Minimize linen layers    Moisture Interventions: Absorbent underpads, Limit adult briefs, Maintain skin hydration (lotion/cream), Minimize layers    Activity Interventions: Increase time out of bed, Pressure redistribution bed/mattress(bed type), PT/OT evaluation    Mobility Interventions: Float heels, Pressure redistribution bed/mattress (bed type), PT/OT evaluation, Turn and reposition approx.  every two hours(pillow and wedges)    Nutrition Interventions: Document food/fluid/supplement intake, Discuss nutritional consult with provider    Friction and Shear Interventions: Lift sheet, Minimize layers                Problem: Patient Education: Go to Patient Education Activity  Goal: Patient/Family Education  Outcome: Progressing Towards Goal     Problem: Risk for Spread of Infection  Goal: Prevent transmission of infectious organism to others  Description: Prevent the transmission of infectious organisms to other patients, staff members, and visitors.   Outcome: Progressing Towards Goal     Problem: Patient Education:  Go to Education Activity  Goal: Patient/Family Education  Outcome: Progressing Towards Goal     Problem: Patient Education: Go to Patient Education Activity  Goal: Patient/Family Education  Outcome: Progressing Towards Goal

## 2021-04-29 NOTE — PROGRESS NOTES
Problem: Falls - Risk of  Goal: *Absence of Falls  Description: Document Vineet Hooks Fall Risk and appropriate interventions in the flowsheet. Outcome: Progressing Towards Goal  Note: Fall Risk Interventions:  Mobility Interventions: Bed/chair exit alarm, Patient to call before getting OOB, PT Consult for mobility concerns         Medication Interventions: Bed/chair exit alarm, Patient to call before getting OOB, Teach patient to arise slowly    Elimination Interventions: Bed/chair exit alarm, Call light in reach, Patient to call for help with toileting needs              Problem: Pressure Injury - Risk of  Goal: *Prevention of pressure injury  Description: Document Sarwat Scale and appropriate interventions in the flowsheet.   Outcome: Progressing Towards Goal  Note: Pressure Injury Interventions:  Sensory Interventions: Assess changes in LOC, Keep linens dry and wrinkle-free, Maintain/enhance activity level, Minimize linen layers    Moisture Interventions: Absorbent underpads, Minimize layers    Activity Interventions: Increase time out of bed, PT/OT evaluation, Pressure redistribution bed/mattress(bed type)    Mobility Interventions: HOB 30 degrees or less, Pressure redistribution bed/mattress (bed type), PT/OT evaluation    Nutrition Interventions: Document food/fluid/supplement intake    Friction and Shear Interventions: Minimize layers, HOB 30 degrees or less

## 2021-04-29 NOTE — PROGRESS NOTES
BS was 67 with morning labs. At bedside it was 79. Monitor, pt. Is NPO for stress test.    0700: End of Shift Note    Bedside shift change report given to [place], [place] (oncoming nurse) by Marissa Novak (offgoing nurse). Report included the following information SBAR, Kardex, Intake/Output, MAR, Recent Results and Cardiac Rhythm NSR, tachy    Shift worked:  [id number]     Shift summary and any significant changes:     PO for stress test. BS see above. Concerns for physician to address:       Zone phone for oncoming shift:          Activity:  Activity Level: Up with Assistance  Number times ambulated in hallways past shift: 0  Number of times OOB to chair past shift: 0    Cardiac:   Cardiac Monitoring: Yes      Cardiac Rhythm: Normal sinus rhythm    Access:   Current line(s): PIV     Genitourinary:   Urinary status: voiding    Respiratory:   O2 Device: None (Room air)   Chronic home O2 use?: YES  Incentive spirometer at bedside: NO     GI:  Last Bowel Movement Date: 04/26/21  Current diet:  DIET NPO Except Meds  DIET ONE TIME MESSAGE  Passing flatus: YES  Tolerating current diet: YES       Pain Management:   Patient states pain is manageable on current regimen: YES    Skin:  Sarwat Score: 19  Interventions: increase time out of bed    Patient Safety:  Fall Score:  Total Score: 3  Interventions: gripper socks and pt to call before getting OOB  High Fall Risk: Yes    Length of Stay:  Expected LOS: 3d 2h  Actual [facility]

## 2021-04-29 NOTE — PROGRESS NOTES
Transition of Care Plan:    RUR:40  Disposition: home  Follow up appointments: TBD  DME needed: none  Transportation at Discharge: wife at 2pm Friday  [place] or means to access home:    No have    IM Medicare letter: has Medicaid  Caregiver Contact: wife Beau Marte [phone]  Discharge Caregiver contacted prior to discharge? yes    Pt declines AMD//ACP discussion. Demographics verified. Saw PCP     3/2020. Denies any d/c needs which I verified with MD//RN. Wife will transport at d/c. Pt has [facility]; is a full code;     Care Management Interventions  PCP Verified by CM:  Yes  Transition of Care Consult (CM Consult): Discharge Planning  MyChart Signup: No  Discharge Durable Medical Equipment: No  Physical Therapy Consult: No  Occupational Therapy Consult: No  Confirm Follow Up Transport: Other (see comment)  The Plan for Transition of Care is Related to the Following Treatment Goals : patient dc needs  The Patient and/or Patient Representative was Provided with a Choice of Provider and Agrees with the Discharge Plan?: Yes  Name of the Patient Representative Who was Provided with a Choice of Provider and Agrees with the Discharge Plan: d/c plans  Freedom of Choice List was Provided with Basic Dialogue that Supports the Patient's Individualized Plan of Care/Goals, Treatment Preferences and Shares the Quality Data Associated with the Providers?: Yes  [place] Resource Information Provided?: No  Discharge Location  Discharge Placement: Home
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
## 2021-04-29 NOTE — PROGRESS NOTES
[facility] Progress Note        NAME: Akin Good       :  1965       MRN:  [id number]     Date/Time: 2021    Risk of deterioration: medium       Assessment:    Plan:  ROSEMARY/CKD IV/hyperkalemia  Drug addiction/on suboxone  CMO  NRP likely due to diabetes  DM  Anemic Creatinine was 2.45 on  ? Ischemic atn from hypotension, slowly improving-now hypertensive, resuming meds  For stress test today  Ivf/change to bicarb, decrease rate  Note 300 cc urine retention-straight cath/johnston prn  No acute need for hd-this is acute and should  Improve. No hydro on ct  I am not inclined to do a renal biopsy for the NRP  k normalized-stop lokelma  Epo, checked iron studies-iv venofer  Start allopurinol-uric acid not at goal       Subjective:     Chief Complaint:  Good night    Review of Systems: no trouble passing urine    Objective:     VITALS:   Last 24hrs VS reviewed since prior progress note. Most recent are:  Visit Vitals  BP (!) 191/91 (BP 1 Location: Right upper arm, BP Patient Position: At rest)   Pulse 91   Temp 97.2 °F (36.2 °C)   Resp 16   Ht 5' 8\" (1.727 m)   Wt 83.2 kg (183 lb 8 oz)   SpO2 95%   BMI 27.90 kg/m²     SpO2 Readings from Last 6 Encounters:   21 95%   21 97%   21 94%   21 96%   21 96%   20 100%    O2 Flow Rate (L/min): 2 l/min       Intake/Output Summary (Last 24 hours) at 2021 1328  Last data filed at 2021 1146  Gross per 24 hour   Intake 1516.77 ml   Output 2900 ml   Net -1383.23 ml        Telemetry Reviewed       PHYSICAL EXAM:    General   well developed, well nourished, appears stated age, in no acute distress  EENT  Normocephalic, Atraumatic,  EOMI  Respiratory   Clear To Auscultation bilaterally  Cardiology  Regular Rate and Rythmn  Abdominal  Soft, non-tender, non-distended  Extremities  No clubbing, cyanosis, or edema. Pulses intact.               Lab Data Reviewed: (see below)    Medications Reviewed: (see below)    PMH/SH reviewed - no change compared to H&P  ___________________________________________________        ___________________________________________________    Attending Physician: Toi Ortiz MD     ____________________________________________________  MEDICATIONS:  Current Facility-Administered Medications   Medication Dose Route Frequency    carvediloL (COREG) tablet 12.5 mg  12.5 mg Oral BID    cloNIDine HCL (CATAPRES) tablet 0.2 mg  0.2 mg Oral BID    insulin glargine (LANTUS) injection 10 Units  10 Units SubCUTAneous QHS    [START ON 4/30/2021] iron sucrose (VENOFER) injection 200 mg  200 mg IntraVENous DAILY    epoetin reagan-epbx (RETACRIT) injection 10,000 Units  10,000 Units SubCUTAneous EVERY WED & SAT    sodium chloride (NS) flush 5-40 mL  5-40 mL IntraVENous Q8H    sodium chloride (NS) flush 5-40 mL  5-40 mL IntraVENous PRN    acetaminophen (TYLENOL) tablet 650 mg  650 mg Oral Q6H PRN    Or    acetaminophen (TYLENOL) suppository 650 mg  650 mg Rectal Q6H PRN    polyethylene glycol (MIRALAX) packet 17 g  17 g Oral DAILY    bisacodyL (DULCOLAX) tablet 5 mg  5 mg Oral DAILY PRN    promethazine (PHENERGAN) tablet 12.5 mg  12.5 mg Oral Q6H PRN    Or    ondansetron (ZOFRAN) injection 4 mg  4 mg IntraVENous Q6H PRN    glucose chewable tablet 16 g  4 Tab Oral PRN    dextrose (D50W) injection syrg 12.5-25 g  25-50 mL IntraVENous PRN    glucagon (GLUCAGEN) injection 1 mg  1 mg IntraMUSCular PRN    insulin lispro (HUMALOG) injection   SubCUTAneous AC&HS    amitriptyline (ELAVIL) tablet 25 mg  25 mg Oral QHS    [Held by provider] aspirin delayed-release tablet 81 mg  81 mg Oral DAILY    atorvastatin (LIPITOR) tablet 20 mg  20 mg Oral QPM    heparin (porcine) injection 5,000 Units  5,000 Units SubCUTAneous Q12H    sodium bicarbonate (8.4%) 150 mEq in sterile water 1,000 mL infusion   IntraVENous CONTINUOUS    buprenorphine-naloxone (SUBOXONE) 8mg-2mg SL film  1 Film SubLINGual BID     Facility-Administered Medications Ordered in Other Encounters   Medication Dose Route Frequency    sodium chloride (NS) flush 10 mL  10 mL IntraVENous PRN        LABS:  Recent Labs     04/28/21  0020 04/26/21  1825   WBC 8.2 6.4   HGB 8.4* 9.4*   HCT 26.3* 29.7*    261     Recent Labs     04/29/21  0345 04/28/21  0020 04/27/21  1130    138 137   K 4.7 5.1 5.8*   * 110* 111*   CO2 22 19* 17*   BUN 90* 96* 93*   CREA 4.51* 5.74* 5.99*   GLU 67 145* 167*   CA 8.9 7.8* 8.3*   PHOS 7.1*  --   --    URICA 8.2*  --   --      Recent Labs     04/28/21  0020 04/26/21  1825   ALT 53 79*   * 200*   TBILI 0.1* 0.2   TP 7.5 8.0   ALB 2.4* 2.6*   GLOB 5.1* 5.4*     No results for input(s): INR, PTP, APTT, INREXT, INREXT in the last 72 hours. Recent Labs     04/29/21  0345   TIBC 335   PSAT 12*   FERR 271      No results for input(s): PH, PCO2, PO2 in the last 72 hours.   Recent Labs     04/27/21  0250 04/26/21 1952 04/26/21 1825     --   --    CKNDX 5.5*  --   --    TROIQ 0.45* 0.52* 0.53*     Lab Results   Component Value Date/Time    Glucose (POC) 130 (H) 04/29/2021 12:43 PM    Glucose (POC) 62 (L) 04/29/2021 12:11 PM    Glucose (POC) 62 (L) 04/29/2021 12:10 PM    Glucose (POC) 61 (L) 04/29/2021 11:51 AM    Glucose (POC) 62 (L) 04/29/2021 11:49 AM

## 2021-04-29 NOTE — PROGRESS NOTES
Re: Pregabalin resumption from home (assistance requested by Dr. Edi Boyer)    Home dose:  100mg po three times daily  Crcl: 18ml/min    Will restart and adjust dose to 100mg po BID for Crcl 15-30 ml/min      Thanks,  Rigoberto Infante, [facility]

## 2021-04-29 NOTE — PROGRESS NOTES
0700 Bedside shift change report given to [place]pa, [facility] (oncoming nurse) by Melissa Dailey RN (offgoing nurse). Report included the following information SBAR, Kardex, Intake/Output, MAR, Recent Results and Cardiac Rhythm NSR.    0705 Pt resting quietly in bed this AM. BP elevated, pt NPO except meds for stress test this AM.      0845 Pt off the floor for stress test.     1120 Pt back in room after stress test. Confirmed with stress lab that his imaging is complete for today. Verbal order received from Antonieta Burntet NP, that it is OK for pt to resume previous diet order. [id number]    Patient with hypoglycemic episode(s) at 1149(time) on 4/29/21(date). BG value(s) pre-treatment 64    Was patient symptomatic? [] yes, [x] no  Patient was treated with the following rescue medications/treatments: [] D50                [] Glucose tablets                [] Glucagon                [x] 4oz juice x 4                [] 6oz reg soda                [x] 8oz low fat milk  BG value post-treatment: 62, 130 @ 1243  Once BG treated and value greater than 80mg/dl, pt was provided with the following:  [] snack  [x] meal  Name of MD notified: Dr. Vineet Guillen  The following orders were received: Insulin Lantus decreased, MD placed order. 1500 Pt resting quietly on side of bed at this time. BP improved after second dose of coreg. Will continue to monitor. 1739 Per the pt, he is stating he takes trazodone HS and lyrica TID that was not continued on his admission to the hospital. Notified Dr. Vineet Guillen. 1800 Received verbal order from Dr. Vineet Guillen to check with pharmacy regarding appropriate dosing for restarting Lyrica and place order according to their recommendation. OK to place order for home dose of trazodone. 1900 End of Shift Note    Bedside shift change report given to Melissa Dailey RN (oncoming nurse) by Anastacia Greene (offgoing nurse).   Report included the following information SBAR, Kardex, Intake/Output, MAR, Recent Results and Cardiac Rhythm NSR    Shift worked:  [id number]     Shift summary and any significant changes:     Stress test complete, pt with episode of hypoglycemia, lantus adjusted, med reconciliation completed and some home meds restarted at pt's request   Concerns for physician to address:  Discharge planning     Zone phone for oncoming shift:           Activity:  Activity Level: Up with Assistance  Number times ambulated in hallways past shift: 0  Number of times OOB to chair past shift: 2    Cardiac:   Cardiac Monitoring: Yes      Cardiac Rhythm: Normal sinus rhythm    Access:   Current line(s): PIV     Genitourinary:   Urinary status: voiding    Respiratory:   O2 Device: None (Room air)  Chronic home O2 use?: NO  Incentive spirometer at bedside: NO     GI:  Last Bowel Movement Date: 04/26/21  Current diet:  DIET ONE TIME MESSAGE  DIET DIABETIC CONSISTENT CARB Regular; 70-70-70 (House)  DIET ONE TIME MESSAGE  DIET NUTRITIONAL SUPPLEMENTS Breakfast, Dinner; [place]  Passing flatus: YES  Tolerating current diet: YES       Pain Management:   Patient states pain is manageable on current regimen: YES    Skin:  Sarwat Score: 19  Interventions: turn team, increase time out of bed and PT/OT consult    Patient Safety:  Fall Score:  Total Score: 3  Interventions: bed/chair alarm, gripper socks, pt to call before getting OOB and stay with me (per policy)  High Fall Risk: Yes    Length of Stay:  Expected LOS: 3d 2h  Actual LOS: [address]

## 2021-04-29 NOTE — PROGRESS NOTES
[address] [place] [address]  [phone]      Cardiology Progress Note      4/29/2021 11AM    Admit Date: 4/27/2021    Admit Diagnosis:   Hyperkalemia [E87.5]    Interval History/Subjective:     Akin Good is a 55 y.o. male with PMH CKD, HFrEF, DM, HTN, drug abuse who was admitted for Hyperkalemia [E87.5]. -BP slightly elevated  -K 4.7; creat down to 4.51  -weight up 1#; I/O incomplete   -Mr. Kelsi Dimas is feeling good today. He has no complaints.       Visit Vitals  BP (!) 191/91 (BP 1 Location: Right upper arm, BP Patient Position: At rest) Comment: post-activity   Pulse 91   Temp 97.2 °F (36.2 °C)   Resp 16   Ht 5' 8\" (1.727 m)   Wt 83.2 kg (183 lb 8 oz)   SpO2 95%   BMI 27.90 kg/m²       Current Facility-Administered Medications   Medication Dose Route Frequency    carvediloL (COREG) tablet 12.5 mg  12.5 mg Oral BID    carvediloL (COREG) tablet 9.375 mg  9.375 mg Oral ONCE    epoetin reagan-epbx (RETACRIT) injection 10,000 Units  10,000 Units SubCUTAneous EVERY WED & SAT    insulin glargine (LANTUS) injection 15 Units  15 Units SubCUTAneous QHS    sodium chloride (NS) flush 5-40 mL  5-40 mL IntraVENous Q8H    sodium chloride (NS) flush 5-40 mL  5-40 mL IntraVENous PRN    acetaminophen (TYLENOL) tablet 650 mg  650 mg Oral Q6H PRN    Or    acetaminophen (TYLENOL) suppository 650 mg  650 mg Rectal Q6H PRN    polyethylene glycol (MIRALAX) packet 17 g  17 g Oral DAILY    bisacodyL (DULCOLAX) tablet 5 mg  5 mg Oral DAILY PRN    promethazine (PHENERGAN) tablet 12.5 mg  12.5 mg Oral Q6H PRN    Or    ondansetron (ZOFRAN) injection 4 mg  4 mg IntraVENous Q6H PRN    glucose chewable tablet 16 g  4 Tab Oral PRN    dextrose (D50W) injection syrg 12.5-25 g  25-50 mL IntraVENous PRN    glucagon (GLUCAGEN) injection 1 mg  1 mg IntraMUSCular PRN    insulin lispro (HUMALOG) injection   SubCUTAneous AC&HS    amitriptyline (ELAVIL) tablet 25 mg  25 mg Oral QHS    [Held by provider] aspirin delayed-release tablet 81 mg  81 mg Oral DAILY    atorvastatin (LIPITOR) tablet 20 mg  20 mg Oral QPM    cloNIDine HCL (CATAPRES) tablet 0.1 mg  0.1 mg Oral BID    heparin (porcine) injection 5,000 Units  5,000 Units SubCUTAneous Q12H    sodium zirconium cyclosilicate (LOKELMA) powder packet 10 g  10 g Oral DAILY    sodium bicarbonate (8.4%) 150 mEq in sterile water 1,000 mL infusion   IntraVENous CONTINUOUS    buprenorphine-naloxone (SUBOXONE) 8mg-2mg SL film  1 Film SubLINGual BID     Facility-Administered Medications Ordered in Other Encounters   Medication Dose Route Frequency    sodium chloride (NS) flush 10 mL  10 mL IntraVENous PRN       Objective:      Physical Exam:   General Appearance:  drowsy, cooperative, well nourished, well developed AAM resting on stretcher; appears stated age  Eyes: sclera anicteric  Mouth/Throat: moist mucous membranes; oral pharynx clear  Neck: supple; + JVD  Pulmonary:  clear to auscultation bilaterally, dim bases; good effort  Cardiovascular: regular rate and rhythm; no murmur, split s2  Abdomen: soft, non-tender, non-distended; bowel sounds normal  Musculoskeletal: no swelling or deformity; moves all extremities  Extremities: 1+ edema BLE; palpable distal pulses   Skin: warm and dry;   Thickened skin BLE   Neuro: grossly normal other than drowsy/falling asleep during assessment  Psych: normal mood and affect given the setting       Data Review:   Recent Labs     04/28/21  0020 04/26/21  1825   WBC 8.2 6.4   HGB 8.4* 9.4*   HCT 26.3* 29.7*    261     Recent Labs     04/29/21  0345 04/28/21  0020 04/27/21  1130 04/26/21  1825 04/26/21  1825    138 137   < > 135*   K 4.7 5.1 5.8*   < > 6.9*   * 110* 111*   < > 106   CO2 22 19* 17*   < > 18*   GLU 67 145* 167*   < > 220*   BUN 90* 96* 93*   < > 91*   CREA 4.51* 5.74* 5.99*   < > 5.71*   CA 8.9 7.8* 8.3*   < > 8.4*   PHOS 7.1*  --   --   --   --    ALB  --  2.4*  --   --  2.6*   TBILI  --  0.1*  -- --  0.2   ALT  --  53  --   --  79*    < > = values in this interval not displayed. Recent Labs     04/27/21  0250 04/26/21 1952 04/26/21  1825   TROIQ 0.45* 0.52* 0.53*     --   --    CKMB 6.4*  --   --          Intake/Output Summary (Last 24 hours) at 4/29/2021 1159  Last data filed at 4/29/2021 1018  Gross per 24 hour   Intake 1356.77 ml   Output 2900 ml   Net -1543.23 ml        Telemetry:SR   ECG: SR with some worsening non-specific ST and T wave changes inferolateral leads   CT chest/abd: \"Small right pleural transudate  Bibasilar subsegmental atelectasis  Constipation, cholelithiasis, emphysema  No focal process identified in the chest, abdomen, or pelvis  Nonspecific ascites\"          Assessment:     Active Problems:    Cardiomyopathy ([facility] [address] (2/21/2020)      Substance abuse ([facility] (2/21/2020)      DM (diabetes mellitus), type 2 ([facility] (2/28/2020)      Hyperkalemia (4/27/2021)      Acute on chronic kidney failure ([facility] [facility] (4/27/2021)      Elevated troponin (4/27/2021)        Plan:     Elevated troponin:  Mild and non-specific ~0.50. In setting of ROSEMARY on CKD. Mild EKG changes. · Continue ASA and statin  · Increase BB  · Stress test results pending       Chronic HFpEF:  Appears mildly overloaded, but stable, in setting of ROSEMARY on CKD  · Received bumex in the ED and then IVF per nephrology         HTN:  BP slightly elevated   · Increase BB back to home dose  · Continue clonidine      Hyperkalemia:  Improving. K 4.7  · Per nephrology and hospitalist      ROSEMARY on CKD:  Creat improvement to 4.51  · Nephrology following        Drug abuse history:  Drug screen negative  · Encouraged continued drug cessation        Cassidy Palm NP  DNP, RN, AGACNP-[facility]    4/29/2021         Patient seen, examined by me personally. Plan discussed as detailed. Agree with note as outlined by  NP with modifications as noted.  My independent physical exam reveals : Physical Exam   Constitutional: He is oriented to person, place, and time. Cardiovascular: Normal rate. No murmur heard. Pulmonary/Chest: Effort normal and breath sounds normal. He has no rales. Musculoskeletal:         General: No edema. Neurological: He is alert and oriented to person, place, and time. Skin: Skin is warm and dry. Psychiatric: He has a normal mood and affect. Nursing note and vitals reviewed. Stress test shows no ischemia. Continue medical therapy. No additional findings noted. Agree with plan as outlined above with modifications as noted.      Gumaro Triana MD

## 2021-04-30 VITALS
OXYGEN SATURATION: 89 % | BODY MASS INDEX: 27.81 KG/M2 | SYSTOLIC BLOOD PRESSURE: 150 MMHG | TEMPERATURE: 98.1 F | WEIGHT: 183.5 LBS | HEART RATE: 86 BPM | HEIGHT: 68 IN | DIASTOLIC BLOOD PRESSURE: 75 MMHG | RESPIRATION RATE: 14 BRPM

## 2021-04-30 LAB
ANION GAP SERPL CALC-SCNC: 7 MMOL/L (ref 5–15)
BUN SERPL-MCNC: 82 MG/DL (ref 6–20)
BUN/CREAT SERPL: 19 (ref 12–20)
CALCIUM SERPL-MCNC: 8.2 MG/DL (ref 8.5–10.1)
CHLORIDE SERPL-SCNC: 109 MMOL/L (ref 97–108)
CO2 SERPL-SCNC: 24 MMOL/L (ref 21–32)
CREAT SERPL-MCNC: 4.35 MG/DL (ref 0.7–1.3)
ERYTHROCYTE [DISTWIDTH] IN BLOOD BY AUTOMATED COUNT: 18.4 % (ref 11.5–14.5)
GLUCOSE BLD STRIP.AUTO-MCNC: 146 MG/DL (ref 65–100)
GLUCOSE BLD STRIP.AUTO-MCNC: 210 MG/DL (ref 65–100)
GLUCOSE SERPL-MCNC: 150 MG/DL (ref 65–100)
HCT VFR BLD AUTO: 25.5 % (ref 36.6–50.3)
HGB BLD-MCNC: 8.2 G/DL (ref 12.1–17)
MCH RBC QN AUTO: 25.9 PG (ref 26–34)
MCHC RBC AUTO-ENTMCNC: 32.2 G/DL (ref 30–36.5)
MCV RBC AUTO: 80.4 FL (ref 80–99)
NRBC # BLD: 0 K/UL (ref 0–0.01)
NRBC BLD-RTO: 0 PER 100 WBC
PLATELET # BLD AUTO: 251 K/UL (ref 150–400)
PMV BLD AUTO: 11.4 FL (ref 8.9–12.9)
POTASSIUM SERPL-SCNC: 4.3 MMOL/L (ref 3.5–5.1)
RBC # BLD AUTO: 3.17 M/UL (ref 4.1–5.7)
SERVICE CMNT-IMP: ABNORMAL
SERVICE CMNT-IMP: ABNORMAL
SODIUM SERPL-SCNC: 140 MMOL/L (ref 136–145)
WBC # BLD AUTO: 8.2 K/UL (ref 4.1–11.1)

## 2021-04-30 PROCEDURE — 82962 GLUCOSE BLOOD TEST: CPT

## 2021-04-30 PROCEDURE — 80048 BASIC METABOLIC PNL TOTAL CA: CPT

## 2021-04-30 PROCEDURE — G0378 HOSPITAL OBSERVATION PER HR: HCPCS

## 2021-04-30 PROCEDURE — APPSS45 APP SPLIT SHARED TIME 31-45 MINUTES: Performed by: NURSE PRACTITIONER

## 2021-04-30 PROCEDURE — 99232 SBSQ HOSP IP/OBS MODERATE 35: CPT | Performed by: INTERNAL MEDICINE

## 2021-04-30 PROCEDURE — 36415 COLL VENOUS BLD VENIPUNCTURE: CPT

## 2021-04-30 PROCEDURE — 97116 GAIT TRAINING THERAPY: CPT

## 2021-04-30 PROCEDURE — 74011250637 HC RX REV CODE- 250/637: Performed by: NURSE PRACTITIONER

## 2021-04-30 PROCEDURE — 74011250637 HC RX REV CODE- 250/637: Performed by: INTERNAL MEDICINE

## 2021-04-30 PROCEDURE — 99218 HC RM OBSERVATION: CPT

## 2021-04-30 PROCEDURE — 97530 THERAPEUTIC ACTIVITIES: CPT

## 2021-04-30 PROCEDURE — 96372 THER/PROPH/DIAG INJ SC/IM: CPT

## 2021-04-30 PROCEDURE — 96376 TX/PRO/DX INJ SAME DRUG ADON: CPT

## 2021-04-30 PROCEDURE — 85027 COMPLETE CBC AUTOMATED: CPT

## 2021-04-30 PROCEDURE — 74011636637 HC RX REV CODE- 636/637: Performed by: INTERNAL MEDICINE

## 2021-04-30 PROCEDURE — 74011250636 HC RX REV CODE- 250/636: Performed by: INTERNAL MEDICINE

## 2021-04-30 RX ORDER — SODIUM BICARBONATE 650 MG/1
650 TABLET ORAL 2 TIMES DAILY
Qty: 60 TAB | Refills: 0 | Status: SHIPPED | OUTPATIENT
Start: 2021-04-30 | End: 2021-12-03

## 2021-04-30 RX ORDER — ALLOPURINOL 300 MG/1
300 TABLET ORAL DAILY
Qty: 30 TAB | Refills: 0 | Status: SHIPPED | OUTPATIENT
Start: 2021-04-30 | End: 2021-10-10

## 2021-04-30 RX ORDER — BUMETANIDE 2 MG/1
2 TABLET ORAL DAILY
Qty: 30 TAB | Refills: 0 | Status: ON HOLD
Start: 2021-04-30 | End: 2021-06-04 | Stop reason: SDUPTHER

## 2021-04-30 RX ORDER — INSULIN GLARGINE 100 [IU]/ML
10 INJECTION, SOLUTION SUBCUTANEOUS
Qty: 1 VIAL | Refills: 0 | Status: SHIPPED
Start: 2021-04-30 | End: 2022-02-01

## 2021-04-30 RX ADMIN — INSULIN LISPRO 2 UNITS: 100 INJECTION, SOLUTION INTRAVENOUS; SUBCUTANEOUS at 09:31

## 2021-04-30 RX ADMIN — Medication 10 ML: at 06:04

## 2021-04-30 RX ADMIN — PREGABALIN 100 MG: 100 CAPSULE ORAL at 09:31

## 2021-04-30 RX ADMIN — CLONIDINE HYDROCHLORIDE 0.2 MG: 0.1 TABLET ORAL at 09:31

## 2021-04-30 RX ADMIN — IRON SUCROSE 200 MG: 20 INJECTION, SOLUTION INTRAVENOUS at 09:30

## 2021-04-30 RX ADMIN — HEPARIN SODIUM 5000 UNITS: 5000 INJECTION INTRAVENOUS; SUBCUTANEOUS at 09:30

## 2021-04-30 RX ADMIN — POLYETHYLENE GLYCOL 3350 17 G: 17 POWDER, FOR SOLUTION ORAL at 09:31

## 2021-04-30 RX ADMIN — CARVEDILOL 12.5 MG: 12.5 TABLET, FILM COATED ORAL at 09:31

## 2021-04-30 RX ADMIN — BUPRENORPHINE AND NALOXONE 1 FILM: 8; 2 FILM BUCCAL; SUBLINGUAL at 09:31

## 2021-04-30 NOTE — PROGRESS NOTES
[facility] Progress Note        NAME: Bushra Cannon       :  1965       MRN:  [id number]     Date/Time: 2021    Risk of deterioration: medium       Assessment:    Plan:  ROSEMARY/CKD IV/hyperkalemia  Drug addiction/on suboxone  CMO  NRP likely due to diabetes  DM  Anemic Note plans for dc today  Pt states he returns to [place] on Monday  Creatinine improving-fu with dr Domingo Montalvo regarding above  bp improved on current meds       Subjective:     Chief Complaint:  Good night    Review of Systems: no trouble passing urine    Objective:     VITALS:   Last 24hrs VS reviewed since prior progress note. Most recent are:  Visit Vitals  /69 (BP 1 Location: Right upper arm, BP Patient Position: At rest)   Pulse 87   Temp 97.6 °F (36.4 °C)   Resp 10   Ht 5' 8\" (1.727 m)   Wt 83.2 kg (183 lb 8 oz)   SpO2 100%   BMI 27.90 kg/m²     SpO2 Readings from Last 6 Encounters:   21 100%   21 97%   21 94%   21 96%   21 96%   20 100%    O2 Flow Rate (L/min): 2 l/min       Intake/Output Summary (Last 24 hours) at 2021 1029  Last data filed at 2021 2209  Gross per 24 hour   Intake 700 ml   Output 1250 ml   Net -550 ml        Telemetry Reviewed       PHYSICAL EXAM:    General   well developed, well nourished, appears stated age, in no acute distress  EENT  Normocephalic, Atraumatic,  EOMI  Respiratory   Clear To Auscultation bilaterally  Cardiology  Regular Rate and Rythmn  Abdominal  Soft, non-tender, non-distended  Extremities  No clubbing, cyanosis, or edema. Pulses intact.               Lab Data Reviewed: (see below)    Medications Reviewed: (see below)    PMH/SH reviewed - no change compared to H&P  ___________________________________________________        ___________________________________________________    Attending Physician: Bel Lee, MD     ____________________________________________________  MEDICATIONS:  Current Facility-Administered Medications   Medication Dose Route Frequency    carvediloL (COREG) tablet 12.5 mg  12.5 mg Oral BID    cloNIDine HCL (CATAPRES) tablet 0.2 mg  0.2 mg Oral BID    insulin glargine (LANTUS) injection 10 Units  10 Units SubCUTAneous QHS    iron sucrose (VENOFER) injection 200 mg  200 mg IntraVENous DAILY    allopurinoL (ZYLOPRIM) tablet 300 mg  300 mg Oral DAILY    pregabalin (LYRICA) capsule 100 mg  100 mg Oral BID    traZODone (DESYREL) tablet 100 mg  100 mg Oral QHS    epoetin reagan-epbx (RETACRIT) injection 10,000 Units  10,000 Units SubCUTAneous EVERY WED & SAT    sodium chloride (NS) flush 5-40 mL  5-40 mL IntraVENous Q8H    sodium chloride (NS) flush 5-40 mL  5-40 mL IntraVENous PRN    acetaminophen (TYLENOL) tablet 650 mg  650 mg Oral Q6H PRN    Or    acetaminophen (TYLENOL) suppository 650 mg  650 mg Rectal Q6H PRN    polyethylene glycol (MIRALAX) packet 17 g  17 g Oral DAILY    bisacodyL (DULCOLAX) tablet 5 mg  5 mg Oral DAILY PRN    promethazine (PHENERGAN) tablet 12.5 mg  12.5 mg Oral Q6H PRN    Or    ondansetron (ZOFRAN) injection 4 mg  4 mg IntraVENous Q6H PRN    glucose chewable tablet 16 g  4 Tab Oral PRN    dextrose (D50W) injection syrg 12.5-25 g  25-50 mL IntraVENous PRN    glucagon (GLUCAGEN) injection 1 mg  1 mg IntraMUSCular PRN    insulin lispro (HUMALOG) injection   SubCUTAneous AC&HS    [Held by provider] aspirin delayed-release tablet 81 mg  81 mg Oral DAILY    atorvastatin (LIPITOR) tablet 20 mg  20 mg Oral QPM    heparin (porcine) injection 5,000 Units  5,000 Units SubCUTAneous Q12H    sodium bicarbonate (8.4%) 150 mEq in sterile water 1,000 mL infusion   IntraVENous CONTINUOUS    buprenorphine-naloxone (SUBOXONE) 8mg-2mg SL film  1 Film SubLINGual BID     Facility-Administered Medications Ordered in Other Encounters   Medication Dose Route Frequency    sodium chloride (NS) flush 10 mL  10 mL IntraVENous PRN        LABS:  Recent Labs     04/30/21  0313 04/28/21  0020   WBC 8.2 8.2   HGB 8.2* 8.4* HCT 25.5* 26.3*    262     Recent Labs     04/30/21  0313 04/29/21  0345 04/28/21  0020    140 138   K 4.3 4.7 5.1   * 110* 110*   CO2 24 22 19*   BUN 82* 90* 96*   CREA 4.35* 4.51* 5.74*   * 67 145*   CA 8.2* 8.9 7.8*   PHOS  --  7.1*  --    URICA  --  8.2*  --      Recent Labs     04/28/21  0020   ALT 53   *   TBILI 0.1*   TP 7.5   ALB 2.4*   GLOB 5.1*     No results for input(s): INR, PTP, APTT, INREXT, INREXT in the last 72 hours. Recent Labs     04/29/21 0345   TIBC 335   PSAT 12*   FERR 271      No results for input(s): PH, PCO2, PO2 in the last 72 hours. No results for input(s): CPK, CKNDX, TROIQ in the last 72 hours.     No lab exists for component: CPKMB  Lab Results   Component Value Date/Time    Glucose (POC) 210 (H) 04/30/2021 07:25 AM    Glucose (POC) 131 (H) 04/29/2021 09:22 PM    Glucose (POC) 282 (H) 04/29/2021 04:02 PM    Glucose (POC) 130 (H) 04/29/2021 12:43 PM    Glucose (POC) 62 (L) 04/29/2021 12:11 PM

## 2021-04-30 NOTE — PROGRESS NOTES
[address]  [phone]      Cardiology Progress Note      4/30/2021 1045AM    Admit Date: 4/27/2021    Admit Diagnosis:   Hyperkalemia [E87.5]    Interval History/Subjective:     Jessica Lindo is a 55 y.o. male with PMH CKD, HFrEF, DM, HTN, drug abuse who was admitted for Hyperkalemia [E87.5]. -BP slightly elevated  -K 4.3; creat down to 4.35  -no weight; I/O incomplete   -Mr. Jani Steen is feeling great today. He has no complaints.   Was walking in the hallway earlier with PT    Visit Vitals  /69 (BP 1 Location: Right upper arm, BP Patient Position: At rest)   Pulse 87   Temp 97.6 °F (36.4 °C)   Resp 10   Ht 5' 8\" (1.727 m)   Wt 83.2 kg (183 lb 8 oz)   SpO2 100%   BMI 27.90 kg/m²       Current Facility-Administered Medications   Medication Dose Route Frequency    carvediloL (COREG) tablet 12.5 mg  12.5 mg Oral BID    cloNIDine HCL (CATAPRES) tablet 0.2 mg  0.2 mg Oral BID    insulin glargine (LANTUS) injection 10 Units  10 Units SubCUTAneous QHS    iron sucrose (VENOFER) injection 200 mg  200 mg IntraVENous DAILY    allopurinoL (ZYLOPRIM) tablet 300 mg  300 mg Oral DAILY    pregabalin (LYRICA) capsule 100 mg  100 mg Oral BID    traZODone (DESYREL) tablet 100 mg  100 mg Oral QHS    epoetin reagan-epbx (RETACRIT) injection 10,000 Units  10,000 Units SubCUTAneous EVERY WED & SAT    sodium chloride (NS) flush 5-40 mL  5-40 mL IntraVENous Q8H    sodium chloride (NS) flush 5-40 mL  5-40 mL IntraVENous PRN    acetaminophen (TYLENOL) tablet 650 mg  650 mg Oral Q6H PRN    Or    acetaminophen (TYLENOL) suppository 650 mg  650 mg Rectal Q6H PRN    polyethylene glycol (MIRALAX) packet 17 g  17 g Oral DAILY    bisacodyL (DULCOLAX) tablet 5 mg  5 mg Oral DAILY PRN    promethazine (PHENERGAN) tablet 12.5 mg  12.5 mg Oral Q6H PRN    Or    ondansetron (ZOFRAN) injection 4 mg  4 mg IntraVENous Q6H PRN    glucose chewable tablet 16 g  4 Tab Oral PRN    dextrose (D50W) injection syrg 12.5-25 g  25-50 mL IntraVENous PRN    glucagon (GLUCAGEN) injection 1 mg  1 mg IntraMUSCular PRN    insulin lispro (HUMALOG) injection   SubCUTAneous AC&HS    [Held by provider] aspirin delayed-release tablet 81 mg  81 mg Oral DAILY    atorvastatin (LIPITOR) tablet 20 mg  20 mg Oral QPM    heparin (porcine) injection 5,000 Units  5,000 Units SubCUTAneous Q12H    sodium bicarbonate (8.4%) 150 mEq in sterile water 1,000 mL infusion   IntraVENous CONTINUOUS    buprenorphine-naloxone (SUBOXONE) 8mg-2mg SL film  1 Film SubLINGual BID     Facility-Administered Medications Ordered in Other Encounters   Medication Dose Route Frequency    sodium chloride (NS) flush 10 mL  10 mL IntraVENous PRN       Objective:      Physical Exam:   General Appearance:  alert, cooperative, well nourished, well developed AAM resting in bed in NAD; appears stated age  Eyes: sclera anicteric  Mouth/Throat: moist mucous membranes; oral pharynx clear  Neck: supple;   Pulmonary:  clear to auscultation bilaterally, dim bases; good effort  Cardiovascular: regular rate and rhythm; no murmur, split s2  Abdomen: soft, non-tender, non-distended; bowel sounds normal  Musculoskeletal: no swelling or deformity; moves all extremities  Extremities: 1+ edema BLE; palpable distal pulses   Skin: warm and dry; Thickened skin BLE   Neuro: grossly normal   Psych: normal mood and affect given the setting       Data Review:   Recent Labs     04/30/21  0313 04/28/21  0020   WBC 8.2 8.2   HGB 8.2* 8.4*   HCT 25.5* 26.3*    262     Recent Labs     04/30/21  0313 04/29/21  0345 04/28/21  0020    140 138   K 4.3 4.7 5.1   * 110* 110*   CO2 24 22 19*   * 67 145*   BUN 82* 90* 96*   CREA 4.35* 4.51* 5.74*   CA 8.2* 8.9 7.8*   PHOS  --  7.1*  --    ALB  --   --  2.4*   TBILI  --   --  0.1*   ALT  --   --  53       No results for input(s): TROIQ, CPK, CKMB in the last 72 hours.       Intake/Output Summary (Last 24 hours) at 4/30/2021 1050  Last data filed at 4/29/2021 2209  Gross per 24 hour   Intake 700 ml   Output 1250 ml   Net -550 ml        Telemetry:SR   ECG: SR with some worsening non-specific ST and T wave changes inferolateral leads   CT chest/abd: \"Small right pleural transudate  Bibasilar subsegmental atelectasis  Constipation, cholelithiasis, emphysema  No focal process identified in the chest, abdomen, or pelvis  Nonspecific ascites\"          Assessment:     Active Problems:    Cardiomyopathy ([facility] (2/21/2020)      Substance abuse ([facility] (2/21/2020)      DM (diabetes mellitus), type 2 ([facility] (2/28/2020)      Hyperkalemia (4/27/2021)      Acute on chronic kidney failure ([facility] (4/27/2021)      Elevated troponin (4/27/2021)        Plan:     Elevated troponin:  Mild and non-specific ~0.50. In setting of ROSEMARY on CKD. Mild EKG changes. · Continue BB, ASA, and statin  · Stress test negative for ischemia       Chronic HFpEF:  Appears mildly overloaded since admission, but this has not worsened despite necessary IVF for ROSEMARY on CKD  · Received bumex in the ED and then IVF per nephrology         HTN:  BP slightly elevated, but improved this morning   · Continue clonidine and BB      Hyperkalemia:  Improved. K 4.3  · Per nephrology and hospitalist      ROSEMARY on CKD:  Creat improvement to 4.35  · Nephrology following        Drug abuse history:  Drug screen negative  · Encouraged continued drug cessation        No additional cardiology recommendations. Will sign off. Please call with questions or concerns. Out patient follow up. Eva Puckett NP  DNP, RN, [facility]    4/30/2021         Patient seen, examined by me personally. Plan discussed as detailed. Agree with note as outlined by  NP with modifications as noted. My independent physical exam reveals : Physical Exam   Constitutional: He is oriented to person, place, and time. He appears well-developed. HENT:   Head: Normocephalic. Eyes: Conjunctivae are normal.   Neck: Neck supple. Cardiovascular: Normal rate and regular rhythm. Murmur heard. Musculoskeletal:         General: No edema. Neurological: He is alert and oriented to person, place, and time. Skin: Skin is warm and dry. Psychiatric: He has a normal mood and affect. Nursing note and vitals reviewed. No additional findings noted. Agree with plan as outlined above with modifications as noted.      Jessica Dhillon MD
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
## 2021-04-30 NOTE — PROGRESS NOTES
Transition of Care Plan:     RUR:47  Disposition: home  Follow up appointments: TBD  DME needed: none  Transportation at Discharge: wife at 2pm Friday if stable  Creatinine:  4.35 4/30/21  Keys or means to access home:    No have    IM Medicare letter: has Medicaid  Caregiver Contact: wife Oliva Turner [phone]  Discharge Caregiver contacted prior to discharge? yes     Pt declines AMD//ACP discussion. Demographics verified. Saw PCP     3/2020. Denies any d/c needs which I verified with MD//RN. Wife will transport at d/c. Pt has [facility]; is a full code. Discharge plans discussed with MD, pt, and RN. Care Management Interventions  PCP Verified by CM:  Yes  Transition of Care Consult (CM Consult): Discharge Planning  MyChart Signup: No  Discharge Durable Medical Equipment: No  Physical Therapy Consult: No  Occupational Therapy Consult: No  Current Support Network: Lives with Spouse  Confirm Follow Up Transport: Family  The Plan for Transition of Care is Related to the Following Treatment Goals : dc plan  The Patient and/or Patient Representative was Provided with a Choice of Provider and Agrees with the Discharge Plan?: Yes  Name of the Patient Representative Who was Provided with a Choice of Provider and Agrees with the Discharge Plan: patient  Freedom of Choice List was Provided with Basic Dialogue that Supports the Patient's Individualized Plan of Care/Goals, Treatment Preferences and Shares the Quality Data Associated with the Providers?: Yes  [place] Resource Information Provided?: No  Discharge Location  Discharge Placement: Home

## 2021-04-30 NOTE — PROGRESS NOTES
DISCHARGE SUMMARY FROM [facility] NURSE    The patient is stable for discharge. I have reviewed the discharge instructions with the patient and spouse. The patient and spouse verbalized understanding. All questions were fully answered. The patient and spouse verbalized no complaints. Hard scripts and medication handouts were given and reviewed with the patient and spouse. Appropriate educational materials and medication side effects teaching were provided also provided. Cardiac monitor and IV line(s) were removed. The following personal items collected during admission were returned to the patient/family  Home medications: n/a  Dental Appliance: Dental Appliances: None  Vision: Visual Aid: None  Hearing Aid:    Jewelry: Jewelry: None  Clothing: Clothing: With patient  Other Valuables: Other Valuables: Cell Phone  Valuables sent to safe:      OR _________________________________________________________________________________________    There were no personal belongings, valuables or home medications left at patient's bedside,  or safe.
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
## 2021-04-30 NOTE — PROGRESS NOTES
Problem: Falls - Risk of  Goal: *Absence of Falls  Description: Document Ke Uriostegui Fall Risk and appropriate interventions in the flowsheet. Outcome: Progressing Towards Goal  Note: Fall Risk Interventions:  Mobility Interventions: Patient to call before getting OOB, PT Consult for assist device competence         Medication Interventions: Patient to call before getting OOB, Teach patient to arise slowly    Elimination Interventions: Call light in reach, Patient to call for help with toileting needs, Toileting schedule/hourly rounds              Problem: Pressure Injury - Risk of  Goal: *Prevention of pressure injury  Description: Document Sarwat Scale and appropriate interventions in the flowsheet.   Outcome: Progressing Towards Goal  Note: Pressure Injury Interventions:  Sensory Interventions: Float heels, Keep linens dry and wrinkle-free, Maintain/enhance activity level, Minimize linen layers    Moisture Interventions: Absorbent underpads    Activity Interventions: Increase time out of bed    Mobility Interventions: Float heels, HOB 30 degrees or less, Pressure redistribution bed/mattress (bed type)    Nutrition Interventions: Document food/fluid/supplement intake    Friction and Shear Interventions: HOB 30 degrees or less, Minimize layers, Feet elevated on foot rest                Problem: Patient Education: Go to Patient Education Activity  Goal: Patient/Family Education  Outcome: Progressing Towards Goal

## 2021-04-30 NOTE — PROGRESS NOTES
Problem: Falls - Risk of  Goal: *Absence of Falls  Description: Document Lyubov Ram Fall Risk and appropriate interventions in the flowsheet. Outcome: Progressing Towards Goal  Note: Fall Risk Interventions:  Mobility Interventions: Patient to call before getting OOB         Medication Interventions: Patient to call before getting OOB, Teach patient to arise slowly    Elimination Interventions: Call light in reach, Patient to call for help with toileting needs, Urinal in reach              Problem: Pressure Injury - Risk of  Goal: *Prevention of pressure injury  Description: Document Sarwat Scale and appropriate interventions in the flowsheet. Outcome: Progressing Towards Goal  Note: Pressure Injury Interventions:  Sensory Interventions: Float heels, Keep linens dry and wrinkle-free, Maintain/enhance activity level, Minimize linen layers    Moisture Interventions: Absorbent underpads    Activity Interventions: Increase time out of bed    Mobility Interventions: Float heels, HOB 30 degrees or less, Pressure redistribution bed/mattress (bed type)    Nutrition Interventions: Document food/fluid/supplement intake    Friction and Shear Interventions: HOB 30 degrees or less, Minimize layers, Feet elevated on foot rest                Problem: Risk for Spread of Infection  Goal: Prevent transmission of infectious organism to others  Description: Prevent the transmission of infectious organisms to other patients, staff members, and visitors.   Outcome: Progressing Towards Goal

## 2021-04-30 NOTE — PROGRESS NOTES
0700: End of Shift Note    Bedside shift change report given to Darrell Jones RN (oncoming nurse) by Stephania Dunbar (offgoing nurse). Report included the following information SBAR, Kardex, Intake/Output, MAR, Recent Results and Cardiac Rhythm NSR    Shift worked:  0446-3841     Shift summary and any significant changes:     nothing to report     Concerns for physician to address:  D/C today     Zone phone for oncoming shift:          Activity:  Activity Level: Up with Assistance  Number times ambulated in hallways past shift: 0  Number of times OOB to chair past shift: 0    Cardiac:   Cardiac Monitoring: Yes      Cardiac Rhythm: Normal sinus rhythm    Access:   Current line(s): PIV     Genitourinary:   Urinary status: voiding    Respiratory:   O2 Device: None (Room air)  Chronic home O2 use?: NO  Incentive spirometer at bedside: NO     GI:  Last Bowel Movement Date: 04/26/21  Current diet:  DIET ONE TIME MESSAGE  DIET DIABETIC CONSISTENT CARB Regular; 70-70-70 (House)  DIET ONE TIME MESSAGE  DIET NUTRITIONAL SUPPLEMENTS Breakfast, Dinner; [place]  Passing flatus: YES  Tolerating current diet: YES       Pain Management:   Patient states pain is manageable on current regimen: YES    Skin:  Sarwat Score: 19  Interventions: increase time out of bed    Patient Safety:  Fall Score:  Total Score: 3  Interventions: gripper socks  High Fall Risk: Yes    Length of Stay:  Expected LOS: 3d 2h  [facility]

## 2021-05-03 ENCOUNTER — PATIENT OUTREACH (OUTPATIENT)
Dept: CASE MANAGEMENT | Age: 56
End: 2021-05-03

## 2021-05-04 ENCOUNTER — PATIENT OUTREACH (OUTPATIENT)
Dept: CASE MANAGEMENT | Age: 56
End: 2021-05-04

## 2021-05-14 RX ORDER — ALBUTEROL SULFATE 90 UG/1
1 AEROSOL, METERED RESPIRATORY (INHALATION)
Qty: 1 INHALER | Refills: 0 | Status: SHIPPED | OUTPATIENT
Start: 2021-05-14 | End: 2021-05-18 | Stop reason: SDUPTHER

## 2021-05-19 RX ORDER — ALBUTEROL SULFATE 90 UG/1
1 AEROSOL, METERED RESPIRATORY (INHALATION)
Qty: 1 INHALER | Refills: 0 | Status: SHIPPED | OUTPATIENT
Start: 2021-05-19 | End: 2021-10-10 | Stop reason: ALTCHOICE

## 2021-05-31 ENCOUNTER — HOSPITAL ENCOUNTER (INPATIENT)
Age: 56
LOS: 3 days | Discharge: HOME | DRG: 194 | End: 2021-06-04
Attending: EMERGENCY MEDICINE | Admitting: STUDENT IN AN ORGANIZED HEALTH CARE EDUCATION/TRAINING PROGRAM
Payer: MEDICAID

## 2021-05-31 ENCOUNTER — APPOINTMENT (OUTPATIENT)
Dept: GENERAL RADIOLOGY | Age: 56
DRG: 194 | End: 2021-05-31
Attending: EMERGENCY MEDICINE
Payer: MEDICAID

## 2021-05-31 DIAGNOSIS — E11.42 TYPE 2 DIABETES MELLITUS WITH DIABETIC POLYNEUROPATHY, WITH LONG-TERM CURRENT USE OF INSULIN (HCC): ICD-10-CM

## 2021-05-31 DIAGNOSIS — I50.1 PULMONARY EDEMA WITH CONGESTIVE HEART FAILURE (HCC): ICD-10-CM

## 2021-05-31 DIAGNOSIS — Z71.89 ADVANCED CARE PLANNING/COUNSELING DISCUSSION: ICD-10-CM

## 2021-05-31 DIAGNOSIS — R06.02 SOB (SHORTNESS OF BREATH): ICD-10-CM

## 2021-05-31 DIAGNOSIS — E11.65 TYPE 2 DIABETES MELLITUS WITH HYPERGLYCEMIA, WITH LONG-TERM CURRENT USE OF INSULIN (HCC): ICD-10-CM

## 2021-05-31 DIAGNOSIS — Z71.89 GOALS OF CARE, COUNSELING/DISCUSSION: ICD-10-CM

## 2021-05-31 DIAGNOSIS — I50.23 ACUTE ON CHRONIC SYSTOLIC CONGESTIVE HEART FAILURE (HCC): Primary | ICD-10-CM

## 2021-05-31 DIAGNOSIS — Z79.4 TYPE 2 DIABETES MELLITUS WITH DIABETIC POLYNEUROPATHY, WITH LONG-TERM CURRENT USE OF INSULIN (HCC): ICD-10-CM

## 2021-05-31 DIAGNOSIS — I42.0 DILATED CARDIOMYOPATHY (HCC): Chronic | ICD-10-CM

## 2021-05-31 DIAGNOSIS — Z79.4 TYPE 2 DIABETES MELLITUS WITH HYPERGLYCEMIA, WITH LONG-TERM CURRENT USE OF INSULIN (HCC): ICD-10-CM

## 2021-05-31 DIAGNOSIS — F14.10 COCAINE ABUSE (HCC): ICD-10-CM

## 2021-05-31 DIAGNOSIS — I10 ESSENTIAL HYPERTENSION: ICD-10-CM

## 2021-05-31 DIAGNOSIS — Z91.199 NON COMPLIANCE WITH MEDICAL TREATMENT: ICD-10-CM

## 2021-05-31 LAB
ALBUMIN SERPL-MCNC: 1.9 G/DL (ref 3.5–5)
ALBUMIN/GLOB SERPL: 0.4 {RATIO} (ref 1.1–2.2)
ALP SERPL-CCNC: 115 U/L (ref 45–117)
ALT SERPL-CCNC: 7 U/L (ref 12–78)
ANION GAP SERPL CALC-SCNC: 7 MMOL/L (ref 5–15)
AST SERPL-CCNC: 26 U/L (ref 15–37)
BASOPHILS # BLD: 0 K/UL (ref 0–0.1)
BASOPHILS NFR BLD: 0 % (ref 0–1)
BILIRUB SERPL-MCNC: 0.3 MG/DL (ref 0.2–1)
BUN SERPL-MCNC: 33 MG/DL (ref 6–20)
BUN/CREAT SERPL: 11 (ref 12–20)
CALCIUM SERPL-MCNC: 7.6 MG/DL (ref 8.5–10.1)
CHLORIDE SERPL-SCNC: 112 MMOL/L (ref 97–108)
CO2 SERPL-SCNC: 22 MMOL/L (ref 21–32)
CREAT SERPL-MCNC: 2.93 MG/DL (ref 0.7–1.3)
DIFFERENTIAL METHOD BLD: ABNORMAL
EOSINOPHIL # BLD: 0.2 K/UL (ref 0–0.4)
EOSINOPHIL NFR BLD: 2 % (ref 0–7)
ERYTHROCYTE [DISTWIDTH] IN BLOOD BY AUTOMATED COUNT: 19.3 % (ref 11.5–14.5)
GLOBULIN SER CALC-MCNC: 4.8 G/DL (ref 2–4)
GLUCOSE SERPL-MCNC: 175 MG/DL (ref 65–100)
HCT VFR BLD AUTO: 28.2 % (ref 36.6–50.3)
HGB BLD-MCNC: 8.8 G/DL (ref 12.1–17)
IMM GRANULOCYTES # BLD AUTO: 0 K/UL (ref 0–0.04)
IMM GRANULOCYTES NFR BLD AUTO: 0 % (ref 0–0.5)
LYMPHOCYTES # BLD: 1.2 K/UL (ref 0.8–3.5)
LYMPHOCYTES NFR BLD: 13 % (ref 12–49)
MCH RBC QN AUTO: 27.1 PG (ref 26–34)
MCHC RBC AUTO-ENTMCNC: 31.2 G/DL (ref 30–36.5)
MCV RBC AUTO: 86.8 FL (ref 80–99)
MONOCYTES # BLD: 0.7 K/UL (ref 0–1)
MONOCYTES NFR BLD: 8 % (ref 5–13)
NEUTS SEG # BLD: 6.9 K/UL (ref 1.8–8)
NEUTS SEG NFR BLD: 77 % (ref 32–75)
NRBC # BLD: 0 K/UL (ref 0–0.01)
NRBC BLD-RTO: 0 PER 100 WBC
PLATELET # BLD AUTO: 285 K/UL (ref 150–400)
PMV BLD AUTO: 10.8 FL (ref 8.9–12.9)
POTASSIUM SERPL-SCNC: 3.6 MMOL/L (ref 3.5–5.1)
PROT SERPL-MCNC: 6.7 G/DL (ref 6.4–8.2)
RBC # BLD AUTO: 3.25 M/UL (ref 4.1–5.7)
SODIUM SERPL-SCNC: 141 MMOL/L (ref 136–145)
TROPONIN I BLD-MCNC: <0.04 NG/ML (ref 0–0.08)
WBC # BLD AUTO: 9.1 K/UL (ref 4.1–11.1)

## 2021-05-31 PROCEDURE — 71045 X-RAY EXAM CHEST 1 VIEW: CPT

## 2021-05-31 PROCEDURE — 84484 ASSAY OF TROPONIN QUANT: CPT

## 2021-05-31 PROCEDURE — 93005 ELECTROCARDIOGRAM TRACING: CPT

## 2021-05-31 PROCEDURE — 36415 COLL VENOUS BLD VENIPUNCTURE: CPT

## 2021-05-31 PROCEDURE — 80053 COMPREHEN METABOLIC PANEL: CPT

## 2021-05-31 PROCEDURE — 85025 COMPLETE CBC W/AUTO DIFF WBC: CPT

## 2021-05-31 PROCEDURE — 99285 EMERGENCY DEPT VISIT HI MDM: CPT

## 2021-06-01 PROBLEM — I50.23 ACUTE ON CHRONIC SYSTOLIC HEART FAILURE (HCC): Status: ACTIVE | Noted: 2021-06-01

## 2021-06-01 LAB
AMMONIA PLAS-SCNC: 16 UMOL/L
AMPHET UR QL SCN: NEGATIVE
ARTERIAL PATENCY WRIST A: YES
ATRIAL RATE: 79 BPM
BARBITURATES UR QL SCN: NEGATIVE
BASE DEFICIT BLDA-SCNC: 2.9 MMOL/L
BDY SITE: ABNORMAL
BENZODIAZ UR QL: NEGATIVE
BNP SERPL-MCNC: ABNORMAL PG/ML
CALCULATED P AXIS, ECG09: 47 DEGREES
CALCULATED R AXIS, ECG10: -11 DEGREES
CALCULATED T AXIS, ECG11: -157 DEGREES
CANNABINOIDS UR QL SCN: NEGATIVE
COCAINE UR QL SCN: POSITIVE
DIAGNOSIS, 93000: NORMAL
DRUG SCRN COMMENT,DRGCM: ABNORMAL
EST. AVERAGE GLUCOSE BLD GHB EST-MCNC: 137 MG/DL
ETHANOL SERPL-MCNC: <10 MG/DL
GAS FLOW.O2 O2 DELIVERY SYS: 1 L/MIN
GLUCOSE BLD STRIP.AUTO-MCNC: 134 MG/DL (ref 65–117)
GLUCOSE BLD STRIP.AUTO-MCNC: 143 MG/DL (ref 65–117)
GLUCOSE BLD STRIP.AUTO-MCNC: 149 MG/DL (ref 65–117)
GLUCOSE BLD STRIP.AUTO-MCNC: 209 MG/DL (ref 65–117)
HBA1C MFR BLD: 6.4 % (ref 4–5.6)
HCO3 BLDA-SCNC: 22 MMOL/L (ref 22–26)
METHADONE UR QL: NEGATIVE
OPIATES UR QL: NEGATIVE
P-R INTERVAL, ECG05: 126 MS
PCO2 BLDA: 40 MMHG (ref 35–45)
PCP UR QL: NEGATIVE
PH BLDA: 7.37 [PH] (ref 7.35–7.45)
PO2 BLDA: 60 MMHG (ref 80–100)
PROCALCITONIN SERPL-MCNC: <0.05 NG/ML
Q-T INTERVAL, ECG07: 384 MS
QRS DURATION, ECG06: 88 MS
QTC CALCULATION (BEZET), ECG08: 440 MS
SAO2 % BLD: 91 % (ref 92–97)
SAO2% DEVICE SAO2% SENSOR NAME: ABNORMAL
SERVICE CMNT-IMP: ABNORMAL
SPECIMEN SITE: ABNORMAL
TROPONIN I SERPL-MCNC: <0.05 NG/ML
VENTRICULAR RATE, ECG03: 79 BPM

## 2021-06-01 PROCEDURE — 74011250636 HC RX REV CODE- 250/636: Performed by: EMERGENCY MEDICINE

## 2021-06-01 PROCEDURE — 96374 THER/PROPH/DIAG INJ IV PUSH: CPT

## 2021-06-01 PROCEDURE — 84145 PROCALCITONIN (PCT): CPT

## 2021-06-01 PROCEDURE — 83036 HEMOGLOBIN GLYCOSYLATED A1C: CPT

## 2021-06-01 PROCEDURE — 74011250636 HC RX REV CODE- 250/636: Performed by: INTERNAL MEDICINE

## 2021-06-01 PROCEDURE — 74011250637 HC RX REV CODE- 250/637: Performed by: NURSE PRACTITIONER

## 2021-06-01 PROCEDURE — 82803 BLOOD GASES ANY COMBINATION: CPT

## 2021-06-01 PROCEDURE — 65270000029 HC RM PRIVATE

## 2021-06-01 PROCEDURE — APPSS180 APP SPLIT SHARED TIME > 60 MINUTES: Performed by: NURSE PRACTITIONER

## 2021-06-01 PROCEDURE — 77010033678 HC OXYGEN DAILY

## 2021-06-01 PROCEDURE — 99233 SBSQ HOSP IP/OBS HIGH 50: CPT | Performed by: INTERNAL MEDICINE

## 2021-06-01 PROCEDURE — 74011636637 HC RX REV CODE- 636/637: Performed by: STUDENT IN AN ORGANIZED HEALTH CARE EDUCATION/TRAINING PROGRAM

## 2021-06-01 PROCEDURE — 74011000250 HC RX REV CODE- 250: Performed by: NURSE PRACTITIONER

## 2021-06-01 PROCEDURE — 84484 ASSAY OF TROPONIN QUANT: CPT

## 2021-06-01 PROCEDURE — 74011250637 HC RX REV CODE- 250/637: Performed by: STUDENT IN AN ORGANIZED HEALTH CARE EDUCATION/TRAINING PROGRAM

## 2021-06-01 PROCEDURE — 74011250636 HC RX REV CODE- 250/636: Performed by: STUDENT IN AN ORGANIZED HEALTH CARE EDUCATION/TRAINING PROGRAM

## 2021-06-01 PROCEDURE — 36600 WITHDRAWAL OF ARTERIAL BLOOD: CPT

## 2021-06-01 PROCEDURE — 83880 ASSAY OF NATRIURETIC PEPTIDE: CPT

## 2021-06-01 PROCEDURE — 94760 N-INVAS EAR/PLS OXIMETRY 1: CPT

## 2021-06-01 PROCEDURE — 80307 DRUG TEST PRSMV CHEM ANLYZR: CPT

## 2021-06-01 PROCEDURE — 82077 ASSAY SPEC XCP UR&BREATH IA: CPT

## 2021-06-01 PROCEDURE — 82140 ASSAY OF AMMONIA: CPT

## 2021-06-01 PROCEDURE — 82962 GLUCOSE BLOOD TEST: CPT

## 2021-06-01 PROCEDURE — 36415 COLL VENOUS BLD VENIPUNCTURE: CPT

## 2021-06-01 PROCEDURE — 74011250637 HC RX REV CODE- 250/637: Performed by: INTERNAL MEDICINE

## 2021-06-01 RX ORDER — DEXTROSE 50 % IN WATER (D50W) INTRAVENOUS SYRINGE
25-50 AS NEEDED
Status: DISCONTINUED | OUTPATIENT
Start: 2021-06-01 | End: 2021-06-04 | Stop reason: HOSPADM

## 2021-06-01 RX ORDER — MAGNESIUM SULFATE 100 %
4 CRYSTALS MISCELLANEOUS AS NEEDED
Status: DISCONTINUED | OUTPATIENT
Start: 2021-06-01 | End: 2021-06-04 | Stop reason: HOSPADM

## 2021-06-01 RX ORDER — INSULIN GLARGINE 100 [IU]/ML
30 INJECTION, SOLUTION SUBCUTANEOUS
Status: DISCONTINUED | OUTPATIENT
Start: 2021-06-01 | End: 2021-06-01

## 2021-06-01 RX ORDER — FUROSEMIDE 10 MG/ML
40 INJECTION INTRAMUSCULAR; INTRAVENOUS
Status: COMPLETED | OUTPATIENT
Start: 2021-06-01 | End: 2021-06-01

## 2021-06-01 RX ORDER — ONDANSETRON 2 MG/ML
4 INJECTION INTRAMUSCULAR; INTRAVENOUS
Status: DISCONTINUED | OUTPATIENT
Start: 2021-06-01 | End: 2021-06-04 | Stop reason: HOSPADM

## 2021-06-01 RX ORDER — ACETAMINOPHEN 325 MG/1
650 TABLET ORAL
Status: DISCONTINUED | OUTPATIENT
Start: 2021-06-01 | End: 2021-06-01 | Stop reason: SDUPTHER

## 2021-06-01 RX ORDER — PREGABALIN 100 MG/1
100 CAPSULE ORAL 2 TIMES DAILY
Status: DISCONTINUED | OUTPATIENT
Start: 2021-06-01 | End: 2021-06-04 | Stop reason: HOSPADM

## 2021-06-01 RX ORDER — BUMETANIDE 0.25 MG/ML
2 INJECTION INTRAMUSCULAR; INTRAVENOUS ONCE
Status: ACTIVE | OUTPATIENT
Start: 2021-06-01 | End: 2021-06-01

## 2021-06-01 RX ORDER — BUPRENORPHINE AND NALOXONE 2; .5 MG/1; MG/1
2 FILM, SOLUBLE BUCCAL; SUBLINGUAL 2 TIMES DAILY
Status: DISCONTINUED | OUTPATIENT
Start: 2021-06-01 | End: 2021-06-02

## 2021-06-01 RX ORDER — CLONIDINE HYDROCHLORIDE 0.1 MG/1
0.1 TABLET ORAL 2 TIMES DAILY
Status: DISCONTINUED | OUTPATIENT
Start: 2021-06-01 | End: 2021-06-01

## 2021-06-01 RX ORDER — POLYETHYLENE GLYCOL 3350 17 G/17G
17 POWDER, FOR SOLUTION ORAL DAILY PRN
Status: DISCONTINUED | OUTPATIENT
Start: 2021-06-01 | End: 2021-06-04 | Stop reason: HOSPADM

## 2021-06-01 RX ORDER — SODIUM CHLORIDE 0.9 % (FLUSH) 0.9 %
5-40 SYRINGE (ML) INJECTION EVERY 8 HOURS
Status: DISCONTINUED | OUTPATIENT
Start: 2021-06-01 | End: 2021-06-04 | Stop reason: HOSPADM

## 2021-06-01 RX ORDER — IPRATROPIUM BROMIDE AND ALBUTEROL SULFATE 2.5; .5 MG/3ML; MG/3ML
3 SOLUTION RESPIRATORY (INHALATION)
Status: DISCONTINUED | OUTPATIENT
Start: 2021-06-01 | End: 2021-06-04 | Stop reason: HOSPADM

## 2021-06-01 RX ORDER — CARVEDILOL 12.5 MG/1
12.5 TABLET ORAL 2 TIMES DAILY
Status: DISCONTINUED | OUTPATIENT
Start: 2021-06-01 | End: 2021-06-01

## 2021-06-01 RX ORDER — ATORVASTATIN CALCIUM 20 MG/1
20 TABLET, FILM COATED ORAL EVERY EVENING
Status: DISCONTINUED | OUTPATIENT
Start: 2021-06-01 | End: 2021-06-04 | Stop reason: HOSPADM

## 2021-06-01 RX ORDER — CLONIDINE HYDROCHLORIDE 0.1 MG/1
0.1 TABLET ORAL 3 TIMES DAILY
Status: DISCONTINUED | OUTPATIENT
Start: 2021-06-01 | End: 2021-06-02

## 2021-06-01 RX ORDER — ISOSORBIDE MONONITRATE 30 MG/1
60 TABLET, EXTENDED RELEASE ORAL DAILY
Status: DISCONTINUED | OUTPATIENT
Start: 2021-06-01 | End: 2021-06-04 | Stop reason: HOSPADM

## 2021-06-01 RX ORDER — METOLAZONE 2.5 MG/1
5 TABLET ORAL ONCE
Status: COMPLETED | OUTPATIENT
Start: 2021-06-01 | End: 2021-06-01

## 2021-06-01 RX ORDER — AMLODIPINE BESYLATE 5 MG/1
5 TABLET ORAL DAILY
Status: DISCONTINUED | OUTPATIENT
Start: 2021-06-01 | End: 2021-06-02

## 2021-06-01 RX ORDER — BUMETANIDE 0.25 MG/ML
1 INJECTION INTRAMUSCULAR; INTRAVENOUS 2 TIMES DAILY
Status: DISCONTINUED | OUTPATIENT
Start: 2021-06-01 | End: 2021-06-04 | Stop reason: HOSPADM

## 2021-06-01 RX ORDER — ENOXAPARIN SODIUM 100 MG/ML
40 INJECTION SUBCUTANEOUS DAILY
Status: DISCONTINUED | OUTPATIENT
Start: 2021-06-01 | End: 2021-06-02 | Stop reason: ALTCHOICE

## 2021-06-01 RX ORDER — BUMETANIDE 0.25 MG/ML
2 INJECTION INTRAMUSCULAR; INTRAVENOUS ONCE
Status: DISCONTINUED | OUTPATIENT
Start: 2021-06-01 | End: 2021-06-01

## 2021-06-01 RX ORDER — PROMETHAZINE HYDROCHLORIDE 25 MG/1
12.5 TABLET ORAL
Status: DISCONTINUED | OUTPATIENT
Start: 2021-06-01 | End: 2021-06-04 | Stop reason: HOSPADM

## 2021-06-01 RX ORDER — ASPIRIN 81 MG/1
81 TABLET ORAL DAILY
Status: DISCONTINUED | OUTPATIENT
Start: 2021-06-01 | End: 2021-06-04 | Stop reason: HOSPADM

## 2021-06-01 RX ORDER — ACETAMINOPHEN 325 MG/1
650 TABLET ORAL
Status: DISCONTINUED | OUTPATIENT
Start: 2021-06-01 | End: 2021-06-04 | Stop reason: HOSPADM

## 2021-06-01 RX ORDER — HYDRALAZINE HYDROCHLORIDE 20 MG/ML
10 INJECTION INTRAMUSCULAR; INTRAVENOUS
Status: DISCONTINUED | OUTPATIENT
Start: 2021-06-01 | End: 2021-06-04 | Stop reason: HOSPADM

## 2021-06-01 RX ORDER — INSULIN GLARGINE 100 [IU]/ML
10 INJECTION, SOLUTION SUBCUTANEOUS
Status: DISCONTINUED | OUTPATIENT
Start: 2021-06-01 | End: 2021-06-04 | Stop reason: HOSPADM

## 2021-06-01 RX ORDER — ONDANSETRON 2 MG/ML
4 INJECTION INTRAMUSCULAR; INTRAVENOUS
Status: DISCONTINUED | OUTPATIENT
Start: 2021-06-01 | End: 2021-06-01 | Stop reason: SDUPTHER

## 2021-06-01 RX ORDER — ACETAMINOPHEN 650 MG/1
650 SUPPOSITORY RECTAL
Status: DISCONTINUED | OUTPATIENT
Start: 2021-06-01 | End: 2021-06-04 | Stop reason: HOSPADM

## 2021-06-01 RX ORDER — ALLOPURINOL 300 MG/1
300 TABLET ORAL DAILY
Status: DISCONTINUED | OUTPATIENT
Start: 2021-06-01 | End: 2021-06-04 | Stop reason: HOSPADM

## 2021-06-01 RX ORDER — SODIUM CHLORIDE 0.9 % (FLUSH) 0.9 %
5-40 SYRINGE (ML) INJECTION AS NEEDED
Status: DISCONTINUED | OUTPATIENT
Start: 2021-06-01 | End: 2021-06-04 | Stop reason: HOSPADM

## 2021-06-01 RX ORDER — INSULIN LISPRO 100 [IU]/ML
INJECTION, SOLUTION INTRAVENOUS; SUBCUTANEOUS
Status: DISCONTINUED | OUTPATIENT
Start: 2021-06-01 | End: 2021-06-04 | Stop reason: HOSPADM

## 2021-06-01 RX ADMIN — ATORVASTATIN CALCIUM 20 MG: 20 TABLET, FILM COATED ORAL at 17:32

## 2021-06-01 RX ADMIN — CLONIDINE HYDROCHLORIDE 0.1 MG: 0.1 TABLET ORAL at 21:35

## 2021-06-01 RX ADMIN — HYDRALAZINE HYDROCHLORIDE 10 MG: 20 INJECTION INTRAMUSCULAR; INTRAVENOUS at 17:32

## 2021-06-01 RX ADMIN — PREGABALIN 100 MG: 100 CAPSULE ORAL at 10:34

## 2021-06-01 RX ADMIN — INSULIN LISPRO 2 UNITS: 100 INJECTION, SOLUTION INTRAVENOUS; SUBCUTANEOUS at 12:43

## 2021-06-01 RX ADMIN — PREGABALIN 100 MG: 100 CAPSULE ORAL at 17:32

## 2021-06-01 RX ADMIN — ASPIRIN 81 MG: 81 TABLET, COATED ORAL at 10:34

## 2021-06-01 RX ADMIN — Medication 10 ML: at 21:37

## 2021-06-01 RX ADMIN — INSULIN GLARGINE 10 UNITS: 100 INJECTION, SOLUTION SUBCUTANEOUS at 21:36

## 2021-06-01 RX ADMIN — Medication 10 ML: at 06:00

## 2021-06-01 RX ADMIN — BUPRENORPHINE AND NALOXONE 2 FILM: 2; .5 FILM BUCCAL; SUBLINGUAL at 10:33

## 2021-06-01 RX ADMIN — CLONIDINE HYDROCHLORIDE 0.1 MG: 0.1 TABLET ORAL at 17:40

## 2021-06-01 RX ADMIN — BUPRENORPHINE AND NALOXONE 2 FILM: 2; .5 FILM BUCCAL; SUBLINGUAL at 17:32

## 2021-06-01 RX ADMIN — CARVEDILOL 12.5 MG: 12.5 TABLET, FILM COATED ORAL at 10:34

## 2021-06-01 RX ADMIN — ALLOPURINOL 300 MG: 300 TABLET ORAL at 10:34

## 2021-06-01 RX ADMIN — CLONIDINE HYDROCHLORIDE 0.1 MG: 0.1 TABLET ORAL at 10:34

## 2021-06-01 RX ADMIN — ENOXAPARIN SODIUM 40 MG: 40 INJECTION SUBCUTANEOUS at 10:35

## 2021-06-01 RX ADMIN — AMLODIPINE BESYLATE 5 MG: 5 TABLET ORAL at 12:45

## 2021-06-01 RX ADMIN — Medication 10 ML: at 14:09

## 2021-06-01 RX ADMIN — BUMETANIDE 1 MG: 0.25 INJECTION INTRAMUSCULAR; INTRAVENOUS at 12:44

## 2021-06-01 RX ADMIN — BUMETANIDE 1 MG: 0.25 INJECTION INTRAMUSCULAR; INTRAVENOUS at 17:32

## 2021-06-01 RX ADMIN — HYDRALAZINE HYDROCHLORIDE 10 MG: 20 INJECTION INTRAMUSCULAR; INTRAVENOUS at 12:44

## 2021-06-01 RX ADMIN — METOLAZONE 5 MG: 5 TABLET ORAL at 07:28

## 2021-06-01 RX ADMIN — FUROSEMIDE 40 MG: 10 INJECTION, SOLUTION INTRAMUSCULAR; INTRAVENOUS at 01:48

## 2021-06-01 NOTE — PROGRESS NOTES
End of Shift Note    Bedside shift change report given to [id number] (oncoming nurse) by CHRIS Marion (offgoing nurse). Report included the following information SBAR, Kardex, ED Summary, Intake/Output, MAR and Recent Results    Shift worked:  [id number]     Shift summary and any significant changes:     Pt had no complaints of pain. Patient required education on keeping O2 on at all times and patient stated he understood why. Pt has no complaints or requests at this time. Concerns for physician to address:  none     Zone phone for oncoming shift:   [id number]       Activity:  Activity Level: Up with Assistance  Number times ambulated in hallways past shift: 0  Number of times OOB to chair past shift: 0    Cardiac:   Cardiac Monitoring: Yes           Access:   Current line(s): PIV     Genitourinary:   Urinary status: voiding    Respiratory:   O2 Device: Nasal cannula  Chronic home O2 use?: YES  Incentive spirometer at bedside: NO     GI:     Current diet:  DIET DIABETIC CONSISTENT CARB Regular; 2 GM NA (House Low NA); FR 800ML  Passing flatus: YES  Tolerating current diet: YES       Pain Management:   Patient states pain is manageable on current regimen: YES    Skin:  Sarwat Score: 20  Interventions: float heels and increase time out of bed    Patient Safety:  Fall Score:  Total Score: 1  Interventions: gripper socks, pt to call before getting OOB and stay with me (per policy)       Length of Stay:  Expected LOS: - - -  [facility]
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
## 2021-06-01 NOTE — PALLIATIVE CARE
Consult received, we will be able to see patient tomorrow. Thank you for including Palliative care in the care of this patient .

## 2021-06-01 NOTE — ED NOTES
Patient is being transferred to hospitals General Surgery, Room # 2115. Report given to Beth Parker RN on Maeve Mayes for routine progression of care. Report consisted of the following information SBAR, ED Summary, MAR and Recent Results. Patient transferred to receiving unit by: Beth Parker RN. Outstanding consults needed: No     Next labs due: Yes    The following personal items will be sent with the patient during transfer to the floor: All valuables:    Cardiac monitoring ordered: Yes    The following CURRENT information was reported to the receiving RN:    Code status: Full Code at time of transfer    Last set of vital signs:  Vital Signs  Level of Consciousness: Alert (0) (05/31/21 2327)  Temp: 98.2 °F (36.8 °C) (05/31/21 2327)  Temp Source: Oral (05/31/21 2327)  Pulse (Heart Rate): 75 (06/01/21 0215)  Resp Rate: 16 (06/01/21 0215)  BP: (!) 173/95 (06/01/21 0215)  MAP (Monitor): 117 (06/01/21 0215)  MAP (Calculated): 121 (06/01/21 0215)  MEWS Score: 1 (05/31/21 2327)         Oxygen Therapy  O2 Sat (%): 97 % (06/01/21 0215)  Pulse via Oximetry: 74 beats per minute (06/01/21 0215)  O2 Device: Nasal cannula (05/31/21 2327)  O2 Flow Rate (L/min): 1 l/min (05/31/21 2327)      Last pain assessment:  Pain 1  Pain Scale 1: Numeric (0 - 10)  Pain Intensity 1: 8  Patient Stated Pain Goal: 0      Wounds: No     Urinary catheter: voiding  Is there a johnston order: No     LDAs:       Peripheral IV 05/31/21 Left Hand (Active)         Opportunity for questions and clarification was provided.     Dario Lee RN
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
## 2021-06-01 NOTE — PROGRESS NOTES
1250- Pt blood pressure elevated. Pt given PRN hydralazine in addition to scheduled BP Med.   1500- Pt requesting doctors note. Message sent to Dr. Abelardo Dela Cruz who placed one in the chart. This was given to pt and faxed to the number that pt requested. 1650- Perfect serve message sent to Dr. Abelardo Dela Cruz. Pts last blood pressure was 175/104. I have already given him prn hydralazine previously in shift and it is too early for another dose. Would you want to order something additional? Instructed to give dose of prn hydralazine now. Also asked if hydralazine dose should be increased to 20 mg.   1725- Per Dr. Abelardo Dela Cruz keep hydralazine dose order the same and increase clonidine order from BID to TID.   1820- BP improved to 159/89  End of Shift Note    Bedside shift change report given to Anita Grady  (oncoming nurse) by Cori Patel (offgoing nurse). Report included the following information SBAR, Kardex, Procedure Summary, Intake/Output, MAR and Recent Results    Shift worked:  7a-7p     Shift summary and any significant changes:     No complaints of pain. Pt up ad melly in room and up to chair for most of the day. Tolerating diet with good appetite. BP elevated- See above. Voiding with out issues. Concerns for physician to address:  BP      Zone phone for oncoming shift:   [phone]       Activity:  Activity Level:  Up with Assistance  Number times ambulated in hallways past shift: 0  Number of times OOB to chair past shift: 2    Cardiac:   Cardiac Monitoring: Yes           Access:   Current line(s): PIV     Genitourinary:   Urinary status: voiding    Respiratory:   O2 Device: Nasal cannula  Chronic home O2 use?: YES       GI:     Current diet:  DIET DIABETIC CONSISTENT CARB Regular; 2 GM NA (House Low NA); FR 800ML  Passing flatus: YES  Tolerating current diet: YES       Pain Management:   Patient states pain is manageable on current regimen: YES    Skin:  Sarwat Score: 19  Interventions: increase time out of bed    Patient Safety:  Fall Score:  Total Score: 1  Interventions: gripper socks and pt to call before getting OOB       Length of Stay:  Expected LOS: 3d 2h  Actual LOS: [address]

## 2021-06-01 NOTE — CONSULTS
[facility] [address] [facility]     Please note that this patient is well-known to [facility] and we are continuing his/her outpatient care/initiating his current episode of inpatient care. Date of  Admission: 5/31/2021 11:11 PM     Admission type:Emergency    Consult for: HF  Consult by: hospitalist      Subjective:     Suad Pack is a 55 y.o. male with PMH HF, DM, HTN, drug abuse, CKD who was admitted for Acute on chronic systolic heart failure ([address] [I50.23]. Per ED provider note Suad Pack presented to the ED with c/o SOB x2 days. Notes indicate that he ran out of his suboxone for 3 days and last  Had cocaine 3 days ago. Cardiology consulted for heart failure. On assessment, Lio Martínez endorses severe DE LA CRUZ. He states he can only walk a few feet without becoming SOB. The oxygen helps him and he would like to go home with oxygen PRN. Mr. Ruma Davis denies any pain. He ran out of his medications for about a month, but that was over a month ago and he has been taking his medications recently. He does endorses limited compliance with medications even when he is taking them. He states if he has a TID medication, he may take 8 out of 12 pills as prescribed. He does not weigh himself daily. He denies changes to his diet. Suad Pack  follows with Dr. Oniel Leger for cardiology. Last ECHO 03/21 with EF 35-40%; moderate concentric hypertrophy . Stress 04/21 without ischemia     Cardiac risk factors: smoking/ tobacco exposure, diabetes mellitus, obesity, male gender, hypertension.       Patient Active Problem List    Diagnosis Date Noted    Acute on chronic systolic heart failure ([address] 06/01/2021    Hyperkalemia 04/27/2021    Acute on chronic kidney failure ([address] 04/27/2021    Elevated troponin 04/27/2021    Acute on chronic combined systolic and diastolic ACC/AHA stage C congestive heart failure ([address] 03/08/2021    Hypertensive urgency 03/08/2021    CKD (chronic kidney disease) 03/08/2021    CHF exacerbation (HCC) 03/05/2021    Acute CHF (congestive heart failure) ([facility] 08/60/5218    Metabolic encephalopathy 52/38/6446    Osteomyelitis of left foot ([address] 02/28/2020    MRSA bacteremia 02/28/2020    Secondary hyperaldosteronism ([address] 02/28/2020    Hypokalemia 02/28/2020    DM (diabetes mellitus), type 2 ([address] 02/28/2020    HTN (hypertension) 02/28/2020    Cardiomyopathy ([address] 02/84/2982    Acute systolic heart failure ([address] 02/21/2020    Substance abuse ([facility] 02/21/2020    Severe sepsis ([facility] 02/19/2020      Yas Espinal MD  Past Medical History:   Diagnosis Date    Acute systolic heart failure ([facility] 2/21/2020    Cardiomyopathy ([facility] 2/21/2020    Diabetes ([facility]     Encounter to establish care with new doctor 10/11/2018    Hypertension     Neuropathy     Sciatica     Substance abuse ([address] 2/21/2020      Social History     Socioeconomic History    Marital status: SINGLE     Spouse name: Not on file    Number of children: Not on file    Years of education: Not on file    Highest education level: Not on file   Tobacco Use    Smoking status: Current Every Day Smoker    Smokeless tobacco: Former User    Tobacco comment: 8-9 cigs/day   Vaping Use    Vaping Use: Never used   Substance and Sexual Activity    Alcohol use: Not Currently    Drug use: Not Currently     Types: Marijuana, Cocaine, Heroin     Comment: Pt is in rehab    Sexual activity: Yes     Partners: Female     Birth control/protection: None   Social History Narrative    ** Merged History Encounter **          Social Determinants of Health     Financial Resource Strain:     Difficulty of Paying Living Expenses:    Food Insecurity:     Worried About Running Out of Food in the Last Year:     Ran Out of Food in the Last Year:    Transportation Needs:     Lack of Transportation (Medical):     Lack of Transportation (Non-Medical):    Physical Activity:     Days of Exercise per Week:     Minutes of Exercise per Session:    Stress:     Feeling of Stress :    Social Connections:     Frequency of Communication with Friends and Family:     Frequency of Social Gatherings with Friends and Family:     Attends [employer]oration[employer] Services:      Active Member of Clubs or Organizations:     Attends Club or Organization Meetings:     Marital Status:      No Known Allergies   Family History   Problem Relation Age of Onset    Diabetes Mother     Diabetes Father       Current Facility-Administered Medications   Medication Dose Route Frequency    albuterol-ipratropium (DUO-NEB) 2.5 MG-0.5 MG/3 ML  3 mL Nebulization Q6H PRN    allopurinoL (ZYLOPRIM) tablet 300 mg  300 mg Oral DAILY    aspirin delayed-release tablet 81 mg  81 mg Oral DAILY    atorvastatin (LIPITOR) tablet 20 mg  20 mg Oral QPM    cloNIDine HCL (CATAPRES) tablet 0.1 mg  0.1 mg Oral BID    isosorbide mononitrate ER (IMDUR) tablet 60 mg  60 mg Oral DAILY    pregabalin (LYRICA) capsule 100 mg  100 mg Oral BID    sodium chloride (NS) flush 5-40 mL  5-40 mL IntraVENous Q8H    sodium chloride (NS) flush 5-40 mL  5-40 mL IntraVENous PRN    acetaminophen (TYLENOL) tablet 650 mg  650 mg Oral Q6H PRN    Or    acetaminophen (TYLENOL) suppository 650 mg  650 mg Rectal Q6H PRN    polyethylene glycol (MIRALAX) packet 17 g  17 g Oral DAILY PRN    promethazine (PHENERGAN) tablet 12.5 mg  12.5 mg Oral Q6H PRN    Or    ondansetron (ZOFRAN) injection 4 mg  4 mg IntraVENous Q6H PRN    enoxaparin (LOVENOX) injection 40 mg  40 mg SubCUTAneous DAILY    bumetanide (BUMEX) injection 2 mg  2 mg IntraVENous ONCE    buprenorphine-naloxone (SUBOXONE) 2mg-0.5mg SL film  2 Film SubLINGual BID    carvediloL (COREG) tablet 12.5 mg  12.5 mg Oral BID    glucose chewable tablet 16 g  4 Tablet Oral PRN    dextrose (D50W) injection syrg 12.5-25 g  25-50 mL IntraVENous PRN    glucagon (GLUCAGEN) injection 1 mg  1 mg IntraMUSCular PRN    insulin lispro (HUMALOG) injection   SubCUTAneous AC&HS    insulin glargine (LANTUS) injection 10 Units  10 Units SubCUTAneous QHS    hydrALAZINE (APRESOLINE) 20 mg/mL injection 10 mg  10 mg IntraVENous Q6H PRN        Review of Symptoms:   11 systems reviewed, negative other than as stated in the HPI        Objective:      Visit Vitals  BP (!) 174/99   Pulse 78   Temp 98 °F (36.7 °C)   Resp 16   Ht 5' 8\" (1.727 m)   Wt 88.7 kg (195 lb 8.8 oz)   SpO2 97%   BMI 29.73 kg/m²       Physical Assessment:   General Appearance:  alert, cooperative, well nourished, well developed; adult AAM sitting in chair rocking back and forth; appears stated age  Eyes: sclera anicteric  Mouth/Throat: moist mucous membranes; oral pharynx clear  Neck: supple; + JVD   Pulmonary:  Dim bilaterally with scattered crackles; good effort  Cardiovascular: regular rate and rhythm; no murmur, click, rub, or gallop  Abdomen: soft, non-tender, non-distended; bowel sounds normal  Musculoskeletal: no swelling or deformity; moves all extremities  Extremities: 1+ edema BLE L>R; palpable distal pulses   Skin: warm and dry  Neuro: grossly normal  Psych: normal mood and affect given the setting      Data Review:   Recent Labs     05/31/21  2325   WBC 9.1   HGB 8.8*   HCT 28.2*        Recent Labs     05/31/21  2325      K 3.6   *   CO2 22   *   BUN 33*   CREA 2.93*   CA 7.6*   ALB 1.9*   TBILI 0.3   ALT 7*       No results for input(s): TROIQ, CPK, CKMB in the last 72 hours. No intake or output data in the 24 hours ending 06/01/21 1125     Cardiographics    Telemetry: SR with short AZ  ECG: NSR;  Diffuse ST/T wave changes consistent with prior EKG  Echocardiogram: last as above  CXRAY: \"Increased moderately severe pulmonary edema. \"       Assessment:       Active Problems:    Substance abuse ([address] (2/21/2020)      HTN (hypertension) (2/28/2020)      CKD (chronic kidney disease) (3/8/2021)      Acute on chronic systolic heart failure ([address] (6/1/2021)         Plan:       Rodo Allen is a 55 y.o. male admitted with Acute on chronic systolic heart failure ([facility] [I50.23] CXR with pulmonary edema. UDS + cocaine. Hg 8.8; creat down to 2.93. EKG similar to prior. Hypertensive. Not fully medication compliant. No proBNP or troponin. Acute on chronic systolic HF. Recent EF 35-40%. Likely 2/2 medication noncompliance and drug abuse. Reinforced importance of medication adherence and daily weights at home. Needs diuresis. Refused Bumex over night. Will order BID. Discussed with patient that he needs to get fluid off to feel better. No  BB with + cocaine. Not on ACEi/ARB/ARNI with CKD  HTN uncontrolled. Patient would prefer daily only medications, but limited with his CKD. Continue clonidine and imdur. Add amlodipine. Recent low risk stress test.  Due to patient's non compliance, he would be a high risk for cardiac catheterization. Would recommend medical mangement of CAD risk unless emergent need. ASA and statin. Needs continued discussions on drug abuse cessation. Back on suboxone. Thank you for consulting [facility]    Farzana Coppola NP  DNP, RN, AGACNP-BC    Patient seen and examined by me with the above nurse practitioner. I personally performed all components of the history, physical, and medical decision making and agree with the assessment and plan with minor modifications as noted. Today the patient presents with acute systolic heart failure in the setting of noncompliance and drug use. General PE  Gen:  NAD  Mental Status - Alert. General Appearance - Not in acute distress. HEENT:  PERRL, no carotid bruits or JVD  Chest and Lung Exam   Inspection: Accessory muscles - No use of accessory muscles in breathing. Auscultation:   Breath sounds: - Normal.   Cardiovascular   Inspection: Jugular vein - Bilateral - Inspection Normal.   Palpation/Percussion:   Apical Impulse: - Normal.   Auscultation: Rhythm - Regular. Heart Sounds - S1 WNL and S2 WNL. No S3 or S4. Murmurs & Other Heart Sounds: Auscultation of the heart reveals - No Murmurs. Peripheral Vascular   Upper Extremity: Inspection - Bilateral - No Cyanotic nailbeds or Digital clubbing. Lower Extremity:   Palpation: Edema - Bilateral - No edema. Abdomen:   Soft, non-tender, bowel sounds are active. Neuro: A&O times 3, CN and motor grossly WNL    Heart failure improving with diuresis and addition of medications. Counseled on drug cessation and need for compliance.

## 2021-06-01 NOTE — PROGRESS NOTES
Spiritual Care Assessment/Progress Note  [facility]      NAME: Flo Collier      MRN: [id number]  AGE: 55 y.o. SEX: male  Anabaptist Affiliation: Mu-ism   Language: English     6/1/2021     Total Time (in minutes): 5     Spiritual Assessment begun in [facility] through conversation with:         [x]Patient        [] Family    [] Friend(s)        Reason for Consult: Palliative Care, Initial/Spiritual Assessment     Spiritual beliefs: (Please include comment if needed)     [x] Identifies with a cookie tradition:         [] Supported by a cookie community:            [] Claims no spiritual orientation:           [] Seeking spiritual identity:                [] Adheres to an individual form of spirituality:           [] Not able to assess:                           Identified resources for coping:      [] Prayer                               [] Music                  [] Guided Imagery     [x] Family/friends                 [] Pet visits     [] Devotional reading                         [] Unknown     [] Other:                                               Interventions offered during this visit: (See comments for more details)    Patient Interventions: Initial/Spiritual assessment, patient floor           Plan of Care:     [] Support spiritual and/or cultural needs    [] Support AMD and/or advance care planning process      [] Support grieving process   [] Coordinate Rites and/or Rituals    [] Coordination with community clergy   [x] No spiritual needs identified at this time   [] Detailed Plan of Care below (See Comments)  [] Make referral to Music Therapy  [] Make referral to Pet Therapy     [] Make referral to Addiction services  [] Make referral to [facility]  [] Make referral to Spiritual Care Partner  [] No future visits requested        [x] Follow up upon further referrals     Comments:  Attempted to offer supportive visit for pt in [address].   Pt expressed no particular needs and declined visit at this time. Contact Spiritual Services for any emotional or spiritual support needs. Joes A Comer MDiv.  Staff   Request  Support/Spiritual Care Services via [address] [place] [address]

## 2021-06-01 NOTE — CARDIO/PULMONARY
Cardiac Rehab Note: chart review/referral     Consult has not been acknowledged     Pt is a 2 yo admitted with acute on chronic systolic heart failure. EF 40%  on 3/6/21 per echo. Smoking history assessed. Patient is a current smoker. Smoking Cessation Program link has been added to the AVS.     Current inpatient diet: DIET DIABETIC CONSISTENT CARB     \"Living with Heart Failure\" book with daily weight calendar and s/s of heart failure magnet provided to Zenon García on 6/1/21. Educated using teach back method. Instruction given on s/s of CHF, checking weight every am and calling MD if weight is up 2-3 lbs in a day or 5 lbs in a week (or as directed by the physician), fluid/Na restrictions, s/s of worsening CHF and when to call MD.     Reviewed activity as tolerated with frequent rest periods as needed, taking medications as prescribed, and the importance of follow up visits with physician. Pt denies regularly following a low salt diet, has not been checking a daily weight but, can get a scale and do so, sometimes misses a dose of his medication and does not regularly exercise. He does report that he could take a walk. Zenon García verbalized understanding with questions answered.   Paul Hsu RN

## 2021-06-01 NOTE — PROGRESS NOTES
Pt seen as courtesy. Admitted for a/c combined systolic and diastolic HF. Cont' with IV bumex and follow daily labs. He is currently on 1LNC. BP elevated but yet to receive AM antihypertensives. Can further titrate clonidine as needed for better BP control.

## 2021-06-01 NOTE — PROGRESS NOTES
[address] [facility].            6/1/2021      RE: Catrachita Erickson (YOB: 1965)    To Whom it May Concern: This is to certify that Catrachita Erickson was admitted to [facility] from 5/31/2021 to present time for treatment of a medical illness. Please feel free to contact my office if you have any questions or concerns. Thank you for your assistance in this matter.     Sincerely,   Dayo Monte MD  [phone] office

## 2021-06-02 LAB
ANION GAP SERPL CALC-SCNC: 8 MMOL/L (ref 5–15)
BUN SERPL-MCNC: 40 MG/DL (ref 6–20)
BUN/CREAT SERPL: 13 (ref 12–20)
CALCIUM SERPL-MCNC: 7.9 MG/DL (ref 8.5–10.1)
CHLORIDE SERPL-SCNC: 109 MMOL/L (ref 97–108)
CO2 SERPL-SCNC: 23 MMOL/L (ref 21–32)
CREAT SERPL-MCNC: 3.14 MG/DL (ref 0.7–1.3)
GLUCOSE BLD STRIP.AUTO-MCNC: 122 MG/DL (ref 65–117)
GLUCOSE BLD STRIP.AUTO-MCNC: 139 MG/DL (ref 65–117)
GLUCOSE BLD STRIP.AUTO-MCNC: 151 MG/DL (ref 65–117)
GLUCOSE BLD STRIP.AUTO-MCNC: 159 MG/DL (ref 65–117)
GLUCOSE BLD STRIP.AUTO-MCNC: 162 MG/DL (ref 65–117)
GLUCOSE SERPL-MCNC: 161 MG/DL (ref 65–100)
POTASSIUM SERPL-SCNC: 3.4 MMOL/L (ref 3.5–5.1)
SERVICE CMNT-IMP: ABNORMAL
SODIUM SERPL-SCNC: 140 MMOL/L (ref 136–145)

## 2021-06-02 PROCEDURE — 74011250637 HC RX REV CODE- 250/637: Performed by: NURSE PRACTITIONER

## 2021-06-02 PROCEDURE — 74011250637 HC RX REV CODE- 250/637: Performed by: STUDENT IN AN ORGANIZED HEALTH CARE EDUCATION/TRAINING PROGRAM

## 2021-06-02 PROCEDURE — 74011250636 HC RX REV CODE- 250/636: Performed by: INTERNAL MEDICINE

## 2021-06-02 PROCEDURE — 74011250637 HC RX REV CODE- 250/637: Performed by: INTERNAL MEDICINE

## 2021-06-02 PROCEDURE — 74011000250 HC RX REV CODE- 250: Performed by: NURSE PRACTITIONER

## 2021-06-02 PROCEDURE — 99232 SBSQ HOSP IP/OBS MODERATE 35: CPT | Performed by: INTERNAL MEDICINE

## 2021-06-02 PROCEDURE — 80048 BASIC METABOLIC PNL TOTAL CA: CPT

## 2021-06-02 PROCEDURE — 74011636637 HC RX REV CODE- 636/637: Performed by: INTERNAL MEDICINE

## 2021-06-02 PROCEDURE — 65270000029 HC RM PRIVATE

## 2021-06-02 PROCEDURE — 82962 GLUCOSE BLOOD TEST: CPT

## 2021-06-02 PROCEDURE — 94760 N-INVAS EAR/PLS OXIMETRY 1: CPT

## 2021-06-02 PROCEDURE — 99223 1ST HOSP IP/OBS HIGH 75: CPT | Performed by: INTERNAL MEDICINE

## 2021-06-02 PROCEDURE — 36415 COLL VENOUS BLD VENIPUNCTURE: CPT

## 2021-06-02 PROCEDURE — 74011636637 HC RX REV CODE- 636/637: Performed by: STUDENT IN AN ORGANIZED HEALTH CARE EDUCATION/TRAINING PROGRAM

## 2021-06-02 PROCEDURE — 77010033678 HC OXYGEN DAILY

## 2021-06-02 PROCEDURE — 74011250636 HC RX REV CODE- 250/636: Performed by: STUDENT IN AN ORGANIZED HEALTH CARE EDUCATION/TRAINING PROGRAM

## 2021-06-02 RX ORDER — HEPARIN SODIUM 5000 [USP'U]/ML
5000 INJECTION, SOLUTION INTRAVENOUS; SUBCUTANEOUS EVERY 8 HOURS
Status: DISCONTINUED | OUTPATIENT
Start: 2021-06-03 | End: 2021-06-04 | Stop reason: HOSPADM

## 2021-06-02 RX ORDER — AMLODIPINE BESYLATE 5 MG/1
5 TABLET ORAL ONCE
Status: COMPLETED | OUTPATIENT
Start: 2021-06-02 | End: 2021-06-02

## 2021-06-02 RX ORDER — BUPRENORPHINE AND NALOXONE 8; 2 MG/1; MG/1
1 FILM, SOLUBLE BUCCAL; SUBLINGUAL 2 TIMES DAILY
Status: DISCONTINUED | OUTPATIENT
Start: 2021-06-02 | End: 2021-06-04 | Stop reason: HOSPADM

## 2021-06-02 RX ORDER — POTASSIUM CHLORIDE 20 MEQ/1
40 TABLET, EXTENDED RELEASE ORAL
Status: COMPLETED | OUTPATIENT
Start: 2021-06-02 | End: 2021-06-02

## 2021-06-02 RX ORDER — AMLODIPINE BESYLATE 5 MG/1
10 TABLET ORAL DAILY
Status: DISCONTINUED | OUTPATIENT
Start: 2021-06-03 | End: 2021-06-04 | Stop reason: HOSPADM

## 2021-06-02 RX ORDER — CLONIDINE HYDROCHLORIDE 0.1 MG/1
0.2 TABLET ORAL 3 TIMES DAILY
Status: DISCONTINUED | OUTPATIENT
Start: 2021-06-02 | End: 2021-06-03

## 2021-06-02 RX ADMIN — ATORVASTATIN CALCIUM 20 MG: 20 TABLET, FILM COATED ORAL at 17:51

## 2021-06-02 RX ADMIN — ENOXAPARIN SODIUM 40 MG: 40 INJECTION SUBCUTANEOUS at 08:44

## 2021-06-02 RX ADMIN — ASPIRIN 81 MG: 81 TABLET, COATED ORAL at 08:44

## 2021-06-02 RX ADMIN — INSULIN LISPRO 2 UNITS: 100 INJECTION, SOLUTION INTRAVENOUS; SUBCUTANEOUS at 11:12

## 2021-06-02 RX ADMIN — BUMETANIDE 1 MG: 0.25 INJECTION INTRAMUSCULAR; INTRAVENOUS at 08:44

## 2021-06-02 RX ADMIN — BUMETANIDE 1 MG: 0.25 INJECTION INTRAMUSCULAR; INTRAVENOUS at 17:51

## 2021-06-02 RX ADMIN — BUPRENORPHINE AND NALOXONE 1 FILM: 8; 2 FILM BUCCAL; SUBLINGUAL at 18:37

## 2021-06-02 RX ADMIN — HYDRALAZINE HYDROCHLORIDE 10 MG: 20 INJECTION INTRAMUSCULAR; INTRAVENOUS at 11:46

## 2021-06-02 RX ADMIN — BUPRENORPHINE AND NALOXONE 2 FILM: 2; .5 FILM BUCCAL; SUBLINGUAL at 08:44

## 2021-06-02 RX ADMIN — PREGABALIN 100 MG: 100 CAPSULE ORAL at 18:07

## 2021-06-02 RX ADMIN — ISOSORBIDE MONONITRATE 60 MG: 30 TABLET, EXTENDED RELEASE ORAL at 08:43

## 2021-06-02 RX ADMIN — AMLODIPINE BESYLATE 5 MG: 5 TABLET ORAL at 15:03

## 2021-06-02 RX ADMIN — Medication 10 ML: at 21:17

## 2021-06-02 RX ADMIN — POTASSIUM CHLORIDE 40 MEQ: 20 TABLET, EXTENDED RELEASE ORAL at 11:11

## 2021-06-02 RX ADMIN — ALLOPURINOL 300 MG: 300 TABLET ORAL at 08:44

## 2021-06-02 RX ADMIN — AMLODIPINE BESYLATE 5 MG: 5 TABLET ORAL at 08:44

## 2021-06-02 RX ADMIN — INSULIN GLARGINE 10 UNITS: 100 INJECTION, SOLUTION SUBCUTANEOUS at 21:17

## 2021-06-02 RX ADMIN — CLONIDINE HYDROCHLORIDE 0.1 MG: 0.1 TABLET ORAL at 08:43

## 2021-06-02 RX ADMIN — Medication 10 ML: at 05:26

## 2021-06-02 RX ADMIN — Medication 10 ML: at 15:08

## 2021-06-02 RX ADMIN — CLONIDINE HYDROCHLORIDE 0.2 MG: 0.1 TABLET ORAL at 17:51

## 2021-06-02 RX ADMIN — CLONIDINE HYDROCHLORIDE 0.2 MG: 0.1 TABLET ORAL at 21:17

## 2021-06-02 RX ADMIN — PREGABALIN 100 MG: 100 CAPSULE ORAL at 08:44

## 2021-06-02 NOTE — PROGRESS NOTES
Re: Lovenox  to Heparin Sq ([employer] approved dose change has been made on this patient)    Indication: VTE prevention  Crcl: 28 ml/min  Wt = 85.6 kg  Ordered: Enoxaparin 40 mg sq Daily  [facility] ordered change: Heparin 5,000 units sq q 8h for Crcl < 30ml/min    Kristin Garcia Postal, [facility]

## 2021-06-02 NOTE — PROGRESS NOTES
Primary care for this patient was provided by Elva Martin LPN and supervised by claudette Fletcher RN. I agree with all assessment data charted and all nursing interventions conducted. I was present for all interventions and reassessments, appropriate nursing care provided.         Karolina Fletcher, RN

## 2021-06-02 NOTE — PROGRESS NOTES
Hospitalist Progress Note    NAME: Herbert Wisdom   :  1965   MRN:  [id number]       Assessment / Plan:  Acute on chronic combined systolic and diastolic heart failure  Substance abuse (cocaine)   Chronic kidney disease stage IV, nephrotic range proteinuria  Noncompliance with diuretic therapy and low-sodium diet  Hypertension  This is likely due to medication noncompliance  CXR showed increased moderately severe pulmonary edema. Repeat CXR in the AM  UDS remains pos for cocaine  Cont' IV bumex BID  Was on coreg, d/c'ed by cardiology, recommended avoiding all BBs  Pt not on ACEi due to CKD  Cont' imdur, amlodipine, titrate clonidine for better BP control  Strict I&Os, daily wt  Cardiology following  Pt counseled on substance abuse    Type 2 diabetes with nephropathy  Cont' lantus, SSI    Opiate dependence on Suboxone therapy  Has run out of his Suboxone for the last 3 days PTA. He reports PTA dose of suboxone is 8mg BID which is confirmed with his outpt [place] SABINA Sexton. Care management for assistance with setting up outpt appnt for suboxone prescription      Body mass index is 28.69 kg/m². Estimated discharge date:   Barriers: pt's condition    Code Status: Full code  Surrogate Decision Maker: Wife information on record   DVT Prophylaxis: Subcutaneous heparin  GI Prophylaxis: not indicated    Baseline: Independent     Subjective:     Chief Complaint / Reason for Physician Visit  Pt awake, NAD. Remains on O2 suppl. Discussed with RN events overnight. Review of Systems:  Symptom Y/N Comments  Symptom Y/N Comments   Fever/Chills    Chest Pain     Poor Appetite    Edema     Cough    Abdominal Pain     Sputum    Joint Pain     SOB/DE LA CRUZ    Pruritis/Rash     Nausea/vomit    Tolerating PT/OT     Diarrhea    Tolerating Diet     Constipation    Other       Could NOT obtain due to:      Objective:     VITALS:   Last 24hrs VS reviewed since prior progress note.  Most recent are:  Patient Vitals for the past 24 hrs:   Temp Pulse Resp BP SpO2   06/02/21 0813 98.4 °F (36.9 °C) 85 17 (!) 156/78 97 %   06/02/21 0800 -- 85 -- -- --   06/02/21 0357 98.3 °F (36.8 °C) 81 17 (!) 158/83 97 %   06/01/21 2353 98.3 °F (36.8 °C) 89 17 (!) 155/80 96 %   06/01/21 2033 97.7 °F (36.5 °C) 83 17 (!) 161/98 99 %   06/01/21 1820 -- -- -- (!) 159/89 --   06/01/21 1649 97.6 °F (36.4 °C) 85 15 (!) 175/104 100 %   06/01/21 1328 -- -- -- (!) 166/90 --   06/01/21 1228 97.2 °F (36.2 °C) 77 16 (!) 178/112 98 %       Intake/Output Summary (Last 24 hours) at 6/2/2021 0936  Last data filed at 6/2/2021 0539  Gross per 24 hour   Intake 720 ml   Output 850 ml   Net -130 ml        I had a face to face encounter and independently examined this patient on 6/2/2021, as outlined below:  PHYSICAL EXAM:  General: WD, WN. Alert, cooperative, no acute distress    EENT:  EOMI. Anicteric sclerae. MMM  Resp:  CTA bilaterally, no wheezing or rales. No accessory muscle use  CV:  Regular  rhythm,  ++edema  GI:  Soft, Non distended, Non tender. +Bowel sounds  Neurologic:  Alert and oriented X 3, normal speech,   Psych:   Good insight. Not anxious nor agitated  Skin:  No rashes. No jaundice    Reviewed most current lab test results and cultures  YES  Reviewed most current radiology test results   YES  Review and summation of old records today    NO  Reviewed patient's current orders and MAR    YES  PMH/SH reviewed - no change compared to H&P  ________________________________________________________________________  Care Plan discussed with:    Comments   Patient x    Family      RN x    Care Manager     Consultant                        Multidiciplinary team rounds were held today with , nursing, pharmacist and clinical coordinator. Patient's plan of care was discussed; medications were reviewed and discharge planning was addressed.      ________________________________________________________________________  Total NON critical care TIME:  35   Minutes    Total CRITICAL CARE TIME Spent:   Minutes non procedure based      Comments   >50% of visit spent in counseling and coordination of care     ________________________________________________________________________  Marcy Roth MD     Procedures: see electronic medical records for all procedures/Xrays and details which were not copied into this note but were reviewed prior to creation of Plan. LABS:  I reviewed today's most current labs and imaging studies.   Pertinent labs include:  Recent Labs     05/31/21  2325   WBC 9.1   HGB 8.8*   HCT 28.2*        Recent Labs     06/02/21  0855 05/31/21  2325    141   K 3.4* 3.6   * 112*   CO2 23 22   * 175*   BUN 40* 33*   CREA 3.14* 2.93*   CA 7.9* 7.6*   ALB  --  1.9*   TBILI  --  0.3   ALT  --  7*       Signed: Marcy Roth MD
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
## 2021-06-02 NOTE — PROGRESS NOTES
End of Shift Note    Bedside shift change report given to [facility] (oncoming nurse) by Shayan Dorantes RN (offgoing nurse). Report included the following information SBAR, Kardex, ED Summary, Intake/Output, MAR and Recent Results    Shift worked:  1010-0441     Shift summary and any significant changes:     Uneventful night. Denies SOB or chest pain     Concerns for physician to address:       Zone phone for oncoming shift:   [phone]       Activity:  Activity Level: Up with Assistance  Number times ambulated in hallways past shift: 0  Number of times OOB to chair past shift: all night    Cardiac:   Cardiac Monitoring: Yes           Access:   Current line(s): PIV     Genitourinary:   Urinary status: voiding    Respiratory:   O2 Device: Nasal cannula  Chronic home O2 use?: YES  Incentive spirometer at bedside: YES     GI:     Current diet:  DIET DIABETIC CONSISTENT CARB Regular; 2 GM NA (House Low NA); FR 800ML  Passing flatus: YES  Tolerating current diet: YES       Pain Management:   Patient states pain is manageable on current regimen: YES    Skin:  Sarwat Score: 20  Interventions: nutritional support     Patient Safety:  Fall Score:  Total Score: 1  Interventions: assistive device (walker, cane, etc), gripper socks, pt to call before getting OOB and stay with me (per policy)       Length of Stay:  Expected LOS: 3d 2h  Actual LOS: 315 Jean-Claude Mcmillan RN

## 2021-06-02 NOTE — PALLIATIVE CARE
Brief summary of visit, full note will be placed. We are called in for goals of care discussion  Met with the patient, who currently seems tired, not amenable to a detailed conversation, does not make eye contact, however has understanding of his medical situation and is able to make medical decisions. He denies being totally noncompliant with medications, however admits to missing medication here and there. His goal is to continue with full restorative care and continue with full CODE STATUS. He may consider completing a advanced directive, to appoint his girlfriend Ritchie Aragon as his medical POA. I did discuss with him that in the absence of medical POA his 3 children 2 daughters and son are legal next of kin to make medical decision on his behalf when and if he is unable to make his own decisions. Our team  Sheila Smith will follow up with the patient tomorrow. He tells me that he is on Suboxone therapy follows up with a Dr. Gael Richards, recently starting April 2021 he is on 8 mg of Suboxone twice daily. I have coordinated with Dr. Herminia Kulkarni to look into his home Suboxone dose.

## 2021-06-02 NOTE — PROGRESS NOTES
[address] [place] [address]  [phone]      Cardiology Progress Note      6/2/2021 11:16 AM    Admit Date: 5/31/2021    Admit Diagnosis:   Acute on chronic systolic heart failure (HCC) [I50.23]    Subjective:     Teofilo Agee is sitting up in the chair this morning, reports improvement in shortness of breath. He states \"I still need this oxygen though. \" \"I was trying to hold off in coming in because I knew I was going to see my heart doctor on Tuesday, but this shortness of breath had been getting worse over a week and I had to call and come in, I couldn't breath\". He denies any chest pain, discomfort, or other cardiac complaints. VSS, remains hypertensive 332'F systolic. Cr 3.14, K 3.4 (replaced)  I/O incomplete.  Weight down roughly 3 kg    Visit Vitals  BP (!) 167/84   Pulse 85   Temp 98.3 °F (36.8 °C)   Resp 18   Ht 5' 8\" (1.727 m)   Wt 85.6 kg (188 lb 11.4 oz)   SpO2 96%   BMI 28.69 kg/m²       Current Facility-Administered Medications   Medication Dose Route Frequency    cloNIDine HCL (CATAPRES) tablet 0.2 mg  0.2 mg Oral TID    albuterol-ipratropium (DUO-NEB) 2.5 MG-0.5 MG/3 ML  3 mL Nebulization Q6H PRN    allopurinoL (ZYLOPRIM) tablet 300 mg  300 mg Oral DAILY    aspirin delayed-release tablet 81 mg  81 mg Oral DAILY    atorvastatin (LIPITOR) tablet 20 mg  20 mg Oral QPM    isosorbide mononitrate ER (IMDUR) tablet 60 mg  60 mg Oral DAILY    pregabalin (LYRICA) capsule 100 mg  100 mg Oral BID    sodium chloride (NS) flush 5-40 mL  5-40 mL IntraVENous Q8H    sodium chloride (NS) flush 5-40 mL  5-40 mL IntraVENous PRN    acetaminophen (TYLENOL) tablet 650 mg  650 mg Oral Q6H PRN    Or    acetaminophen (TYLENOL) suppository 650 mg  650 mg Rectal Q6H PRN    polyethylene glycol (MIRALAX) packet 17 g  17 g Oral DAILY PRN    promethazine (PHENERGAN) tablet 12.5 mg  12.5 mg Oral Q6H PRN    Or    ondansetron (ZOFRAN) injection 4 mg  4 mg IntraVENous Q6H PRN    enoxaparin (LOVENOX) injection 40 mg  40 mg SubCUTAneous DAILY    buprenorphine-naloxone (SUBOXONE) 2mg-0.5mg SL film  2 Film SubLINGual BID    glucose chewable tablet 16 g  4 Tablet Oral PRN    dextrose (D50W) injection syrg 12.5-25 g  25-50 mL IntraVENous PRN    glucagon (GLUCAGEN) injection 1 mg  1 mg IntraMUSCular PRN    insulin lispro (HUMALOG) injection   SubCUTAneous AC&HS    insulin glargine (LANTUS) injection 10 Units  10 Units SubCUTAneous QHS    hydrALAZINE (APRESOLINE) 20 mg/mL injection 10 mg  10 mg IntraVENous Q6H PRN    bumetanide (BUMEX) injection 1 mg  1 mg IntraVENous BID    amLODIPine (NORVASC) tablet 5 mg  5 mg Oral DAILY       Objective:      Physical Exam:  General Appearance:  alert, cooperative, well nourished, well developed; adult AAM sitting in chair; appears stated age  Eyes: sclera anicteric  Mouth/Throat: moist mucous membranes; oral pharynx clear  Neck: supple; + JVD   Pulmonary:  Dim bilaterally with scattered crackles; good effort  Cardiovascular: regular rate and rhythm; no murmur, click, rub, or gallop  Abdomen: soft, non-tender, non-distended; bowel sounds normal  Musculoskeletal: no swelling or deformity; moves all extremities  Extremities: 1+ edema BLE L>R; palpable distal pulses  Skin: warm and dry, thick skin on legs, leathery  Neuro: grossly normal  Psych: normal mood and affect given the setting    Data Review:   Recent Labs     05/31/21  2325   WBC 9.1   HGB 8.8*   HCT 28.2*        Recent Labs     06/02/21  0855 05/31/21  2325    141   K 3.4* 3.6   * 112*   CO2 23 22   * 175*   BUN 40* 33*   CREA 3.14* 2.93*   CA 7.9* 7.6*   ALB  --  1.9*   TBILI  --  0.3   ALT  --  7*       Recent Labs     06/01/21  1141   TROIQ <0.05         Intake/Output Summary (Last 24 hours) at 6/2/2021 1116  Last data filed at 6/2/2021 0912  Gross per 24 hour   Intake 960 ml   Output 850 ml   Net 110 ml      Cardiographics     Telemetry: NSR   ECG: NSR;  Diffuse ST/T wave changes consistent with prior EKG  Echocardiogram:   · LV: Estimated LVEF is 35 - 40%. Normal cavity size. Moderate concentric hypertrophy. Moderately reduced systolic function. · TV: Mild tricuspid valve regurgitation is present. · PA: Mild pulmonary hypertension. Pulmonary arterial systolic pressure is 46 mmHg. CXRAY: \"Increased moderately severe pulmonary edema. \"    Assessment:     Active Problems:    Substance abuse ([facility] (2/21/2020)      HTN (hypertension) (2/28/2020)      CKD (chronic kidney disease) (3/8/2021)      Acute on chronic systolic heart failure ([facility] (6/1/2021)        Plan:   Bushra Cannon is a 55 y.o. male admitted with Acute on chronic systolic heart failure ([facility] [I50.23] CXR with pulmonary edema. UDS + cocaine. Hg 8.8; creat back up to 3.14. EKG similar to prior. Hypertensive. Not fully medication compliant per patient report. Troponin negative. NT-pro BNP almost 25K. · Acute on chronic systolic HF. Recent EF 35-40%. Likely 2/2 medication noncompliance and drug abuse. Reinforced importance of medication adherence and daily weights at home. NT-pro BNP as above. Patient hypoxic without nasal cannula. Stated he bought oxygen from a neighbor woman \"who's  used to use oxygen\". May benefit from 6 minute walk test. Also consult  for Medicaid assistance. The patient reports concerns? · Needs continued diuresis. Continue Bumex 1 mg IV BID. Strict I/O, labs, daily weights. · No  BB with + cocaine. Not on ACEi/ARB/ARNI with CKD  · HTN improved. Patient would prefer daily only medications, but limited with his CKD. Continue clonidine and imdur and amlodipine, will increase to 10 mg today. · Recent low risk stress test.  Due to patient's non compliance, he would be a high risk for cardiac catheterization. Would recommend medical mangement of CAD risk unless emergent need. ASA and statin. · Needs continued discussions on drug abuse cessation. Back on suboxone.    FORREST Landry  [facility] Card[facility]    6/2/2021         Patient seen, examined by me personally. Plan discussed as detailed. Agree with note as outlined by  NP with modifications as noted. My independent physical exam reveals : Physical Exam  Vitals and nursing note reviewed. Cardiovascular:      Rate and Rhythm: Normal rate and regular rhythm. Heart sounds: Murmur heard. Pulmonary:      Effort: Pulmonary effort is normal.      Breath sounds: Normal breath sounds. Musculoskeletal:      Cervical back: Neck supple. Right lower leg: No edema. Left lower leg: Edema present. Skin:     General: Skin is warm and dry. Neurological:      Mental Status: He is alert and oriented to person, place, and time. Psychiatric:         Mood and Affect: Mood normal.         Behavior: Behavior normal.          No additional findings noted. Continue diuresis. Discussed compliance. Agree with plan as outlined above with modifications as noted.      Juve Cohen MD

## 2021-06-02 NOTE — PROGRESS NOTES
Problem: Risk for Spread of Infection  Goal: Prevent transmission of infectious organism to others  Description: Prevent the transmission of infectious organisms to other patients, staff members, and visitors. 6/2/2021 1835 by Mary Ellen SCHULTZ  Outcome: Progressing Towards Goal  6/2/2021 1511 by Fredna Roll  Outcome: Progressing Towards Goal     Problem: Patient Education:  Go to Education Activity  Goal: Patient/Family Education  6/2/2021 1835 by Mary Ellen SCHULTZ  Outcome: Progressing Towards Goal  6/2/2021 1511 by Fredna Roll  Outcome: Progressing Towards Goal     Problem: Falls - Risk of  Goal: *Absence of Falls  Description: Document Monteiro Randall Fall Risk and appropriate interventions in the flowsheet.   6/2/2021 1835 by Fredna Roll  Outcome: Progressing Towards Goal  Note: Fall Risk Interventions:            Medication Interventions: Patient to call before getting OOB         History of Falls Interventions: Door open when patient unattended      6/2/2021 1511 by Fredna Roll  Outcome: Progressing Towards Goal  Note: Fall Risk Interventions:            Medication Interventions: Patient to call before getting OOB, Teach patient to arise slowly                   Problem: Patient Education: Go to Patient Education Activity  Goal: Patient/Family Education  6/2/2021 [address] by Mary Ellen SCHULTZ  Outcome: Progressing Towards Goal  6/2/2021 1511 by Fredna Roll  Outcome: Progressing Towards Goal

## 2021-06-02 NOTE — PROGRESS NOTES
Reason for Admission:   Acute on chronic combined systolic and diastolic heart failure               RUR Score: 45%          PCP: First and Last name:  Agustin Osgood, MD     Name of Practice:   Are you a current patient: Yes/No: yes   Approximate date of last visit: Feb 2021   Can you do a virtual visit with your PCP: yes             Resources/supports as identified by patient/family:   fiance                Top Challenges facing patient (as identified by patient/family and CM): Finances/Medication cost?  No issues identified                  Transportation? No issues identified              Support system or lack thereof? Living arrangements? Self-care/ADLs/Cognition? Independent          Current Advanced Directive/Advance Care Plan:  Full Code Advance Care Planning     General Advance Care Planning (ACP) Conversation      Date of Conversation: 6/2/21  Conducted with: Patient with Decision Making Capacity    Healthcare Decision Maker:     Click here to complete [email or web address] including selection of the [email or web address] Relationship (ie \"Primary\")  Today we discussed Healthcare Decision Makers. The patient is considering options. Content/Action Overview:   Has NO ACP documents/care preferences - information provided, considering goals and options    Length of Voluntary ACP Conversation in minutes:  16 minutes    Quynh Hayes           Payor Source Payor: MEDICAID [place] / Plan: [facility] [address]  / Product Type: Medicaid /                             Plan for utilizing home health:  Not anticipated                   Transition of Care Plan: CM met with pt to complete assessment. Prior to admission, pt was independent w/ADL/IADL to include driving. Patient denies past HH/Rehab. Patient was IP at [facility] for a couple of weeks in April 2021. DME use includes, walker & cane.   CM will contact pt's Nba MUHAMMAD, [phone] /[facility], regarding pt's Suboxone. No other needs or concerns identified at this time. CM will continue to follow. PCP-   Digna Dominguez MD  Ph[facility] Management Interventions  PCP Verified by CM: Yes (Digna Dominguez MD)  Mode of Transport at Discharge: Other (see comment) (shaji, Sage Zelaya)  Transition of Care Consult (CM Consult): Discharge Planning  Discharge Durable Medical Equipment: No (walker, cane)  Physical Therapy Consult: No  Occupational Therapy Consult: No  Speech Therapy Consult: No  Current Support Network: Other (Lives w/Sage girard in a one story home w/3 SHAW)  Confirm Follow Up Transport: Self  Discharge Location  Discharge Placement:  (Anticipate home w/finace)      Valdez Ontiveros  Ext [phone]    Transition of Care Plan:    RUR:45%  Disposition:Home w/finace  Follow up appointments:  DME needed:n/a  Transportation at [address] [place] [address] or means to access home:        IM Medicare letter:n/a  [address] [phone]  Discharge Caregiver contacted prior to discharge?    [address], [facility], Patricia Childers, [phone]

## 2021-06-02 NOTE — PROGRESS NOTES
Problem: Risk for Spread of Infection  Goal: Prevent transmission of infectious organism to others  Description: Prevent the transmission of infectious organisms to other patients, staff members, and visitors. Outcome: Progressing Towards Goal     Problem: Patient Education:  Go to Education Activity  Goal: Patient/Family Education  Outcome: Progressing Towards Goal     Problem: Falls - Risk of  Goal: *Absence of Falls  Description: Document Vutori Zhangin Fall Risk and appropriate interventions in the flowsheet.   Outcome: Progressing Towards Goal  Note: Fall Risk Interventions:            Medication Interventions: Patient to call before getting OOB, Teach patient to arise slowly                   Problem: Patient Education: Go to Patient Education Activity  Goal: Patient/Family Education  Outcome: Progressing Towards Goal

## 2021-06-03 ENCOUNTER — APPOINTMENT (OUTPATIENT)
Dept: GENERAL RADIOLOGY | Age: 56
DRG: 194 | End: 2021-06-03
Attending: INTERNAL MEDICINE
Payer: MEDICAID

## 2021-06-03 LAB
ANION GAP SERPL CALC-SCNC: 6 MMOL/L (ref 5–15)
BUN SERPL-MCNC: 45 MG/DL (ref 6–20)
BUN/CREAT SERPL: 14 (ref 12–20)
CALCIUM SERPL-MCNC: 8.3 MG/DL (ref 8.5–10.1)
CHLORIDE SERPL-SCNC: 109 MMOL/L (ref 97–108)
CO2 SERPL-SCNC: 24 MMOL/L (ref 21–32)
CREAT SERPL-MCNC: 3.19 MG/DL (ref 0.7–1.3)
GLUCOSE BLD STRIP.AUTO-MCNC: 100 MG/DL (ref 65–117)
GLUCOSE BLD STRIP.AUTO-MCNC: 215 MG/DL (ref 65–117)
GLUCOSE BLD STRIP.AUTO-MCNC: 219 MG/DL (ref 65–117)
GLUCOSE BLD STRIP.AUTO-MCNC: 77 MG/DL (ref 65–117)
GLUCOSE SERPL-MCNC: 78 MG/DL (ref 65–100)
POTASSIUM SERPL-SCNC: 3.6 MMOL/L (ref 3.5–5.1)
SERVICE CMNT-IMP: ABNORMAL
SERVICE CMNT-IMP: ABNORMAL
SERVICE CMNT-IMP: NORMAL
SERVICE CMNT-IMP: NORMAL
SODIUM SERPL-SCNC: 139 MMOL/L (ref 136–145)

## 2021-06-03 PROCEDURE — 71045 X-RAY EXAM CHEST 1 VIEW: CPT

## 2021-06-03 PROCEDURE — 80048 BASIC METABOLIC PNL TOTAL CA: CPT

## 2021-06-03 PROCEDURE — 74011250637 HC RX REV CODE- 250/637: Performed by: STUDENT IN AN ORGANIZED HEALTH CARE EDUCATION/TRAINING PROGRAM

## 2021-06-03 PROCEDURE — 74011250637 HC RX REV CODE- 250/637: Performed by: INTERNAL MEDICINE

## 2021-06-03 PROCEDURE — 74011636637 HC RX REV CODE- 636/637: Performed by: STUDENT IN AN ORGANIZED HEALTH CARE EDUCATION/TRAINING PROGRAM

## 2021-06-03 PROCEDURE — 74011000250 HC RX REV CODE- 250: Performed by: NURSE PRACTITIONER

## 2021-06-03 PROCEDURE — 74011636637 HC RX REV CODE- 636/637: Performed by: INTERNAL MEDICINE

## 2021-06-03 PROCEDURE — 99232 SBSQ HOSP IP/OBS MODERATE 35: CPT | Performed by: INTERNAL MEDICINE

## 2021-06-03 PROCEDURE — 36415 COLL VENOUS BLD VENIPUNCTURE: CPT

## 2021-06-03 PROCEDURE — 74011250636 HC RX REV CODE- 250/636: Performed by: INTERNAL MEDICINE

## 2021-06-03 PROCEDURE — 82962 GLUCOSE BLOOD TEST: CPT

## 2021-06-03 PROCEDURE — 94760 N-INVAS EAR/PLS OXIMETRY 1: CPT

## 2021-06-03 PROCEDURE — 74011250637 HC RX REV CODE- 250/637: Performed by: NURSE PRACTITIONER

## 2021-06-03 PROCEDURE — 65270000029 HC RM PRIVATE

## 2021-06-03 RX ORDER — POTASSIUM CHLORIDE 20 MEQ/1
20 TABLET, EXTENDED RELEASE ORAL
Status: COMPLETED | OUTPATIENT
Start: 2021-06-03 | End: 2021-06-03

## 2021-06-03 RX ORDER — CLONIDINE HYDROCHLORIDE 0.1 MG/1
0.3 TABLET ORAL 3 TIMES DAILY
Status: DISCONTINUED | OUTPATIENT
Start: 2021-06-03 | End: 2021-06-04 | Stop reason: HOSPADM

## 2021-06-03 RX ADMIN — BUMETANIDE 1 MG: 0.25 INJECTION INTRAMUSCULAR; INTRAVENOUS at 17:13

## 2021-06-03 RX ADMIN — INSULIN GLARGINE 10 UNITS: 100 INJECTION, SOLUTION SUBCUTANEOUS at 22:34

## 2021-06-03 RX ADMIN — HEPARIN SODIUM 5000 UNITS: 5000 INJECTION INTRAVENOUS; SUBCUTANEOUS at 16:07

## 2021-06-03 RX ADMIN — Medication 10 ML: at 06:15

## 2021-06-03 RX ADMIN — ASPIRIN 81 MG: 81 TABLET, COATED ORAL at 11:24

## 2021-06-03 RX ADMIN — POTASSIUM CHLORIDE 20 MEQ: 20 TABLET, EXTENDED RELEASE ORAL at 11:33

## 2021-06-03 RX ADMIN — AMLODIPINE BESYLATE 10 MG: 5 TABLET ORAL at 11:23

## 2021-06-03 RX ADMIN — ALLOPURINOL 300 MG: 300 TABLET ORAL at 11:24

## 2021-06-03 RX ADMIN — PREGABALIN 100 MG: 100 CAPSULE ORAL at 17:13

## 2021-06-03 RX ADMIN — BUPRENORPHINE AND NALOXONE 1 FILM: 8; 2 FILM BUCCAL; SUBLINGUAL at 11:24

## 2021-06-03 RX ADMIN — BUMETANIDE 1 MG: 0.25 INJECTION INTRAMUSCULAR; INTRAVENOUS at 11:23

## 2021-06-03 RX ADMIN — CLONIDINE HYDROCHLORIDE 0.3 MG: 0.1 TABLET ORAL at 22:33

## 2021-06-03 RX ADMIN — HEPARIN SODIUM 5000 UNITS: 5000 INJECTION INTRAVENOUS; SUBCUTANEOUS at 06:14

## 2021-06-03 RX ADMIN — Medication 10 ML: at 22:37

## 2021-06-03 RX ADMIN — CLONIDINE HYDROCHLORIDE 0.3 MG: 0.1 TABLET ORAL at 16:07

## 2021-06-03 RX ADMIN — INSULIN LISPRO 3 UNITS: 100 INJECTION, SOLUTION INTRAVENOUS; SUBCUTANEOUS at 17:13

## 2021-06-03 RX ADMIN — BUPRENORPHINE AND NALOXONE 1 FILM: 8; 2 FILM BUCCAL; SUBLINGUAL at 17:13

## 2021-06-03 RX ADMIN — ATORVASTATIN CALCIUM 20 MG: 20 TABLET, FILM COATED ORAL at 17:13

## 2021-06-03 RX ADMIN — PREGABALIN 100 MG: 100 CAPSULE ORAL at 11:23

## 2021-06-03 RX ADMIN — Medication 10 ML: at 14:00

## 2021-06-03 RX ADMIN — ISOSORBIDE MONONITRATE 60 MG: 30 TABLET, EXTENDED RELEASE ORAL at 11:23

## 2021-06-03 RX ADMIN — INSULIN LISPRO 2 UNITS: 100 INJECTION, SOLUTION INTRAVENOUS; SUBCUTANEOUS at 22:33

## 2021-06-03 RX ADMIN — HEPARIN SODIUM 5000 UNITS: 5000 INJECTION INTRAVENOUS; SUBCUTANEOUS at 22:33

## 2021-06-03 NOTE — PROGRESS NOTES
Problem: Risk for Spread of Infection  Goal: Prevent transmission of infectious organism to others  Description: Prevent the transmission of infectious organisms to other patients, staff members, and visitors. Outcome: Progressing Towards Goal     Problem: Patient Education:  Go to Education Activity  Goal: Patient/Family Education  Outcome: Progressing Towards Goal     Problem: Falls - Risk of  Goal: *Absence of Falls  Description: Document Diego Bertrand Fall Risk and appropriate interventions in the flowsheet.   Outcome: Progressing Towards Goal  Note: Fall Risk Interventions:            Medication Interventions: Bed/chair exit alarm         History of Falls Interventions: Door open when patient unattended         Problem: Patient Education: Go to Patient Education Activity  Goal: Patient/Family Education  Outcome: Progressing Towards Goal

## 2021-06-03 NOTE — PROGRESS NOTES
End of Shift Note    Bedside shift change report given to [place] (oncoming nurse) by Michael Mcelroy RN (offgoing nurse). Report included the following information SBAR, Kardex, Procedure Summary, Intake/Output, MAR and Accordion    Shift worked:  7a-7p     Shift summary and any significant changes:     patient rested in his room throughout the shift. No complaints of pain. No significant changes. Concerns for physician to address:  none     Zone phone for oncoming shift:   [phone]       Activity:  Activity Level: Up ad melly  Number times ambulated in hallways past shift: 1  Number of times OOB to chair past shift: 2    Cardiac:   Cardiac Monitoring: Yes      Cardiac Rhythm: Sinus Rhythm    Access:   Current line(s): PIV     Genitourinary:   Urinary status: voiding    Respiratory:   O2 Device: None (Room air)  Chronic home O2 use?: NO  Incentive spirometer at bedside: YES  Actual Volume (ml): 1000 ml  GI:  Last Bowel Movement Date: 05/31/21  Current diet:  DIET DIABETIC CONSISTENT CARB Regular; 2 GM NA (House Low NA); FR 800ML  DIET ONE TIME MESSAGE  DIET ONE TIME MESSAGE  DIET ONE TIME MESSAGE  DIET ONE TIME MESSAGE  Passing flatus: YES  Tolerating current diet: YES       Pain Management:   Patient states pain is manageable on current regimen: YES    Skin:  Sarwat Score: 22  Interventions: increase time out of bed, limit briefs and nutritional support     Patient Safety:  Fall Score:  Total Score: 1  Interventions: assistive device (walker, cane, etc), gripper socks, pt to call before getting OOB and stay with me (per policy)       Length of Stay:  Expected LOS: 3d 2h  Actual LOS: 2      Michael Mcelroy RN

## 2021-06-03 NOTE — ACP (ADVANCE CARE PLANNING)
Primary Decision Maker: Opal Harris - Other Relative - [phone]    Secondary Decision Maker: Sarah Bro - Daughter - [phone]  Advance Care Planning 6/3/2021   Patient's Healthcare Decision Maker is: Named in scanned ACP document   Confirm Advance Directive Yes, on file   Patient Would Like to Complete Advance Directive -       Patient was seen by Dr Shantell Emanuel the previous day, LCSW saw patient today to discuss Advance Medical Directive and patient's wishes for End of Life. Patient was alert, cognitively aware, sitting up in his chair in room, awaiting lunch. He was open to talking to this writer and took time to understand, discuss and voice his wishes at end of life. Explained at length what the medical health care portion of the AMD refers to. Patient named the above two family members as his medical decision makers. He has other children, when asked, shared that they do talk to him and with Lawrence Mendez. Patient does not want life prolonging measures at end of life, when he is dying. As he noted,\"I don't want to be a vegetable,if they feel there is nothing else they can do then just let me die\". He would not want life prolonging measures if he is unresponsive with little to no chance of recovery. Patient is not an organ donor. Patient discussed with Dr Shantell Emanuel yesterday that he wishes to be Full Code. Patient is a retired [employer]. 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 He developed sciatica in his back and could no longer cut hair. Patient has a history of cocaine abuse. Copy of AMD placed in hard chart, gave patient original and two copies to give to his [facility].

## 2021-06-03 NOTE — PROGRESS NOTES
[address]  [phone]      Cardiology Progress Note      6/3/2021 1015 AM    Admit Date: 5/31/2021    Admit Diagnosis:   Acute on chronic systolic heart failure (HCC) [I50.23]    Subjective:     Maeve Mayes is walking around in his room, off oxygen, reports \"I am breathing just fine right now\". VSS, BP has improved   Cr 3.19, K 3.6 (replaced)  I/O incomplete. Weight down roughly 10 lbs.      Visit Vitals  BP (!) 169/100   Pulse 82   Temp 98.5 °F (36.9 °C)   Resp 16   Ht 5' 8\" (1.727 m)   Wt 84 kg (185 lb 3 oz)   SpO2 98%   BMI 28.16 kg/m²       Current Facility-Administered Medications   Medication Dose Route Frequency    cloNIDine HCL (CATAPRES) tablet 0.2 mg  0.2 mg Oral TID    amLODIPine (NORVASC) tablet 10 mg  10 mg Oral DAILY    heparin (porcine) injection 5,000 Units  5,000 Units SubCUTAneous Q8H    buprenorphine-naloxone (SUBOXONE) 8mg-2mg SL film  1 Film SubLINGual BID    albuterol-ipratropium (DUO-NEB) 2.5 MG-0.5 MG/3 ML  3 mL Nebulization Q6H PRN    allopurinoL (ZYLOPRIM) tablet 300 mg  300 mg Oral DAILY    aspirin delayed-release tablet 81 mg  81 mg Oral DAILY    atorvastatin (LIPITOR) tablet 20 mg  20 mg Oral QPM    isosorbide mononitrate ER (IMDUR) tablet 60 mg  60 mg Oral DAILY    pregabalin (LYRICA) capsule 100 mg  100 mg Oral BID    sodium chloride (NS) flush 5-40 mL  5-40 mL IntraVENous Q8H    sodium chloride (NS) flush 5-40 mL  5-40 mL IntraVENous PRN    acetaminophen (TYLENOL) tablet 650 mg  650 mg Oral Q6H PRN    Or    acetaminophen (TYLENOL) suppository 650 mg  650 mg Rectal Q6H PRN    polyethylene glycol (MIRALAX) packet 17 g  17 g Oral DAILY PRN    promethazine (PHENERGAN) tablet 12.5 mg  12.5 mg Oral Q6H PRN    Or    ondansetron (ZOFRAN) injection 4 mg  4 mg IntraVENous Q6H PRN    glucose chewable tablet 16 g  4 Tablet Oral PRN    dextrose (D50W) injection syrg 12.5-25 g  25-50 mL IntraVENous PRN    glucagon (GLUCAGEN) injection 1 mg  1 mg IntraMUSCular PRN    insulin lispro (HUMALOG) injection   SubCUTAneous AC&HS    insulin glargine (LANTUS) injection 10 Units  10 Units SubCUTAneous QHS    hydrALAZINE (APRESOLINE) 20 mg/mL injection 10 mg  10 mg IntraVENous Q6H PRN    bumetanide (BUMEX) injection 1 mg  1 mg IntraVENous BID       Objective:      Physical Exam:  General Appearance:  alert, cooperative, well nourished, well developed; adult AAM ambulating in room; appears stated age  Eyes: sclera anicteric  Mouth/Throat: moist mucous membranes; oral pharynx clear  Neck: supple; no JVD  Pulmonary:  Diminished bilaterally; good effort  Cardiovascular: regular rate and rhythm; no murmur, click, rub, or gallop  Abdomen: soft, non-tender, non-distended; bowel sounds normal  Musculoskeletal: no swelling or deformity; moves all extremities  Extremities: trace edema BLE L>R; palpable distal pulses, amputation left toes   Skin: warm and dry, thick skin on legs, leathery  Neuro: grossly normal  Psych: normal mood and affect given the setting    Data Review:   Recent Labs     05/31/21  2325   WBC 9.1   HGB 8.8*   HCT 28.2*        Recent Labs     06/03/21  0357 06/02/21  0855 05/31/21  2325    140 141   K 3.6 3.4* 3.6   * 109* 112*   CO2 24 23 22   GLU 78 161* 175*   BUN 45* 40* 33*   CREA 3.19* 3.14* 2.93*   CA 8.3* 7.9* 7.6*   ALB  --   --  1.9*   TBILI  --   --  0.3   ALT  --   --  7*       Recent Labs     06/01/21  1141   TROIQ <0.05         Intake/Output Summary (Last 24 hours) at 6/3/2021 1037  Last data filed at 6/3/2021 0329  Gross per 24 hour   Intake 900 ml   Output --   Net 900 ml      Cardiographics     Telemetry: NSR   ECG: NSR;  Diffuse ST/T wave changes consistent with prior EKG  Echocardiogram:   · LV: Estimated LVEF is 35 - 40%. Normal cavity size. Moderate concentric hypertrophy. Moderately reduced systolic function. · TV: Mild tricuspid valve regurgitation is present. · PA:  Mild pulmonary hypertension. Pulmonary arterial systolic pressure is 46 mmHg. CXRAY: \"partial improvement of pulmonary edema. \"    Assessment:     Active Problems:    Substance abuse ([facility] (2/21/2020)      HTN (hypertension) (2/28/2020)      CKD (chronic kidney disease) (3/8/2021)      Acute on chronic systolic heart failure ([facility] (6/1/2021)        Plan:   Lakisha Ojeda is a 55 y.o. male admitted with Acute on chronic systolic heart failure ([facility] [I50.23] CXR with pulmonary edema. UDS + cocaine. Hg 8.8; creat back up to 3.14. EKG similar to prior. Hypertensive. Not fully medication compliant per patient report. Troponin negative. NT-pro BNP almost 25K. · Acute on chronic systolic HF. Recent EF 35-40%. Likely 2/2 medication noncompliance and drug abuse. Reinforced importance of medication adherence and daily weights at home. NT-pro BNP as above. Patient on RA, SAT's upper 90's, no DE LA CRUZ. O2 challenge p/t discharge  · Needs continued diuresis. Continue Bumex 1 mg IV BID. Strict I/O, labs, daily weights. · No  BB with + cocaine. Not on ACEi/ARB/ARNI with CKD  · HTN much improved. Patient would prefer daily only medications, but limited with his CKD. Continue clonidine at increased dose of 0.3 mg TID and imdur and amlodipine at increased dose of 10 mg.    · Recent low risk stress test.  Due to patient's non compliance, he would be a high risk for cardiac catheterization. Would recommend medical mangement of CAD risk unless emergent need. ASA and statin. · Needs continued discussions on drug abuse cessation. Back on suboxone. · Case management following for medication assitance     Micheal Wasserman [id number],HF  [facility]    6/3/2021         Patient seen, examined by me personally. Plan discussed as detailed. Agree with note as outlined by  NP with modifications as noted. My independent physical exam reveals : Physical Exam  Vitals and nursing note reviewed.    Cardiovascular:      Rate and Rhythm: Normal rate and regular rhythm. Heart sounds: Normal heart sounds. Pulmonary:      Effort: Pulmonary effort is normal.      Breath sounds: Normal breath sounds. Musculoskeletal:         General: No swelling. Neurological:      Mental Status: He is alert and oriented to person, place, and time. Psychiatric:         Mood and Affect: Mood normal.         Behavior: Behavior normal.          No additional findings noted. Agree with plan as outlined above with modifications as noted.      Peter Gtz MD

## 2021-06-03 NOTE — PROGRESS NOTES
End of Shift Note    Bedside shift change report given to [address] [place] [address] (oncoming nurse) by Celeste Goldberg LPN(offgoing nurse). Report included the following information SBAR, Kardex, Intake/Output, MAR and Recent Results    Shift worked:    0974-5729     Shift summary and any significant changes:    No c/o of pain or discomfort noted. Patient was up in chair for most of the shift. Blood Sugar at HS was 162 no extra coverage given. Concerns for physician to address:      Zone phone for oncoming shift:   [phone]         Activity:  Activity Level: Up ad melly  Number times ambulated in hallways past shift: 0  Number of times OOB to chair past shift: 1    Cardiac:   Cardiac Monitoring: Yes      Cardiac Rhythm: Sinus Rhythm    Access:   Current line(s): PIV     Genitourinary:   Urinary status: voiding    Respiratory:   O2 Device: None (Room air)  Chronic home O2 use?: NO  Incentive spirometer at bedside: YES  Actual Volume (ml): 1000 ml  GI:  Last Bowel Movement Date: 05/31/21  Current diet:  DIET DIABETIC CONSISTENT CARB Regular; 2 GM NA (House Low NA); FR 800ML  DIET ONE TIME MESSAGE  Passing flatus: YES  Tolerating current diet: YES       Pain Management:   Patient states pain is manageable on current regimen: YES    Skin:  Sarwat Score: 22  Interventions: increase time out of bed and nutritional support     Patient Safety:  Fall Score:  Total Score: 1  Interventions: bed/chair alarm and gripper socks       Length of Stay:  Expected LOS: 3d 2h  Actual LOS: [address] LPN

## 2021-06-03 NOTE — PROGRESS NOTES
Primary care for this patient was provided by Marissa Eddy RN and supervised by claudette Trujillo RN. I agree with all assessment data charted and all nursing interventions conducted. I was present for all interventions and reassessments, appropriate nursing care provided.         Didier Trujillo RN

## 2021-06-03 NOTE — PROGRESS NOTES
Problem: Falls - Risk of  Goal: *Absence of Falls  Description: Document Temo Garcia Fall Risk and appropriate interventions in the flowsheet.   Outcome: Progressing Towards Goal  Note: Fall Risk Interventions:            Medication Interventions: Bed/chair exit alarm         History of Falls Interventions: Bed/chair exit alarm

## 2021-06-03 NOTE — PROGRESS NOTES
Hospitalist Progress Note    NAME: Herbert Wisdom   :  1965   MRN:  [id number]       Assessment / Plan:  Acute on chronic combined systolic and diastolic heart failure  Substance abuse (cocaine)   Chronic kidney disease stage IV, nephrotic range proteinuria  Noncompliance with diuretic therapy and low-sodium diet  Hypertension  This is likely due to medication noncompliance  CXR showed increased moderately severe pulmonary edema. Repeat CXR in the AM  UDS remains pos for cocaine  Cont' IV bumex BID  Was on coreg, d/c'ed by cardiology, recommended avoiding all BBs  Pt not on ACEi due to CKD  Cont' imdur, amlodipine, increase clonidine for better BP control  Strict I&Os, daily wt  Cardiology following  Pt counseled on substance abuse  Ambulate, O2 challenge prior to discharge, hopefully tomorrow    Type 2 diabetes with nephropathy  Cont' lantus, SSI    Opiate dependence on Suboxone therapy  Has run out of his Suboxone for the last 3 days PTA. He reports PTA dose of suboxone is 8mg BID which is confirmed with his outpt Niranjan Sexton CM. Care management for assistance with setting up outpt appnt for suboxone prescription      Body mass index is 28.16 kg/m². Estimated discharge date:   Barriers: pt's condition    Code Status: Full code  Surrogate Decision Maker: Wife information on record   DVT Prophylaxis: Subcutaneous heparin  GI Prophylaxis: not indicated    Baseline: Independent     Subjective:     Chief Complaint / Reason for Physician Visit  Pt awake, NAD. Stable on RA. Discussed with RN events overnight.      Review of Systems:  Symptom Y/N Comments  Symptom Y/N Comments   Fever/Chills    Chest Pain     Poor Appetite    Edema     Cough    Abdominal Pain     Sputum    Joint Pain     SOB/DE LA CRUZ    Pruritis/Rash     Nausea/vomit    Tolerating PT/OT     Diarrhea    Tolerating Diet     Constipation    Other       Could NOT obtain due to:      Objective:     VITALS:   Last 24hrs VS reviewed since prior progress note. Most recent are:  Patient Vitals for the past 24 hrs:   Temp Pulse Resp BP SpO2   06/03/21 0740 98.5 °F (36.9 °C) 82 16 (!) 169/100 98 %   06/03/21 0400 98.9 °F (37.2 °C) 97 18 (!) 152/88 99 %   06/03/21 0011 99.1 °F (37.3 °C) 90 17 (!) 152/89 95 %   06/02/21 2006 98.5 °F (36.9 °C) 94 17 (!) 140/83 96 %   06/02/21 1600 -- 92 -- -- --   06/02/21 1509 -- 92 17 (!) 152/86 98 %   06/02/21 1508 98.5 °F (36.9 °C) 92 18 (!) 148/87 97 %   06/02/21 1151 -- 85 -- (!) 161/79 --   06/02/21 1100 98.3 °F (36.8 °C) 85 18 (!) 167/84 96 %       Intake/Output Summary (Last 24 hours) at 6/3/2021 1045  Last data filed at 6/3/2021 0329  Gross per 24 hour   Intake 900 ml   Output --   Net 900 ml        I had a face to face encounter and independently examined this patient on 6/3/2021, as outlined below:  PHYSICAL EXAM:  General: WD, WN. Alert, cooperative, no acute distress    EENT:  EOMI. Anicteric sclerae. MMM  Resp:  CTA bilaterally, no wheezing or rales. No accessory muscle use  CV:  Regular  rhythm,  ++edema  GI:  Soft, Non distended, Non tender. +Bowel sounds  Neurologic:  Alert and oriented X 3, normal speech,   Psych:   Good insight. Not anxious nor agitated  Skin:  No rashes. No jaundice    Reviewed most current lab test results and cultures  YES  Reviewed most current radiology test results   YES  Review and summation of old records today    NO  Reviewed patient's current orders and MAR    YES  PMH/SH reviewed - no change compared to H&P  ________________________________________________________________________  Care Plan discussed with:    Comments   Patient x    Family      RN x    Care Manager     Consultant                        Multidiciplinary team rounds were held today with , nursing, pharmacist and clinical coordinator. Patient's plan of care was discussed; medications were reviewed and discharge planning was addressed. ________________________________________________________________________  Total NON critical care TIME:  35   Minutes    Total CRITICAL CARE TIME Spent:   Minutes non procedure based      Comments   >50% of visit spent in counseling and coordination of care     ________________________________________________________________________  Lola Jimenez MD     Procedures: see electronic medical records for all procedures/Xrays and details which were not copied into this note but were reviewed prior to creation of Plan. LABS:  I reviewed today's most current labs and imaging studies.   Pertinent labs include:  Recent Labs     05/31/21  2325   WBC 9.1   HGB 8.8*   HCT 28.2*        Recent Labs     06/03/21  0357 06/02/21  0855 05/31/21  2325    140 141   K 3.6 3.4* 3.6   * 109* 112*   CO2 24 23 22   GLU 78 161* 175*   BUN 45* 40* 33*   CREA 3.19* 3.14* 2.93*   CA 8.3* 7.9* 7.6*   ALB  --   --  1.9*   TBILI  --   --  0.3   ALT  --   --  7*       Signed: Lola Jimenez MD

## 2021-06-04 VITALS
BODY MASS INDEX: 28.37 KG/M2 | DIASTOLIC BLOOD PRESSURE: 84 MMHG | HEIGHT: 68 IN | SYSTOLIC BLOOD PRESSURE: 151 MMHG | WEIGHT: 187.17 LBS | RESPIRATION RATE: 14 BRPM | HEART RATE: 79 BPM | OXYGEN SATURATION: 98 % | TEMPERATURE: 97.8 F

## 2021-06-04 LAB
ANION GAP SERPL CALC-SCNC: 7 MMOL/L (ref 5–15)
BUN SERPL-MCNC: 50 MG/DL (ref 6–20)
BUN/CREAT SERPL: 14 (ref 12–20)
CALCIUM SERPL-MCNC: 7.7 MG/DL (ref 8.5–10.1)
CHLORIDE SERPL-SCNC: 106 MMOL/L (ref 97–108)
CO2 SERPL-SCNC: 24 MMOL/L (ref 21–32)
CREAT SERPL-MCNC: 3.62 MG/DL (ref 0.7–1.3)
GLUCOSE BLD STRIP.AUTO-MCNC: 155 MG/DL (ref 65–117)
GLUCOSE SERPL-MCNC: 181 MG/DL (ref 65–100)
POTASSIUM SERPL-SCNC: 3.7 MMOL/L (ref 3.5–5.1)
SERVICE CMNT-IMP: ABNORMAL
SODIUM SERPL-SCNC: 137 MMOL/L (ref 136–145)

## 2021-06-04 PROCEDURE — 74011250636 HC RX REV CODE- 250/636: Performed by: INTERNAL MEDICINE

## 2021-06-04 PROCEDURE — 74011250637 HC RX REV CODE- 250/637: Performed by: NURSE PRACTITIONER

## 2021-06-04 PROCEDURE — 74011000250 HC RX REV CODE- 250: Performed by: NURSE PRACTITIONER

## 2021-06-04 PROCEDURE — 94760 N-INVAS EAR/PLS OXIMETRY 1: CPT

## 2021-06-04 PROCEDURE — 74011636637 HC RX REV CODE- 636/637: Performed by: INTERNAL MEDICINE

## 2021-06-04 PROCEDURE — 80048 BASIC METABOLIC PNL TOTAL CA: CPT

## 2021-06-04 PROCEDURE — 74011250637 HC RX REV CODE- 250/637: Performed by: STUDENT IN AN ORGANIZED HEALTH CARE EDUCATION/TRAINING PROGRAM

## 2021-06-04 PROCEDURE — 36415 COLL VENOUS BLD VENIPUNCTURE: CPT

## 2021-06-04 PROCEDURE — 82962 GLUCOSE BLOOD TEST: CPT

## 2021-06-04 PROCEDURE — 99232 SBSQ HOSP IP/OBS MODERATE 35: CPT | Performed by: INTERNAL MEDICINE

## 2021-06-04 PROCEDURE — 74011250637 HC RX REV CODE- 250/637: Performed by: INTERNAL MEDICINE

## 2021-06-04 PROCEDURE — 74011636637 HC RX REV CODE- 636/637: Performed by: STUDENT IN AN ORGANIZED HEALTH CARE EDUCATION/TRAINING PROGRAM

## 2021-06-04 RX ORDER — BUMETANIDE 2 MG/1
2 TABLET ORAL DAILY
Qty: 30 TABLET | Refills: 0 | Status: SHIPPED | OUTPATIENT
Start: 2021-06-04 | End: 2021-08-04

## 2021-06-04 RX ORDER — AMLODIPINE BESYLATE 10 MG/1
10 TABLET ORAL DAILY
Qty: 30 TABLET | Refills: 0 | Status: SHIPPED | OUTPATIENT
Start: 2021-06-04 | End: 2021-06-17 | Stop reason: SDUPTHER

## 2021-06-04 RX ORDER — NITROGLYCERIN 0.4 MG/1
0.4 TABLET SUBLINGUAL AS NEEDED
Qty: 30 TABLET | Refills: 0 | Status: SHIPPED | OUTPATIENT
Start: 2021-06-04

## 2021-06-04 RX ORDER — PREGABALIN 100 MG/1
100 CAPSULE ORAL 3 TIMES DAILY
Qty: 90 CAPSULE | Refills: 0 | Status: SHIPPED | OUTPATIENT
Start: 2021-06-04 | End: 2021-07-04

## 2021-06-04 RX ORDER — CLONIDINE HYDROCHLORIDE 0.3 MG/1
0.3 TABLET ORAL 3 TIMES DAILY
Qty: 90 TABLET | Refills: 0 | Status: SHIPPED | OUTPATIENT
Start: 2021-06-04 | End: 2021-07-04

## 2021-06-04 RX ADMIN — BUMETANIDE 1 MG: 0.25 INJECTION INTRAMUSCULAR; INTRAVENOUS at 08:29

## 2021-06-04 RX ADMIN — AMLODIPINE BESYLATE 10 MG: 5 TABLET ORAL at 08:28

## 2021-06-04 RX ADMIN — Medication 10 ML: at 06:28

## 2021-06-04 RX ADMIN — ISOSORBIDE MONONITRATE 60 MG: 30 TABLET, EXTENDED RELEASE ORAL at 08:27

## 2021-06-04 RX ADMIN — INSULIN LISPRO 2 UNITS: 100 INJECTION, SOLUTION INTRAVENOUS; SUBCUTANEOUS at 08:25

## 2021-06-04 RX ADMIN — HEPARIN SODIUM 5000 UNITS: 5000 INJECTION INTRAVENOUS; SUBCUTANEOUS at 06:27

## 2021-06-04 RX ADMIN — BUPRENORPHINE AND NALOXONE 1 FILM: 8; 2 FILM BUCCAL; SUBLINGUAL at 08:28

## 2021-06-04 RX ADMIN — ALLOPURINOL 300 MG: 300 TABLET ORAL at 08:27

## 2021-06-04 RX ADMIN — CLONIDINE HYDROCHLORIDE 0.3 MG: 0.1 TABLET ORAL at 08:26

## 2021-06-04 RX ADMIN — PREGABALIN 100 MG: 100 CAPSULE ORAL at 08:28

## 2021-06-04 RX ADMIN — ASPIRIN 81 MG: 81 TABLET, COATED ORAL at 08:28

## 2021-06-04 NOTE — ROUTINE PROCESS
Transition of Care Plan:     RUR:42%  Disposition:Home w/fiorella  Follow up appointments:on AVS  DME needed:n/a  Transportation at [address] [facility]  or means to access home: yes       IM Medicare letter:n/a  Caregiver Contact:Shelley Grace [phone]  Discharge Caregiver contacted prior to discharge? [place] [address], [place], Clement Leonard, [phone]    Patient is being discharged home today without any needs or concerns. Follow-up appointment is on the AVS.  [facility] Mr Brain Ache will f/u with pt at 2pm today and arrange for pt to get Suboxon. Patient is ready for d/c from CM standpoint. Care Management Interventions  PCP Verified by CM: Yes (Kami Schwartz MD)  Mode of Transport at Discharge: Other (see comment) (Curly girard)  Transition of Care Consult (CM Consult): Discharge Planning  Discharge Durable Medical Equipment: No (walker, cane)  Physical Therapy Consult: No  Occupational Therapy Consult: No  Speech Therapy Consult: No  Current Support Network:  Other (Lives w/Curly girard in a one story home w/3 SHAW)  Confirm Follow Up Transport: Self  Discharge Location  Discharge Placement: Home with family assistance      Jack Rivera  Ext [phone]

## 2021-06-04 NOTE — PROGRESS NOTES
[address] [place] [address] [facility]  [phone]      Cardiology Progress Note      6/4/2021 0915 AM    Admit Date: 5/31/2021    Admit Diagnosis:   Acute on chronic systolic heart failure ([address] [I50.23]    Subjective:     Bin Nguyen is walking around in his room, off oxygen, reports \"I am leaving today\". Patient is fully clothed. VSS, BP has improved   Cr 3.62, K 3.7   I/O incomplete. Weight down roughly 10 lbs.      Visit Vitals  BP (!) 151/84 (BP 1 Location: Right upper arm)   Pulse 79   Temp 97.8 °F (36.6 °C)   Resp 14   Ht 5' 8\" (1.727 m)   Wt 84.9 kg (187 lb 2.7 oz)   SpO2 98%   BMI 28.46 kg/m²       Current Facility-Administered Medications   Medication Dose Route Frequency    cloNIDine HCL (CATAPRES) tablet 0.3 mg  0.3 mg Oral TID    amLODIPine (NORVASC) tablet 10 mg  10 mg Oral DAILY    heparin (porcine) injection 5,000 Units  5,000 Units SubCUTAneous Q8H    buprenorphine-naloxone (SUBOXONE) 8mg-2mg SL film  1 Film SubLINGual BID    albuterol-ipratropium (DUO-NEB) 2.5 MG-0.5 MG/3 ML  3 mL Nebulization Q6H PRN    allopurinoL (ZYLOPRIM) tablet 300 mg  300 mg Oral DAILY    aspirin delayed-release tablet 81 mg  81 mg Oral DAILY    atorvastatin (LIPITOR) tablet 20 mg  20 mg Oral QPM    isosorbide mononitrate ER (IMDUR) tablet 60 mg  60 mg Oral DAILY    pregabalin (LYRICA) capsule 100 mg  100 mg Oral BID    sodium chloride (NS) flush 5-40 mL  5-40 mL IntraVENous Q8H    sodium chloride (NS) flush 5-40 mL  5-40 mL IntraVENous PRN    acetaminophen (TYLENOL) tablet 650 mg  650 mg Oral Q6H PRN    Or    acetaminophen (TYLENOL) suppository 650 mg  650 mg Rectal Q6H PRN    polyethylene glycol (MIRALAX) packet 17 g  17 g Oral DAILY PRN    promethazine (PHENERGAN) tablet 12.5 mg  12.5 mg Oral Q6H PRN    Or    ondansetron (ZOFRAN) injection 4 mg  4 mg IntraVENous Q6H PRN    glucose chewable tablet 16 g  4 Tablet Oral PRN    dextrose (D50W) injection syrg 12.5-25 g  25-50 mL IntraVENous PRN    glucagon (GLUCAGEN) injection 1 mg  1 mg IntraMUSCular PRN    insulin lispro (HUMALOG) injection   SubCUTAneous AC&HS    insulin glargine (LANTUS) injection 10 Units  10 Units SubCUTAneous QHS    hydrALAZINE (APRESOLINE) 20 mg/mL injection 10 mg  10 mg IntraVENous Q6H PRN    bumetanide (BUMEX) injection 1 mg  1 mg IntraVENous BID     Current Outpatient Medications   Medication Sig    bumetanide (BUMEX) 2 mg tablet Take 1 Tablet by mouth daily.  cloNIDine HCL (CATAPRES) 0.3 mg tablet Take 1 Tablet by mouth three (3) times daily for 30 days.  amLODIPine (NORVASC) 10 mg tablet Take 1 Tablet by mouth daily.  pregabalin (Lyrica) 100 mg capsule Take 1 Capsule by mouth three (3) times daily for 30 days. Max Daily Amount: 300 mg.    nitroglycerin (NITROSTAT) 0.4 mg SL tablet 1 Tablet by SubLINGual route as needed for Chest Pain. Up to 3 doses.  albuterol (PROVENTIL HFA, VENTOLIN HFA, PROAIR HFA) 90 mcg/actuation inhaler Take 1 Puff by inhalation every six (6) hours as needed for Wheezing.  sodium bicarbonate 650 mg tablet Take 1 Tab by mouth two (2) times a day.  allopurinoL (ZYLOPRIM) 300 mg tablet Take 1 Tab by mouth daily.  insulin glargine (Lantus U-100 Insulin) 100 unit/mL injection 10 Units by SubCUTAneous route nightly. 30 units    traZODone (DESYREL) 100 mg tablet Take 100 mg by mouth nightly.  buprenorphine-naloxone (Suboxone) 8-2 mg film sublingaul film 1 Film by SubLINGual route two (2) times a day. Per  ([address])    6/22/21    atorvastatin (LIPITOR) 20 mg tablet Take 1 Tab by mouth every evening.  isosorbide mononitrate ER (IMDUR) 60 mg CR tablet Take 1 Tab by mouth daily.  aspirin delayed-release 81 mg tablet Take 1 Tab by mouth daily.  BD INSULIN SYRINGE ULTRA-FINE 1 mL 31 gauge x 5/16 syrg USE AS DIRECTED FOR INJECTING INSULIN    dapagliflozin (FARXIGA) 5 mg tab tablet Take 1 Tab by mouth daily.     Blood-Glucose Meter (TRUE METRIX GLUCOSE METER) misc 1 Device by Does Not Apply route two (2) times a day.  glucose blood VI test strips (TRUE METRIX GLUCOSE TEST STRIP) strip Test BS 2 times a day    lancets misc Check BS 2 times a day       Objective:      Physical Exam:  General Appearance:  alert, cooperative, well nourished, well developed; adult AAM ambulating in room; appears stated age  Eyes: sclera anicteric  Mouth/Throat: moist mucous membranes; oral pharynx clear  Neck: supple; no JVD  Pulmonary:  Diminished bilaterally; good effort  Cardiovascular: regular rate and rhythm; no murmur, click, rub, or gallop  Abdomen: soft, non-tender, non-distended; bowel sounds normal  Musculoskeletal: no swelling or deformity; moves all extremities  Extremities: trace edema BLE L>R; palpable distal pulses, amputation left toes   Skin: warm and dry, thick skin on legs, leathery  Neuro: grossly normal  Psych: normal mood and affect given the setting    Data Review:   No results for input(s): WBC, HGB, HCT, PLT, HGBEXT, HCTEXT, PLTEXT, HGBEXT, HCTEXT, PLTEXT in the last 72 hours. Recent Labs     06/04/21  0345 06/03/21  0357 06/02/21  0855    139 140   K 3.7 3.6 3.4*    109* 109*   CO2 24 24 23   * 78 161*   BUN 50* 45* 40*   CREA 3.62* 3.19* 3.14*   CA 7.7* 8.3* 7.9*       Recent Labs     06/01/21  1141   TROIQ <0.05         Intake/Output Summary (Last 24 hours) at 6/4/2021 1034  Last data filed at 6/3/2021 1519  Gross per 24 hour   Intake 360 ml   Output --   Net 360 ml      Cardiographics     Telemetry: NSR   ECG: NSR;  Diffuse ST/T wave changes consistent with prior EKG  Echocardiogram:   · LV: Estimated LVEF is 35 - 40%. Normal cavity size. Moderate concentric hypertrophy. Moderately reduced systolic function. · TV: Mild tricuspid valve regurgitation is present. · PA: Mild pulmonary hypertension. Pulmonary arterial systolic pressure is 46 mmHg.   CXRAY: \"partial improvement of pulmonary edema.\"    Assessment:     Active Problems:    Substance abuse ([facility] [address] (2/21/2020)      HTN (hypertension) (2/28/2020)      CKD (chronic kidney disease) (3/8/2021)      Acute on chronic systolic heart failure ([facility] [address] (6/1/2021)        Plan:   Dev Marte is a 55 y.o. male admitted with Acute on chronic systolic heart failure ([facility] [address] [I50.23] CXR with pulmonary edema. UDS + cocaine. Hg 8.8; creat back up to 3.14. EKG similar to prior. Hypertensive. Not fully medication compliant per patient report. Troponin negative. NT-pro BNP almost 25K. · Acute on chronic systolic HF. Recent EF 35-40%. Likely 2/2 medication noncompliance and drug abuse. Reinforced importance of medication adherence and daily weights at home. NT-pro BNP as above. Patient on RA, SAT's upper 90's, no [place] CRUZ. · Needs continued diuresis. Bumex 2 mg PO daily ordered at discharge. Noted climbing creatinine, discussed need for outpatient nephrology follow-up and care. · No  BB with + cocaine. Not on ACEi/ARB/ARNI with CKD  · HTN much improved. Patient would prefer daily only medications, but limited with his CKD. Continue clonidine at increased dose of 0.3 mg TID and imdur and amlodipine at increased dose of 10 mg.    · Recent low risk stress test.  Due to patient's non compliance, he would be a high risk for cardiac catheterization. Would recommend medical mangement of CAD risk unless emergent need. ASA and statin. · Needs continued discussions on drug abuse cessation. Back on suboxone. · Follow-up scheduled with Dr. Sheridan Gorman. Will continue discussions regarding medication compliance.      [id number]  [facility]    6/4/2021         Patient seen, examined by me personally. Plan discussed as detailed. Agree with note as outlined by  NP with modifications as noted. My independent physical exam reveals : Physical Exam  Vitals and nursing note reviewed.    Cardiovascular:      Rate and Rhythm: Normal rate and regular rhythm. Pulses: Normal pulses. Heart sounds: Normal heart sounds. Pulmonary:      Effort: Pulmonary effort is normal.      Breath sounds: Normal breath sounds. Musculoskeletal:         General: No swelling. Skin:     General: Skin is dry. Neurological:      Mental Status: He is alert and oriented to person, place, and time. Psychiatric:         Mood and Affect: Mood normal.         Behavior: Behavior normal.          No additional findings noted. Agree with plan as outlined above with modifications as noted.      Jessica Dhillon MD

## 2021-06-04 NOTE — DISCHARGE SUMMARY
Discharge Summary      Name: Lazaro Birch  [id number]  YOB: 1965 (Age: 55 y.o.)   Date of Admission: 5/31/2021  Date of Discharge: 6/4/2021  Attending Physician: Pablo Gotti MD    Discharge Diagnosis:   Acute on chronic combined systolic and diastolic heart failure  Substance abuse (cocaine)   Chronic kidney disease stage IV, nephrotic range proteinuria  Noncompliance with diuretic therapy and low-sodium diet  Hypertension  Type 2 diabetes with nephropathy   Opiate dependence on Suboxone therapy    Consultations:  IP CONSULT TO HOSPITALIST  IP CONSULT TO CARDIOLOGY  IP CONSULT TO PALLIATIVE CARE - PROVIDER      Brief Admission History/Reason for Admission Per Edil Raygoza MD:   Tiago Ochoa is a 55 y.o.  male past medical history significant for combined systolic and diastolic heart failure, substance abuse including cocaine and heroin, type 2 diabetes stage IV nephropathy, with nephrotic range proteinuria.     Who presents with progressive dyspnea over the course of the last 2 days. He explained that he had acquired an oxygen tank which seems to have helped with his symptoms. Has been skipping a few doses of his bumetanide also has not been very compliant with a low-sodium diet. Dyspnea is on minimal exertion, along with PND and orthopnea. Some sensation of chest tightness but no chest pain, no palpitations, no sweating, no fever. No GI symptoms or urinary symptoms. Initially he denied any drug use but upon confrontation with his urine drug screen he said that he had used a little bit of cocaine 3 days PTA. His investigations in the ED was significant for a sodium of 141 potassium 3.6, creatinine 2.93, BUN 33 which is much improved from last admission. CBC with a white cell count of 9.1, no band forms reported, hemoglobin 8.8, platelets 716.   Chest x-ray with severe severe pulmonary edema.       Brief Hospital Course by Marcelino Problems:   Acute on chronic combined systolic and diastolic heart failure  Substance abuse (cocaine)   Chronic kidney disease stage IV, nephrotic range proteinuria  Noncompliance with diuretic therapy and low-sodium diet  Hypertension  This is likely due to medication noncompliance  CXR showed increased moderately severe pulmonary edema. Repeat CXR in the AM  UDS remains pos for cocaine  Pt diuresed with IV bumex, Was on coreg, d/c'ed by cardiology, recommended avoiding all BBs  Pt not on ACEi due to CKD. Cont' imdur, amlodipine, increase clonidine for better BP control. Pt was able to be weaned to RA. Leg edema has improved. Pt is transitioned to PO bumex at discharge. F/u with cardiology in 2 weeks. Type 2 diabetes with nephropathy  Resume home meds.     Opiate dependence on Suboxone therapy  Cont' home meds.        Body mass index is 28.16 kg/m². Discharge Exam:  Patient seen and examined by me on discharge day. Pertinent Findings:  Visit Vitals  /81   Pulse 77   Temp 97.9 °F (36.6 °C)   Resp 18   Ht 5' 8\" (1.727 m)   Wt 84.9 kg (187 lb 2.7 oz)   SpO2 97%   BMI 28.46 kg/m²     Gen:    Not in distress  Chest: Clear lungs  CVS:   Regular rhythm. No edema  Abd:  Soft, not distended, not tender    Discharge/Recent Laboratory Results:  Recent Labs     06/04/21  0345      K 3.7      CO2 24   BUN 50*   *   CA 7.7*     No results for input(s): HGB, HCT, WBC, PLT, HGBEXT, HCTEXT, PLTEXT in the last 72 hours. Discharge Medications:  Current Discharge Medication List      START taking these medications    Details   amLODIPine (NORVASC) 10 mg tablet Take 1 Tablet by mouth daily. Qty: 30 Tablet, Refills: 0  Start date: 6/4/2021         CONTINUE these medications which have CHANGED    Details   bumetanide (BUMEX) 2 mg tablet Take 1 Tablet by mouth daily.   Qty: 30 Tablet, Refills: 0  Start date: 6/4/2021      cloNIDine HCL (CATAPRES) 0.3 mg tablet Take 1 Tablet by mouth three (3) times daily for 30 days. Qty: 90 Tablet, Refills: 0  Start date: 6/4/2021, End date: 7/4/2021         CONTINUE these medications which have NOT CHANGED    Details   albuterol (PROVENTIL HFA, VENTOLIN HFA, PROAIR HFA) 90 mcg/actuation inhaler Take 1 Puff by inhalation every six (6) hours as needed for Wheezing. Qty: 1 Inhaler, Refills: 0      sodium bicarbonate 650 mg tablet Take 1 Tab by mouth two (2) times a day. Qty: 60 Tab, Refills: 0      allopurinoL (ZYLOPRIM) 300 mg tablet Take 1 Tab by mouth daily. Qty: 30 Tab, Refills: 0      insulin glargine (Lantus U-100 Insulin) 100 unit/mL injection 10 Units by SubCUTAneous route nightly. 30 units  Qty: 1 Vial, Refills: 0      pregabalin (Lyrica) 100 mg capsule Take 100 mg by mouth three (3) times daily. traZODone (DESYREL) 100 mg tablet Take 100 mg by mouth nightly. buprenorphine-naloxone (Suboxone) 8-2 mg film sublingaul film 1 Film by SubLINGual route two (2) times a day. Per  (Tyra Moore)    6/22/21    Comments: .      atorvastatin (LIPITOR) 20 mg tablet Take 1 Tab by mouth every evening. Qty: 30 Tab, Refills: 0    Associated Diagnoses: Mixed hyperlipidemia      nitroglycerin (NITROSTAT) 0.4 mg SL tablet 1 Tab by SubLINGual route as needed for Chest Pain. Up to 3 doses. Qty: 30 Tab, Refills: 0      isosorbide mononitrate ER (IMDUR) 60 mg CR tablet Take 1 Tab by mouth daily. Qty: 30 Tab, Refills: 0      aspirin delayed-release 81 mg tablet Take 1 Tab by mouth daily. Qty: 30 Tab, Refills: 0      !! dapagliflozin (Farxiga) 5 mg tab tablet Take 5 mg by mouth daily. BD INSULIN SYRINGE ULTRA-FINE 1 mL 31 gauge x 5/16 syrg USE AS DIRECTED FOR INJECTING INSULIN  Qty: 100 Syringe, Refills: 5    Associated Diagnoses: Type 2 diabetes mellitus with diabetic polyneuropathy, with long-term current use of insulin ([address] Type 2 diabetes mellitus with hyperglycemia, with long-term current use of insulin ([address]      ! ! dapagliflozin (FARXIGA) 5 mg tab tablet Take 1 Tab by mouth daily. Qty: 90 Tab, Refills: 3    Associated Diagnoses: Type 2 diabetes mellitus with hyperglycemia, with long-term current use of insulin ([address] Type 2 diabetes mellitus with diabetic polyneuropathy, with long-term current use of insulin ([facility])      Blood-Glucose Meter (TRUE METRIX GLUCOSE METER) misc 1 Device by Does Not Apply route two (2) times a day. Qty: 1 Each, Refills: 0    Associated Diagnoses: Type 2 diabetes mellitus with diabetic polyneuropathy, with long-term current use of insulin ([address] Type 2 diabetes mellitus with hyperglycemia, with long-term current use of insulin ([facility])      glucose blood VI test strips (TRUE METRIX GLUCOSE TEST STRIP) strip Test BS 2 times a day  Qty: 100 Strip, Refills: 11    Associated Diagnoses: Type 2 diabetes mellitus with diabetic polyneuropathy, with long-term current use of insulin ([address] Type 2 diabetes mellitus with hyperglycemia, with long-term current use of insulin ([facility])      lancets misc Check BS 2 times a day  Qty: 100 Each, Refills: 11    Associated Diagnoses: Type 2 diabetes mellitus with diabetic polyneuropathy, with long-term current use of insulin ([address] Type 2 diabetes mellitus with hyperglycemia, with long-term current use of insulin ([address]       ! ! - Potential duplicate medications found. Please discuss with provider. STOP taking these medications       carvediloL (COREG) 12.5 mg tablet Comments:   Reason for Stopping:               Patient Follow Up Instructions:    Activity: Activity as tolerated  Diet: Diabetic Diet, low salt  Wound Care: None needed  Code: Full    DISPOSITION:    Home with Family: x   Home with HH/PT/OT/RN:    SNF/LTC:    JUNE:    OTHER:          Follow up with:   PCP : Kami Schwartz MD  Follow-up Information     Follow up With Specialties Details Why Shanta Gonzalez MD Cardiology, [address] [place] [address] Vascular Surgery, Internal Medicine On 6/17/2021 For hospital follow up; 1:15 pm  [address] [facility]  [phone]      Savanah Tate MD Family Medicine In 3 days  [address] [address]  [address] [place] [address],[address]  [place] [address] [phone]              Total time in minutes spent coordinating this discharge (includes going over instructions, follow-up, prescriptions, and preparing report for sign off to her PCP) : 35 minutes

## 2021-06-04 NOTE — DISCHARGE INSTRUCTIONS
Patient Education        Heart Failure: Care Instructions  Your Care Instructions     Heart failure occurs when your heart does not pump as much blood as the body needs. Failure does not mean that the heart has stopped pumping but rather that it is not pumping as well as it should. Over time, this causes fluid buildup in your lungs and other parts of your body. Fluid buildup can cause shortness of breath, fatigue, swollen ankles, and other problems. By taking medicines regularly, reducing sodium (salt) in your diet, checking your weight every day, and making lifestyle changes, you can feel better and live longer. Follow-up care is a key part of your treatment and safety. Be sure to make and go to all appointments, and call your doctor if you are having problems. It's also a good idea to know your test results and keep a list of the medicines you take. How can you care for yourself at home? Medicines    · Be safe with medicines. Take your medicines exactly as prescribed. Call your doctor if you think you are having a problem with your medicine.     · Do not take any vitamins, over-the-counter medicine, or herbal products without talking to your doctor first. Gari Sandifer not take ibuprofen (Advil or Motrin) and naproxen (Aleve) without talking to your doctor first. They could make your heart failure worse.     · You may take some of the following medicine. ? Angiotensin-converting enzyme inhibitors (ACEIs) or angiotensin II receptor blockers (ARBs) reduce the heart's workload, lower blood pressure, and reduce swelling. Taking an ACEI or ARB may lower your chance of needing to be hospitalized. ? Beta-blockers can slow heart rate, decrease blood pressure, and improve your condition. Taking a beta-blocker may lower your chance of needing to be hospitalized. ? Diuretics, also called water pills, reduce swelling. You will get more details on the specific medicines your doctor prescribes.   Diet    · Your doctor may suggest that you limit sodium. Your doctor can tell you how much sodium is right for you. An example is less than 3,000 mg a day. This includes all the salt you eat in cooking or in packaged foods. People get most of their sodium from processed foods. Fast food and restaurant meals also tend to be very high in sodium.     · Ask your doctor how much liquid you can drink each day. You may have to limit liquids. Weight    · Weigh yourself without clothing at the same time each day. Record your weight. Call your doctor if you have a sudden weight gain, such as more than 2 to 3 pounds in a day or 5 pounds in a week. (Your doctor may suggest a different range of weight gain.) A sudden weight gain may mean that your heart failure is getting worse. Activity level    · Start light exercise (if your doctor says it is okay). Even if you can only do a small amount, exercise will help you get stronger, have more energy, and manage your weight and your stress. Walking is an easy way to get exercise. Start out by walking a little more than you did before. Bit by bit, increase the amount you walk.     · When you exercise, watch for signs that your heart is working too hard. You are pushing yourself too hard if you cannot talk while you are exercising. If you become short of breath or dizzy or have chest pain, stop, sit down, and rest.     · If you feel \"wiped out\" the day after you exercise, walk slower or for a shorter distance until you can work up to a better pace.     · Get enough rest at night. Sleeping with 1 or 2 pillows under your upper body and head may help you breathe easier. Lifestyle changes    · Do not smoke. Smoking can make a heart condition worse. If you need help quitting, talk to your doctor about stop-smoking programs and medicines. These can increase your chances of quitting for good.  Quitting smoking may be the most important step you can take to protect your heart.     · Limit alcohol to 2 drinks a day for men and 1 drink a day for women. Too much alcohol can cause health problems.     · Avoid getting sick from colds and the flu. Get a pneumococcal vaccine shot. If you have had one before, ask your doctor whether you need another dose. Get a flu shot each year. If you must be around people with colds or the flu, wash your hands often. When should you call for help? Call 911 if you have symptoms of sudden heart failure such as:    · You have severe trouble breathing.     · You cough up pink, foamy mucus.     · You have a new irregular or rapid heartbeat. Call your doctor now or seek immediate medical care if:    · You have new or increased shortness of breath.     · You are dizzy or lightheaded, or you feel like you may faint.     · You have sudden weight gain, such as more than 2 to 3 pounds in a day or 5 pounds in a week. (Your doctor may suggest a different range of weight gain.)     · You have increased swelling in your legs, ankles, or feet.     · You are suddenly so tired or weak that you cannot do your usual activities. Watch closely for changes in your health, and be sure to contact your doctor if you develop new symptoms. Where can you learn more? Go to http://www.gray.com/  Enter J696 in the search box to learn more about \"Heart Failure: Care Instructions. \"  Current as of: August 31, 2020               Content Version: 12.8  © 6876-9702 [employer]. Care instructions adapted under license by [employer] (which disclaims liability or warranty for this information). If you have questions about a medical condition or this instruction, always ask your healthcare professional. [place] [address] any warranty or liability for your use of this information. Smoking Cessation Program: This is a free, phone/text/email based, smoking cessation program. The program is individualized to meet each patient's needs.  To enroll use the link - [employer]. com/quit or text Nora Benjamin to [phone] from any smart phone. DISCHARGE SUMMARY from Nurse    The following personal items are in your possession at time of discharge:    Dental Appliances: None  Visual Aid: None  Home Medications: None  Jewelry: None  Clothing: None (left in in pt. room)  Other Valuables: None             PATIENT INSTRUCTIONS:    While taking prescription Narcotics:  · Limit your activities  · Do not drive and operate hazardous machinery  · Do not make important personal or business decisions  · Do  not drink alcoholic beverages    To prevent infection remember to use good handwashing. What to do at Home:  Recommended activity: Activity as tolerated. Recommended diet: Diabetic    If you experience any of the following symptoms difficulty breathing, increased leg swelling (more than usual), weight gain,  please follow up with PCP. *  Please give a list of your current medications to your Primary Care Provider. *  Please update this list whenever your medications are discontinued, doses are      changed, or new medications (including over-the-counter products) are added. *  Please carry medication information at all times in case of emergency situations. These are general instructions for a healthy lifestyle:    No smoking/ No tobacco products/ Avoid exposure to second hand smoke    Surgeon General's Warning:  Quitting smoking now greatly reduces serious risk to your health. Obesity, smoking, and sedentary lifestyle greatly increases your risk for illness    A healthy diet, regular physical exercise & weight monitoring are important for maintaining a healthy lifestyle    You may be retaining fluid if you have a history of heart failure or if you experience any of the following symptoms:  Weight gain of 3 pounds or more overnight or 5 pounds in a week, increased swelling in our hands or feet or shortness of breath while lying flat in bed.   Please call your doctor as soon as you notice any of these symptoms; do not wait until your next office visit. Recognize signs and symptoms of STROKE:    F-face looks uneven    A-arms unable to move or move unevenly    S-speech slurred or non-existent    T-time-call 911 as soon as signs and symptoms begin-DO NOT go       Back to bed or wait to see if you get better-TIME IS BRAIN. The discharge information has been reviewed with the patient. The patient  Patient Education        Learning About Heart Failure Zones  What are heart failure zones? Heart failure zones give you an easy way to see changes in your heart failure symptoms. They also tell you when you need to get help. Check every day to see which zone you are in. Green zone. You are doing well. This is where you want to be. · Your weight is stable. It's not going up or down. · You breathe easily. · You are sleeping well. You are able to lie flat without shortness of breath. · You can do your usual activities. Yellow zone. Be careful. Your symptoms are changing. Call your doctor. · You have new or increased shortness of breath. · You are dizzy or lightheaded, or you feel like you may faint. · You have sudden weight gain, such as more than 2 to 3 pounds in a day or 5 pounds in a week. (Your doctor may suggest a different range of weight gain.)  · You have increased swelling in your legs, ankles, or feet. · You are so tired or weak that you can't do your usual activities. · You are not sleeping well. Shortness of breath wakes you up at night. You need extra pillows. Red zone. This is an emergency. Call 911. You have symptoms of sudden heart failure. For example:  · You have severe trouble breathing. · You cough up pink, foamy mucus. · You have a new irregular or fast heartbeat. You have symptoms of a heart attack. These may include:  · Chest pain or pressure, or a strange feeling in the chest.  · Sweating. · Shortness of breath.   · Nausea or vomiting. · Pain, pressure, or a strange feeling in the back, neck, jaw, or upper belly or in one or both shoulders or arms. · Lightheadedness or sudden weakness. · A fast or irregular heartbeat. If you have symptoms of a heart attack: After you call 911, the  may tell you to chew 1 adult-strength or 2 to 4 low-dose aspirin. Wait for an ambulance. Do not try to drive yourself. Follow-up care is a key part of your treatment and safety. Be sure to make and go to all appointments, and call your doctor if you are having problems. It's also a good idea to know your test results and keep a list of the medicines you take. Where can you learn more? Go to http://www.gray.com/  Enter T174 in the search box to learn more about \"Learning About Heart Failure Zones. \"  Current as of: August 31, 2020               Content Version: 12.8  © 2006-2021 [employer]. Care instructions adapted under license by [facility] (which disclaims liability or warranty for this information). If you have questions about a medical condition or this instruction, always ask your healthcare professional. [place] [address] any warranty or liability for your use of this information. verbalized understanding.

## 2021-06-04 NOTE — PROGRESS NOTES
Problem: Falls - Risk of  Goal: *Absence of Falls  Description: Document Kelsey Trujillo Fall Risk and appropriate interventions in the flowsheet.   Outcome: Progressing Towards Goal  Note: Fall Risk Interventions:            Medication Interventions: Teach patient to arise slowly         History of Falls Interventions: Door open when patient unattended

## 2021-06-04 NOTE — CONSULTS
Palliative Medicine  [phone]    Please see my colleagues notes from 6/2 and 6/3  Goals are clear  Signing off    Yeison Mayfield NP

## 2021-06-07 ENCOUNTER — OFFICE VISIT (OUTPATIENT)
Dept: INTERNAL MEDICINE CLINIC | Age: 56
End: 2021-06-07
Payer: MEDICAID

## 2021-06-07 ENCOUNTER — TELEPHONE (OUTPATIENT)
Dept: CARDIOLOGY CLINIC | Age: 56
End: 2021-06-07

## 2021-06-07 ENCOUNTER — TELEPHONE (OUTPATIENT)
Dept: CASE MANAGEMENT | Age: 56
End: 2021-06-07

## 2021-06-07 VITALS
RESPIRATION RATE: 16 BRPM | HEIGHT: 68 IN | HEART RATE: 91 BPM | BODY MASS INDEX: 27.28 KG/M2 | DIASTOLIC BLOOD PRESSURE: 82 MMHG | WEIGHT: 180 LBS | OXYGEN SATURATION: 98 % | SYSTOLIC BLOOD PRESSURE: 159 MMHG | TEMPERATURE: 98.1 F

## 2021-06-07 DIAGNOSIS — N17.9 ACUTE RENAL FAILURE SUPERIMPOSED ON STAGE 4 CHRONIC KIDNEY DISEASE, UNSPECIFIED ACUTE RENAL FAILURE TYPE (HCC): ICD-10-CM

## 2021-06-07 DIAGNOSIS — F11.29 OPIOID DEPENDENCE WITH OPIOID-INDUCED DISORDER (HCC): ICD-10-CM

## 2021-06-07 DIAGNOSIS — N18.4 ACUTE RENAL FAILURE SUPERIMPOSED ON STAGE 4 CHRONIC KIDNEY DISEASE, UNSPECIFIED ACUTE RENAL FAILURE TYPE (HCC): ICD-10-CM

## 2021-06-07 DIAGNOSIS — E11.59 TYPE 2 DIABETES MELLITUS WITH OTHER CIRCULATORY COMPLICATION, WITH LONG-TERM CURRENT USE OF INSULIN (HCC): ICD-10-CM

## 2021-06-07 DIAGNOSIS — Z79.4 TYPE 2 DIABETES MELLITUS WITH OTHER CIRCULATORY COMPLICATION, WITH LONG-TERM CURRENT USE OF INSULIN (HCC): ICD-10-CM

## 2021-06-07 DIAGNOSIS — L21.9 SEBORRHEIC DERMATITIS: ICD-10-CM

## 2021-06-07 DIAGNOSIS — I10 ESSENTIAL HYPERTENSION, BENIGN: ICD-10-CM

## 2021-06-07 DIAGNOSIS — I50.43 ACUTE ON CHRONIC COMBINED SYSTOLIC AND DIASTOLIC CONGESTIVE HEART FAILURE (HCC): ICD-10-CM

## 2021-06-07 DIAGNOSIS — E78.5 HYPERLIPIDEMIA, UNSPECIFIED HYPERLIPIDEMIA TYPE: ICD-10-CM

## 2021-06-07 DIAGNOSIS — F19.10 SUBSTANCE ABUSE (HCC): ICD-10-CM

## 2021-06-07 DIAGNOSIS — Z76.89 ENCOUNTER FOR SUPPORT AND COORDINATION OF TRANSITION OF CARE: Primary | ICD-10-CM

## 2021-06-07 PROCEDURE — 99214 OFFICE O/P EST MOD 30 MIN: CPT | Performed by: FAMILY MEDICINE

## 2021-06-07 RX ORDER — KETOCONAZOLE 20 MG/G
CREAM TOPICAL DAILY
Qty: 15 G | Refills: 0 | Status: SHIPPED | OUTPATIENT
Start: 2021-06-07 | End: 2021-12-02

## 2021-06-07 NOTE — TELEPHONE ENCOUNTER
Needs financial assistance getting his medications. Just discharged from hospital on Friday.   [phone]    Thanks,  Cristopher

## 2021-06-07 NOTE — TELEPHONE ENCOUNTER
This writer contacted patient in reference to medication assistance. Two patient identifiers confirmed. Per patient was informed per [facility] of problems with insurance information. Patient has been unable to obtain medication still hospital discharge. Patient advised this writer will contact the pharmacy to obtain information regarding prescriptions. This writer contacted [address] [place] [address] on the behalf of patient. This writer spoke with pharmacist, in reference to the above information provided by the patient. Two patient identifiers confirmed. Per pharmacist patient need to bring in valid insurance card in order to process medication. This writer verbalized understanding. This writer contacted patient two patient identifiers confirmed.  Patient informed of information provided above per pharmacy

## 2021-06-07 NOTE — PROGRESS NOTES
Chief Complaint   Patient presents with   [address]     Patient is here for a transition of care      1. Have you been to the ER, urgent care clinic since your last visit? Hospitalized since your last visit? Yes Reason for visit: shortness of breath     2. Have you seen or consulted any other health care providers outside of the [address] [place] [address] since your last visit? Include any pap smears or colon screening.  No  ]

## 2021-06-07 NOTE — TELEPHONE ENCOUNTER
6/7/21 2:33 PM     Did not educate patient about risk for severe COVID-19 due to risk factors according to CDC guidelines because pt denies having any questions/concerns at this time. CTN reviewed discharge instructions with the patient who verbalized understanding. Discussed COVID vaccination status: pt states he has not had opportunity, but is interested in obtaining it. [facility] . Education provided on COVID-19 vaccination as appropriate. Discussed exposure protocols and quarantine with CDC Guidelines. Patient was given an opportunity to verbalize any questions and concerns and agrees to contact health care provider for questions related to their healthcare. Pt has recently completed an AMD and it is on file in his BS medical record. His current health care decision makers were reviewed and found to be consistent with those on his AMD.      Primary Decision Maker: Luan Yarbrough - Other Relative - [phone]    Secondary Decision Maker: Lauren Neri - Daughter - [phone]     Pt reports he is having a challenge getting his insurance reinstated to the community option but has been advised that this should be taken care of by tomorrow. Without this, he states he has been unable to get his 5 new medications filled at his [facility] pharmacy. He reports that he has informed his cardiology office of this and staff are working with him on a solution and he is waiting for a return call. He is aware of the 5 medications and the changes to his insulin dosing (per pt, this is a return to his pre-hospital dose) and his clonidine. He had F/U with his PCP today and will see his cardiologist on 6/17. I thanked pt for his time and the update, wished him the very best and we disconnected. This concludes this episode of care.
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
## 2021-06-17 ENCOUNTER — OFFICE VISIT (OUTPATIENT)
Dept: CARDIOLOGY CLINIC | Age: 56
End: 2021-06-17
Payer: MEDICAID

## 2021-06-17 VITALS
BODY MASS INDEX: 27.19 KG/M2 | WEIGHT: 179.4 LBS | HEART RATE: 91 BPM | SYSTOLIC BLOOD PRESSURE: 158 MMHG | OXYGEN SATURATION: 99 % | DIASTOLIC BLOOD PRESSURE: 90 MMHG | HEIGHT: 68 IN | RESPIRATION RATE: 18 BRPM

## 2021-06-17 DIAGNOSIS — F19.10 SUBSTANCE ABUSE (HCC): Chronic | ICD-10-CM

## 2021-06-17 DIAGNOSIS — I42.0 DILATED CARDIOMYOPATHY (HCC): Primary | ICD-10-CM

## 2021-06-17 DIAGNOSIS — I10 ESSENTIAL HYPERTENSION: ICD-10-CM

## 2021-06-17 PROBLEM — I50.9 ACUTE CHF (CONGESTIVE HEART FAILURE) (HCC): Status: RESOLVED | Noted: 2020-06-20 | Resolved: 2021-06-17

## 2021-06-17 PROBLEM — R77.8 ELEVATED TROPONIN: Status: RESOLVED | Noted: 2021-04-27 | Resolved: 2021-06-17

## 2021-06-17 PROBLEM — I50.43 ACUTE ON CHRONIC COMBINED SYSTOLIC AND DIASTOLIC ACC/AHA STAGE C CONGESTIVE HEART FAILURE (HCC): Status: RESOLVED | Noted: 2021-03-08 | Resolved: 2021-06-17

## 2021-06-17 PROBLEM — I50.23 ACUTE ON CHRONIC SYSTOLIC HEART FAILURE (HCC): Status: RESOLVED | Noted: 2021-06-01 | Resolved: 2021-06-17

## 2021-06-17 PROBLEM — I50.9 CHF EXACERBATION (HCC): Status: RESOLVED | Noted: 2021-03-05 | Resolved: 2021-06-17

## 2021-06-17 PROBLEM — I16.0 HYPERTENSIVE URGENCY: Status: RESOLVED | Noted: 2021-03-08 | Resolved: 2021-06-17

## 2021-06-17 PROBLEM — I50.21 ACUTE SYSTOLIC HEART FAILURE (HCC): Status: RESOLVED | Noted: 2020-02-21 | Resolved: 2021-06-17

## 2021-06-17 PROCEDURE — 99213 OFFICE O/P EST LOW 20 MIN: CPT | Performed by: INTERNAL MEDICINE

## 2021-06-17 PROCEDURE — 93000 ELECTROCARDIOGRAM COMPLETE: CPT | Performed by: INTERNAL MEDICINE

## 2021-06-17 RX ORDER — AMLODIPINE BESYLATE 10 MG/1
10 TABLET ORAL DAILY
Qty: 90 TABLET | Refills: 3 | Status: SHIPPED | OUTPATIENT
Start: 2021-06-17 | End: 2021-08-02 | Stop reason: SDUPTHER

## 2021-06-17 RX ORDER — AMLODIPINE BESYLATE 10 MG/1
10 TABLET ORAL DAILY
Qty: 90 TABLET | Refills: 3 | Status: SHIPPED | OUTPATIENT
Start: 2021-06-17 | End: 2021-06-17

## 2021-06-17 RX ORDER — AMLODIPINE BESYLATE 10 MG/1
10 TABLET ORAL DAILY
Qty: 30 TABLET | Refills: 0 | Status: SHIPPED | OUTPATIENT
Start: 2021-06-17 | End: 2021-06-17

## 2021-06-17 NOTE — PROGRESS NOTES
Chief Complaint   Patient presents with   [id number]; Chest Pain and Shortness of Breath-states symptoms begin today     Hypertension     Visit Vitals  BP (!) 158/90 (BP 1 Location: Left arm, BP Patient Position: Sitting, BP Cuff Size: Adult)   Pulse 91   Resp 18   Ht 5' 8\" (1.727 m)   Wt 179 lb 6.4 oz (81.4 kg)   SpO2 99%   BMI 27.28 kg/m²     1. Have you been to the ER, urgent care clinic since your last visit? Hospitalized since your last visit? Yes When: 5/31/2021 - 6/4/2021 [facility] Substance Abuse/CHF    2. Have you seen or consulted any other health care providers outside of the [address] [place] [address] since your last visit? Include any pap smears or colon screening.  No

## 2021-06-17 NOTE — PROGRESS NOTES
uJne Merino, [facility]    Subjective/HPI:     Akin Good is a 55 y.o. male is here for routine f/u. He has a PMHx of non-ischemic cardiomyopathy, HTN, substance abuse. Was admitted to [facility] [place] 5/2021 for acute on chronic systolic heart failure exacerbation. Pt reported he wasn't taking his medications and had skipped Bumex doses. Also tested + for cocaine on UDS. He was diuresed approx 10 lbs, improved respiratory status. Since discharge, reports new shortness of breath that started today. Has not been able to take his medications, except for bumex, because he does not have the cash to pay for them. He is still working on getting his insurance switched over from coverage through [employer] (while incarcerated) to [place]. Current Outpatient Medications on File Prior to Visit   Medication Sig Dispense Refill    bumetanide (BUMEX) 2 mg tablet Take 1 Tablet by mouth daily. 30 Tablet 0    ketoconazole (NIZORAL) 2 % topical cream Apply  to affected area daily. (Patient not taking: Reported on 6/17/2021) 15 g 0    cloNIDine HCL (CATAPRES) 0.3 mg tablet Take 1 Tablet by mouth three (3) times daily for 30 days. (Patient not taking: Reported on 6/17/2021) 90 Tablet 0    amLODIPine (NORVASC) 10 mg tablet Take 1 Tablet by mouth daily. (Patient not taking: Reported on 6/17/2021) 30 Tablet 0    pregabalin (Lyrica) 100 mg capsule Take 1 Capsule by mouth three (3) times daily for 30 days. Max Daily Amount: 300 mg. (Patient not taking: Reported on 6/17/2021) 90 Capsule 0    nitroglycerin (NITROSTAT) 0.4 mg SL tablet 1 Tablet by SubLINGual route as needed for Chest Pain. Up to 3 doses. (Patient not taking: Reported on 6/17/2021) 30 Tablet 0    albuterol (PROVENTIL HFA, VENTOLIN HFA, PROAIR HFA) 90 mcg/actuation inhaler Take 1 Puff by inhalation every six (6) hours as needed for Wheezing.  (Patient not taking: Reported on 6/17/2021) 1 Inhaler 0    sodium bicarbonate 650 mg tablet Take 1 Tab by mouth two (2) times a day. (Patient not taking: Reported on 6/17/2021) 60 Tab 0    allopurinoL (ZYLOPRIM) 300 mg tablet Take 1 Tab by mouth daily. (Patient not taking: Reported on 6/17/2021) 30 Tab 0    insulin glargine (Lantus U-100 Insulin) 100 unit/mL injection 10 Units by SubCUTAneous route nightly. 30 units (Patient not taking: Reported on 6/17/2021) 1 Vial 0    traZODone (DESYREL) 100 mg tablet Take 100 mg by mouth nightly. (Patient not taking: Reported on 6/17/2021)      buprenorphine-naloxone (Suboxone) 8-2 mg film sublingaul film 1 Film by SubLINGual route two (2) times a day. Per  ([address])    6/22/21 (Patient not taking: Reported on 6/17/2021)      atorvastatin (LIPITOR) 20 mg tablet Take 1 Tab by mouth every evening. (Patient not taking: Reported on 6/17/2021) 30 Tab 0    isosorbide mononitrate ER (IMDUR) 60 mg CR tablet Take 1 Tab by mouth daily. (Patient not taking: Reported on 6/17/2021) 30 Tab 0    aspirin delayed-release 81 mg tablet Take 1 Tab by mouth daily. (Patient not taking: Reported on 6/17/2021) 30 Tab 0    BD INSULIN SYRINGE ULTRA-FINE 1 mL 31 gauge x 5/16 syrg USE AS DIRECTED FOR INJECTING INSULIN (Patient not taking: Reported on 6/17/2021) 100 Syringe 5    dapagliflozin (FARXIGA) 5 mg tab tablet Take 1 Tab by mouth daily. (Patient not taking: Reported on 6/17/2021) 90 Tab 3    Blood-Glucose Meter (TRUE METRIX GLUCOSE METER) misc 1 Device by Does Not Apply route two (2) times a day. (Patient not taking: Reported on 6/17/2021) 1 Each 0    glucose blood VI test strips (TRUE METRIX GLUCOSE TEST STRIP) strip Test BS 2 times a day (Patient not taking: Reported on 6/17/2021) 100 Strip 11    lancets misc Check BS 2 times a day (Patient not taking: Reported on 6/17/2021) 100 Each 11     No current facility-administered medications on file prior to visit.        Review of Symptoms:    Review of Systems   Constitutional: Negative for chills, fever and weight loss. HENT: Negative for nosebleeds. Eyes: Negative for blurred vision and double vision. Respiratory: Positive for shortness of breath. Negative for cough and wheezing. Cardiovascular: Negative for chest pain, palpitations, orthopnea, leg swelling and PND. Skin: Negative for rash. Neurological: Negative for dizziness and loss of consciousness. Physical Exam:      General: Well developed, in no acute distress, cooperative and alert  Heart:  reg rate and rhythm; normal S1/S2; no murmurs, no gallops or rubs. Respiratory: Clear bilaterally x 4, no wheezing or rales  Extremities:  Normal cap refill, no cyanosis, atraumatic. No edema. Vascular: 2+ pulses symmetric in all extremities    Vitals:    06/17/21 1324   BP: (!) 158/90   BP 1 Location: Left arm   BP Patient Position: Sitting   BP Cuff Size: Adult   Pulse: 91   Resp: 18   Height: 5' 8\" (1.727 m)   Weight: 179 lb 6.4 oz (81.4 kg)   SpO2: 99%       ECG done today shows sinus rhythm     Assessment:       ICD-10-CM ICD-9-CM    1. Dilated cardiomyopathy ([facility])  I42.0 425.4 AMB POC EKG ROUTINE W/ 12 LEADS, INTER & REP   2. Essential hypertension  I10 401.9 AMB POC EKG ROUTINE W/ 12 LEADS, INTER & REP   3. Substance abuse ([facility])  F19.10 305.90 AMB POC EKG ROUTINE W/ 12 LEADS, INTER & REP        Plan:     1. Dilated cardiomyopathy (HCC)  Hx of non-ischemic cardiomyopathy  Echo done 3/2021 with reduced LVEF 35-40% with mild TR, mild PHTN  Lexiscan stress test done 4/2021 without evidence of ischemia  Treatment limited due to substance abuse and CKD -- cannot use BB or ACEi/ARB  Continue bumex 1 mg daily     2. Essential hypertension  BP sub-optimal.  Has been off meds as he cannot afford them. Will prescribe 90-day amlodipine, which is free through [employer] program  Should also be on Imdur, clonidine 0.3 mg TID  Further anti-hypertensive reatment is limited given CKD and substance abuse    3.  Substance abuse [facility])  Recent UDS + cocaine  Encouraged abstinence    F/u with Dr. Oniel Leger in 3 months    Yeison Espinosa NP       [facility]    6/17/2021         Patient seen, examined by me personally. Plan discussed as detailed. Agree with note as outlined by  NP with modifications as noted. My independent physical exam reveals : Physical Exam  Vitals and nursing note reviewed. Cardiovascular:      Rate and Rhythm: Normal rate and regular rhythm. Heart sounds: No murmur heard. Musculoskeletal:      Cervical back: Neck supple. Skin:     General: Skin is warm and dry. Neurological:      Mental Status: He is alert and oriented to person, place, and time. Psychiatric:         Mood and Affect: Mood normal.         Behavior: Behavior normal.          No additional findings noted. Patient directed to  medication through [facility] program or free clinic. Agree with plan as outlined above with modifications as noted.      Staci Zelaya MD

## 2021-06-17 NOTE — LETTER
6/17/2021 Patient: Zoya Kang YOB: 1965 Date of Visit: 6/17/2021 Mike Madrid MD 
[facility] [address] [facility] [address] [id number] [address] [employer] Dear Mike Madrid MD, Thank you for referring Mr. Harini Valente to [address] [place] [address] for evaluation. My notes for this consultation are attached. If you have questions, please do not hesitate to call me. I look forward to following your patient along with you. Sincerely, Geoff Callaway MD

## 2021-06-22 NOTE — PROGRESS NOTES
TRANSFER - IN REPORT:    Verbal report received from Maryam Hernandez (name) on Ricco Ross  being received from CCU (unit) for routine progression of care      Report consisted of patients Situation, Background, Assessment and   Recommendations(SBAR). Information from the following report(s) SBAR, Kardex, Procedure Summary, Intake/Output, MAR and Recent Results was reviewed with the receiving nurse. Opportunity for questions and clarification was provided. Assessment completed upon patients arrival to unit and care assumed. 18

## 2021-08-04 RX ORDER — CLONIDINE HYDROCHLORIDE 0.3 MG/1
0.3 TABLET ORAL 2 TIMES DAILY
Qty: 180 TABLET | Refills: 1 | Status: SHIPPED | OUTPATIENT
Start: 2021-08-04 | End: 2021-10-04 | Stop reason: SDUPTHER

## 2021-08-04 RX ORDER — AMLODIPINE BESYLATE 10 MG/1
10 TABLET ORAL DAILY
Qty: 90 TABLET | Refills: 1 | Status: SHIPPED | OUTPATIENT
Start: 2021-08-04 | End: 2021-12-16 | Stop reason: SDUPTHER

## 2021-08-04 RX ORDER — BUMETANIDE 2 MG/1
2 TABLET ORAL DAILY
Qty: 90 TABLET | Refills: 1 | Status: SHIPPED | OUTPATIENT
Start: 2021-08-04 | End: 2021-10-04 | Stop reason: SDUPTHER

## 2021-09-24 ENCOUNTER — HOSPITAL ENCOUNTER (EMERGENCY)
Age: 56
Discharge: HOME | End: 2021-09-25
Attending: EMERGENCY MEDICINE | Admitting: EMERGENCY MEDICINE
Payer: MEDICAID

## 2021-09-24 ENCOUNTER — APPOINTMENT (OUTPATIENT)
Dept: GENERAL RADIOLOGY | Age: 56
End: 2021-09-24
Attending: EMERGENCY MEDICINE
Payer: MEDICAID

## 2021-09-24 DIAGNOSIS — I50.9 ACUTE ON CHRONIC CONGESTIVE HEART FAILURE, UNSPECIFIED HEART FAILURE TYPE (HCC): Primary | ICD-10-CM

## 2021-09-24 DIAGNOSIS — I10 MALIGNANT HYPERTENSION: ICD-10-CM

## 2021-09-24 DIAGNOSIS — N18.9 CHRONIC KIDNEY DISEASE, UNSPECIFIED CKD STAGE: ICD-10-CM

## 2021-09-24 DIAGNOSIS — I50.1 PULMONARY EDEMA CARDIAC CAUSE (HCC): ICD-10-CM

## 2021-09-24 DIAGNOSIS — I50.23 SYSTOLIC CHF, ACUTE ON CHRONIC (HCC): ICD-10-CM

## 2021-09-24 DIAGNOSIS — D64.9 CHRONIC ANEMIA: ICD-10-CM

## 2021-09-24 PROCEDURE — 83880 ASSAY OF NATRIURETIC PEPTIDE: CPT

## 2021-09-24 PROCEDURE — 93005 ELECTROCARDIOGRAM TRACING: CPT

## 2021-09-24 PROCEDURE — 85025 COMPLETE CBC W/AUTO DIFF WBC: CPT

## 2021-09-24 PROCEDURE — 71045 X-RAY EXAM CHEST 1 VIEW: CPT

## 2021-09-24 PROCEDURE — 74011250637 HC RX REV CODE- 250/637: Performed by: EMERGENCY MEDICINE

## 2021-09-24 PROCEDURE — 96374 THER/PROPH/DIAG INJ IV PUSH: CPT

## 2021-09-24 PROCEDURE — 36415 COLL VENOUS BLD VENIPUNCTURE: CPT

## 2021-09-24 PROCEDURE — 80053 COMPREHEN METABOLIC PANEL: CPT

## 2021-09-24 PROCEDURE — 99285 EMERGENCY DEPT VISIT HI MDM: CPT

## 2021-09-24 PROCEDURE — 84484 ASSAY OF TROPONIN QUANT: CPT

## 2021-09-24 RX ORDER — SODIUM CHLORIDE 0.9 % (FLUSH) 0.9 %
5-40 SYRINGE (ML) INJECTION EVERY 8 HOURS
Status: DISCONTINUED | OUTPATIENT
Start: 2021-09-24 | End: 2021-09-25 | Stop reason: HOSPADM

## 2021-09-24 RX ORDER — SODIUM CHLORIDE 0.9 % (FLUSH) 0.9 %
5-40 SYRINGE (ML) INJECTION AS NEEDED
Status: DISCONTINUED | OUTPATIENT
Start: 2021-09-24 | End: 2021-09-25 | Stop reason: HOSPADM

## 2021-09-24 RX ORDER — NITROGLYCERIN 0.4 MG/1
0.4 TABLET SUBLINGUAL
Status: COMPLETED | OUTPATIENT
Start: 2021-09-24 | End: 2021-09-24

## 2021-09-24 RX ADMIN — NITROGLYCERIN 0.4 MG: 0.4 TABLET, ORALLY DISINTEGRATING SUBLINGUAL at 23:50

## 2021-09-24 RX ADMIN — NITROGLYCERIN 0.4 MG: 0.4 TABLET, ORALLY DISINTEGRATING SUBLINGUAL at 23:44

## 2021-09-25 VITALS
RESPIRATION RATE: 11 BRPM | WEIGHT: 176.37 LBS | TEMPERATURE: 97.9 F | OXYGEN SATURATION: 94 % | HEIGHT: 67 IN | BODY MASS INDEX: 27.68 KG/M2 | DIASTOLIC BLOOD PRESSURE: 89 MMHG | HEART RATE: 91 BPM | SYSTOLIC BLOOD PRESSURE: 159 MMHG

## 2021-09-25 LAB
ALBUMIN SERPL-MCNC: 2.2 G/DL (ref 3.5–5)
ALBUMIN/GLOB SERPL: 0.4 {RATIO} (ref 1.1–2.2)
ALP SERPL-CCNC: 92 U/L (ref 45–117)
ALT SERPL-CCNC: 13 U/L (ref 12–78)
AMPHET UR QL SCN: NEGATIVE
ANION GAP SERPL CALC-SCNC: 7 MMOL/L (ref 5–15)
APPEARANCE UR: CLEAR
AST SERPL-CCNC: 23 U/L (ref 15–37)
ATRIAL RATE: 101 BPM
ATRIAL RATE: 95 BPM
BACTERIA URNS QL MICRO: NEGATIVE /HPF
BARBITURATES UR QL SCN: NEGATIVE
BASOPHILS # BLD: 0 K/UL (ref 0–0.1)
BASOPHILS NFR BLD: 0 % (ref 0–1)
BENZODIAZ UR QL: NEGATIVE
BILIRUB SERPL-MCNC: 0.3 MG/DL (ref 0.2–1)
BILIRUB UR QL: NEGATIVE
BNP SERPL-MCNC: ABNORMAL PG/ML
BUN SERPL-MCNC: 55 MG/DL (ref 6–20)
BUN/CREAT SERPL: 11 (ref 12–20)
CALCIUM SERPL-MCNC: 8.5 MG/DL (ref 8.5–10.1)
CALCULATED P AXIS, ECG09: 46 DEGREES
CALCULATED P AXIS, ECG09: 48 DEGREES
CALCULATED R AXIS, ECG10: -20 DEGREES
CALCULATED R AXIS, ECG10: -21 DEGREES
CALCULATED T AXIS, ECG11: 149 DEGREES
CALCULATED T AXIS, ECG11: 163 DEGREES
CANNABINOIDS UR QL SCN: NEGATIVE
CHLORIDE SERPL-SCNC: 111 MMOL/L (ref 97–108)
CO2 SERPL-SCNC: 21 MMOL/L (ref 21–32)
COCAINE UR QL SCN: POSITIVE
COLOR UR: ABNORMAL
COMMENT, HOLDF: NORMAL
CREAT SERPL-MCNC: 5.06 MG/DL (ref 0.7–1.3)
DIAGNOSIS, 93000: NORMAL
DIAGNOSIS, 93000: NORMAL
DIFFERENTIAL METHOD BLD: ABNORMAL
DRUG SCRN COMMENT,DRGCM: ABNORMAL
EOSINOPHIL # BLD: 0.2 K/UL (ref 0–0.4)
EOSINOPHIL NFR BLD: 2 % (ref 0–7)
EPITH CASTS URNS QL MICRO: ABNORMAL /LPF
ERYTHROCYTE [DISTWIDTH] IN BLOOD BY AUTOMATED COUNT: 17.4 % (ref 11.5–14.5)
GLOBULIN SER CALC-MCNC: 5.9 G/DL (ref 2–4)
GLUCOSE SERPL-MCNC: 209 MG/DL (ref 65–100)
GLUCOSE UR STRIP.AUTO-MCNC: 500 MG/DL
HCT VFR BLD AUTO: 29.5 % (ref 36.6–50.3)
HGB BLD-MCNC: 9.2 G/DL (ref 12.1–17)
HGB UR QL STRIP: ABNORMAL
HYALINE CASTS URNS QL MICRO: ABNORMAL /LPF (ref 0–5)
IMM GRANULOCYTES # BLD AUTO: 0.1 K/UL (ref 0–0.04)
IMM GRANULOCYTES NFR BLD AUTO: 1 % (ref 0–0.5)
KETONES UR QL STRIP.AUTO: NEGATIVE MG/DL
LEUKOCYTE ESTERASE UR QL STRIP.AUTO: NEGATIVE
LYMPHOCYTES # BLD: 0.8 K/UL (ref 0.8–3.5)
LYMPHOCYTES NFR BLD: 9 % (ref 12–49)
MCH RBC QN AUTO: 26.2 PG (ref 26–34)
MCHC RBC AUTO-ENTMCNC: 31.2 G/DL (ref 30–36.5)
MCV RBC AUTO: 84 FL (ref 80–99)
METHADONE UR QL: NEGATIVE
MONOCYTES # BLD: 0.6 K/UL (ref 0–1)
MONOCYTES NFR BLD: 6 % (ref 5–13)
NEUTS SEG # BLD: 7.3 K/UL (ref 1.8–8)
NEUTS SEG NFR BLD: 82 % (ref 32–75)
NITRITE UR QL STRIP.AUTO: NEGATIVE
NRBC # BLD: 0 K/UL (ref 0–0.01)
NRBC BLD-RTO: 0 PER 100 WBC
OPIATES UR QL: POSITIVE
P-R INTERVAL, ECG05: 136 MS
P-R INTERVAL, ECG05: 138 MS
PCP UR QL: NEGATIVE
PH UR STRIP: 6.5 [PH] (ref 5–8)
PLATELET # BLD AUTO: 304 K/UL (ref 150–400)
PMV BLD AUTO: 11 FL (ref 8.9–12.9)
POTASSIUM SERPL-SCNC: 4.3 MMOL/L (ref 3.5–5.1)
PROT SERPL-MCNC: 8.1 G/DL (ref 6.4–8.2)
PROT UR STRIP-MCNC: 300 MG/DL
Q-T INTERVAL, ECG07: 354 MS
Q-T INTERVAL, ECG07: 362 MS
QRS DURATION, ECG06: 82 MS
QRS DURATION, ECG06: 92 MS
QTC CALCULATION (BEZET), ECG08: 454 MS
QTC CALCULATION (BEZET), ECG08: 459 MS
RBC # BLD AUTO: 3.51 M/UL (ref 4.1–5.7)
RBC #/AREA URNS HPF: ABNORMAL /HPF (ref 0–5)
SAMPLES BEING HELD,HOLD: NORMAL
SODIUM SERPL-SCNC: 139 MMOL/L (ref 136–145)
SP GR UR REFRACTOMETRY: 1.02 (ref 1–1.03)
TROPONIN I SERPL-MCNC: <0.05 NG/ML
TROPONIN I SERPL-MCNC: <0.05 NG/ML
UA: UC IF INDICATED,UAUC: ABNORMAL
UROBILINOGEN UR QL STRIP.AUTO: 0.2 EU/DL (ref 0.2–1)
VENTRICULAR RATE, ECG03: 101 BPM
VENTRICULAR RATE, ECG03: 95 BPM
WBC # BLD AUTO: 9 K/UL (ref 4.1–11.1)
WBC URNS QL MICRO: ABNORMAL /HPF (ref 0–4)

## 2021-09-25 PROCEDURE — 80307 DRUG TEST PRSMV CHEM ANLYZR: CPT

## 2021-09-25 PROCEDURE — 81001 URINALYSIS AUTO W/SCOPE: CPT

## 2021-09-25 PROCEDURE — 84484 ASSAY OF TROPONIN QUANT: CPT

## 2021-09-25 PROCEDURE — 74011000250 HC RX REV CODE- 250: Performed by: EMERGENCY MEDICINE

## 2021-09-25 RX ORDER — BUMETANIDE 0.25 MG/ML
1 INJECTION INTRAMUSCULAR; INTRAVENOUS ONCE
Status: COMPLETED | OUTPATIENT
Start: 2021-09-25 | End: 2021-09-25

## 2021-09-25 RX ADMIN — Medication 10 ML: at 02:15

## 2021-09-25 RX ADMIN — BUMETANIDE 1 MG: 0.25 INJECTION INTRAMUSCULAR; INTRAVENOUS at 02:15

## 2021-09-25 NOTE — DISCHARGE INSTRUCTIONS
Take your Bumex twice a day for 3 days, then back to once a day. Please make sure that you take all your medications on time and EVERY DAY. Do not skip any doses. This is critically important in regulating your fluid and your blood pressure.

## 2021-09-25 NOTE — ED NOTES
Pt presents to ed due to central chest pain and shortness of breath that began around 4pm. Pt reports his pain does not radiate. Pt notes hx of hf. Pt states he took 2 NTGSL at home       Pt notes he is prescribed a diuretic and bp medication but has not taken it today. Bedside shift change report given to Ko Urbina RN (oncoming nurse) by Oscar Alcala RN (offgoing nurse). Report included the following information SBAR, Kardex, ED Summary, [facility] and Recent Results.
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
## 2021-09-25 NOTE — ED NOTES
Discharge instructions reviewed and patient signed. He stated he didn't have any questions. IV was removed and patient was stable.

## 2021-10-04 ENCOUNTER — OFFICE VISIT (OUTPATIENT)
Dept: INTERNAL MEDICINE CLINIC | Age: 56
End: 2021-10-04
Payer: MEDICAID

## 2021-10-04 VITALS
WEIGHT: 179.2 LBS | TEMPERATURE: 98.4 F | SYSTOLIC BLOOD PRESSURE: 158 MMHG | HEART RATE: 84 BPM | RESPIRATION RATE: 16 BRPM | DIASTOLIC BLOOD PRESSURE: 89 MMHG | OXYGEN SATURATION: 98 % | HEIGHT: 67 IN | BODY MASS INDEX: 28.12 KG/M2

## 2021-10-04 DIAGNOSIS — I42.8 NONISCHEMIC CARDIOMYOPATHY (HCC): Primary | ICD-10-CM

## 2021-10-04 DIAGNOSIS — E11.42 DIABETIC POLYNEUROPATHY ASSOCIATED WITH TYPE 2 DIABETES MELLITUS (HCC): ICD-10-CM

## 2021-10-04 DIAGNOSIS — H54.7 VISION PROBLEM: ICD-10-CM

## 2021-10-04 DIAGNOSIS — I69.398 CVA, OLD, DISTURBANCES OF VISION: ICD-10-CM

## 2021-10-04 DIAGNOSIS — Z91.14 NONCOMPLIANCE WITH MEDICATION REGIMEN: ICD-10-CM

## 2021-10-04 DIAGNOSIS — Z12.11 COLON CANCER SCREENING: ICD-10-CM

## 2021-10-04 DIAGNOSIS — I10 ELEVATED BLOOD PRESSURE READING IN OFFICE WITH DIAGNOSIS OF HYPERTENSION: ICD-10-CM

## 2021-10-04 DIAGNOSIS — I50.43 ACUTE ON CHRONIC COMBINED SYSTOLIC AND DIASTOLIC CONGESTIVE HEART FAILURE (HCC): ICD-10-CM

## 2021-10-04 DIAGNOSIS — E78.5 HYPERLIPIDEMIA, UNSPECIFIED HYPERLIPIDEMIA TYPE: ICD-10-CM

## 2021-10-04 DIAGNOSIS — H53.9 CVA, OLD, DISTURBANCES OF VISION: ICD-10-CM

## 2021-10-04 PROCEDURE — 99214 OFFICE O/P EST MOD 30 MIN: CPT | Performed by: FAMILY MEDICINE

## 2021-10-04 PROCEDURE — 3051F HG A1C>EQUAL 7.0%<8.0%: CPT | Performed by: FAMILY MEDICINE

## 2021-10-04 RX ORDER — BUMETANIDE 2 MG/1
2 TABLET ORAL DAILY
Qty: 90 TABLET | Refills: 3 | Status: SHIPPED | OUTPATIENT
Start: 2021-10-04 | End: 2021-12-16 | Stop reason: SDUPTHER

## 2021-10-04 RX ORDER — PREGABALIN 100 MG/1
100 CAPSULE ORAL 2 TIMES DAILY
COMMUNITY
End: 2022-02-01

## 2021-10-04 RX ORDER — CARVEDILOL 25 MG/1
25 TABLET ORAL 2 TIMES DAILY WITH MEALS
Qty: 180 TABLET | Refills: 3 | Status: SHIPPED | OUTPATIENT
Start: 2021-10-04 | End: 2021-12-16 | Stop reason: SDUPTHER

## 2021-10-04 RX ORDER — CLONIDINE HYDROCHLORIDE 0.3 MG/1
0.3 TABLET ORAL 2 TIMES DAILY
Qty: 180 TABLET | Refills: 3 | Status: SHIPPED | OUTPATIENT
Start: 2021-10-04 | End: 2021-12-22 | Stop reason: SDUPTHER

## 2021-10-04 RX ORDER — CARVEDILOL 25 MG/1
25 TABLET ORAL 2 TIMES DAILY WITH MEALS
COMMUNITY
End: 2021-10-04 | Stop reason: SDUPTHER

## 2021-10-04 NOTE — PROGRESS NOTES
Chief Complaint   Patient presents with   [facility] ED Follow-up     Patient seen at [facility] ED on 9/24/21 for chest pain. 1. Have you been to the ER, urgent care clinic since your last visit? Hospitalized since your last visit? Yes When: 9/24/21 Where: [facility] ED Reason for visit: chest pain     2. Have you seen or consulted any other health care providers outside of the [address] [place] [address] since your last visit? Include any pap smears or colon screening.  No
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
## 2021-10-05 LAB
ALBUMIN/CREAT UR: 5826 MG/G CREAT (ref 0–29)
CHOLEST SERPL-MCNC: 213 MG/DL (ref 100–199)
CREAT UR-MCNC: 44 MG/DL
EST. AVERAGE GLUCOSE BLD GHB EST-MCNC: 157 MG/DL
HBA1C MFR BLD: 7.1 % (ref 4.8–5.6)
HDLC SERPL-MCNC: 70 MG/DL
LDLC SERPL CALC-MCNC: 127 MG/DL (ref 0–99)
MICROALBUMIN UR-MCNC: 2563.6 UG/ML
TRIGL SERPL-MCNC: 92 MG/DL (ref 0–149)
VLDLC SERPL CALC-MCNC: 16 MG/DL (ref 5–40)

## 2021-11-17 ENCOUNTER — APPOINTMENT (OUTPATIENT)
Dept: GENERAL RADIOLOGY | Age: 56
End: 2021-11-17
Attending: STUDENT IN AN ORGANIZED HEALTH CARE EDUCATION/TRAINING PROGRAM
Payer: MEDICAID

## 2021-11-17 ENCOUNTER — HOSPITAL ENCOUNTER (EMERGENCY)
Age: 56
Discharge: LEFT BEFORE DISCHARGE | End: 2021-11-17
Attending: STUDENT IN AN ORGANIZED HEALTH CARE EDUCATION/TRAINING PROGRAM
Payer: MEDICAID

## 2021-11-17 VITALS
TEMPERATURE: 98.7 F | OXYGEN SATURATION: 100 % | RESPIRATION RATE: 16 BRPM | WEIGHT: 160 LBS | DIASTOLIC BLOOD PRESSURE: 98 MMHG | HEIGHT: 68 IN | BODY MASS INDEX: 24.25 KG/M2 | HEART RATE: 84 BPM | SYSTOLIC BLOOD PRESSURE: 167 MMHG

## 2021-11-17 DIAGNOSIS — R06.02 SOB (SHORTNESS OF BREATH): Primary | ICD-10-CM

## 2021-11-17 LAB
ALBUMIN SERPL-MCNC: 2.3 G/DL (ref 3.5–5)
ALBUMIN/GLOB SERPL: 0.5 {RATIO} (ref 1.1–2.2)
ALP SERPL-CCNC: 62 U/L (ref 45–117)
ALT SERPL-CCNC: 13 U/L (ref 12–78)
ANION GAP SERPL CALC-SCNC: 9 MMOL/L (ref 5–15)
AST SERPL-CCNC: 28 U/L (ref 15–37)
BASOPHILS # BLD: 0 K/UL (ref 0–0.1)
BASOPHILS NFR BLD: 0 % (ref 0–1)
BILIRUB SERPL-MCNC: 0.4 MG/DL (ref 0.2–1)
BNP SERPL-MCNC: ABNORMAL PG/ML (ref 0–125)
BUN SERPL-MCNC: 46 MG/DL (ref 6–20)
BUN/CREAT SERPL: 9 (ref 12–20)
CALCIUM SERPL-MCNC: 8.2 MG/DL (ref 8.5–10.1)
CHLORIDE SERPL-SCNC: 102 MMOL/L (ref 97–108)
CO2 SERPL-SCNC: 27 MMOL/L (ref 21–32)
CREAT SERPL-MCNC: 5.14 MG/DL (ref 0.7–1.3)
DIFFERENTIAL METHOD BLD: ABNORMAL
EOSINOPHIL # BLD: 0.2 K/UL (ref 0–0.4)
EOSINOPHIL NFR BLD: 2 % (ref 0–7)
ERYTHROCYTE [DISTWIDTH] IN BLOOD BY AUTOMATED COUNT: 15.6 % (ref 11.5–14.5)
GLOBULIN SER CALC-MCNC: 4.7 G/DL (ref 2–4)
GLUCOSE SERPL-MCNC: 95 MG/DL (ref 65–100)
HCT VFR BLD AUTO: 29.1 % (ref 36.6–50.3)
HGB BLD-MCNC: 9.2 G/DL (ref 12.1–17)
IMM GRANULOCYTES # BLD AUTO: 0.1 K/UL (ref 0–0.04)
IMM GRANULOCYTES NFR BLD AUTO: 1 % (ref 0–0.5)
LYMPHOCYTES # BLD: 0.6 K/UL (ref 0.8–3.5)
LYMPHOCYTES NFR BLD: 6 % (ref 12–49)
MCH RBC QN AUTO: 26.3 PG (ref 26–34)
MCHC RBC AUTO-ENTMCNC: 31.6 G/DL (ref 30–36.5)
MCV RBC AUTO: 83.1 FL (ref 80–99)
MONOCYTES # BLD: 0.6 K/UL (ref 0–1)
MONOCYTES NFR BLD: 6 % (ref 5–13)
NEUTS SEG # BLD: 7.7 K/UL (ref 1.8–8)
NEUTS SEG NFR BLD: 85 % (ref 32–75)
NRBC # BLD: 0 K/UL (ref 0–0.01)
NRBC BLD-RTO: 0 PER 100 WBC
PLATELET # BLD AUTO: 213 K/UL (ref 150–400)
PMV BLD AUTO: 10 FL (ref 8.9–12.9)
POTASSIUM SERPL-SCNC: 3.1 MMOL/L (ref 3.5–5.1)
PROT SERPL-MCNC: 7 G/DL (ref 6.4–8.2)
RBC # BLD AUTO: 3.5 M/UL (ref 4.1–5.7)
RBC MORPH BLD: ABNORMAL
SODIUM SERPL-SCNC: 138 MMOL/L (ref 136–145)
TROPONIN-HIGH SENSITIVITY: 67 NG/L (ref 0–76)
WBC # BLD AUTO: 9.2 K/UL (ref 4.1–11.1)

## 2021-11-17 PROCEDURE — 99284 EMERGENCY DEPT VISIT MOD MDM: CPT

## 2021-11-17 PROCEDURE — 71045 X-RAY EXAM CHEST 1 VIEW: CPT

## 2021-11-17 PROCEDURE — 83880 ASSAY OF NATRIURETIC PEPTIDE: CPT

## 2021-11-17 PROCEDURE — 85025 COMPLETE CBC W/AUTO DIFF WBC: CPT

## 2021-11-17 PROCEDURE — 36415 COLL VENOUS BLD VENIPUNCTURE: CPT

## 2021-11-17 PROCEDURE — 93005 ELECTROCARDIOGRAM TRACING: CPT

## 2021-11-17 PROCEDURE — 80053 COMPREHEN METABOLIC PANEL: CPT

## 2021-11-17 PROCEDURE — 74011250637 HC RX REV CODE- 250/637: Performed by: STUDENT IN AN ORGANIZED HEALTH CARE EDUCATION/TRAINING PROGRAM

## 2021-11-17 PROCEDURE — 84484 ASSAY OF TROPONIN QUANT: CPT

## 2021-11-17 RX ORDER — POTASSIUM CHLORIDE 750 MG/1
20 TABLET, FILM COATED, EXTENDED RELEASE ORAL
Status: COMPLETED | OUTPATIENT
Start: 2021-11-17 | End: 2021-11-17

## 2021-11-17 RX ADMIN — POTASSIUM CHLORIDE 20 MEQ: 750 TABLET, EXTENDED RELEASE ORAL at 12:00

## 2021-11-17 NOTE — ED TRIAGE NOTES
Pt reports a hx of CHF and COPD and reports sob that started last night, reports he used marijuana, heroine, and cocaine last night.
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
## 2021-11-18 LAB
ATRIAL RATE: 86 BPM
CALCULATED P AXIS, ECG09: 51 DEGREES
CALCULATED R AXIS, ECG10: -26 DEGREES
CALCULATED T AXIS, ECG11: 158 DEGREES
DIAGNOSIS, 93000: NORMAL
P-R INTERVAL, ECG05: 116 MS
Q-T INTERVAL, ECG07: 404 MS
QRS DURATION, ECG06: 88 MS
QTC CALCULATION (BEZET), ECG08: 483 MS
VENTRICULAR RATE, ECG03: 86 BPM

## 2021-12-02 ENCOUNTER — OFFICE VISIT (OUTPATIENT)
Dept: INTERNAL MEDICINE CLINIC | Age: 56
End: 2021-12-02
Payer: MEDICAID

## 2021-12-02 VITALS
BODY MASS INDEX: 25.76 KG/M2 | OXYGEN SATURATION: 95 % | WEIGHT: 170 LBS | RESPIRATION RATE: 16 BRPM | TEMPERATURE: 98.3 F | HEART RATE: 102 BPM | HEIGHT: 68 IN | SYSTOLIC BLOOD PRESSURE: 185 MMHG | DIASTOLIC BLOOD PRESSURE: 100 MMHG

## 2021-12-02 DIAGNOSIS — I10 UNCONTROLLED HYPERTENSION: ICD-10-CM

## 2021-12-02 DIAGNOSIS — Z91.199 H/O NONCOMPLIANCE WITH MEDICAL TREATMENT, PRESENTING HAZARDS TO HEALTH: ICD-10-CM

## 2021-12-02 DIAGNOSIS — I25.5 ISCHEMIC CARDIOMYOPATHY: ICD-10-CM

## 2021-12-02 DIAGNOSIS — Z01.818 PREOPERATIVE EXAMINATION: Primary | ICD-10-CM

## 2021-12-02 DIAGNOSIS — E11.319 DIABETIC RETINOPATHY ASSOCIATED WITH TYPE 2 DIABETES MELLITUS, MACULAR EDEMA PRESENCE UNSPECIFIED, UNSPECIFIED LATERALITY, UNSPECIFIED RETINOPATHY SEVERITY (HCC): ICD-10-CM

## 2021-12-02 DIAGNOSIS — N18.5 STAGE 5 CHRONIC KIDNEY DISEASE NOT ON CHRONIC DIALYSIS (HCC): ICD-10-CM

## 2021-12-02 PROCEDURE — 3051F HG A1C>EQUAL 7.0%<8.0%: CPT | Performed by: FAMILY MEDICINE

## 2021-12-02 PROCEDURE — 99396 PREV VISIT EST AGE 40-64: CPT | Performed by: FAMILY MEDICINE

## 2021-12-02 PROCEDURE — 99213 OFFICE O/P EST LOW 20 MIN: CPT | Performed by: FAMILY MEDICINE

## 2021-12-02 RX ORDER — BUPRENORPHINE HYDROCHLORIDE, NALOXONE HYDROCHLORIDE 8; 2 MG/1; MG/1
2 FILM, SOLUBLE BUCCAL; SUBLINGUAL DAILY
COMMUNITY
Start: 2021-11-24

## 2021-12-02 NOTE — PROGRESS NOTES
Chief Complaint   Patient presents with    Pre-op Exam     Patient scheduled for surgery on 12/8/21 for his eyes. 1. Have you been to the ER, urgent care clinic since your last visit? Hospitalized since your last visit? Yes When: 11/17/21 Where: [facility] ED Reason for visit: shortness of bresth     2. Have you seen or consulted any other health care providers outside of the [address] [place] [address] since your last visit? Include any pap smears or colon screening.  No
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
## 2021-12-22 DIAGNOSIS — I50.43 ACUTE ON CHRONIC COMBINED SYSTOLIC AND DIASTOLIC CONGESTIVE HEART FAILURE (HCC): ICD-10-CM

## 2021-12-22 RX ORDER — CLONIDINE HYDROCHLORIDE 0.3 MG/1
0.3 TABLET ORAL 2 TIMES DAILY
Qty: 180 TABLET | Refills: 3 | Status: SHIPPED | OUTPATIENT
Start: 2021-12-22 | End: 2022-04-29 | Stop reason: SDUPTHER

## 2022-01-19 ENCOUNTER — HOSPITAL ENCOUNTER (INPATIENT)
Age: 57
LOS: 3 days | Discharge: LEFT BEFORE DISCHARGE | DRG: 199 | End: 2022-01-23
Attending: EMERGENCY MEDICINE | Admitting: INTERNAL MEDICINE
Payer: MEDICAID

## 2022-01-19 ENCOUNTER — APPOINTMENT (OUTPATIENT)
Dept: CT IMAGING | Age: 57
DRG: 199 | End: 2022-01-19
Attending: FAMILY MEDICINE
Payer: MEDICAID

## 2022-01-19 ENCOUNTER — APPOINTMENT (OUTPATIENT)
Dept: GENERAL RADIOLOGY | Age: 57
DRG: 199 | End: 2022-01-19
Attending: EMERGENCY MEDICINE
Payer: MEDICAID

## 2022-01-19 ENCOUNTER — APPOINTMENT (OUTPATIENT)
Dept: VASCULAR SURGERY | Age: 57
DRG: 199 | End: 2022-01-19
Attending: FAMILY MEDICINE
Payer: MEDICAID

## 2022-01-19 ENCOUNTER — APPOINTMENT (OUTPATIENT)
Dept: NUCLEAR MEDICINE | Age: 57
DRG: 199 | End: 2022-01-19
Attending: FAMILY MEDICINE
Payer: MEDICAID

## 2022-01-19 DIAGNOSIS — I50.9 ACUTE ON CHRONIC CONGESTIVE HEART FAILURE, UNSPECIFIED HEART FAILURE TYPE (HCC): Primary | ICD-10-CM

## 2022-01-19 DIAGNOSIS — I10 BENIGN ESSENTIAL HTN: ICD-10-CM

## 2022-01-19 DIAGNOSIS — N17.9 ACUTE RENAL FAILURE SUPERIMPOSED ON STAGE 5 CHRONIC KIDNEY DISEASE, NOT ON CHRONIC DIALYSIS, UNSPECIFIED ACUTE RENAL FAILURE TYPE (HCC): ICD-10-CM

## 2022-01-19 DIAGNOSIS — I42.8 NON-ISCHEMIC CARDIOMYOPATHY (HCC): ICD-10-CM

## 2022-01-19 DIAGNOSIS — N18.5 ACUTE RENAL FAILURE SUPERIMPOSED ON STAGE 5 CHRONIC KIDNEY DISEASE, NOT ON CHRONIC DIALYSIS, UNSPECIFIED ACUTE RENAL FAILURE TYPE (HCC): ICD-10-CM

## 2022-01-19 LAB
ALBUMIN SERPL-MCNC: 2.4 G/DL (ref 3.5–5)
ALBUMIN/GLOB SERPL: 0.4 {RATIO} (ref 1.1–2.2)
ALP SERPL-CCNC: 130 U/L (ref 45–117)
ALT SERPL-CCNC: 15 U/L (ref 12–78)
ANION GAP SERPL CALC-SCNC: 6 MMOL/L (ref 5–15)
ARTERIAL PATENCY WRIST A: POSITIVE
AST SERPL-CCNC: 23 U/L (ref 15–37)
ATRIAL RATE: 91 BPM
BASE DEFICIT BLD-SCNC: 8.4 MMOL/L
BASOPHILS # BLD: 0 K/UL (ref 0–0.1)
BASOPHILS NFR BLD: 0 % (ref 0–1)
BDY SITE: ABNORMAL
BILIRUB SERPL-MCNC: 0.3 MG/DL (ref 0.2–1)
BNP SERPL-MCNC: ABNORMAL PG/ML
BUN SERPL-MCNC: 64 MG/DL (ref 6–20)
BUN/CREAT SERPL: 10 (ref 12–20)
CALCIUM SERPL-MCNC: 8.5 MG/DL (ref 8.5–10.1)
CALCULATED P AXIS, ECG09: 70 DEGREES
CALCULATED R AXIS, ECG10: -14 DEGREES
CALCULATED T AXIS, ECG11: -169 DEGREES
CHLORIDE SERPL-SCNC: 113 MMOL/L (ref 97–108)
CO2 SERPL-SCNC: 18 MMOL/L (ref 21–32)
COMMENT, HOLDF: NORMAL
COVID-19 RAPID TEST, COVR: NOT DETECTED
CREAT SERPL-MCNC: 6.2 MG/DL (ref 0.7–1.3)
DIAGNOSIS, 93000: NORMAL
DIFFERENTIAL METHOD BLD: ABNORMAL
EOSINOPHIL # BLD: 0.1 K/UL (ref 0–0.4)
EOSINOPHIL NFR BLD: 1 % (ref 0–7)
ERYTHROCYTE [DISTWIDTH] IN BLOOD BY AUTOMATED COUNT: 16.6 % (ref 11.5–14.5)
FLUAV AG NPH QL IA: NEGATIVE
FLUBV AG NOSE QL IA: NEGATIVE
GAS FLOW.O2 O2 DELIVERY SYS: ABNORMAL L/MIN
GLOBULIN SER CALC-MCNC: 5.8 G/DL (ref 2–4)
GLUCOSE BLD STRIP.AUTO-MCNC: 121 MG/DL (ref 65–117)
GLUCOSE BLD STRIP.AUTO-MCNC: 162 MG/DL (ref 65–117)
GLUCOSE SERPL-MCNC: 190 MG/DL (ref 65–100)
HCO3 BLD-SCNC: 17.5 MMOL/L (ref 22–26)
HCT VFR BLD AUTO: 26.1 % (ref 36.6–50.3)
HGB BLD-MCNC: 8.1 G/DL (ref 12.1–17)
IMM GRANULOCYTES # BLD AUTO: 0.1 K/UL (ref 0–0.04)
IMM GRANULOCYTES NFR BLD AUTO: 1 % (ref 0–0.5)
LYMPHOCYTES # BLD: 0.4 K/UL (ref 0.8–3.5)
LYMPHOCYTES NFR BLD: 4 % (ref 12–49)
MCH RBC QN AUTO: 26.6 PG (ref 26–34)
MCHC RBC AUTO-ENTMCNC: 31 G/DL (ref 30–36.5)
MCV RBC AUTO: 85.6 FL (ref 80–99)
MONOCYTES # BLD: 0.2 K/UL (ref 0–1)
MONOCYTES NFR BLD: 2 % (ref 5–13)
NEUTS SEG # BLD: 8.3 K/UL (ref 1.8–8)
NEUTS SEG NFR BLD: 92 % (ref 32–75)
NRBC # BLD: 0 K/UL (ref 0–0.01)
NRBC BLD-RTO: 0 PER 100 WBC
O2/TOTAL GAS SETTING VFR VENT: 4 %
P-R INTERVAL, ECG05: 124 MS
PCO2 BLD: 36.2 MMHG (ref 35–45)
PH BLD: 7.29 [PH] (ref 7.35–7.45)
PLATELET # BLD AUTO: 309 K/UL (ref 150–400)
PLATELET COMMENTS,PCOM: ABNORMAL
PMV BLD AUTO: 11.5 FL (ref 8.9–12.9)
PO2 BLD: 70 MMHG (ref 80–100)
POTASSIUM SERPL-SCNC: 5.2 MMOL/L (ref 3.5–5.1)
PROCALCITONIN SERPL-MCNC: 0.07 NG/ML
PROT SERPL-MCNC: 8.2 G/DL (ref 6.4–8.2)
Q-T INTERVAL, ECG07: 338 MS
QRS DURATION, ECG06: 78 MS
QTC CALCULATION (BEZET), ECG08: 415 MS
RBC # BLD AUTO: 3.05 M/UL (ref 4.1–5.7)
RBC MORPH BLD: ABNORMAL
SAMPLES BEING HELD,HOLD: NORMAL
SAO2 % BLD: 91.8 % (ref 92–97)
SERVICE CMNT-IMP: ABNORMAL
SERVICE CMNT-IMP: ABNORMAL
SODIUM SERPL-SCNC: 137 MMOL/L (ref 136–145)
SOURCE, COVRS: NORMAL
SPECIMEN TYPE: ABNORMAL
TROPONIN-HIGH SENSITIVITY: 27 NG/L (ref 0–76)
VENTRICULAR RATE, ECG03: 91 BPM
WBC # BLD AUTO: 9.1 K/UL (ref 4.1–11.1)

## 2022-01-19 PROCEDURE — 36415 COLL VENOUS BLD VENIPUNCTURE: CPT

## 2022-01-19 PROCEDURE — 87804 INFLUENZA ASSAY W/OPTIC: CPT

## 2022-01-19 PROCEDURE — 74011000250 HC RX REV CODE- 250: Performed by: FAMILY MEDICINE

## 2022-01-19 PROCEDURE — 74011250637 HC RX REV CODE- 250/637: Performed by: FAMILY MEDICINE

## 2022-01-19 PROCEDURE — 5A09357 ASSISTANCE WITH RESPIRATORY VENTILATION, LESS THAN 24 CONSECUTIVE HOURS, CONTINUOUS POSITIVE AIRWAY PRESSURE: ICD-10-PCS | Performed by: STUDENT IN AN ORGANIZED HEALTH CARE EDUCATION/TRAINING PROGRAM

## 2022-01-19 PROCEDURE — 74011636637 HC RX REV CODE- 636/637: Performed by: FAMILY MEDICINE

## 2022-01-19 PROCEDURE — 71045 X-RAY EXAM CHEST 1 VIEW: CPT

## 2022-01-19 PROCEDURE — 77010033678 HC OXYGEN DAILY

## 2022-01-19 PROCEDURE — 84484 ASSAY OF TROPONIN QUANT: CPT

## 2022-01-19 PROCEDURE — 96374 THER/PROPH/DIAG INJ IV PUSH: CPT

## 2022-01-19 PROCEDURE — A9540 TC99M MAA: HCPCS

## 2022-01-19 PROCEDURE — 83880 ASSAY OF NATRIURETIC PEPTIDE: CPT

## 2022-01-19 PROCEDURE — 84145 PROCALCITONIN (PCT): CPT

## 2022-01-19 PROCEDURE — 80053 COMPREHEN METABOLIC PANEL: CPT

## 2022-01-19 PROCEDURE — 96375 TX/PRO/DX INJ NEW DRUG ADDON: CPT

## 2022-01-19 PROCEDURE — 93005 ELECTROCARDIOGRAM TRACING: CPT

## 2022-01-19 PROCEDURE — 71250 CT THORAX DX C-: CPT

## 2022-01-19 PROCEDURE — 82803 BLOOD GASES ANY COMBINATION: CPT

## 2022-01-19 PROCEDURE — 74011250637 HC RX REV CODE- 250/637: Performed by: EMERGENCY MEDICINE

## 2022-01-19 PROCEDURE — 94640 AIRWAY INHALATION TREATMENT: CPT

## 2022-01-19 PROCEDURE — 93970 EXTREMITY STUDY: CPT

## 2022-01-19 PROCEDURE — 94664 DEMO&/EVAL PT USE INHALER: CPT

## 2022-01-19 PROCEDURE — G0378 HOSPITAL OBSERVATION PER HR: HCPCS

## 2022-01-19 PROCEDURE — 99285 EMERGENCY DEPT VISIT HI MDM: CPT

## 2022-01-19 PROCEDURE — 94760 N-INVAS EAR/PLS OXIMETRY 1: CPT

## 2022-01-19 PROCEDURE — 94660 CPAP INITIATION&MGMT: CPT

## 2022-01-19 PROCEDURE — 36600 WITHDRAWAL OF ARTERIAL BLOOD: CPT

## 2022-01-19 PROCEDURE — 85025 COMPLETE CBC W/AUTO DIFF WBC: CPT

## 2022-01-19 PROCEDURE — 70450 CT HEAD/BRAIN W/O DYE: CPT

## 2022-01-19 PROCEDURE — 87635 SARS-COV-2 COVID-19 AMP PRB: CPT

## 2022-01-19 PROCEDURE — 82962 GLUCOSE BLOOD TEST: CPT

## 2022-01-19 PROCEDURE — 74011000250 HC RX REV CODE- 250: Performed by: EMERGENCY MEDICINE

## 2022-01-19 PROCEDURE — 99222 1ST HOSP IP/OBS MODERATE 55: CPT | Performed by: INTERNAL MEDICINE

## 2022-01-19 RX ORDER — BUMETANIDE 0.25 MG/ML
2 INJECTION INTRAMUSCULAR; INTRAVENOUS
Status: DISCONTINUED | OUTPATIENT
Start: 2022-01-19 | End: 2022-01-19

## 2022-01-19 RX ORDER — SODIUM CHLORIDE 0.9 % (FLUSH) 0.9 %
5-40 SYRINGE (ML) INJECTION AS NEEDED
Status: DISCONTINUED | OUTPATIENT
Start: 2022-01-19 | End: 2022-01-23

## 2022-01-19 RX ORDER — INSULIN LISPRO 100 [IU]/ML
INJECTION, SOLUTION INTRAVENOUS; SUBCUTANEOUS
Status: DISCONTINUED | OUTPATIENT
Start: 2022-01-19 | End: 2022-01-23 | Stop reason: HOSPADM

## 2022-01-19 RX ORDER — BUMETANIDE 0.25 MG/ML
2 INJECTION INTRAMUSCULAR; INTRAVENOUS 2 TIMES DAILY
Status: DISCONTINUED | OUTPATIENT
Start: 2022-01-19 | End: 2022-01-23

## 2022-01-19 RX ORDER — CARVEDILOL 12.5 MG/1
25 TABLET ORAL 2 TIMES DAILY WITH MEALS
Status: DISCONTINUED | OUTPATIENT
Start: 2022-01-19 | End: 2022-01-23 | Stop reason: HOSPADM

## 2022-01-19 RX ORDER — PREGABALIN 25 MG/1
100 CAPSULE ORAL 2 TIMES DAILY
Status: DISCONTINUED | OUTPATIENT
Start: 2022-01-19 | End: 2022-01-19

## 2022-01-19 RX ORDER — MAGNESIUM SULFATE 100 %
4 CRYSTALS MISCELLANEOUS AS NEEDED
Status: DISCONTINUED | OUTPATIENT
Start: 2022-01-19 | End: 2022-01-23 | Stop reason: HOSPADM

## 2022-01-19 RX ORDER — NITROGLYCERIN 20 MG/100ML
0-200 INJECTION INTRAVENOUS
Status: DISCONTINUED | OUTPATIENT
Start: 2022-01-19 | End: 2022-01-20

## 2022-01-19 RX ORDER — ALBUTEROL SULFATE 90 UG/1
8 AEROSOL, METERED RESPIRATORY (INHALATION)
Status: COMPLETED | OUTPATIENT
Start: 2022-01-19 | End: 2022-01-19

## 2022-01-19 RX ORDER — BUPRENORPHINE HYDROCHLORIDE AND NALOXONE HYDROCHLORIDE DIHYDRATE 8; 2 MG/1; MG/1
2 TABLET SUBLINGUAL DAILY
Status: DISCONTINUED | OUTPATIENT
Start: 2022-01-19 | End: 2022-01-19

## 2022-01-19 RX ORDER — DEXTROSE 50 % IN WATER (D50W) INTRAVENOUS SYRINGE
25-50 AS NEEDED
Status: DISCONTINUED | OUTPATIENT
Start: 2022-01-19 | End: 2022-01-23 | Stop reason: HOSPADM

## 2022-01-19 RX ORDER — AMLODIPINE BESYLATE 5 MG/1
10 TABLET ORAL DAILY
Status: DISCONTINUED | OUTPATIENT
Start: 2022-01-19 | End: 2022-01-23 | Stop reason: HOSPADM

## 2022-01-19 RX ORDER — HEPARIN SODIUM 5000 [USP'U]/ML
5000 INJECTION, SOLUTION INTRAVENOUS; SUBCUTANEOUS EVERY 8 HOURS
Status: DISCONTINUED | OUTPATIENT
Start: 2022-01-19 | End: 2022-01-23 | Stop reason: HOSPADM

## 2022-01-19 RX ORDER — ASPIRIN 81 MG/1
81 TABLET ORAL DAILY
Status: DISCONTINUED | OUTPATIENT
Start: 2022-01-19 | End: 2022-01-23 | Stop reason: HOSPADM

## 2022-01-19 RX ORDER — ACETAMINOPHEN 325 MG/1
650 TABLET ORAL
Status: DISCONTINUED | OUTPATIENT
Start: 2022-01-19 | End: 2022-01-23 | Stop reason: HOSPADM

## 2022-01-19 RX ORDER — SODIUM CHLORIDE 0.9 % (FLUSH) 0.9 %
5-40 SYRINGE (ML) INJECTION EVERY 8 HOURS
Status: DISCONTINUED | OUTPATIENT
Start: 2022-01-19 | End: 2022-01-23

## 2022-01-19 RX ADMIN — ALBUTEROL SULFATE 8 PUFF: 90 AEROSOL, METERED RESPIRATORY (INHALATION) at 08:53

## 2022-01-19 RX ADMIN — ASPIRIN 81 MG: 81 TABLET, COATED ORAL at 13:01

## 2022-01-19 RX ADMIN — NITROGLYCERIN 10 MCG/MIN: 20 INJECTION INTRAVENOUS at 06:56

## 2022-01-19 RX ADMIN — CLONIDINE HYDROCHLORIDE 0.3 MG: 0.2 TABLET ORAL at 13:01

## 2022-01-19 RX ADMIN — AMLODIPINE BESYLATE 10 MG: 5 TABLET ORAL at 13:01

## 2022-01-19 RX ADMIN — Medication 2 UNITS: at 13:36

## 2022-01-19 RX ADMIN — BUMETANIDE 2 MG: 0.25 INJECTION INTRAMUSCULAR; INTRAVENOUS at 13:01
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
## 2022-01-19 NOTE — ED NOTES
Triage Note: Patient arrives to ER via EMS from home after complaining that a new onset of SOB has increasingly worsened. Patient states an increase in the swelling of his legs that is new. Patient is not vaccinated for the flu and received one shot of the covid-vaccine.      Pt. Hx of CHF, COPD     Patient denies : fever, cough, or nausea     Patient was found at 75% on RA but his baseline is 2L on nasal cannula     Patient received 1 duo-neb and 10mg of dexmethasone from EMS

## 2022-01-19 NOTE — ED NOTES
Verbal shift change report given to JETT Velasco (oncoming nurse) by Michael Bhagat RN (offgoing nurse).  Report included the following information SBAR, ED Summary, Intake/Output, MAR and Recent Results

## 2022-01-19 NOTE — ED NOTES
Patient refuses to keep his BP cuff on; pt initially refused his medication earlier; however pt was convinced to take it and educated on the need for his BP medicine.

## 2022-01-19 NOTE — Clinical Note
Patient Class[de-identified] OBSERVATION [104]   Type of Bed: Telemetry [19]   Cardiac Monitoring Required?: Yes   Reason for Observation: chf   Admitting Diagnosis: CHF (congestive heart failure) [facility]) [[id number]]   Admitting Physician: Rhona Cornejo [[id number]]   Attending Physician: Renetta Orozco
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
## 2022-01-19 NOTE — ED NOTES
Patient assisted with urinal and voided 300ml's and placed back in bed/on monitor; and nitro drip continues to infuse. Pt given warm blanket/call bell/side rails up x2, and wheels locked.

## 2022-01-19 NOTE — NURSE NAVIGATOR
Chart reviewed by Heart Failure Nurse Navigator. Heart Failure database completed. EF:  Pending; previous EF 35-40% (echo dated 3/6/21)     ACEi/ARB/ARNi: not currently on d/t renal failure    BB: Coreg 25 mg BID    Aldosterone Antagonist: not currently on d/t renal failure    Obstructive Sleep Apnea Screening: screening priority 1   STOP-BANG score:   Referred to Sleep Medicine:     CRT . NYHA Functional Class IV on admission. Heart Failure Teach Back in Patient Education. Heart Failure Avoiding Triggers on Discharge Instructions. Cardiologist: Dr Shantel Mcmahon ([facility])      Post discharge follow up phone call to be made within 48-72 hours of discharge.
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
## 2022-01-20 ENCOUNTER — APPOINTMENT (OUTPATIENT)
Dept: GENERAL RADIOLOGY | Age: 57
DRG: 199 | End: 2022-01-20
Attending: STUDENT IN AN ORGANIZED HEALTH CARE EDUCATION/TRAINING PROGRAM
Payer: MEDICAID

## 2022-01-20 ENCOUNTER — APPOINTMENT (OUTPATIENT)
Dept: NON INVASIVE DIAGNOSTICS | Age: 57
DRG: 199 | End: 2022-01-20
Attending: FAMILY MEDICINE
Payer: MEDICAID

## 2022-01-20 PROBLEM — I16.0 HYPERTENSIVE URGENCY: Status: ACTIVE | Noted: 2022-01-20

## 2022-01-20 LAB
ALBUMIN SERPL-MCNC: 2 G/DL (ref 3.5–5)
ALBUMIN/GLOB SERPL: 0.4 {RATIO} (ref 1.1–2.2)
ALP SERPL-CCNC: 97 U/L (ref 45–117)
ALT SERPL-CCNC: 14 U/L (ref 12–78)
ANION GAP SERPL CALC-SCNC: 3 MMOL/L (ref 5–15)
ANION GAP SERPL CALC-SCNC: 4 MMOL/L (ref 5–15)
APPEARANCE UR: CLEAR
APPEARANCE UR: CLEAR
AST SERPL-CCNC: 25 U/L (ref 15–37)
BACTERIA URNS QL MICRO: NEGATIVE /HPF
BACTERIA URNS QL MICRO: NEGATIVE /HPF
BASOPHILS # BLD: 0 K/UL (ref 0–0.1)
BASOPHILS NFR BLD: 0 % (ref 0–1)
BILIRUB SERPL-MCNC: 0.3 MG/DL (ref 0.2–1)
BILIRUB UR QL: NEGATIVE
BILIRUB UR QL: NEGATIVE
BUN SERPL-MCNC: 69 MG/DL (ref 6–20)
BUN SERPL-MCNC: 70 MG/DL (ref 6–20)
BUN/CREAT SERPL: 11 (ref 12–20)
BUN/CREAT SERPL: 11 (ref 12–20)
CALCIUM SERPL-MCNC: 8.2 MG/DL (ref 8.5–10.1)
CALCIUM SERPL-MCNC: 8.3 MG/DL (ref 8.5–10.1)
CHLORIDE SERPL-SCNC: 116 MMOL/L (ref 97–108)
CHLORIDE SERPL-SCNC: 116 MMOL/L (ref 97–108)
CHOLEST SERPL-MCNC: 133 MG/DL
CO2 SERPL-SCNC: 20 MMOL/L (ref 21–32)
CO2 SERPL-SCNC: 20 MMOL/L (ref 21–32)
COLOR UR: ABNORMAL
COLOR UR: ABNORMAL
CREAT SERPL-MCNC: 6.2 MG/DL (ref 0.7–1.3)
CREAT SERPL-MCNC: 6.2 MG/DL (ref 0.7–1.3)
DIFFERENTIAL METHOD BLD: ABNORMAL
ECHO AO ROOT DIAM: 3.5 CM
ECHO AO ROOT INDEX: 1.83 CM/M2
ECHO AV PEAK GRADIENT: 7 MMHG
ECHO AV PEAK VELOCITY: 1.3 M/S
ECHO AV VELOCITY RATIO: 0.85
ECHO LA DIAMETER INDEX: 2.46 CM/M2
ECHO LA DIAMETER: 4.7 CM
ECHO LA TO AORTIC ROOT RATIO: 1.34
ECHO LA VOL 2C: 113 ML (ref 18–58)
ECHO LA VOL 4C: 86 ML (ref 18–58)
ECHO LA VOLUME AREA LENGTH: 109 ML
ECHO LA VOLUME INDEX A2C: 59 ML/M2 (ref 16–34)
ECHO LA VOLUME INDEX A4C: 45 ML/M2 (ref 16–34)
ECHO LA VOLUME INDEX AREA LENGTH: 57 ML/M2 (ref 16–34)
ECHO LV E' LATERAL VELOCITY: 7 CM/S
ECHO LV E' SEPTAL VELOCITY: 6 CM/S
ECHO LV FRACTIONAL SHORTENING: 29 % (ref 28–44)
ECHO LV INTERNAL DIMENSION DIASTOLE INDEX: 2.72 CM/M2
ECHO LV INTERNAL DIMENSION DIASTOLIC: 5.2 CM (ref 4.2–5.9)
ECHO LV INTERNAL DIMENSION SYSTOLIC INDEX: 1.94 CM/M2
ECHO LV INTERNAL DIMENSION SYSTOLIC: 3.7 CM
ECHO LV IVSD: 1.1 CM (ref 0.6–1)
ECHO LV IVSD: 1.9 CM (ref 0.6–1)
ECHO LV POSTERIOR WALL DIASTOLIC: 1.2 CM (ref 0.6–1)
ECHO LV RELATIVE WALL THICKNESS RATIO: 0.46
ECHO LVOT MEAN GRADIENT: 2 MMHG
ECHO LVOT PEAK GRADIENT: 5 MMHG
ECHO LVOT PEAK VELOCITY: 1.1 M/S
ECHO LVOT VTI: 21.2 CM
ECHO MV A VELOCITY: 0.5 M/S
ECHO MV AREA PHT: 5.5 CM2
ECHO MV E DECELERATION TIME (DT): 138.4 MS
ECHO MV E VELOCITY: 0.98 M/S
ECHO MV E/A RATIO: 1.96
ECHO MV E/E' LATERAL: 14
ECHO MV E/E' RATIO (AVERAGED): 15.17
ECHO MV E/E' SEPTAL: 16.33
ECHO MV PRESSURE HALF TIME (PHT): 40.1 MS
ECHO PV MAX VELOCITY: 0.7 M/S
ECHO PV PEAK GRADIENT: 2 MMHG
ECHO RV FREE WALL PEAK S': 12 CM/S
ECHO RV TAPSE: 2.3 CM (ref 1.5–2)
EOSINOPHIL # BLD: 0 K/UL (ref 0–0.4)
EOSINOPHIL NFR BLD: 0 % (ref 0–7)
EPITH CASTS URNS QL MICRO: ABNORMAL /LPF
EPITH CASTS URNS QL MICRO: ABNORMAL /LPF
ERYTHROCYTE [DISTWIDTH] IN BLOOD BY AUTOMATED COUNT: 16.5 % (ref 11.5–14.5)
EST. AVERAGE GLUCOSE BLD GHB EST-MCNC: 117 MG/DL
GLOBULIN SER CALC-MCNC: 4.9 G/DL (ref 2–4)
GLUCOSE BLD STRIP.AUTO-MCNC: 118 MG/DL (ref 65–117)
GLUCOSE BLD STRIP.AUTO-MCNC: 139 MG/DL (ref 65–117)
GLUCOSE BLD STRIP.AUTO-MCNC: 94 MG/DL (ref 65–117)
GLUCOSE BLD STRIP.AUTO-MCNC: 95 MG/DL (ref 65–117)
GLUCOSE SERPL-MCNC: 125 MG/DL (ref 65–100)
GLUCOSE SERPL-MCNC: 90 MG/DL (ref 65–100)
GLUCOSE UR STRIP.AUTO-MCNC: 100 MG/DL
GLUCOSE UR STRIP.AUTO-MCNC: NEGATIVE MG/DL
HBA1C MFR BLD: 5.7 % (ref 4–5.6)
HCT VFR BLD AUTO: 21.3 % (ref 36.6–50.3)
HCT VFR BLD AUTO: 21.8 % (ref 36.6–50.3)
HCT VFR BLD AUTO: 22.7 % (ref 36.6–50.3)
HDLC SERPL-MCNC: 62 MG/DL
HDLC SERPL: 2.1 {RATIO} (ref 0–5)
HGB BLD-MCNC: 6.5 G/DL (ref 12.1–17)
HGB BLD-MCNC: 6.7 G/DL (ref 12.1–17)
HGB BLD-MCNC: 7 G/DL (ref 12.1–17)
HGB UR QL STRIP: ABNORMAL
HGB UR QL STRIP: ABNORMAL
HISTORY CHECKED?,CKHIST: NORMAL
HYALINE CASTS URNS QL MICRO: ABNORMAL /LPF (ref 0–5)
HYALINE CASTS URNS QL MICRO: ABNORMAL /LPF (ref 0–5)
IMM GRANULOCYTES # BLD AUTO: 0.1 K/UL (ref 0–0.04)
IMM GRANULOCYTES NFR BLD AUTO: 1 % (ref 0–0.5)
IRON SATN MFR SERPL: 14 % (ref 20–50)
IRON SERPL-MCNC: 36 UG/DL (ref 35–150)
KETONES UR QL STRIP.AUTO: NEGATIVE MG/DL
KETONES UR QL STRIP.AUTO: NEGATIVE MG/DL
LDLC SERPL CALC-MCNC: 59 MG/DL (ref 0–100)
LEUKOCYTE ESTERASE UR QL STRIP.AUTO: NEGATIVE
LEUKOCYTE ESTERASE UR QL STRIP.AUTO: NEGATIVE
LYMPHOCYTES # BLD: 0.7 K/UL (ref 0.8–3.5)
LYMPHOCYTES NFR BLD: 9 % (ref 12–49)
MAGNESIUM SERPL-MCNC: 2.2 MG/DL (ref 1.6–2.4)
MCH RBC QN AUTO: 26.5 PG (ref 26–34)
MCHC RBC AUTO-ENTMCNC: 30.7 G/DL (ref 30–36.5)
MCV RBC AUTO: 86.2 FL (ref 80–99)
MONOCYTES # BLD: 0.6 K/UL (ref 0–1)
MONOCYTES NFR BLD: 7 % (ref 5–13)
NEUTS SEG # BLD: 6.6 K/UL (ref 1.8–8)
NEUTS SEG NFR BLD: 83 % (ref 32–75)
NITRITE UR QL STRIP.AUTO: NEGATIVE
NITRITE UR QL STRIP.AUTO: NEGATIVE
NRBC # BLD: 0 K/UL (ref 0–0.01)
NRBC BLD-RTO: 0 PER 100 WBC
PH UR STRIP: 6 [PH] (ref 5–8)
PH UR STRIP: 6 [PH] (ref 5–8)
PHOSPHATE SERPL-MCNC: 5.4 MG/DL (ref 2.6–4.7)
PLATELET # BLD AUTO: 255 K/UL (ref 150–400)
PLATELET COMMENTS,PCOM: ABNORMAL
PMV BLD AUTO: 10.9 FL (ref 8.9–12.9)
POTASSIUM SERPL-SCNC: 5 MMOL/L (ref 3.5–5.1)
POTASSIUM SERPL-SCNC: 5.1 MMOL/L (ref 3.5–5.1)
PROT SERPL-MCNC: 6.9 G/DL (ref 6.4–8.2)
PROT UR STRIP-MCNC: 100 MG/DL
PROT UR STRIP-MCNC: 300 MG/DL
RBC # BLD AUTO: 2.53 M/UL (ref 4.1–5.7)
RBC #/AREA URNS HPF: ABNORMAL /HPF (ref 0–5)
RBC #/AREA URNS HPF: ABNORMAL /HPF (ref 0–5)
RBC MORPH BLD: ABNORMAL
RBC MORPH BLD: ABNORMAL
SERVICE CMNT-IMP: ABNORMAL
SERVICE CMNT-IMP: ABNORMAL
SERVICE CMNT-IMP: NORMAL
SERVICE CMNT-IMP: NORMAL
SODIUM SERPL-SCNC: 139 MMOL/L (ref 136–145)
SODIUM SERPL-SCNC: 140 MMOL/L (ref 136–145)
SP GR UR REFRACTOMETRY: 1.01 (ref 1–1.03)
SP GR UR REFRACTOMETRY: 1.01 (ref 1–1.03)
TIBC SERPL-MCNC: 251 UG/DL (ref 250–450)
TRIGL SERPL-MCNC: 60 MG/DL (ref ?–150)
TSH SERPL DL<=0.05 MIU/L-ACNC: 0.78 UIU/ML (ref 0.36–3.74)
UROBILINOGEN UR QL STRIP.AUTO: 0.2 EU/DL (ref 0.2–1)
UROBILINOGEN UR QL STRIP.AUTO: 0.2 EU/DL (ref 0.2–1)
VLDLC SERPL CALC-MCNC: 12 MG/DL
WBC # BLD AUTO: 8 K/UL (ref 4.1–11.1)
WBC URNS QL MICRO: ABNORMAL /HPF (ref 0–4)
WBC URNS QL MICRO: ABNORMAL /HPF (ref 0–4)

## 2022-01-20 PROCEDURE — 65660000001 HC RM ICU INTERMED STEPDOWN

## 2022-01-20 PROCEDURE — 74011250636 HC RX REV CODE- 250/636: Performed by: INTERNAL MEDICINE

## 2022-01-20 PROCEDURE — 02H633Z INSERTION OF INFUSION DEVICE INTO RIGHT ATRIUM, PERCUTANEOUS APPROACH: ICD-10-PCS | Performed by: STUDENT IN AN ORGANIZED HEALTH CARE EDUCATION/TRAINING PROGRAM

## 2022-01-20 PROCEDURE — 74011000250 HC RX REV CODE- 250: Performed by: FAMILY MEDICINE

## 2022-01-20 PROCEDURE — 84443 ASSAY THYROID STIM HORMONE: CPT

## 2022-01-20 PROCEDURE — 94660 CPAP INITIATION&MGMT: CPT

## 2022-01-20 PROCEDURE — C9113 INJ PANTOPRAZOLE SODIUM, VIA: HCPCS | Performed by: INTERNAL MEDICINE

## 2022-01-20 PROCEDURE — 36415 COLL VENOUS BLD VENIPUNCTURE: CPT

## 2022-01-20 PROCEDURE — 83036 HEMOGLOBIN GLYCOSYLATED A1C: CPT

## 2022-01-20 PROCEDURE — 74011250636 HC RX REV CODE- 250/636: Performed by: FAMILY MEDICINE

## 2022-01-20 PROCEDURE — 80053 COMPREHEN METABOLIC PANEL: CPT

## 2022-01-20 PROCEDURE — 96372 THER/PROPH/DIAG INJ SC/IM: CPT

## 2022-01-20 PROCEDURE — 93306 TTE W/DOPPLER COMPLETE: CPT

## 2022-01-20 PROCEDURE — 86923 COMPATIBILITY TEST ELECTRIC: CPT

## 2022-01-20 PROCEDURE — 99232 SBSQ HOSP IP/OBS MODERATE 35: CPT | Performed by: INTERNAL MEDICINE

## 2022-01-20 PROCEDURE — 83540 ASSAY OF IRON: CPT

## 2022-01-20 PROCEDURE — 74011000250 HC RX REV CODE- 250: Performed by: EMERGENCY MEDICINE

## 2022-01-20 PROCEDURE — 81001 URINALYSIS AUTO W/SCOPE: CPT

## 2022-01-20 PROCEDURE — 74011250637 HC RX REV CODE- 250/637: Performed by: FAMILY MEDICINE

## 2022-01-20 PROCEDURE — 74011000258 HC RX REV CODE- 258: Performed by: INTERNAL MEDICINE

## 2022-01-20 PROCEDURE — 71045 X-RAY EXAM CHEST 1 VIEW: CPT

## 2022-01-20 PROCEDURE — 80061 LIPID PANEL: CPT

## 2022-01-20 PROCEDURE — 82962 GLUCOSE BLOOD TEST: CPT

## 2022-01-20 PROCEDURE — 74011000250 HC RX REV CODE- 250: Performed by: INTERNAL MEDICINE

## 2022-01-20 PROCEDURE — 85025 COMPLETE CBC W/AUTO DIFF WBC: CPT

## 2022-01-20 PROCEDURE — G0378 HOSPITAL OBSERVATION PER HR: HCPCS

## 2022-01-20 PROCEDURE — 93306 TTE W/DOPPLER COMPLETE: CPT | Performed by: INTERNAL MEDICINE

## 2022-01-20 PROCEDURE — 85018 HEMOGLOBIN: CPT

## 2022-01-20 PROCEDURE — 96376 TX/PRO/DX INJ SAME DRUG ADON: CPT

## 2022-01-20 PROCEDURE — 96374 THER/PROPH/DIAG INJ IV PUSH: CPT

## 2022-01-20 PROCEDURE — 84100 ASSAY OF PHOSPHORUS: CPT

## 2022-01-20 PROCEDURE — 83735 ASSAY OF MAGNESIUM: CPT

## 2022-01-20 PROCEDURE — 86900 BLOOD TYPING SEROLOGIC ABO: CPT

## 2022-01-20 RX ORDER — SODIUM CHLORIDE 9 MG/ML
250 INJECTION, SOLUTION INTRAVENOUS AS NEEDED
Status: DISCONTINUED | OUTPATIENT
Start: 2022-01-20 | End: 2022-01-23

## 2022-01-20 RX ORDER — PANTOPRAZOLE SODIUM 40 MG/10ML
40 INJECTION, POWDER, LYOPHILIZED, FOR SOLUTION INTRAVENOUS EVERY 12 HOURS
Status: DISCONTINUED | OUTPATIENT
Start: 2022-01-20 | End: 2022-01-23

## 2022-01-20 RX ORDER — HYDRALAZINE HYDROCHLORIDE 20 MG/ML
20 INJECTION INTRAMUSCULAR; INTRAVENOUS ONCE
Status: COMPLETED | OUTPATIENT
Start: 2022-01-20 | End: 2022-01-20

## 2022-01-20 RX ORDER — SODIUM CHLORIDE 0.9 % (FLUSH) 0.9 %
10 SYRINGE (ML) INJECTION EVERY 12 HOURS
Status: DISCONTINUED | OUTPATIENT
Start: 2022-01-20 | End: 2022-01-23

## 2022-01-20 RX ORDER — HYDRALAZINE HYDROCHLORIDE 20 MG/ML
20 INJECTION INTRAMUSCULAR; INTRAVENOUS
Status: DISCONTINUED | OUTPATIENT
Start: 2022-01-20 | End: 2022-01-23

## 2022-01-20 RX ORDER — NITROGLYCERIN 20 MG/100ML
0-200 INJECTION INTRAVENOUS
Status: DISCONTINUED | OUTPATIENT
Start: 2022-01-20 | End: 2022-01-21

## 2022-01-20 RX ADMIN — NITROGLYCERIN 50 MCG/MIN: 20 INJECTION INTRAVENOUS at 23:07

## 2022-01-20 RX ADMIN — SODIUM CHLORIDE, PRESERVATIVE FREE 10 ML: 5 INJECTION INTRAVENOUS at 23:11

## 2022-01-20 RX ADMIN — CLONIDINE HYDROCHLORIDE 0.3 MG: 0.2 TABLET ORAL at 17:39

## 2022-01-20 RX ADMIN — PANTOPRAZOLE SODIUM 40 MG: 40 INJECTION, POWDER, FOR SOLUTION INTRAVENOUS at 10:15

## 2022-01-20 RX ADMIN — SODIUM CHLORIDE, PRESERVATIVE FREE 10 ML: 5 INJECTION INTRAVENOUS at 23:06

## 2022-01-20 RX ADMIN — NITROGLYCERIN 5 MCG/MIN: 20 INJECTION INTRAVENOUS at 09:48

## 2022-01-20 RX ADMIN — PANTOPRAZOLE SODIUM 40 MG: 40 INJECTION, POWDER, FOR SOLUTION INTRAVENOUS at 23:06

## 2022-01-20 RX ADMIN — NITROGLYCERIN 45 MCG/MIN: 20 INJECTION INTRAVENOUS at 15:51

## 2022-01-20 RX ADMIN — SODIUM CHLORIDE, PRESERVATIVE FREE 10 ML: 5 INJECTION INTRAVENOUS at 10:16

## 2022-01-20 RX ADMIN — IRON SUCROSE 200 MG: 20 INJECTION, SOLUTION INTRAVENOUS at 23:04

## 2022-01-20 RX ADMIN — CLONIDINE HYDROCHLORIDE 0.3 MG: 0.2 TABLET ORAL at 01:58

## 2022-01-20 RX ADMIN — AMLODIPINE BESYLATE 10 MG: 5 TABLET ORAL at 09:10

## 2022-01-20 RX ADMIN — BUMETANIDE 2 MG: 0.25 INJECTION INTRAMUSCULAR; INTRAVENOUS at 10:12

## 2022-01-20 RX ADMIN — HYDRALAZINE HYDROCHLORIDE 20 MG: 20 INJECTION INTRAMUSCULAR; INTRAVENOUS at 17:39

## 2022-01-20 RX ADMIN — HEPARIN SODIUM 5000 UNITS: 5000 INJECTION, SOLUTION INTRAVENOUS; SUBCUTANEOUS at 01:55

## 2022-01-20 RX ADMIN — HEPARIN SODIUM 5000 UNITS: 5000 INJECTION, SOLUTION INTRAVENOUS; SUBCUTANEOUS at 09:11

## 2022-01-20 RX ADMIN — SODIUM CHLORIDE, PRESERVATIVE FREE 10 ML: 5 INJECTION INTRAVENOUS at 06:04

## 2022-01-20 RX ADMIN — SODIUM CHLORIDE, PRESERVATIVE FREE 10 ML: 5 INJECTION INTRAVENOUS at 01:43

## 2022-01-20 RX ADMIN — HEPARIN SODIUM 5000 UNITS: 5000 INJECTION, SOLUTION INTRAVENOUS; SUBCUTANEOUS at 17:39

## 2022-01-20 RX ADMIN — BUMETANIDE 2 MG: 0.25 INJECTION INTRAMUSCULAR; INTRAVENOUS at 01:41

## 2022-01-20 RX ADMIN — CLONIDINE HYDROCHLORIDE 0.3 MG: 0.2 TABLET ORAL at 09:10

## 2022-01-20 RX ADMIN — NITROGLYCERIN 40 MCG/MIN: 20 INJECTION INTRAVENOUS at 11:46

## 2022-01-20 NOTE — PROGRESS NOTES
1/20/2022 -   TRANSITIONS OF CARE PLAN:   1. RUR: 19%; Moderate  2. DESTINATION: TBD - possibly own home  3. TRANSPORT: TBD - likely significant other, Shelley  4. NEEDS FOR DISCHARGE: TBD - CM Team to monitor for possible OP HD needs  5. ANTICIPATED FOLLOW UPS: PCP, Cardio, Nephro  6. ONGOING INPATIENT NEEDS: Monitoring of Labs, Cardene drip, BiPAP support as needed, IV Diuresing, Echo, close monitoring by Nephro for possible dialysis needs, Full CM Assessment when medically able or family returns call, further plan per medical team    Anticipated Discharge is: Greater Than 48 Hours    Reason for Admission:  CHF                   RUR Score:    19%; Moderate              PCP: First and Last name:   Leonarda Wells MD     Name of Practice: [facility]    Are you a current patient: Yes/No: Yes   Approximate date of last visit: 12/2   Can you participate in a virtual visit if needed:     Do you (patient/family) have any concerns for transition/discharge? Has hx of [facility]; patient may have had recent drug use again (heroine and cocaine)    Patient has known hx of noncompliance with medication regime                   Plan for utilizing home health:   TBD    Current Advanced Directive/Advance Care Plan:  Full Code    Healthcare Decision Maker:   Click here to complete [address] including selection of the [address] Relationship (ie \"Primary\")            Primary Decision Maker: Justo Tom - Other Relative - [phone]    Secondary Decision Maker: Gala Lynch - Daughter - [phone]    Transition of Care Plan:  CM notes CM Consult for HF. CM placed [facility] information on         CM attempted to meet with patient, but patient was on BiPAP, sleeping soundly, and did not respond to door knock or name call.   CM contacted patient's listed emergency contact/primary decision maker (significant other, Shelley: [phone]) but was not able to leave a VM due to the inbox not being set up. CM contacted patient's secondary decision maker (daughter, Aline Arroyo: [phone]) and the phone number is no longer in service. CM reviewed patient's chart. Per previous CM documentation:    At baseline, patient lives with significant other     At baseline, patient is independent in ADLs, to include driving and working as     Home DME includes: cane, walker    Patient has no hx of HH or Rehab; patient was in IP substance abuse program at [place] in 04/2021. CM notes that patient has been followed by Alexandra Ramesh (Joe Just: [phone]) for suboxone. [facility]    Additional support includes: primary support is significant other (Shelley); Alexandra Ramesh; 2 daughters in [place], [place]; 1 son in [facility]    Patient's vaccination status is unknown at this time. Patient does not have an AMD and has previously declined completion. Potential disposition is for discharge to own home with transport via family, pending medical progression and clearance by patient's medical team.     CM Team to continue following for any additional DALIA needs and to complete CM Assessment as able. Care Management Interventions  PCP Verified by CM: Yes (followed by Dr. Paula Jacobson)  Last Visit to PCP: 12/02/21  Mode of Transport at Discharge:  Other (see comment) (possibly significant other)  Transition of Care Consult (CM Consult): Discharge Planning  MyChart Signup: No  Discharge Durable Medical Equipment: No (has cane and walker)  Physical Therapy Consult: No  Occupational Therapy Consult: No  Speech Therapy Consult: No  Support Systems: Spouse/Significant Other,Child(breanne)  Confirm Follow Up Transport: Self (as baseline: independent in ADLs, to include driving)  [employer] Provided?: No  Discharge Location  Patient Expects to be Discharged to[de-identified] Unable to determine at this time (reported to live with significant other)  CRM: Erick Hoover, MPH, CHES; Z: [phone]

## 2022-01-20 NOTE — PROGRESS NOTES
0001: Bedside shift change report given to Alise Haynes (oncoming nurse) by Rickey Garrett (offgoing nurse). Report included the following information SBAR, Kardex, MAR and Cardiac Rhythm NSR/ST.       0130: Central line R IJ quad lumen placed by Dr Mimi Malin    0200: Titrate nitro drip for SBP management    0315: Patient requests (very vocal) to take BIPAP off. \"It's too much air! \"  Patient placed on 6L NC. Continue to monitor oxygen saturation    0400: Patient back on BIPAP for support    0600: Titrate up  On nitro gtt for BP management    29970: Bedside shift change report given to [facility] (oncoming nurse) by Alise Haynes (offgoing nurse). Report included the following information SBAR, Kardex, MAR and Cardiac Rhythm NSR/ST.

## 2022-01-20 NOTE — ED NOTES
Verbal shift change report given to [employer] (oncoming nurse) by Ruy Pradhan RN (offgoing nurse). Report included the following information SBAR and ED Summary.

## 2022-01-20 NOTE — PROGRESS NOTES
Patient name: Niurka Link  MRN: [id number]    Nephrology Progress note:    Assessment:  ROSEMARY on CKD-4: Elevated serum Cr 6.2mg/dl. In the setting of HTN urgency. Recent progression s/p repeated episodes of ROSEMARY. Underlying DM nephropathy. High risk for RRT     Hyperkalemia: mild     Decompensated Systolic CHF: +Pulm edema/Elevated BNP     Metabolic acidosis: Mild     HTN: Elevated.     DM2: HgbA1c 7.1     Anemia: likely 2 to advanced CKD. Hgb suboptimal <7     Substance abuse: Cocaine/Heroin    Plan/Recommendations:  No emergent indication for RRT (yet)  Start Cardene drip  Wean off Tridil gtt  Added IV PRN Hydralazine  BIPAP  IV Bumex 2mg BID  Ct CLonidine and Amlodipine  IV Venofer. NIRAV after BP improves  PRBCs per primary team  Strict I/Os  Am labs        Subjective: On BIPAP. Remains on Tridil gtt. SBP >200 still. No HA.    ROS:   No nausea, no vomiting  No chest pain    Exam:  Visit Vitals  BP (!) 167/107   Pulse 69   Temp 98.3 °F (36.8 °C)   Resp 16   Ht 5' 8\" (1.727 m)   Wt 77.1 kg (170 lb)   SpO2 100%   BMI 25.85 kg/m²     Wt Readings from Last 3 Encounters:   01/20/22 77.1 kg (170 lb)   12/02/21 77.1 kg (170 lb)   11/17/21 72.6 kg (160 lb)       Intake/Output Summary (Last 24 hours) at 1/20/2022 1217  Last data filed at 1/20/2022 0700  Gross per 24 hour   Intake 120 ml   Output 700 ml   Net -580 ml       Gen: NAD  HEENT: BIPAP, AT/NC  Lungs/Chest wall: Clear. No accessory muscle use. Cardiovascular: Regular rate, normal rhythm. Abdomen/: Soft, NT, ND, BS+.    Ext: No peripheral edema  CNS: alert and awake. Answers appropriately      Current Facility-Administered Medications   Medication Dose Route Frequency Last Admin    pantoprazole (PROTONIX) injection 40 mg  40 mg IntraVENous Q12H 40 mg at 01/20/22 1015    And    sodium chloride (NS) flush 10 mL  10 mL IntraVENous Q12H 10 mL at 01/20/22 1016    niCARdipine (CARDENE) 25 mg in 0.9% sodium chloride 250 mL infusion  0-15 mg/hr IntraVENous TITRATE      nitroglycerin (Tridil) 200 mcg/ml infusion  0-200 mcg/min IntraVENous TITRATE 40 mcg/min at 01/20/22 1146    bumetanide (BUMEX) injection 2 mg  2 mg IntraVENous BID 2 mg at 01/20/22 1012    amLODIPine (NORVASC) tablet 10 mg  10 mg Oral DAILY 10 mg at 01/20/22 0910    [Held by provider] carvediloL (COREG) tablet 25 mg  25 mg Oral BID WITH MEALS      cloNIDine HCL (CATAPRES) tablet 0.3 mg  0.3 mg Oral BID 0.3 mg at 01/20/22 0910    sodium chloride (NS) flush 5-40 mL  5-40 mL IntraVENous Q8H 10 mL at 01/20/22 0604    sodium chloride (NS) flush 5-40 mL  5-40 mL IntraVENous PRN      [Held by provider] aspirin delayed-release tablet 81 mg  81 mg Oral DAILY 81 mg at 01/19/22 1301    acetaminophen (TYLENOL) tablet 650 mg  650 mg Oral Q4H PRN      heparin (porcine) injection 5,000 Units  5,000 Units SubCUTAneous Q8H 5,000 Units at 01/20/22 0911    glucose chewable tablet 16 g  4 Tablet Oral PRN      dextrose (D50W) injection syrg 12.5-25 g  25-50 mL IntraVENous PRN      glucagon (GLUCAGEN) injection 1 mg  1 mg IntraMUSCular PRN      insulin lispro (HUMALOG) injection   SubCUTAneous AC&HS 2 Units at 01/19/22 1336     Current Outpatient Medications   Medication Sig Dispense    cloNIDine HCL (CATAPRES) 0.3 mg tablet Take 1 Tablet by mouth two (2) times a day. Take for high blood pressure 180 Tablet    carvediloL (COREG) 25 mg tablet Take 1 Tablet by mouth two (2) times daily (with meals). 180 Tablet    amLODIPine (NORVASC) 10 mg tablet Take 1 Tablet by mouth daily.  Take for blood pressure 90 Tablet    bumetanide (BUMEX) 2 mg tablet Take 1 Tablet by mouth daily. 90 Tablet    Suboxone 8-2 mg film sublingaul film      pregabalin (Lyrica) 100 mg capsule Take 100 mg by mouth two (2) times a day.  nitroglycerin (NITROSTAT) 0.4 mg SL tablet 1 Tablet by SubLINGual route as needed for Chest Pain. Up to 3 doses. 30 Tablet    insulin glargine (Lantus U-100 Insulin) 100 unit/mL injection 10 Units by SubCUTAneous route nightly. 30 units (Patient taking differently: 20 Units by SubCUTAneous route two (2) times a day. 20 units bid) 1 Vial       Labs/Data:    Lab Results   Component Value Date/Time    WBC 8.0 01/20/2022 03:28 AM    HGB 7.0 (L) 01/20/2022 12:00 PM    HCT 22.7 (L) 01/20/2022 12:00 PM    PLATELET 651 13/62/5135 03:28 AM    MCV 86.2 01/20/2022 03:28 AM       Lab Results   Component Value Date/Time    Sodium 139 01/20/2022 03:28 AM    Potassium 5.1 01/20/2022 03:28 AM    Chloride 116 (H) 01/20/2022 03:28 AM    CO2 20 (L) 01/20/2022 03:28 AM    Anion gap 3 (L) 01/20/2022 03:28 AM    Glucose 90 01/20/2022 03:28 AM    BUN 70 (H) 01/20/2022 03:28 AM    Creatinine 6.20 (H) 01/20/2022 03:28 AM    BUN/Creatinine ratio 11 (L) 01/20/2022 03:28 AM    GFR est AA 11 (L) 01/20/2022 03:28 AM    GFR est non-AA 9 (L) 01/20/2022 03:28 AM    Calcium 8.2 (L) 01/20/2022 03:28 AM       Patient seen and examined. Chart reviewed. Labs, data and other pertinent notes reviewed in last 24 hrs.     Discussed with patient and Dr. Jenna Fuchs by:  Blade Bob MD  [address]

## 2022-01-20 NOTE — ED NOTES
Patient refused morning labs. Pt stated, \"I already had labs. \"  Throughout the day pt refused to use call bell and instead yelled nurse. Pt call bell secured on bed rail and urinal within reach. Report given to Unique Hudson RN.

## 2022-01-20 NOTE — PROCEDURES
ADULT HOSPITALIST      Procedure Note - Central Venous Access:   Performed by Tobias Caldera MD    Obtained informed Consent. Immediately prior to the procedure, the patient was reevaluated and found suitable for the planned procedure and any planned medications. Immediately prior to the procedure a time out was called to verify the correct patient, procedure, equipment, staff, and marking as appropriate. Central line Bundle:  Full sterile barrier precautions used. 7-Step Sterility Protocol followed. (cap, mask sterile gown, sterile gloves, large sterile sheet, hand hygiene, 2% chlorhexidine for cutaneous antisepsis)  5 mL 1% Lidocaine placed at insertion site. Patient positioned in Trendelenburg?yes   The site was prepped with ChloraPrep. Using Seldinger technique a Quad Lumen CVC was placed in the Right via direct cannulation with 1 number of attempts for Blood Drawing, IV Access and diuresis. Ultrasound Guidance was utilized. There was good dark, non-pulsatile blood return in all ports. Femoral Site? no. If Yes, reason femoral site was chosen: NA  Catheter secured. Biopatch in place? yes. Sterile Bio-occlusive dressing placed. The following complications were encountered: None. A follow-up chest x-ray was ordered post procedure. The procedure was tolerated well.       Tobias Caldera MD  [facility] [place]

## 2022-01-20 NOTE — PROGRESS NOTES
Cardiology Progress Note      1/20/2022 1:46 PM    Admit Date: 1/19/2022    Admit Diagnosis: CHF (congestive heart failure) ([facility] [address] [I50.9]  Hypertensive urgency [I16.0]      Subjective:     Parveen Costa  Reviewed overnight events. Still short of breath, intermittently on bipap. No chest pain. Visit Vitals  BP (!) 167/107   Pulse 69   Temp 98.3 °F (36.8 °C)   Resp 16   Ht 5' 8\" (1.727 m)   Wt 170 lb (77.1 kg)   SpO2 100%   BMI 25.85 kg/m²     Current Facility-Administered Medications   Medication Dose Route Frequency    pantoprazole (PROTONIX) injection 40 mg  40 mg IntraVENous Q12H    And    sodium chloride (NS) flush 10 mL  10 mL IntraVENous Q12H    niCARdipine (CARDENE) 25 mg in 0.9% sodium chloride 250 mL infusion  0-15 mg/hr IntraVENous TITRATE    hydrALAZINE (APRESOLINE) 20 mg/mL injection 20 mg  20 mg IntraVENous Q6H PRN    bumetanide (BUMEX) injection 2 mg  2 mg IntraVENous BID    amLODIPine (NORVASC) tablet 10 mg  10 mg Oral DAILY    [Held by provider] carvediloL (COREG) tablet 25 mg  25 mg Oral BID WITH MEALS    cloNIDine HCL (CATAPRES) tablet 0.3 mg  0.3 mg Oral BID    sodium chloride (NS) flush 5-40 mL  5-40 mL IntraVENous Q8H    sodium chloride (NS) flush 5-40 mL  5-40 mL IntraVENous PRN    [Held by provider] aspirin delayed-release tablet 81 mg  81 mg Oral DAILY    acetaminophen (TYLENOL) tablet 650 mg  650 mg Oral Q4H PRN    heparin (porcine) injection 5,000 Units  5,000 Units SubCUTAneous Q8H    glucose chewable tablet 16 g  4 Tablet Oral PRN    dextrose (D50W) injection syrg 12.5-25 g  25-50 mL IntraVENous PRN    glucagon (GLUCAGEN) injection 1 mg  1 mg IntraMUSCular PRN    insulin lispro (HUMALOG) injection   SubCUTAneous AC&HS     Current Outpatient Medications   Medication Sig    cloNIDine HCL (CATAPRES) 0.3 mg tablet Take 1 Tablet by mouth two (2) times a day.  Take for high blood pressure    carvediloL (COREG) 25 mg tablet Take 1 Tablet by mouth two (2) times daily (with meals).  amLODIPine (NORVASC) 10 mg tablet Take 1 Tablet by mouth daily. Take for blood pressure    bumetanide (BUMEX) 2 mg tablet Take 1 Tablet by mouth daily.  Suboxone 8-2 mg film sublingaul film     pregabalin (Lyrica) 100 mg capsule Take 100 mg by mouth two (2) times a day.  nitroglycerin (NITROSTAT) 0.4 mg SL tablet 1 Tablet by SubLINGual route as needed for Chest Pain. Up to 3 doses.  insulin glargine (Lantus U-100 Insulin) 100 unit/mL injection 10 Units by SubCUTAneous route nightly. 30 units (Patient taking differently: 20 Units by SubCUTAneous route two (2) times a day. 20 units bid)         Objective:      Physical Exam:  Visit Vitals  BP (!) 167/107   Pulse 69   Temp 98.3 °F (36.8 °C)   Resp 16   Ht 5' 8\" (1.727 m)   Wt 170 lb (77.1 kg)   SpO2 100%   BMI 25.85 kg/m²     General Appearance:  Alert, appropriate, no acute distress. Ears/Nose/Mouth/Throat:   Hearing grossly normal.         Neck: Supple. Chest:   Decreased BS bases, scattered rhonchi   Cardiovascular:  Regular rate and rhythm, S1, S2 normal, II/VI systolic murmur   Abdomen:   Soft, non-tender, bowel sounds are active. Extremities: 1+ LE edema bilaterally. Skin: Warm and dry.                Data Review:   Labs:    Recent Results (from the past 24 hour(s))   GLUCOSE, POC    Collection Time: 01/19/22  7:03 PM   Result Value Ref Range    Glucose (POC) 121 (H) 65 - 117 mg/dL    Performed by [facility]    LIPID PANEL    Collection Time: 01/20/22  3:28 AM   Result Value Ref Range    Cholesterol, total 133 <200 MG/DL    Triglyceride 60 <150 MG/DL    HDL Cholesterol 62 MG/DL    LDL, calculated 59 0 - 100 MG/DL    VLDL, calculated 12 MG/DL    CHOL/HDL Ratio 2.1 0.0 - 5.0     CBC WITH AUTOMATED DIFF    Collection Time: 01/20/22  3:28 AM   Result Value Ref Range    WBC 8.0 4.1 - 11.1 K/uL    RBC 2.53 (L) 4.10 - 5.70 M/uL    HGB 6.7 (L) 12.1 - 17.0 g/dL    HCT 21.8 (L) 36.6 - 50.3 %    MCV 86.2 80.0 - 99.0 FL    MCH 26.5 26.0 - 34.0 PG    MCHC 30.7 30.0 - 36.5 g/dL    RDW 16.5 (H) 11.5 - 14.5 %    PLATELET 909 904 - 705 K/uL    MPV 10.9 8.9 - 12.9 FL    NRBC 0.0 0  WBC    ABSOLUTE NRBC 0.00 0.00 - 0.01 K/uL    NEUTROPHILS 83 (H) 32 - 75 %    LYMPHOCYTES 9 (L) 12 - 49 %    MONOCYTES 7 5 - 13 %    EOSINOPHILS 0 0 - 7 %    BASOPHILS 0 0 - 1 %    IMMATURE GRANULOCYTES 1 (H) 0.0 - 0.5 %    ABS. NEUTROPHILS 6.6 1.8 - 8.0 K/UL    ABS. LYMPHOCYTES 0.7 (L) 0.8 - 3.5 K/UL    ABS. MONOCYTES 0.6 0.0 - 1.0 K/UL    ABS. EOSINOPHILS 0.0 0.0 - 0.4 K/UL    ABS. BASOPHILS 0.0 0.0 - 0.1 K/UL    ABS. IMM. GRANS. 0.1 (H) 0.00 - 0.04 K/UL    DF SMEAR SCANNED      PLATELET COMMENTS Large Platelets      RBC COMMENTS ANISOCYTOSIS  1+        RBC COMMENTS OVALOCYTES  PRESENT       MAGNESIUM    Collection Time: 01/20/22  3:28 AM   Result Value Ref Range    Magnesium 2.2 1.6 - 2.4 mg/dL   PHOSPHORUS    Collection Time: 01/20/22  3:28 AM   Result Value Ref Range    Phosphorus 5.4 (H) 2.6 - 4.7 MG/DL   METABOLIC PANEL, COMPREHENSIVE    Collection Time: 01/20/22  3:28 AM   Result Value Ref Range    Sodium 139 136 - 145 mmol/L    Potassium 5.1 3.5 - 5.1 mmol/L    Chloride 116 (H) 97 - 108 mmol/L    CO2 20 (L) 21 - 32 mmol/L    Anion gap 3 (L) 5 - 15 mmol/L    Glucose 90 65 - 100 mg/dL    BUN 70 (H) 6 - 20 MG/DL    Creatinine 6.20 (H) 0.70 - 1.30 MG/DL    BUN/Creatinine ratio 11 (L) 12 - 20      GFR est AA 11 (L) >60 ml/min/1.73m2    GFR est non-AA 9 (L) >60 ml/min/1.73m2    Calcium 8.2 (L) 8.5 - 10.1 MG/DL    Bilirubin, total 0.3 0.2 - 1.0 MG/DL    ALT (SGPT) 14 12 - 78 U/L    AST (SGOT) 25 15 - 37 U/L    Alk.  phosphatase 97 45 - 117 U/L    Protein, total 6.9 6.4 - 8.2 g/dL    Albumin 2.0 (L) 3.5 - 5.0 g/dL    Globulin 4.9 (H) 2.0 - 4.0 g/dL    A-G Ratio 0.4 (L) 1.1 - 2.2     IRON PROFILE    Collection Time: 01/20/22  3:28 AM   Result Value Ref Range    Iron 36 35 - 150 ug/dL    TIBC 251 250 - 450 ug/dL    Iron % saturation 14 (L) 20 - 50 %   URINALYSIS W/MICROSCOPIC    Collection Time: 01/20/22  3:28 AM   Result Value Ref Range    Color YELLOW/STRAW      Appearance CLEAR CLEAR      Specific gravity 1.010 1.003 - 1.030      pH (UA) 6.0 5.0 - 8.0      Protein 100 (A) NEG mg/dL    Glucose Negative NEG mg/dL    Ketone Negative NEG mg/dL    Bilirubin Negative NEG      Blood SMALL (A) NEG      Urobilinogen 0.2 0.2 - 1.0 EU/dL    Nitrites Negative NEG      Leukocyte Esterase Negative NEG      WBC 0-4 0 - 4 /hpf    RBC 0-5 0 - 5 /hpf    Epithelial cells FEW FEW /lpf    Bacteria Negative NEG /hpf    Hyaline cast 0-2 0 - 5 /lpf   GLUCOSE, POC    Collection Time: 01/20/22  6:11 AM   Result Value Ref Range    Glucose (POC) 94 65 - 117 mg/dL    Performed by Kody Marie    HGB & HCT    Collection Time: 01/20/22  6:15 AM   Result Value Ref Range    HGB 6.5 (L) 12.1 - 17.0 g/dL    HCT 21.3 (L) 36.6 - 50.3 %   TYPE & SCREEN    Collection Time: 01/20/22  6:15 AM   Result Value Ref Range    Crossmatch Expiration 01/23/2022,2359     ABO/Rh(D) Consuello Emily POSITIVE     Antibody screen NEG    URINALYSIS W/MICROSCOPIC    Collection Time: 01/20/22 11:33 AM   Result Value Ref Range    Color YELLOW/STRAW      Appearance CLEAR CLEAR      Specific gravity 1.010 1.003 - 1.030      pH (UA) 6.0 5.0 - 8.0      Protein 300 (A) NEG mg/dL    Glucose 100 (A) NEG mg/dL    Ketone Negative NEG mg/dL    Bilirubin Negative NEG      Blood SMALL (A) NEG      Urobilinogen 0.2 0.2 - 1.0 EU/dL    Nitrites Negative NEG      Leukocyte Esterase Negative NEG      WBC 0-4 0 - 4 /hpf    RBC 0-5 0 - 5 /hpf    Epithelial cells FEW FEW /lpf    Bacteria Negative NEG /hpf    Hyaline cast 0-2 0 - 5 /lpf   GLUCOSE, POC    Collection Time: 01/20/22 11:52 AM   Result Value Ref Range    Glucose (POC) 139 (H) 65 - 117 mg/dL    Performed by Vanessa Hernández (Tier 2)    HEMOGLOBIN A1C WITH EAG    Collection Time: 01/20/22 12:00 PM   Result Value Ref Range    Hemoglobin A1c 5.7 (H) 4.0 - 5.6 %    Est. average glucose 117 mg/dL   HGB & HCT Collection Time: 01/20/22 12:00 PM   Result Value Ref Range    HGB 7.0 (L) 12.1 - 17.0 g/dL    HCT 22.7 (L) 36.6 - 25.4 %   METABOLIC PANEL, BASIC    Collection Time: 01/20/22 12:00 PM   Result Value Ref Range    Sodium 140 136 - 145 mmol/L    Potassium 5.0 3.5 - 5.1 mmol/L    Chloride 116 (H) 97 - 108 mmol/L    CO2 20 (L) 21 - 32 mmol/L    Anion gap 4 (L) 5 - 15 mmol/L    Glucose 125 (H) 65 - 100 mg/dL    BUN 69 (H) 6 - 20 MG/DL    Creatinine 6.20 (H) 0.70 - 1.30 MG/DL    BUN/Creatinine ratio 11 (L) 12 - 20      GFR est AA 11 (L) >60 ml/min/1.73m2    GFR est non-AA 9 (L) >60 ml/min/1.73m2    Calcium 8.3 (L) 8.5 - 10.1 MG/DL   TSH 3RD GENERATION    Collection Time: 01/20/22 12:00 PM   Result Value Ref Range    TSH 0.78 0.36 - 3.74 uIU/mL       Telemetry: normal sinus rhythm      Assessment/Plan:     Acute respiratory failure. Appears to be due to acute on chronic renal failure, Cr is now 6.2. Patient says nephrology has had discussions about HD in past. Will repeat echo. No evidence of acute cardiac ischemia by EKG or serial cardiac enzymes. Stress test this past year with no reversible ischemia. Will review echo when completed, EF previously 40% by echo, 48% by nuclear study; no additional cardiac evaluation indicated at this time. ADDENDUM: LV and RV function normal by echo. Fluid overload is not due to CHF, rather renal failure and fluid retention. No additional cardiac evaluation at this time. Will be available prn. Outpt f/u with Dr. Tripp Gutiérrez. Acute on chronic renal failure. Per nephrology.  BP meds and diuretics per renal given renal failure.

## 2022-01-20 NOTE — DISCHARGE INSTRUCTIONS
[facility]/ED Visit Follow-Up Instructions/Information    You may have an in home follow up visit set up with "[employer] or may wish to contact [facility] to set-up a visit:    What are we? "[employer] is an in-home urgent care service staffed with emergency trained medical teams. We come to your home in a vehicle stocked with medical supplies and technology. An ER physician is always available if needed. When? As a part of your hospital follow-up, an appointment has been/ or can be set up for us to come see you. Usually, this will be 24-72 hours after you leave the hospital or as needed. "[employer] is open 7am-9pm, 7 days a week, 365 days a year, including holidays. Why? We know that you cannot always get to your doctor after being in the hospital and that your doctor is not always available when you need them. Once your workup is complete, we'll call in your prescriptions, update your family doctor, and handle billing with your insurance so you can focus on feeling better, faster without leaving home. How much? We accept most major health insurance plans, including Medicaid, Medicare, and Medicare Advantage [address] [place] [address], [facility], and [employer]. We also accept: credit, debit, health savings account (HSA), health reimbursement account (HRA) and flexible spending account (FSA) payments. [facility]'s prices compare to conventional urgent care facilities, but we bring the care to you. How to reach us? Getting care is easy- use our mobile barbra ([employer]), website ([email or web address]) or call us [phone].

## 2022-01-20 NOTE — PROGRESS NOTES
Patient BP elevated 186/103 , Nitro gtt increased to 35 mcg/min. 1015-Patient central line dressing lifting. Patient refusing change, dressing reinforced. 1040-Paged Dr. John Parker in regards to order for Nitro and Cardene gtt. Awaiting response. 1100-Per Dr. John Parker hold off on Cardene gtt for know. 1200- notified that central line removed by patient. Per Dr. John Parker he will contact anesthesiologists.

## 2022-01-20 NOTE — ED NOTES
Pt has nitro drip infusing in PIV in L hand. Another PIV needed to draw blood and administer other medications. Pt refusing any additional IV sticks, stating he has too much scar tissue. MD also refusing vital signs and refusing to keep monitoring equipment on. Updated hospitalist, hospitalist at bedside to assess pt. Hospitalist ordered bipap and will insert central line. Pt agreeable to this.

## 2022-01-21 LAB
ALBUMIN SERPL-MCNC: 1.9 G/DL (ref 3.5–5)
ALBUMIN/GLOB SERPL: 0.4 {RATIO} (ref 1.1–2.2)
ALP SERPL-CCNC: 84 U/L (ref 45–117)
ALT SERPL-CCNC: 14 U/L (ref 12–78)
ANION GAP SERPL CALC-SCNC: 6 MMOL/L (ref 5–15)
AST SERPL-CCNC: 26 U/L (ref 15–37)
BILIRUB SERPL-MCNC: 0.2 MG/DL (ref 0.2–1)
BUN SERPL-MCNC: 74 MG/DL (ref 6–20)
BUN/CREAT SERPL: 13 (ref 12–20)
CALCIUM SERPL-MCNC: 7.9 MG/DL (ref 8.5–10.1)
CHLORIDE SERPL-SCNC: 114 MMOL/L (ref 97–108)
CO2 SERPL-SCNC: 18 MMOL/L (ref 21–32)
CREAT SERPL-MCNC: 5.81 MG/DL (ref 0.7–1.3)
ERYTHROCYTE [DISTWIDTH] IN BLOOD BY AUTOMATED COUNT: 16.3 % (ref 11.5–14.5)
GLOBULIN SER CALC-MCNC: 4.3 G/DL (ref 2–4)
GLUCOSE BLD STRIP.AUTO-MCNC: 125 MG/DL (ref 65–117)
GLUCOSE BLD STRIP.AUTO-MCNC: 136 MG/DL (ref 65–117)
GLUCOSE BLD STRIP.AUTO-MCNC: 191 MG/DL (ref 65–117)
GLUCOSE BLD STRIP.AUTO-MCNC: 218 MG/DL (ref 65–117)
GLUCOSE SERPL-MCNC: 124 MG/DL (ref 65–100)
HCT VFR BLD AUTO: 22.4 % (ref 36.6–50.3)
HGB BLD-MCNC: 7 G/DL (ref 12.1–17)
MAGNESIUM SERPL-MCNC: 2.1 MG/DL (ref 1.6–2.4)
MCH RBC QN AUTO: 26.3 PG (ref 26–34)
MCHC RBC AUTO-ENTMCNC: 31.3 G/DL (ref 30–36.5)
MCV RBC AUTO: 84.2 FL (ref 80–99)
NRBC # BLD: 0 K/UL (ref 0–0.01)
NRBC BLD-RTO: 0 PER 100 WBC
PHOSPHATE SERPL-MCNC: 5.2 MG/DL (ref 2.6–4.7)
PLATELET # BLD AUTO: 275 K/UL (ref 150–400)
PMV BLD AUTO: 11.1 FL (ref 8.9–12.9)
POTASSIUM SERPL-SCNC: 4.8 MMOL/L (ref 3.5–5.1)
PROT SERPL-MCNC: 6.2 G/DL (ref 6.4–8.2)
RBC # BLD AUTO: 2.66 M/UL (ref 4.1–5.7)
SERVICE CMNT-IMP: ABNORMAL
SODIUM SERPL-SCNC: 138 MMOL/L (ref 136–145)
WBC # BLD AUTO: 7.4 K/UL (ref 4.1–11.1)

## 2022-01-21 PROCEDURE — 74011000250 HC RX REV CODE- 250: Performed by: FAMILY MEDICINE

## 2022-01-21 PROCEDURE — 74011250636 HC RX REV CODE- 250/636: Performed by: INTERNAL MEDICINE

## 2022-01-21 PROCEDURE — C9113 INJ PANTOPRAZOLE SODIUM, VIA: HCPCS | Performed by: INTERNAL MEDICINE

## 2022-01-21 PROCEDURE — 74011636637 HC RX REV CODE- 636/637: Performed by: FAMILY MEDICINE

## 2022-01-21 PROCEDURE — 83735 ASSAY OF MAGNESIUM: CPT

## 2022-01-21 PROCEDURE — 77010033678 HC OXYGEN DAILY

## 2022-01-21 PROCEDURE — 36415 COLL VENOUS BLD VENIPUNCTURE: CPT

## 2022-01-21 PROCEDURE — 94760 N-INVAS EAR/PLS OXIMETRY 1: CPT

## 2022-01-21 PROCEDURE — 82962 GLUCOSE BLOOD TEST: CPT

## 2022-01-21 PROCEDURE — 74011250636 HC RX REV CODE- 250/636: Performed by: FAMILY MEDICINE

## 2022-01-21 PROCEDURE — 74011000258 HC RX REV CODE- 258: Performed by: INTERNAL MEDICINE

## 2022-01-21 PROCEDURE — 80053 COMPREHEN METABOLIC PANEL: CPT

## 2022-01-21 PROCEDURE — 85027 COMPLETE CBC AUTOMATED: CPT

## 2022-01-21 PROCEDURE — 74011000250 HC RX REV CODE- 250: Performed by: INTERNAL MEDICINE

## 2022-01-21 PROCEDURE — 65660000001 HC RM ICU INTERMED STEPDOWN

## 2022-01-21 PROCEDURE — 74011250637 HC RX REV CODE- 250/637: Performed by: FAMILY MEDICINE

## 2022-01-21 PROCEDURE — 84100 ASSAY OF PHOSPHORUS: CPT

## 2022-01-21 RX ADMIN — BUMETANIDE 2 MG: 0.25 INJECTION INTRAMUSCULAR; INTRAVENOUS at 11:43

## 2022-01-21 RX ADMIN — HEPARIN SODIUM 5000 UNITS: 5000 INJECTION, SOLUTION INTRAVENOUS; SUBCUTANEOUS at 04:23

## 2022-01-21 RX ADMIN — PANTOPRAZOLE SODIUM 40 MG: 40 INJECTION, POWDER, FOR SOLUTION INTRAVENOUS at 21:52

## 2022-01-21 RX ADMIN — IRON SUCROSE 200 MG: 20 INJECTION, SOLUTION INTRAVENOUS at 21:52

## 2022-01-21 RX ADMIN — SODIUM CHLORIDE, PRESERVATIVE FREE 10 ML: 5 INJECTION INTRAVENOUS at 06:30

## 2022-01-21 RX ADMIN — SODIUM CHLORIDE, PRESERVATIVE FREE 10 ML: 5 INJECTION INTRAVENOUS at 21:53

## 2022-01-21 RX ADMIN — CLONIDINE HYDROCHLORIDE 0.3 MG: 0.2 TABLET ORAL at 19:12

## 2022-01-21 RX ADMIN — PANTOPRAZOLE SODIUM 40 MG: 40 INJECTION, POWDER, FOR SOLUTION INTRAVENOUS at 11:44

## 2022-01-21 RX ADMIN — SODIUM CHLORIDE, PRESERVATIVE FREE 10 ML: 5 INJECTION INTRAVENOUS at 21:52

## 2022-01-21 RX ADMIN — CARVEDILOL 25 MG: 12.5 TABLET, FILM COATED ORAL at 19:11

## 2022-01-21 RX ADMIN — AMLODIPINE BESYLATE 10 MG: 5 TABLET ORAL at 11:44

## 2022-01-21 RX ADMIN — Medication 3 UNITS: at 13:12

## 2022-01-21 RX ADMIN — HEPARIN SODIUM 5000 UNITS: 5000 INJECTION, SOLUTION INTRAVENOUS; SUBCUTANEOUS at 19:12

## 2022-01-21 RX ADMIN — HYDRALAZINE HYDROCHLORIDE 20 MG: 20 INJECTION, SOLUTION INTRAMUSCULAR; INTRAVENOUS at 13:13

## 2022-01-21 RX ADMIN — EPOETIN ALFA-EPBX 10000 UNITS: 10000 INJECTION, SOLUTION INTRAVENOUS; SUBCUTANEOUS at 19:12

## 2022-01-21 RX ADMIN — CLONIDINE HYDROCHLORIDE 0.3 MG: 0.2 TABLET ORAL at 11:44

## 2022-01-21 RX ADMIN — SODIUM CHLORIDE, PRESERVATIVE FREE 10 ML: 5 INJECTION INTRAVENOUS at 13:04

## 2022-01-21 RX ADMIN — HEPARIN SODIUM 5000 UNITS: 5000 INJECTION, SOLUTION INTRAVENOUS; SUBCUTANEOUS at 11:44

## 2022-01-21 RX ADMIN — SODIUM CHLORIDE, PRESERVATIVE FREE 10 ML: 5 INJECTION INTRAVENOUS at 11:45

## 2022-01-21 RX ADMIN — BUMETANIDE 2 MG: 0.25 INJECTION INTRAMUSCULAR; INTRAVENOUS at 19:12

## 2022-01-21 NOTE — PROGRESS NOTES
DALIA: Anticipate discharge home with family support pending medical progress. Transportation likely in car with significant other, [address] [facility]. RUR: 19%    Disposition: Patient admitted 1/20 for CHF and hypertensive urgency. Lives with girlfriend, Keyur Santillan. DME: cane, walker. Hx: polysubstance use, acute systolic heart failure, COPD, diabetes, HTN, neuropathy. Has  with Luis Enrique Griffinlander [phone]. Currently on 7L O2. Nephrology and cardiology following. CM will continue to follow for discharge needs.     Otto Meyers, [address]  [phone]

## 2022-01-21 NOTE — CARDIO/PULMONARY
Cardiac Rehab: Consult received and chart reviewed. Echo on 1/20/22 reports EF 55-60% therefore patient does not meet the following criteria and is not a candidate for cardiac rehab.  EF ? 35% at time of enrollment   Stable class 2-4 NYHA heart failure   Optimal medical therapy for > 6 weeks   No major hospitalizations within the last 6 weeks   No major hospitalizations within the next 6 weeks  Tj Thurman RN

## 2022-01-21 NOTE — PROGRESS NOTES
Patient name: Hailee Mendoza  MRN: [id number]    Nephrology Progress note:    Assessment:  ROSEMARY on CKD-4: Elevated serum Cr 6.2mg/dl. Better today at 5.8mg/dl. In the setting of HTN urgency. Recent progression s/p repeated episodes of ROSEMARY. Underlying DM nephropathy. High risk for RRT     Hyperkalemia: mild-> better     Decompensated Systolic CHF: +Pulm edema/Elevated BNP     Metabolic acidosis: Mild     HTN urgency-> improving.     DM2: HgbA1c 5.7     Anemia: likely 2 to advanced CKD. Hgb suboptimal < 7-> s/p PRBC (1/20/22)     Substance abuse: Cocaine/Heroin    Plan/Recommendations:  No emergent indication for RRT (yet)-> not an ideal candidate for chronic HD given his issues with substance abuse. IV PRN Hydralazine  BIPAP PRN  IV Bumex 2mg BID  Ct CLonidine and Amlodipine  IV Venofer. NIRAV tonight now that BP is better  PRBCs per primary team  Strict I/Os  Am labs        Subjective:  Off BIPAP. BP improving.  Reports SOB better  No HA.    ROS:   No nausea, no vomiting  No chest pain    Exam:  Visit Vitals  BP (!) 144/60 (BP 1 Location: Left arm, BP Patient Position: At rest;Lying;Lying right side)   Pulse 84   Temp 99.6 °F (37.6 °C)   Resp 19   Ht 5' 8\" (1.727 m)   Wt 77.1 kg (170 lb)   SpO2 98%   BMI 25.85 kg/m²     Wt Readings from Last 3 Encounters:   01/20/22 77.1 kg (170 lb)   12/02/21 77.1 kg (170 lb)   11/17/21 72.6 kg (160 lb)       Intake/Output Summary (Last 24 hours) at 1/21/2022 1717  Last data filed at 1/21/2022 1600  Gross per 24 hour   Intake 1820 ml   Output 1050 ml Net 770 ml       Gen: NAD  HEENT: BIPAP, AT/NC  Lungs/Chest wall: Clear. No accessory muscle use. Cardiovascular: Regular rate, normal rhythm. Abdomen/: Soft, NT, ND, BS+. Ext: No peripheral edema  CNS: alert and awake.  Answers appropriately      Current Facility-Administered Medications   Medication Dose Route Frequency Last Admin    pantoprazole (PROTONIX) injection 40 mg  40 mg IntraVENous Q12H 40 mg at 01/21/22 1144    And    sodium chloride (NS) flush 10 mL  10 mL IntraVENous Q12H 10 mL at 01/21/22 1145    hydrALAZINE (APRESOLINE) 20 mg/mL injection 20 mg  20 mg IntraVENous Q6H PRN 20 mg at 01/21/22 1313    0.9% sodium chloride infusion 250 mL  250 mL IntraVENous PRN      iron sucrose (VENOFER) 200 mg in 0.9% sodium chloride 100 mL IVPB  200 mg IntraVENous Q24H 200 mg at 01/20/22 2304    bumetanide (BUMEX) injection 2 mg  2 mg IntraVENous BID 2 mg at 01/21/22 1143    amLODIPine (NORVASC) tablet 10 mg  10 mg Oral DAILY 10 mg at 01/21/22 1144    carvediloL (COREG) tablet 25 mg  25 mg Oral BID WITH MEALS      cloNIDine HCL (CATAPRES) tablet 0.3 mg  0.3 mg Oral BID 0.3 mg at 01/21/22 1144    sodium chloride (NS) flush 5-40 mL  5-40 mL IntraVENous Q8H 10 mL at 01/21/22 1304    sodium chloride (NS) flush 5-40 mL  5-40 mL IntraVENous PRN      [Held by provider] aspirin delayed-release tablet 81 mg  81 mg Oral DAILY 81 mg at 01/19/22 1301    acetaminophen (TYLENOL) tablet 650 mg  650 mg Oral Q4H PRN      heparin (porcine) injection 5,000 Units  5,000 Units SubCUTAneous Q8H 5,000 Units at 01/21/22 1144    glucose chewable tablet 16 g  4 Tablet Oral PRN      dextrose (D50W) injection syrg 12.5-25 g  25-50 mL IntraVENous PRN      glucagon (GLUCAGEN) injection 1 mg  1 mg IntraMUSCular PRN      insulin lispro (HUMALOG) injection   SubCUTAneous AC&HS 3 Units at 01/21/22 1312       Labs/Data:    Lab Results   Component Value Date/Time    WBC 7.4 01/21/2022 04:56 AM    HGB 7.0 (L) 01/21/2022 04:56 AM    HCT 22.4 (L) 01/21/2022 04:56 AM    PLATELET 141 59/82/1912 04:56 AM    MCV 84.2 01/21/2022 04:56 AM       Lab Results   Component Value Date/Time    Sodium 138 01/21/2022 04:56 AM    Potassium 4.8 01/21/2022 04:56 AM    Chloride 114 (H) 01/21/2022 04:56 AM    CO2 18 (L) 01/21/2022 04:56 AM    Anion gap 6 01/21/2022 04:56 AM    Glucose 124 (H) 01/21/2022 04:56 AM    BUN 74 (H) 01/21/2022 04:56 AM    Creatinine 5.81 (H) 01/21/2022 04:56 AM    BUN/Creatinine ratio 13 01/21/2022 04:56 AM    GFR est AA 12 (L) 01/21/2022 04:56 AM    GFR est non-AA 10 (L) 01/21/2022 04:56 AM    Calcium 7.9 (L) 01/21/2022 04:56 AM       Patient seen and examined. Chart reviewed. Labs, data and other pertinent notes reviewed in last 24 hrs.     Discussed with patient and Dr. Cornelious Habermann by:  Romana Crea, MD  [id number] PDV

## 2022-01-21 NOTE — PROGRESS NOTES
[facility]  Hospitalist Group                                                                                          Hospitalist Progress Note  Christine Pérez MD  Answering service: [phone] from in house phone        Date of Service:  2022  NAME:  Marissa Pena  :  1965  MRN:  [id number]      Admission Summary:   Marissa Pena is a 55 y.o. male who presents with shortness of breath     History is primary obtained from the patient, patient reports that he started experiencing shortness of breath that started 2 days back, patient reports that he has been getting swelling in his legs, he reports that he has not been taking his medications and has not been very compliant with the same, per chart review patient has history of noncompliance with medication and also has history of cocaine abuse, patient came to the ER, was found to be in CHF exacerbation, was hypertensive, was started on nitroglycerin drip and was requested to be admitted to the hospitalist service, patient reports that he also had some chest pain associated with his symptoms, reports that he has not used cocaine in the last \"many days\" but cannot quantify the number of days currently patient reports that he is feeling better but continues to have shortness of breath, denies any other complaints or problems     The patient denies any Headache, blurry vision, sore throat, trouble swallowing, trouble with speech, , cough, fever, chills, N/V/D, abd pain, urinary symptoms, constipation, recent travels, sick contacts, focal or generalized neurological symptoms,  falls, injuries, rashes, contact with COVID-19 diagnosed patients, hematemesis, melena, hemoptysis, hematuria, rashes, denies starting any new medications and denies any other concerns or problems besides as mentioned above.          Interval history / Subjective:   2022 :    Pt more alert    Cont to be hungry   Off NIVD   Discussed with nephrology,    Hgb up w/o transfusion to 7    [place]  as below        Assessment & Plan:     Acute hypoxic hypoxemic respiratory failure  - support supplemental  w 02/NIVD; watch for increased wob, trials off BiPAP, to high flow as jeremy     Malignant hypertension  - iv antihypertensives, tridil/consider nicardene drip     Acute on chronic combined systolic and diastolic congestive heart failure NYHA class IV on presentation;   - monitor improvemen/  clinical re-eval     Acute on chronic renal failure  - for now monitor avoid nephrotoxic's and control bp     Anemia;   -support, iron;f/up hgb and trans for less than 7     Hyperkalemia- treat as indicated, likely will improve w diuresis     History of polysubstance abuse    Diabetes mellitus type 2  - SSI for now  Lab Results   Component Value Date/Time    Glucose 124 (H) 01/21/2022 04:56 AM    Glucose (POC) 218 (H) 01/21/2022 01:02 PM               GI DVT prophylaxis: Patient will be on heparin          Hospital Problems  Date Reviewed: 12/2/2021          Codes Class Noted POA    Hypertensive urgency ICD-10-CM: I16.0  ICD-9-CM: 401.9  1/20/2022 Unknown        CHF (congestive heart failure) ([facility] [address] ICD-10-CM: I50.9  ICD-9-CM: 428.0  1/19/2022 Unknown                Review of Systems:   A comprehensive review of systems was negative except for that written in the HPI. Vital Signs:    Last 24hrs VS reviewed since prior progress note.  Most recent are:  Visit Vitals  BP (!) 166/76   Pulse 82   Temp 99 °F (37.2 °C)   Resp 21   Ht 5' 8\" (1.727 m)   Wt 77.1 kg (170 lb)   SpO2 100%   BMI 25.85 kg/m²         Intake/Output Summary (Last 24 hours) at 1/21/2022 1337  Last data filed at 1/21/2022 0448  Gross per 24 hour   Intake 1220 ml   Output 1050 ml   Net 170 ml        Physical Examination:     I had a face to face encounter with this patient and independently examined them on 1/21/2022 as outlined below:          Constitutional:  No acute distress, cooperative, pleasant ENT:  Oral mucosa moist, oropharynx benign. Resp:  CTA bilaterally. No wheezing/rhonchi/rales. No accessory muscle use. On O2, baseline requirement    CV:  Regular rhythm, normal rate, no murmurs, gallops, rubs    GI:  Soft, non distended, non tender. normoactive bowel sounds, no hepatosplenomegaly     Musculoskeletal:  No edema, warm, 2+ pulses throughout    Neurologic:  Moves all extremities. AAOx3, CN II-XII reviewed            Data Review:    Review and/or order of clinical lab test      Labs:     Recent Labs     01/21/22  0456 01/20/22  1200 01/20/22  0615 01/20/22  0328   WBC 7.4  --   --  8.0   HGB 7.0* 7.0*   < > 6.7*   HCT 22.4* 22.7*   < > 21.8*     --   --  255    < > = values in this interval not displayed. Recent Labs     01/21/22  0456 01/20/22  1200 01/20/22  0328    140 139   K 4.8 5.0 5.1   * 116* 116*   CO2 18* 20* 20*   BUN 74* 69* 70*   CREA 5.81* 6.20* 6.20*   * 125* 90   CA 7.9* 8.3* 8.2*   MG 2.1  --  2.2   PHOS 5.2*  --  5.4*     Recent Labs     01/21/22  0456 01/20/22  0328 01/19/22  0357   ALT 14 14 15   AP 84 97 130*   TBILI 0.2 0.3 0.3   TP 6.2* 6.9 8.2   ALB 1.9* 2.0* 2.4*   GLOB 4.3* 4.9* 5.8*     No results for input(s): INR, PTP, APTT, INREXT in the last 72 hours. Recent Labs     01/20/22  0328   TIBC 251   PSAT 14*      No results found for: FOL, RBCF   No results for input(s): PH, PCO2, PO2 in the last 72 hours. No results for input(s): CPK, CKNDX, TROIQ in the last 72 hours.     No lab exists for component: CPKMB  Lab Results   Component Value Date/Time    Cholesterol, total 133 01/20/2022 03:28 AM    HDL Cholesterol 62 01/20/2022 03:28 AM    LDL, calculated 59 01/20/2022 03:28 AM    Triglyceride 60 01/20/2022 03:28 AM    CHOL/HDL Ratio 2.1 01/20/2022 03:28 AM     Lab Results   Component Value Date/Time    Glucose (POC) 218 (H) 01/21/2022 01:02 PM    Glucose (POC) 125 (H) 01/21/2022 06:28 AM    Glucose (POC) 118 (H) 01/20/2022 10:58 PM Glucose (POC) 95 01/20/2022 05:54 PM    Glucose (POC) 139 (H) 01/20/2022 11:52 AM     Lab Results   Component Value Date/Time    Color YELLOW/STRAW 01/20/2022 11:33 AM    Appearance CLEAR 01/20/2022 11:33 AM    Specific gravity 1.010 01/20/2022 11:33 AM    pH (UA) 6.0 01/20/2022 11:33 AM    Protein 300 (A) 01/20/2022 11:33 AM    Glucose 100 (A) 01/20/2022 11:33 AM    Ketone Negative 01/20/2022 11:33 AM    Bilirubin Negative 01/20/2022 11:33 AM    Urobilinogen 0.2 01/20/2022 11:33 AM    Nitrites Negative 01/20/2022 11:33 AM    Leukocyte Esterase Negative 01/20/2022 11:33 AM    Epithelial cells FEW 01/20/2022 11:33 AM    Bacteria Negative 01/20/2022 11:33 AM    WBC 0-4 01/20/2022 11:33 AM    RBC 0-5 01/20/2022 11:33 AM         Medications Reviewed:     Current Facility-Administered Medications   Medication Dose Route Frequency    pantoprazole (PROTONIX) injection 40 mg  40 mg IntraVENous Q12H    And    sodium chloride (NS) flush 10 mL  10 mL IntraVENous Q12H    hydrALAZINE (APRESOLINE) 20 mg/mL injection 20 mg  20 mg IntraVENous Q6H PRN    0.9% sodium chloride infusion 250 mL  250 mL IntraVENous PRN    iron sucrose (VENOFER) 200 mg in 0.9% sodium chloride 100 mL IVPB  200 mg IntraVENous Q24H    bumetanide (BUMEX) injection 2 mg  2 mg IntraVENous BID    amLODIPine (NORVASC) tablet 10 mg  10 mg Oral DAILY    carvediloL (COREG) tablet 25 mg  25 mg Oral BID WITH MEALS    cloNIDine HCL (CATAPRES) tablet 0.3 mg  0.3 mg Oral BID    sodium chloride (NS) flush 5-40 mL  5-40 mL IntraVENous Q8H    sodium chloride (NS) flush 5-40 mL  5-40 mL IntraVENous PRN    [Held by provider] aspirin delayed-release tablet 81 mg  81 mg Oral DAILY    acetaminophen (TYLENOL) tablet 650 mg  650 mg Oral Q4H PRN    heparin (porcine) injection 5,000 Units  5,000 Units SubCUTAneous Q8H    glucose chewable tablet 16 g  4 Tablet Oral PRN    dextrose (D50W) injection syrg 12.5-25 g  25-50 mL IntraVENous PRN    glucagon (GLUCAGEN) injection 1 mg  1 mg IntraMUSCular PRN    insulin lispro (HUMALOG) injection   SubCUTAneous AC&HS     ______________________________________________________________________  EXPECTED LENGTH OF STAY: - - -  ACTUAL LENGTH OF STAY:          1                 Jovany Baugh MD

## 2022-01-22 LAB
ALBUMIN SERPL-MCNC: 2.1 G/DL (ref 3.5–5)
ALBUMIN/GLOB SERPL: 0.5 {RATIO} (ref 1.1–2.2)
ALP SERPL-CCNC: 114 U/L (ref 45–117)
ALT SERPL-CCNC: 16 U/L (ref 12–78)
ANION GAP SERPL CALC-SCNC: 8 MMOL/L (ref 5–15)
AST SERPL-CCNC: 29 U/L (ref 15–37)
BASOPHILS # BLD: 0 K/UL (ref 0–0.1)
BASOPHILS NFR BLD: 0 % (ref 0–1)
BILIRUB SERPL-MCNC: 0.2 MG/DL (ref 0.2–1)
BUN SERPL-MCNC: 78 MG/DL (ref 6–20)
BUN/CREAT SERPL: 14 (ref 12–20)
CALCIUM SERPL-MCNC: 8 MG/DL (ref 8.5–10.1)
CHLORIDE SERPL-SCNC: 112 MMOL/L (ref 97–108)
CO2 SERPL-SCNC: 18 MMOL/L (ref 21–32)
CREAT SERPL-MCNC: 5.7 MG/DL (ref 0.7–1.3)
DIFFERENTIAL METHOD BLD: ABNORMAL
EOSINOPHIL # BLD: 0.1 K/UL (ref 0–0.4)
EOSINOPHIL NFR BLD: 1 % (ref 0–7)
ERYTHROCYTE [DISTWIDTH] IN BLOOD BY AUTOMATED COUNT: 16.5 % (ref 11.5–14.5)
GLOBULIN SER CALC-MCNC: 4.6 G/DL (ref 2–4)
GLUCOSE BLD STRIP.AUTO-MCNC: 127 MG/DL (ref 65–117)
GLUCOSE BLD STRIP.AUTO-MCNC: 180 MG/DL (ref 65–117)
GLUCOSE BLD STRIP.AUTO-MCNC: 216 MG/DL (ref 65–117)
GLUCOSE BLD STRIP.AUTO-MCNC: 230 MG/DL (ref 65–117)
GLUCOSE SERPL-MCNC: 161 MG/DL (ref 65–100)
HCT VFR BLD AUTO: 22.8 % (ref 36.6–50.3)
HGB BLD-MCNC: 7.2 G/DL (ref 12.1–17)
IMM GRANULOCYTES # BLD AUTO: 0.1 K/UL (ref 0–0.04)
IMM GRANULOCYTES NFR BLD AUTO: 1 % (ref 0–0.5)
LYMPHOCYTES # BLD: 0.7 K/UL (ref 0.8–3.5)
LYMPHOCYTES NFR BLD: 9 % (ref 12–49)
MAGNESIUM SERPL-MCNC: 2.2 MG/DL (ref 1.6–2.4)
MCH RBC QN AUTO: 26.8 PG (ref 26–34)
MCHC RBC AUTO-ENTMCNC: 31.6 G/DL (ref 30–36.5)
MCV RBC AUTO: 84.8 FL (ref 80–99)
MONOCYTES # BLD: 0.6 K/UL (ref 0–1)
MONOCYTES NFR BLD: 8 % (ref 5–13)
NEUTS SEG # BLD: 6.3 K/UL (ref 1.8–8)
NEUTS SEG NFR BLD: 81 % (ref 32–75)
NRBC # BLD: 0 K/UL (ref 0–0.01)
NRBC BLD-RTO: 0 PER 100 WBC
PLATELET # BLD AUTO: 250 K/UL (ref 150–400)
PLATELET COMMENTS,PCOM: ABNORMAL
PMV BLD AUTO: 11.2 FL (ref 8.9–12.9)
POTASSIUM SERPL-SCNC: 5.1 MMOL/L (ref 3.5–5.1)
PROT SERPL-MCNC: 6.7 G/DL (ref 6.4–8.2)
RBC # BLD AUTO: 2.69 M/UL (ref 4.1–5.7)
RBC MORPH BLD: ABNORMAL
SERVICE CMNT-IMP: ABNORMAL
SODIUM SERPL-SCNC: 138 MMOL/L (ref 136–145)
WBC # BLD AUTO: 7.8 K/UL (ref 4.1–11.1)

## 2022-01-22 PROCEDURE — 94760 N-INVAS EAR/PLS OXIMETRY 1: CPT

## 2022-01-22 PROCEDURE — 36415 COLL VENOUS BLD VENIPUNCTURE: CPT

## 2022-01-22 PROCEDURE — 74011250637 HC RX REV CODE- 250/637: Performed by: FAMILY MEDICINE

## 2022-01-22 PROCEDURE — 77010033678 HC OXYGEN DAILY

## 2022-01-22 PROCEDURE — 85025 COMPLETE CBC W/AUTO DIFF WBC: CPT

## 2022-01-22 PROCEDURE — 74011250636 HC RX REV CODE- 250/636: Performed by: INTERNAL MEDICINE

## 2022-01-22 PROCEDURE — 74011250636 HC RX REV CODE- 250/636: Performed by: FAMILY MEDICINE

## 2022-01-22 PROCEDURE — 74011000258 HC RX REV CODE- 258: Performed by: INTERNAL MEDICINE

## 2022-01-22 PROCEDURE — C9113 INJ PANTOPRAZOLE SODIUM, VIA: HCPCS | Performed by: INTERNAL MEDICINE

## 2022-01-22 PROCEDURE — 82962 GLUCOSE BLOOD TEST: CPT

## 2022-01-22 PROCEDURE — 83735 ASSAY OF MAGNESIUM: CPT

## 2022-01-22 PROCEDURE — 74011000250 HC RX REV CODE- 250: Performed by: INTERNAL MEDICINE

## 2022-01-22 PROCEDURE — 74011636637 HC RX REV CODE- 636/637: Performed by: FAMILY MEDICINE

## 2022-01-22 PROCEDURE — 65660000001 HC RM ICU INTERMED STEPDOWN

## 2022-01-22 PROCEDURE — 74011000250 HC RX REV CODE- 250: Performed by: FAMILY MEDICINE

## 2022-01-22 PROCEDURE — 80053 COMPREHEN METABOLIC PANEL: CPT

## 2022-01-22 RX ORDER — ONDANSETRON 2 MG/ML
4 INJECTION INTRAMUSCULAR; INTRAVENOUS
Status: DISCONTINUED | OUTPATIENT
Start: 2022-01-22 | End: 2022-01-23

## 2022-01-22 RX ADMIN — Medication 2 UNITS: at 07:03

## 2022-01-22 RX ADMIN — HEPARIN SODIUM 5000 UNITS: 5000 INJECTION, SOLUTION INTRAVENOUS; SUBCUTANEOUS at 04:21

## 2022-01-22 RX ADMIN — SODIUM CHLORIDE, PRESERVATIVE FREE 10 ML: 5 INJECTION INTRAVENOUS at 06:17

## 2022-01-22 RX ADMIN — HEPARIN SODIUM 5000 UNITS: 5000 INJECTION, SOLUTION INTRAVENOUS; SUBCUTANEOUS at 18:33

## 2022-01-22 RX ADMIN — SODIUM CHLORIDE, PRESERVATIVE FREE 10 ML: 5 INJECTION INTRAVENOUS at 09:00

## 2022-01-22 RX ADMIN — Medication 3 UNITS: at 11:57

## 2022-01-22 RX ADMIN — BUMETANIDE 2 MG: 0.25 INJECTION INTRAMUSCULAR; INTRAVENOUS at 18:32

## 2022-01-22 RX ADMIN — AMLODIPINE BESYLATE 10 MG: 5 TABLET ORAL at 10:40

## 2022-01-22 RX ADMIN — BUMETANIDE 2 MG: 0.25 INJECTION INTRAMUSCULAR; INTRAVENOUS at 10:39

## 2022-01-22 RX ADMIN — CLONIDINE HYDROCHLORIDE 0.3 MG: 0.2 TABLET ORAL at 10:40

## 2022-01-22 RX ADMIN — HEPARIN SODIUM 5000 UNITS: 5000 INJECTION, SOLUTION INTRAVENOUS; SUBCUTANEOUS at 10:40

## 2022-01-22 RX ADMIN — CARVEDILOL 25 MG: 12.5 TABLET, FILM COATED ORAL at 18:33

## 2022-01-22 RX ADMIN — CLONIDINE HYDROCHLORIDE 0.3 MG: 0.2 TABLET ORAL at 18:33

## 2022-01-22 RX ADMIN — PANTOPRAZOLE SODIUM 40 MG: 40 INJECTION, POWDER, FOR SOLUTION INTRAVENOUS at 10:40

## 2022-01-22 RX ADMIN — IRON SUCROSE 200 MG: 20 INJECTION, SOLUTION INTRAVENOUS at 18:45

## 2022-01-22 RX ADMIN — CARVEDILOL 25 MG: 12.5 TABLET, FILM COATED ORAL at 07:03

## 2022-01-22 NOTE — PROGRESS NOTES
Pt very insistent that he does not need oxygen. Currently on 7L satting at 100%. Turned down to his BL of 2L, still satting at 100%. Will continue to monitor.

## 2022-01-22 NOTE — PROGRESS NOTES
Patient name: Reuben Roy  MRN: [id number]    Nephrology Progress note:    Assessment:  ROSEMARY on CKD-4: Elevated serum Cr 6.2mg/dl. Better today at 5.8mg/dl. In the setting of HTN urgency. Recent progression s/p repeated episodes of ROSEMARY. Underlying DM nephropathy. High risk for RRT     Hyperkalemia: mild-> better     Decompensated Systolic CHF: +Pulm edema/Elevated BNP     Metabolic acidosis: Mild     HTN urgency-> improving.     DM2: HgbA1c 5.7     Anemia: likely 2 to advanced CKD. Hgb suboptimal < 7-> s/p PRBC (1/20/22)     Substance abuse: Cocaine/Heroin    UOP fair. Cr slowly trending down. Lytes OK    Plan/Recommendations:  No emergent indication for RRT (yet)-> not an ideal candidate for chronic HD given his issues with substance abuse. IV PRN Hydralazine  BIPAP PRN  IV Bumex 2mg BID  Ct CLonidine and Amlodipine  IV Venofer. NIRAV  PRBCs per primary team  Strict I/Os  Am labs    Subjective:  Off BIPAP. Resting.  Feels OK  Improved sob    ROS:   No nausea, no vomiting  No chest pain    Exam:  Visit Vitals  BP (!) 157/60 (BP 1 Location: Left upper arm, BP Patient Position: At rest)   Pulse 81   Temp 97.7 °F (36.5 °C)   Resp 22   Ht 5' 8\" (1.727 m)   Wt 83 kg (182 lb 15.7 oz)   SpO2 100%   BMI 27.82 kg/m²     Wt Readings from Last 3 Encounters:   01/22/22 83 kg (182 lb 15.7 oz)   12/02/21 77.1 kg (170 lb)   11/17/21 72.6 kg (160 lb)       Intake/Output Summary (Last 24 hours) at 1/22/2022 1256  Last data filed at 1/22/2022 1126  Gross per 24 hour   Intake 240 ml   Output 850 ml Net -610 ml       Gen: NAD  HEENT:  AT/NC  Lungs/Chest wall: Clear. No accessory muscle use. Cardiovascular: Regular rate, normal rhythm.    Ext: No peripheral edema  CNS: resting    Current Facility-Administered Medications   Medication Dose Route Frequency Last Admin    ondansetron (ZOFRAN) injection 4 mg  4 mg IntraVENous Q6H PRN      ziprasidone (GEODON) 20 mg in sterile water (preservative free) 1 mL injection  20 mg IntraMUSCular ONCE PRN      epoetin reagan-epbx (RETACRIT) injection 10,000 Units  10,000 Units SubCUTAneous Q7D 10,000 Units at 01/21/22 1912    pantoprazole (PROTONIX) injection 40 mg  40 mg IntraVENous Q12H 40 mg at 01/22/22 1040    And    sodium chloride (NS) flush 10 mL  10 mL IntraVENous Q12H 10 mL at 01/22/22 0900    hydrALAZINE (APRESOLINE) 20 mg/mL injection 20 mg  20 mg IntraVENous Q6H PRN 20 mg at 01/21/22 1313    0.9% sodium chloride infusion 250 mL  250 mL IntraVENous PRN      iron sucrose (VENOFER) 200 mg in 0.9% sodium chloride 100 mL IVPB  200 mg IntraVENous Q24H 200 mg at 01/21/22 2152    bumetanide (BUMEX) injection 2 mg  2 mg IntraVENous BID 2 mg at 01/22/22 1039    amLODIPine (NORVASC) tablet 10 mg  10 mg Oral DAILY 10 mg at 01/22/22 1040    carvediloL (COREG) tablet 25 mg  25 mg Oral BID WITH MEALS 25 mg at 01/22/22 0703    cloNIDine HCL (CATAPRES) tablet 0.3 mg  0.3 mg Oral BID 0.3 mg at 01/22/22 1040    sodium chloride (NS) flush 5-40 mL  5-40 mL IntraVENous Q8H 10 mL at 01/22/22 0617    sodium chloride (NS) flush 5-40 mL  5-40 mL IntraVENous PRN      [Held by provider] aspirin delayed-release tablet 81 mg  81 mg Oral DAILY 81 mg at 01/19/22 1301    acetaminophen (TYLENOL) tablet 650 mg  650 mg Oral Q4H PRN      heparin (porcine) injection 5,000 Units  5,000 Units SubCUTAneous Q8H 5,000 Units at 01/22/22 1040    glucose chewable tablet 16 g  4 Tablet Oral PRN      dextrose (D50W) injection syrg 12.5-25 g  25-50 mL IntraVENous PRN      glucagon (GLUCAGEN) injection 1 mg  1 mg IntraMUSCular PRN      insulin lispro (HUMALOG) injection   SubCUTAneous AC&HS 3 Units at 01/22/22 1157       Labs/Data:    Lab Results   Component Value Date/Time    WBC 7.8 01/22/2022 06:31 AM    HGB 7.2 (L) 01/22/2022 06:31 AM    HCT 22.8 (L) 01/22/2022 06:31 AM    PLATELET 356 17/64/0743 06:31 AM    MCV 84.8 01/22/2022 06:31 AM       Lab Results   Component Value Date/Time    Sodium 138 01/22/2022 06:31 AM    Potassium 5.1 01/22/2022 06:31 AM    Chloride 112 (H) 01/22/2022 06:31 AM    CO2 18 (L) 01/22/2022 06:31 AM    Anion gap 8 01/22/2022 06:31 AM    Glucose 161 (H) 01/22/2022 06:31 AM    BUN 78 (H) 01/22/2022 06:31 AM    Creatinine 5.70 (H) 01/22/2022 06:31 AM    BUN/Creatinine ratio 14 01/22/2022 06:31 AM    GFR est AA 13 (L) 01/22/2022 06:31 AM    GFR est non-AA 10 (L) 01/22/2022 06:31 AM    Calcium 8.0 (L) 01/22/2022 06:31 AM       Patient seen and examined. Chart reviewed. Labs, data and other pertinent notes reviewed in last 24 hrs.     Discussed with patient

## 2022-01-22 NOTE — ROUTINE PROCESS
Bedside shift change report given to Ana Rosa Gonzales RN (oncoming nurse) by Elsy Kemp (offgoing nurse). Report included the following information SBAR, Kardex, Intake/Output, MAR, Accordion and Cardiac Rhythm.

## 2022-01-22 NOTE — PROGRESS NOTES
[facility] [facility] Adult  Hospitalist Group                                                                                          Hospitalist Progress Note  Rajendra Louie MD  Answering service: [phone] OR 36 from in house phone        Date of Service:  2022  NAME:  Andrés Spence  :  1965  MRN:  [id number]      Admission Summary:   Andrés Spence is a 55 y.o. male who presents with shortness of breath     History is primary obtained from the patient, patient reports that he started experiencing shortness of breath that started 2 days back, patient reports that he has been getting swelling in his legs, he reports that he has not been taking his medications and has not been very compliant with the same, per chart review patient has history of noncompliance with medication and also has history of cocaine abuse, patient came to the ER, was found to be in CHF exacerbation, was hypertensive, was started on nitroglycerin drip and was requested to be admitted to the hospitalist service, patient reports that he also had some chest pain associated with his symptoms, reports that he has not used cocaine in the last \"many days\" but cannot quantify the number of days currently patient reports that he is feeling better but continues to have shortness of breath, denies any other complaints or problems     The patient denies any Headache, blurry vision, sore throat, trouble swallowing, trouble with speech, , cough, fever, chills, N/V/D, abd pain, urinary symptoms, constipation, recent travels, sick contacts, focal or generalized neurological symptoms,  falls, injuries, rashes, contact with COVID-19 diagnosed patients, hematemesis, melena, hemoptysis, hematuria, rashes, denies starting any new medications and denies any other concerns or problems besides as mentioned above.          Interval history / Subjective:   2022 :   · Fair UO  · Non n/v/d/f/chills  · Breathing better  · S Cr favorable trend Assessment & Plan:     Acute hypoxic hypoxemic respiratory failure  - support supplemental  w 02/NIVD; watch for increased wob, trials off BiPAP, to high flow as jeremy   - daily improvement as of 1/22/2022      Malignant hypertension  - iv antihypertensives, tridil/consider nicardene drip   - resovled     Acute on chronic combined systolic and diastolic congestive heart failure NYHA class IV on presentation;   - monitor improvemen/  clinical re-eval   - clincially stable, improved breathing.   - increasing in room activity 1/22/2022      Acute on chronic renal failure  - for now monitor avoid nephrotoxic's and control bp   1/22/2022  :  · Fair UO. · S Cr favorable trend    Anemia;   -support, iron;f/up hgb and trans for less than 7   Lab Results   Component Value Date/Time    HGB 7.2 (L) 01/22/2022 06:31 AM        Hyperkalemia- treat as indicated, likely will improve w diuresis     History of polysubstance abuse    Diabetes mellitus type 2  - SSI for now  Lab Results   Component Value Date/Time    Glucose 161 (H) 01/22/2022 06:31 AM    Glucose (POC) 180 (H) 01/22/2022 06:14 AM               GI DVT prophylaxis: Patient will be on heparin          Hospital Problems  Date Reviewed: 12/2/2021          Codes Class Noted POA    Hypertensive urgency ICD-10-CM: I16.0  ICD-9-CM: 401.9  1/20/2022 Unknown        CHF (congestive heart failure) ([facility]) ICD-10-CM: I50.9  ICD-9-CM: 428.0  1/19/2022 Unknown                Review of Systems:   A comprehensive review of systems was negative except for that written in the HPI. Vital Signs:    Last 24hrs VS reviewed since prior progress note.  Most recent are:  Visit Vitals  BP (!) 153/68 (BP 1 Location: Left arm, BP Patient Position: At rest)   Pulse 77   Temp 98.3 °F (36.8 °C)   Resp 23   Ht 5' 8\" (1.727 m)   Wt 83 kg (182 lb 15.7 oz)   SpO2 100%   BMI 27.82 kg/m²         Intake/Output Summary (Last 24 hours) at 1/22/2022 1043  Last data filed at 1/22/2022 0845  Gross per 24 hour   Intake 240 ml   Output 750 ml   Net -510 ml        Physical Examination:     I had a face to face encounter with this patient and independently examined them on 1/22/2022 as outlined below:          Constitutional:  No acute distress, cooperative, pleasant    ENT:  Oral mucosa moist, oropharynx benign. Resp:  CTA bilaterally. No wheezing/rhonchi/rales. No accessory muscle use. On O2, baseline requirement    CV:  Regular rhythm, normal rate, no murmurs, gallops, rubs    GI:  Soft, non distended, non tender. normoactive bowel sounds, no hepatosplenomegaly     Musculoskeletal:  No edema, warm, 2+ pulses throughout    Neurologic:  Moves all extremities. AAOx3, CN II-XII reviewed            Data Review:    Review and/or order of clinical lab test      Labs:     Recent Labs     01/22/22  0631 01/21/22 0456   WBC 7.8 7.4   HGB 7.2* 7.0*   HCT 22.8* 22.4*    275     Recent Labs     01/22/22  0631 01/21/22  0456 01/20/22  1200 01/20/22  0328 01/20/22 0328    138 140   < > 139   K 5.1 4.8 5.0   < > 5.1   * 114* 116*   < > 116*   CO2 18* 18* 20*   < > 20*   BUN 78* 74* 69*   < > 70*   CREA 5.70* 5.81* 6.20*   < > 6.20*   * 124* 125*   < > 90   CA 8.0* 7.9* 8.3*   < > 8.2*   MG 2.2 2.1  --   --  2.2   PHOS  --  5.2*  --   --  5.4*    < > = values in this interval not displayed. Recent Labs     01/22/22  0631 01/21/22  0456 01/20/22 0328   ALT 16 14 14    84 97   TBILI 0.2 0.2 0.3   TP 6.7 6.2* 6.9   ALB 2.1* 1.9* 2.0*   GLOB 4.6* 4.3* 4.9*     No results for input(s): INR, PTP, APTT, INREXT, INREXT in the last 72 hours. Recent Labs     01/20/22  0328   TIBC 251   PSAT 14*      No results found for: FOL, RBCF   No results for input(s): PH, PCO2, PO2 in the last 72 hours. No results for input(s): CPK, CKNDX, TROIQ in the last 72 hours.     No lab exists for component: CPKMB  Lab Results   Component Value Date/Time    Cholesterol, total 133 01/20/2022 03:28 AM    HDL Cholesterol 62 01/20/2022 03:28 AM    LDL, calculated 59 01/20/2022 03:28 AM    Triglyceride 60 01/20/2022 03:28 AM    CHOL/HDL Ratio 2.1 01/20/2022 03:28 AM     Lab Results   Component Value Date/Time    Glucose (POC) 180 (H) 01/22/2022 06:14 AM    Glucose (POC) 191 (H) 01/21/2022 09:20 PM    Glucose (POC) 136 (H) 01/21/2022 04:07 PM    Glucose (POC) 218 (H) 01/21/2022 01:02 PM    Glucose (POC) 125 (H) 01/21/2022 06:28 AM     Lab Results   Component Value Date/Time    Color YELLOW/STRAW 01/20/2022 11:33 AM    Appearance CLEAR 01/20/2022 11:33 AM    Specific gravity 1.010 01/20/2022 11:33 AM    pH (UA) 6.0 01/20/2022 11:33 AM    Protein 300 (A) 01/20/2022 11:33 AM    Glucose 100 (A) 01/20/2022 11:33 AM    Ketone Negative 01/20/2022 11:33 AM    Bilirubin Negative 01/20/2022 11:33 AM    Urobilinogen 0.2 01/20/2022 11:33 AM    Nitrites Negative 01/20/2022 11:33 AM    Leukocyte Esterase Negative 01/20/2022 11:33 AM    Epithelial cells FEW 01/20/2022 11:33 AM    Bacteria Negative 01/20/2022 11:33 AM    WBC 0-4 01/20/2022 11:33 AM    RBC 0-5 01/20/2022 11:33 AM         Medications Reviewed:     Current Facility-Administered Medications   Medication Dose Route Frequency    ondansetron (ZOFRAN) injection 4 mg  4 mg IntraVENous Q6H PRN    epoetin reagan-epbx (RETACRIT) injection 10,000 Units  10,000 Units SubCUTAneous Q7D    pantoprazole (PROTONIX) injection 40 mg  40 mg IntraVENous Q12H    And    sodium chloride (NS) flush 10 mL  10 mL IntraVENous Q12H    hydrALAZINE (APRESOLINE) 20 mg/mL injection 20 mg  20 mg IntraVENous Q6H PRN    0.9% sodium chloride infusion 250 mL  250 mL IntraVENous PRN    iron sucrose (VENOFER) 200 mg in 0.9% sodium chloride 100 mL IVPB  200 mg IntraVENous Q24H    bumetanide (BUMEX) injection 2 mg  2 mg IntraVENous BID    amLODIPine (NORVASC) tablet 10 mg  10 mg Oral DAILY    carvediloL (COREG) tablet 25 mg  25 mg Oral BID WITH MEALS    cloNIDine HCL (CATAPRES) tablet 0.3 mg  0.3 mg Oral BID    sodium chloride (NS) flush 5-40 mL  5-40 mL IntraVENous Q8H    sodium chloride (NS) flush 5-40 mL  5-40 mL IntraVENous PRN    [Held by provider] aspirin delayed-release tablet 81 mg  81 mg Oral DAILY    acetaminophen (TYLENOL) tablet 650 mg  650 mg Oral Q4H PRN    heparin (porcine) injection 5,000 Units  5,000 Units SubCUTAneous Q8H    glucose chewable tablet 16 g  4 Tablet Oral PRN    dextrose (D50W) injection syrg 12.5-25 g  25-50 mL IntraVENous PRN    glucagon (GLUCAGEN) injection 1 mg  1 mg IntraMUSCular PRN    insulin lispro (HUMALOG) injection   SubCUTAneous AC&HS     ______________________________________________________________________  EXPECTED LENGTH OF STAY: - - -  ACTUAL LENGTH OF STAY:          2                 Doug Silva MD

## 2022-01-23 VITALS
DIASTOLIC BLOOD PRESSURE: 68 MMHG | BODY MASS INDEX: 27.73 KG/M2 | SYSTOLIC BLOOD PRESSURE: 162 MMHG | TEMPERATURE: 97.7 F | HEART RATE: 79 BPM | RESPIRATION RATE: 20 BRPM | HEIGHT: 68 IN | WEIGHT: 182.98 LBS | OXYGEN SATURATION: 98 %

## 2022-01-23 LAB
GLUCOSE BLD STRIP.AUTO-MCNC: 128 MG/DL (ref 65–117)
SERVICE CMNT-IMP: ABNORMAL

## 2022-01-23 PROCEDURE — 74011000250 HC RX REV CODE- 250: Performed by: FAMILY MEDICINE

## 2022-01-23 PROCEDURE — C9113 INJ PANTOPRAZOLE SODIUM, VIA: HCPCS | Performed by: INTERNAL MEDICINE

## 2022-01-23 PROCEDURE — 74011636637 HC RX REV CODE- 636/637: Performed by: FAMILY MEDICINE

## 2022-01-23 PROCEDURE — 74011250636 HC RX REV CODE- 250/636: Performed by: FAMILY MEDICINE

## 2022-01-23 PROCEDURE — 74011250636 HC RX REV CODE- 250/636: Performed by: INTERNAL MEDICINE

## 2022-01-23 PROCEDURE — 77010033678 HC OXYGEN DAILY

## 2022-01-23 PROCEDURE — 82962 GLUCOSE BLOOD TEST: CPT

## 2022-01-23 PROCEDURE — 74011000250 HC RX REV CODE- 250: Performed by: INTERNAL MEDICINE

## 2022-01-23 RX ORDER — PANTOPRAZOLE SODIUM 40 MG/1
40 TABLET, DELAYED RELEASE ORAL
Status: DISCONTINUED | OUTPATIENT
Start: 2022-01-24 | End: 2022-01-23 | Stop reason: HOSPADM

## 2022-01-23 RX ORDER — BUMETANIDE 1 MG/1
2 TABLET ORAL 2 TIMES DAILY
Status: DISCONTINUED | OUTPATIENT
Start: 2022-01-23 | End: 2022-01-23 | Stop reason: HOSPADM

## 2022-01-23 RX ADMIN — HEPARIN SODIUM 5000 UNITS: 5000 INJECTION, SOLUTION INTRAVENOUS; SUBCUTANEOUS at 00:57

## 2022-01-23 RX ADMIN — SODIUM CHLORIDE, PRESERVATIVE FREE 10 ML: 5 INJECTION INTRAVENOUS at 00:58

## 2022-01-23 RX ADMIN — SODIUM CHLORIDE, PRESERVATIVE FREE 10 ML: 5 INJECTION INTRAVENOUS at 00:57

## 2022-01-23 RX ADMIN — Medication 2 UNITS: at 00:57

## 2022-01-23 RX ADMIN — PANTOPRAZOLE SODIUM 40 MG: 40 INJECTION, POWDER, FOR SOLUTION INTRAVENOUS at 00:57

## 2022-01-23 RX ADMIN — SODIUM CHLORIDE, PRESERVATIVE FREE 10 ML: 5 INJECTION INTRAVENOUS at 06:00

## 2022-01-23 NOTE — ROUTINE PROCESS
I entered the patient's room this morning with all of his morning medications to give him. I asked him if there was something I could get him to drink to take his pills with. The patient stated \"I'm not taking any pills\". I asked him why. He stated \"I'm getting up out of here, my old lady is coming to get me\". I advised him that he had not been discharged and that he still needed to be monitored and treated. He told me again that he did not want to be in the hospital any more and that he was leaving. I told him I would speak with the doctor. As I was leaving the room, I ran into Dr Armen Denver. I advised him of the patient's statements. He went into the room and explained why it was important to stay. The patient still insisted he was leaving. The hospitalist took the patient his [place] papers. The patient said he would not sign them until his wife was here. I then got a phone call from the patient's wife. She asked what we were doing for the patient. I explained to her what had happened earlier. She said that he was leaving because the nurses were not doing their jobs. I tried to explain all that we were trying to do and her husbands refusal for treatment, pulling out IV's, calling out 10-15X an hour and then refusing to cooperate when we went in the room. I told her it was important for him to stay but that I could not force him to stay. I then went into the patients room to see what his plans were, since he hadn't signed the papers. When I went in the room, he told me to get him the F*&# out of here and threw his urinal on the ground spilling urine all over the floor. I advised the patient that he could not throw things. The patient continued to be contrary to the point that two other nurses were called into the room to try to keep him calm until his wife came. The patient then said he wanted to sign his papers which he did. The wife came and they left off the floor together.

## 2022-01-23 NOTE — PROGRESS NOTES
Bedside shift change report given to [facility] RN (oncoming nurse) by Chandu Loving RN (offgoing nurse). Report included the following information SBAR, Procedure Summary, Intake/Output, MAR, Med Rec Status and Cardiac Rhythm nsr.

## 2022-01-23 NOTE — PROGRESS NOTES
0315:  pt refused vital signs for this morning. He reports that he is tired of being in the hospital and wants to leave. Provider Rosalio Wharton NP was notified.

## 2022-01-23 NOTE — ROUTINE PROCESS
Bedside shift change report given to Matthew Garcia RN (oncoming nurse) by Shauna Olszewski (offgoing nurse). Report included the following information SBAR, Kardex, Intake/Output, MAR, Accordion and Cardiac Rhythm NSR.

## 2022-01-24 LAB
ABO + RH BLD: NORMAL
BLD PROD TYP BPU: NORMAL
BLOOD GROUP ANTIBODIES SERPL: NORMAL
BPU ID: NORMAL
CROSSMATCH RESULT,%XM: NORMAL
SPECIMEN EXP DATE BLD: NORMAL
STATUS OF UNIT,%ST: NORMAL
UNIT DIVISION, %UDIV: 0

## 2022-02-01 ENCOUNTER — APPOINTMENT (OUTPATIENT)
Dept: ULTRASOUND IMAGING | Age: 57
DRG: 194 | End: 2022-02-01
Attending: INTERNAL MEDICINE
Payer: MEDICAID

## 2022-02-01 ENCOUNTER — HOSPITAL ENCOUNTER (INPATIENT)
Age: 57
LOS: 8 days | Discharge: HOME | DRG: 194 | End: 2022-02-09
Attending: EMERGENCY MEDICINE | Admitting: INTERNAL MEDICINE
Payer: MEDICAID

## 2022-02-01 ENCOUNTER — APPOINTMENT (OUTPATIENT)
Dept: GENERAL RADIOLOGY | Age: 57
DRG: 194 | End: 2022-02-01
Attending: EMERGENCY MEDICINE
Payer: MEDICAID

## 2022-02-01 DIAGNOSIS — N17.9 ACUTE RENAL FAILURE SUPERIMPOSED ON CHRONIC KIDNEY DISEASE, UNSPECIFIED CKD STAGE, UNSPECIFIED ACUTE RENAL FAILURE TYPE (HCC): Primary | ICD-10-CM

## 2022-02-01 DIAGNOSIS — J81.0 ACUTE PULMONARY EDEMA (HCC): ICD-10-CM

## 2022-02-01 DIAGNOSIS — N18.9 ACUTE RENAL FAILURE SUPERIMPOSED ON CHRONIC KIDNEY DISEASE, UNSPECIFIED CKD STAGE, UNSPECIFIED ACUTE RENAL FAILURE TYPE (HCC): Primary | ICD-10-CM

## 2022-02-01 DIAGNOSIS — E87.5 ACUTE HYPERKALEMIA: ICD-10-CM

## 2022-02-01 PROBLEM — F19.10 POLYSUBSTANCE ABUSE (HCC): Status: ACTIVE | Noted: 2022-02-01

## 2022-02-01 LAB
ALBUMIN SERPL-MCNC: 2.4 G/DL (ref 3.5–5)
ALBUMIN/GLOB SERPL: 0.4 {RATIO} (ref 1.1–2.2)
ALP SERPL-CCNC: 220 U/L (ref 45–117)
ALT SERPL-CCNC: 27 U/L (ref 12–78)
AMORPH CRY URNS QL MICRO: ABNORMAL
AMPHET UR QL SCN: NEGATIVE
ANION GAP SERPL CALC-SCNC: 7 MMOL/L (ref 5–15)
ANION GAP SERPL CALC-SCNC: 8 MMOL/L (ref 5–15)
APPEARANCE UR: ABNORMAL
AST SERPL-CCNC: 48 U/L (ref 15–37)
ATRIAL RATE: 89 BPM
BACTERIA URNS QL MICRO: NEGATIVE /HPF
BARBITURATES UR QL SCN: NEGATIVE
BASOPHILS # BLD: 0 K/UL (ref 0–0.1)
BASOPHILS NFR BLD: 0 % (ref 0–1)
BENZODIAZ UR QL: NEGATIVE
BILIRUB SERPL-MCNC: 0.4 MG/DL (ref 0.2–1)
BILIRUB UR QL: NEGATIVE
BNP SERPL-MCNC: ABNORMAL PG/ML
BUN SERPL-MCNC: 89 MG/DL (ref 6–20)
BUN SERPL-MCNC: 93 MG/DL (ref 6–20)
BUN/CREAT SERPL: 13 (ref 12–20)
BUN/CREAT SERPL: 13 (ref 12–20)
CALCIUM SERPL-MCNC: 7.9 MG/DL (ref 8.5–10.1)
CALCIUM SERPL-MCNC: 8.1 MG/DL (ref 8.5–10.1)
CALCULATED P AXIS, ECG09: 71 DEGREES
CALCULATED R AXIS, ECG10: -14 DEGREES
CALCULATED T AXIS, ECG11: -163 DEGREES
CANNABINOIDS UR QL SCN: NEGATIVE
CHLORIDE SERPL-SCNC: 113 MMOL/L (ref 97–108)
CHLORIDE SERPL-SCNC: 114 MMOL/L (ref 97–108)
CHLORIDE UR-SCNC: 48 MMOL/L
CO2 SERPL-SCNC: 18 MMOL/L (ref 21–32)
CO2 SERPL-SCNC: 20 MMOL/L (ref 21–32)
COCAINE UR QL SCN: POSITIVE
COLOR UR: ABNORMAL
CREAT SERPL-MCNC: 7 MG/DL (ref 0.7–1.3)
CREAT SERPL-MCNC: 7.06 MG/DL (ref 0.7–1.3)
DIAGNOSIS, 93000: NORMAL
DIFFERENTIAL METHOD BLD: ABNORMAL
DRUG SCRN COMMENT,DRGCM: ABNORMAL
EOSINOPHIL # BLD: 0.2 K/UL (ref 0–0.4)
EOSINOPHIL NFR BLD: 2 % (ref 0–7)
EPITH CASTS URNS QL MICRO: ABNORMAL /LPF
ERYTHROCYTE [DISTWIDTH] IN BLOOD BY AUTOMATED COUNT: 18.6 % (ref 11.5–14.5)
GLOBULIN SER CALC-MCNC: 5.7 G/DL (ref 2–4)
GLUCOSE BLD STRIP.AUTO-MCNC: 101 MG/DL (ref 65–117)
GLUCOSE BLD STRIP.AUTO-MCNC: 81 MG/DL (ref 65–117)
GLUCOSE BLD STRIP.AUTO-MCNC: 89 MG/DL (ref 65–117)
GLUCOSE BLD STRIP.AUTO-MCNC: 92 MG/DL (ref 65–117)
GLUCOSE SERPL-MCNC: 86 MG/DL (ref 65–100)
GLUCOSE SERPL-MCNC: 99 MG/DL (ref 65–100)
GLUCOSE UR STRIP.AUTO-MCNC: 100 MG/DL
HCT VFR BLD AUTO: 25.8 % (ref 36.6–50.3)
HGB BLD-MCNC: 7.9 G/DL (ref 12.1–17)
HGB UR QL STRIP: ABNORMAL
IMM GRANULOCYTES # BLD AUTO: 0.1 K/UL (ref 0–0.04)
IMM GRANULOCYTES NFR BLD AUTO: 1 % (ref 0–0.5)
KETONES UR QL STRIP.AUTO: NEGATIVE MG/DL
LEUKOCYTE ESTERASE UR QL STRIP.AUTO: NEGATIVE
LYMPHOCYTES # BLD: 0.6 K/UL (ref 0.8–3.5)
LYMPHOCYTES NFR BLD: 6 % (ref 12–49)
MCH RBC QN AUTO: 26.5 PG (ref 26–34)
MCHC RBC AUTO-ENTMCNC: 30.6 G/DL (ref 30–36.5)
MCV RBC AUTO: 86.6 FL (ref 80–99)
METHADONE UR QL: NEGATIVE
MONOCYTES # BLD: 0.7 K/UL (ref 0–1)
MONOCYTES NFR BLD: 8 % (ref 5–13)
NEUTS SEG # BLD: 7.6 K/UL (ref 1.8–8)
NEUTS SEG NFR BLD: 83 % (ref 32–75)
NITRITE UR QL STRIP.AUTO: NEGATIVE
NRBC # BLD: 0 K/UL (ref 0–0.01)
NRBC BLD-RTO: 0 PER 100 WBC
OPIATES UR QL: POSITIVE
P-R INTERVAL, ECG05: 126 MS
PCP UR QL: NEGATIVE
PH UR STRIP: 5.5 [PH] (ref 5–8)
PLATELET # BLD AUTO: 272 K/UL (ref 150–400)
PMV BLD AUTO: 10.3 FL (ref 8.9–12.9)
POTASSIUM SERPL-SCNC: 5.6 MMOL/L (ref 3.5–5.1)
POTASSIUM SERPL-SCNC: 5.8 MMOL/L (ref 3.5–5.1)
POTASSIUM UR-SCNC: 32 MMOL/L
PROT SERPL-MCNC: 8.1 G/DL (ref 6.4–8.2)
PROT UR STRIP-MCNC: 300 MG/DL
Q-T INTERVAL, ECG07: 362 MS
QRS DURATION, ECG06: 86 MS
QTC CALCULATION (BEZET), ECG08: 440 MS
RBC # BLD AUTO: 2.98 M/UL (ref 4.1–5.7)
RBC #/AREA URNS HPF: ABNORMAL /HPF (ref 0–5)
RBC MORPH BLD: ABNORMAL
RBC MORPH BLD: ABNORMAL
SERVICE CMNT-IMP: NORMAL
SODIUM SERPL-SCNC: 139 MMOL/L (ref 136–145)
SODIUM SERPL-SCNC: 141 MMOL/L (ref 136–145)
SODIUM UR-SCNC: 50 MMOL/L
SP GR UR REFRACTOMETRY: 1.02 (ref 1–1.03)
TROPONIN-HIGH SENSITIVITY: 35 NG/L (ref 0–76)
UROBILINOGEN UR QL STRIP.AUTO: 0.2 EU/DL (ref 0.2–1)
VENTRICULAR RATE, ECG03: 89 BPM
WBC # BLD AUTO: 9.2 K/UL (ref 4.1–11.1)
WBC URNS QL MICRO: ABNORMAL /HPF (ref 0–4)

## 2022-02-01 PROCEDURE — 76770 US EXAM ABDO BACK WALL COMP: CPT

## 2022-02-01 PROCEDURE — 65660000000 HC RM CCU STEPDOWN

## 2022-02-01 PROCEDURE — 74011250637 HC RX REV CODE- 250/637: Performed by: INTERNAL MEDICINE

## 2022-02-01 PROCEDURE — 84133 ASSAY OF URINE POTASSIUM: CPT

## 2022-02-01 PROCEDURE — 80053 COMPREHEN METABOLIC PANEL: CPT

## 2022-02-01 PROCEDURE — 82436 ASSAY OF URINE CHLORIDE: CPT

## 2022-02-01 PROCEDURE — 85025 COMPLETE CBC W/AUTO DIFF WBC: CPT

## 2022-02-01 PROCEDURE — 74011000250 HC RX REV CODE- 250: Performed by: INTERNAL MEDICINE

## 2022-02-01 PROCEDURE — 93005 ELECTROCARDIOGRAM TRACING: CPT

## 2022-02-01 PROCEDURE — 84300 ASSAY OF URINE SODIUM: CPT

## 2022-02-01 PROCEDURE — 96375 TX/PRO/DX INJ NEW DRUG ADDON: CPT

## 2022-02-01 PROCEDURE — 71045 X-RAY EXAM CHEST 1 VIEW: CPT

## 2022-02-01 PROCEDURE — 74011250636 HC RX REV CODE- 250/636: Performed by: INTERNAL MEDICINE

## 2022-02-01 PROCEDURE — 82962 GLUCOSE BLOOD TEST: CPT

## 2022-02-01 PROCEDURE — 36415 COLL VENOUS BLD VENIPUNCTURE: CPT

## 2022-02-01 PROCEDURE — 81001 URINALYSIS AUTO W/SCOPE: CPT

## 2022-02-01 PROCEDURE — 99284 EMERGENCY DEPT VISIT MOD MDM: CPT

## 2022-02-01 PROCEDURE — 80307 DRUG TEST PRSMV CHEM ANLYZR: CPT

## 2022-02-01 PROCEDURE — 96374 THER/PROPH/DIAG INJ IV PUSH: CPT

## 2022-02-01 PROCEDURE — 74011000250 HC RX REV CODE- 250: Performed by: EMERGENCY MEDICINE

## 2022-02-01 PROCEDURE — 83880 ASSAY OF NATRIURETIC PEPTIDE: CPT

## 2022-02-01 PROCEDURE — 74011250636 HC RX REV CODE- 250/636: Performed by: EMERGENCY MEDICINE

## 2022-02-01 PROCEDURE — 84484 ASSAY OF TROPONIN QUANT: CPT

## 2022-02-01 PROCEDURE — 74011636637 HC RX REV CODE- 636/637: Performed by: EMERGENCY MEDICINE

## 2022-02-01 RX ORDER — CLONIDINE HYDROCHLORIDE 0.1 MG/1
0.3 TABLET ORAL 2 TIMES DAILY
Status: DISCONTINUED | OUTPATIENT
Start: 2022-02-01 | End: 2022-02-09 | Stop reason: HOSPADM

## 2022-02-01 RX ORDER — DEXTROSE 50 % IN WATER (D50W) INTRAVENOUS SYRINGE
25
Status: COMPLETED | OUTPATIENT
Start: 2022-02-01 | End: 2022-02-01

## 2022-02-01 RX ORDER — BUMETANIDE 0.25 MG/ML
2 INJECTION INTRAMUSCULAR; INTRAVENOUS
Status: COMPLETED | OUTPATIENT
Start: 2022-02-01 | End: 2022-02-01

## 2022-02-01 RX ORDER — INSULIN LISPRO 100 [IU]/ML
INJECTION, SOLUTION INTRAVENOUS; SUBCUTANEOUS
Status: DISCONTINUED | OUTPATIENT
Start: 2022-02-01 | End: 2022-02-07

## 2022-02-01 RX ORDER — IBUPROFEN 200 MG
1 TABLET ORAL DAILY
Status: DISCONTINUED | OUTPATIENT
Start: 2022-02-01 | End: 2022-02-09 | Stop reason: HOSPADM

## 2022-02-01 RX ORDER — CARVEDILOL 12.5 MG/1
25 TABLET ORAL 2 TIMES DAILY WITH MEALS
Status: DISCONTINUED | OUTPATIENT
Start: 2022-02-01 | End: 2022-02-01

## 2022-02-01 RX ORDER — CALCIUM GLUCONATE 94 MG/ML
1 INJECTION, SOLUTION INTRAVENOUS
Status: COMPLETED | OUTPATIENT
Start: 2022-02-01 | End: 2022-02-01

## 2022-02-01 RX ORDER — AMLODIPINE BESYLATE 5 MG/1
10 TABLET ORAL DAILY
Status: DISCONTINUED | OUTPATIENT
Start: 2022-02-01 | End: 2022-02-09 | Stop reason: HOSPADM

## 2022-02-01 RX ORDER — PREGABALIN 25 MG/1
100 CAPSULE ORAL 2 TIMES DAILY
Status: DISCONTINUED | OUTPATIENT
Start: 2022-02-01 | End: 2022-02-01

## 2022-02-01 RX ORDER — BUMETANIDE 0.25 MG/ML
2 INJECTION INTRAMUSCULAR; INTRAVENOUS EVERY 12 HOURS
Status: DISCONTINUED | OUTPATIENT
Start: 2022-02-01 | End: 2022-02-09 | Stop reason: HOSPADM

## 2022-02-01 RX ORDER — SODIUM CHLORIDE 0.9 % (FLUSH) 0.9 %
5-40 SYRINGE (ML) INJECTION EVERY 8 HOURS
Status: DISCONTINUED | OUTPATIENT
Start: 2022-02-01 | End: 2022-02-09 | Stop reason: HOSPADM

## 2022-02-01 RX ORDER — SODIUM CHLORIDE 0.9 % (FLUSH) 0.9 %
5-40 SYRINGE (ML) INJECTION AS NEEDED
Status: DISCONTINUED | OUTPATIENT
Start: 2022-02-01 | End: 2022-02-09 | Stop reason: HOSPADM

## 2022-02-01 RX ORDER — DEXTROSE 50 % IN WATER (D50W) INTRAVENOUS SYRINGE
12.5-25 AS NEEDED
Status: DISCONTINUED | OUTPATIENT
Start: 2022-02-01 | End: 2022-02-09 | Stop reason: HOSPADM

## 2022-02-01 RX ORDER — HYDRALAZINE HYDROCHLORIDE 20 MG/ML
10 INJECTION INTRAMUSCULAR; INTRAVENOUS
Status: DISCONTINUED | OUTPATIENT
Start: 2022-02-01 | End: 2022-02-09 | Stop reason: HOSPADM

## 2022-02-01 RX ORDER — HEPARIN SODIUM 5000 [USP'U]/ML
5000 INJECTION, SOLUTION INTRAVENOUS; SUBCUTANEOUS EVERY 8 HOURS
Status: DISCONTINUED | OUTPATIENT
Start: 2022-02-01 | End: 2022-02-09 | Stop reason: HOSPADM

## 2022-02-01 RX ORDER — INSULIN GLARGINE 100 [IU]/ML
10 INJECTION, SOLUTION SUBCUTANEOUS
Status: DISCONTINUED | OUTPATIENT
Start: 2022-02-01 | End: 2022-02-09 | Stop reason: HOSPADM

## 2022-02-01 RX ORDER — MAGNESIUM SULFATE 100 %
4 CRYSTALS MISCELLANEOUS AS NEEDED
Status: DISCONTINUED | OUTPATIENT
Start: 2022-02-01 | End: 2022-02-09 | Stop reason: HOSPADM

## 2022-02-01 RX ORDER — INSULIN GLARGINE 100 [IU]/ML
10 INJECTION, SOLUTION SUBCUTANEOUS 2 TIMES DAILY
COMMUNITY
End: 2022-02-09

## 2022-02-01 RX ADMIN — CLONIDINE HYDROCHLORIDE 0.3 MG: 0.1 TABLET ORAL at 08:17

## 2022-02-01 RX ADMIN — Medication 10 UNITS: at 07:44

## 2022-02-01 RX ADMIN — SODIUM ZIRCONIUM CYCLOSILICATE 10 G: 10 POWDER, FOR SUSPENSION ORAL at 15:51

## 2022-02-01 RX ADMIN — HEPARIN SODIUM 5000 UNITS: 5000 INJECTION, SOLUTION INTRAVENOUS; SUBCUTANEOUS at 21:26

## 2022-02-01 RX ADMIN — SODIUM CHLORIDE, PRESERVATIVE FREE 10 ML: 5 INJECTION INTRAVENOUS at 08:23

## 2022-02-01 RX ADMIN — PREGABALIN 100 MG: 25 CAPSULE ORAL at 08:17

## 2022-02-01 RX ADMIN — DEXTROSE MONOHYDRATE 25 G: 25 INJECTION, SOLUTION INTRAVENOUS at 07:44

## 2022-02-01 RX ADMIN — CLONIDINE HYDROCHLORIDE 0.3 MG: 0.1 TABLET ORAL at 17:35

## 2022-02-01 RX ADMIN — CALCIUM GLUCONATE 1 G: 98 INJECTION, SOLUTION INTRAVENOUS at 07:44

## 2022-02-01 RX ADMIN — BUMETANIDE 2 MG: 0.25 INJECTION INTRAMUSCULAR; INTRAVENOUS at 06:18

## 2022-02-01 RX ADMIN — AMLODIPINE BESYLATE 10 MG: 5 TABLET ORAL at 08:18

## 2022-02-01 RX ADMIN — HEPARIN SODIUM 5000 UNITS: 5000 INJECTION, SOLUTION INTRAVENOUS; SUBCUTANEOUS at 17:37

## 2022-02-01 RX ADMIN — BUMETANIDE 2 MG: 0.25 INJECTION INTRAMUSCULAR; INTRAVENOUS at 17:40

## 2022-02-01 RX ADMIN — SODIUM CHLORIDE, PRESERVATIVE FREE 10 ML: 5 INJECTION INTRAVENOUS at 21:28

## 2022-02-01 NOTE — PROGRESS NOTES
Transition of Care Plan:    RUR: 24% high risk for readmission   Disposition: Home with significant other  Follow up appointments: PCP, Nephrology   DME needed: Pt has a cane and walker   Transportation at Discharge: Pt's significant other, Leva Юлия or means to access home: Pt will have access       IM Medicare Letter: N/A; Medicaid coverage  Is patient a BCPI-A Bundle:  N/A         If yes, was Bundle Letter given?:    Is patient a  and connected with the [facility]? N/A           If yes, was [employer] transfer form completed and VA notified? Caregiver Contact: Pt's significant other/healthcare decision maker: Jocelin Chopra) [phone]  Discharge Caregiver contacted prior to discharge? Care Conference needed?:                     Reason for Readmission:   Acute on chronic combined systolic and diastolic CHF, uncontrolled hypertension, worsening CKD now stage V, hyperkalemia            RUR Score/Risk Level:  24% high risk for readmission        PCP: First and Last name:  Char Garvin MD    Name of Practice:    Are you a current patient: Yes/No: Yes   Approximate date of last visit: Nov-Dec 2021   Can you participate in a virtual visit with your PCP: yes    Is a Care Conference indicated:       Did you attend your follow up appointment (s): If not, why not:  Pt did not follow up with providers post d/c - pt also d/c'd [facility]EAN[facility] from [facility] on 1/23       Resources/supports as identified by patient/family: Supportive significant other          Top Challenges facing patient (as identified by patient/family and CM): Finances/Medication cost?  No concerns voiced     Transportation    Pt's significant other provides transportation needs. Support system or lack thereof? No concerns voiced about support       Living arrangements? Pt resides with significant other in 1 level home with 3 SHAW         Self-care/ADLs/Cognition?   Pt's significant other assists with bathing, dressing, medication management Current Advanced Directive/Advance Care Plan:    Advance Care Planning     General Advance Care Planning (ACP) Conversation      Date of Conversation: 2/1/2022  Conducted with: Healthcare Decision Maker: Named in Advance Directive or Healthcare Power of [employer] Decision Maker:     Primary Decision Maker: Prince Lloyd - Other Relative - [phone]    Secondary Decision Maker: Rafi Kwong - Daughter - [phone]  Click here to complete [address] including selection of the Healthcare Decision Maker Relationship (ie \"Primary\")      Today we documented Decision Maker(s) consistent with ACP documents on file. Content/Action Overview: Has ACP document(s) on file - reflects the patient's care preferences  Reviewed DNR/DNI and patient elects Full Code (Attempt Resuscitation)    Length of Voluntary ACP Conversation in minutes:  <16 minutes (Non-Billable)    [address] [facility] for utilizing home health: No home health needs identified. Transition of Care Plan:    Based on readmission, the patient's previous Plan of Care   has been evaluated and/or modified. The current Transition of Care Plan is:     Home with significant other, outpatient follow up appts    CM reviewed chart. CM attempted to meet with pt who was asleep, CM completed assessment with pt's significant other/healthcare decision maker, Ruth Dolan, via phone. CM introduced self/role, verified demographics, and discussed discharge planning. Pt resides with Shelley in 1 level home with 3 SHAW. Ruth Dolan provides assistance with bathing, toileting, medication management, transportation needs. Pt owns a walker and cane, typically uses his cane per Shelley. She reports that he has a  at [facility] named Aylin Kirit Avila, and continues to obtain Suboxone treatment.  Ruth Dolan will transport home at d/c. Pt's significant other is requesting documentation from provider to provide to  as she reports pt missed his court date today due to being in hospital. She requests this be sent to her e-mail at [email or web address] Unit care management will continue to follow for transition of care planning needs. Care Management Interventions  PCP Verified by CM: Yes  Palliative Care Criteria Met (RRAT>21 & CHF Dx)?: No  Mode of Transport at Discharge:  Other (see comment) (Significant other, Shelley)  Transition of Care Consult (CM Consult): Discharge Planning  Discharge Durable Medical Equipment: No (Pt owns a walker and cane)  Physical Therapy Consult: No  Occupational Therapy Consult: No  Speech Therapy Consult: No  Support Systems: Spouse/Significant Other,Child(breanne)  Confirm Follow Up Transport: Family (Significant other, Kayleigh Smiley)  Discharge Location  Patient Expects to be Discharged to[de-identified] Home with outpatient services (Home with outpatient follow up)    Readmission Assessment  Number of days since last admission?: 1-7 days  Previous disposition: Home with Family (Home with significant other, Kayleigh Smiley)  Who is being interviewed?: Caregiver (Pt's significant other/healthcare decision maker Amy Chadwick)  What was the patient's/caregiver's perception as to why they think they needed to return back to the hospital?: [place] discharge on prior admission  Did you visit your Primary Care Physician after you left the hospital, before you returned this time?: No  Who advised the patient to return to the hospital?: Self-referral,Caregiver  Does the patient report anything that got in the way of taking their medications?: No (Pt with documented noncompliance, pt's significant other voices that she helps oversee pt's medication administration)  In our efforts to provide the best possible care to you and others like you, can you think of anything that we could have done to help you after you left the hospital the first time, so that you might not have needed to return so soon?: Other (Comment) (None voiced)    Herbie Gonzalez [address] [facility] [address]
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
## 2022-02-01 NOTE — ED NOTES
Patient is being transferred to [address], Room # 2182. Report given to Melba Valentin on Debby Burger for routine progression of care. Report consisted of the following information SBAR, Kardex, ED Summary and MAR. Patient transferred to receiving unit by: transport (RN or tech name). Next labs due: Yes    The following personal items will be sent with the patient during transfer to the floor:     All valuables:    Cardiac monitoring ordered: Yes    The following CURRENT information was reported to the receiving RN:    Code status: Full Code at time of transfer    Last set of vital signs:  Vital Signs  Level of Consciousness: Alert (0) (02/01/22 0817)  Temp: 97.9 °F (36.6 °C) (02/01/22 0817)  Temp Source: Oral (02/01/22 0817)  Pulse (Heart Rate): 83 (02/01/22 1735)  Heart Rate Source: Monitor (02/01/22 0817)  Resp Rate: 13 (02/01/22 1732)  BP: (!) 149/81 (02/01/22 1735)  MAP (Monitor): 99 (02/01/22 1732)  MAP (Calculated): 104 (02/01/22 1735)  BP 1 Location: Left upper arm (02/01/22 0817)  BP 1 Method: Automatic (02/01/22 0817)  BP Patient Position: At rest (02/01/22 0817)  MEWS Score: 4 (02/01/22 0817)         Oxygen Therapy  O2 Sat (%): 93 % (02/01/22 1732)  Pulse via Oximetry: 80 beats per minute (02/01/22 1732)  O2 Device: None (Room air) (02/01/22 0522)      Last pain assessment:  Pain 1  Pain Scale 1: Numeric (0 - 10)  Pain Intensity 1: 6  Pain Onset 1: today  Pain Location 1: Chest  Pain Description 1: Aching  Pain Intervention(s) 1: Emotional support        Urinary catheter: voiding  Is there a johnston order: No     LDAs:       Peripheral IV 02/01/22 Left Antecubital (Active)       Peripheral IV 02/01/22 Left Hand (Active)   Site Assessment Clean, dry, & intact 02/01/22 1432   Phlebitis Assessment 0 02/01/22 1432   Infiltration Assessment 0 02/01/22 1432   Dressing Status Clean, dry, & intact 02/01/22 1432   Hub Color/Line Status Pink 02/01/22 1432   Alcohol Cap Used Yes 02/01/22 1432 Opportunity for questions and clarification was provided.     Tash Schaffer, RN

## 2022-02-01 NOTE — PROGRESS NOTES
Pharmacy Medication Reconciliation     The patient was interviewed regarding current PTA medication list, use and drug allergies;  patient present in room, but was unwilling to provide history. Completed med rec based on current refills and progress notes. Patient has compliance issues. Allergy Update: Patient has no known allergies. Recommendations/Findings: The following amendments were made to the patient's active medication list on file at [facility]:   1) Additions: none  2) Deletions:   -pregabalin  3) Changes:  unsure of lantus dosing  Pertinent Findings:   -compliance issues  -no current fills of pregabalin, and refused Rx at most recent MD visit in 2021    Clarified PTA med list with rx query, patient interview. PTA medication list was corrected to the following:     Prior to Admission Medications   Prescriptions Last Dose Informant Taking? Suboxone 8-2 mg film sublingaul film  Other Yes   Si Film by SubLINGual route daily. amLODIPine (NORVASC) 10 mg tablet  Other Yes   Sig: Take 1 Tablet by mouth daily. Take for blood pressure   bumetanide (BUMEX) 2 mg tablet  Other Yes   Sig: Take 1 Tablet by mouth daily. carvediloL (COREG) 25 mg tablet  Other Yes   Sig: Take 1 Tablet by mouth two (2) times daily (with meals). cloNIDine HCL (CATAPRES) 0.3 mg tablet  Other Yes   Sig: Take 1 Tablet by mouth two (2) times a day. Take for high blood pressure   insulin glargine (LANTUS) 100 unit/mL injection  Other Yes   Sig: 10 Units by SubCUTAneous route two (2) times a day. nitroglycerin (NITROSTAT) 0.4 mg SL tablet  Other Yes   Si Tablet by SubLINGual route as needed for Chest Pain. Up to 3 doses.       Facility-Administered Medications: None        Thank you,  MOISES Keane

## 2022-02-01 NOTE — ED NOTES
Bedside shift change report given to Sarabjit Ann (oncoming nurse) by Delroy oSria (offgoing nurse). Report included the following information SBAR, Kardex, ED Summary and MAR. Pt resting quietly in bed, no acute distress noted. Denies current CP, SOB. Monitor x3, call bell in reach.

## 2022-02-01 NOTE — ED NOTES
Bedside and Verbal shift change report given to [address] [facility]  (oncoming nurse) by Rodri Warren  (offgoing nurse). Report included the following information SBAR, Kardex, ED Summary and MAR.

## 2022-02-01 NOTE — PROGRESS NOTES
[address] February 1, 2022       RE: Zuleika Kellogg      To Whom It May Concern,    This is to certify that Zuleika Kellogg is currently hospitalized at [facility] since 02/01/2022 at least three to four days   Please feel free to contact my office if you have any questions or concerns. Thank you for your assistance in this matter.       Sincerely,  Luna Vivas MD
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
## 2022-02-01 NOTE — PROGRESS NOTES
CM reviewed chart and attempted to meet with pt at bedside. Pt asleep and did not respond to knocking on door and CM calling pt's name. CM will attempt to contact pt's mPOAShelley, to complete readmission assessment.     Naomi Grider [facility] [address]

## 2022-02-01 NOTE — PROGRESS NOTES
Pharmacy Medication Reconciliation     The patient was interviewed regarding current PTA medication list, use and drug allergies;  patient present in room, but was unwilling to provide history. Completed med rec based on current refills and progress notes. Patient has compliance issues. Allergy Update: Patient has no known allergies. Recommendations/Findings: The following amendments were made to the patient's active medication list on file at [facility]:   1) Additions: none  2) Deletions:   -pregabalin  3) Changes:  unsure of lantus dosing  Pertinent Findings:   -compliance issues    Clarified PTA med list with rx query, patient interview. PTA medication list was corrected to the following:     Prior to Admission Medications   Prescriptions Last Dose Informant Taking? Suboxone 8-2 mg film sublingaul film  Other Yes   Si Film by SubLINGual route daily. amLODIPine (NORVASC) 10 mg tablet  Other Yes   Sig: Take 1 Tablet by mouth daily. Take for blood pressure   bumetanide (BUMEX) 2 mg tablet  Other Yes   Sig: Take 1 Tablet by mouth daily. carvediloL (COREG) 25 mg tablet  Other Yes   Sig: Take 1 Tablet by mouth two (2) times daily (with meals). cloNIDine HCL (CATAPRES) 0.3 mg tablet  Other Yes   Sig: Take 1 Tablet by mouth two (2) times a day. Take for high blood pressure   insulin glargine (LANTUS) 100 unit/mL injection  Other Yes   Sig: 10 Units by SubCUTAneous route two (2) times a day. nitroglycerin (NITROSTAT) 0.4 mg SL tablet  Other Yes   Si Tablet by SubLINGual route as needed for Chest Pain. Up to 3 doses.       Facility-Administered Medications: None        Thank you,  MOISES Ríos

## 2022-02-02 LAB
ANION GAP SERPL CALC-SCNC: 9 MMOL/L (ref 5–15)
BUN SERPL-MCNC: 95 MG/DL (ref 6–20)
BUN/CREAT SERPL: 14 (ref 12–20)
CALCIUM SERPL-MCNC: 7.9 MG/DL (ref 8.5–10.1)
CHLORIDE SERPL-SCNC: 114 MMOL/L (ref 97–108)
CO2 SERPL-SCNC: 17 MMOL/L (ref 21–32)
CREAT SERPL-MCNC: 7.01 MG/DL (ref 0.7–1.3)
ERYTHROCYTE [DISTWIDTH] IN BLOOD BY AUTOMATED COUNT: 18.2 % (ref 11.5–14.5)
FERRITIN SERPL-MCNC: 253 NG/ML (ref 26–388)
GLUCOSE BLD STRIP.AUTO-MCNC: 120 MG/DL (ref 65–117)
GLUCOSE BLD STRIP.AUTO-MCNC: 149 MG/DL (ref 65–117)
GLUCOSE BLD STRIP.AUTO-MCNC: 169 MG/DL (ref 65–117)
GLUCOSE BLD STRIP.AUTO-MCNC: 175 MG/DL (ref 65–117)
GLUCOSE SERPL-MCNC: 137 MG/DL (ref 65–100)
HCT VFR BLD AUTO: 22.4 % (ref 36.6–50.3)
HGB BLD-MCNC: 6.8 G/DL (ref 12.1–17)
IRON SATN MFR SERPL: 13 % (ref 20–50)
IRON SERPL-MCNC: 36 UG/DL (ref 35–150)
MAGNESIUM SERPL-MCNC: 2 MG/DL (ref 1.6–2.4)
MCH RBC QN AUTO: 26.7 PG (ref 26–34)
MCHC RBC AUTO-ENTMCNC: 30.4 G/DL (ref 30–36.5)
MCV RBC AUTO: 87.8 FL (ref 80–99)
NRBC # BLD: 0.02 K/UL (ref 0–0.01)
NRBC BLD-RTO: 0.3 PER 100 WBC
PHOSPHATE SERPL-MCNC: 7.1 MG/DL (ref 2.6–4.7)
PLATELET # BLD AUTO: 262 K/UL (ref 150–400)
PMV BLD AUTO: 10.9 FL (ref 8.9–12.9)
POTASSIUM SERPL-SCNC: 4.8 MMOL/L (ref 3.5–5.1)
RBC # BLD AUTO: 2.55 M/UL (ref 4.1–5.7)
SERVICE CMNT-IMP: ABNORMAL
SODIUM SERPL-SCNC: 140 MMOL/L (ref 136–145)
TIBC SERPL-MCNC: 267 UG/DL (ref 250–450)
WBC # BLD AUTO: 6.9 K/UL (ref 4.1–11.1)

## 2022-02-02 PROCEDURE — 84100 ASSAY OF PHOSPHORUS: CPT

## 2022-02-02 PROCEDURE — 77010033678 HC OXYGEN DAILY

## 2022-02-02 PROCEDURE — 74011000250 HC RX REV CODE- 250: Performed by: INTERNAL MEDICINE

## 2022-02-02 PROCEDURE — 82728 ASSAY OF FERRITIN: CPT

## 2022-02-02 PROCEDURE — 83540 ASSAY OF IRON: CPT

## 2022-02-02 PROCEDURE — 83735 ASSAY OF MAGNESIUM: CPT

## 2022-02-02 PROCEDURE — 85027 COMPLETE CBC AUTOMATED: CPT

## 2022-02-02 PROCEDURE — 82962 GLUCOSE BLOOD TEST: CPT

## 2022-02-02 PROCEDURE — 82607 VITAMIN B-12: CPT

## 2022-02-02 PROCEDURE — 74011250636 HC RX REV CODE- 250/636: Performed by: INTERNAL MEDICINE

## 2022-02-02 PROCEDURE — 74011250637 HC RX REV CODE- 250/637: Performed by: INTERNAL MEDICINE

## 2022-02-02 PROCEDURE — 80048 BASIC METABOLIC PNL TOTAL CA: CPT

## 2022-02-02 PROCEDURE — 36415 COLL VENOUS BLD VENIPUNCTURE: CPT

## 2022-02-02 PROCEDURE — 65660000000 HC RM CCU STEPDOWN

## 2022-02-02 PROCEDURE — 74011636637 HC RX REV CODE- 636/637: Performed by: INTERNAL MEDICINE

## 2022-02-02 RX ORDER — HYDRALAZINE HYDROCHLORIDE 25 MG/1
25 TABLET, FILM COATED ORAL 3 TIMES DAILY
Status: DISCONTINUED | OUTPATIENT
Start: 2022-02-02 | End: 2022-02-04

## 2022-02-02 RX ADMIN — HEPARIN SODIUM 5000 UNITS: 5000 INJECTION, SOLUTION INTRAVENOUS; SUBCUTANEOUS at 06:00

## 2022-02-02 RX ADMIN — INSULIN GLARGINE 10 UNITS: 100 INJECTION, SOLUTION SUBCUTANEOUS at 23:23

## 2022-02-02 RX ADMIN — HYDRALAZINE HYDROCHLORIDE 25 MG: 25 TABLET, FILM COATED ORAL at 18:34

## 2022-02-02 RX ADMIN — BUMETANIDE 2 MG: 0.25 INJECTION INTRAMUSCULAR; INTRAVENOUS at 09:31

## 2022-02-02 RX ADMIN — SODIUM CHLORIDE, PRESERVATIVE FREE 10 ML: 5 INJECTION INTRAVENOUS at 23:25

## 2022-02-02 RX ADMIN — CLONIDINE HYDROCHLORIDE 0.3 MG: 0.1 TABLET ORAL at 09:31

## 2022-02-02 RX ADMIN — BUMETANIDE 2 MG: 0.25 INJECTION INTRAMUSCULAR; INTRAVENOUS at 23:25

## 2022-02-02 RX ADMIN — CLONIDINE HYDROCHLORIDE 0.3 MG: 0.1 TABLET ORAL at 18:34

## 2022-02-02 RX ADMIN — HYDRALAZINE HYDROCHLORIDE 25 MG: 25 TABLET, FILM COATED ORAL at 23:25

## 2022-02-02 RX ADMIN — AMLODIPINE BESYLATE 10 MG: 5 TABLET ORAL at 09:31

## 2022-02-02 RX ADMIN — SODIUM CHLORIDE, PRESERVATIVE FREE 10 ML: 5 INJECTION INTRAVENOUS at 06:00
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
## 2022-02-02 NOTE — PROGRESS NOTES
Problem: Falls - Risk of  Goal: *Absence of Falls  Description: Document Barbara Litter Fall Risk and appropriate interventions in the flowsheet.   Outcome: Progressing Towards Goal  Note: Fall Risk Interventions:                                Problem: Pain  Goal: *Control of Pain  Outcome: Progressing Towards Goal
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
## 2022-02-02 NOTE — PROGRESS NOTES
Hospitalist Progress Note    NAME: Niurka Link   :  1965   MRN:  [id number]       Assessment / Plan:    Acute on chronic combined systolic and diastolic CHF  Uncontrolled hypertension  Worsening CKD, now stage V  Hyperkalemia  status post IV Bumex 2 mg in the ED, will continue Bumex 2 mg twice daily. Continue monitor creatinine. Most likely will need to be started on hemodialysis. Nephrology consulted  Potassium of 5.6, status post temporizing measures with insulin, calcium gluconate  Repeat BMP around 2 PM  Blood pressure elevated, resume BP meds with Norvasc clonidine. Avoid beta-blockers due to cocaine abuse. As needed hydralazine   : bp still elevated , start Po hydralazine   Creat remains around 7 , K 4.8  nephro recommended hemodialysis, patient  Tremaine Fierro that he will think about it. He would be a poor hemodialysis candidate due to high risk of noncompliance    Acute on chronic anemia  Hemoglobin dropped down to 6.8 from 7.9. No active GI bleed  . Repeat hemoglobin and transfuse if it is below 7  Iron panel showed low iron saturation but normal ferritin  Polysubstance abuse: Tobacco abuse and cocaine abuse   nicotine patch, patient counseled about cessation  Diabetes mellitus type 2  Resume insulin regiment     Code Status: Full code  Surrogate Decision Maker: Wife     DVT Prophylaxis: Heparin  GI Prophylaxis: not indicated     Baseline: Ambulatory    25.0 - 29.9 Overweight / Body mass index is 26.21 kg/m². Estimated discharge date:   Barriers:       Subjective:     Chief Complaint / Reason for Physician Visit  Steph Martínez . No acute issues. Discussed with RN events overnight.      Review of Systems:  Symptom Y/N Comments  Symptom Y/N Comments   Fever/Chills    Chest Pain n    Poor Appetite    Edema     Cough n   Abdominal Pain n    Sputum    Joint Pain     SOB/DE LA CRUZ y   Pruritis/Rash     Nausea/vomit    Tolerating PT/OT     Diarrhea    Tolerating Diet     Constipation Other       Could NOT obtain due to:      Objective:     VITALS:   Last 24hrs VS reviewed since prior progress note. Most recent are:  Patient Vitals for the past 24 hrs:   Temp Pulse Resp BP SpO2   02/02/22 0346 99 °F (37.2 °C) 72 16 (!) 150/85 98 %   02/01/22 1952 -- 64 19 136/83 94 %   02/01/22 1735 -- 83 -- (!) 149/81 --   02/01/22 1732 -- 80 13 (!) 149/81 93 %   02/01/22 1632 -- 78 11 (!) 140/81 96 %   02/01/22 1527 -- 83 -- (!) 167/100 94 %   02/01/22 1427 -- 81 13 (!) 137/92 95 %   02/01/22 1357 -- 84 16 127/87 95 %   02/01/22 1247 -- 83 17 (!) 140/101 (!) 86 %   02/01/22 0900 -- 88 18 (!) 192/103 --       Intake/Output Summary (Last 24 hours) at 2/2/2022 0841  Last data filed at 2/1/2022 2102  Gross per 24 hour   Intake --   Output 1150 ml   Net -1150 ml        I had a face to face encounter and independently examined this patient on 2/2/2022, as outlined below:  PHYSICAL EXAM:  General: WD, WN. Alert, cooperative, no acute distress    EENT:  EOMI. Anicteric sclerae. MMM  Resp:  CTA bilaterally, no wheezing or rales. No accessory muscle use  CV:  Regular  rhythm,  No edema  GI:  Soft, Non distended, Non tender. +Bowel sounds  Neurologic:  Alert and oriented X 3, normal speech,   Psych:   Good insight. Not anxious nor agitated  Skin:  No rashes. No jaundice    Reviewed most current lab test results and cultures  YES  Reviewed most current radiology test results   YES  Review and summation of old records today    NO  Reviewed patient's current orders and MAR    YES  PMH/SH reviewed - no change compared to H&P  ________________________________________________________________________  Care Plan discussed with:    Comments   Patient x    Family      RN x    Care Manager     Consultant                        Multidiciplinary team rounds were held today with , nursing, pharmacist and clinical coordinator.   Patient's plan of care was discussed; medications were reviewed and discharge planning was addressed. ________________________________________________________________________  Total NON critical care TIME35 Minutes    Total CRITICAL CARE TIME Spent:   Minutes non procedure based      Comments   >50% of visit spent in counseling and coordination of care     ________________________________________________________________________  Enrique Mcdaniel MD     Procedures: see electronic medical records for all procedures/Xrays and details which were not copied into this note but were reviewed prior to creation of Plan. LABS:  I reviewed today's most current labs and imaging studies.   Pertinent labs include:  Recent Labs     02/02/22  0124 02/01/22  0533   WBC 6.9 9.2   HGB 6.8* 7.9*   HCT 22.4* 25.8*    272     Recent Labs     02/02/22  0124 02/01/22  1333 02/01/22  0533    139 141   K 4.8 5.8* 5.6*   * 113* 114*   CO2 17* 18* 20*   * 86 99   BUN 95* 93* 89*   CREA 7.01* 7.00* 7.06*   CA 7.9* 7.9* 8.1*   PHOS 7.1*  --   --    ALB  --   --  2.4*   TBILI  --   --  0.4   ALT  --   --  27       Signed: Enrique Mcdaniel MD

## 2022-02-02 NOTE — PROGRESS NOTES
Problem: Patient Education: Go to Patient Education Activity  Goal: Patient/Family Education  Outcome: Progressing Towards Goal     Problem: Pain  Goal: *Control of Pain  Outcome: Progressing Towards Goal  Goal: *PALLIATIVE CARE:  Alleviation of Pain  Outcome: Progressing Towards Goal     Problem: Patient Education: Go to Patient Education Activity  Goal: Patient/Family Education  Outcome: Progressing Towards Goal     Problem: Risk for Spread of Infection  Goal: Prevent transmission of infectious organism to others  Description: Prevent the transmission of infectious organisms to other patients, staff members, and visitors.   Outcome: Progressing Towards Goal     Problem: Patient Education:  Go to Education Activity  Goal: Patient/Family Education  Outcome: Progressing Towards Goal

## 2022-02-03 ENCOUNTER — APPOINTMENT (OUTPATIENT)
Dept: INTERVENTIONAL RADIOLOGY/VASCULAR | Age: 57
DRG: 194 | End: 2022-02-03
Attending: INTERNAL MEDICINE
Payer: MEDICAID

## 2022-02-03 LAB
ANION GAP SERPL CALC-SCNC: 9 MMOL/L (ref 5–15)
BUN SERPL-MCNC: 96 MG/DL (ref 6–20)
BUN/CREAT SERPL: 14 (ref 12–20)
CALCIUM SERPL-MCNC: 7.5 MG/DL (ref 8.5–10.1)
CHLORIDE SERPL-SCNC: 112 MMOL/L (ref 97–108)
CO2 SERPL-SCNC: 16 MMOL/L (ref 21–32)
CREAT SERPL-MCNC: 7.07 MG/DL (ref 0.7–1.3)
ERYTHROCYTE [DISTWIDTH] IN BLOOD BY AUTOMATED COUNT: 18.4 % (ref 11.5–14.5)
FERRITIN SERPL-MCNC: 242 NG/ML (ref 26–388)
GLUCOSE BLD STRIP.AUTO-MCNC: 120 MG/DL (ref 65–117)
GLUCOSE BLD STRIP.AUTO-MCNC: 164 MG/DL (ref 65–117)
GLUCOSE BLD STRIP.AUTO-MCNC: 200 MG/DL (ref 65–117)
GLUCOSE BLD STRIP.AUTO-MCNC: 211 MG/DL (ref 65–117)
GLUCOSE SERPL-MCNC: 149 MG/DL (ref 65–100)
HBV SURFACE AB SER QL: NONREACTIVE
HBV SURFACE AB SER-ACNC: 3.26 MIU/ML
HBV SURFACE AG SER QL: <0.1 INDEX
HBV SURFACE AG SER QL: NEGATIVE
HCT VFR BLD AUTO: 23.5 % (ref 36.6–50.3)
HGB BLD-MCNC: 7.3 G/DL (ref 12.1–17)
MCH RBC QN AUTO: 26.4 PG (ref 26–34)
MCHC RBC AUTO-ENTMCNC: 31.1 G/DL (ref 30–36.5)
MCV RBC AUTO: 84.8 FL (ref 80–99)
NRBC # BLD: 0 K/UL (ref 0–0.01)
NRBC BLD-RTO: 0 PER 100 WBC
PLATELET # BLD AUTO: 271 K/UL (ref 150–400)
PMV BLD AUTO: 11.2 FL (ref 8.9–12.9)
POTASSIUM SERPL-SCNC: 5.4 MMOL/L (ref 3.5–5.1)
RBC # BLD AUTO: 2.77 M/UL (ref 4.1–5.7)
SERVICE CMNT-IMP: ABNORMAL
SODIUM SERPL-SCNC: 137 MMOL/L (ref 136–145)
VIT B12 SERPL-MCNC: 467 PG/ML (ref 193–986)
WBC # BLD AUTO: 7.5 K/UL (ref 4.1–11.1)

## 2022-02-03 PROCEDURE — 74011250636 HC RX REV CODE- 250/636: Performed by: STUDENT IN AN ORGANIZED HEALTH CARE EDUCATION/TRAINING PROGRAM

## 2022-02-03 PROCEDURE — C1892 INTRO/SHEATH,FIXED,PEEL-AWAY: HCPCS

## 2022-02-03 PROCEDURE — 82747 ASSAY OF FOLIC ACID RBC: CPT

## 2022-02-03 PROCEDURE — 65660000000 HC RM CCU STEPDOWN

## 2022-02-03 PROCEDURE — 74011000250 HC RX REV CODE- 250: Performed by: INTERNAL MEDICINE

## 2022-02-03 PROCEDURE — 2709999900 HC NON-CHARGEABLE SUPPLY

## 2022-02-03 PROCEDURE — 86706 HEP B SURFACE ANTIBODY: CPT

## 2022-02-03 PROCEDURE — 36415 COLL VENOUS BLD VENIPUNCTURE: CPT

## 2022-02-03 PROCEDURE — 85027 COMPLETE CBC AUTOMATED: CPT

## 2022-02-03 PROCEDURE — 05HM33Z INSERTION OF INFUSION DEVICE INTO RIGHT INTERNAL JUGULAR VEIN, PERCUTANEOUS APPROACH: ICD-10-PCS | Performed by: STUDENT IN AN ORGANIZED HEALTH CARE EDUCATION/TRAINING PROGRAM

## 2022-02-03 PROCEDURE — 94760 N-INVAS EAR/PLS OXIMETRY 1: CPT

## 2022-02-03 PROCEDURE — 90935 HEMODIALYSIS ONE EVALUATION: CPT

## 2022-02-03 PROCEDURE — 74011250637 HC RX REV CODE- 250/637: Performed by: INTERNAL MEDICINE

## 2022-02-03 PROCEDURE — 77001 FLUOROGUIDE FOR VEIN DEVICE: CPT

## 2022-02-03 PROCEDURE — 77010033678 HC OXYGEN DAILY

## 2022-02-03 PROCEDURE — 74011250636 HC RX REV CODE- 250/636: Performed by: INTERNAL MEDICINE

## 2022-02-03 PROCEDURE — 74011000250 HC RX REV CODE- 250: Performed by: STUDENT IN AN ORGANIZED HEALTH CARE EDUCATION/TRAINING PROGRAM

## 2022-02-03 PROCEDURE — 82962 GLUCOSE BLOOD TEST: CPT

## 2022-02-03 PROCEDURE — C1752 CATH,HEMODIALYSIS,SHORT-TERM: HCPCS

## 2022-02-03 PROCEDURE — 74011636637 HC RX REV CODE- 636/637: Performed by: INTERNAL MEDICINE

## 2022-02-03 PROCEDURE — 80048 BASIC METABOLIC PNL TOTAL CA: CPT

## 2022-02-03 PROCEDURE — 87340 HEPATITIS B SURFACE AG IA: CPT

## 2022-02-03 RX ORDER — HEPARIN 100 UNIT/ML
500 SYRINGE INTRAVENOUS ONCE
Status: COMPLETED | OUTPATIENT
Start: 2022-02-03 | End: 2022-02-03

## 2022-02-03 RX ORDER — HEPARIN SODIUM 200 [USP'U]/100ML
400 INJECTION, SOLUTION INTRAVENOUS ONCE
Status: COMPLETED | OUTPATIENT
Start: 2022-02-03 | End: 2022-02-03

## 2022-02-03 RX ORDER — LIDOCAINE HYDROCHLORIDE 20 MG/ML
20 INJECTION, SOLUTION INFILTRATION; PERINEURAL ONCE
Status: COMPLETED | OUTPATIENT
Start: 2022-02-03 | End: 2022-02-03

## 2022-02-03 RX ADMIN — AMLODIPINE BESYLATE 10 MG: 5 TABLET ORAL at 10:25

## 2022-02-03 RX ADMIN — HYDRALAZINE HYDROCHLORIDE 25 MG: 25 TABLET, FILM COATED ORAL at 17:12

## 2022-02-03 RX ADMIN — BUMETANIDE 2 MG: 0.25 INJECTION INTRAMUSCULAR; INTRAVENOUS at 10:25

## 2022-02-03 RX ADMIN — HYDRALAZINE HYDROCHLORIDE 25 MG: 25 TABLET, FILM COATED ORAL at 23:18

## 2022-02-03 RX ADMIN — Medication 1 UNITS: at 23:17

## 2022-02-03 RX ADMIN — SODIUM CHLORIDE, PRESERVATIVE FREE 5 ML: 5 INJECTION INTRAVENOUS at 23:18

## 2022-02-03 RX ADMIN — CLONIDINE HYDROCHLORIDE 0.3 MG: 0.1 TABLET ORAL at 17:12

## 2022-02-03 RX ADMIN — BUMETANIDE 2 MG: 0.25 INJECTION INTRAMUSCULAR; INTRAVENOUS at 23:17

## 2022-02-03 RX ADMIN — IRON SUCROSE 200 MG: 20 INJECTION, SOLUTION INTRAVENOUS at 15:12

## 2022-02-03 RX ADMIN — SODIUM CHLORIDE, PRESERVATIVE FREE 300 UNITS: 5 INJECTION INTRAVENOUS at 14:01

## 2022-02-03 RX ADMIN — LIDOCAINE HYDROCHLORIDE 200 MG: 20 INJECTION, SOLUTION INFILTRATION; PERINEURAL at 14:01

## 2022-02-03 RX ADMIN — HYDRALAZINE HYDROCHLORIDE 25 MG: 25 TABLET, FILM COATED ORAL at 10:25

## 2022-02-03 RX ADMIN — CLONIDINE HYDROCHLORIDE 0.3 MG: 0.1 TABLET ORAL at 10:25

## 2022-02-03 RX ADMIN — HEPARIN SODIUM IN SODIUM CHLORIDE 200 UNITS: 200 INJECTION INTRAVENOUS at 14:11

## 2022-02-03 RX ADMIN — INSULIN GLARGINE 10 UNITS: 100 INJECTION, SOLUTION SUBCUTANEOUS at 23:17

## 2022-02-03 NOTE — DIALYSIS
Hemodialysis / [phone]    Vitals Pre Post Assessment Pre Post   BP BP: (!) 163/90 (02/03/22 1420) 165/87 LOC Alert and oriented x 4 Alert and oriented x 4   HR Pulse (Heart Rate): 73 (02/03/22 1420) 76 Lungs Crackles  Diminished    Resp Resp Rate: 17 (02/03/22 1420) 18 Cardiac NSR NSR   Temp Temp: 98.7 °F (37.1 °C) (02/03/22 1348) 98.8 Skin Warm and dry  Warm and dry with a bad smile    Weight  N/A N/A Edema Trace  Trace    Tele status Monitored remotely  Monitored remotely  Pain Pain Intensity 1: 0 (02/03/22 1400) 0     Orders   Duration: Start: 1401 End: 1628 Total: 2. 5HRS   Dialyzer: Dialyzer/Set Up Inspection: Tremaine Silva (02/03/22 1348)   K Bath: Dialysate K (mEq/L): 2 (02/03/22 1348)   Ca Bath: Dialysate CA (mEq/L): 2.5 (02/03/22 1348)   Na: Dialysate NA (mEq/L): 138 (02/03/22 1348)   Bicarb: Dialysate HCO3 (mEq/L): 35 (02/03/22 1348)   Target Fluid Removal: Goal/Amount of Fluid to Remove (mL): 1000 mL (02/03/22 1348)     Access   Type & Location: Right IJ   Comments:                                   Newly inserted      Labs   HBsAg (Antigen) / date:            Unknown                                    HBsAb (Antibody) / date:           Unknown    Source:    Obtained/Reviewed  Critical Results Called HGB   Date Value Ref Range Status   02/03/2022 7.3 (L) 12.1 - 17.0 g/dL Final     Potassium   Date Value Ref Range Status   02/03/2022 5.4 (H) 3.5 - 5.1 mmol/L Final     Calcium   Date Value Ref Range Status   02/03/2022 7.5 (L) 8.5 - 10.1 MG/DL Final     BUN   Date Value Ref Range Status   02/03/2022 96 (H) 6 - 20 MG/DL Final     Creatinine   Date Value Ref Range Status   02/03/2022 7.07 (H) 0.70 - 1.30 MG/DL Final        Meds Given   Name Dose Route                    Adequacy / Fluid    Total Liters Process: 41.8   Net Fluid Removed: 1500ml      Comments   Time Out Done:   (Time) 1345   Admitting Diagnosis: ROSEMARY   Consent obtained/signed: Informed Consent Verified: Yes (02/03/22 5372)   Machine / RO # Machine Number: [id number] (02/03/22 1348)   Primary Nurse Rpt Pre: Nasir Holguin RN   Primary Nurse Rpt Post: Nasir Holguin RN   Pt Education: [address]: Continue with HD care plan   Pts outpatient clinic: none     Tx Summary   Comments:                         RIJ CVC: Dressing CDI. No s/s of infection. Both lumens aspirate & flush well. Running well at . SBAR received from Primary RN. Iman Thompson Consent signed & on file. 1401: Each catheter limb disinfected per p&p, caps removed, hubs disinfected per p&p. Each lumen aspirated for blood return and flushed with Normal Saline per policy. Labs drawn per  order. VSS. Dialysis Tx initiated. 1628: Tx ended. VSS. Each dialysis catheter limb disinfected per p&p, all possible blood returned per p&p, and each dialysis hub disinfected per p&p. Each lumen flushed , and caps applied. Bed locked and in the lowest position, call bell and belongings in reach. SBAR given to Primary, RN. Patient is stable at time of my departure. All Dialysis related medications have been reviewed.

## 2022-02-03 NOTE — PROGRESS NOTES
Name of procedure: Jad catheter placement     Vital Signs: Stable    Fluids removed: No    Samples sent to lab: No    Any complications related to procedure: No    Post Procedure Care Needed/order sets placed in [facility] care.

## 2022-02-03 NOTE — PROGRESS NOTES
Hospitalist Progress Note    NAME: Nina Chowdhury   :  1965   MRN:  [id number]       Assessment / Plan:  Acute on chronic combined systolic and diastolic CHF  Uncontrolled hypertension  Worsening CKD, now stage V  Hyperkalemia  End-stage renal disease, new hemodialysis started on   status post IV Bumex 2 mg in the ED, will continue Bumex 2 mg twice daily. Continue monitor creatinine. Most likely will need to be started on hemodialysis. Nephrology consulted  Potassium of 5.6, status post temporizing measures with insulin, calcium gluconate  Repeat BMP around 2 PM  Blood pressure elevated, resume BP meds with Norvasc clonidine. Avoid beta-blockers due to cocaine abuse. As needed hydralazine   : bp still elevated , start Po hydralazine   Creat remains around 7 , K 4.8  nephro recommended hemodialysis, patient  Nicolas Angel that he will think about it. He would be a poor hemodialysis candidate due to high risk of noncompliance  : Hemodialysis was started today  Review of the labs showed mildly elevated potassium at 5.4, elevated creatinine  Hemodialysis should improve the potassium  Acute on chronic anemia  Hemoglobin dropped down to 6.8 from 7.9 on . No active GI bleed  Repeat hemoglobin was 7.3  Iron panel showed low iron saturation but normal ferritin  Polysubstance abuse: Tobacco abuse and cocaine abuse   nicotine patch, patient counseled about cessation  Diabetes mellitus type 2  Resume insulin regiment     Code Status: Full code  Surrogate Decision Maker: Wife     DVT Prophylaxis:  SCDs  GI Prophylaxis: not indicated     Baseline: Ambulatory    25.0 - 29.9 Overweight / Body mass index is 26.21 kg/m². Estimated discharge date:   Barriers:       Subjective:     Chief Complaint / Reason for Physician Visit  Sherin Marie . Patient is finally agreeable to start hemodialysis today. Discussed with RN events overnight.      Review of Systems:  Symptom Y/N Comments  Symptom Y/N Comments   Fever/Chills    Chest Pain n    Poor Appetite    Edema     Cough n   Abdominal Pain n    Sputum    Joint Pain     SOB/DE LA CRUZ y   Pruritis/Rash     Nausea/vomit    Tolerating PT/OT     Diarrhea    Tolerating Diet     Constipation    Other       Could NOT obtain due to:      Objective:     VITALS:   Last 24hrs VS reviewed since prior progress note. Most recent are:  Patient Vitals for the past 24 hrs:   Temp Pulse Resp BP SpO2   02/02/22 2044 99.1 °F (37.3 °C) 70 18 (!) 149/80 93 %   02/02/22 1600 -- 74 -- -- --   02/02/22 1557 98.7 °F (37.1 °C) 74 16 (!) 159/93 93 %   02/02/22 1206 99 °F (37.2 °C) 96 17 (!) 180/97 95 %       Intake/Output Summary (Last 24 hours) at 2/3/2022 0933  Last data filed at 2/3/2022 0359  Gross per 24 hour   Intake --   Output 1950 ml   Net -1950 ml        I had a face to face encounter and independently examined this patient on 2/3/2022, as outlined below:  PHYSICAL EXAM:  General: WD, WN. Alert, cooperative, no acute distress    EENT:  EOMI. Anicteric sclerae. MMM  Resp:  CTA bilaterally, no wheezing or rales. No accessory muscle use  CV:  Regular  rhythm,  No edema  GI:  Soft, Non distended, Non tender. +Bowel sounds  Neurologic:  Alert and oriented X 3, normal speech,   Psych:   Good insight. Not anxious nor agitated  Skin:  No rashes. No jaundice    Reviewed most current lab test results and cultures  YES  Reviewed most current radiology test results   YES  Review and summation of old records today    NO  Reviewed patient's current orders and MAR    YES  PMH/SH reviewed - no change compared to H&P  ________________________________________________________________________  Care Plan discussed with:    Comments   Patient x    Family      RN x    Care Manager     Consultant                        Multidiciplinary team rounds were held today with , nursing, pharmacist and clinical coordinator.   Patient's plan of care was discussed; medications were reviewed and discharge planning was addressed. ________________________________________________________________________  Total NON critical care TIME35 Minutes    Total CRITICAL CARE TIME Spent:   Minutes non procedure based      Comments   >50% of visit spent in counseling and coordination of care     ________________________________________________________________________  Hill Hawk MD     Procedures: see electronic medical records for all procedures/Xrays and details which were not copied into this note but were reviewed prior to creation of Plan. LABS:  I reviewed today's most current labs and imaging studies. Pertinent labs include:  Recent Labs     02/03/22  0050 02/02/22  0124 02/01/22  0533   WBC 7.5 6.9 9.2   HGB 7.3* 6.8* 7.9*   HCT 23.5* 22.4* 25.8*    262 272     Recent Labs     02/03/22  0050 02/02/22  0124 02/01/22  1333 02/01/22  0533 02/01/22  0533    140 139   < > 141   K 5.4* 4.8 5.8*   < > 5.6*   * 114* 113*   < > 114*   CO2 16* 17* 18*   < > 20*   * 137* 86   < > 99   BUN 96* 95* 93*   < > 89*   CREA 7.07* 7.01* 7.00*   < > 7.06*   CA 7.5* 7.9* 7.9*   < > 8.1*   MG  --  2.0  --   --   --    PHOS  --  7.1*  --   --   --    ALB  --   --   --   --  2.4*   TBILI  --   --   --   --  0.4   ALT  --   --   --   --  27    < > = values in this interval not displayed.        Signed: Hill Hawk MD
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
## 2022-02-03 NOTE — PROGRESS NOTES
Transition of Care Plan:    RUR:  24%  High Risk for Readmission  LOS:  2 Days  Disposition:  Home with Significant other; OP HD (new)  Follow up appointments:  PCP, Nephrology  DME needed:   On-going assessment; has cane and walker at home  Transportation at Discharge:   Significant other, Tara Dacosta or means to access home:    Patient has access    IM Medicare Letter:   N/A; Medicaid  Is patient a BCPI-A Bundle:   N/A          If yes, was Bundle Letter given?:    Is patient a [place] and [facility]? N/A              If yes, was [employer] transfer form completed and VA notified? Caregiver Contact:   Patient's significant other/healthcare decisions maker:  Albin Grady  [phone]  Discharge Caregiver contacted prior to discharge? Care Conference needed?:   No    Met briefly with patient who has now agreed to Outpatient HD services. Contacted [facility] Admissions:  Awa Huerta  [phone]; Fax [phone]. Faxed initial referral information to Ms. Manuela Cosme. Still pending HD run sheets and Hep B Surface Antigen results which will be sent when available. Patient states his SO will provide transportation to HD if needed. Patient acknowledged and agreed to attend HD 3 x week or as indicated by nephrologist.  He agrees to this plan. Patient has Medicaid in place as payor source. Continue to follow and coordinate disposition plan.     7 Rita Rudd, [address], [facility], [facility]-  Complex CM/  [phone]

## 2022-02-03 NOTE — PROGRESS NOTES
TRANSFER - OUT REPORT:    Verbal report given to SANGEETA DURÁN [facility]) on Marissa Pena  being transferred to [facility](unit) for routine progression of care       Report consisted of patients Situation, Background, Assessment and   Recommendations(SBAR). Information from the following report(s) Procedure Summary was reviewed with the receiving nurse. Lines:   Peripheral IV 02/01/22 Left Hand (Active)   Site Assessment Clean, dry, & intact 02/02/22 2326   Phlebitis Assessment 0 02/02/22 2326   Infiltration Assessment 0 02/02/22 2326   Dressing Status Clean, dry, & intact 02/02/22 2326   Dressing Type Tape;Transparent 02/02/22 2326   Hub Color/Line Status Pink;Capped 02/02/22 2326   Action Taken Open ports on tubing capped 02/01/22 1952   Alcohol Cap Used Yes 02/01/22 1952        Opportunity for questions and clarification was provided.       Patient transported with:   Registered Nurse

## 2022-02-03 NOTE — INTERDISCIPLINARY ROUNDS
Interdisciplinary Rounds were completed on 02/03/22 for this patient. Rounds included nursing, clinical care leader, pharmacy, and case management. Plan of care discussed. See clinical pathway and/or care plan for interventions and desired outcomes.

## 2022-02-03 NOTE — PROGRESS NOTES
Pt arrives to IR department as an IP for [facility] dialysis catheter placement. Pt is A&Ox4 and Dr. Sriram Branham at bedside to consent pt.

## 2022-02-04 LAB
ANION GAP SERPL CALC-SCNC: 7 MMOL/L (ref 5–15)
BUN SERPL-MCNC: 65 MG/DL (ref 6–20)
BUN/CREAT SERPL: 12 (ref 12–20)
CALCIUM SERPL-MCNC: 7.8 MG/DL (ref 8.5–10.1)
CHLORIDE SERPL-SCNC: 108 MMOL/L (ref 97–108)
CO2 SERPL-SCNC: 23 MMOL/L (ref 21–32)
CREAT SERPL-MCNC: 5.35 MG/DL (ref 0.7–1.3)
ERYTHROCYTE [DISTWIDTH] IN BLOOD BY AUTOMATED COUNT: 18.6 % (ref 11.5–14.5)
FOLATE BLD-MCNC: 256 NG/ML
FOLATE RBC-MCNC: 1113 NG/ML
GLUCOSE BLD STRIP.AUTO-MCNC: 117 MG/DL (ref 65–117)
GLUCOSE BLD STRIP.AUTO-MCNC: 118 MG/DL (ref 65–117)
GLUCOSE BLD STRIP.AUTO-MCNC: 258 MG/DL (ref 65–117)
GLUCOSE SERPL-MCNC: 161 MG/DL (ref 65–100)
HCT VFR BLD AUTO: 22.9 % (ref 36.6–50.3)
HCT VFR BLD AUTO: 23 % (ref 37.5–51)
HGB BLD-MCNC: 7.4 G/DL (ref 12.1–17)
MCH RBC QN AUTO: 26.9 PG (ref 26–34)
MCHC RBC AUTO-ENTMCNC: 32.3 G/DL (ref 30–36.5)
MCV RBC AUTO: 83.3 FL (ref 80–99)
NRBC # BLD: 0 K/UL (ref 0–0.01)
NRBC BLD-RTO: 0 PER 100 WBC
PLATELET # BLD AUTO: 272 K/UL (ref 150–400)
PMV BLD AUTO: 10.7 FL (ref 8.9–12.9)
POTASSIUM SERPL-SCNC: 4.2 MMOL/L (ref 3.5–5.1)
RBC # BLD AUTO: 2.75 M/UL (ref 4.1–5.7)
SERVICE CMNT-IMP: ABNORMAL
SERVICE CMNT-IMP: ABNORMAL
SERVICE CMNT-IMP: NORMAL
SODIUM SERPL-SCNC: 138 MMOL/L (ref 136–145)
WBC # BLD AUTO: 7.6 K/UL (ref 4.1–11.1)

## 2022-02-04 PROCEDURE — 36415 COLL VENOUS BLD VENIPUNCTURE: CPT

## 2022-02-04 PROCEDURE — 74011250636 HC RX REV CODE- 250/636: Performed by: INTERNAL MEDICINE

## 2022-02-04 PROCEDURE — 85027 COMPLETE CBC AUTOMATED: CPT

## 2022-02-04 PROCEDURE — 77010033678 HC OXYGEN DAILY

## 2022-02-04 PROCEDURE — 80048 BASIC METABOLIC PNL TOTAL CA: CPT

## 2022-02-04 PROCEDURE — 94760 N-INVAS EAR/PLS OXIMETRY 1: CPT

## 2022-02-04 PROCEDURE — 65660000000 HC RM CCU STEPDOWN

## 2022-02-04 PROCEDURE — 74011250637 HC RX REV CODE- 250/637: Performed by: NURSE PRACTITIONER

## 2022-02-04 PROCEDURE — 74011250637 HC RX REV CODE- 250/637: Performed by: INTERNAL MEDICINE

## 2022-02-04 PROCEDURE — 2709999900 HC NON-CHARGEABLE SUPPLY

## 2022-02-04 PROCEDURE — 82962 GLUCOSE BLOOD TEST: CPT

## 2022-02-04 PROCEDURE — 74011636637 HC RX REV CODE- 636/637: Performed by: INTERNAL MEDICINE

## 2022-02-04 PROCEDURE — 74011000250 HC RX REV CODE- 250: Performed by: INTERNAL MEDICINE

## 2022-02-04 PROCEDURE — 90935 HEMODIALYSIS ONE EVALUATION: CPT

## 2022-02-04 RX ORDER — HYDROXYZINE 25 MG/1
25 TABLET, FILM COATED ORAL ONCE
Status: COMPLETED | OUTPATIENT
Start: 2022-02-04 | End: 2022-02-04

## 2022-02-04 RX ORDER — TEMAZEPAM 15 MG/1
15 CAPSULE ORAL ONCE
Status: COMPLETED | OUTPATIENT
Start: 2022-02-05 | End: 2022-02-04

## 2022-02-04 RX ORDER — HYDRALAZINE HYDROCHLORIDE 50 MG/1
50 TABLET, FILM COATED ORAL 3 TIMES DAILY
Status: DISCONTINUED | OUTPATIENT
Start: 2022-02-04 | End: 2022-02-09 | Stop reason: HOSPADM

## 2022-02-04 RX ADMIN — TEMAZEPAM 15 MG: 15 CAPSULE ORAL at 23:25

## 2022-02-04 RX ADMIN — SODIUM CHLORIDE, PRESERVATIVE FREE 5 ML: 5 INJECTION INTRAVENOUS at 23:25

## 2022-02-04 RX ADMIN — HYDRALAZINE HYDROCHLORIDE 50 MG: 50 TABLET, FILM COATED ORAL at 23:25

## 2022-02-04 RX ADMIN — Medication 3 UNITS: at 09:09

## 2022-02-04 RX ADMIN — SODIUM CHLORIDE, PRESERVATIVE FREE 5 ML: 5 INJECTION INTRAVENOUS at 06:39

## 2022-02-04 RX ADMIN — HYDROXYZINE HYDROCHLORIDE 25 MG: 25 TABLET, FILM COATED ORAL at 04:17

## 2022-02-04 RX ADMIN — HEPARIN SODIUM 1400 UNITS: 1000 INJECTION INTRAVENOUS; SUBCUTANEOUS at 12:36

## 2022-02-04 RX ADMIN — HEPARIN SODIUM 1100 UNITS: 1000 INJECTION INTRAVENOUS; SUBCUTANEOUS at 12:35

## 2022-02-04 RX ADMIN — INSULIN GLARGINE 10 UNITS: 100 INJECTION, SOLUTION SUBCUTANEOUS at 23:25

## 2022-02-04 RX ADMIN — BUMETANIDE 2 MG: 0.25 INJECTION INTRAMUSCULAR; INTRAVENOUS at 23:25

## 2022-02-04 NOTE — PROGRESS NOTES
Spiritual Care Assessment/Progress Note  [facility]      NAME: Lo Quinn      MRN: [id number]  AGE: 55 y.o. SEX: male  Pentecostalism Affiliation: Yarsanism   Language: English     2/4/2022     Total Time (in minutes): 15     Spiritual Assessment begun in [facility] 2 MED TELE through conversation with:         [x]Patient        [x] Family    [] Friend(s)        Reason for Consult: Initial/Spiritual assessment, patient floor     Spiritual beliefs: (Please include comment if needed)     [x] Identifies with a cookie tradition:  Yarsanism        [] Supported by a cookie community:            [] Claims no spiritual orientation:           [] Seeking spiritual identity:                [] Adheres to an individual form of spirituality:           [] Not able to assess:                           Identified resources for coping:      [x] Prayer                               [] Music                  [] Guided Imagery     [x] Family/friends                 [] Pet visits     [] Devotional reading                         [] Unknown     [] Other:                                          Interventions offered during this visit: (See comments for more details)    Patient Interventions: Affirmation of emotions/emotional suffering,Affirmation of cookie,Catharsis/review of pertinent events in supportive environment,Iconic (affirming the presence of God/Higher Power),Initial/Spiritual assessment, patient floor,Normalization of emotional/spiritual concerns,Prayer (actual)     Family/Friend(s):  Affirmation of cookie,Iconic (affirming the presence of God/Higher Power),Prayer (actual)     Plan of Care:     [x] Support spiritual and/or cultural needs    [] Support AMD and/or advance care planning process      [] Support grieving process   [] Coordinate Rites and/or Rituals    [] Coordination with community clergy   [] No spiritual needs identified at this time   [] Detailed Plan of Care below (See Comments)  [] Make referral to Music Therapy  [] Make referral to Pet Therapy     [] Make referral to Addiction services  [] Make referral to [facility]  [] Make referral to Spiritual Care Partner  [] No future visits requested        [x] Contact Spiritual Care for further referrals     Comments:  Reviewed chart prior to visit on Med Tele for spiritual assessment. Patient's wife was present. Provided bedside presence and supportive listening as patient shared about current medical concerns. He was feeling chilly in his room; adjusted thermostat to 75 degrees. Offered pray at his request.  Advised of ongoing availability of pastoral support. After leaving room, returned with another blanket for comfort.      GIL Avendaño, [facility], Staff [address]    [facility] Paging Service  [phone])
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
## 2022-02-04 NOTE — PROGRESS NOTES
Hospitalist Progress Note    NAME: Chin Lemus   :  1965   MRN:  [id number]       Assessment / Plan:  Acute on chronic combined systolic and diastolic CHF  Uncontrolled hypertension  Worsening CKD, now stage V  Hyperkalemia  End-stage renal disease, new hemodialysis started on   status post IV Bumex 2 mg in the ED, will continue Bumex 2 mg twice daily. Continue monitor creatinine. Most likely will need to be started on hemodialysis. Nephrology consulted  Potassium of 5.6, status post temporizing measures with insulin, calcium gluconate  Repeat BMP around 2 PM  Blood pressure elevated, resume BP meds with Norvasc clonidine. Avoid beta-blockers due to cocaine abuse. As needed hydralazine   : bp still elevated , start Po hydralazine   Creat remains around 7 , K 4.8  nephro recommended hemodialysis, patient  Brenda Scales that he will think about it. He would be a poor hemodialysis candidate due to high risk of noncompliance  : Hemodialysis was started  Review of the labs showed mildly elevated potassium at 5.4, elevated creatinine  Hemodialysis should improve the potassium  : Labs reviewed, had second session of hemodialysis  Needs permacath to be placed on Monday  Outpatient dialysis set up by   Acute on chronic anemia  Hemoglobin dropped down to 6.8 from 7.9 on . No active GI bleed  Repeat hemoglobin was 7.3  Iron panel showed low iron saturation but normal ferritin  Polysubstance abuse: Tobacco abuse and cocaine abuse   nicotine patch, patient counseled about cessation  Diabetes mellitus type 2  Resume insulin regiment     Code Status: Full code  Surrogate Decision Maker: Wife     DVT Prophylaxis:  SCDs  GI Prophylaxis: not indicated     Baseline: Ambulatory    25.0 - 29.9 Overweight / Body mass index is 26.21 kg/m². Estimated discharge date:   Barriers:       Subjective:     Chief Complaint / Reason for Physician Visit  Reba Merritt . Bernard Miller   Patient is restless today, wants to leave. I reminded him that he is very sick and need his hemodialysis to be set up as outpatient. Discussed with RN events overnight. Review of Systems:  Symptom Y/N Comments  Symptom Y/N Comments   Fever/Chills    Chest Pain n    Poor Appetite    Edema     Cough n   Abdominal Pain n    Sputum    Joint Pain     SOB/DE LA CRUZ y   Pruritis/Rash     Nausea/vomit    Tolerating PT/OT     Diarrhea    Tolerating Diet     Constipation    Other       Could NOT obtain due to:      Objective:     VITALS:   Last 24hrs VS reviewed since prior progress note. Most recent are:  Patient Vitals for the past 24 hrs:   Temp Pulse Resp BP SpO2   02/04/22 1303 98.1 °F (36.7 °C) 81 20 (!) 189/87 --   02/04/22 1248 -- 82 20 (!) 165/79 --   02/04/22 1233 -- 84 20 (!) 171/81 --   02/04/22 1218 -- 89 20 (!) 177/85 --   02/04/22 1203 -- 85 20 (!) 144/66 --   02/04/22 1148 -- 75 20 (!) 144/65 --   02/04/22 1133 -- 75 20 (!) 156/75 --   02/04/22 1118 -- 83 22 (!) 155/87 --   02/04/22 1103 -- 82 22 (!) 165/86 --   02/04/22 1048 -- 84 20 (!) 154/81 --   02/04/22 1033 -- 84 20 (!) 153/66 --   02/04/22 1018 -- 78 20 (!) 175/87 --   02/04/22 1003 -- 77 22 (!) 161/77 --   02/04/22 0730 97.9 °F (36.6 °C) 73 20 (!) 183/100 99 %   02/04/22 0419 98.7 °F (37.1 °C) 79 22 (!) 175/88 96 %   02/03/22 2304 98.3 °F (36.8 °C) 76 24 (!) 175/95 94 %   02/03/22 2030 98.3 °F (36.8 °C) 75 24 (!) 157/87 93 %   02/03/22 1630 98.3 °F (36.8 °C) 76 18 (!) 165/87 --   02/03/22 1615 -- 75 18 (!) 157/80 --       Intake/Output Summary (Last 24 hours) at 2/4/2022 1559  Last data filed at 2/4/2022 1303  Gross per 24 hour   Intake --   Output 4975 ml   Net -4975 ml        I had a face to face encounter and independently examined this patient on 2/4/2022, as outlined below:  PHYSICAL EXAM:  General: WD, WN. Alert, cooperative, no acute distress    EENT:  EOMI. Anicteric sclerae. MMM  Resp:  CTA bilaterally, no wheezing or rales.   No accessory muscle use  CV:  Regular  rhythm,  No edema  GI:  Soft, Non distended, Non tender. +Bowel sounds  Neurologic:  Alert and oriented X 3, normal speech,   Psych:   Good insight. Not anxious nor agitated  Skin:  No rashes. No jaundice    Reviewed most current lab test results and cultures  YES  Reviewed most current radiology test results   YES  Review and summation of old records today    NO  Reviewed patient's current orders and MAR    YES  PMH/SH reviewed - no change compared to H&P  ________________________________________________________________________  Care Plan discussed with:    Comments   Patient x    Family      RN x    Care Manager     Consultant                        Multidiciplinary team rounds were held today with , nursing, pharmacist and clinical coordinator. Patient's plan of care was discussed; medications were reviewed and discharge planning was addressed. ________________________________________________________________________  Total NON critical care TIME35 Minutes    Total CRITICAL CARE TIME Spent:   Minutes non procedure based      Comments   >50% of visit spent in counseling and coordination of care     ________________________________________________________________________  Ishaan Gil MD     Procedures: see electronic medical records for all procedures/Xrays and details which were not copied into this note but were reviewed prior to creation of Plan. LABS:  I reviewed today's most current labs and imaging studies. Pertinent labs include:  Recent Labs     02/04/22  1022 02/03/22  0123 02/03/22  0050 02/02/22  0124 02/02/22  0124   WBC 7.6  --  7.5  --  6.9   HGB 7.4*  --  7.3*  --  6.8*   HCT 22.9* 23.0* 23.5*   < > 22.4*     --  271  --  262    < > = values in this interval not displayed.      Recent Labs     02/04/22  1022 02/03/22  0050 02/02/22  0124    137 140   K 4.2 5.4* 4.8    112* 114*   CO2 23 16* 17*   * 149* 137*   BUN 65* 96* 95* CREA 5.35* 7.07* 7.01*   CA 7.8* 7.5* 7.9*   MG  --   --  2.0   PHOS  --   --  7.1*       Signed: Carola Martines MD

## 2022-02-04 NOTE — PROGRESS NOTES
NAME: Melia Quintana        :  1965        MRN:  [id number]                       Assessment   :                                               Plan:  CKD-4/5 ? ESRD  Noncompliance  Drug use  Hyperkalemia  htn  DM  anemia   He left [facility] AMA on . Those notes show that he was verbally abusive to the nurse. As well he threw his urinal.    We also talked about his abusive behavior at [facility]. He says that it is a lie. CM working on outpatient placement. I don't think he's going to do well with long term HD.      2K bath for elevated potassium.     Iron sat low. Getting IV iron. Plan for HD today. Will need permcath prior to DC. He already ate today so will need to  Place the first of the week. He says that he'd like to leave today. He cannot leave with the temporary HD cath in place. Will see again Monday. Please call if any questions.               Subjective:     Chief Complaint:  \"I need to go home today. \"  Dyspnea a little better. No CP. No N/V        Review of Systems:    Symptom Y/N Comments  Symptom Y/N Comments   Fever/Chills    Chest Pain     Poor Appetite    Edema     Cough    Abdominal Pain     Sputum    Joint Pain     SOB/DE LA CRUZ    Pruritis/Rash     Nausea/vomit    Tolerating PT/OT     Diarrhea    Tolerating Diet     Constipation    Other       Could not obtain due to:      Objective:     VITALS:   Last 24hrs VS reviewed since prior progress note.  Most recent are:  Visit Vitals  BP (!) 183/100 (BP 1 Location: Right upper arm)   Pulse 73   Temp 97.9 °F (36.6 °C)   Resp 20   Ht 5' 8\" (1.727 m)   Wt 78.2 kg (172 lb 6.4 oz)   SpO2 99%   BMI 26.21 kg/m²       Intake/Output Summary (Last 24 hours) at 2022 0910  Last data filed at 2022 0417  Gross per 24 hour   Intake --   Output 3775 ml   Net -3775 ml      Telemetry Reviewed:     PHYSICAL EXAM:  General: NAD  + edema      Lab Data Reviewed: (see below)    Medications Reviewed: (see below)    PMH/SH reviewed - no change compared to H&P  ________________________________________________________________________  Care Plan discussed with:  Patient     Family      RN     Care Manager                    Consultant:          Comments   >50% of visit spent in counseling and coordination of care       ________________________________________________________________________  Shin Sosa MD     Procedures: see electronic medical records for all procedures/Xrays and details which  were not copied into this note but were reviewed prior to creation of Plan. LABS:  Recent Labs     02/03/22  0123 02/03/22  0050 02/02/22  0124 02/02/22  0124   WBC  --  7.5  --  6.9   HGB  --  7.3*  --  6.8*   HCT 23.0* 23.5*   < > 22.4*   PLT  --  271  --  262    < > = values in this interval not displayed. Recent Labs     02/03/22  0050 02/02/22  0124 02/01/22  1333    140 139   K 5.4* 4.8 5.8*   * 114* 113*   CO2 16* 17* 18*   BUN 96* 95* 93*   CREA 7.07* 7.01* 7.00*   * 137* 86   CA 7.5* 7.9* 7.9*   MG  --  2.0  --    PHOS  --  7.1*  --      No results for input(s): AP, TBIL, TP, ALB, GLOB, GGT, AML, LPSE in the last 72 hours. No lab exists for component: SGOT, GPT, AMYP, HLPSE  No results for input(s): INR, PTP, APTT, INREXT, INREXT in the last 72 hours. Recent Labs     02/02/22  1155 02/02/22  0124   TIBC  --  267   PSAT  --  13*   FERR 242 253      No results found for: FOL, RBCF   No results for input(s): PH, PCO2, PO2 in the last 72 hours. No results for input(s): CPK, CKMB in the last 72 hours.     No lab exists for component: TROPONINI  No components found for: [place]  Lab Results   Component Value Date/Time    Color YELLOW/STRAW 02/01/2022 03:17 PM    Appearance TURBID (A) 02/01/2022 03:17 PM    Specific gravity 1.016 02/01/2022 03:17 PM    pH (UA) 5.5 02/01/2022 03:17 PM    Protein 300 (A) 02/01/2022 03:17 PM    Glucose 100 (A) 02/01/2022 03:17 PM    Ketone Negative 02/01/2022 03:17 PM    Bilirubin Negative 02/01/2022 03:17 PM    Urobilinogen 0.2 02/01/2022 03:17 PM    Nitrites Negative 02/01/2022 03:17 PM    Leukocyte Esterase Negative 02/01/2022 03:17 PM    Epithelial cells FEW 02/01/2022 03:17 PM    Bacteria Negative 02/01/2022 03:17 PM    WBC 0-4 02/01/2022 03:17 PM    RBC 5-10 02/01/2022 03:17 PM       MEDICATIONS:  Current Facility-Administered Medications   Medication Dose Route Frequency    hydrALAZINE (APRESOLINE) tablet 50 mg  50 mg Oral TID    iron sucrose (VENOFER) injection 200 mg  200 mg IntraVENous Q24H    amLODIPine (NORVASC) tablet 10 mg  10 mg Oral DAILY    bumetanide (BUMEX) injection 2 mg  2 mg IntraVENous Q12H    cloNIDine HCL (CATAPRES) tablet 0.3 mg  0.3 mg Oral BID    insulin glargine (LANTUS) injection 10 Units  10 Units SubCUTAneous QHS    insulin lispro (HUMALOG) injection   SubCUTAneous AC&HS    glucose chewable tablet 16 g  4 Tablet Oral PRN    dextrose (D50W) injection syrg 12.5-25 g  12.5-25 g IntraVENous PRN    glucagon (GLUCAGEN) injection 1 mg  1 mg IntraMUSCular PRN    sodium chloride (NS) flush 5-40 mL  5-40 mL IntraVENous Q8H    sodium chloride (NS) flush 5-40 mL  5-40 mL IntraVENous PRN    [Held by provider] heparin (porcine) injection 5,000 Units  5,000 Units SubCUTAneous Q8H    hydrALAZINE (APRESOLINE) 20 mg/mL injection 10 mg  10 mg IntraVENous Q6H PRN    nicotine (NICODERM CQ) 14 mg/24 hr patch 1 Patch  1 Patch TransDERmal DAILY

## 2022-02-04 NOTE — PROGRESS NOTES
Received notification from bedside RN about patient with regards to: requesting medication to help with sleep and pain  VS: /95, HR 76, RR 24, O2 sat 94% on NC 3 L    Intervention given: Atarax 25 mg PO x 1 dose ordered

## 2022-02-04 NOTE — PROGRESS NOTES
Pt hypertensive but refusing meds post dialysis. Pt refusing bed alarm, educated on fall safety and to call when wanting to ambulate.

## 2022-02-04 NOTE — DIALYSIS
Hemodialysis / [phone]    Vitals Pre Post Assessment Pre Post   /77 182/99 LOC Alert and oriented x 3 Alert and oriented x 3   HR 78 81 Lungs 4L NC, intermittently removes O2 during HD and refused to allow me to reapply. Occasional SOB 4L NC, No SOB at this time   Resp 18 20 Cardiac regular regular   Temp Temp: 97.9 °F (36.6 °C) (02/04/22 0730) 98.1 oral Skin Warm, dry, intact Warm, dry, intact   Weight Pre-Dialysis Weight: 83.7 kg (184 lb 8.4 oz) (02/04/22 1003) 82.7 kg Edema 2+ pitting edema bilateral lower extremeties 2+ pitting edema bilateral lower extremities   Tele status Remote monitor  Pain Pain Intensity 1: 0 (02/03/22 2030) 0/10     Orders   Duration: Start: 1003 End: 1303 Total: 3 hours   Dialyzer: Dialyzer/Set Up Inspection: Revaclear (02/04/22 1003)   K Bath: Dialysate K (mEq/L): 2 (02/04/22 1003)   Ca Bath: Dialysate CA (mEq/L): 2.5 (02/04/22 1003)   Na: Dialysate NA (mEq/L): 138 (02/04/22 1003)   Bicarb: Dialysate HCO3 (mEq/L): 35 (02/04/22 1003)   Target Fluid Removal: Goal/Amount of Fluid to Remove (mL): 1000 mL (02/04/22 1003)     Access   Type & Location: Right IJ Temporary CVC   Comments: inserted 2/3/22, dressing and biopatch in place, but loose. Changed per p&p and dated 02/04/22, no signs and symptoms in infection at insertion site. Limbs and hubs disinfected per p&p on initiation and discontinuation of HD. Lines reversed due to frequent arterial pressure alarms during treatment, educated on remaining still during treatment, but patient moved throughout treatment causing alarms. patient scheduled for permacath placement prior to discharge. At end of HD heparin dwell instilled, limbs, clamped, and caps applied.                                        Labs   HBsAg (Antigen) / date: Negative 02/03/22                                          HBsAb (Antibody) / date: Susceptible 02/03/22   Source: Connect care   Obtained/Reviewed  Critical Results Called HGB   Date Value Ref Range Status   02/03/2022 7.3 (L) 12.1 - 17.0 g/dL Final     Potassium   Date Value Ref Range Status   02/03/2022 5.4 (H) 3.5 - 5.1 mmol/L Final     Calcium   Date Value Ref Range Status   02/03/2022 7.5 (L) 8.5 - 10.1 MG/DL Final     BUN   Date Value Ref Range Status   02/03/2022 96 (H) 6 - 20 MG/DL Final     Creatinine   Date Value Ref Range Status   02/03/2022 7.07 (H) 0.70 - 1.30 MG/DL Final        Meds Given   Name Dose Route   Heparin (1000 units per 1mL) 1.1mL red and 1.4mL blue Post HD catheter dwell               Adequacy / Fluid    Total Liters Process: 58 liters   Net Fluid Removed: 1000mL      Comments   Time Out Done:   (Time) Yes 0955   Admitting Diagnosis: CHF, CKD   Consent obtained/signed: Informed Consent Verified: Yes (02/04/22 1003)   Machine / RO # Machine Number: [id number] (02/04/22 1003)   Primary Nurse Rpt Pre: Ismael Mcleod RN   Primary Nurse Rpt Post: Ismael Mcleod RN   Pt Education: Procedural, infection prevention, compliance   Care Plan: Continue HD per MD order while inpatient. Needs permacath prior to outpatient dialysis placement   Pts outpatient clinic: N/A     Tx Summary   Comments: Tolerated HD well with frequent education regarding access safety and importance of completing HD treatment in it's entirety as ordered by MD. Patient voided a total of 500mL clear yellow urine in urinal during HD. At end of treatment patient remained in bed with bed in lowest position and locked, bedrails x 3, bed alarm in place and call bell within reach. Educated on calling for assistance prior to any attempts to ambulate.

## 2022-02-05 LAB
BASOPHILS # BLD: 0.1 K/UL (ref 0–0.1)
BASOPHILS NFR BLD: 1 % (ref 0–1)
DIFFERENTIAL METHOD BLD: ABNORMAL
EOSINOPHIL # BLD: 0.2 K/UL (ref 0–0.4)
EOSINOPHIL NFR BLD: 2 % (ref 0–7)
ERYTHROCYTE [DISTWIDTH] IN BLOOD BY AUTOMATED COUNT: 18.2 % (ref 11.5–14.5)
GLUCOSE BLD STRIP.AUTO-MCNC: 118 MG/DL (ref 65–117)
GLUCOSE BLD STRIP.AUTO-MCNC: 142 MG/DL (ref 65–117)
GLUCOSE BLD STRIP.AUTO-MCNC: 154 MG/DL (ref 65–117)
GLUCOSE BLD STRIP.AUTO-MCNC: 85 MG/DL (ref 65–117)
HCT VFR BLD AUTO: 25.9 % (ref 36.6–50.3)
HGB BLD-MCNC: 8.4 G/DL (ref 12.1–17)
IMM GRANULOCYTES # BLD AUTO: 0.1 K/UL (ref 0–0.04)
IMM GRANULOCYTES NFR BLD AUTO: 1 % (ref 0–0.5)
LYMPHOCYTES # BLD: 0.6 K/UL (ref 0.8–3.5)
LYMPHOCYTES NFR BLD: 6 % (ref 12–49)
MCH RBC QN AUTO: 27.3 PG (ref 26–34)
MCHC RBC AUTO-ENTMCNC: 32.4 G/DL (ref 30–36.5)
MCV RBC AUTO: 84.1 FL (ref 80–99)
MONOCYTES # BLD: 0.8 K/UL (ref 0–1)
MONOCYTES NFR BLD: 10 % (ref 5–13)
NEUTS SEG # BLD: 7.1 K/UL (ref 1.8–8)
NEUTS SEG NFR BLD: 81 % (ref 32–75)
NRBC # BLD: 0 K/UL (ref 0–0.01)
NRBC BLD-RTO: 0 PER 100 WBC
PLATELET # BLD AUTO: 259 K/UL (ref 150–400)
PMV BLD AUTO: 9.8 FL (ref 8.9–12.9)
RBC # BLD AUTO: 3.08 M/UL (ref 4.1–5.7)
SERVICE CMNT-IMP: ABNORMAL
SERVICE CMNT-IMP: NORMAL
WBC # BLD AUTO: 8.8 K/UL (ref 4.1–11.1)

## 2022-02-05 PROCEDURE — 85025 COMPLETE CBC W/AUTO DIFF WBC: CPT

## 2022-02-05 PROCEDURE — 74011250636 HC RX REV CODE- 250/636: Performed by: INTERNAL MEDICINE

## 2022-02-05 PROCEDURE — 65660000000 HC RM CCU STEPDOWN

## 2022-02-05 PROCEDURE — 74011250637 HC RX REV CODE- 250/637: Performed by: INTERNAL MEDICINE

## 2022-02-05 PROCEDURE — 74011636637 HC RX REV CODE- 636/637: Performed by: INTERNAL MEDICINE

## 2022-02-05 PROCEDURE — 82962 GLUCOSE BLOOD TEST: CPT

## 2022-02-05 PROCEDURE — 74011000250 HC RX REV CODE- 250: Performed by: INTERNAL MEDICINE

## 2022-02-05 PROCEDURE — 77010033678 HC OXYGEN DAILY

## 2022-02-05 PROCEDURE — 94760 N-INVAS EAR/PLS OXIMETRY 1: CPT

## 2022-02-05 PROCEDURE — 36415 COLL VENOUS BLD VENIPUNCTURE: CPT

## 2022-02-05 RX ADMIN — BUMETANIDE 2 MG: 0.25 INJECTION INTRAMUSCULAR; INTRAVENOUS at 10:50

## 2022-02-05 RX ADMIN — CLONIDINE HYDROCHLORIDE 0.3 MG: 0.1 TABLET ORAL at 10:49

## 2022-02-05 RX ADMIN — AMLODIPINE BESYLATE 10 MG: 5 TABLET ORAL at 10:49

## 2022-02-05 RX ADMIN — BUMETANIDE 2 MG: 0.25 INJECTION INTRAMUSCULAR; INTRAVENOUS at 22:10

## 2022-02-05 RX ADMIN — HYDRALAZINE HYDROCHLORIDE 50 MG: 50 TABLET, FILM COATED ORAL at 22:10

## 2022-02-05 RX ADMIN — INSULIN GLARGINE 10 UNITS: 100 INJECTION, SOLUTION SUBCUTANEOUS at 22:10

## 2022-02-05 RX ADMIN — HYDRALAZINE HYDROCHLORIDE 50 MG: 50 TABLET, FILM COATED ORAL at 15:49

## 2022-02-05 RX ADMIN — HYDRALAZINE HYDROCHLORIDE 50 MG: 50 TABLET, FILM COATED ORAL at 10:50

## 2022-02-05 RX ADMIN — IRON SUCROSE 200 MG: 20 INJECTION, SOLUTION INTRAVENOUS at 10:50

## 2022-02-05 RX ADMIN — SODIUM CHLORIDE, PRESERVATIVE FREE 10 ML: 5 INJECTION INTRAVENOUS at 13:26

## 2022-02-05 NOTE — PROGRESS NOTES
End of Shift Note    Bedside shift change report given to Latia Salinas RN (oncoming nurse) by Anil Kapoor RN (offgoing nurse). Report included the following information SBAR, Kardex, Intake/Output, MAR and Recent Results    Shift worked:  7a to 7p     Shift summary and any significant changes:     Continues to refuse telemetry. Dr. Krystal Jesus aware. Clonidine at 19800 held due to /50     Concerns for physician to address:       Zone phone for oncoming shift:   [phone]       Activity:  Activity Level: Up with Assistance  Number times ambulated in hallways past shift: 0  Number of times OOB to chair past shift: 2    Cardiac:   Cardiac Monitoring: Refused      Cardiac Rhythm: Sinus Rhythm    Access:   Current line(s): PIV and HD access     Genitourinary:   Urinary status: voiding    Respiratory:   O2 Device: Nasal cannula  Chronic home O2 use?: NO  Incentive spirometer at bedside: NO     GI:  Last Bowel Movement Date: 02/05/22  Current diet:  ADULT DIET Regular  Passing flatus: YES  Tolerating current diet: YES       Pain Management:   Patient states pain is manageable on current regimen: YES    Skin:  Sarwat Score: 21  Interventions: increase time out of bed    Patient Safety:  Fall Score:  Total Score: 2  Interventions: gripper socks       Length of Stay:  Expected LOS: 3d 19h  Actual LOS: [address] Yadira Cole RN

## 2022-02-05 NOTE — PROGRESS NOTES
Pt continues to refuse all medications, RN educated pt on benefits and risks of medications and treatment. Patient also refusing cardiac monitoring.

## 2022-02-05 NOTE — PROGRESS NOTES
Received notification from bedside RN about patient with regards to: requesting medication for sleep, Atarax did not work last night  VS: /78, HR 82, RR 20, O2 sat 99% on NC 2 L    Intervention given: Restoril 15 mg PO x 1 dose ordered

## 2022-02-05 NOTE — PROGRESS NOTES
Problem: Falls - Risk of  Goal: *Absence of Falls  Description: Document Carlosmali Rascon Fall Risk and appropriate interventions in the flowsheet.   Outcome: Progressing Towards Goal  Note: Fall Risk Interventions:  Mobility Interventions: Assess mobility with egress test         Medication Interventions: Teach patient to arise slowly                   Problem: Patient Education: Go to Patient Education Activity  Goal: Patient/Family Education  Outcome: Progressing Towards Goal

## 2022-02-06 LAB
ANION GAP SERPL CALC-SCNC: 7 MMOL/L (ref 5–15)
BUN SERPL-MCNC: 48 MG/DL (ref 6–20)
BUN/CREAT SERPL: 11 (ref 12–20)
CALCIUM SERPL-MCNC: 8.1 MG/DL (ref 8.5–10.1)
CHLORIDE SERPL-SCNC: 107 MMOL/L (ref 97–108)
CO2 SERPL-SCNC: 24 MMOL/L (ref 21–32)
CREAT SERPL-MCNC: 4.57 MG/DL (ref 0.7–1.3)
ERYTHROCYTE [DISTWIDTH] IN BLOOD BY AUTOMATED COUNT: 18.5 % (ref 11.5–14.5)
GLUCOSE BLD STRIP.AUTO-MCNC: 104 MG/DL (ref 65–117)
GLUCOSE BLD STRIP.AUTO-MCNC: 109 MG/DL (ref 65–117)
GLUCOSE BLD STRIP.AUTO-MCNC: 153 MG/DL (ref 65–117)
GLUCOSE BLD STRIP.AUTO-MCNC: 153 MG/DL (ref 65–117)
GLUCOSE SERPL-MCNC: 115 MG/DL (ref 65–100)
HCT VFR BLD AUTO: 27.6 % (ref 36.6–50.3)
HGB BLD-MCNC: 8.5 G/DL (ref 12.1–17)
MCH RBC QN AUTO: 26.3 PG (ref 26–34)
MCHC RBC AUTO-ENTMCNC: 30.8 G/DL (ref 30–36.5)
MCV RBC AUTO: 85.4 FL (ref 80–99)
NRBC # BLD: 0 K/UL (ref 0–0.01)
NRBC BLD-RTO: 0 PER 100 WBC
PLATELET # BLD AUTO: 290 K/UL (ref 150–400)
PMV BLD AUTO: 10.7 FL (ref 8.9–12.9)
POTASSIUM SERPL-SCNC: 3.8 MMOL/L (ref 3.5–5.1)
RBC # BLD AUTO: 3.23 M/UL (ref 4.1–5.7)
SERVICE CMNT-IMP: ABNORMAL
SERVICE CMNT-IMP: ABNORMAL
SERVICE CMNT-IMP: NORMAL
SERVICE CMNT-IMP: NORMAL
SODIUM SERPL-SCNC: 138 MMOL/L (ref 136–145)
WBC # BLD AUTO: 9.1 K/UL (ref 4.1–11.1)

## 2022-02-06 PROCEDURE — 77010033678 HC OXYGEN DAILY

## 2022-02-06 PROCEDURE — 65660000000 HC RM CCU STEPDOWN

## 2022-02-06 PROCEDURE — 74011000250 HC RX REV CODE- 250: Performed by: INTERNAL MEDICINE

## 2022-02-06 PROCEDURE — 74011250637 HC RX REV CODE- 250/637: Performed by: NURSE PRACTITIONER

## 2022-02-06 PROCEDURE — 74011636637 HC RX REV CODE- 636/637: Performed by: INTERNAL MEDICINE

## 2022-02-06 PROCEDURE — 74011250636 HC RX REV CODE- 250/636: Performed by: INTERNAL MEDICINE

## 2022-02-06 PROCEDURE — 36415 COLL VENOUS BLD VENIPUNCTURE: CPT

## 2022-02-06 PROCEDURE — 82962 GLUCOSE BLOOD TEST: CPT

## 2022-02-06 PROCEDURE — 85027 COMPLETE CBC AUTOMATED: CPT

## 2022-02-06 PROCEDURE — 94760 N-INVAS EAR/PLS OXIMETRY 1: CPT

## 2022-02-06 PROCEDURE — 80048 BASIC METABOLIC PNL TOTAL CA: CPT

## 2022-02-06 PROCEDURE — 74011250637 HC RX REV CODE- 250/637: Performed by: INTERNAL MEDICINE

## 2022-02-06 RX ORDER — TRAMADOL HYDROCHLORIDE 50 MG/1
50 TABLET ORAL ONCE
Status: COMPLETED | OUTPATIENT
Start: 2022-02-06 | End: 2022-02-06

## 2022-02-06 RX ADMIN — SODIUM CHLORIDE, PRESERVATIVE FREE 10 ML: 5 INJECTION INTRAVENOUS at 22:20

## 2022-02-06 RX ADMIN — IRON SUCROSE 200 MG: 20 INJECTION, SOLUTION INTRAVENOUS at 09:31

## 2022-02-06 RX ADMIN — CLONIDINE HYDROCHLORIDE 0.3 MG: 0.1 TABLET ORAL at 17:37

## 2022-02-06 RX ADMIN — BUMETANIDE 2 MG: 0.25 INJECTION INTRAMUSCULAR; INTRAVENOUS at 22:19

## 2022-02-06 RX ADMIN — CLONIDINE HYDROCHLORIDE 0.3 MG: 0.1 TABLET ORAL at 09:26

## 2022-02-06 RX ADMIN — HYDRALAZINE HYDROCHLORIDE 50 MG: 50 TABLET, FILM COATED ORAL at 22:19

## 2022-02-06 RX ADMIN — SODIUM CHLORIDE, PRESERVATIVE FREE 10 ML: 5 INJECTION INTRAVENOUS at 14:00

## 2022-02-06 RX ADMIN — HYDRALAZINE HYDROCHLORIDE 50 MG: 50 TABLET, FILM COATED ORAL at 17:37

## 2022-02-06 RX ADMIN — SODIUM CHLORIDE, PRESERVATIVE FREE 10 ML: 5 INJECTION INTRAVENOUS at 06:46

## 2022-02-06 RX ADMIN — TRAMADOL HYDROCHLORIDE 50 MG: 50 TABLET, COATED ORAL at 04:07

## 2022-02-06 RX ADMIN — TRAMADOL HYDROCHLORIDE 50 MG: 50 TABLET ORAL at 17:36

## 2022-02-06 RX ADMIN — BUMETANIDE 2 MG: 0.25 INJECTION INTRAMUSCULAR; INTRAVENOUS at 09:26

## 2022-02-06 RX ADMIN — INSULIN GLARGINE 10 UNITS: 100 INJECTION, SOLUTION SUBCUTANEOUS at 22:20

## 2022-02-06 RX ADMIN — AMLODIPINE BESYLATE 10 MG: 5 TABLET ORAL at 09:26

## 2022-02-06 RX ADMIN — HYDRALAZINE HYDROCHLORIDE 50 MG: 50 TABLET, FILM COATED ORAL at 09:26

## 2022-02-06 NOTE — DISCHARGE SUMMARY
Discharge Summary       PATIENT ID: Viviana Kendrick  MRN: [id number]   YOB: 1965    DATE OF ADMISSION: 1/19/2022  3:33 AM    DATE OF DISCHARGE: 1/23/2022   PRIMARY CARE PROVIDER: Diego Laurent MD     ATTENDING PHYSICIAN: Doug Silva MD   DISCHARGING PROVIDER: Doug Silva MD    To contact this individual call [phone] and ask the  to page. If unavailable ask to be transferred the Adult Hospitalist Department. CONSULTATIONS: IP CONSULT TO CARDIOLOGY  IP CONSULT TO NEPHROLOGY    PROCEDURES/SURGERIES: * No surgery found *    DISCHARGE DIAGNOSES:   Pt leaves AMA     SEE LAST RN INPUT ON THE PN SECTION     ELSE PRIOR NOTE:     Admission Summary:   Meeta Salguero a 55 y. o. male who presents with shortness of breath     History is primary obtained from the patient, patient reports that he started experiencing shortness of breath that started 2 days back, patient reports that he has been getting swelling in his legs, he reports that he has not been taking his medications and has not been very compliant with the same, per chart review patient has history of noncompliance with medication and also has history of cocaine abuse, patient came to the ER, was found to be in CHF exacerbation, was hypertensive, was started on nitroglycerin drip and was requested to be admitted to the hospitalist service, patient reports that he also had some chest pain associated with his symptoms, reports that he has not used cocaine in the last \"many days\" but cannot quantify the number of days currently patient reports that he is feeling better but continues to have shortness of breath, denies any other complaints or problems     The patient denies any Headache, blurry vision, sore throat, trouble swallowing, trouble with speech, , cough, fever, chills, N/V/D, abd pain, urinary symptoms, constipation, recent travels, sick contacts, focal or generalized neurological symptoms,  falls, injuries, rashes, contact with COVID-19 diagnosed patients, hematemesis, melena, hemoptysis, hematuria, rashes, denies starting any new medications and denies any other concerns or problems besides as mentioned above.          Interval history / Subjective:   1/22/2022 :   · Fair UO  · Non n/v/d/f/chills  · Breathing better  · S Cr favorable trend       Assessment & Plan:      Acute hypoxic hypoxemic respiratory failure  - support supplemental  w 02/NIVD; watch for increased wob, trials off BiPAP, to high flow as jeremy   - daily improvement as of 1/22/2022       Malignant hypertension  - iv antihypertensives, tridil/consider nicardene drip   - resovled      Acute on chronic combined systolic and diastolic congestive heart failure NYHA class IV on presentation;   - monitor improvemen/  clinical re-eval   - clincially stable, improved breathing.   - increasing in room activity 1/22/2022       Acute on chronic renal failure  - for now monitor avoid nephrotoxic's and control bp   1/22/2022  :  · Fair UO. · S Cr favorable trend     Anemia;   -support, iron;f/up hgb and trans for less than 7   Lab Results   Component Value Date/Time     HGB 7.2 (L) 01/22/2022 06:31 AM         Hyperkalemia- treat as indicated, likely will improve w diuresis      History of polysubstance abuse     Diabetes mellitus type 2  - SSI for now        Lab Results   Component Value Date/Time     Glucose 161 (H) 01/22/2022 06:31 AM     Glucose (POC) 180 (H) 01/22/2022 06:14 AM               GI DVT prophylaxis: Patient will be on heparin                    DISCHARGE DIAGNOSES / PLAN:           BMI: Body mass index is 27.82 kg/m². . This patient: Meets criteria for overweight given BMI >/= 25 and < 30 due to excess calories/nutritional. Weight loss and lifestyle modifications should be encouraged as an outpatient.      PENDING TEST RESULTS:   At the time of discharge the following test results are still pending: PT LEAVES AMA      ADDITIONAL CARE RECOMMENDATIONS:         NOTIFY YOUR PHYSICIAN FOR ANY OF THE FOLLOWING:   Fever over 101 degrees for 24 hours. Chest pain, shortness of breath, fever, chills, nausea, vomiting, diarrhea, change in mentation, falling, weakness, bleeding. Severe pain or pain not relieved by medications, as well as any other signs or symptoms that you may have questions about.     FOLLOW UP APPOINTMENTS:    Follow-up Information    None         DISCHARGE MEDICATIONS:  Discharge Medication List as of 1/23/2022 11:28 AM          DISPOSITION:    Home With:   OT  PT  HH  RN       Long term SNF/Inpatient Rehab    Independent/assisted living    Hospice   X  Other:AMA           CHRONIC MEDICAL DIAGNOSES:  Problem List as of 1/23/2022 Date Reviewed: 12/2/2021          Codes Class Noted - Resolved    CHF (congestive heart failure) ([facility] ICD-10-CM: I50.9  ICD-9-CM: 428.0  1/19/2022 - Present        Hyperkalemia ICD-10-CM: E87.5  ICD-9-CM: 276.7  4/27/2021 - Present        Acute on chronic kidney failure ([facility] ICD-10-CM: N17.9, N18.9  ICD-9-CM: 584.9, 585.9  4/27/2021 - Present        CKD (chronic kidney disease) ICD-10-CM: N18.9  ICD-9-CM: 585.9  3/8/2021 - Present        Metabolic encephalopathy NUM-29-CV: G93.41  ICD-9-CM: 348.31  2/28/2020 - Present        Osteomyelitis of left foot (HCC) ICD-10-CM: M86.9  ICD-9-CM: 730.27  2/28/2020 - Present        MRSA bacteremia ICD-10-CM: R78.81, B95.62  ICD-9-CM: 790.7, 041.12  2/28/2020 - Present        Secondary hyperaldosteronism ([facility] ICD-10-CM: E26.1  ICD-9-CM: 255.14  2/28/2020 - Present        Hypokalemia ICD-10-CM: E87.6  ICD-9-CM: 276.8  2/28/2020 - Present        DM (diabetes mellitus), type 2 (HCC) (Chronic) ICD-10-CM: E11.9  ICD-9-CM: 250.00  2/28/2020 - Present        HTN (hypertension) ICD-10-CM: I10  ICD-9-CM: 401.9  2/28/2020 - Present        Cardiomyopathy ([facility] (Chronic) ICD-10-CM: I42.9  ICD-9-CM: 425.4  2/21/2020 - Present        Substance abuse ([facility] (Chronic) ICD-10-CM: F19.10  ICD-9-CM: 305.90  2/21/2020 - Present        Severe sepsis (HCC) ICD-10-CM: A41.9, R65.20  ICD-9-CM: 038.9, 995.92  2/19/2020 - Present        RESOLVED: Acute on chronic systolic heart failure (HCC) ICD-10-CM: I50.23  ICD-9-CM: 428.23  6/1/2021 - 6/17/2021        RESOLVED: Elevated troponin ICD-10-CM: R77.8  ICD-9-CM: 790.6  4/27/2021 - 6/17/2021        RESOLVED: Acute on chronic combined systolic and diastolic ACC/AHA stage C congestive heart failure ([facility] ICD-10-CM: I50.43  ICD-9-CM: 428.43, 428.0  3/8/2021 - 6/17/2021        RESOLVED: Hypertensive urgency ICD-10-CM: I16.0  ICD-9-CM: 401.9  3/8/2021 - 6/17/2021        RESOLVED: CHF exacerbation ([facility] ICD-10-CM: I50.9  ICD-9-CM: 428.0  3/5/2021 - 6/17/2021        RESOLVED: Acute CHF (congestive heart failure) ([facility] ICD-10-CM: I50.9  ICD-9-CM: 428.0  6/20/2020 - 6/17/2021        RESOLVED: Acute systolic heart failure ([facility] ICD-10-CM: I50.21  ICD-9-CM: 428.21  2/21/2020 - 6/17/2021        RESOLVED: Encounter to establish care with new doctor ICD-10-CM: Z76.89  ICD-9-CM: V65.8  10/11/2018 - 2/28/2020        Hypertensive urgency ICD-10-CM: I16.0  ICD-9-CM: 401.9  1/20/2022 - Present              Greater than 15  minutes were spent with the patient on counseling and coordination of care    Signed:   Ken Diallo MD  2/6/2022  2:58 PM

## 2022-02-06 NOTE — PROGRESS NOTES
Received notification from bedside RN about patient with regards to:8/10 leg pain, requesting medication for relief  VS: /82, HR 82, RR 19, O2 sat 92% on NC 3 L    Intervention given: Tramadol PO x 1 dose ordered

## 2022-02-06 NOTE — PROGRESS NOTES
Hospitalist Progress Note    NAME: Shaunna Coombs   :  1965   MRN:  [id number]       Assessment / Plan:  Acute on chronic combined systolic and diastolic CHF  Uncontrolled hypertension  Hyperkalemia  End-stage renal disease, new hemodialysis started on   -started on HD by renal. Will need permacath placement Monday. Renal following for HD needs. Case mgmt trying to arrange out pt HD center  -Cont bumex  -NPO from midnite  for PC in am tomorrow  -Check labs in am         Acute on chronic anemia  Hemoglobin dropped down to 6.8 from 7.9 on . No active GI bleed  Repeat hemoglobin is 8.4. Iron panel showed low iron saturation but normal ferritin      Polysubstance abuse: Tobacco abuse and cocaine abuse   nicotine patch, patient counseled about cessation    Diabetes mellitus type 2  -Cont lantus 10 units at bed time and Insulin lispro SSI.      Code Status: Full code  Surrogate Decision Maker: Wife     DVT Prophylaxis:  SCDs  GI Prophylaxis: not indicated     Baseline: Ambulatory    25.0 - 29.9 Overweight / Body mass index is 26.21 kg/m². Estimated discharge date:   Barriers:       Subjective:     Chief Complaint / Reason for Physician Visit  No chest pain or sob. No fever      Objective:     VITALS:   Last 24hrs VS reviewed since prior progress note.  Most recent are:  Patient Vitals for the past 24 hrs:   Temp Pulse Resp BP SpO2   22 1949 97.9 °F (36.6 °C) 88 18 129/69 100 %   22 1820 -- 89 -- 104/63 --   22 1547 -- 91 -- -- 96 %   22 1459 98 °F (36.7 °C) 75 17 (!) 151/68 91 %   22 1144 98 °F (36.7 °C) 74 17 (!) 150/65 97 %   22 0829 98 °F (36.7 °C) 80 18 (!) 155/70 97 %   22 0523 98.3 °F (36.8 °C) 83 18 (!) 165/83 --   22 2243 97.5 °F (36.4 °C) 82 20 (!) 166/78 --       Intake/Output Summary (Last 24 hours) at [id number]  Last data filed at 2022 0752  Gross per 24 hour   Intake 360 ml   Output 600 ml   Net -240 ml        I had a face to face encounter and independently examined this patient on 2/5/2022, as outlined below:  PHYSICAL EXAM:  General: WD, WN. Alert, cooperative, no acute distress    EENT:  EOMI. Anicteric sclerae. MMM  Resp:  CTA bilaterally, no wheezing or rales. No accessory muscle use  CV:  Regular  rhythm,  No edema  GI:  Soft, Non distended, Non tender. +Bowel sounds  Neurologic:  Alert and oriented X 3, normal speech,   Psych:   Good insight. Not anxious nor agitated  Skin:  No rashes. No jaundice    Reviewed most current lab test results and cultures  YES  Reviewed most current radiology test results   YES  Review and summation of old records today    NO  Reviewed patient's current orders and MAR    YES  PMH/SH reviewed - no change compared to H&P  ________________________________________________________________________  Care Plan discussed with:    Comments   Patient x    Family      RN x    Care Manager     Consultant                        Multidiciplinary team rounds were held today with , nursing, pharmacist and clinical coordinator. Patient's plan of care was discussed; medications were reviewed and discharge planning was addressed. ________________________________________________________________________  Total NON critical care TIME35 Minutes    Total CRITICAL CARE TIME Spent:   Minutes non procedure based      Comments   >50% of visit spent in counseling and coordination of care     ________________________________________________________________________  Marj Cardenas MD     Procedures: see electronic medical records for all procedures/Xrays and details which were not copied into this note but were reviewed prior to creation of Plan. LABS:  I reviewed today's most current labs and imaging studies.   Pertinent labs include:  Recent Labs     02/05/22  0641 02/04/22  1022 02/03/22  0123 02/03/22  0050 02/03/22  0050   WBC 8.8 7.6  --   --  7.5   HGB 8.4* 7.4*  --   --  7.3*   HCT 25.9* 22.9* 23.0*   < > 23.5*    272  --   --  271    < > = values in this interval not displayed.      Recent Labs     02/04/22  1022 02/03/22  0050    137   K 4.2 5.4*    112*   CO2 23 16*   * 149*   BUN 65* 96*   CREA 5.35* 7.07*   CA 7.8* 7.5*       Signed: Hayden Wiggins MD

## 2022-02-07 ENCOUNTER — APPOINTMENT (OUTPATIENT)
Dept: INTERVENTIONAL RADIOLOGY/VASCULAR | Age: 57
DRG: 194 | End: 2022-02-07
Attending: INTERNAL MEDICINE
Payer: MEDICAID

## 2022-02-07 LAB
COVID-19 RAPID TEST, COVR: NOT DETECTED
GLUCOSE BLD STRIP.AUTO-MCNC: 100 MG/DL (ref 65–117)
GLUCOSE BLD STRIP.AUTO-MCNC: 177 MG/DL (ref 65–117)
GLUCOSE BLD STRIP.AUTO-MCNC: 60 MG/DL (ref 65–117)
GLUCOSE BLD STRIP.AUTO-MCNC: 61 MG/DL (ref 65–117)
GLUCOSE BLD STRIP.AUTO-MCNC: 74 MG/DL (ref 65–117)
GLUCOSE BLD STRIP.AUTO-MCNC: 75 MG/DL (ref 65–117)
SERVICE CMNT-IMP: ABNORMAL
SERVICE CMNT-IMP: NORMAL
SOURCE, COVRS: NORMAL

## 2022-02-07 PROCEDURE — 77010033678 HC OXYGEN DAILY

## 2022-02-07 PROCEDURE — 74011000250 HC RX REV CODE- 250: Performed by: INTERNAL MEDICINE

## 2022-02-07 PROCEDURE — 74011250636 HC RX REV CODE- 250/636: Performed by: INTERNAL MEDICINE

## 2022-02-07 PROCEDURE — 65660000000 HC RM CCU STEPDOWN

## 2022-02-07 PROCEDURE — 77001 FLUOROGUIDE FOR VEIN DEVICE: CPT

## 2022-02-07 PROCEDURE — C1750 CATH, HEMODIALYSIS,LONG-TERM: HCPCS

## 2022-02-07 PROCEDURE — 77030002996 HC SUT SLK J&J -A

## 2022-02-07 PROCEDURE — 2709999900 HC NON-CHARGEABLE SUPPLY

## 2022-02-07 PROCEDURE — 90935 HEMODIALYSIS ONE EVALUATION: CPT

## 2022-02-07 PROCEDURE — 94760 N-INVAS EAR/PLS OXIMETRY 1: CPT

## 2022-02-07 PROCEDURE — 87635 SARS-COV-2 COVID-19 AMP PRB: CPT

## 2022-02-07 PROCEDURE — 77030010507 HC ADH SKN DERMBND J&J -B

## 2022-02-07 PROCEDURE — 74011250636 HC RX REV CODE- 250/636: Performed by: STUDENT IN AN ORGANIZED HEALTH CARE EDUCATION/TRAINING PROGRAM

## 2022-02-07 PROCEDURE — 82962 GLUCOSE BLOOD TEST: CPT

## 2022-02-07 PROCEDURE — 0JH63XZ INSERTION OF TUNNELED VASCULAR ACCESS DEVICE INTO CHEST SUBCUTANEOUS TISSUE AND FASCIA, PERCUTANEOUS APPROACH: ICD-10-PCS | Performed by: STUDENT IN AN ORGANIZED HEALTH CARE EDUCATION/TRAINING PROGRAM

## 2022-02-07 PROCEDURE — 74011250637 HC RX REV CODE- 250/637: Performed by: INTERNAL MEDICINE

## 2022-02-07 PROCEDURE — 77030031139 HC SUT VCRL2 J&J -A

## 2022-02-07 PROCEDURE — 74011000250 HC RX REV CODE- 250: Performed by: STUDENT IN AN ORGANIZED HEALTH CARE EDUCATION/TRAINING PROGRAM

## 2022-02-07 PROCEDURE — 5A1D70Z PERFORMANCE OF URINARY FILTRATION, INTERMITTENT, LESS THAN 6 HOURS PER DAY: ICD-10-PCS | Performed by: INTERNAL MEDICINE

## 2022-02-07 RX ORDER — HEPARIN SODIUM 200 [USP'U]/100ML
400 INJECTION, SOLUTION INTRAVENOUS ONCE
Status: COMPLETED | OUTPATIENT
Start: 2022-02-07 | End: 2022-02-07

## 2022-02-07 RX ORDER — SODIUM CHLORIDE 9 MG/ML
25 INJECTION, SOLUTION INTRAVENOUS CONTINUOUS
Status: DISCONTINUED | OUTPATIENT
Start: 2022-02-07 | End: 2022-02-07

## 2022-02-07 RX ORDER — CEFAZOLIN SODIUM 1 G/3ML
INJECTION, POWDER, FOR SOLUTION INTRAMUSCULAR; INTRAVENOUS
Status: DISCONTINUED
Start: 2022-02-07 | End: 2022-02-09 | Stop reason: HOSPADM

## 2022-02-07 RX ORDER — HEPARIN SODIUM 1000 [USP'U]/ML
10000 INJECTION, SOLUTION INTRAVENOUS; SUBCUTANEOUS ONCE
Status: COMPLETED | OUTPATIENT
Start: 2022-02-07 | End: 2022-02-07

## 2022-02-07 RX ORDER — LIDOCAINE HYDROCHLORIDE 20 MG/ML
18 INJECTION, SOLUTION INFILTRATION; PERINEURAL ONCE
Status: COMPLETED | OUTPATIENT
Start: 2022-02-07 | End: 2022-02-07

## 2022-02-07 RX ORDER — FENTANYL CITRATE 50 UG/ML
100 INJECTION, SOLUTION INTRAMUSCULAR; INTRAVENOUS
Status: DISCONTINUED | OUTPATIENT
Start: 2022-02-07 | End: 2022-02-07

## 2022-02-07 RX ORDER — MAGNESIUM SULFATE 100 %
4 CRYSTALS MISCELLANEOUS AS NEEDED
Status: DISCONTINUED | OUTPATIENT
Start: 2022-02-07 | End: 2022-02-09 | Stop reason: HOSPADM

## 2022-02-07 RX ORDER — WATER FOR INJECTION,STERILE
VIAL (ML) INJECTION
Status: DISCONTINUED
Start: 2022-02-07 | End: 2022-02-09 | Stop reason: HOSPADM

## 2022-02-07 RX ORDER — INSULIN LISPRO 100 [IU]/ML
INJECTION, SOLUTION INTRAVENOUS; SUBCUTANEOUS
Status: DISCONTINUED | OUTPATIENT
Start: 2022-02-07 | End: 2022-02-09 | Stop reason: HOSPADM

## 2022-02-07 RX ADMIN — CLONIDINE HYDROCHLORIDE 0.3 MG: 0.1 TABLET ORAL at 10:07

## 2022-02-07 RX ADMIN — EPOETIN ALFA-EPBX 10000 UNITS: 10000 INJECTION, SOLUTION INTRAVENOUS; SUBCUTANEOUS at 23:24

## 2022-02-07 RX ADMIN — HYDRALAZINE HYDROCHLORIDE 50 MG: 50 TABLET, FILM COATED ORAL at 22:17

## 2022-02-07 RX ADMIN — WATER 2 G: 1 INJECTION INTRAMUSCULAR; INTRAVENOUS; SUBCUTANEOUS at 12:20

## 2022-02-07 RX ADMIN — HEPARIN SODIUM 1400 UNITS: 1000 INJECTION INTRAVENOUS; SUBCUTANEOUS at 18:01

## 2022-02-07 RX ADMIN — SODIUM CHLORIDE, PRESERVATIVE FREE 10 ML: 5 INJECTION INTRAVENOUS at 06:35

## 2022-02-07 RX ADMIN — HEPARIN SODIUM 5000 UNITS: 5000 INJECTION, SOLUTION INTRAVENOUS; SUBCUTANEOUS at 22:17

## 2022-02-07 RX ADMIN — HYDRALAZINE HYDROCHLORIDE 50 MG: 50 TABLET, FILM COATED ORAL at 18:29

## 2022-02-07 RX ADMIN — BUMETANIDE 2 MG: 0.25 INJECTION INTRAMUSCULAR; INTRAVENOUS at 10:08

## 2022-02-07 RX ADMIN — SODIUM CHLORIDE, PRESERVATIVE FREE 10 ML: 5 INJECTION INTRAVENOUS at 22:17

## 2022-02-07 RX ADMIN — HEPARIN SODIUM 1100 UNITS: 1000 INJECTION INTRAVENOUS; SUBCUTANEOUS at 18:00

## 2022-02-07 RX ADMIN — FENTANYL CITRATE 50 MCG: 50 INJECTION, SOLUTION INTRAMUSCULAR; INTRAVENOUS at 12:29

## 2022-02-07 RX ADMIN — LIDOCAINE HYDROCHLORIDE 360 MG: 20 INJECTION, SOLUTION INFILTRATION; PERINEURAL at 12:22

## 2022-02-07 RX ADMIN — HYDRALAZINE HYDROCHLORIDE 50 MG: 50 TABLET, FILM COATED ORAL at 10:08

## 2022-02-07 RX ADMIN — AMLODIPINE BESYLATE 10 MG: 5 TABLET ORAL at 10:08

## 2022-02-07 RX ADMIN — HEPARIN SODIUM IN SODIUM CHLORIDE 200 UNITS: 200 INJECTION INTRAVENOUS at 12:35

## 2022-02-07 RX ADMIN — SODIUM CHLORIDE, PRESERVATIVE FREE 10 ML: 5 INJECTION INTRAVENOUS at 14:00

## 2022-02-07 RX ADMIN — HEPARIN SODIUM 3800 UNITS: 1000 INJECTION, SOLUTION INTRAVENOUS; SUBCUTANEOUS at 12:22

## 2022-02-07 RX ADMIN — BUMETANIDE 2 MG: 0.25 INJECTION INTRAMUSCULAR; INTRAVENOUS at 22:16

## 2022-02-07 RX ADMIN — CLONIDINE HYDROCHLORIDE 0.3 MG: 0.1 TABLET ORAL at 18:28

## 2022-02-07 NOTE — PROCEDURES
Hemodialysis / [phone]    Vitals Pre Post Assessment Pre Post   BP BP: (!) 160/85 (02/07/22 1415) 160/76 LOC Alert and oriented, x 4 No change   HR Pulse (Heart Rate): (!) 104 (02/07/22 1415) 98 Lungs Clear No change   Resp Resp Rate: 18 (02/07/22 1415) 18 Cardiac RRR No change   Temp Temp: 98.3 °F (36.8 °C) (02/07/22 1415) 98.8 Skin Dry and intact No change   Weight Pre-Dialysis Weight: 83.7 kg (184 lb 8.4 oz) (02/04/22 1003) n/a Edema  No change   Tele status Remote tele Remote tele Pain Pain Intensity 1: 0 (02/07/22 1303)      Orders   Duration: Start: 9598 End: 7615 Total: 3.5 hours   Dialyzer: Dialyzer/Set Up Inspection: Raj Cortes (02/07/22 1415)   K Bath: Dialysate K (mEq/L): 3 (02/07/22 1415)   Ca Bath: Dialysate CA (mEq/L): 2.5 (02/07/22 1415)   Na: Dialysate NA (mEq/L): 138 (02/07/22 1415)   Bicarb: Dialysate HCO3 (mEq/L): 35 (02/07/22 1415)   Target Fluid Removal: Goal/Amount of Fluid to Remove (mL): 1000 mL (02/07/22 1415)     Access   Type & Location: Rij cvc tunneled, no s/s of infection, Each catheter limb disinfected per p&p, caps removed, hubs disinfected per p&p.      as ordered   Comments:                                        Labs   HBsAg (Antigen) / date:  Negative 2/3/22                                             HBsAb (Antibody) / date: susceptible 2/3/22   Source: Epic   Obtained/Reviewed  Critical Results Called HGB   Date Value Ref Range Status   02/06/2022 8.5 (L) 12.1 - 17.0 g/dL Final     Potassium   Date Value Ref Range Status   02/06/2022 3.8 3.5 - 5.1 mmol/L Final     Calcium   Date Value Ref Range Status   02/06/2022 8.1 (L) 8.5 - 10.1 MG/DL Final     BUN   Date Value Ref Range Status   02/06/2022 48 (H) 6 - 20 MG/DL Final     Creatinine   Date Value Ref Range Status   02/06/2022 4.57 (H) 0.70 - 1.30 MG/DL Final        Meds Given   Name Dose Route   Hearin 2500 cvc dwell               Adequacy / Fluid    Total Liters Process: 76.6   Net Fluid Removed: 1000 ml Comments   Time Out Done:   (Time) 1410   Admitting Diagnosis: ESRD   Consent obtained/signed: Informed Consent Verified: Yes (02/07/22 1415)   Machine / RO # Machine Number: Verlon Fabry (02/07/22 0186)   Primary Nurse Rpt Pre: Mikie Harkins RN   Primary Nurse Rpt Post: Mikie Harkins RN   Pt Education: Procedural   Care Plan: Continue Nephrology plan of care   Pts outpatient clinic: TBD     Tx Summary   Comments:   1415-Labs, order and medications were reviewed. Sbar was given by primary nurse. HD was initiated using RIJ cvc with no issues. 1755-   HD was completed and tolerated well. All possible blood was returned. Sbar was given by primary nurse. Pt remains hypertensive but stable. Each dialysis catheter limb disinfected per p&p, blood returned per p&p, each dialysis hub disinfected per p&p, post dialysis catheter dwell instilled per order, and caps applied. Pt left clinic in stable condition.

## 2022-02-07 NOTE — PROGRESS NOTES
Bedside shift change report given to RN (oncoming nurse) by Poli Bailey RN (offgoing nurse).   Report included the following information SBAR, Kardex, Intake/Output, MAR and Recent Results

## 2022-02-07 NOTE — PROGRESS NOTES
NAME: Parveen Costa        :  1965        MRN:  [id number]                       Assessment   :                                               Plan:  CKD-4/5 ? ESRD  Noncompliance  Drug use  Hyperkalemia  htn  DM  anemia MWF HD for now, use 3 K bath, perm cath today    CM working on outpatient placement.     Iron sat low. Getting IV iron.               Subjective:     Chief Complaint:  Wants food (he is npo for perm cath). We reviewed the above plan. Review of Systems:    Symptom Y/N Comments  Symptom Y/N Comments   Fever/Chills    Chest Pain     Poor Appetite    Edema     Cough    Abdominal Pain     Sputum    Joint Pain     SOB/[place]    Pruritis/Rash     Nausea/vomit    Tolerating PT/OT     Diarrhea    Tolerating Diet     Constipation    Other       Could not obtain due to:      Objective:     VITALS:   Last 24hrs VS reviewed since prior progress note. Most recent are:  Visit Vitals  BP (!) 154/86   Pulse 85   Temp 98.2 °F (36.8 °C)   Resp 20   Ht 5' 8\" (1.727 m)   Wt 78.2 kg (172 lb 6.4 oz)   SpO2 98%   BMI 26.21 kg/m²     No intake or output data in the 24 hours ending 22 0619   Telemetry Reviewed:     PHYSICAL EXAM:  General: NAD  + edema      Lab Data Reviewed: (see below)    Medications Reviewed: (see below)    PMH/SH reviewed - no change compared to H&P  ________________________________________________________________________  Care Plan discussed with:  Patient     Family      RN     Care Manager                    Consultant:          Comments   >50% of visit spent in counseling and coordination of care       ________________________________________________________________________  Courtney Kebede MD     Procedures: see electronic medical records for all procedures/Xrays and details which  were not copied into this note but were reviewed prior to creation of Plan.       LABS:  Recent Labs     22  0354 02/05/22  0641   WBC 9.1 8.8   HGB 8.5* 8.4*   HCT 27.6* 25.9*    259     Recent Labs     02/06/22  0359 02/04/22  1022    138   K 3.8 4.2    108   CO2 24 23   BUN 48* 65*   CREA 4.57* 5.35*   * 161*   CA 8.1* 7.8*     No results for input(s): AP, TBIL, TP, ALB, GLOB, GGT, AML, LPSE in the last 72 hours. No lab exists for component: SGOT, GPT, AMYP, HLPSE  No results for input(s): INR, PTP, APTT, INREXT, INREXT in the last 72 hours. No results for input(s): FE, TIBC, PSAT, FERR in the last 72 hours. No results found for: FOL, RBCF   No results for input(s): PH, PCO2, PO2 in the last 72 hours. No results for input(s): CPK, CKMB in the last 72 hours.     No lab exists for component: TROPONINI  No components found for: [place]  Lab Results   Component Value Date/Time    Color YELLOW/STRAW 02/01/2022 03:17 PM    Appearance TURBID (A) 02/01/2022 03:17 PM    Specific gravity 1.016 02/01/2022 03:17 PM    pH (UA) 5.5 02/01/2022 03:17 PM    Protein 300 (A) 02/01/2022 03:17 PM    Glucose 100 (A) 02/01/2022 03:17 PM    Ketone Negative 02/01/2022 03:17 PM    Bilirubin Negative 02/01/2022 03:17 PM    Urobilinogen 0.2 02/01/2022 03:17 PM    Nitrites Negative 02/01/2022 03:17 PM    Leukocyte Esterase Negative 02/01/2022 03:17 PM    Epithelial cells FEW 02/01/2022 03:17 PM    Bacteria Negative 02/01/2022 03:17 PM    WBC 0-4 02/01/2022 03:17 PM    RBC 5-10 02/01/2022 03:17 PM       MEDICATIONS:  Current Facility-Administered Medications   Medication Dose Route Frequency    hydrALAZINE (APRESOLINE) tablet 50 mg  50 mg Oral TID    heparin (porcine) 1,000 unit/mL injection 1,400 Units  1,400 Units InterCATHeter DIALYSIS PRN    And    heparin (porcine) 1,000 unit/mL injection 1,100 Units  1,100 Units InterCATHeter DIALYSIS PRN    iron sucrose (VENOFER) injection 200 mg  200 mg IntraVENous Q24H    amLODIPine (NORVASC) tablet 10 mg  10 mg Oral DAILY    bumetanide (BUMEX) injection 2 mg  2 mg IntraVENous Q12H    cloNIDine HCL (CATAPRES) tablet 0.3 mg  0.3 mg Oral BID    insulin glargine (LANTUS) injection 10 Units  10 Units SubCUTAneous QHS    insulin lispro (HUMALOG) injection   SubCUTAneous AC&HS    glucose chewable tablet 16 g  4 Tablet Oral PRN    dextrose (D50W) injection syrg 12.5-25 g  12.5-25 g IntraVENous PRN    glucagon (GLUCAGEN) injection 1 mg  1 mg IntraMUSCular PRN    sodium chloride (NS) flush 5-40 mL  5-40 mL IntraVENous Q8H    sodium chloride (NS) flush 5-40 mL  5-40 mL IntraVENous PRN    [Held by provider] heparin (porcine) injection 5,000 Units  5,000 Units SubCUTAneous Q8H    hydrALAZINE (APRESOLINE) 20 mg/mL injection 10 mg  10 mg IntraVENous Q6H PRN    nicotine (NICODERM CQ) 14 mg/24 hr patch 1 Patch  1 Patch TransDERmal DAILY

## 2022-02-07 NOTE — PROGRESS NOTES
Patient arrived from room alert & oriented X4 and on room air. Oxygen nasal cannula on patient forehead. Consent obtained after MD Enrique Oglesby spoke.

## 2022-02-07 NOTE — PROGRESS NOTES
I entered the patient's room and he was sitting in the recliner, very groggy and appeared disoriented,it seemed as if he took something, such as opioids, etc. The patient's wife also called while I was in his room and didn't seemed concerned that the patient was acting strangely. She had also mentioned that she had just visited the patient that evening. Strong suspicion that the patient's wife brought in some type of recreational drugs to the patient. MD notified and did not want a repeat drug screen, advised to notify the supervisor. Supervisor notified and further visitors prohibited.

## 2022-02-07 NOTE — PROGRESS NOTES
Problem: Falls - Risk of  Goal: *Absence of Falls  Description: Document Josephine Méndez Fall Risk and appropriate interventions in the flowsheet. Outcome: Progressing Towards Goal  Note: Fall Risk Interventions:  Mobility Interventions: Communicate number of staff needed for ambulation/transfer         Medication Interventions: Patient to call before getting OOB,Teach patient to arise slowly                   Problem: Patient Education: Go to Patient Education Activity  Goal: Patient/Family Education  Outcome: Progressing Towards Goal     Problem: Pain  Goal: *Control of Pain  Outcome: Progressing Towards Goal  Goal: *PALLIATIVE CARE:  Alleviation of Pain  Outcome: Progressing Towards Goal     Problem: Patient Education: Go to Patient Education Activity  Goal: Patient/Family Education  Outcome: Progressing Towards Goal     Problem: Risk for Spread of Infection  Goal: Prevent transmission of infectious organism to others  Description: Prevent the transmission of infectious organisms to other patients, staff members, and visitors.   Outcome: Progressing Towards Goal     Problem: Patient Education:  Go to Education Activity  Goal: Patient/Family Education  Outcome: Progressing Towards Goal

## 2022-02-07 NOTE — PROGRESS NOTES
Hospitalist Progress Note    NAME: Anna George   :  1965   MRN:  [id number]       Assessment / Plan:  Acute on chronic combined systolic and diastolic CHF  Uncontrolled hypertension  Hyperkalemia  End-stage renal disease, new hemodialysis started on   -started on HD by renal.   S/p permacath placement today. - Nephrology following  -Cont bumex 2 mg I.V BID     Acute on chronic anemia likely due to anemia of kidney disease  Hb 8.5  Iron panel showed low iron saturation but normal ferritin  Transfuse prn hb < 7  Monitor H/h    Polysubstance abuse: Tobacco abuse and cocaine abuse  -nicotine patch, patient counseled about cessation    Diabetes mellitus type 2 with hypoglycemia  -BG down to 61. Likely due to NPO status  - hold lantus for now due to hypoglycemia. Continue ISS high sensitivity  Accu checks     Code Status: Full code  Surrogate Decision Maker: Wife     DVT Prophylaxis:  Heparin   GI Prophylaxis: not indicated     Baseline: Ambulatory    25.0 - 29.9 Overweight / Body mass index is 26.21 kg/m². Estimated discharge date:   Barriers: Outpatient HD center   Dispo: he is stable for discharge       Subjective:     Chief Complaint / Reason for Physician Visit  F/u for SOB/ESRD  SOB much better. No new complaints  He wants to be discharged      Objective:     VITALS:   Last 24hrs VS reviewed since prior progress note.  Most recent are:  Patient Vitals for the past 24 hrs:   Temp Pulse Resp BP SpO2   22 1303 98.2 °F (36.8 °C) 98 18 (!) 141/75 100 %   22 1240 -- 96 14 (!) 152/83 100 %   22 1235 -- 93 13 (!) 144/81 100 %   22 1231 -- 94 14 (!) 149/75 100 %   22 1227 -- 94 20 (!) 154/78 100 %   22 1144 -- 91 20 (!) 154/81 93 %   22 1004 98.1 °F (36.7 °C) 98 20 (!) 181/85 96 %   22 2317 98.2 °F (36.8 °C) 85 20 (!) 154/86 98 %   22 1539 98 °F (36.7 °C) 88 20 (!) 144/76 92 %     No intake or output data in the 24 hours ending 22 26     I had a face to face encounter and independently examined this patient on 2/7/2022, as outlined below:  PHYSICAL EXAM:  General: WD, WN. Alert, cooperative, no acute distress    EENT:  EOMI. Anicteric sclerae. MMM  Resp:  CTA bilaterally, no wheezing or rales. No accessory muscle use  CV:  Regular  rhythm,  No edema  GI:  Soft, Non distended, Non tender. +Bowel sounds  Neurologic:  Alert and oriented X 3, normal speech,   Psych:   Good insight. Not anxious nor agitated  Skin:  No rashes. No jaundice. Right upper chest perma cath    Reviewed most current lab test results and cultures  YES  Reviewed most current radiology test results   YES  Review and summation of old records today    NO  Reviewed patient's current orders and MAR    YES  PMH/ reviewed - no change compared to H&P  ________________________________________________________________________  Care Plan discussed with:    Comments   Patient x    Family      RN x    Care Manager     Consultant                        Multidiciplinary team rounds were held today with , nursing, pharmacist and clinical coordinator. Patient's plan of care was discussed; medications were reviewed and discharge planning was addressed. ________________________________________________________________________  Total NON critical care TIME35 Minutes    Total CRITICAL CARE TIME Spent:   Minutes non procedure based      Comments   >50% of visit spent in counseling and coordination of care     ________________________________________________________________________  Arash Castillo MD     Procedures: see electronic medical records for all procedures/Xrays and details which were not copied into this note but were reviewed prior to creation of Plan. LABS:  I reviewed today's most current labs and imaging studies.   Pertinent labs include:  Recent Labs     02/06/22  0359 02/05/22  0641   WBC 9.1 8.8   HGB 8.5* 8.4*   HCT 27.6* 25.9*    259 Recent Labs     02/06/22  0359      K 3.8      CO2 24   *   BUN 48*   CREA 4.57*   CA 8.1*       Signed: Tegan Walter MD

## 2022-02-07 NOTE — PROGRESS NOTES
Bedside shift change report given to Kaleb FRAUSTO (oncoming nurse) by Eileen Jett (offgoing nurse). Report included the following information SBAR.

## 2022-02-07 NOTE — PROGRESS NOTES
Hospitalist Progress Note    NAME: Prudence Evans   :  1965   MRN:  [id number]       Assessment / Plan:  Acute on chronic combined systolic and diastolic CHF  Uncontrolled hypertension  Hyperkalemia  End-stage renal disease, new hemodialysis started on   -started on HD by renal. Will need permacath placement Monday. Renal following for HD needs. Case mgmt trying to arrange out pt HD center  -Cont bumex  -NPO from midnite  for PC in am tomorrow     Acute on chronic anemia  Hemoglobin dropped down to 6.8 from 7.9 on . No active GI bleed  Repeat hemoglobin is 8.5. Iron panel showed low iron saturation but normal ferritin      Polysubstance abuse: Tobacco abuse and cocaine abuse  -nicotine patch, patient counseled about cessation    Diabetes mellitus type 2  -Cont lantus 10 units at bed time and Insulin lispro SSI.      Code Status: Full code  Surrogate Decision Maker: Wife     DVT Prophylaxis:  Heparin but being held for permacath placement in a.m. GI Prophylaxis: not indicated     Baseline: Ambulatory    25.0 - 29.9 Overweight / Body mass index is 26.21 kg/m². Estimated discharge date:   Barriers:       Subjective:     Chief Complaint / Reason for Physician Visit  Does not report any chest pain or shortness of breath  Overnight events noted and discussed with nursing      Objective:     VITALS:   Last 24hrs VS reviewed since prior progress note. Most recent are:  Patient Vitals for the past 24 hrs:   Temp Pulse Resp BP SpO2   22 2317 98.2 °F (36.8 °C) 85 20 (!) 154/86 98 %   22 1539 98 °F (36.7 °C) 88 20 (!) 144/76 92 %   22 1051 98 °F (36.7 °C) 80 18 (!) 150/80 92 %   22 0902 98 °F (36.7 °C) 86 18 (!) 166/88 98 %     No intake or output data in the 24 hours ending 22 2331     I had a face to face encounter and independently examined this patient on 2022, as outlined below:  PHYSICAL EXAM:  General: WD, WN.  Alert, cooperative, no acute distress    EENT:  EOMI. Anicteric sclerae. MMM  Resp:  CTA bilaterally, no wheezing or rales. No accessory muscle use  CV:  Regular  rhythm,  No edema  GI:  Soft, Non distended, Non tender. +Bowel sounds  Neurologic:  Alert and oriented X 3, normal speech,   Psych:   Good insight. Not anxious nor agitated  Skin:  No rashes. No jaundice    Reviewed most current lab test results and cultures  YES  Reviewed most current radiology test results   YES  Review and summation of old records today    NO  Reviewed patient's current orders and MAR    YES  PMH/SH reviewed - no change compared to H&P  ________________________________________________________________________  Care Plan discussed with:    Comments   Patient x    Family      RN x    Care Manager     Consultant                        Multidiciplinary team rounds were held today with , nursing, pharmacist and clinical coordinator. Patient's plan of care was discussed; medications were reviewed and discharge planning was addressed. ________________________________________________________________________  Total NON critical care TIME35 Minutes    Total CRITICAL CARE TIME Spent:   Minutes non procedure based      Comments   >50% of visit spent in counseling and coordination of care     ________________________________________________________________________  Susan Márquez MD     Procedures: see electronic medical records for all procedures/Xrays and details which were not copied into this note but were reviewed prior to creation of Plan. LABS:  I reviewed today's most current labs and imaging studies.   Pertinent labs include:  Recent Labs     02/06/22  0359 02/05/22  0641 02/04/22  1022   WBC 9.1 8.8 7.6   HGB 8.5* 8.4* 7.4*   HCT 27.6* 25.9* 22.9*    259 272     Recent Labs     02/06/22  0359 02/04/22  1022    138   K 3.8 4.2    108   CO2 24 23   * 161*   BUN 48* 65*   CREA 4.57* 5.35*   CA 8.1* 7.8*       Signed: Elise Dwyer MD

## 2022-02-07 NOTE — PROGRESS NOTES
Transition of Care Plan:    RUR:  24%  High Risk of Readmission  LOS:  6 Days  Disposition:  Home; OP HD  Follow up appointments:  PCP Specialist  DME needed:  None identified  Transportation at Discharge:  Significant other, Mildred Sanches or means to access home:  Patient and S.O.       Medicare Letter:  N/A  Is patient a BCPI-A Bundle:   N/A          If yes, was Bundle Letter given?:    Is patient a [place] and connected with the [address]? If yes, was [employer] transfer form completed and VA notified? N/A  Caregiver Contact:  Patient/s significant other/healthcare decision maker:  Marcus Evelia [phone]  Discharge Caregiver contacted prior to discharge? Care Conference needed?:  No    1108 Update:  Received message from [facility] requesting Hep B Antibody test and COVID test. PS sent to physician to please order. Also notified bedside RN, Johan Shepherd, of needed tests. 4235 Originial Note:  Received follow up call from [facility] in [facility] [phone]; Fax:  [phone] requesting information for [facility] from [facility]. Referral information sent as requested for MD review. Patient will also need a new COVID test.    [facility]ed [facility] and spoke with Vicki Esparza [phone] as [facility] [place] [facility] about [place] one hour drive from patient's home. Was informed that no other [facility] clinic had availability to accept patient. Advised CM Leadership of plan and sent PS Message to Attending. Continue to follow.     7 Rita Rudd, [address], [place], [facility]  Complex CM/  [phone]

## 2022-02-07 NOTE — PROGRESS NOTES
65 - Dr. George Singer notified via [employer] that patient's BP is 181/85, received BP medications afterwards. Patient is refusing telemetry, BG was 75 and patient said he feels slightly tired, refuses to get into bed. Dr. George Singer read message. 1300 -   TRANSFER - IN REPORT:    Verbal report received from Nya(name) on Blaiseylene Matter  being received from Radiology(unit) for ordered procedure      Report consisted of patients Situation, Background, Assessment and   Recommendations(SBAR). Information from the following report(s) SBAR, Kardex, Intake/Output, MAR and Recent Results was reviewed with the receiving nurse. Opportunity for questions and clarification was provided. Assessment completed upon patients arrival to unit and care assumed. Dr. George Singer notified via [employer] that patient is back on floor from cath procedure and is requesting diet order, said okay for cardiac diet. 1315 - Patient had BG of 60. He refused dextrose IV, glucagon and glucose tablets. Rechecked after he had a little bit of juice and it was 61. Patient said he feels slightly lightheaded but he is eating now, Dr. George Singer notified via [employer]. 1345 - TRANSFER - OUT REPORT:    Verbal report given to [facility] JETT DUNCAN(name) on Faylene Matter  being transferred to Dialysis(unit) for routine progression of care       Report consisted of patients Situation, Background, Assessment and   Recommendations(SBAR). Information from the following report(s) SBAR, Kardex, Intake/Output, MAR and Recent Results was reviewed with the receiving nurse.     Lines:   Peripheral IV 02/01/22 Left Hand (Active)   Site Assessment Clean, dry, & intact 02/07/22 1104   Phlebitis Assessment 0 02/07/22 1104   Infiltration Assessment 0 02/07/22 1104   Dressing Status Clean, dry, & intact 02/07/22 1104   Dressing Type Transparent;Tape 02/07/22 1104   Hub Color/Line Status Patent 02/07/22 1104   Action Taken Open ports on tubing capped 02/07/22 1104   Alcohol Cap Used Yes 02/07/22 1104        Opportunity for questions and clarification was provided. Patient transported with:  transport    TRANSFER - IN REPORT:    Verbal report received from JETT William(name) on [facility] [place] [employer]  being received from Dialysis(unit) for ordered procedure      Report consisted of patients Situation, Background, Assessment and   Recommendations(SBAR). Information from the following report(s) SBAR, Kardex, Intake/Output, MAR and Recent Results was reviewed with the receiving nurse. Opportunity for questions and clarification was provided. Assessment completed upon patients arrival to unit and care assumed. End of Shift Note    Bedside shift change report given to JETT Magana (oncoming nurse) by Meera Grullon RN (offgoing nurse). Report included the following information SBAR, Kardex, Intake/Output, MAR and Recent Results    Shift worked:  Day     Shift summary and any significant changes:    Patient has had stable vital signs, patient has refused tele today. Dialysis removed 1L and permacath placed. Concerns for physician to address:  n/a     Zone phone for oncoming shift:  [phone]     Activity:  Activity Level: Up with Assistance  Number times ambulated in hallways past shift: 0  Number of times OOB to chair past shift: 0    Cardiac:   Cardiac Monitoring: Yes      Cardiac Rhythm: Sinus Rhythm    Access:   Current line(s): PIV and HD access     Genitourinary:   Urinary status: voiding    Respiratory:   O2 Device: Nasal cannula  Chronic home O2 use?: NO  Incentive spirometer at bedside: YES     GI:  Last Bowel Movement Date: 02/05/22  Current diet:  DIET ONE TIME MESSAGE  ADULT DIET Regular; 4 carb choices (60 gm/meal);  Low Sodium (2 gm)  DIET ONE TIME MESSAGE  Passing flatus: YES  Tolerating current diet: YES       Pain Management:   Patient states pain is manageable on current regimen: YES    Skin:  [place] Score: 21  Interventions: float heels and increase time out of bed    Patient Safety:  Fall Score:  Total Score: 2  Interventions: gripper socks and pt to call before getting OOB       Length of Stay:  Expected LOS: 3d 19h  Actual LOS: [address], RN

## 2022-02-08 LAB
GLUCOSE BLD STRIP.AUTO-MCNC: 142 MG/DL (ref 65–117)
GLUCOSE BLD STRIP.AUTO-MCNC: 165 MG/DL (ref 65–117)
GLUCOSE BLD STRIP.AUTO-MCNC: 190 MG/DL (ref 65–117)
HBV SURFACE AG SER QL: <0.1 INDEX
HBV SURFACE AG SER QL: NEGATIVE
SERVICE CMNT-IMP: ABNORMAL

## 2022-02-08 PROCEDURE — 87340 HEPATITIS B SURFACE AG IA: CPT

## 2022-02-08 PROCEDURE — 77010033678 HC OXYGEN DAILY

## 2022-02-08 PROCEDURE — 74011250636 HC RX REV CODE- 250/636: Performed by: INTERNAL MEDICINE

## 2022-02-08 PROCEDURE — 74011250637 HC RX REV CODE- 250/637: Performed by: INTERNAL MEDICINE

## 2022-02-08 PROCEDURE — 94760 N-INVAS EAR/PLS OXIMETRY 1: CPT

## 2022-02-08 PROCEDURE — 82962 GLUCOSE BLOOD TEST: CPT

## 2022-02-08 PROCEDURE — 65660000000 HC RM CCU STEPDOWN

## 2022-02-08 PROCEDURE — 36415 COLL VENOUS BLD VENIPUNCTURE: CPT

## 2022-02-08 PROCEDURE — 74011000250 HC RX REV CODE- 250: Performed by: INTERNAL MEDICINE

## 2022-02-08 PROCEDURE — 86704 HEP B CORE ANTIBODY TOTAL: CPT

## 2022-02-08 RX ADMIN — CLONIDINE HYDROCHLORIDE 0.3 MG: 0.1 TABLET ORAL at 08:49

## 2022-02-08 RX ADMIN — HYDRALAZINE HYDROCHLORIDE 50 MG: 50 TABLET, FILM COATED ORAL at 16:54

## 2022-02-08 RX ADMIN — CLONIDINE HYDROCHLORIDE 0.3 MG: 0.1 TABLET ORAL at 17:07

## 2022-02-08 RX ADMIN — AMLODIPINE BESYLATE 10 MG: 5 TABLET ORAL at 08:48

## 2022-02-08 RX ADMIN — SODIUM CHLORIDE, PRESERVATIVE FREE 10 ML: 5 INJECTION INTRAVENOUS at 05:37

## 2022-02-08 RX ADMIN — BUMETANIDE 2 MG: 0.25 INJECTION INTRAMUSCULAR; INTRAVENOUS at 08:49

## 2022-02-08 RX ADMIN — HEPARIN SODIUM 5000 UNITS: 5000 INJECTION, SOLUTION INTRAVENOUS; SUBCUTANEOUS at 22:01

## 2022-02-08 RX ADMIN — HEPARIN SODIUM 5000 UNITS: 5000 INJECTION, SOLUTION INTRAVENOUS; SUBCUTANEOUS at 13:35

## 2022-02-08 RX ADMIN — HEPARIN SODIUM 5000 UNITS: 5000 INJECTION, SOLUTION INTRAVENOUS; SUBCUTANEOUS at 05:37

## 2022-02-08 RX ADMIN — HYDRALAZINE HYDROCHLORIDE 50 MG: 50 TABLET, FILM COATED ORAL at 22:02

## 2022-02-08 RX ADMIN — SODIUM CHLORIDE, PRESERVATIVE FREE 10 ML: 5 INJECTION INTRAVENOUS at 22:03

## 2022-02-08 RX ADMIN — SODIUM CHLORIDE, PRESERVATIVE FREE 10 ML: 5 INJECTION INTRAVENOUS at 13:36

## 2022-02-08 RX ADMIN — BUMETANIDE 2 MG: 0.25 INJECTION INTRAMUSCULAR; INTRAVENOUS at 22:02

## 2022-02-08 RX ADMIN — HYDRALAZINE HYDROCHLORIDE 50 MG: 50 TABLET, FILM COATED ORAL at 08:49

## 2022-02-08 NOTE — PROGRESS NOTES
Hospitalist Progress Note    NAME: Chacho Mayes   :  1965   MRN:  [id number]       Assessment / Plan:  Acute on chronic combined systolic and diastolic CHF  Uncontrolled hypertension  Hyperkalemia  End-stage renal disease, new hemodialysis started on   -started on HD by renal.   S/p permacath placement  in preparation for DC home on new onset HD  - Nephrology following  -Cont bumex 2 mg I.V BID     Acute on chronic anemia likely due to anemia of kidney disease  Hb 8.5  Iron panel showed low iron saturation but normal ferritin  Transfuse prn hb < 7  Monitor H/H    Polysubstance abuse: Tobacco abuse and cocaine abuse  -nicotine patch, patient counseled about cessation  Supervised visitation recommended with over the weekend incident (suspicious drug use in hospital brought in by visitor per RN report)  Urine Drug screen if suspected again    Diabetes mellitus type 2 with hypoglycemia  -BG down to 61. Likely due to NPO status  - hold lantus for now due to hypoglycemia. Continue ISS high sensitivity  Accu checks     Code Status: Full code  Surrogate Decision Maker: Wife     DVT Prophylaxis:  Heparin   GI Prophylaxis: not indicated     Baseline: Ambulatory    25.0 - 29.9 Overweight / Body mass index is 26.21 kg/m². Estimated discharge date: ? Barriers: Outpatient HD setup  Dispo: he has been stable for discharge pending arrangements       Subjective:     Chief Complaint / Reason for Physician Visit  F/u for SOB/ESRD  SOB much better. No new complaints  He wants to be discharged with arrangements      Objective:     VITALS:   Last 24hrs VS reviewed since prior progress note.  Most recent are:  Patient Vitals for the past 24 hrs:   Temp Pulse Resp BP SpO2   22 1135 98.4 °F (36.9 °C) 77 18 (!) 125/58 100 %   22 0805 98.5 °F (36.9 °C) 88 18 (!) 161/77 100 %   22 2239 98.6 °F (37 °C) 89 18 (!) 149/66 100 %   22 1817 98.6 °F (37 °C) 96 18 (!) 156/74 97 %   22 1745 98.8 °F (37.1 °C) 98 18 (!) 160/76 99 %   02/07/22 1730 -- (!) 101 18 (!) 161/89 --   02/07/22 1715 -- 94 18 (!) 156/83 --   02/07/22 1700 -- 93 18 (!) 150/84 --   02/07/22 1645 -- 96 18 (!) 152/79 --   02/07/22 1630 -- (!) 110 18 (!) 165/87 --   02/07/22 1615 -- (!) 102 18 (!) 147/75 --   02/07/22 1600 -- 84 18 (!) 148/76 --   02/07/22 1545 -- 100 18 (!) 156/87 --   02/07/22 1515 -- (!) 106 18 (!) 141/69 --   02/07/22 1500 -- (!) 102 18 (!) 144/77 --   02/07/22 1445 -- (!) 102 18 (!) 156/76 --   02/07/22 1430 -- (!) 105 18 (!) 153/80 --   02/07/22 1415 98.3 °F (36.8 °C) (!) 104 18 (!) 160/85 100 %       Intake/Output Summary (Last 24 hours) at 2/8/2022 1405  Last data filed at 2/8/2022 1135  Gross per 24 hour   Intake --   Output 1400 ml   Net -1400 ml        I had a face to face encounter and independently examined this patient on 2/8/2022, as outlined below:  PHYSICAL EXAM:  General: WD, WN. Alert, cooperative, no acute distress    EENT:  EOMI. Anicteric sclerae. MMM  Resp:  CTA bilaterally, no wheezing or rales. No accessory muscle use  CV:  Regular  rhythm,  No edema  GI:  Soft, Non distended, Non tender. +Bowel sounds  Neurologic:  Alert and oriented X 3, normal speech,   Psych:   Good insight. Not anxious nor agitated  Skin:  No rashes. No jaundice. Right upper chest perma cath    Reviewed most current lab test results and cultures  YES  Reviewed most current radiology test results   YES  Review and summation of old records today    NO  Reviewed patient's current orders and MAR    YES  PMH/SH reviewed - no change compared to H&P  ________________________________________________________________________  Care Plan discussed with:    Comments   Patient x    Family      RN x    Care Manager x    Consultant  x  Phuong Denton                     Multidiciplinary team rounds were held today with , nursing, pharmacist and clinical coordinator.   Patient's plan of care was discussed; medications were reviewed and discharge planning was addressed. ________________________________________________________________________  Total NON critical care TIME: 26 Minutes    Total CRITICAL CARE TIME Spent:   Minutes non procedure based      Comments   >50% of visit spent in counseling and coordination of care x    ________________________________________________________________________  Fito Hernandez MD     Procedures: see electronic medical records for all procedures/Xrays and details which were not copied into this note but were reviewed prior to creation of Plan. LABS:  I reviewed today's most current labs and imaging studies.   Pertinent labs include:  Recent Labs     02/06/22 0359   WBC 9.1   HGB 8.5*   HCT 27.6*        Recent Labs     02/06/22 0359      K 3.8      CO2 24   *   BUN 48*   CREA 4.57*   CA 8.1*       Signed: Fito Hernandez MD

## 2022-02-08 NOTE — PROGRESS NOTES
Comprehensive Nutrition Assessment    Type and Reason for Visit: Initial,RD nutrition re-screen/LOS    Nutrition Recommendations/Plan:   Continue consistent carb, low Na diet  Please document % meals consumed in flowsheet I/O's under intake     Nutrition Assessment:      Chart reviewed for LOS. Pt noted for CHF, uncontrolled HTN, ESRD, new HD started 02/03/22, anemia, polysubstance abuse, DM2. Pt noted to be medically stable for d/c pending HD arrangements. MST negative for malnutrition risk factors. Good PO intake documented. No acute nutrition concerns at this time. Patient Vitals for the past 168 hrs:   % Diet Eaten   02/05/22 0752 76 - 100%     Wt Readings from Last 5 Encounters:   02/01/22 78.2 kg (172 lb 6.4 oz)   01/22/22 83 kg (182 lb 15.7 oz)   12/02/21 77.1 kg (170 lb)   11/17/21 72.6 kg (160 lb)   10/04/21 81.3 kg (179 lb 3.2 oz)   ]    Estimated Daily Nutrient Needs:  Energy (kcal): 2069 kcals (BMR 1592 x 1.3AF); Weight Used for Energy Requirements: Current  Protein (g): 78-94g (1.0-1.2g/kg); Weight Used for Protein Requirements: Current  Fluid (ml/day): 2000mL; Method Used for Fluid Requirements: 1 ml/kcal      Nutrition Related Findings:  Labs: [phone]. Meds: bumex, ancef. Edema: 1+BUE, 2+pitting LLE, 3+pitting RLE. BM 2/7. Wounds:    None       Current Nutrition Therapies:  DIET ONE TIME MESSAGE  ADULT DIET Regular; 4 carb choices (60 gm/meal); Low Sodium (2 gm)  DIET ONE TIME MESSAGE    Anthropometric Measures:  · Height:  5' 8\" (172.7 cm)  · Current Body Wt:  78.2 kg (172 lb 6.4 oz)   · Ideal Body Wt:  154 lbs:  111.9 %   · BMI Category: Overweight (BMI 25.0-29. 9)       Nutrition Diagnosis:   No nutrition diagnosis at this time     Nutrition Interventions:   Food and/or Nutrient Delivery: Continue current diet  Nutrition Education and Counseling: No recommendations at this time  Coordination of Nutrition Care: No recommendation at this time    Goals:  Continue PO intake >75% meals next 5-7 days       Nutrition Monitoring and Evaluation:   Behavioral-Environmental Outcomes: None identified  Food/Nutrient Intake Outcomes: Food and nutrient intake  Physical Signs/Symptoms Outcomes: Biochemical data,GI status,Weight,Fluid status or edema    Discharge Planning:    Continue current diet     Electronically signed by Akanksha Orozco RD on 2/8/2022 at 3:14 PM    Contact: [phone]

## 2022-02-08 NOTE — PROGRESS NOTES
Problem: Falls - Risk of  Goal: *Absence of Falls  Description: Document Gunjan Don Fall Risk and appropriate interventions in the flowsheet. Outcome: Progressing Towards Goal  Note: Fall Risk Interventions:  Mobility Interventions: Communicate number of staff needed for ambulation/transfer         Medication Interventions: Teach patient to arise slowly,Patient to call before getting OOB    Elimination Interventions: Urinal in reach              Problem: Patient Education: Go to Patient Education Activity  Goal: Patient/Family Education  Outcome: Progressing Towards Goal     Problem: Pain  Goal: *Control of Pain  Outcome: Progressing Towards Goal  Goal: *PALLIATIVE CARE:  Alleviation of Pain  Outcome: Progressing Towards Goal     Problem: Patient Education: Go to Patient Education Activity  Goal: Patient/Family Education  Outcome: Progressing Towards Goal     Problem: Risk for Spread of Infection  Goal: Prevent transmission of infectious organism to others  Description: Prevent the transmission of infectious organisms to other patients, staff members, and visitors.   Outcome: Progressing Towards Goal     Problem: Patient Education:  Go to Education Activity  Goal: Patient/Family Education  Outcome: Progressing Towards Goal

## 2022-02-08 NOTE — PROGRESS NOTES
Transition of Care Plan:    RUR:  24%  High Risk for Readmission  LOS:  7 Days  Disposition:  Home with OP HD (No accepting HD clinic yet.)  Follow up appointments:  PCP, Specialist  DME needed:   None identified  Transportation at Discharge:  Significant other, Duncan Nearing or means to access home:  Patient and S.O.      IM Medicare Letter:  N/A  Medicaid only  Is patient a BCPI-A Bundle: If yes, was Bundle Letter given?:    N/A  Is patient a [place] and connected with the [place]? N/A             If yes, was [employer] transfer form completed and VA notified? Caregiver Contact:Patient/s significant other/healthcare decision maker:  Leandro Nevarez [phone]  Discharge Caregiver contacted prior to discharge? Care Conference needed?:       Sent additional information (Covid results, Hep B Panel, CXray) [facility] [phone]; Fax: [phone]. Also received a message through [employer] from Shyla Lei ([phone]) requesting same info. Sent to them as well through [employer], but still waiting on some additional labs. Need to verify with Nephrology that all appropriate labs have been ordered for patient to have OP HD services. Continue to follow with Attending and Nephrology for OP HD options which are very limited.     7 Rita Rudd, [address], [facility], [facility]  Complex CM/  [phone]

## 2022-02-08 NOTE — PROGRESS NOTES
NAME: Merritt Curtis        :  1965        MRN:  [id number]                       Assessment   :                                               Plan:  New ESRD  Noncompliance  Drug use  Hyperkalemia  htn  DM  anemia MWF HD for now, use 3 K bath, perm cath 2/7    CM working on outpatient placement.     hgb < 10, Iron sat low. S/p IV iron. Continue on retacrit               Subjective:     Chief Complaint:  oob in chair. Wants to know when he can go home. We discussed that he needs a dialysis center to accept him and that this is difficult b/c of his behavior control issues. Review of Systems:    Symptom Y/N Comments  Symptom Y/N Comments   Fever/Chills    Chest Pain     Poor Appetite    Edema     Cough    Abdominal Pain     Sputum    Joint Pain     SOB/DE LA CRUZ    Pruritis/Rash     Nausea/vomit    Tolerating PT/OT     Diarrhea    Tolerating Diet     Constipation    Other       Could not obtain due to:      Objective:     VITALS:   Last 24hrs VS reviewed since prior progress note.  Most recent are:  Visit Vitals  BP (!) 149/66 (BP Patient Position: At rest)   Pulse 89   Temp 98.6 °F (37 °C)   Resp 18   Ht 5' 8\" (1.727 m)   Wt 78.2 kg (172 lb 6.4 oz)   SpO2 100%   BMI 26.21 kg/m²       Intake/Output Summary (Last 24 hours) at 2022 4157  Last data filed at 2022 1745  Gross per 24 hour   Intake --   Output 1000 ml   Net -1000 ml      Telemetry Reviewed:     PHYSICAL EXAM:  General: NAD  + edema      Lab Data Reviewed: (see below)    Medications Reviewed: (see below)    PMH/SH reviewed - no change compared to H&P  ________________________________________________________________________  Care Plan discussed with:  Patient     Family      RN     Care Manager                    Consultant:          Comments   >50% of visit spent in counseling and coordination of care ________________________________________________________________________  Marco Paul MD     Procedures: see electronic medical records for all procedures/Xrays and details which  were not copied into this note but were reviewed prior to creation of Plan. LABS:  Recent Labs     02/06/22 0359 02/05/22  0641   WBC 9.1 8.8   HGB 8.5* 8.4*   HCT 27.6* 25.9*    259     Recent Labs     02/06/22 0359      K 3.8      CO2 24   BUN 48*   CREA 4.57*   *   CA 8.1*     No results for input(s): AP, TBIL, TP, ALB, GLOB, GGT, AML, LPSE in the last 72 hours. No lab exists for component: SGOT, GPT, AMYP, HLPSE  No results for input(s): INR, PTP, APTT, INREXT, INREXT in the last 72 hours. No results for input(s): FE, TIBC, PSAT, FERR in the last 72 hours. No results found for: FOL, RBCF   No results for input(s): PH, PCO2, PO2 in the last 72 hours. No results for input(s): CPK, CKMB in the last 72 hours.     No lab exists for component: TROPONINI  No components found for: [place]  Lab Results   Component Value Date/Time    Color YELLOW/STRAW 02/01/2022 03:17 PM    Appearance TURBID (A) 02/01/2022 03:17 PM    Specific gravity 1.016 02/01/2022 03:17 PM    pH (UA) 5.5 02/01/2022 03:17 PM    Protein 300 (A) 02/01/2022 03:17 PM    Glucose 100 (A) 02/01/2022 03:17 PM    Ketone Negative 02/01/2022 03:17 PM    Bilirubin Negative 02/01/2022 03:17 PM    Urobilinogen 0.2 02/01/2022 03:17 PM    Nitrites Negative 02/01/2022 03:17 PM    Leukocyte Esterase Negative 02/01/2022 03:17 PM    Epithelial cells FEW 02/01/2022 03:17 PM    Bacteria Negative 02/01/2022 03:17 PM    WBC 0-4 02/01/2022 03:17 PM    RBC 5-10 02/01/2022 03:17 PM       MEDICATIONS:  Current Facility-Administered Medications   Medication Dose Route Frequency    ceFAZolin (ANCEF) 1 gram injection        sterile water (preservative free) injection        insulin lispro (HUMALOG) injection   SubCUTAneous AC&HS    glucose chewable tablet 16 g  4 Tablet Oral PRN    glucagon (GLUCAGEN) injection 1 mg  1 mg IntraMUSCular PRN    epoetin reagan-epbx (RETACRIT) injection 10,000 Units  10,000 Units SubCUTAneous EVERY MON & TH    hydrALAZINE (APRESOLINE) tablet 50 mg  50 mg Oral TID    heparin (porcine) 1,000 unit/mL injection 1,400 Units  1,400 Units InterCATHeter DIALYSIS PRN    And    heparin (porcine) 1,000 unit/mL injection 1,100 Units  1,100 Units InterCATHeter DIALYSIS PRN    amLODIPine (NORVASC) tablet 10 mg  10 mg Oral DAILY    bumetanide (BUMEX) injection 2 mg  2 mg IntraVENous Q12H    cloNIDine HCL (CATAPRES) tablet 0.3 mg  0.3 mg Oral BID    [Held by provider] insulin glargine (LANTUS) injection 10 Units  10 Units SubCUTAneous QHS    glucose chewable tablet 16 g  4 Tablet Oral PRN    dextrose (D50W) injection syrg 12.5-25 g  12.5-25 g IntraVENous PRN    glucagon (GLUCAGEN) injection 1 mg  1 mg IntraMUSCular PRN    sodium chloride (NS) flush 5-40 mL  5-40 mL IntraVENous Q8H    sodium chloride (NS) flush 5-40 mL  5-40 mL IntraVENous PRN    heparin (porcine) injection 5,000 Units  5,000 Units SubCUTAneous Q8H    hydrALAZINE (APRESOLINE) 20 mg/mL injection 10 mg  10 mg IntraVENous Q6H PRN    nicotine (NICODERM CQ) 14 mg/24 hr patch 1 Patch  1 Patch TransDERmal DAILY

## 2022-02-08 NOTE — PROGRESS NOTES
Problem: Falls - Risk of  Goal: *Absence of Falls  Description: Document Roselle Fail Fall Risk and appropriate interventions in the flowsheet. Outcome: Progressing Towards Goal  Note: Fall Risk Interventions:  Mobility Interventions: Communicate number of staff needed for ambulation/transfer         Medication Interventions: Patient to call before getting OOB,Teach patient to arise slowly                   Problem: Patient Education: Go to Patient Education Activity  Goal: Patient/Family Education  Outcome: Progressing Towards Goal     Problem: Pain  Goal: *Control of Pain  Outcome: Progressing Towards Goal     Problem: Risk for Spread of Infection  Goal: Prevent transmission of infectious organism to others  Description: Prevent the transmission of infectious organisms to other patients, staff members, and visitors.   Outcome: Progressing Towards Goal

## 2022-02-09 VITALS
OXYGEN SATURATION: 100 % | HEIGHT: 68 IN | TEMPERATURE: 98.2 F | BODY MASS INDEX: 26.13 KG/M2 | WEIGHT: 172.4 LBS | SYSTOLIC BLOOD PRESSURE: 160 MMHG | DIASTOLIC BLOOD PRESSURE: 90 MMHG | RESPIRATION RATE: 18 BRPM | HEART RATE: 85 BPM

## 2022-02-09 LAB
ANION GAP SERPL CALC-SCNC: 6 MMOL/L (ref 5–15)
BUN SERPL-MCNC: 39 MG/DL (ref 6–20)
BUN/CREAT SERPL: 9 (ref 12–20)
CALCIUM SERPL-MCNC: 8.4 MG/DL (ref 8.5–10.1)
CHLORIDE SERPL-SCNC: 100 MMOL/L (ref 97–108)
CO2 SERPL-SCNC: 28 MMOL/L (ref 21–32)
CREAT SERPL-MCNC: 4.28 MG/DL (ref 0.7–1.3)
ERYTHROCYTE [DISTWIDTH] IN BLOOD BY AUTOMATED COUNT: 18.4 % (ref 11.5–14.5)
GLUCOSE BLD STRIP.AUTO-MCNC: 117 MG/DL (ref 65–117)
GLUCOSE BLD STRIP.AUTO-MCNC: 202 MG/DL (ref 65–117)
GLUCOSE SERPL-MCNC: 158 MG/DL (ref 65–100)
HBV CORE AB SERPL QL IA: NEGATIVE
HCT VFR BLD AUTO: 26.4 % (ref 36.6–50.3)
HGB BLD-MCNC: 8.1 G/DL (ref 12.1–17)
MCH RBC QN AUTO: 26.8 PG (ref 26–34)
MCHC RBC AUTO-ENTMCNC: 30.7 G/DL (ref 30–36.5)
MCV RBC AUTO: 87.4 FL (ref 80–99)
NRBC # BLD: 0.04 K/UL (ref 0–0.01)
NRBC BLD-RTO: 0.6 PER 100 WBC
PHOSPHATE SERPL-MCNC: 6.2 MG/DL (ref 2.6–4.7)
PLATELET # BLD AUTO: 233 K/UL (ref 150–400)
PMV BLD AUTO: 10.7 FL (ref 8.9–12.9)
POTASSIUM SERPL-SCNC: 4.2 MMOL/L (ref 3.5–5.1)
RBC # BLD AUTO: 3.02 M/UL (ref 4.1–5.7)
SERVICE CMNT-IMP: ABNORMAL
SERVICE CMNT-IMP: NORMAL
SODIUM SERPL-SCNC: 134 MMOL/L (ref 136–145)
WBC # BLD AUTO: 6.7 K/UL (ref 4.1–11.1)

## 2022-02-09 PROCEDURE — 90935 HEMODIALYSIS ONE EVALUATION: CPT

## 2022-02-09 PROCEDURE — 5A1D70Z PERFORMANCE OF URINARY FILTRATION, INTERMITTENT, LESS THAN 6 HOURS PER DAY: ICD-10-PCS | Performed by: INTERNAL MEDICINE

## 2022-02-09 PROCEDURE — 74011000250 HC RX REV CODE- 250: Performed by: INTERNAL MEDICINE

## 2022-02-09 PROCEDURE — 36415 COLL VENOUS BLD VENIPUNCTURE: CPT

## 2022-02-09 PROCEDURE — 84100 ASSAY OF PHOSPHORUS: CPT

## 2022-02-09 PROCEDURE — 74011250636 HC RX REV CODE- 250/636: Performed by: INTERNAL MEDICINE

## 2022-02-09 PROCEDURE — 85027 COMPLETE CBC AUTOMATED: CPT

## 2022-02-09 PROCEDURE — 80048 BASIC METABOLIC PNL TOTAL CA: CPT

## 2022-02-09 PROCEDURE — 74011250637 HC RX REV CODE- 250/637: Performed by: INTERNAL MEDICINE

## 2022-02-09 PROCEDURE — 82962 GLUCOSE BLOOD TEST: CPT

## 2022-02-09 RX ORDER — HYDRALAZINE HYDROCHLORIDE 50 MG/1
25 TABLET, FILM COATED ORAL 3 TIMES DAILY
Qty: 45 TABLET | Refills: 0 | Status: SHIPPED | OUTPATIENT
Start: 2022-02-09 | End: 2022-03-11

## 2022-02-09 RX ADMIN — SODIUM CHLORIDE, PRESERVATIVE FREE 10 ML: 5 INJECTION INTRAVENOUS at 05:57

## 2022-02-09 RX ADMIN — BUMETANIDE 2 MG: 0.25 INJECTION INTRAMUSCULAR; INTRAVENOUS at 12:43

## 2022-02-09 RX ADMIN — HEPARIN SODIUM 5000 UNITS: 5000 INJECTION, SOLUTION INTRAVENOUS; SUBCUTANEOUS at 05:56

## 2022-02-09 RX ADMIN — HEPARIN SODIUM 1900 UNITS: 1000 INJECTION, SOLUTION INTRAVENOUS; SUBCUTANEOUS at 11:50

## 2022-02-09 RX ADMIN — CLONIDINE HYDROCHLORIDE 0.3 MG: 0.1 TABLET ORAL at 12:42

## 2022-02-09 RX ADMIN — SODIUM CHLORIDE, PRESERVATIVE FREE 10 ML: 5 INJECTION INTRAVENOUS at 14:25

## 2022-02-09 RX ADMIN — AMLODIPINE BESYLATE 10 MG: 5 TABLET ORAL at 12:43

## 2022-02-09 RX ADMIN — HYDRALAZINE HYDROCHLORIDE 50 MG: 50 TABLET, FILM COATED ORAL at 12:43

## 2022-02-09 NOTE — DISCHARGE SUMMARY
Hospitalist Discharge Summary     Patient ID:  Rossi Lot  [id number]  55 y.o.  1965 2/1/2022    PCP on record: Meredith Mcgarry MD    Admit date: 2/1/2022  Discharge date and time: 2/9/2022    DISCHARGE DIAGNOSIS:    Acute on chronic combined systolic and diastolic CHF  End-stage renal disease, new hemodialysis started on 02/03  Acute on chronic anemia likely due to anemia of kidney disease  Polysubstance abuse: Tobacco abuse and cocaine abuse  Diabetes mellitus type 2           CONSULTATIONS:  None    Excerpted HPI from H&P of Nishant Harris MD:  Mak Hawkins is a 55 y.o.  male history of systolic CHF, COPD, diabetes mellitus, substance abuse who presents with shortness of breath. She was admitted recently at [facility] for the same complaint. Patient is on Bumex at home and reported compliance to his medication. He used cocaine on a daily basis, last use was 2 days ago. Reported swelling of his legs to his abdomen. In the ED, his blood pressure was elevated, his labs showed potassium of 5.6, worsening creatinine 7.06 with low bicarb and proBNP above 15003. His UDS is positive for cocaine and opiates  His chest x-ray showed mild to moderate pulmonary edema  We were asked to admit for work up and evaluation of the above problems.      ______________________________________________________________________  DISCHARGE SUMMARY/HOSPITAL COURSE:  for full details see H&P, daily progress notes, labs, consult notes.      Acute on chronic combined systolic and diastolic CHF  Uncontrolled hypertension  Hyperkalemia  End-stage renal disease, new hemodialysis started on 02/03  -started on HD by renal.   S/p permacath placement 2/7 in preparation for DC home on new onset HD  - Nephrology         Acute on chronic anemia likely due to anemia of kidney disease  Hb 8.5  Iron panel showed low iron saturation but normal ferritin  Transfuse prn hb < 7  Monitor H/H     Polysubstance abuse: Tobacco abuse and cocaine abuse  -nicotine patch, patient counseled about cessation  Supervised visitation recommended with over the weekend incident (suspicious drug use in hospital brought in by visitor per RN report)       Diabetes mellitus type 2 with hypoglycemia  -BG down to 61. Likely due to NPO status  - hold lantus for now due to hypoglycemia. Continue ISS high sensitivity  Accu checks             _______________________________________________________________________  Patient seen and examined by me on discharge day. Pertinent Findings:  Gen:    Not in distress  Chest: Clear lungs  CVS:   Regular rhythm. No edema  Abd:  Soft, not distended, not tender  Neuro:  Alert,   _______________________________________________________________________  DISCHARGE MEDICATIONS:   Current Discharge Medication List      START taking these medications    Details   hydrALAZINE (APRESOLINE) 50 mg tablet Take 0.5 Tablets by mouth three (3) times daily for 30 days. Qty: 45 Tablet, Refills: 0  Start date: 2/9/2022, End date: 3/11/2022         CONTINUE these medications which have NOT CHANGED    Details   cloNIDine HCL (CATAPRES) 0.3 mg tablet Take 1 Tablet by mouth two (2) times a day. Take for high blood pressure  Qty: 180 Tablet, Refills: 3    Associated Diagnoses: Acute on chronic combined systolic and diastolic congestive heart failure (HCC)      carvediloL (COREG) 25 mg tablet Take 1 Tablet by mouth two (2) times daily (with meals). Qty: 180 Tablet, Refills: 1    Associated Diagnoses: Acute on chronic combined systolic and diastolic congestive heart failure (HCC)      amLODIPine (NORVASC) 10 mg tablet Take 1 Tablet by mouth daily. Take for blood pressure  Qty: 90 Tablet, Refills: 1      bumetanide (BUMEX) 2 mg tablet Take 1 Tablet by mouth daily.   Qty: 90 Tablet, Refills: 1    Associated Diagnoses: Acute on chronic combined systolic and diastolic congestive heart failure (HCC)      Suboxone 8-2 mg film sublingaul film 2 Film by SubLINGual route daily. nitroglycerin (NITROSTAT) 0.4 mg SL tablet 1 Tablet by SubLINGual route as needed for Chest Pain. Up to 3 doses. Qty: 30 Tablet, Refills: 0         STOP taking these medications       insulin glargine (LANTUS) 100 unit/mL injection Comments:   Reason for Stopping:         insulin glargine (Lantus U-100 Insulin) 100 unit/mL injection Comments:   Reason for Stopping:                 Patient Follow Up Instructions: Activity: Activity as tolerated  Diet: Renal Diet  Wound Care: None needed        Follow-up Information     Follow up With Specialties Details Why Contact Info    [facility]  On 2/10/2022 Dialysis Appt tomorrow 02/10 at 11:45AM.  HD is every Tues, Thurs Sat at 12:15PM  [address], [address] [place] [phone]    [facility] Transportation   Medicaid transportation will call you TODAY with  time for tomorrow's Dialysis appt. [phone]    02/10 Transportation Reference # [id number]  02/12 Transportation Reference # [id number]    Nilay Bonds MD Family Medicine Go on 2/11/2022 at 11:45am for your PCP hospital follow up appointment.   Hayley Perez 62  [phone]          ________________________________________________________________    Risk of deterioration: Moderate    Condition at Discharge:  Stable  __________________________________________________________________    Disposition  Home with family, no needs    ____________________________________________________________________    Code Status: Full Code  ___________________________________________________________________      Total time in minutes spent coordinating this discharge (includes going over instructions, follow-up, prescriptions, and preparing report for sign off to her PCP) :  >30 minutes    Signed:  Fely Mahan MD

## 2022-02-09 NOTE — DISCHARGE INSTRUCTIONS
HOSPITALIST DISCHARGE INSTRUCTIONS    NAME: Dixon Grant   :  1965   MRN:  [id number]     Date/Time:  2022 1:27 PM    ADMIT DATE: 2022   DISCHARGE DATE: 2022     Acute on chronic combined systolic and diastolic CHF  End-stage renal disease, new hemodialysis started on   Acute on chronic anemia likely due to anemia of kidney disease  Polysubstance abuse: Tobacco abuse and cocaine abuse  Diabetes mellitus type 2      · It is important that you take the medication exactly as they are prescribed. · Keep your medication in the bottles provided by the pharmacist and keep a list of the medication names, dosages, and times to be taken in your wallet. · Do not take other medications without consulting your doctor. What to do at [address]:  Resume previous diet    Recommended activity: Activity as tolerated      If you have questions regarding the hospital related prescriptions or hospital related issues please call [facility] at [phone]. You can always direct your questions to your primary care doctor if you are unable to reach your hospital physician; your PCP works as an extension of your hospital doctor just like your hospital doctor is an extension of your PCP for your time at the hospital [facility])    If you experience any of the following symptoms then please call your primary care physician or return to the emergency room if you cannot get hold of your doctor:    Fever, chills, nausea, vomiting, or persistent diarrhea  Worsening weakness or new problems with your speech or balance  Dark stools or visible blood in your stools  New Leg swelling or shortness of breath as these could be signs of a clot    Additional Instructions:      Bring these papers with you to your follow up appointments.  The papers will help your doctors be sure to continue the care plan from the hospital.              Information obtained by :  I understand that if any problems occur once I am at home I am to contact my physician. I understand and acknowledge receipt of the instructions indicated above.                                                                                                                                            Physician's or R.N.'s Signature                                                                  Date/Time                                                                                                                                              Patient or Representative Signature

## 2022-02-09 NOTE — PROGRESS NOTES
Problem: Falls - Risk of  Goal: *Absence of Falls  Description: Document Sherri Nap Fall Risk and appropriate interventions in the flowsheet.   Outcome: Progressing Towards Goal  Note: Fall Risk Interventions:  Mobility Interventions: Communicate number of staff needed for ambulation/transfer         Medication Interventions: Bed/chair exit alarm,Teach patient to arise slowly    Elimination Interventions: Urinal in reach,Call light in reach              Problem: Patient Education: Go to Patient Education Activity  Goal: Patient/Family Education  Outcome: Progressing Towards Goal

## 2022-02-09 NOTE — PROCEDURES
Hemodialysis / [phone]    Vitals Pre Post Assessment Pre Post   BP BP: (!) 147/80 (02/09/22 0815) 147/76 LOC A&Ox4 A&Ox4   HR Pulse (Heart Rate): 74 (02/09/22 0815) 76 Lungs Dim bases Dim bases   Resp Resp Rate: 18 (02/09/22 0815) 16 Cardiac Refused Tele; Reg S1 S2 Refused Tele; Reg S1 S2   Temp Temp: 99 °F (37.2 °C) (02/09/22 0815) 99.1, oral Skin Toe amputations x3, healed; swelling; CDI Toe amputations x3, healed; swelling; CD   Weight n/a n/a Edema 2+ BLE 2+ BLE   Tele status none none Pain 0 0     Orders   Duration: Start: 0815 End: 1145 Total: 3.5hr   Dialyzer: Dialyzer/Set Up Inspection: Ab Brown (02/09/22 0815)   K Bath: Dialysate K (mEq/L): 3 (02/09/22 0815)   Ca Bath: Dialysate CA (mEq/L): 2.5 (02/09/22 0815)   Na: Dialysate NA (mEq/L): 138 (02/09/22 0815)   Bicarb: Dialysate HCO3 (mEq/L): 35 (02/09/22 0815)   Target Fluid Removal: Goal/Amount of Fluid to Remove (mL): 1000 mL (02/09/22 0815)     Access   Type & Location: [facility]   Comments: Dressing CDI from IR; dated 2/7/22. No s/s of infection. Both lumens aspirate & flush well. Running well at Rx .                                       Labs   HBsAg (Antigen) / date: Neg Ag 2/3/22                                              HBsAb (Antibody) / date: <10 Ab 2/3/22   Source: EPIC   Obtained/Reviewed  Critical Results Called HGB   Date Value Ref Range Status   02/09/2022 8.1 (L) 12.1 - 17.0 g/dL Final     Potassium   Date Value Ref Range Status   02/09/2022 4.2 3.5 - 5.1 mmol/L Final     Calcium   Date Value Ref Range Status   02/09/2022 8.4 (L) 8.5 - 10.1 MG/DL Final     BUN   Date Value Ref Range Status   02/09/2022 39 (H) 6 - 20 MG/DL Final     Creatinine   Date Value Ref Range Status   02/09/2022 4.28 (H) 0.70 - 1.30 MG/DL Final        Meds Given   Name Dose Route   Heparin 1:1000  1.9ml & 1.9ml Dwell in CVC after HD               Adequacy / Fluid    Total Liters Process: 68.1L   Net Fluid Removed: 1000ml      Comments   Time Out Done: (Time) 0813   Admitting Diagnosis: CKD to ESRD; Hyperkalemia; polysubstance abuse   Consent obtained/signed: Informed Consent Verified: Yes (02/09/22 0815)   Machine / RO # Machine Number: [id number] (02/09/22 0815)   Primary Nurse Rpt Pre: Deepti Balderas RN   Primary Nurse Rpt Post: Alvarado Gavin RN   Pt Education: procedural   Care Plan: ongoing   Pts outpatient clinic: n/a     Tx Summary   Comments:       SBAR received from Primary RN. Pt arrived to HD suite A&Ox4. Consent signed & on file. 0815: Each catheter limb disinfected per p&p, caps removed, hubs disinfected per p&p. Each lumen aspirated for blood return and flushed with Normal Saline per policy. Labs drawn per request/ order. VSS. Dialysis Tx initiated. * no issues with HD*  1145: Tx ended. VSS. Each dialysis catheter limb disinfected per p&p, all possible blood returned per p&p, and each dialysis hub disinfected per p&p. Each lumen flushed, post dialysis catheter Heparin dwell instilled per order, and caps applied. Bed locked and in the lowest position, call bell and belongings in reach. SBAR given to Primary, RN. Patient is stable at time of their departure. All Dialysis related medications have been reviewed.

## 2022-02-09 NOTE — PROGRESS NOTES
NAME: Rossi Hinton        :  1965        MRN:  [id number]                       Assessment   :                                               Plan:  New ESRD  Noncompliance  Drug use  Hyperkalemia  htn  DM  anemia MWF HD for now, use 3 K bath, perm cath 2/7    CM working on outpatient placement. Negative HBVsAb and negative HBVsAg on 2/3; HBVcAB pending     hgb < 10, Iron sat low. S/p IV iron. Continue on retacrit               Subjective:     Chief Complaint:  The patient was seen on dialysis at 9:39 AM .  BP is stable. Catheter is functioning well. Review of Systems:    Symptom Y/N Comments  Symptom Y/N Comments   Fever/Chills    Chest Pain     Poor Appetite    Edema     Cough    Abdominal Pain     Sputum    Joint Pain     SOB/DE LA CRUZ    Pruritis/Rash     Nausea/vomit    Tolerating PT/OT     Diarrhea    Tolerating Diet     Constipation    Other       Could not obtain due to:      Objective:     VITALS:   Last 24hrs VS reviewed since prior progress note.  Most recent are:  Visit Vitals  /72   Pulse 79   Temp 98.1 °F (36.7 °C)   Resp 18   Ht 5' 8\" (1.727 m)   Wt 78.2 kg (172 lb 6.4 oz)   SpO2 100%   BMI 26.21 kg/m²       Intake/Output Summary (Last 24 hours) at 2022 5777  Last data filed at 2022 1135  Gross per 24 hour   Intake --   Output 400 ml   Net -400 ml      Telemetry Reviewed:     PHYSICAL EXAM:  General: NAD  + edema      Lab Data Reviewed: (see below)    Medications Reviewed: (see below)    PMH/SH reviewed - no change compared to H&P  ________________________________________________________________________  Care Plan discussed with:  Patient     Family      RN     Care Manager                    Consultant:          Comments   >50% of visit spent in counseling and coordination of care       ________________________________________________________________________  Nisreen Rollins MD     Procedures: see electronic medical records for all procedures/Xrays and details which  were not copied into this note but were reviewed prior to creation of Plan. LABS:  Recent Labs     02/09/22 0427   WBC 6.7   HGB 8.1*   HCT 26.4*        Recent Labs     02/09/22 0427   *   K 4.2      CO2 28   BUN 39*   CREA 4.28*   *   CA 8.4*   PHOS 6.2*     No results for input(s): AP, TBIL, TP, ALB, GLOB, GGT, AML, LPSE in the last 72 hours. No lab exists for component: SGOT, GPT, AMYP, HLPSE  No results for input(s): INR, PTP, APTT, INREXT, INREXT in the last 72 hours. No results for input(s): FE, TIBC, PSAT, FERR in the last 72 hours. No results found for: FOL, RBCF   No results for input(s): PH, PCO2, PO2 in the last 72 hours. No results for input(s): CPK, CKMB in the last 72 hours.     No lab exists for component: TROPONINI  No components found for: [place]  Lab Results   Component Value Date/Time    Color YELLOW/STRAW 02/01/2022 03:17 PM    Appearance TURBID (A) 02/01/2022 03:17 PM    Specific gravity 1.016 02/01/2022 03:17 PM    pH (UA) 5.5 02/01/2022 03:17 PM    Protein 300 (A) 02/01/2022 03:17 PM    Glucose 100 (A) 02/01/2022 03:17 PM    Ketone Negative 02/01/2022 03:17 PM    Bilirubin Negative 02/01/2022 03:17 PM    Urobilinogen 0.2 02/01/2022 03:17 PM    Nitrites Negative 02/01/2022 03:17 PM    Leukocyte Esterase Negative 02/01/2022 03:17 PM    Epithelial cells FEW 02/01/2022 03:17 PM    Bacteria Negative 02/01/2022 03:17 PM    WBC 0-4 02/01/2022 03:17 PM    RBC 5-10 02/01/2022 03:17 PM       MEDICATIONS:  Current Facility-Administered Medications   Medication Dose Route Frequency    ceFAZolin (ANCEF) 1 gram injection        sterile water (preservative free) injection        insulin lispro (HUMALOG) injection   SubCUTAneous AC&HS    glucose chewable tablet 16 g  4 Tablet Oral PRN    glucagon (GLUCAGEN) injection 1 mg  1 mg IntraMUSCular PRN    epoetin reagan-epbx (RETACRIT) injection 10,000 Units 10,000 Units SubCUTAneous EVERY MON & TH    hydrALAZINE (APRESOLINE) tablet 50 mg  50 mg Oral TID    heparin (porcine) 1,000 unit/mL injection 1,400 Units  1,400 Units InterCATHeter DIALYSIS PRN    And    heparin (porcine) 1,000 unit/mL injection 1,100 Units  1,100 Units InterCATHeter DIALYSIS PRN    amLODIPine (NORVASC) tablet 10 mg  10 mg Oral DAILY    bumetanide (BUMEX) injection 2 mg  2 mg IntraVENous Q12H    cloNIDine HCL (CATAPRES) tablet 0.3 mg  0.3 mg Oral BID    [Held by provider] insulin glargine (LANTUS) injection 10 Units  10 Units SubCUTAneous QHS    glucose chewable tablet 16 g  4 Tablet Oral PRN    dextrose (D50W) injection syrg 12.5-25 g  12.5-25 g IntraVENous PRN    glucagon (GLUCAGEN) injection 1 mg  1 mg IntraMUSCular PRN    sodium chloride (NS) flush 5-40 mL  5-40 mL IntraVENous Q8H    sodium chloride (NS) flush 5-40 mL  5-40 mL IntraVENous PRN    heparin (porcine) injection 5,000 Units  5,000 Units SubCUTAneous Q8H    hydrALAZINE (APRESOLINE) 20 mg/mL injection 10 mg  10 mg IntraVENous Q6H PRN    nicotine (NICODERM CQ) 14 mg/24 hr patch 1 Patch  1 Patch TransDERmal DAILY

## 2022-02-09 NOTE — PROGRESS NOTES
Transition of Care Plan:    RUR:  24%  High Risk for Readmission  Disposition:  Home with OP HD  OP HD:  [facility] starting 02/10  Follow up appointments:  PCP (See AVS)  DME needed:  None identified  Transportation at Discharge:  S.O. [address] or means to access home:  S.O. has keys      IM Medicare Letter:  N/A  Is patient a BCPI-A Bundle: If yes, was Bundle Letter given?:  N/A  Is patient a  and connected with the [place]? N/A           If yes, was [place] transfer form completed and [facility] notified? Caregiver Contact:  Ying Saenz [phone]  Discharge Caregiver contacted prior to discharge? Care Conference needed?:   None identified    Arrangements completed for patient's Dialysis Clinic for OP HD services. Patient accepted at [facility] for TTS schedule at 12:15PM;  To begin on Tuesday 02/10 with appt at 11:34AM to complete initial paperwork. Arranged transportation through [facility] [phone];  Reference # for 02/10 is [id number]; Reference # beginning 02/12 is [id number]. Reviewed accepting HD facility with patient. Explained that efforts will be made to transition patient to a [facility] when a [facility] is available. Patient agreed to go to [place] (a hour away) beginning tomorrow 02/10. Transportation is to call patient this afternoon with time for  for appt tomorrow. PCP appt also completed by CM Assistant. Patient stated his S. O. will take him to see Dr. Ibeth Pastor. Patient reports no other needs at this time. All information reviewed with patient while his S.O. Ying Saenz was on the phone/speaker. Assisted with her questions as well. Patient will DC this afternoon to his home. Care Management Interventions  PCP Verified by CM: Yes  Palliative Care Criteria Met (RRAT>21 & CHF Dx)?: No  Mode of Transport at Discharge:  Other (see comment) (S.O. Ms. Michael Schmid transport home by car.)  [facility] Transport Time of Discharge: 1430  Transition of Care Consult (CM Consult):  Other (New HD Set up)  Discharge Durable Medical Equipment: No (Pt owns a walker and cane)  Physical Therapy Consult: No  Occupational Therapy Consult: No  Speech Therapy Consult: No  Support Systems: Spouse/Significant Other  Confirm Follow Up Transport: Other (see comment) (S.O. for PCP appt; Medicaid transport for HD appts.)  The Plan for Transition of Care is Related to the Following Treatment Goals : New HD patient on TTS  The Patient and/or Patient Representative was Provided with a Choice of Provider and Agrees with the Discharge Plan?: Yes  Name of the Patient Representative Who was Provided with a Choice of Provider and Agrees with the Discharge Plan: [facility] and [facility]  Discharge Location  Patient Expects to be Discharged to[de-identified] Home with outpatient services    [address], [address], [place], [facility]  Complex CM/  [phone]

## 2022-02-09 NOTE — PROGRESS NOTES
Hospital follow-up PCP transitional care appointment has been scheduled with Dr. Ana Puckett on 2/11/22 at [phone]. Pending patient discharge.   Arron Oglesby, Care Management Specialist

## 2022-02-09 NOTE — PROGRESS NOTES
Hospitalist Progress Note    NAME: Amber Ann   :  1965   MRN:  [id number]       Assessment / Plan:  Acute on chronic combined systolic and diastolic CHF  Uncontrolled hypertension  Hyperkalemia  End-stage renal disease, new hemodialysis started on   -started on HD by renal.   S/p permacath placement  in preparation for DC home on new onset HD  - Nephrology following       Acute on chronic anemia likely due to anemia of kidney disease  Hb 8.5  Iron panel showed low iron saturation but normal ferritin  Transfuse prn hb < 7  Monitor H/H    Polysubstance abuse: Tobacco abuse and cocaine abuse  -nicotine patch, patient counseled about cessation  Supervised visitation recommended with over the weekend incident (suspicious drug use in hospital brought in by visitor per RN report)  Urine Drug screen if suspected again    Diabetes mellitus type 2 with hypoglycemia  -BG down to 61. Likely due to NPO status  - hold lantus for now due to hypoglycemia. Continue ISS high sensitivity  Accu checks     Code Status: Full code  Surrogate Decision Maker: Wife     DVT Prophylaxis:  Heparin   GI Prophylaxis: not indicated     Baseline: Ambulatory    25.0 - 29.9 Overweight / Body mass index is 26.21 kg/m². Estimated discharge date: February 9-10? Barriers: Outpatient HD setup  Dispo: he has been stable for discharge pending arrangements       Subjective:     Chief Complaint / Reason for Physician Visit  F/u for SOB/ESRD  SOB much better. No new complaints        Objective:     VITALS:   Last 24hrs VS reviewed since prior progress note.  Most recent are:  Patient Vitals for the past 24 hrs:   Temp Pulse Resp BP SpO2   22 1145 99.1 °F (37.3 °C) 76 18 (!) 147/76 --   22 1130 -- 78 18 (!) 156/87 --   22 1115 -- 77 18 (!) 158/86 --   22 1100 -- 77 18 (!) 163/92 --   22 1045 -- 77 18 (!) 155/82 --   22 1030 -- 79 18 (!) 152/80 --   22 1015 -- 75 18 (!) 150/83 --   22 1000 -- 75 18 (!) 150/82 --   02/09/22 0945 -- 75 18 (!) 148/82 --   02/09/22 0930 -- 81 18 (!) 148/77 --   02/09/22 0915 -- 73 18 (!) 147/83 --   02/09/22 0900 -- 74 18 (!) 146/79 --   02/09/22 0845 -- 74 18 (!) 147/81 --   02/09/22 0830 -- 72 18 (!) 149/81 --   02/09/22 0815 99 °F (37.2 °C) 74 18 (!) 147/80 --   02/08/22 2341 98.1 °F (36.7 °C) 79 18 137/72 100 %   02/08/22 1951 97.7 °F (36.5 °C) 77 18 (!) 147/85 100 %   02/08/22 1654 -- -- -- (!) 145/80 --   02/08/22 1514 98 °F (36.7 °C) 77 18 136/65 100 %       Intake/Output Summary (Last 24 hours) at 2/9/2022 1212  Last data filed at 2/9/2022 1145  Gross per 24 hour   Intake --   Output 1220 ml   Net -1220 ml        I had a face to face encounter and independently examined this patient on 2/9/2022, as outlined below:  PHYSICAL EXAM:  General: WD, WN. Alert, cooperative, no acute distress    EENT:  EOMI. Anicteric sclerae. MMM  Resp:  CTA bilaterally, no wheezing or rales. No accessory muscle use  CV:  Regular  rhythm,  No edema  GI:  Soft, Non distended, Non tender. +Bowel sounds  Neurologic:  Alert and oriented X 3, normal speech,   Psych:   Good insight. Not anxious nor agitated  Skin:  No rashes. No jaundice. Right upper chest perma cath    Reviewed most current lab test results and cultures  YES  Reviewed most current radiology test results   YES  Review and summation of old records today    NO  Reviewed patient's current orders and MAR    YES  PMH/SH reviewed - no change compared to H&P  ________________________________________________________________________  Care Plan discussed with:    Comments   Patient x    Family      RN x    Care Manager x    Consultant                        Multidiciplinary team rounds were held today with , nursing, pharmacist and clinical coordinator. Patient's plan of care was discussed; medications were reviewed and discharge planning was addressed. ________________________________________________________________________  Total NON critical care TIME: 26 Minutes    Total CRITICAL CARE TIME Spent:   Minutes non procedure based      Comments   >50% of visit spent in counseling and coordination of care x    ________________________________________________________________________  Fely Mahan MD     Procedures: see electronic medical records for all procedures/Xrays and details which were not copied into this note but were reviewed prior to creation of Plan. LABS:  I reviewed today's most current labs and imaging studies.   Pertinent labs include:  Recent Labs     02/09/22 0427   WBC 6.7   HGB 8.1*   HCT 26.4*        Recent Labs     02/09/22 0427   *   K 4.2      CO2 28   *   BUN 39*   CREA 4.28*   CA 8.4*   PHOS 6.2*       Signed: Fely Mahan MD

## 2022-03-18 PROBLEM — N18.9 CKD (CHRONIC KIDNEY DISEASE): Status: ACTIVE | Noted: 2021-03-08

## 2022-03-18 PROBLEM — R65.20 SEVERE SEPSIS (HCC): Status: ACTIVE | Noted: 2020-02-19

## 2022-03-18 PROBLEM — I50.9 CHF (CONGESTIVE HEART FAILURE) (HCC): Status: ACTIVE | Noted: 2022-01-19

## 2022-03-18 PROBLEM — A41.9 SEVERE SEPSIS (HCC): Status: ACTIVE | Noted: 2020-02-19

## 2022-03-18 PROBLEM — I42.9 CARDIOMYOPATHY (HCC): Status: ACTIVE | Noted: 2020-02-21

## 2022-03-18 PROBLEM — F19.10 SUBSTANCE ABUSE (HCC): Status: ACTIVE | Noted: 2020-02-21

## 2022-03-19 PROBLEM — I10 HTN (HYPERTENSION): Status: ACTIVE | Noted: 2020-02-28

## 2022-03-19 PROBLEM — E87.6 HYPOKALEMIA: Status: ACTIVE | Noted: 2020-02-28

## 2022-03-19 PROBLEM — I16.0 HYPERTENSIVE URGENCY: Status: ACTIVE | Noted: 2022-01-20

## 2022-03-19 PROBLEM — G93.41 METABOLIC ENCEPHALOPATHY: Status: ACTIVE | Noted: 2020-02-28

## 2022-03-19 PROBLEM — M86.9 OSTEOMYELITIS OF LEFT FOOT (HCC): Status: ACTIVE | Noted: 2020-02-28

## 2022-03-19 PROBLEM — B95.62 MRSA BACTEREMIA: Status: ACTIVE | Noted: 2020-02-28

## 2022-03-19 PROBLEM — R78.81 MRSA BACTEREMIA: Status: ACTIVE | Noted: 2020-02-28

## 2022-03-19 PROBLEM — F19.10 POLYSUBSTANCE ABUSE (HCC): Status: ACTIVE | Noted: 2022-02-01

## 2022-03-19 PROBLEM — E87.5 HYPERKALEMIA: Status: ACTIVE | Noted: 2021-04-27

## 2022-03-20 PROBLEM — E11.9 DM (DIABETES MELLITUS), TYPE 2 (HCC): Status: ACTIVE | Noted: 2020-02-28

## 2022-03-20 PROBLEM — N17.9 ACUTE ON CHRONIC KIDNEY FAILURE (HCC): Status: ACTIVE | Noted: 2021-04-27

## 2022-03-20 PROBLEM — N18.9 ACUTE ON CHRONIC KIDNEY FAILURE (HCC): Status: ACTIVE | Noted: 2021-04-27

## 2022-03-20 PROBLEM — E26.1 SECONDARY HYPERALDOSTERONISM (HCC): Status: ACTIVE | Noted: 2020-02-28

## 2022-04-29 ENCOUNTER — OFFICE VISIT (OUTPATIENT)
Dept: INTERNAL MEDICINE CLINIC | Age: 57
End: 2022-04-29
Payer: MEDICAID

## 2022-04-29 DIAGNOSIS — Z12.11 COLON CANCER SCREENING: ICD-10-CM

## 2022-04-29 DIAGNOSIS — I50.43 ACUTE ON CHRONIC COMBINED SYSTOLIC AND DIASTOLIC CONGESTIVE HEART FAILURE (HCC): ICD-10-CM

## 2022-04-29 DIAGNOSIS — M25.559 HIP PAIN: ICD-10-CM

## 2022-04-29 DIAGNOSIS — I10 ESSENTIAL HYPERTENSION, BENIGN: ICD-10-CM

## 2022-04-29 DIAGNOSIS — E11.42 DIABETIC POLYNEUROPATHY ASSOCIATED WITH TYPE 2 DIABETES MELLITUS (HCC): Primary | ICD-10-CM

## 2022-04-29 PROCEDURE — 3044F HG A1C LEVEL LT 7.0%: CPT | Performed by: FAMILY MEDICINE

## 2022-04-29 PROCEDURE — 99214 OFFICE O/P EST MOD 30 MIN: CPT | Performed by: FAMILY MEDICINE

## 2022-04-29 RX ORDER — CARVEDILOL 25 MG/1
25 TABLET ORAL 2 TIMES DAILY WITH MEALS
Qty: 180 TABLET | Refills: 3 | Status: SHIPPED | OUTPATIENT
Start: 2022-04-29 | End: 2022-07-25 | Stop reason: SDUPTHER

## 2022-04-29 RX ORDER — LIDOCAINE 50 MG/G
PATCH TOPICAL
Qty: 1 EACH | Refills: 0 | Status: SHIPPED | OUTPATIENT
Start: 2022-04-29

## 2022-04-29 RX ORDER — BUPRENORPHINE HYDROCHLORIDE, NALOXONE HYDROCHLORIDE 8; 2 MG/1; MG/1
2 FILM, SOLUBLE BUCCAL; SUBLINGUAL DAILY
Status: CANCELLED | OUTPATIENT
Start: 2022-04-29

## 2022-04-29 RX ORDER — CLONIDINE HYDROCHLORIDE 0.3 MG/1
0.3 TABLET ORAL 2 TIMES DAILY
Qty: 180 TABLET | Refills: 3 | Status: SHIPPED | OUTPATIENT
Start: 2022-04-29 | End: 2022-07-25 | Stop reason: SDUPTHER

## 2022-04-29 RX ORDER — NITROGLYCERIN 0.4 MG/1
0.4 TABLET SUBLINGUAL AS NEEDED
Qty: 30 TABLET | Refills: 0 | Status: CANCELLED | OUTPATIENT
Start: 2022-04-29

## 2022-04-29 RX ORDER — BUMETANIDE 2 MG/1
2 TABLET ORAL DAILY
Qty: 90 TABLET | Refills: 1 | Status: SHIPPED | OUTPATIENT
Start: 2022-04-29 | End: 2022-07-25 | Stop reason: SDUPTHER

## 2022-04-29 RX ORDER — AMLODIPINE BESYLATE 10 MG/1
10 TABLET ORAL DAILY
Qty: 90 TABLET | Refills: 3 | Status: SHIPPED | OUTPATIENT
Start: 2022-04-29 | End: 2022-07-25 | Stop reason: SDUPTHER

## 2022-04-29 NOTE — PROGRESS NOTES
Chief Complaint   Patient presents with    Diabetes     routine follow up          1. Have you been to the ER, urgent care clinic since your last visit? Hospitalized since your last visit? Yes When: 2/24/22 Where: [facility] ED  Reason for visit: right hip pain     2. Have you seen or consulted any other health care providers outside of the [address] [place] [address] since your last visit? Include any pap smears or colon screening.  No
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
## 2022-05-06 VITALS
WEIGHT: 161.4 LBS | RESPIRATION RATE: 16 BRPM | HEART RATE: 75 BPM | OXYGEN SATURATION: 99 % | HEIGHT: 68 IN | DIASTOLIC BLOOD PRESSURE: 88 MMHG | BODY MASS INDEX: 24.46 KG/M2 | TEMPERATURE: 98.7 F | SYSTOLIC BLOOD PRESSURE: 165 MMHG

## 2022-05-06 RX ORDER — HYDRALAZINE HYDROCHLORIDE 50 MG/1
50 TABLET, FILM COATED ORAL 3 TIMES DAILY
Qty: 270 TABLET | Refills: 1 | Status: SHIPPED | OUTPATIENT
Start: 2022-05-06 | End: 2022-07-25 | Stop reason: SDUPTHER

## 2022-06-09 LAB — COLOGUARD TEST, EXTERNAL: POSITIVE

## 2022-06-28 ENCOUNTER — HOSPITAL ENCOUNTER (EMERGENCY)
Age: 57
Discharge: HOME | End: 2022-06-28
Attending: STUDENT IN AN ORGANIZED HEALTH CARE EDUCATION/TRAINING PROGRAM
Payer: MEDICARE

## 2022-06-28 ENCOUNTER — APPOINTMENT (OUTPATIENT)
Dept: GENERAL RADIOLOGY | Age: 57
End: 2022-06-28
Attending: EMERGENCY MEDICINE
Payer: MEDICARE

## 2022-06-28 VITALS
RESPIRATION RATE: 15 BRPM | HEIGHT: 68 IN | SYSTOLIC BLOOD PRESSURE: 131 MMHG | HEART RATE: 66 BPM | DIASTOLIC BLOOD PRESSURE: 77 MMHG | WEIGHT: 170 LBS | OXYGEN SATURATION: 100 % | TEMPERATURE: 98 F | BODY MASS INDEX: 25.76 KG/M2

## 2022-06-28 DIAGNOSIS — R06.02 SOB (SHORTNESS OF BREATH): Primary | ICD-10-CM

## 2022-06-28 LAB
ALBUMIN SERPL-MCNC: 2.9 G/DL (ref 3.5–5)
ALBUMIN/GLOB SERPL: 0.5 {RATIO} (ref 1.1–2.2)
ALP SERPL-CCNC: 69 U/L (ref 45–117)
ALT SERPL-CCNC: 15 U/L (ref 12–78)
ANION GAP SERPL CALC-SCNC: 8 MMOL/L (ref 5–15)
AST SERPL-CCNC: 22 U/L (ref 15–37)
BASOPHILS # BLD: 0 K/UL (ref 0–0.1)
BASOPHILS NFR BLD: 0 % (ref 0–1)
BILIRUB SERPL-MCNC: 0.3 MG/DL (ref 0.2–1)
BNP SERPL-MCNC: ABNORMAL PG/ML
BUN SERPL-MCNC: 35 MG/DL (ref 6–20)
BUN/CREAT SERPL: 5 (ref 12–20)
CALCIUM SERPL-MCNC: 9.3 MG/DL (ref 8.5–10.1)
CHLORIDE SERPL-SCNC: 103 MMOL/L (ref 97–108)
CO2 SERPL-SCNC: 23 MMOL/L (ref 21–32)
CREAT SERPL-MCNC: 6.91 MG/DL (ref 0.7–1.3)
DIFFERENTIAL METHOD BLD: ABNORMAL
EOSINOPHIL # BLD: 0.1 K/UL (ref 0–0.4)
EOSINOPHIL NFR BLD: 1 % (ref 0–7)
ERYTHROCYTE [DISTWIDTH] IN BLOOD BY AUTOMATED COUNT: 16.5 % (ref 11.5–14.5)
GLOBULIN SER CALC-MCNC: 5.3 G/DL (ref 2–4)
GLUCOSE SERPL-MCNC: 132 MG/DL (ref 65–100)
HCT VFR BLD AUTO: 38.2 % (ref 36.6–50.3)
HGB BLD-MCNC: 12.3 G/DL (ref 12.1–17)
IMM GRANULOCYTES # BLD AUTO: 0.1 K/UL (ref 0–0.04)
IMM GRANULOCYTES NFR BLD AUTO: 1 % (ref 0–0.5)
LYMPHOCYTES # BLD: 0.7 K/UL (ref 0.8–3.5)
LYMPHOCYTES NFR BLD: 7 % (ref 12–49)
MAGNESIUM SERPL-MCNC: 2.6 MG/DL (ref 1.6–2.4)
MCH RBC QN AUTO: 27.2 PG (ref 26–34)
MCHC RBC AUTO-ENTMCNC: 32.2 G/DL (ref 30–36.5)
MCV RBC AUTO: 84.3 FL (ref 80–99)
MONOCYTES # BLD: 0.5 K/UL (ref 0–1)
MONOCYTES NFR BLD: 5 % (ref 5–13)
NEUTS SEG # BLD: 8.8 K/UL (ref 1.8–8)
NEUTS SEG NFR BLD: 86 % (ref 32–75)
NRBC # BLD: 0 K/UL (ref 0–0.01)
NRBC BLD-RTO: 0 PER 100 WBC
PLATELET # BLD AUTO: 288 K/UL (ref 150–400)
PMV BLD AUTO: 9.7 FL (ref 8.9–12.9)
POTASSIUM SERPL-SCNC: 3.7 MMOL/L (ref 3.5–5.1)
PROT SERPL-MCNC: 8.2 G/DL (ref 6.4–8.2)
RBC # BLD AUTO: 4.53 M/UL (ref 4.1–5.7)
RBC MORPH BLD: ABNORMAL
SODIUM SERPL-SCNC: 134 MMOL/L (ref 136–145)
TROPONIN-HIGH SENSITIVITY: 45 NG/L (ref 0–76)
TROPONIN-HIGH SENSITIVITY: 48 NG/L (ref 0–76)
WBC # BLD AUTO: 10.2 K/UL (ref 4.1–11.1)

## 2022-06-28 PROCEDURE — 36415 COLL VENOUS BLD VENIPUNCTURE: CPT

## 2022-06-28 PROCEDURE — 83735 ASSAY OF MAGNESIUM: CPT

## 2022-06-28 PROCEDURE — 71045 X-RAY EXAM CHEST 1 VIEW: CPT

## 2022-06-28 PROCEDURE — 80053 COMPREHEN METABOLIC PANEL: CPT

## 2022-06-28 PROCEDURE — 93005 ELECTROCARDIOGRAM TRACING: CPT

## 2022-06-28 PROCEDURE — 99285 EMERGENCY DEPT VISIT HI MDM: CPT

## 2022-06-28 PROCEDURE — 85025 COMPLETE CBC W/AUTO DIFF WBC: CPT

## 2022-06-28 PROCEDURE — 84484 ASSAY OF TROPONIN QUANT: CPT

## 2022-06-28 PROCEDURE — 83880 ASSAY OF NATRIURETIC PEPTIDE: CPT

## 2022-06-28 NOTE — ED NOTES
Patient discharged by Dr. Zhou Quiroga. Patient provided with discharge instructions Rx and instructions on follow up care. Patient out of ED ambulatory accompanied by family.

## 2022-06-28 NOTE — ED NOTES
Shift change report given to Alex Singh (oncoming nurse) by Austin Ruff (offgoing nurse). Report included the following information SBAR, Kardex, ED Summary, Intake/Output, MAR and Recent Results.

## 2022-06-28 NOTE — ED NOTES
Report received from [facility]. DEVAN, Jaz, ED Summary, MAR and Recent Results was discussed. Marleni Stephen RN     Patient refusing to wear cardiac monitor or pulse ox at this time. Educated patient on importance of monitoring patient with his symptoms. Patient continues to refuse.
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
## 2022-06-29 LAB
ATRIAL RATE: 62 BPM
CALCULATED P AXIS, ECG09: 47 DEGREES
CALCULATED R AXIS, ECG10: -24 DEGREES
CALCULATED T AXIS, ECG11: -141 DEGREES
DIAGNOSIS, 93000: NORMAL
P-R INTERVAL, ECG05: 144 MS
Q-T INTERVAL, ECG07: 466 MS
QRS DURATION, ECG06: 86 MS
QTC CALCULATION (BEZET), ECG08: 472 MS
VENTRICULAR RATE, ECG03: 62 BPM

## 2022-09-26 ENCOUNTER — TRANSCRIBE ORDER (OUTPATIENT)
Dept: SCHEDULING | Age: 57
End: 2022-09-26

## 2022-09-26 DIAGNOSIS — L97.509 DIABETIC FOOT ULCER WITH OSTEOMYELITIS (HCC): Primary | ICD-10-CM

## 2022-09-26 DIAGNOSIS — E11.69 DIABETIC FOOT ULCER WITH OSTEOMYELITIS (HCC): Primary | ICD-10-CM

## 2022-09-26 DIAGNOSIS — M86.9 DIABETIC FOOT ULCER WITH OSTEOMYELITIS (HCC): Primary | ICD-10-CM

## 2022-09-26 DIAGNOSIS — E11.621 DIABETIC FOOT ULCER WITH OSTEOMYELITIS (HCC): Primary | ICD-10-CM

## 2022-09-30 ENCOUNTER — HOSPITAL ENCOUNTER (EMERGENCY)
Age: 57
Discharge: STILL ADMITTED OR ADMINISTRATIVE | End: 2022-09-30
Attending: EMERGENCY MEDICINE

## 2022-09-30 ENCOUNTER — HOSPITAL ENCOUNTER (OUTPATIENT)
Dept: MRI IMAGING | Age: 57
Discharge: HOME | End: 2022-09-30
Attending: STUDENT IN AN ORGANIZED HEALTH CARE EDUCATION/TRAINING PROGRAM
Payer: MEDICARE

## 2022-09-30 DIAGNOSIS — E11.621 DIABETIC FOOT ULCER WITH OSTEOMYELITIS (HCC): ICD-10-CM

## 2022-09-30 DIAGNOSIS — M86.9 DIABETIC FOOT ULCER WITH OSTEOMYELITIS (HCC): ICD-10-CM

## 2022-09-30 DIAGNOSIS — L97.509 DIABETIC FOOT ULCER WITH OSTEOMYELITIS (HCC): ICD-10-CM

## 2022-09-30 DIAGNOSIS — E11.69 DIABETIC FOOT ULCER WITH OSTEOMYELITIS (HCC): ICD-10-CM

## 2022-09-30 PROCEDURE — 73718 MRI LOWER EXTREMITY W/O DYE: CPT

## 2022-10-13 ENCOUNTER — HOSPITAL ENCOUNTER (OUTPATIENT)
Dept: VASCULAR SURGERY | Age: 57
Discharge: HOME | End: 2022-10-13
Attending: STUDENT IN AN ORGANIZED HEALTH CARE EDUCATION/TRAINING PROGRAM
Payer: MEDICARE

## 2022-10-13 PROCEDURE — 93922 UPR/L XTREMITY ART 2 LEVELS: CPT | Performed by: INTERNAL MEDICINE

## 2022-10-13 PROCEDURE — 93922 UPR/L XTREMITY ART 2 LEVELS: CPT

## 2022-10-13 PROCEDURE — 93925 LOWER EXTREMITY STUDY: CPT

## 2022-10-14 PROCEDURE — 93925 LOWER EXTREMITY STUDY: CPT | Performed by: INTERNAL MEDICINE

## 2022-10-31 ENCOUNTER — DOCUMENTATION ONLY (OUTPATIENT)
Dept: INTERNAL MEDICINE CLINIC | Age: 57
End: 2022-10-31

## 2022-12-12 NOTE — PROGRESS NOTES
Patient was notified about positive Cologuard test by letter and his current primary care physician was notified.

## 2023-04-25 ENCOUNTER — TRANSCRIBE ORDER (OUTPATIENT)
Dept: SCHEDULING | Age: 58
End: 2023-04-25

## 2023-04-25 DIAGNOSIS — M21.331 WRIST DROP, BILATERAL: Primary | ICD-10-CM

## 2023-04-25 DIAGNOSIS — M21.332 WRIST DROP, BILATERAL: Primary | ICD-10-CM

## 2023-05-02 ENCOUNTER — HOSPITAL ENCOUNTER (OUTPATIENT)
Dept: MRI IMAGING | Age: 58
Discharge: HOME | End: 2023-05-02
Attending: INTERNAL MEDICINE
Payer: MEDICARE

## 2023-05-02 DIAGNOSIS — M21.332 WRIST DROP, BILATERAL: ICD-10-CM

## 2023-05-02 DIAGNOSIS — M21.331 WRIST DROP, BILATERAL: ICD-10-CM

## 2023-05-02 PROCEDURE — 70551 MRI BRAIN STEM W/O DYE: CPT

## 2023-05-24 RX ORDER — NITROGLYCERIN 0.4 MG/1
0.4 TABLET SUBLINGUAL PRN
COMMUNITY
Start: 2021-06-04

## 2023-05-24 RX ORDER — CLONIDINE HYDROCHLORIDE 0.3 MG/1
0.3 TABLET ORAL 2 TIMES DAILY
COMMUNITY
Start: 2022-07-26

## 2023-05-24 RX ORDER — HYDRALAZINE HYDROCHLORIDE 50 MG/1
50 TABLET, FILM COATED ORAL 3 TIMES DAILY
COMMUNITY
Start: 2022-07-26

## 2023-05-24 RX ORDER — BUPRENORPHINE AND NALOXONE 8; 2 MG/1; MG/1
2 FILM, SOLUBLE BUCCAL; SUBLINGUAL DAILY
COMMUNITY
Start: 2021-11-24

## 2023-05-24 RX ORDER — BUMETANIDE 2 MG/1
2 TABLET ORAL DAILY
COMMUNITY
Start: 2022-07-26

## 2023-05-24 RX ORDER — LIDOCAINE 50 MG/G
PATCH TOPICAL
COMMUNITY
Start: 2022-04-29

## 2023-05-24 RX ORDER — CARVEDILOL 25 MG/1
25 TABLET ORAL 2 TIMES DAILY WITH MEALS
COMMUNITY
Start: 2022-07-26

## 2023-05-24 RX ORDER — AMLODIPINE BESYLATE 10 MG/1
10 TABLET ORAL DAILY
COMMUNITY
Start: 2022-07-26

## 2023-06-05 ENCOUNTER — APPOINTMENT (OUTPATIENT)
Facility: HOSPITAL | Age: 58
End: 2023-06-05
Payer: MEDICARE

## 2023-06-05 ENCOUNTER — HOSPITAL ENCOUNTER (EMERGENCY)
Facility: HOSPITAL | Age: 58
Discharge: HOME | End: 2023-06-05
Attending: EMERGENCY MEDICINE
Payer: MEDICARE

## 2023-06-05 VITALS
HEART RATE: 62 BPM | RESPIRATION RATE: 11 BRPM | DIASTOLIC BLOOD PRESSURE: 106 MMHG | TEMPERATURE: 97.1 F | OXYGEN SATURATION: 100 % | SYSTOLIC BLOOD PRESSURE: 167 MMHG

## 2023-06-05 DIAGNOSIS — Z99.2 ESRD NEEDING DIALYSIS (HCC): ICD-10-CM

## 2023-06-05 DIAGNOSIS — T40.1X1A ACCIDENTAL OVERDOSE OF HEROIN, INITIAL ENCOUNTER (HCC): Primary | ICD-10-CM

## 2023-06-05 DIAGNOSIS — N18.6 ESRD NEEDING DIALYSIS (HCC): ICD-10-CM

## 2023-06-05 LAB
ALBUMIN SERPL-MCNC: 2.9 G/DL (ref 3.5–5)
ALBUMIN/GLOB SERPL: 0.6 (ref 1.1–2.2)
ALP SERPL-CCNC: 103 U/L (ref 45–117)
ALT SERPL-CCNC: 21 U/L (ref 12–78)
AMMONIA PLAS-SCNC: 36 UMOL/L
ANION GAP BLD CALC-SCNC: 8 (ref 10–20)
ANION GAP SERPL CALC-SCNC: 10 MMOL/L (ref 5–15)
AST SERPL-CCNC: 34 U/L (ref 15–37)
BASE DEFICIT BLD-SCNC: 5.9 MMOL/L
BASOPHILS # BLD: 0.1 K/UL (ref 0–0.1)
BASOPHILS NFR BLD: 1 % (ref 0–1)
BILIRUB SERPL-MCNC: 0.9 MG/DL (ref 0.2–1)
BUN SERPL-MCNC: 44 MG/DL (ref 6–20)
BUN/CREAT SERPL: 6 (ref 12–20)
CA-I BLD-MCNC: 1.18 MMOL/L (ref 1.12–1.32)
CALCIUM SERPL-MCNC: 8.2 MG/DL (ref 8.5–10.1)
CHLORIDE BLD-SCNC: 104 MMOL/L (ref 100–108)
CHLORIDE SERPL-SCNC: 101 MMOL/L (ref 97–108)
CO2 BLD-SCNC: 20 MMOL/L (ref 19–24)
CO2 SERPL-SCNC: 21 MMOL/L (ref 21–32)
CREAT SERPL-MCNC: 7.1 MG/DL (ref 0.7–1.3)
CREAT UR-MCNC: 4.2 MG/DL (ref 0.6–1.3)
DIFFERENTIAL METHOD BLD: ABNORMAL
EKG ATRIAL RATE: 62 BPM
EKG DIAGNOSIS: NORMAL
EKG P AXIS: 39 DEGREES
EKG P-R INTERVAL: 158 MS
EKG Q-T INTERVAL: 444 MS
EKG QRS DURATION: 98 MS
EKG QTC CALCULATION (BAZETT): 450 MS
EKG R AXIS: -13 DEGREES
EKG T AXIS: 233 DEGREES
EKG VENTRICULAR RATE: 62 BPM
EOSINOPHIL # BLD: 0.1 K/UL (ref 0–0.4)
EOSINOPHIL NFR BLD: 2 % (ref 0–7)
ERYTHROCYTE [DISTWIDTH] IN BLOOD BY AUTOMATED COUNT: 18.6 % (ref 11.5–14.5)
GLOBULIN SER CALC-MCNC: 5.1 G/DL (ref 2–4)
GLUCOSE BLD STRIP.AUTO-MCNC: 186 MG/DL (ref 74–106)
GLUCOSE BLD STRIP.AUTO-MCNC: 206 MG/DL (ref 65–117)
GLUCOSE SERPL-MCNC: 180 MG/DL (ref 65–100)
HCO3 BLDA-SCNC: 20 MMOL/L
HCT VFR BLD AUTO: 33.3 % (ref 36.6–50.3)
HGB BLD-MCNC: 10.5 G/DL (ref 12.1–17)
IMM GRANULOCYTES # BLD AUTO: 0 K/UL (ref 0–0.04)
IMM GRANULOCYTES NFR BLD AUTO: 0 % (ref 0–0.5)
LACTATE BLD-SCNC: 1.14 MMOL/L (ref 0.4–2)
LYMPHOCYTES # BLD: 0.7 K/UL (ref 0.8–3.5)
LYMPHOCYTES NFR BLD: 13 % (ref 12–49)
MAGNESIUM SERPL-MCNC: 2.6 MG/DL (ref 1.6–2.4)
MCH RBC QN AUTO: 28.8 PG (ref 26–34)
MCHC RBC AUTO-ENTMCNC: 31.5 G/DL (ref 30–36.5)
MCV RBC AUTO: 91.2 FL (ref 80–99)
MONOCYTES # BLD: 0.4 K/UL (ref 0–1)
MONOCYTES NFR BLD: 7 % (ref 5–13)
NEUTS SEG # BLD: 4.2 K/UL (ref 1.8–8)
NEUTS SEG NFR BLD: 77 % (ref 32–75)
NRBC # BLD: 0 K/UL (ref 0–0.01)
NRBC BLD-RTO: 0 PER 100 WBC
NT PRO BNP: ABNORMAL PG/ML
PCO2 BLDV: 37.6 MMHG (ref 41–51)
PH BLDV: 7.33 (ref 7.32–7.42)
PLATELET # BLD AUTO: 240 K/UL (ref 150–400)
PMV BLD AUTO: 10.8 FL (ref 8.9–12.9)
PO2 BLDV: 55 MMHG (ref 25–40)
POTASSIUM BLD-SCNC: 3.8 MMOL/L (ref 3.5–5.5)
POTASSIUM SERPL-SCNC: 3.8 MMOL/L (ref 3.5–5.1)
PROT SERPL-MCNC: 8 G/DL (ref 6.4–8.2)
RBC # BLD AUTO: 3.65 M/UL (ref 4.1–5.7)
RBC MORPH BLD: ABNORMAL
SAO2 % BLD: 86 %
SERVICE CMNT-IMP: ABNORMAL
SODIUM BLD-SCNC: 132 MMOL/L (ref 136–145)
SODIUM SERPL-SCNC: 132 MMOL/L (ref 136–145)
SPECIMEN SITE: ABNORMAL
T4 FREE SERPL-MCNC: 1.2 NG/DL (ref 0.8–1.5)
TROPONIN I SERPL HS-MCNC: 35 NG/L (ref 0–76)
TSH SERPL DL<=0.05 MIU/L-ACNC: 0.8 UIU/ML (ref 0.36–3.74)
WBC # BLD AUTO: 5.5 K/UL (ref 4.1–11.1)

## 2023-06-05 PROCEDURE — 96374 THER/PROPH/DIAG INJ IV PUSH: CPT

## 2023-06-05 PROCEDURE — 80053 COMPREHEN METABOLIC PANEL: CPT

## 2023-06-05 PROCEDURE — 84132 ASSAY OF SERUM POTASSIUM: CPT

## 2023-06-05 PROCEDURE — 82330 ASSAY OF CALCIUM: CPT

## 2023-06-05 PROCEDURE — 96375 TX/PRO/DX INJ NEW DRUG ADDON: CPT

## 2023-06-05 PROCEDURE — 84443 ASSAY THYROID STIM HORMONE: CPT

## 2023-06-05 PROCEDURE — 82140 ASSAY OF AMMONIA: CPT

## 2023-06-05 PROCEDURE — 71045 X-RAY EXAM CHEST 1 VIEW: CPT

## 2023-06-05 PROCEDURE — 36415 COLL VENOUS BLD VENIPUNCTURE: CPT

## 2023-06-05 PROCEDURE — 82947 ASSAY GLUCOSE BLOOD QUANT: CPT

## 2023-06-05 PROCEDURE — 71250 CT THORAX DX C-: CPT

## 2023-06-05 PROCEDURE — 84484 ASSAY OF TROPONIN QUANT: CPT

## 2023-06-05 PROCEDURE — 82962 GLUCOSE BLOOD TEST: CPT

## 2023-06-05 PROCEDURE — 84439 ASSAY OF FREE THYROXINE: CPT

## 2023-06-05 PROCEDURE — 83735 ASSAY OF MAGNESIUM: CPT

## 2023-06-05 PROCEDURE — 83880 ASSAY OF NATRIURETIC PEPTIDE: CPT

## 2023-06-05 PROCEDURE — 74176 CT ABD & PELVIS W/O CONTRAST: CPT

## 2023-06-05 PROCEDURE — 6360000002 HC RX W HCPCS: Performed by: EMERGENCY MEDICINE

## 2023-06-05 PROCEDURE — 93005 ELECTROCARDIOGRAM TRACING: CPT | Performed by: EMERGENCY MEDICINE

## 2023-06-05 PROCEDURE — 99285 EMERGENCY DEPT VISIT HI MDM: CPT

## 2023-06-05 PROCEDURE — 82803 BLOOD GASES ANY COMBINATION: CPT

## 2023-06-05 PROCEDURE — 70450 CT HEAD/BRAIN W/O DYE: CPT

## 2023-06-05 PROCEDURE — 84295 ASSAY OF SERUM SODIUM: CPT

## 2023-06-05 PROCEDURE — 6370000000 HC RX 637 (ALT 250 FOR IP): Performed by: EMERGENCY MEDICINE

## 2023-06-05 PROCEDURE — 85025 COMPLETE CBC W/AUTO DIFF WBC: CPT

## 2023-06-05 RX ORDER — BUPRENORPHINE AND NALOXONE 8; 2 MG/1; MG/1
1 FILM, SOLUBLE BUCCAL; SUBLINGUAL
Status: COMPLETED | OUTPATIENT
Start: 2023-06-05 | End: 2023-06-05

## 2023-06-05 RX ORDER — KETOROLAC TROMETHAMINE 30 MG/ML
15 INJECTION, SOLUTION INTRAMUSCULAR; INTRAVENOUS ONCE
Status: COMPLETED | OUTPATIENT
Start: 2023-06-05 | End: 2023-06-05

## 2023-06-05 RX ORDER — NALOXONE HYDROCHLORIDE 4 MG/.1ML
1 SPRAY NASAL PRN
Qty: 2 EACH | Refills: 0 | Status: SHIPPED | OUTPATIENT
Start: 2023-06-05

## 2023-06-05 RX ORDER — ONDANSETRON 2 MG/ML
4 INJECTION INTRAMUSCULAR; INTRAVENOUS EVERY 6 HOURS PRN
Status: DISCONTINUED | OUTPATIENT
Start: 2023-06-05 | End: 2023-06-05 | Stop reason: HOSPADM

## 2023-06-05 RX ORDER — NALOXONE HYDROCHLORIDE 0.4 MG/ML
0.4 INJECTION, SOLUTION INTRAMUSCULAR; INTRAVENOUS; SUBCUTANEOUS ONCE
Status: COMPLETED | OUTPATIENT
Start: 2023-06-05 | End: 2023-06-05

## 2023-06-05 RX ADMIN — NALOXONE HYDROCHLORIDE 0.4 MG: 0.4 INJECTION, SOLUTION INTRAMUSCULAR; INTRAVENOUS; SUBCUTANEOUS at 11:52

## 2023-06-05 RX ADMIN — KETOROLAC TROMETHAMINE 15 MG: 30 INJECTION, SOLUTION INTRAMUSCULAR; INTRAVENOUS at 14:05

## 2023-06-05 RX ADMIN — BUPRENORPHINE AND NALOXONE 1 FILM: 8; 2 FILM BUCCAL; SUBLINGUAL at 14:05

## 2023-06-05 ASSESSMENT — PAIN SCALES - GENERAL: PAINLEVEL_OUTOF10: 6

## 2023-06-05 ASSESSMENT — PAIN - FUNCTIONAL ASSESSMENT: PAIN_FUNCTIONAL_ASSESSMENT: 0-10
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
## 2023-06-05 NOTE — DISCHARGE INSTRUCTIONS
You were evaluated in the emergency department for a heroin overdose. Your CT scans show some mild pulmonary edema as well as concern for discitis/osteomyelitis of your spine which may cause back pain   It will be important for you to follow-up with your primary care physician in 2-3 days. Please get your dialysis either tomorrow or Wednesday. If you develop worsening symptoms such as fevers, back pain, shortness of breath, or abdominal pain, please return to the emergency department immediately.

## 2023-06-05 NOTE — ED NOTES
Pt presents to ER from home via EMS w/ complaints of constipation x1 month. RN notes pt is very lethargic, repeatedly falling asleep and having apneic episodes. Pt also believes he is at home when asked. Pt is a dialysis pt who is scheduled for infusion today. Pt now also reporting SOB.       Taylor Sarmiento RN  06/05/23 7053

## 2023-08-20 ENCOUNTER — HOSPITAL ENCOUNTER (EMERGENCY)
Facility: HOSPITAL | Age: 58
Discharge: HOME | End: 2023-08-20
Attending: EMERGENCY MEDICINE
Payer: MEDICARE

## 2023-08-20 ENCOUNTER — APPOINTMENT (OUTPATIENT)
Facility: HOSPITAL | Age: 58
End: 2023-08-20
Payer: MEDICARE

## 2023-08-20 VITALS
RESPIRATION RATE: 25 BRPM | TEMPERATURE: 98 F | DIASTOLIC BLOOD PRESSURE: 124 MMHG | SYSTOLIC BLOOD PRESSURE: 142 MMHG | OXYGEN SATURATION: 98 % | HEART RATE: 98 BPM

## 2023-08-20 DIAGNOSIS — R07.9 CHEST PAIN, UNSPECIFIED TYPE: Primary | ICD-10-CM

## 2023-08-20 DIAGNOSIS — Z99.2 ESRD ON HEMODIALYSIS (HCC): ICD-10-CM

## 2023-08-20 DIAGNOSIS — N18.6 ESRD ON HEMODIALYSIS (HCC): ICD-10-CM

## 2023-08-20 LAB
ALBUMIN SERPL-MCNC: 2.7 G/DL (ref 3.5–5)
ALBUMIN/GLOB SERPL: 0.5 (ref 1.1–2.2)
ALP SERPL-CCNC: 85 U/L (ref 45–117)
ALT SERPL-CCNC: 12 U/L (ref 12–78)
ANION GAP SERPL CALC-SCNC: 8 MMOL/L (ref 5–15)
AST SERPL-CCNC: 34 U/L (ref 15–37)
BASOPHILS # BLD: 0 K/UL (ref 0–0.1)
BASOPHILS NFR BLD: 0 % (ref 0–1)
BILIRUB SERPL-MCNC: 0.8 MG/DL (ref 0.2–1)
BUN SERPL-MCNC: 31 MG/DL (ref 6–20)
BUN/CREAT SERPL: 5 (ref 12–20)
CALCIUM SERPL-MCNC: 8.4 MG/DL (ref 8.5–10.1)
CHLORIDE SERPL-SCNC: 105 MMOL/L (ref 97–108)
CO2 SERPL-SCNC: 23 MMOL/L (ref 21–32)
CREAT SERPL-MCNC: 5.71 MG/DL (ref 0.7–1.3)
DIFFERENTIAL METHOD BLD: ABNORMAL
EOSINOPHIL # BLD: 0.1 K/UL (ref 0–0.4)
EOSINOPHIL NFR BLD: 2 % (ref 0–7)
ERYTHROCYTE [DISTWIDTH] IN BLOOD BY AUTOMATED COUNT: 16.7 % (ref 11.5–14.5)
GLOBULIN SER CALC-MCNC: 5.5 G/DL (ref 2–4)
GLUCOSE BLD STRIP.AUTO-MCNC: 77 MG/DL (ref 65–117)
GLUCOSE SERPL-MCNC: 84 MG/DL (ref 65–100)
HCT VFR BLD AUTO: 35.8 % (ref 36.6–50.3)
HGB BLD-MCNC: 11.6 G/DL (ref 12.1–17)
IMM GRANULOCYTES # BLD AUTO: 0 K/UL (ref 0–0.04)
IMM GRANULOCYTES NFR BLD AUTO: 0 % (ref 0–0.5)
LYMPHOCYTES # BLD: 0.6 K/UL (ref 0.8–3.5)
LYMPHOCYTES NFR BLD: 8 % (ref 12–49)
MCH RBC QN AUTO: 28.9 PG (ref 26–34)
MCHC RBC AUTO-ENTMCNC: 32.4 G/DL (ref 30–36.5)
MCV RBC AUTO: 89.1 FL (ref 80–99)
MONOCYTES # BLD: 0.4 K/UL (ref 0–1)
MONOCYTES NFR BLD: 6 % (ref 5–13)
NEUTS SEG # BLD: 5.9 K/UL (ref 1.8–8)
NEUTS SEG NFR BLD: 84 % (ref 32–75)
NRBC # BLD: 0 K/UL (ref 0–0.01)
NRBC BLD-RTO: 0 PER 100 WBC
PLATELET # BLD AUTO: 197 K/UL (ref 150–400)
PMV BLD AUTO: 10.6 FL (ref 8.9–12.9)
POTASSIUM SERPL-SCNC: 4.4 MMOL/L (ref 3.5–5.1)
PROCALCITONIN SERPL-MCNC: 0.15 NG/ML
PROT SERPL-MCNC: 8.2 G/DL (ref 6.4–8.2)
RBC # BLD AUTO: 4.02 M/UL (ref 4.1–5.7)
RBC MORPH BLD: ABNORMAL
SERVICE CMNT-IMP: NORMAL
SODIUM SERPL-SCNC: 136 MMOL/L (ref 136–145)
TROPONIN I SERPL HS-MCNC: 78 NG/L (ref 0–76)
TROPONIN I SERPL HS-MCNC: 81 NG/L (ref 0–76)
WBC # BLD AUTO: 7 K/UL (ref 4.1–11.1)

## 2023-08-20 PROCEDURE — 84145 PROCALCITONIN (PCT): CPT

## 2023-08-20 PROCEDURE — 99285 EMERGENCY DEPT VISIT HI MDM: CPT

## 2023-08-20 PROCEDURE — 82962 GLUCOSE BLOOD TEST: CPT

## 2023-08-20 PROCEDURE — 93005 ELECTROCARDIOGRAM TRACING: CPT | Performed by: EMERGENCY MEDICINE

## 2023-08-20 PROCEDURE — 71045 X-RAY EXAM CHEST 1 VIEW: CPT

## 2023-08-20 PROCEDURE — 6360000002 HC RX W HCPCS: Performed by: EMERGENCY MEDICINE

## 2023-08-20 PROCEDURE — 96374 THER/PROPH/DIAG INJ IV PUSH: CPT

## 2023-08-20 PROCEDURE — 84484 ASSAY OF TROPONIN QUANT: CPT

## 2023-08-20 PROCEDURE — 36415 COLL VENOUS BLD VENIPUNCTURE: CPT

## 2023-08-20 PROCEDURE — 85025 COMPLETE CBC W/AUTO DIFF WBC: CPT

## 2023-08-20 PROCEDURE — 96375 TX/PRO/DX INJ NEW DRUG ADDON: CPT

## 2023-08-20 PROCEDURE — 80053 COMPREHEN METABOLIC PANEL: CPT

## 2023-08-20 RX ORDER — MORPHINE SULFATE 4 MG/ML
4 INJECTION, SOLUTION INTRAMUSCULAR; INTRAVENOUS
Status: COMPLETED | OUTPATIENT
Start: 2023-08-20 | End: 2023-08-20

## 2023-08-20 RX ORDER — LABETALOL HYDROCHLORIDE 5 MG/ML
10 INJECTION, SOLUTION INTRAVENOUS
Status: COMPLETED | OUTPATIENT
Start: 2023-08-20 | End: 2023-08-20

## 2023-08-20 RX ADMIN — MORPHINE SULFATE 4 MG: 4 INJECTION, SOLUTION INTRAMUSCULAR; INTRAVENOUS at 18:11

## 2023-08-20 RX ADMIN — LABETALOL HYDROCHLORIDE 10 MG: 5 INJECTION INTRAVENOUS at 18:11

## 2023-08-20 ASSESSMENT — ENCOUNTER SYMPTOMS
SHORTNESS OF BREATH: 0
VOMITING: 0
ABDOMINAL PAIN: 0
NAUSEA: 0

## 2023-08-20 ASSESSMENT — PAIN SCALES - GENERAL: PAINLEVEL_OUTOF10: 10

## 2023-08-20 NOTE — ED PROVIDER NOTES
EMERGENCY DEPARTMENT HISTORY AND PHYSICAL EXAM    Date: 8/20/2023  Patient Name: Sharon Mckeon  Patient Age and Sex: 63 y.o. male  MRN:  [id number]  CSN:  [id number]    History of Present Illness     Chief Complaint   Patient presents with    Chest Pain     Patient arrives via EMS c/o chest pain since about 10am this morning. Patient was given 324 aspirin by EMS en route. Patient reports a few episodes of similar chest pain earlier this week that resolved without intervention. History Provided By: Patient    Ability to gather history was limited by:     HPI: Sharon Mckeon, 63 y.o. male   With history of cardiomyopathy, CHF, COPD, diabetes, hepatitis C, ESRD on hemodialysis, complains of right-sided chest pain described as aching and cramping, also a buzzing or vibrating sensation in the right chest, starting about 8 hours ago. Symptoms are mild. No significant shortness of breath. No fevers. Tobacco Use      Smoking status: Every Day        Packs/day: 0.25        Types: Cigarettes      Smokeless tobacco: Former     Past History   The patient's medical, surgical, and social history were reviewed by me today. Current Medications:  No current facility-administered medications on file prior to encounter. Current Outpatient Medications on File Prior to Encounter   Medication Sig Dispense Refill    naloxone (NARCAN) 4 MG/0.1ML LIQD nasal spray 1 spray by Nasal route as needed for Opioid Reversal 2 each 0    amLODIPine (NORVASC) 10 MG tablet Take 1 tablet by mouth daily      bumetanide (BUMEX) 2 MG tablet Take 1 tablet by mouth daily      buprenorphine-naloxone (SUBOXONE) 8-2 MG FILM SL film Place 2 Film under the tongue daily.  Max Daily Amount: 2 Film      carvedilol (COREG) 25 MG tablet Take 1 tablet by mouth 2 times daily (with meals)      cloNIDine (CATAPRES) 0.3 MG tablet Take 1 tablet by mouth 2 times daily      hydrALAZINE (APRESOLINE) 50 MG tablet Take 1 tablet by mouth 3 times daily

## 2023-08-21 LAB
EKG ATRIAL RATE: 92 BPM
EKG DIAGNOSIS: NORMAL
EKG P AXIS: 46 DEGREES
EKG P-R INTERVAL: 136 MS
EKG Q-T INTERVAL: 372 MS
EKG QRS DURATION: 84 MS
EKG QTC CALCULATION (BAZETT): 460 MS
EKG R AXIS: -28 DEGREES
EKG T AXIS: 164 DEGREES
EKG VENTRICULAR RATE: 92 BPM

## 2023-09-08 ENCOUNTER — HOSPITAL ENCOUNTER (INPATIENT)
Facility: HOSPITAL | Age: 58
LOS: 2 days | Discharge: LEFT BEFORE DISCHARGE | DRG: 314 | End: 2023-09-10
Attending: STUDENT IN AN ORGANIZED HEALTH CARE EDUCATION/TRAINING PROGRAM | Admitting: INTERNAL MEDICINE
Payer: MEDICARE

## 2023-09-08 ENCOUNTER — APPOINTMENT (OUTPATIENT)
Facility: HOSPITAL | Age: 58
DRG: 314 | End: 2023-09-08
Payer: MEDICARE

## 2023-09-08 DIAGNOSIS — I21.4 NSTEMI (NON-ST ELEVATED MYOCARDIAL INFARCTION) (HCC): ICD-10-CM

## 2023-09-08 DIAGNOSIS — M54.12 CERVICAL RADICULOPATHY: ICD-10-CM

## 2023-09-08 DIAGNOSIS — R77.8 ELEVATED TROPONIN: Primary | ICD-10-CM

## 2023-09-08 DIAGNOSIS — F19.10 POLYSUBSTANCE ABUSE (HCC): ICD-10-CM

## 2023-09-08 DIAGNOSIS — F11.93 OPIOID WITHDRAWAL (HCC): ICD-10-CM

## 2023-09-08 DIAGNOSIS — J81.0 ACUTE PULMONARY EDEMA (HCC): ICD-10-CM

## 2023-09-08 PROBLEM — R06.02 SOB (SHORTNESS OF BREATH): Status: ACTIVE | Noted: 2023-09-08

## 2023-09-08 LAB
ALBUMIN SERPL-MCNC: 2.6 G/DL (ref 3.5–5)
ALBUMIN/GLOB SERPL: 0.5 (ref 1.1–2.2)
ALP SERPL-CCNC: 110 U/L (ref 45–117)
ALT SERPL-CCNC: 11 U/L (ref 12–78)
ANION GAP SERPL CALC-SCNC: 7 MMOL/L (ref 5–15)
AST SERPL-CCNC: 30 U/L (ref 15–37)
BASOPHILS # BLD: 0.1 K/UL (ref 0–0.1)
BASOPHILS NFR BLD: 1 % (ref 0–1)
BILIRUB SERPL-MCNC: 1 MG/DL (ref 0.2–1)
BUN SERPL-MCNC: 39 MG/DL (ref 6–20)
BUN/CREAT SERPL: 5 (ref 12–20)
CALCIUM SERPL-MCNC: 8.4 MG/DL (ref 8.5–10.1)
CHLORIDE SERPL-SCNC: 106 MMOL/L (ref 97–108)
CO2 SERPL-SCNC: 27 MMOL/L (ref 21–32)
COMMENT:: NORMAL
CREAT SERPL-MCNC: 7.23 MG/DL (ref 0.7–1.3)
DIFFERENTIAL METHOD BLD: ABNORMAL
EOSINOPHIL # BLD: 0.1 K/UL (ref 0–0.4)
EOSINOPHIL NFR BLD: 2 % (ref 0–7)
ERYTHROCYTE [DISTWIDTH] IN BLOOD BY AUTOMATED COUNT: 19.4 % (ref 11.5–14.5)
GLOBULIN SER CALC-MCNC: 5.1 G/DL (ref 2–4)
GLUCOSE BLD STRIP.AUTO-MCNC: 101 MG/DL (ref 65–117)
GLUCOSE SERPL-MCNC: 95 MG/DL (ref 65–100)
HCT VFR BLD AUTO: 40.2 % (ref 36.6–50.3)
HGB BLD-MCNC: 12.9 G/DL (ref 12.1–17)
IMM GRANULOCYTES # BLD AUTO: 0 K/UL (ref 0–0.04)
IMM GRANULOCYTES NFR BLD AUTO: 0 % (ref 0–0.5)
LYMPHOCYTES # BLD: 0.5 K/UL (ref 0.8–3.5)
LYMPHOCYTES NFR BLD: 7 % (ref 12–49)
MCH RBC QN AUTO: 29 PG (ref 26–34)
MCHC RBC AUTO-ENTMCNC: 32.1 G/DL (ref 30–36.5)
MCV RBC AUTO: 90.3 FL (ref 80–99)
MONOCYTES # BLD: 0.8 K/UL (ref 0–1)
MONOCYTES NFR BLD: 11 % (ref 5–13)
NEUTS SEG # BLD: 5.7 K/UL (ref 1.8–8)
NEUTS SEG NFR BLD: 79 % (ref 32–75)
NRBC # BLD: 0 K/UL (ref 0–0.01)
NRBC BLD-RTO: 0 PER 100 WBC
NT PRO BNP: ABNORMAL PG/ML
PLATELET # BLD AUTO: 225 K/UL (ref 150–400)
PMV BLD AUTO: 10 FL (ref 8.9–12.9)
POTASSIUM SERPL-SCNC: 3.4 MMOL/L (ref 3.5–5.1)
PROT SERPL-MCNC: 7.7 G/DL (ref 6.4–8.2)
RBC # BLD AUTO: 4.45 M/UL (ref 4.1–5.7)
RBC MORPH BLD: ABNORMAL
SERVICE CMNT-IMP: NORMAL
SODIUM SERPL-SCNC: 140 MMOL/L (ref 136–145)
SPECIMEN HOLD: NORMAL
TROPONIN I SERPL HS-MCNC: 107 NG/L (ref 0–76)
TROPONIN I SERPL HS-MCNC: 115 NG/L (ref 0–76)
WBC # BLD AUTO: 7.2 K/UL (ref 4.1–11.1)

## 2023-09-08 PROCEDURE — 82962 GLUCOSE BLOOD TEST: CPT

## 2023-09-08 PROCEDURE — 84484 ASSAY OF TROPONIN QUANT: CPT

## 2023-09-08 PROCEDURE — 6370000000 HC RX 637 (ALT 250 FOR IP): Performed by: INTERNAL MEDICINE

## 2023-09-08 PROCEDURE — 96375 TX/PRO/DX INJ NEW DRUG ADDON: CPT

## 2023-09-08 PROCEDURE — 5A1D70Z PERFORMANCE OF URINARY FILTRATION, INTERMITTENT, LESS THAN 6 HOURS PER DAY: ICD-10-PCS | Performed by: INTERNAL MEDICINE

## 2023-09-08 PROCEDURE — 93005 ELECTROCARDIOGRAM TRACING: CPT | Performed by: STUDENT IN AN ORGANIZED HEALTH CARE EDUCATION/TRAINING PROGRAM

## 2023-09-08 PROCEDURE — 6360000002 HC RX W HCPCS: Performed by: INTERNAL MEDICINE

## 2023-09-08 PROCEDURE — 86706 HEP B SURFACE ANTIBODY: CPT

## 2023-09-08 PROCEDURE — 90935 HEMODIALYSIS ONE EVALUATION: CPT

## 2023-09-08 PROCEDURE — 99285 EMERGENCY DEPT VISIT HI MDM: CPT

## 2023-09-08 PROCEDURE — 1100000000 HC RM PRIVATE

## 2023-09-08 PROCEDURE — 87340 HEPATITIS B SURFACE AG IA: CPT

## 2023-09-08 PROCEDURE — 71045 X-RAY EXAM CHEST 1 VIEW: CPT

## 2023-09-08 PROCEDURE — 96374 THER/PROPH/DIAG INJ IV PUSH: CPT

## 2023-09-08 PROCEDURE — 83880 ASSAY OF NATRIURETIC PEPTIDE: CPT

## 2023-09-08 PROCEDURE — 36415 COLL VENOUS BLD VENIPUNCTURE: CPT

## 2023-09-08 PROCEDURE — 6360000002 HC RX W HCPCS: Performed by: STUDENT IN AN ORGANIZED HEALTH CARE EDUCATION/TRAINING PROGRAM

## 2023-09-08 PROCEDURE — 2580000003 HC RX 258: Performed by: INTERNAL MEDICINE

## 2023-09-08 PROCEDURE — 6370000000 HC RX 637 (ALT 250 FOR IP): Performed by: STUDENT IN AN ORGANIZED HEALTH CARE EDUCATION/TRAINING PROGRAM

## 2023-09-08 PROCEDURE — 85025 COMPLETE CBC W/AUTO DIFF WBC: CPT

## 2023-09-08 PROCEDURE — 80053 COMPREHEN METABOLIC PANEL: CPT

## 2023-09-08 RX ORDER — ACETAMINOPHEN 325 MG/1
650 TABLET ORAL EVERY 4 HOURS PRN
Status: DISCONTINUED | OUTPATIENT
Start: 2023-09-08 | End: 2023-09-08 | Stop reason: SDUPTHER

## 2023-09-08 RX ORDER — LOPERAMIDE HYDROCHLORIDE 2 MG/1
2 CAPSULE ORAL 4 TIMES DAILY PRN
Status: DISCONTINUED | OUTPATIENT
Start: 2023-09-08 | End: 2023-09-10 | Stop reason: HOSPADM

## 2023-09-08 RX ORDER — SODIUM CHLORIDE 0.9 % (FLUSH) 0.9 %
5-40 SYRINGE (ML) INJECTION PRN
Status: DISCONTINUED | OUTPATIENT
Start: 2023-09-08 | End: 2023-09-10 | Stop reason: HOSPADM

## 2023-09-08 RX ORDER — BUPRENORPHINE HYDROCHLORIDE AND NALOXONE HYDROCHLORIDE DIHYDRATE 2; .5 MG/1; MG/1
2 TABLET SUBLINGUAL PRN
Status: DISCONTINUED | OUTPATIENT
Start: 2023-09-08 | End: 2023-09-08

## 2023-09-08 RX ORDER — SODIUM CHLORIDE 9 MG/ML
INJECTION, SOLUTION INTRAVENOUS PRN
Status: DISCONTINUED | OUTPATIENT
Start: 2023-09-08 | End: 2023-09-10 | Stop reason: HOSPADM

## 2023-09-08 RX ORDER — GABAPENTIN 100 MG/1
200 CAPSULE ORAL ONCE
Status: COMPLETED | OUTPATIENT
Start: 2023-09-08 | End: 2023-09-08

## 2023-09-08 RX ORDER — ACETAMINOPHEN 500 MG
1000 TABLET ORAL
Status: COMPLETED | OUTPATIENT
Start: 2023-09-08 | End: 2023-09-08

## 2023-09-08 RX ORDER — CLONIDINE HYDROCHLORIDE 0.1 MG/1
0.1 TABLET ORAL EVERY 6 HOURS PRN
Status: DISCONTINUED | OUTPATIENT
Start: 2023-09-08 | End: 2023-09-10 | Stop reason: HOSPADM

## 2023-09-08 RX ORDER — INSULIN LISPRO 100 [IU]/ML
0-4 INJECTION, SOLUTION INTRAVENOUS; SUBCUTANEOUS NIGHTLY
Status: DISCONTINUED | OUTPATIENT
Start: 2023-09-08 | End: 2023-09-10 | Stop reason: HOSPADM

## 2023-09-08 RX ORDER — HYDRALAZINE HYDROCHLORIDE 20 MG/ML
10 INJECTION INTRAMUSCULAR; INTRAVENOUS EVERY 6 HOURS PRN
Status: DISCONTINUED | OUTPATIENT
Start: 2023-09-08 | End: 2023-09-10 | Stop reason: HOSPADM

## 2023-09-08 RX ORDER — ONDANSETRON 4 MG/1
4 TABLET, ORALLY DISINTEGRATING ORAL EVERY 8 HOURS PRN
Status: DISCONTINUED | OUTPATIENT
Start: 2023-09-08 | End: 2023-09-10 | Stop reason: HOSPADM

## 2023-09-08 RX ORDER — BUPRENORPHINE AND NALOXONE 8; 2 MG/1; MG/1
1 FILM, SOLUBLE BUCCAL; SUBLINGUAL
Status: COMPLETED | OUTPATIENT
Start: 2023-09-08 | End: 2023-09-08

## 2023-09-08 RX ORDER — LORAZEPAM 2 MG/ML
1 INJECTION INTRAMUSCULAR ONCE
Status: COMPLETED | OUTPATIENT
Start: 2023-09-08 | End: 2023-09-08

## 2023-09-08 RX ORDER — OLANZAPINE 2.5 MG/1
2.5 TABLET ORAL DAILY PRN
Status: DISCONTINUED | OUTPATIENT
Start: 2023-09-08 | End: 2023-09-10 | Stop reason: HOSPADM

## 2023-09-08 RX ORDER — HYDRALAZINE HYDROCHLORIDE 50 MG/1
50 TABLET, FILM COATED ORAL 3 TIMES DAILY
Status: DISCONTINUED | OUTPATIENT
Start: 2023-09-08 | End: 2023-09-10 | Stop reason: HOSPADM

## 2023-09-08 RX ORDER — POLYETHYLENE GLYCOL 3350 17 G/17G
17 POWDER, FOR SOLUTION ORAL DAILY PRN
Status: DISCONTINUED | OUTPATIENT
Start: 2023-09-08 | End: 2023-09-10 | Stop reason: HOSPADM

## 2023-09-08 RX ORDER — CLONIDINE HYDROCHLORIDE 0.1 MG/1
0.3 TABLET ORAL 2 TIMES DAILY
Status: DISCONTINUED | OUTPATIENT
Start: 2023-09-08 | End: 2023-09-10 | Stop reason: HOSPADM

## 2023-09-08 RX ORDER — ONDANSETRON 2 MG/ML
4 INJECTION INTRAMUSCULAR; INTRAVENOUS EVERY 4 HOURS PRN
Status: DISCONTINUED | OUTPATIENT
Start: 2023-09-08 | End: 2023-09-08 | Stop reason: SDUPTHER

## 2023-09-08 RX ORDER — ACETAMINOPHEN 325 MG/1
650 TABLET ORAL EVERY 6 HOURS PRN
Status: DISCONTINUED | OUTPATIENT
Start: 2023-09-08 | End: 2023-09-10 | Stop reason: HOSPADM

## 2023-09-08 RX ORDER — AMLODIPINE BESYLATE 5 MG/1
10 TABLET ORAL
Status: COMPLETED | OUTPATIENT
Start: 2023-09-08 | End: 2023-09-08

## 2023-09-08 RX ORDER — CLONIDINE HYDROCHLORIDE 0.1 MG/1
0.3 TABLET ORAL
Status: COMPLETED | OUTPATIENT
Start: 2023-09-08 | End: 2023-09-08

## 2023-09-08 RX ORDER — DIPHENHYDRAMINE HCL 25 MG
25 CAPSULE ORAL NIGHTLY PRN
Status: DISCONTINUED | OUTPATIENT
Start: 2023-09-08 | End: 2023-09-10 | Stop reason: HOSPADM

## 2023-09-08 RX ORDER — KETOROLAC TROMETHAMINE 30 MG/ML
15 INJECTION, SOLUTION INTRAMUSCULAR; INTRAVENOUS ONCE
Status: COMPLETED | OUTPATIENT
Start: 2023-09-08 | End: 2023-09-08

## 2023-09-08 RX ORDER — SODIUM CHLORIDE 0.9 % (FLUSH) 0.9 %
5-40 SYRINGE (ML) INJECTION EVERY 12 HOURS SCHEDULED
Status: DISCONTINUED | OUTPATIENT
Start: 2023-09-08 | End: 2023-09-10 | Stop reason: HOSPADM

## 2023-09-08 RX ORDER — ACETAMINOPHEN 650 MG/1
650 SUPPOSITORY RECTAL EVERY 6 HOURS PRN
Status: DISCONTINUED | OUTPATIENT
Start: 2023-09-08 | End: 2023-09-10 | Stop reason: HOSPADM

## 2023-09-08 RX ORDER — BUPRENORPHINE AND NALOXONE 2; .5 MG/1; MG/1
2 FILM, SOLUBLE BUCCAL; SUBLINGUAL PRN
Status: DISCONTINUED | OUTPATIENT
Start: 2023-09-08 | End: 2023-09-10 | Stop reason: HOSPADM

## 2023-09-08 RX ORDER — HEPARIN SODIUM 5000 [USP'U]/ML
5000 INJECTION, SOLUTION INTRAVENOUS; SUBCUTANEOUS EVERY 8 HOURS SCHEDULED
Status: DISCONTINUED | OUTPATIENT
Start: 2023-09-08 | End: 2023-09-10 | Stop reason: HOSPADM

## 2023-09-08 RX ORDER — INSULIN LISPRO 100 [IU]/ML
0-4 INJECTION, SOLUTION INTRAVENOUS; SUBCUTANEOUS
Status: DISCONTINUED | OUTPATIENT
Start: 2023-09-08 | End: 2023-09-10 | Stop reason: HOSPADM

## 2023-09-08 RX ORDER — METHYLPREDNISOLONE 4 MG/1
TABLET ORAL
Qty: 1 KIT | Refills: 0 | Status: SHIPPED | OUTPATIENT
Start: 2023-09-08 | End: 2023-09-14

## 2023-09-08 RX ORDER — PREDNISONE 20 MG/1
40 TABLET ORAL ONCE
Status: COMPLETED | OUTPATIENT
Start: 2023-09-08 | End: 2023-09-08

## 2023-09-08 RX ORDER — HYDRALAZINE HYDROCHLORIDE 50 MG/1
50 TABLET, FILM COATED ORAL
Status: COMPLETED | OUTPATIENT
Start: 2023-09-08 | End: 2023-09-08

## 2023-09-08 RX ORDER — ONDANSETRON 2 MG/ML
4 INJECTION INTRAMUSCULAR; INTRAVENOUS EVERY 6 HOURS PRN
Status: DISCONTINUED | OUTPATIENT
Start: 2023-09-08 | End: 2023-09-10 | Stop reason: HOSPADM

## 2023-09-08 RX ADMIN — ACETAMINOPHEN 1000 MG: 500 TABLET ORAL at 09:15

## 2023-09-08 RX ADMIN — CLONIDINE HYDROCHLORIDE 0.3 MG: 0.1 TABLET ORAL at 09:16

## 2023-09-08 RX ADMIN — LORAZEPAM 1 MG: 2 INJECTION INTRAMUSCULAR; INTRAVENOUS at 12:51

## 2023-09-08 RX ADMIN — BUPRENORPHINE AND NALOXONE 1 FILM: 8; 2 FILM, SOLUBLE BUCCAL; SUBLINGUAL at 13:14

## 2023-09-08 RX ADMIN — HEPARIN SODIUM 5000 UNITS: 5000 INJECTION INTRAVENOUS; SUBCUTANEOUS at 17:12

## 2023-09-08 RX ADMIN — CLONIDINE HYDROCHLORIDE 0.3 MG: 0.1 TABLET ORAL at 22:44

## 2023-09-08 RX ADMIN — CLONIDINE HYDROCHLORIDE 0.1 MG: 0.1 TABLET ORAL at 18:22

## 2023-09-08 RX ADMIN — BUPRENORPHINE AND NALOXONE 1 FILM: 8; 2 FILM, SOLUBLE BUCCAL; SUBLINGUAL at 11:50

## 2023-09-08 RX ADMIN — PREDNISONE 40 MG: 20 TABLET ORAL at 15:47

## 2023-09-08 RX ADMIN — DIPHENHYDRAMINE HYDROCHLORIDE 25 MG: 25 CAPSULE ORAL at 22:53

## 2023-09-08 RX ADMIN — OLANZAPINE 2.5 MG: 2.5 TABLET, FILM COATED ORAL at 17:57

## 2023-09-08 RX ADMIN — HYDRALAZINE HYDROCHLORIDE 50 MG: 50 TABLET, FILM COATED ORAL at 09:16

## 2023-09-08 RX ADMIN — GABAPENTIN 200 MG: 100 CAPSULE ORAL at 15:47

## 2023-09-08 RX ADMIN — HYDRALAZINE HYDROCHLORIDE 50 MG: 50 TABLET, FILM COATED ORAL at 17:12

## 2023-09-08 RX ADMIN — AMLODIPINE BESYLATE 10 MG: 5 TABLET ORAL at 09:16

## 2023-09-08 RX ADMIN — HEPARIN SODIUM 5000 UNITS: 5000 INJECTION INTRAVENOUS; SUBCUTANEOUS at 22:44

## 2023-09-08 RX ADMIN — BUPRENORPHINE AND NALOXONE 2 FILM: 2; .5 FILM, SOLUBLE BUCCAL; SUBLINGUAL at 19:50

## 2023-09-08 RX ADMIN — SODIUM CHLORIDE, PRESERVATIVE FREE 10 ML: 5 INJECTION INTRAVENOUS at 22:48

## 2023-09-08 RX ADMIN — KETOROLAC TROMETHAMINE 15 MG: 30 INJECTION, SOLUTION INTRAMUSCULAR at 09:17

## 2023-09-08 RX ADMIN — HYDRALAZINE HYDROCHLORIDE 50 MG: 50 TABLET, FILM COATED ORAL at 22:45

## 2023-09-08 ASSESSMENT — PAIN SCALES - GENERAL
PAINLEVEL_OUTOF10: 10
PAINLEVEL_OUTOF10: 10
PAINLEVEL_OUTOF10: 5
PAINLEVEL_OUTOF10: 10
PAINLEVEL_OUTOF10: 10
PAINLEVEL_OUTOF10: 5
PAINLEVEL_OUTOF10: 10

## 2023-09-08 ASSESSMENT — PAIN DESCRIPTION - LOCATION
LOCATION: ARM

## 2023-09-08 ASSESSMENT — PAIN - FUNCTIONAL ASSESSMENT: PAIN_FUNCTIONAL_ASSESSMENT: 0-10

## 2023-09-08 NOTE — ED NOTES
Pt screaming out \"nurse\" from room despite having call bell light. Upon entering, pt requesting pain meds, instructed to pt, RN would have to ask provider. Pt becoming irritable and upset with RN.  RN removed self from room      OsmanInd[facility] University Health North Hospitalmoise ValadezAsbury, [place]  09/08/23 1007

## 2023-09-08 NOTE — FLOWSHEET NOTE
IV will not flush or draw blood, new one started, patient requested the old IV be left in until the pain medicine works, unable to change his mind

## 2023-09-08 NOTE — H&P
8/20/2023  Clinical history: right sided chest pain and vibrating sensation. Has right chest dialysis catheter INDICATION:   right sided chest pain and vibrating sensation. Has right chest dialysis catheter COMPARISON: 6/5/2023 FINDINGS: AP portable upright view of the chest demonstrates an enlarged cardiopericardial silhouette. There is associated increased interstitial opacity. .There is no focal consolidation. .There is no pneumothorax. . Patient is on a cardiac monitor. Right-sided dialysis catheter in place. Cardiomegaly and mild interstitial pulmonary edema.       _______________________________________________________________________    TOTAL TIME:  76 960 Robbie Walsh Provided     Minutes non procedure based    Signed: Giovany Hagen MD    Procedures: see electronic medical records for all procedures/Xrays and details which were not copied into this note but were reviewed prior to creation of Plan.

## 2023-09-08 NOTE — ED PROVIDER NOTES
management. Decision regarding hospitalization. ED Course as of 09/08/23 1529   Fri Sep 08, 2023   0900 Notably hypertensive at 378 systolic. Did not take his home medications we will give him p.o. amlodipine clonidine and hydralazine. I did review his EKG. Has some T wave inversions in lateral leads and notable LVH but no STEMI. [JS]   8195 Reviewed his lab work. Troponin was elevated but coming down. This is appropriate in the setting of his ESRD and his EKG not consistent with STEMI likely just retention from failed clearance and he did not get his dialysis this morning. Did discuss with the patient staying for dialysis but he refuses and saying he wants to go home. A bit upset because he is still in pain however I did discuss not using opioids given his substance abuse history which she somewhat understands. Did discuss risks of leaving which include pulmonary edema, hyperkalemia, respiratory failure and cardiac arrest and even death. Patient notes he still wants to go home. We will start him on a steroid pack for likely cervical radiculopathy causing this pain I did discuss with him better sleeping habits. [JS]   1525 Attempted to discharge the patient however he was beginning to scream in pain and painful walls. Patient was reassessed. He has good pulses and notes his pain keeps jumping from his chest to his arms over the course of several seconds. Multiple etiologies exist for this including fluid overload from his missed dialysis this morning, anginal pain, aortic dissection, withdrawal.  Patient was giving another dose of Suboxone as well as some Ativan. Attempted to get a CTA but patient was moving too much of the table and was taken back to the room. We will get repeat EKG. Discussed the case with nephrology who will dialyze the patient given he does have a pulmonary edema on chest x-ray. BNP also greater than 35,000.   Will need mission for pain control, withdrawal management,

## 2023-09-08 NOTE — ED NOTES
Patient still yelling from his room, unable to sit still for CT, it will be delayed, MD notified, no new orders     Harmony Lopez RN  09/08/23 7088

## 2023-09-09 ENCOUNTER — APPOINTMENT (OUTPATIENT)
Facility: HOSPITAL | Age: 58
DRG: 314 | End: 2023-09-09
Attending: INTERNAL MEDICINE
Payer: MEDICARE

## 2023-09-09 ENCOUNTER — APPOINTMENT (OUTPATIENT)
Facility: HOSPITAL | Age: 58
DRG: 314 | End: 2023-09-09
Payer: MEDICARE

## 2023-09-09 LAB
ANION GAP SERPL CALC-SCNC: 7 MMOL/L (ref 5–15)
BASOPHILS # BLD: 0 K/UL (ref 0–0.1)
BASOPHILS NFR BLD: 0 % (ref 0–1)
BUN SERPL-MCNC: 23 MG/DL (ref 6–20)
BUN/CREAT SERPL: 5 (ref 12–20)
CALCIUM SERPL-MCNC: 8.6 MG/DL (ref 8.5–10.1)
CHLORIDE SERPL-SCNC: 104 MMOL/L (ref 97–108)
CO2 SERPL-SCNC: 24 MMOL/L (ref 21–32)
CREAT SERPL-MCNC: 4.78 MG/DL (ref 0.7–1.3)
DIFFERENTIAL METHOD BLD: ABNORMAL
ECHO AO ROOT DIAM: 3.7 CM
ECHO AO ROOT INDEX: 2.08 CM/M2
ECHO AV AREA PEAK VELOCITY: 3.2 CM2
ECHO AV AREA/BSA PEAK VELOCITY: 1.8 CM2/M2
ECHO AV PEAK GRADIENT: 8 MMHG
ECHO AV PEAK VELOCITY: 1.4 M/S
ECHO AV VELOCITY RATIO: 0.71
ECHO BSA: 1.78 M2
ECHO LA DIAMETER INDEX: 2.08 CM/M2
ECHO LA DIAMETER: 3.7 CM
ECHO LA TO AORTIC ROOT RATIO: 1
ECHO LV FRACTIONAL SHORTENING: 16 % (ref 28–44)
ECHO LV INTERNAL DIMENSION DIASTOLE INDEX: 2.47 CM/M2
ECHO LV INTERNAL DIMENSION DIASTOLIC: 4.4 CM (ref 4.2–5.9)
ECHO LV INTERNAL DIMENSION SYSTOLIC INDEX: 2.08 CM/M2
ECHO LV INTERNAL DIMENSION SYSTOLIC: 3.7 CM
ECHO LV IVSD: 1.6 CM (ref 0.6–1)
ECHO LV MASS 2D: 266.9 G (ref 88–224)
ECHO LV MASS INDEX 2D: 149.9 G/M2 (ref 49–115)
ECHO LV POSTERIOR WALL DIASTOLIC: 1.4 CM (ref 0.6–1)
ECHO LV RELATIVE WALL THICKNESS RATIO: 0.64
ECHO LVOT AREA: 4.5 CM2
ECHO LVOT DIAM: 2.4 CM
ECHO LVOT PEAK GRADIENT: 4 MMHG
ECHO LVOT PEAK VELOCITY: 1 M/S
ECHO PV MAX VELOCITY: 0.7 M/S
ECHO PV PEAK GRADIENT: 2 MMHG
ECHO RV INTERNAL DIMENSION: 4.5 CM
ECHO RV TAPSE: 1.9 CM (ref 1.7–?)
ECHO TV REGURGITANT MAX VELOCITY: 2.22 M/S
ECHO TV REGURGITANT PEAK GRADIENT: 20 MMHG
EKG ATRIAL RATE: 86 BPM
EKG DIAGNOSIS: NORMAL
EKG P AXIS: 74 DEGREES
EKG P-R INTERVAL: 132 MS
EKG Q-T INTERVAL: 380 MS
EKG QRS DURATION: 98 MS
EKG QTC CALCULATION (BAZETT): 454 MS
EKG R AXIS: -18 DEGREES
EKG T AXIS: 177 DEGREES
EKG VENTRICULAR RATE: 86 BPM
EOSINOPHIL # BLD: 0 K/UL (ref 0–0.4)
EOSINOPHIL NFR BLD: 0 % (ref 0–7)
ERYTHROCYTE [DISTWIDTH] IN BLOOD BY AUTOMATED COUNT: 18.7 % (ref 11.5–14.5)
GLUCOSE BLD STRIP.AUTO-MCNC: 129 MG/DL (ref 65–117)
GLUCOSE BLD STRIP.AUTO-MCNC: 172 MG/DL (ref 65–117)
GLUCOSE BLD STRIP.AUTO-MCNC: 215 MG/DL (ref 65–117)
GLUCOSE SERPL-MCNC: 82 MG/DL (ref 65–100)
HBV SURFACE AB SER QL: REACTIVE
HBV SURFACE AB SER-ACNC: 166.81 MIU/ML
HBV SURFACE AG SER QL: <0.1 INDEX
HBV SURFACE AG SER QL: NEGATIVE
HCT VFR BLD AUTO: 41.2 % (ref 36.6–50.3)
HGB BLD-MCNC: 14 G/DL (ref 12.1–17)
IMM GRANULOCYTES # BLD AUTO: 0 K/UL (ref 0–0.04)
IMM GRANULOCYTES NFR BLD AUTO: 0 % (ref 0–0.5)
LYMPHOCYTES # BLD: 0.4 K/UL (ref 0.8–3.5)
LYMPHOCYTES NFR BLD: 7 % (ref 12–49)
MAGNESIUM SERPL-MCNC: 2.2 MG/DL (ref 1.6–2.4)
MCH RBC QN AUTO: 29.4 PG (ref 26–34)
MCHC RBC AUTO-ENTMCNC: 34 G/DL (ref 30–36.5)
MCV RBC AUTO: 86.4 FL (ref 80–99)
MONOCYTES # BLD: 0.3 K/UL (ref 0–1)
MONOCYTES NFR BLD: 6 % (ref 5–13)
NEUTS SEG # BLD: 4.5 K/UL (ref 1.8–8)
NEUTS SEG NFR BLD: 87 % (ref 32–75)
NRBC # BLD: 0 K/UL (ref 0–0.01)
NRBC BLD-RTO: 0 PER 100 WBC
PLATELET # BLD AUTO: 235 K/UL (ref 150–400)
PMV BLD AUTO: 10.1 FL (ref 8.9–12.9)
POTASSIUM SERPL-SCNC: 3.5 MMOL/L (ref 3.5–5.1)
RBC # BLD AUTO: 4.77 M/UL (ref 4.1–5.7)
RBC MORPH BLD: ABNORMAL
SERVICE CMNT-IMP: ABNORMAL
SODIUM SERPL-SCNC: 135 MMOL/L (ref 136–145)
TROPONIN I SERPL HS-MCNC: 87 NG/L (ref 0–76)
WBC # BLD AUTO: 5.2 K/UL (ref 4.1–11.1)

## 2023-09-09 PROCEDURE — 1100000000 HC RM PRIVATE

## 2023-09-09 PROCEDURE — 85025 COMPLETE CBC W/AUTO DIFF WBC: CPT

## 2023-09-09 PROCEDURE — 93308 TTE F-UP OR LMTD: CPT

## 2023-09-09 PROCEDURE — 2580000003 HC RX 258: Performed by: INTERNAL MEDICINE

## 2023-09-09 PROCEDURE — 6370000000 HC RX 637 (ALT 250 FOR IP): Performed by: INTERNAL MEDICINE

## 2023-09-09 PROCEDURE — 6360000002 HC RX W HCPCS: Performed by: INTERNAL MEDICINE

## 2023-09-09 PROCEDURE — 71275 CT ANGIOGRAPHY CHEST: CPT

## 2023-09-09 PROCEDURE — 82962 GLUCOSE BLOOD TEST: CPT

## 2023-09-09 PROCEDURE — 6360000004 HC RX CONTRAST MEDICATION: Performed by: INTERNAL MEDICINE

## 2023-09-09 PROCEDURE — 80048 BASIC METABOLIC PNL TOTAL CA: CPT

## 2023-09-09 PROCEDURE — 84484 ASSAY OF TROPONIN QUANT: CPT

## 2023-09-09 PROCEDURE — 36415 COLL VENOUS BLD VENIPUNCTURE: CPT

## 2023-09-09 PROCEDURE — 83735 ASSAY OF MAGNESIUM: CPT

## 2023-09-09 RX ORDER — POTASSIUM CHLORIDE 20 MEQ/1
40 TABLET, EXTENDED RELEASE ORAL ONCE
Status: COMPLETED | OUTPATIENT
Start: 2023-09-09 | End: 2023-09-09

## 2023-09-09 RX ADMIN — IOPAMIDOL 100 ML: 755 INJECTION, SOLUTION INTRAVENOUS at 13:56

## 2023-09-09 RX ADMIN — HYDRALAZINE HYDROCHLORIDE 50 MG: 50 TABLET, FILM COATED ORAL at 11:04

## 2023-09-09 RX ADMIN — SODIUM CHLORIDE, PRESERVATIVE FREE 10 ML: 5 INJECTION INTRAVENOUS at 11:06

## 2023-09-09 RX ADMIN — CLONIDINE HYDROCHLORIDE 0.3 MG: 0.1 TABLET ORAL at 11:04

## 2023-09-09 RX ADMIN — POTASSIUM CHLORIDE 40 MEQ: 1500 TABLET, EXTENDED RELEASE ORAL at 11:04

## 2023-09-09 RX ADMIN — HEPARIN SODIUM 5000 UNITS: 5000 INJECTION INTRAVENOUS; SUBCUTANEOUS at 14:48

## 2023-09-09 RX ADMIN — SODIUM CHLORIDE, PRESERVATIVE FREE 10 ML: 5 INJECTION INTRAVENOUS at 21:19

## 2023-09-09 RX ADMIN — CLONIDINE HYDROCHLORIDE 0.3 MG: 0.1 TABLET ORAL at 21:18

## 2023-09-09 RX ADMIN — ACETAMINOPHEN 650 MG: 325 TABLET ORAL at 14:48

## 2023-09-09 RX ADMIN — HEPARIN SODIUM 5000 UNITS: 5000 INJECTION INTRAVENOUS; SUBCUTANEOUS at 21:19

## 2023-09-09 RX ADMIN — HEPARIN SODIUM 5000 UNITS: 5000 INJECTION INTRAVENOUS; SUBCUTANEOUS at 06:23

## 2023-09-09 ASSESSMENT — PAIN DESCRIPTION - LOCATION
LOCATION: ARM

## 2023-09-09 ASSESSMENT — PAIN DESCRIPTION - ORIENTATION
ORIENTATION: LEFT;RIGHT
ORIENTATION: LEFT

## 2023-09-09 ASSESSMENT — PAIN SCALES - GENERAL
PAINLEVEL_OUTOF10: 0
PAINLEVEL_OUTOF10: 6
PAINLEVEL_OUTOF10: 6
PAINLEVEL_OUTOF10: 4
PAINLEVEL_OUTOF10: 8

## 2023-09-09 ASSESSMENT — PAIN DESCRIPTION - DESCRIPTORS
DESCRIPTORS: PRESSURE
DESCRIPTORS: PRESSURE

## 2023-09-09 NOTE — PLAN OF CARE
Problem: Discharge Planning  Goal: Discharge to home or other facility with appropriate resources  Outcome: Progressing  Flowsheets (Taken 9/8/2023 1740 by Rosita Tracey, RN)  Discharge to home or other facility with appropriate resources:   Identify barriers to discharge with patient and caregiver   Arrange for needed discharge resources and transportation as appropriate   Identify discharge learning needs (meds, wound care, etc)     Problem: Pain  Goal: Verbalizes/displays adequate comfort level or baseline comfort level  Outcome: Progressing  Flowsheets (Taken 9/8/2023 1740 by Rosita Tracey RN)  Verbalizes/displays adequate comfort level or baseline comfort level:   Encourage patient to monitor pain and request assistance   Assess pain using appropriate pain scale   Implement non-pharmacological measures as appropriate and evaluate response     Problem: Chronic Conditions and Co-morbidities  Goal: Patient's chronic conditions and co-morbidity symptoms are monitored and maintained or improved  Outcome: Progressing  Flowsheets (Taken 9/8/2023 1740 by Rosita Tracey RN)  Care Plan - Patient's Chronic Conditions and Co-Morbidity Symptoms are Monitored and Maintained or Improved:   Monitor and assess patient's chronic conditions and comorbid symptoms for stability, deterioration, or improvement   Collaborate with multidisciplinary team to address chronic and comorbid conditions and prevent exacerbation or deterioration   Update acute care plan with appropriate goals if chronic or comorbid symptoms are exacerbated and prevent overall improvement and discharge     Problem: Safety - Adult  Goal: Free from fall injury  Outcome: Progressing

## 2023-09-10 ENCOUNTER — APPOINTMENT (OUTPATIENT)
Facility: HOSPITAL | Age: 58
DRG: 314 | End: 2023-09-10
Payer: MEDICARE

## 2023-09-10 VITALS
HEART RATE: 92 BPM | DIASTOLIC BLOOD PRESSURE: 57 MMHG | TEMPERATURE: 98 F | OXYGEN SATURATION: 98 % | SYSTOLIC BLOOD PRESSURE: 157 MMHG | WEIGHT: 145 LBS | RESPIRATION RATE: 17 BRPM | BODY MASS INDEX: 21.98 KG/M2 | HEIGHT: 68 IN

## 2023-09-10 LAB
ANION GAP SERPL CALC-SCNC: 9 MMOL/L (ref 5–15)
BUN SERPL-MCNC: 38 MG/DL (ref 6–20)
BUN/CREAT SERPL: 6 (ref 12–20)
CALCIUM SERPL-MCNC: 7.8 MG/DL (ref 8.5–10.1)
CHLORIDE SERPL-SCNC: 106 MMOL/L (ref 97–108)
CO2 SERPL-SCNC: 22 MMOL/L (ref 21–32)
CREAT SERPL-MCNC: 5.98 MG/DL (ref 0.7–1.3)
ERYTHROCYTE [DISTWIDTH] IN BLOOD BY AUTOMATED COUNT: 19.2 % (ref 11.5–14.5)
GLUCOSE BLD STRIP.AUTO-MCNC: 84 MG/DL (ref 65–117)
GLUCOSE SERPL-MCNC: 161 MG/DL (ref 65–100)
HCT VFR BLD AUTO: 38.5 % (ref 36.6–50.3)
HGB BLD-MCNC: 12.7 G/DL (ref 12.1–17)
LACTATE SERPL-SCNC: 1.9 MMOL/L (ref 0.4–2)
MAGNESIUM SERPL-MCNC: 2.3 MG/DL (ref 1.6–2.4)
MCH RBC QN AUTO: 29.4 PG (ref 26–34)
MCHC RBC AUTO-ENTMCNC: 33 G/DL (ref 30–36.5)
MCV RBC AUTO: 89.1 FL (ref 80–99)
NRBC # BLD: 0 K/UL (ref 0–0.01)
NRBC BLD-RTO: 0 PER 100 WBC
PLATELET # BLD AUTO: 200 K/UL (ref 150–400)
PMV BLD AUTO: 10.6 FL (ref 8.9–12.9)
POTASSIUM SERPL-SCNC: 3.3 MMOL/L (ref 3.5–5.1)
RBC # BLD AUTO: 4.32 M/UL (ref 4.1–5.7)
SERVICE CMNT-IMP: NORMAL
SODIUM SERPL-SCNC: 137 MMOL/L (ref 136–145)
TROPONIN I SERPL HS-MCNC: 101 NG/L (ref 0–76)
WBC # BLD AUTO: 6.5 K/UL (ref 4.1–11.1)

## 2023-09-10 PROCEDURE — 82962 GLUCOSE BLOOD TEST: CPT

## 2023-09-10 PROCEDURE — 36415 COLL VENOUS BLD VENIPUNCTURE: CPT

## 2023-09-10 PROCEDURE — 85027 COMPLETE CBC AUTOMATED: CPT

## 2023-09-10 PROCEDURE — 2580000003 HC RX 258: Performed by: INTERNAL MEDICINE

## 2023-09-10 PROCEDURE — 6370000000 HC RX 637 (ALT 250 FOR IP): Performed by: INTERNAL MEDICINE

## 2023-09-10 PROCEDURE — 83735 ASSAY OF MAGNESIUM: CPT

## 2023-09-10 PROCEDURE — 83605 ASSAY OF LACTIC ACID: CPT

## 2023-09-10 PROCEDURE — 6360000002 HC RX W HCPCS: Performed by: INTERNAL MEDICINE

## 2023-09-10 PROCEDURE — 80048 BASIC METABOLIC PNL TOTAL CA: CPT

## 2023-09-10 PROCEDURE — 84484 ASSAY OF TROPONIN QUANT: CPT

## 2023-09-10 RX ORDER — POTASSIUM CHLORIDE 20 MEQ/1
40 TABLET, EXTENDED RELEASE ORAL ONCE
Status: COMPLETED | OUTPATIENT
Start: 2023-09-10 | End: 2023-09-10

## 2023-09-10 RX ADMIN — CLONIDINE HYDROCHLORIDE 0.3 MG: 0.1 TABLET ORAL at 09:19

## 2023-09-10 RX ADMIN — BUPRENORPHINE AND NALOXONE 2 FILM: 2; .5 FILM, SOLUBLE BUCCAL; SUBLINGUAL at 07:43

## 2023-09-10 RX ADMIN — DIPHENHYDRAMINE HYDROCHLORIDE 25 MG: 25 CAPSULE ORAL at 01:48

## 2023-09-10 RX ADMIN — POTASSIUM CHLORIDE 40 MEQ: 1500 TABLET, EXTENDED RELEASE ORAL at 09:19

## 2023-09-10 RX ADMIN — HYDRALAZINE HYDROCHLORIDE 50 MG: 50 TABLET, FILM COATED ORAL at 09:19

## 2023-09-10 RX ADMIN — ACETAMINOPHEN 650 MG: 325 TABLET ORAL at 07:43

## 2023-09-10 RX ADMIN — SODIUM CHLORIDE, PRESERVATIVE FREE 10 ML: 5 INJECTION INTRAVENOUS at 09:20

## 2023-09-10 ASSESSMENT — PAIN SCALES - GENERAL
PAINLEVEL_OUTOF10: 6
PAINLEVEL_OUTOF10: 0
PAINLEVEL_OUTOF10: 4

## 2023-09-10 ASSESSMENT — PAIN DESCRIPTION - ORIENTATION: ORIENTATION: RIGHT;LEFT

## 2023-09-10 ASSESSMENT — PAIN DESCRIPTION - LOCATION: LOCATION: ARM

## 2023-09-10 ASSESSMENT — PAIN DESCRIPTION - DESCRIPTORS: DESCRIPTORS: ACHING

## 2023-09-10 NOTE — DISCHARGE SUMMARY
Code status: Full code  Recommended diet: renal diet  Recommended activity: activity as tolerated  Wound care: None      Follow up with:   PCP : Hannah Moran MD  No follow-up provider specified.         Total time in minutes spent coordinating this discharge (includes going over instructions, follow-up, prescriptions, and preparing report for sign off to her PCP) :  35 minutes

## 2023-09-12 ENCOUNTER — APPOINTMENT (OUTPATIENT)
Facility: HOSPITAL | Age: 58
End: 2023-09-12
Payer: MEDICARE

## 2023-09-12 ENCOUNTER — HOSPITAL ENCOUNTER (EMERGENCY)
Facility: HOSPITAL | Age: 58
Discharge: HOME | End: 2023-09-12
Attending: EMERGENCY MEDICINE
Payer: MEDICARE

## 2023-09-12 VITALS
SYSTOLIC BLOOD PRESSURE: 149 MMHG | HEART RATE: 81 BPM | DIASTOLIC BLOOD PRESSURE: 58 MMHG | BODY MASS INDEX: 21.99 KG/M2 | TEMPERATURE: 97.9 F | WEIGHT: 145.06 LBS | HEIGHT: 68 IN | RESPIRATION RATE: 18 BRPM | OXYGEN SATURATION: 98 %

## 2023-09-12 DIAGNOSIS — M50.90 CERVICAL DISC DISEASE: ICD-10-CM

## 2023-09-12 DIAGNOSIS — M54.12 CERVICAL RADICULOPATHY: Primary | ICD-10-CM

## 2023-09-12 LAB
ALBUMIN SERPL-MCNC: 2.4 G/DL (ref 3.5–5)
ALBUMIN/GLOB SERPL: 0.5 (ref 1.1–2.2)
ALP SERPL-CCNC: 170 U/L (ref 45–117)
ALT SERPL-CCNC: 13 U/L (ref 12–78)
ANION GAP SERPL CALC-SCNC: 8 MMOL/L (ref 5–15)
AST SERPL-CCNC: 33 U/L (ref 15–37)
BASOPHILS # BLD: 0.1 K/UL (ref 0–0.1)
BASOPHILS NFR BLD: 1 % (ref 0–1)
BILIRUB SERPL-MCNC: 0.6 MG/DL (ref 0.2–1)
BUN SERPL-MCNC: 39 MG/DL (ref 6–20)
BUN/CREAT SERPL: 6 (ref 12–20)
CALCIUM SERPL-MCNC: 7.8 MG/DL (ref 8.5–10.1)
CHLORIDE SERPL-SCNC: 106 MMOL/L (ref 97–108)
CO2 SERPL-SCNC: 22 MMOL/L (ref 21–32)
CREAT SERPL-MCNC: 6.4 MG/DL (ref 0.7–1.3)
CRP SERPL-MCNC: 0.82 MG/DL (ref 0–0.6)
DIFFERENTIAL METHOD BLD: ABNORMAL
EOSINOPHIL # BLD: 0.2 K/UL (ref 0–0.4)
EOSINOPHIL NFR BLD: 2 % (ref 0–7)
ERYTHROCYTE [DISTWIDTH] IN BLOOD BY AUTOMATED COUNT: 18.7 % (ref 11.5–14.5)
ERYTHROCYTE [SEDIMENTATION RATE] IN BLOOD: 28 MM/HR (ref 0–20)
GLOBULIN SER CALC-MCNC: 5.2 G/DL (ref 2–4)
GLUCOSE BLD STRIP.AUTO-MCNC: 106 MG/DL (ref 65–117)
GLUCOSE BLD STRIP.AUTO-MCNC: 64 MG/DL (ref 65–117)
GLUCOSE SERPL-MCNC: 155 MG/DL (ref 65–100)
HCT VFR BLD AUTO: 36.9 % (ref 36.6–50.3)
HGB BLD-MCNC: 11.6 G/DL (ref 12.1–17)
IMM GRANULOCYTES # BLD AUTO: 0 K/UL (ref 0–0.04)
IMM GRANULOCYTES NFR BLD AUTO: 0 % (ref 0–0.5)
LYMPHOCYTES # BLD: 0.6 K/UL (ref 0.8–3.5)
LYMPHOCYTES NFR BLD: 7 % (ref 12–49)
MCH RBC QN AUTO: 28.4 PG (ref 26–34)
MCHC RBC AUTO-ENTMCNC: 31.4 G/DL (ref 30–36.5)
MCV RBC AUTO: 90.4 FL (ref 80–99)
MONOCYTES # BLD: 0.7 K/UL (ref 0–1)
MONOCYTES NFR BLD: 9 % (ref 5–13)
NEUTS SEG # BLD: 6.6 K/UL (ref 1.8–8)
NEUTS SEG NFR BLD: 81 % (ref 32–75)
NRBC # BLD: 0 K/UL (ref 0–0.01)
NRBC BLD-RTO: 0 PER 100 WBC
PLATELET # BLD AUTO: 179 K/UL (ref 150–400)
PMV BLD AUTO: 10 FL (ref 8.9–12.9)
POTASSIUM SERPL-SCNC: 3.5 MMOL/L (ref 3.5–5.1)
PROT SERPL-MCNC: 7.6 G/DL (ref 6.4–8.2)
RBC # BLD AUTO: 4.08 M/UL (ref 4.1–5.7)
RBC MORPH BLD: ABNORMAL
SERVICE CMNT-IMP: ABNORMAL
SERVICE CMNT-IMP: NORMAL
SODIUM SERPL-SCNC: 136 MMOL/L (ref 136–145)
WBC # BLD AUTO: 8.2 K/UL (ref 4.1–11.1)

## 2023-09-12 PROCEDURE — 85652 RBC SED RATE AUTOMATED: CPT

## 2023-09-12 PROCEDURE — 72141 MRI NECK SPINE W/O DYE: CPT

## 2023-09-12 PROCEDURE — 86140 C-REACTIVE PROTEIN: CPT

## 2023-09-12 PROCEDURE — 96372 THER/PROPH/DIAG INJ SC/IM: CPT

## 2023-09-12 PROCEDURE — 99284 EMERGENCY DEPT VISIT MOD MDM: CPT

## 2023-09-12 PROCEDURE — 2500000003 HC RX 250 WO HCPCS: Performed by: EMERGENCY MEDICINE

## 2023-09-12 PROCEDURE — 72125 CT NECK SPINE W/O DYE: CPT

## 2023-09-12 PROCEDURE — 85025 COMPLETE CBC W/AUTO DIFF WBC: CPT

## 2023-09-12 PROCEDURE — 6370000000 HC RX 637 (ALT 250 FOR IP): Performed by: EMERGENCY MEDICINE

## 2023-09-12 PROCEDURE — 36415 COLL VENOUS BLD VENIPUNCTURE: CPT

## 2023-09-12 PROCEDURE — 80053 COMPREHEN METABOLIC PANEL: CPT

## 2023-09-12 PROCEDURE — 82962 GLUCOSE BLOOD TEST: CPT

## 2023-09-12 RX ORDER — GABAPENTIN 300 MG/1
300 CAPSULE ORAL 3 TIMES DAILY
Qty: 90 CAPSULE | Refills: 0 | Status: SHIPPED | OUTPATIENT
Start: 2023-09-12 | End: 2023-10-12

## 2023-09-12 RX ORDER — OXYCODONE HYDROCHLORIDE 5 MG/1
5 TABLET ORAL ONCE
Status: COMPLETED | OUTPATIENT
Start: 2023-09-12 | End: 2023-09-12

## 2023-09-12 RX ORDER — HYDROMORPHONE HYDROCHLORIDE 1 MG/ML
1 INJECTION, SOLUTION INTRAMUSCULAR; INTRAVENOUS; SUBCUTANEOUS
Status: COMPLETED | OUTPATIENT
Start: 2023-09-12 | End: 2023-09-12

## 2023-09-12 RX ORDER — DEXAMETHASONE 2 MG/1
TABLET ORAL
Qty: 24 TABLET | Refills: 0 | Status: SHIPPED | OUTPATIENT
Start: 2023-09-12 | End: 2023-09-19

## 2023-09-12 RX ORDER — HYDROMORPHONE HYDROCHLORIDE 1 MG/ML
1 INJECTION, SOLUTION INTRAMUSCULAR; INTRAVENOUS; SUBCUTANEOUS
Status: DISCONTINUED | OUTPATIENT
Start: 2023-09-12 | End: 2023-09-12

## 2023-09-12 RX ADMIN — HYDROMORPHONE HYDROCHLORIDE 1 MG: 1 INJECTION, SOLUTION INTRAMUSCULAR; INTRAVENOUS; SUBCUTANEOUS at 09:09

## 2023-09-12 RX ADMIN — OXYCODONE HYDROCHLORIDE 5 MG: 5 TABLET ORAL at 07:28

## 2023-09-12 ASSESSMENT — PAIN DESCRIPTION - LOCATION
LOCATION: ARM

## 2023-09-12 ASSESSMENT — LIFESTYLE VARIABLES
HOW OFTEN DO YOU HAVE A DRINK CONTAINING ALCOHOL: PATIENT DECLINED
HOW MANY STANDARD DRINKS CONTAINING ALCOHOL DO YOU HAVE ON A TYPICAL DAY: PATIENT DECLINED

## 2023-09-12 ASSESSMENT — PAIN SCALES - GENERAL
PAINLEVEL_OUTOF10: 10
PAINLEVEL_OUTOF10: 10
PAINLEVEL_OUTOF10: 8
PAINLEVEL_OUTOF10: 9

## 2023-09-12 ASSESSMENT — PAIN DESCRIPTION - DESCRIPTORS
DESCRIPTORS: ACHING
DESCRIPTORS: THROBBING

## 2023-09-12 ASSESSMENT — PAIN DESCRIPTION - ORIENTATION
ORIENTATION: RIGHT;LEFT
ORIENTATION: RIGHT;LEFT

## 2023-09-12 NOTE — ED NOTES
Call to MRI for ETA of patients MRI ordered; was told that earliest ETA will most likely be around 1430.      Ezra Mahmood RN  09/12/23 2068

## 2023-09-12 NOTE — ED PROVIDER NOTES
[facility] EMERGENCY DEPT  EMERGENCY DEPARTMENT ENCOUNTER       Pt Name: Daniela Christian  MRN: [id number]  [address] [facility] 1965  Date of evaluation: 9/12/2023  Provider: Chanel Lopez DO   PCP: Kari Dhillon MD  Note Started: 5:35 AM EDT 9/12/23     CHIEF COMPLAINT       Chief Complaint   Patient presents with    Arm Pain     Patient presents to ED Room 04 via stretcher with [facility] complaining of bilateral arm pain. Per EMS patient is having bilateral arm pain with no recent falls or injuries. Patient was seen and treated here on 09/08/2023 for the same concern per EMS. HISTORY OF PRESENT ILLNESS: 1 or more elements      History From: Patient, History limited by: none     Daniela Christian is a 63 y.o. male past medical history significant for polysubstance use, chronic kidney disease, dialysis presenting emergency department complaining of bilateral arm pain. He states that been going on intermittently \"for a while \". Became more severe last Friday, was hospitalized for ACS rule out, had dialysis while hospitalized, he left AGAINST MEDICAL ADVICE as he states \"nothing was being done \". He reports ongoing severe pain in the right arm, had dialysis yesterday. Pain is worse with movement, relieved by certain positions, exacerbated by certain positions, relieved by rubbing the arm. Please See MDM for Additional Details of the HPI/PMH  Nursing Notes were all reviewed and agreed with or any disagreements were addressed in the HPI. REVIEW OF SYSTEMS        Positives and Pertinent negatives as per HPI.     PAST HISTORY     Past Medical History:  Past Medical History:   Diagnosis Date    Acute systolic heart failure ([address]) 2/21/2020    Cardiomyopathy ([address]) 2/21/2020    Chronic kidney disease     Cocaine abuse ([address])     COPD (chronic obstructive pulmonary disease) ([address])     Diabetes ([address])     Drug abuse, IV ([address])     Encounter to establish care with new doctor 10/11/2018    Hepatitis C 1998    Hypertension

## 2023-09-12 NOTE — DISCHARGE INSTRUCTIONS
Neurontin is a neuropathic pain medication. The prescription that I have written for you is different than the way I want you to take it. Start off with taking 300 mg at bedtime. If after 2 days you are still continuing to have pain and discomfort you can increase dosing to once in the morning and once at night or double the dose at bedtime. A third dose can be added after a few days if you are tolerating the medicine he continued to have pain and can be taken as 1 in the morning 1 in the afternoon or 1 at night you may take 1 in the morning and 2 at night or take 3 at bedtime. For any further dosing it should be done by your primary care physician or physician that we have asked you to follow-up with.

## 2023-09-12 NOTE — ED NOTES
Patient states that he feels that his sugar is getting low, Bg 64, Dr Blair Mediate called and patient will get orange juice times 2.     Juice given and will recheck in 15 minutes     Tiffanie Israel RN  09/12/23 9115

## 2023-09-23 ENCOUNTER — HOSPITAL ENCOUNTER (INPATIENT)
Facility: HOSPITAL | Age: 58
LOS: 8 days | Discharge: LEFT BEFORE DISCHARGE | DRG: 981 | End: 2023-10-01
Attending: EMERGENCY MEDICINE | Admitting: FAMILY MEDICINE
Payer: MEDICARE

## 2023-09-23 ENCOUNTER — APPOINTMENT (OUTPATIENT)
Facility: HOSPITAL | Age: 58
DRG: 981 | End: 2023-09-23
Payer: MEDICARE

## 2023-09-23 DIAGNOSIS — J81.0 ACUTE PULMONARY EDEMA (HCC): ICD-10-CM

## 2023-09-23 DIAGNOSIS — Z91.158 NON-COMPLIANCE WITH RENAL DIALYSIS: ICD-10-CM

## 2023-09-23 DIAGNOSIS — M54.12 CERVICAL RADICULOPATHY: Primary | ICD-10-CM

## 2023-09-23 DIAGNOSIS — Z98.1 S/P CERVICAL SPINAL FUSION: ICD-10-CM

## 2023-09-23 DIAGNOSIS — I50.9 CONGESTIVE HEART FAILURE, UNSPECIFIED HF CHRONICITY, UNSPECIFIED HEART FAILURE TYPE (HCC): ICD-10-CM

## 2023-09-23 DIAGNOSIS — M50.90 CERVICAL DISC DISEASE: ICD-10-CM

## 2023-09-23 DIAGNOSIS — R07.9 CHEST PAIN, UNSPECIFIED TYPE: ICD-10-CM

## 2023-09-23 PROBLEM — J96.00 ACUTE RESPIRATORY FAILURE, UNSP W HYPOXIA OR HYPERCAPNIA (HCC): Status: ACTIVE | Noted: 2023-09-23

## 2023-09-23 LAB
ALBUMIN SERPL-MCNC: 2.7 G/DL (ref 3.5–5)
ALBUMIN/GLOB SERPL: 0.5 (ref 1.1–2.2)
ALP SERPL-CCNC: 412 U/L (ref 45–117)
ALT SERPL-CCNC: 43 U/L (ref 12–78)
AMPHET UR QL SCN: NEGATIVE
ANION GAP SERPL CALC-SCNC: 8 MMOL/L (ref 5–15)
AST SERPL-CCNC: 77 U/L (ref 15–37)
BARBITURATES UR QL SCN: NEGATIVE
BASOPHILS # BLD: 0 K/UL (ref 0–0.1)
BASOPHILS NFR BLD: 0 % (ref 0–1)
BENZODIAZ UR QL: NEGATIVE
BILIRUB SERPL-MCNC: 0.8 MG/DL (ref 0.2–1)
BUN SERPL-MCNC: 43 MG/DL (ref 6–20)
BUN/CREAT SERPL: 7 (ref 12–20)
CALCIUM SERPL-MCNC: 8.4 MG/DL (ref 8.5–10.1)
CANNABINOIDS UR QL SCN: NEGATIVE
CHLORIDE SERPL-SCNC: 99 MMOL/L (ref 97–108)
CO2 SERPL-SCNC: 27 MMOL/L (ref 21–32)
COCAINE UR QL SCN: POSITIVE
COMMENT:: NORMAL
CREAT SERPL-MCNC: 6.19 MG/DL (ref 0.7–1.3)
DIFFERENTIAL METHOD BLD: ABNORMAL
EKG ATRIAL RATE: 80 BPM
EKG DIAGNOSIS: NORMAL
EKG P AXIS: 20 DEGREES
EKG P-R INTERVAL: 150 MS
EKG Q-T INTERVAL: 378 MS
EKG QRS DURATION: 90 MS
EKG QTC CALCULATION (BAZETT): 435 MS
EKG R AXIS: -27 DEGREES
EKG T AXIS: 215 DEGREES
EKG VENTRICULAR RATE: 80 BPM
EOSINOPHIL # BLD: 0.1 K/UL (ref 0–0.4)
EOSINOPHIL NFR BLD: 1 % (ref 0–7)
ERYTHROCYTE [DISTWIDTH] IN BLOOD BY AUTOMATED COUNT: 17.2 % (ref 11.5–14.5)
ETHANOL SERPL-MCNC: <10 MG/DL (ref 0–0.08)
GLOBULIN SER CALC-MCNC: 5.7 G/DL (ref 2–4)
GLUCOSE SERPL-MCNC: 168 MG/DL (ref 65–100)
HCT VFR BLD AUTO: 36.6 % (ref 36.6–50.3)
HGB BLD-MCNC: 11.9 G/DL (ref 12.1–17)
IMM GRANULOCYTES # BLD AUTO: 0.1 K/UL (ref 0–0.04)
IMM GRANULOCYTES NFR BLD AUTO: 1 % (ref 0–0.5)
LIPASE SERPL-CCNC: 192 U/L (ref 73–393)
LYMPHOCYTES # BLD: 0.4 K/UL (ref 0.8–3.5)
LYMPHOCYTES NFR BLD: 6 % (ref 12–49)
Lab: ABNORMAL
MAGNESIUM SERPL-MCNC: 3 MG/DL (ref 1.6–2.4)
MCH RBC QN AUTO: 28.4 PG (ref 26–34)
MCHC RBC AUTO-ENTMCNC: 32.5 G/DL (ref 30–36.5)
MCV RBC AUTO: 87.4 FL (ref 80–99)
METHADONE UR QL: NEGATIVE
MONOCYTES # BLD: 0.4 K/UL (ref 0–1)
MONOCYTES NFR BLD: 6 % (ref 5–13)
NEUTS SEG # BLD: 6.3 K/UL (ref 1.8–8)
NEUTS SEG NFR BLD: 86 % (ref 32–75)
NRBC # BLD: 0 K/UL (ref 0–0.01)
NRBC BLD-RTO: 0 PER 100 WBC
NT PRO BNP: ABNORMAL PG/ML
OPIATES UR QL: NEGATIVE
PCP UR QL: NEGATIVE
PLATELET # BLD AUTO: 220 K/UL (ref 150–400)
PMV BLD AUTO: 12 FL (ref 8.9–12.9)
POTASSIUM SERPL-SCNC: 4.9 MMOL/L (ref 3.5–5.1)
PROT SERPL-MCNC: 8.4 G/DL (ref 6.4–8.2)
RBC # BLD AUTO: 4.19 M/UL (ref 4.1–5.7)
RBC MORPH BLD: ABNORMAL
SODIUM SERPL-SCNC: 134 MMOL/L (ref 136–145)
SPECIMEN HOLD: NORMAL
TROPONIN I SERPL HS-MCNC: 64 NG/L (ref 0–76)
WBC # BLD AUTO: 7.3 K/UL (ref 4.1–11.1)

## 2023-09-23 PROCEDURE — 80307 DRUG TEST PRSMV CHEM ANLYZR: CPT

## 2023-09-23 PROCEDURE — 2060000000 HC ICU INTERMEDIATE R&B

## 2023-09-23 PROCEDURE — 6370000000 HC RX 637 (ALT 250 FOR IP): Performed by: EMERGENCY MEDICINE

## 2023-09-23 PROCEDURE — 80053 COMPREHEN METABOLIC PANEL: CPT

## 2023-09-23 PROCEDURE — 36415 COLL VENOUS BLD VENIPUNCTURE: CPT

## 2023-09-23 PROCEDURE — 71045 X-RAY EXAM CHEST 1 VIEW: CPT

## 2023-09-23 PROCEDURE — 83735 ASSAY OF MAGNESIUM: CPT

## 2023-09-23 PROCEDURE — 83690 ASSAY OF LIPASE: CPT

## 2023-09-23 PROCEDURE — 90935 HEMODIALYSIS ONE EVALUATION: CPT

## 2023-09-23 PROCEDURE — 5A1D70Z PERFORMANCE OF URINARY FILTRATION, INTERMITTENT, LESS THAN 6 HOURS PER DAY: ICD-10-PCS | Performed by: STUDENT IN AN ORGANIZED HEALTH CARE EDUCATION/TRAINING PROGRAM

## 2023-09-23 PROCEDURE — 82077 ASSAY SPEC XCP UR&BREATH IA: CPT

## 2023-09-23 PROCEDURE — 84484 ASSAY OF TROPONIN QUANT: CPT

## 2023-09-23 PROCEDURE — 85025 COMPLETE CBC W/AUTO DIFF WBC: CPT

## 2023-09-23 PROCEDURE — 99285 EMERGENCY DEPT VISIT HI MDM: CPT

## 2023-09-23 PROCEDURE — 83880 ASSAY OF NATRIURETIC PEPTIDE: CPT

## 2023-09-23 RX ORDER — LIDOCAINE 4 G/G
1 PATCH TOPICAL DAILY
Status: DISCONTINUED | OUTPATIENT
Start: 2023-09-24 | End: 2023-09-24

## 2023-09-23 RX ORDER — ACETAMINOPHEN 650 MG/1
650 SUPPOSITORY RECTAL EVERY 6 HOURS PRN
Status: DISCONTINUED | OUTPATIENT
Start: 2023-09-23 | End: 2023-10-01 | Stop reason: HOSPADM

## 2023-09-23 RX ORDER — HYDRALAZINE HYDROCHLORIDE 25 MG/1
50 TABLET, FILM COATED ORAL 3 TIMES DAILY
Status: DISCONTINUED | OUTPATIENT
Start: 2023-09-23 | End: 2023-10-01 | Stop reason: HOSPADM

## 2023-09-23 RX ORDER — ONDANSETRON 2 MG/ML
4 INJECTION INTRAMUSCULAR; INTRAVENOUS EVERY 6 HOURS PRN
Status: DISCONTINUED | OUTPATIENT
Start: 2023-09-23 | End: 2023-09-28 | Stop reason: SDUPTHER

## 2023-09-23 RX ORDER — SODIUM CHLORIDE 0.9 % (FLUSH) 0.9 %
5-40 SYRINGE (ML) INJECTION PRN
Status: DISCONTINUED | OUTPATIENT
Start: 2023-09-23 | End: 2023-10-01 | Stop reason: HOSPADM

## 2023-09-23 RX ORDER — AMLODIPINE BESYLATE 5 MG/1
10 TABLET ORAL DAILY
Status: DISCONTINUED | OUTPATIENT
Start: 2023-09-24 | End: 2023-10-01 | Stop reason: HOSPADM

## 2023-09-23 RX ORDER — SODIUM CHLORIDE 9 MG/ML
INJECTION, SOLUTION INTRAVENOUS PRN
Status: DISCONTINUED | OUTPATIENT
Start: 2023-09-23 | End: 2023-10-01 | Stop reason: HOSPADM

## 2023-09-23 RX ORDER — POLYETHYLENE GLYCOL 3350 17 G/17G
17 POWDER, FOR SOLUTION ORAL DAILY PRN
Status: CANCELLED | OUTPATIENT
Start: 2023-09-23

## 2023-09-23 RX ORDER — GABAPENTIN 300 MG/1
300 CAPSULE ORAL 3 TIMES DAILY
Status: DISCONTINUED | OUTPATIENT
Start: 2023-09-23 | End: 2023-09-24

## 2023-09-23 RX ORDER — CARVEDILOL 12.5 MG/1
25 TABLET ORAL 2 TIMES DAILY WITH MEALS
Status: DISCONTINUED | OUTPATIENT
Start: 2023-09-24 | End: 2023-10-01 | Stop reason: HOSPADM

## 2023-09-23 RX ORDER — SODIUM CHLORIDE 0.9 % (FLUSH) 0.9 %
5-40 SYRINGE (ML) INJECTION EVERY 12 HOURS SCHEDULED
Status: DISCONTINUED | OUTPATIENT
Start: 2023-09-23 | End: 2023-10-01 | Stop reason: HOSPADM

## 2023-09-23 RX ORDER — LACTULOSE 10 G/15ML
20 SOLUTION ORAL ONCE
Status: COMPLETED | OUTPATIENT
Start: 2023-09-23 | End: 2023-09-24

## 2023-09-23 RX ORDER — ONDANSETRON 4 MG/1
4 TABLET, ORALLY DISINTEGRATING ORAL EVERY 8 HOURS PRN
Status: DISCONTINUED | OUTPATIENT
Start: 2023-09-23 | End: 2023-09-28 | Stop reason: SDUPTHER

## 2023-09-23 RX ORDER — ACETAMINOPHEN 325 MG/1
650 TABLET ORAL EVERY 6 HOURS PRN
Status: DISCONTINUED | OUTPATIENT
Start: 2023-09-23 | End: 2023-10-01 | Stop reason: HOSPADM

## 2023-09-23 RX ORDER — HEPARIN SODIUM 5000 [USP'U]/ML
5000 INJECTION, SOLUTION INTRAVENOUS; SUBCUTANEOUS EVERY 8 HOURS SCHEDULED
Status: DISCONTINUED | OUTPATIENT
Start: 2023-09-23 | End: 2023-10-01 | Stop reason: HOSPADM

## 2023-09-23 RX ORDER — SENNOSIDES A AND B 8.6 MG/1
1 TABLET, FILM COATED ORAL DAILY PRN
Status: DISCONTINUED | OUTPATIENT
Start: 2023-09-23 | End: 2023-10-01 | Stop reason: HOSPADM

## 2023-09-23 RX ORDER — ASPIRIN 81 MG/1
324 TABLET, CHEWABLE ORAL ONCE
Status: COMPLETED | OUTPATIENT
Start: 2023-09-23 | End: 2023-09-23

## 2023-09-23 RX ADMIN — ASPIRIN 81 MG CHEWABLE TABLET 324 MG: 81 TABLET CHEWABLE at 19:05

## 2023-09-23 ASSESSMENT — ENCOUNTER SYMPTOMS
SORE THROAT: 0
BACK PAIN: 0
COUGH: 0
ABDOMINAL PAIN: 0

## 2023-09-23 NOTE — CONSULTS
A:    ESKD - missed HD  Perm cath  Poor adherence with medical advice  HTN  Mild hyponatremia  Volume overload/hypoxia/Severe pulmonary edema pattern.  Small-moderate right and trace left pleural effusions    P:    HD with UF tonight  Full consult tomorrow if admitted    Jennifer Taylor MD  [address] [facility]

## 2023-09-24 ENCOUNTER — APPOINTMENT (OUTPATIENT)
Facility: HOSPITAL | Age: 58
DRG: 981 | End: 2023-09-24
Payer: MEDICARE

## 2023-09-24 PROCEDURE — 2060000000 HC ICU INTERMEDIATE R&B

## 2023-09-24 PROCEDURE — 6360000002 HC RX W HCPCS: Performed by: FAMILY MEDICINE

## 2023-09-24 PROCEDURE — 2580000003 HC RX 258: Performed by: FAMILY MEDICINE

## 2023-09-24 PROCEDURE — 6370000000 HC RX 637 (ALT 250 FOR IP): Performed by: FAMILY MEDICINE

## 2023-09-24 PROCEDURE — 73030 X-RAY EXAM OF SHOULDER: CPT

## 2023-09-24 PROCEDURE — 99222 1ST HOSP IP/OBS MODERATE 55: CPT | Performed by: PHYSICIAN ASSISTANT

## 2023-09-24 PROCEDURE — 6370000000 HC RX 637 (ALT 250 FOR IP): Performed by: INTERNAL MEDICINE

## 2023-09-24 RX ORDER — LIDOCAINE 4 G/G
1 PATCH TOPICAL DAILY
Status: DISCONTINUED | OUTPATIENT
Start: 2023-09-24 | End: 2023-10-01 | Stop reason: HOSPADM

## 2023-09-24 RX ORDER — GABAPENTIN 100 MG/1
100 CAPSULE ORAL 3 TIMES DAILY
Status: DISCONTINUED | OUTPATIENT
Start: 2023-09-24 | End: 2023-09-26

## 2023-09-24 RX ORDER — BUPRENORPHINE HYDROCHLORIDE AND NALOXONE HYDROCHLORIDE DIHYDRATE 8; 2 MG/1; MG/1
2 TABLET SUBLINGUAL DAILY
Status: DISCONTINUED | OUTPATIENT
Start: 2023-09-24 | End: 2023-10-01 | Stop reason: HOSPADM

## 2023-09-24 RX ORDER — BUPRENORPHINE AND NALOXONE 8; 2 MG/1; MG/1
2 FILM, SOLUBLE BUCCAL; SUBLINGUAL DAILY
Status: DISCONTINUED | OUTPATIENT
Start: 2023-09-24 | End: 2023-09-24 | Stop reason: CLARIF

## 2023-09-24 RX ADMIN — CLONIDINE HYDROCHLORIDE 0.3 MG: 0.2 TABLET ORAL at 03:07

## 2023-09-24 RX ADMIN — CARVEDILOL 25 MG: 12.5 TABLET, FILM COATED ORAL at 09:42

## 2023-09-24 RX ADMIN — BUPRENORPHINE HYDROCHLORIDE AND NALOXONE HYDROCHLORIDE DIHYDRATE 2 TABLET: 8; 2 TABLET SUBLINGUAL at 09:41

## 2023-09-24 RX ADMIN — HEPARIN SODIUM 5000 UNITS: 5000 INJECTION INTRAVENOUS; SUBCUTANEOUS at 18:22

## 2023-09-24 RX ADMIN — SODIUM CHLORIDE, PRESERVATIVE FREE 10 ML: 5 INJECTION INTRAVENOUS at 09:42

## 2023-09-24 RX ADMIN — CLONIDINE HYDROCHLORIDE 0.3 MG: 0.2 TABLET ORAL at 15:04

## 2023-09-24 RX ADMIN — HYDRALAZINE HYDROCHLORIDE 50 MG: 50 TABLET, FILM COATED ORAL at 03:07

## 2023-09-24 RX ADMIN — HEPARIN SODIUM 5000 UNITS: 5000 INJECTION INTRAVENOUS; SUBCUTANEOUS at 03:07

## 2023-09-24 RX ADMIN — HEPARIN SODIUM 5000 UNITS: 5000 INJECTION INTRAVENOUS; SUBCUTANEOUS at 11:20

## 2023-09-24 RX ADMIN — GABAPENTIN 100 MG: 100 CAPSULE ORAL at 18:22

## 2023-09-24 RX ADMIN — ACETAMINOPHEN 650 MG: 325 TABLET ORAL at 03:07

## 2023-09-24 RX ADMIN — LACTULOSE 20 G: 20 SOLUTION ORAL at 03:11

## 2023-09-24 RX ADMIN — AMLODIPINE BESYLATE 10 MG: 5 TABLET ORAL at 09:41

## 2023-09-24 RX ADMIN — SODIUM CHLORIDE, PRESERVATIVE FREE 10 ML: 5 INJECTION INTRAVENOUS at 03:08

## 2023-09-24 RX ADMIN — GABAPENTIN 300 MG: 300 CAPSULE ORAL at 03:07

## 2023-09-24 RX ADMIN — SODIUM CHLORIDE, PRESERVATIVE FREE 10 ML: 5 INJECTION INTRAVENOUS at 23:01

## 2023-09-24 ASSESSMENT — PAIN DESCRIPTION - LOCATION: LOCATION: SHOULDER

## 2023-09-24 ASSESSMENT — PAIN SCALES - GENERAL
PAINLEVEL_OUTOF10: 5
PAINLEVEL_OUTOF10: 10

## 2023-09-24 NOTE — ED NOTES
Pt awake and alert, vitals stable, no acute distress, bed low and locked callbell in reach       [employer], RN  09/23/23 2036

## 2023-09-24 NOTE — ED NOTES
TRANSFER - OUT REPORT:    Verbal report given to Camden Limon on Eduardo Led  being transferred to [facility] [id number] for routine progression of patient care       Report consisted of patient's Situation, Background, Assessment and   Recommendations(SBAR). Information from the following report(s) Nurse Handoff Report, Index, ED Encounter Summary, ED SBAR, Intake/Output, MAR, Recent Results, and Med Rec Status was reviewed with the receiving nurse. [place] Fall Assessment:    Presents to emergency department  because of falls (Syncope, seizure, or loss of consciousness): No  Age > 70: No  Altered Mental Status, Intoxication with alcohol or substance confusion (Disorientation, impaired judgment, poor safety awaremess, or inability to follow instructions): No  Impaired Mobility: Ambulates or transfers with assistive devices or assistance; Unable to ambulate or transer.: No  Nursing Judgement: No          Lines:   Peripheral IV 09/23/23 Right Antecubital (Active)       Hemodialysis Central Access Right Subclavian (Active)   Continued need for line? Yes 09/23/23 2330   Site Assessment Clean, dry & intact 09/23/23 2330   Venous Lumen Status Infusing 09/23/23 2330   Arterial Lumen Status Infusing 09/23/23 [facility] checked and tightened;Ports disinfected 09/23/23 2330   Dressing Type Bacteriocidal;Transparent 09/23/23 2330   Date of Last Dressing Change 09/23/23 09/23/23 2330   Dressing Status New dressing applied;Clean, dry & intact 09/23/23 2330   Dressing Intervention New;Dressing changed 09/23/23 2330   Dressing Change Due 09/25/23 09/23/23 2330        Opportunity for questions and clarification was provided.       Patient transported with:  Monitor, O2 @ 3lpm, and Registered Nurse           Rhonda Barron RN  09/24/23 1747
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
## 2023-09-24 NOTE — H&P
on file   ISOLATION PRECAUTIONS: No active isolations  CODE STATUS: Full  DVT PROPHYLAXIS: Heparin  ANTICIPATED DISCHARGE: 2-3 days  ANTICIPATED DISPOSITION: Home    CRITICAL CARE WAS PERFORMED FOR THIS ENCOUNTER: 30 to 74 minutes      Signed By: Patti Calvin MD     September 23, 2023         Please note that this dictation may have been completed with Gabbie Prabhakar, the computer voice recognition software. Quite often unanticipated grammatical, syntax, homophones, and other interpretive errors are inadvertently transcribed by the computer software. Please disregard these errors. Please excuse any errors that have escaped final proofreading.
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
## 2023-09-25 LAB
ALBUMIN SERPL-MCNC: 2 G/DL (ref 3.5–5)
ANION GAP SERPL CALC-SCNC: 6 MMOL/L (ref 5–15)
BASOPHILS # BLD: 0.1 K/UL (ref 0–0.1)
BASOPHILS NFR BLD: 1 % (ref 0–1)
BUN SERPL-MCNC: 39 MG/DL (ref 6–20)
BUN/CREAT SERPL: 7 (ref 12–20)
CALCIUM SERPL-MCNC: 7.1 MG/DL (ref 8.5–10.1)
CHLORIDE SERPL-SCNC: 106 MMOL/L (ref 97–108)
CO2 SERPL-SCNC: 27 MMOL/L (ref 21–32)
COMMENT:: NORMAL
CREAT SERPL-MCNC: 5.77 MG/DL (ref 0.7–1.3)
DIFFERENTIAL METHOD BLD: ABNORMAL
EOSINOPHIL # BLD: 0.1 K/UL (ref 0–0.4)
EOSINOPHIL NFR BLD: 2 % (ref 0–7)
ERYTHROCYTE [DISTWIDTH] IN BLOOD BY AUTOMATED COUNT: 17 % (ref 11.5–14.5)
GLUCOSE SERPL-MCNC: 262 MG/DL (ref 65–100)
HCT VFR BLD AUTO: 34.9 % (ref 36.6–50.3)
HGB BLD-MCNC: 11 G/DL (ref 12.1–17)
IMM GRANULOCYTES # BLD AUTO: 0 K/UL (ref 0–0.04)
IMM GRANULOCYTES NFR BLD AUTO: 0 % (ref 0–0.5)
LYMPHOCYTES # BLD: 0.3 K/UL (ref 0.8–3.5)
LYMPHOCYTES NFR BLD: 6 % (ref 12–49)
MCH RBC QN AUTO: 28.4 PG (ref 26–34)
MCHC RBC AUTO-ENTMCNC: 31.5 G/DL (ref 30–36.5)
MCV RBC AUTO: 89.9 FL (ref 80–99)
MONOCYTES # BLD: 0.4 K/UL (ref 0–1)
MONOCYTES NFR BLD: 7 % (ref 5–13)
NEUTS SEG # BLD: 4.8 K/UL (ref 1.8–8)
NEUTS SEG NFR BLD: 84 % (ref 32–75)
NRBC # BLD: 0 K/UL (ref 0–0.01)
NRBC BLD-RTO: 0 PER 100 WBC
PHOSPHATE SERPL-MCNC: 7.2 MG/DL (ref 2.6–4.7)
PLATELET # BLD AUTO: 209 K/UL (ref 150–400)
PMV BLD AUTO: 11.6 FL (ref 8.9–12.9)
POTASSIUM SERPL-SCNC: 4 MMOL/L (ref 3.5–5.1)
RBC # BLD AUTO: 3.88 M/UL (ref 4.1–5.7)
RBC MORPH BLD: ABNORMAL
RBC MORPH BLD: ABNORMAL
SODIUM SERPL-SCNC: 139 MMOL/L (ref 136–145)
SPECIMEN HOLD: NORMAL
WBC # BLD AUTO: 5.7 K/UL (ref 4.1–11.1)

## 2023-09-25 PROCEDURE — 6360000002 HC RX W HCPCS: Performed by: FAMILY MEDICINE

## 2023-09-25 PROCEDURE — 36415 COLL VENOUS BLD VENIPUNCTURE: CPT

## 2023-09-25 PROCEDURE — 6370000000 HC RX 637 (ALT 250 FOR IP): Performed by: INTERNAL MEDICINE

## 2023-09-25 PROCEDURE — 6370000000 HC RX 637 (ALT 250 FOR IP): Performed by: FAMILY MEDICINE

## 2023-09-25 PROCEDURE — 85025 COMPLETE CBC W/AUTO DIFF WBC: CPT

## 2023-09-25 PROCEDURE — 90935 HEMODIALYSIS ONE EVALUATION: CPT

## 2023-09-25 PROCEDURE — 2060000000 HC ICU INTERMEDIATE R&B

## 2023-09-25 PROCEDURE — 94760 N-INVAS EAR/PLS OXIMETRY 1: CPT

## 2023-09-25 PROCEDURE — APPNB45 APP NON BILLABLE 31-45 MINUTES: Performed by: NURSE PRACTITIONER

## 2023-09-25 PROCEDURE — 2580000003 HC RX 258: Performed by: FAMILY MEDICINE

## 2023-09-25 PROCEDURE — 80069 RENAL FUNCTION PANEL: CPT

## 2023-09-25 PROCEDURE — 2700000000 HC OXYGEN THERAPY PER DAY

## 2023-09-25 RX ADMIN — HEPARIN SODIUM 5000 UNITS: 5000 INJECTION INTRAVENOUS; SUBCUTANEOUS at 11:18

## 2023-09-25 RX ADMIN — CARVEDILOL 25 MG: 12.5 TABLET, FILM COATED ORAL at 08:52

## 2023-09-25 RX ADMIN — GABAPENTIN 100 MG: 100 CAPSULE ORAL at 04:05

## 2023-09-25 RX ADMIN — CLONIDINE HYDROCHLORIDE 0.3 MG: 0.2 TABLET ORAL at 21:18

## 2023-09-25 RX ADMIN — CARVEDILOL 25 MG: 12.5 TABLET, FILM COATED ORAL at 21:18

## 2023-09-25 RX ADMIN — SODIUM CHLORIDE, PRESERVATIVE FREE 10 ML: 5 INJECTION INTRAVENOUS at 21:19

## 2023-09-25 RX ADMIN — AMLODIPINE BESYLATE 10 MG: 5 TABLET ORAL at 08:52

## 2023-09-25 RX ADMIN — GABAPENTIN 100 MG: 100 CAPSULE ORAL at 21:19

## 2023-09-25 RX ADMIN — SODIUM CHLORIDE, PRESERVATIVE FREE 5 ML: 5 INJECTION INTRAVENOUS at 08:52

## 2023-09-25 RX ADMIN — HEPARIN SODIUM 5000 UNITS: 5000 INJECTION INTRAVENOUS; SUBCUTANEOUS at 04:06

## 2023-09-25 RX ADMIN — ACETAMINOPHEN 650 MG: 325 TABLET ORAL at 04:05

## 2023-09-25 RX ADMIN — GABAPENTIN 100 MG: 100 CAPSULE ORAL at 11:17

## 2023-09-25 RX ADMIN — HYDRALAZINE HYDROCHLORIDE 50 MG: 50 TABLET, FILM COATED ORAL at 21:19

## 2023-09-25 RX ADMIN — BUPRENORPHINE HYDROCHLORIDE AND NALOXONE HYDROCHLORIDE DIHYDRATE 2 TABLET: 8; 2 TABLET SUBLINGUAL at 08:52

## 2023-09-25 RX ADMIN — CLONIDINE HYDROCHLORIDE 0.3 MG: 0.2 TABLET ORAL at 08:52

## 2023-09-25 RX ADMIN — HEPARIN SODIUM 5000 UNITS: 5000 INJECTION INTRAVENOUS; SUBCUTANEOUS at 21:18

## 2023-09-25 NOTE — CARE COORDINATION
Care Management Initial Assessment       RUR: 22%  Readmission? Yes -   1st IM letter given? Yes -   1st  letter given: No    09/25/23 5501   Service Assessment   Patient Orientation Alert and Oriented   Cognition Alert   History Provided By Patient;Significant Other;Medical Record   Primary Caregiver Self   Support Systems Spouse/Significant Other   PCP Verified by CM Yes   Last Visit to PCP Within last 3 months   Prior Functional Level Independent in ADLs/IADLs   Current Functional Level Independent in ADLs/IADLs   Can patient return to prior living arrangement Yes   Ability to make needs known: Good   Family able to assist with home care needs: Yes   Would you like for me to discuss the discharge plan with any other family members/significant others, and if so, who? Yes  Fredy Mary (nanette)   Financial Resources Medicare;Medicaid   Social/Functional History   Lives With Significant other   Type of [address] [place] [address] Dr One level   ADL Assistance Independent   Homemaking Assistance Independent   Ambulation Assistance Independent   Transfer Assistance Independent   Discharge Planning   Living Arrangements Spouse/Significant Other   Current Services Prior To Admission None   DME Ordered? No   Potential Assistance Purchasing Medications No   Patient expects to be discharged to: House   One/Two Story Residence One story   Services At/After Discharge   Mode of Transport at Discharge Other (see comment)   Confirm Follow Up Transport Friends     CM met with patient and his filaya Mary [phone]). The patient lives in a 1-story home (3 steps to entrance) with [facility]. He has HD M/W/F at [facility]) and uses [facility] transport to his appts. Chair time is 6am to 10am.   Previous HH/SNF/IPR: none    Pharm: [facility]. CM continuing to follow.     Vita Mello, [address] [facility], [address] [place] [address]

## 2023-09-25 NOTE — CARE COORDINATION
09/25/23 1405   Readmission Assessment   Number of Days since last admission? 8-30 days   Previous Disposition Home with Family  (lives with fiingrid' (Ivelisse Aguilar))   Who is being Interviewed Patient   What was the patient's/caregiver's perception as to why they think they needed to return back to the hospital? [facility] discharge on prior admission   Did you visit your Primary Care Physician after you left the hospital, before you returned this time? Yes   Did you see a specialist, such as Cardiac, Pulmonary, Orthopedic Physician, etc. after you left the hospital? Yes   Who advised the patient to return to the hospital? Self-referral   Does the patient report anything that got in the way of taking their medications? No   In our efforts to provide the best possible care to you and others like you, can you think of anything that we could have done to help you after you left the hospital the first time, so that you might not have needed to return so soon?  Other (Comment)     Lashaun Theodore, [address] [facility], [address] [place] [address]

## 2023-09-25 NOTE — CARE COORDINATION
Care Management Initial Assessment       RUR:  Readmission? Yes -   1st IM letter given? Yes -   1st  letter given: No    09/25/23 1331   Service Assessment   Patient Orientation Alert and Oriented   Cognition Alert   History Provided By Patient;Significant Other;Medical Record   Primary Caregiver Self   Support Systems Spouse/Significant Other   PCP Verified by CM Yes   Last Visit to PCP Within last 3 months   Prior Functional Level Independent in ADLs/IADLs   Current Functional Level Independent in ADLs/IADLs   Can patient return to prior living arrangement Yes   Ability to make needs known: Good   Family able to assist with home care needs: Yes   Would you like for me to discuss the discharge plan with any other family members/significant others, and if so, who? Yes  Ivet Rivera (peter'))   Financial Resources Medicare;Medicaid   Social/Functional History   Lives With Significant other   Type of [facility]  One level   ADL Assistance Independent   Homemaking Assistance Independent   Ambulation Assistance Independent   Transfer Assistance Independent   Discharge Planning   Living Arrangements Spouse/Significant Other   Current Services Prior To Admission None   DME Ordered?  No   Potential Assistance Purchasing Medications No   Patient expects to be discharged to: House   One/Two Story Residence One story   Services At/After Discharge   Mode of Transport at Discharge Other (see comment)   Confirm Follow Up Transport Friends

## 2023-09-26 PROBLEM — J96.00 ACUTE RESPIRATORY FAILURE, UNSP W HYPOXIA OR HYPERCAPNIA (HCC): Status: RESOLVED | Noted: 2023-09-23 | Resolved: 2023-09-26

## 2023-09-26 LAB
ALBUMIN SERPL-MCNC: 2.2 G/DL (ref 3.5–5)
ANION GAP SERPL CALC-SCNC: 4 MMOL/L (ref 5–15)
BASOPHILS # BLD: 0 K/UL (ref 0–0.1)
BASOPHILS NFR BLD: 0 % (ref 0–1)
BUN SERPL-MCNC: 28 MG/DL (ref 6–20)
BUN/CREAT SERPL: 6 (ref 12–20)
CALCIUM SERPL-MCNC: 7.6 MG/DL (ref 8.5–10.1)
CHLORIDE SERPL-SCNC: 105 MMOL/L (ref 97–108)
CO2 SERPL-SCNC: 27 MMOL/L (ref 21–32)
CREAT SERPL-MCNC: 4.33 MG/DL (ref 0.7–1.3)
DIFFERENTIAL METHOD BLD: ABNORMAL
EOSINOPHIL # BLD: 0.1 K/UL (ref 0–0.4)
EOSINOPHIL NFR BLD: 2 % (ref 0–7)
ERYTHROCYTE [DISTWIDTH] IN BLOOD BY AUTOMATED COUNT: 16.9 % (ref 11.5–14.5)
GLUCOSE SERPL-MCNC: 265 MG/DL (ref 65–100)
HCT VFR BLD AUTO: 35.5 % (ref 36.6–50.3)
HGB BLD-MCNC: 11.2 G/DL (ref 12.1–17)
IMM GRANULOCYTES # BLD AUTO: 0 K/UL (ref 0–0.04)
IMM GRANULOCYTES NFR BLD AUTO: 0 % (ref 0–0.5)
LYMPHOCYTES # BLD: 0.3 K/UL (ref 0.8–3.5)
LYMPHOCYTES NFR BLD: 5 % (ref 12–49)
MCH RBC QN AUTO: 28.2 PG (ref 26–34)
MCHC RBC AUTO-ENTMCNC: 31.5 G/DL (ref 30–36.5)
MCV RBC AUTO: 89.4 FL (ref 80–99)
MONOCYTES # BLD: 0.5 K/UL (ref 0–1)
MONOCYTES NFR BLD: 7 % (ref 5–13)
NEUTS SEG # BLD: 5.8 K/UL (ref 1.8–8)
NEUTS SEG NFR BLD: 86 % (ref 32–75)
NRBC # BLD: 0 K/UL (ref 0–0.01)
NRBC BLD-RTO: 0 PER 100 WBC
PHOSPHATE SERPL-MCNC: 4.1 MG/DL (ref 2.6–4.7)
PLATELET # BLD AUTO: 210 K/UL (ref 150–400)
PMV BLD AUTO: 12 FL (ref 8.9–12.9)
POTASSIUM SERPL-SCNC: 3.8 MMOL/L (ref 3.5–5.1)
RBC # BLD AUTO: 3.97 M/UL (ref 4.1–5.7)
RBC MORPH BLD: ABNORMAL
SODIUM SERPL-SCNC: 136 MMOL/L (ref 136–145)
WBC # BLD AUTO: 6.7 K/UL (ref 4.1–11.1)

## 2023-09-26 PROCEDURE — 6370000000 HC RX 637 (ALT 250 FOR IP): Performed by: INTERNAL MEDICINE

## 2023-09-26 PROCEDURE — 6360000002 HC RX W HCPCS: Performed by: NURSE PRACTITIONER

## 2023-09-26 PROCEDURE — APPNB45 APP NON BILLABLE 31-45 MINUTES: Performed by: NURSE PRACTITIONER

## 2023-09-26 PROCEDURE — 6360000002 HC RX W HCPCS: Performed by: FAMILY MEDICINE

## 2023-09-26 PROCEDURE — 2060000000 HC ICU INTERMEDIATE R&B

## 2023-09-26 PROCEDURE — 85025 COMPLETE CBC W/AUTO DIFF WBC: CPT

## 2023-09-26 PROCEDURE — 6370000000 HC RX 637 (ALT 250 FOR IP): Performed by: FAMILY MEDICINE

## 2023-09-26 PROCEDURE — 2580000003 HC RX 258: Performed by: FAMILY MEDICINE

## 2023-09-26 PROCEDURE — 80069 RENAL FUNCTION PANEL: CPT

## 2023-09-26 PROCEDURE — 94760 N-INVAS EAR/PLS OXIMETRY 1: CPT

## 2023-09-26 PROCEDURE — 36415 COLL VENOUS BLD VENIPUNCTURE: CPT

## 2023-09-26 RX ORDER — PREGABALIN 75 MG/1
75 CAPSULE ORAL DAILY
Status: DISCONTINUED | OUTPATIENT
Start: 2023-09-26 | End: 2023-09-26

## 2023-09-26 RX ORDER — DEXAMETHASONE SODIUM PHOSPHATE 4 MG/ML
4 INJECTION, SOLUTION INTRA-ARTICULAR; INTRALESIONAL; INTRAMUSCULAR; INTRAVENOUS; SOFT TISSUE EVERY 8 HOURS
Status: DISCONTINUED | OUTPATIENT
Start: 2023-09-26 | End: 2023-09-29

## 2023-09-26 RX ORDER — GABAPENTIN 100 MG/1
100 CAPSULE ORAL 3 TIMES DAILY
Qty: 90 CAPSULE | Refills: 0 | Status: SHIPPED | OUTPATIENT
Start: 2023-09-26 | End: 2023-10-26

## 2023-09-26 RX ORDER — PREGABALIN 75 MG/1
75 CAPSULE ORAL
Status: DISCONTINUED | OUTPATIENT
Start: 2023-09-27 | End: 2023-10-01 | Stop reason: HOSPADM

## 2023-09-26 RX ADMIN — DEXAMETHASONE SODIUM PHOSPHATE 4 MG: 4 INJECTION, SOLUTION INTRAMUSCULAR; INTRAVENOUS at 23:35

## 2023-09-26 RX ADMIN — HEPARIN SODIUM 5000 UNITS: 5000 INJECTION INTRAVENOUS; SUBCUTANEOUS at 03:21

## 2023-09-26 RX ADMIN — ACETAMINOPHEN 650 MG: 325 TABLET ORAL at 12:44

## 2023-09-26 RX ADMIN — CLONIDINE HYDROCHLORIDE 0.3 MG: 0.2 TABLET ORAL at 20:17

## 2023-09-26 RX ADMIN — SODIUM CHLORIDE, PRESERVATIVE FREE 10 ML: 5 INJECTION INTRAVENOUS at 20:17

## 2023-09-26 RX ADMIN — HYDRALAZINE HYDROCHLORIDE 50 MG: 50 TABLET, FILM COATED ORAL at 03:21

## 2023-09-26 RX ADMIN — HYDRALAZINE HYDROCHLORIDE 50 MG: 50 TABLET, FILM COATED ORAL at 17:34

## 2023-09-26 RX ADMIN — SODIUM CHLORIDE, PRESERVATIVE FREE 10 ML: 5 INJECTION INTRAVENOUS at 09:04

## 2023-09-26 RX ADMIN — BUPRENORPHINE HYDROCHLORIDE AND NALOXONE HYDROCHLORIDE DIHYDRATE 2 TABLET: 8; 2 TABLET SUBLINGUAL at 09:01

## 2023-09-26 RX ADMIN — CLONIDINE HYDROCHLORIDE 0.3 MG: 0.2 TABLET ORAL at 09:01

## 2023-09-26 RX ADMIN — HEPARIN SODIUM 5000 UNITS: 5000 INJECTION INTRAVENOUS; SUBCUTANEOUS at 17:34

## 2023-09-26 RX ADMIN — AMLODIPINE BESYLATE 10 MG: 5 TABLET ORAL at 09:01

## 2023-09-26 RX ADMIN — DEXAMETHASONE SODIUM PHOSPHATE 4 MG: 4 INJECTION, SOLUTION INTRAMUSCULAR; INTRAVENOUS at 15:48

## 2023-09-26 RX ADMIN — GABAPENTIN 100 MG: 100 CAPSULE ORAL at 12:36

## 2023-09-26 RX ADMIN — GABAPENTIN 100 MG: 100 CAPSULE ORAL at 03:21

## 2023-09-26 RX ADMIN — CARVEDILOL 25 MG: 12.5 TABLET, FILM COATED ORAL at 09:01

## 2023-09-26 RX ADMIN — CARVEDILOL 25 MG: 12.5 TABLET, FILM COATED ORAL at 17:34

## 2023-09-26 ASSESSMENT — PAIN DESCRIPTION - PAIN TYPE: TYPE: ACUTE PAIN

## 2023-09-26 ASSESSMENT — PAIN DESCRIPTION - DESCRIPTORS: DESCRIPTORS: ACHING

## 2023-09-26 ASSESSMENT — PAIN SCALES - GENERAL
PAINLEVEL_OUTOF10: 8
PAINLEVEL_OUTOF10: 0

## 2023-09-26 ASSESSMENT — PAIN DESCRIPTION - ORIENTATION: ORIENTATION: LEFT

## 2023-09-26 ASSESSMENT — PAIN DESCRIPTION - LOCATION: LOCATION: SHOULDER;ARM

## 2023-09-26 NOTE — DISCHARGE INSTRUCTIONS
Discharge Instructions       PATIENT ID: Eduardo De Guzman  MRN: [id number]   YOB: 1965    DATE OF ADMISSION: [unfilled]    DATE OF DISCHARGE: 9/26/2023    PRIMARY CARE PROVIDER: @PCP@     ATTENDING PHYSICIAN: @JAY@  DISCHARGING PROVIDER: Sheryl Lott MD    To contact this individual call [phone] and ask the  to page. If unavailable ask to be transferred the Adult Hospitalist Department. DISCHARGE DIAGNOSES Back pain, SOB    CONSULTATIONS: Nephrology, neurosurgery    PROCEDURES/SURGERIES: * No surgery found *    PENDING TEST RESULTS:   At the time of discharge the following test results are still pending: none    FOLLOW UP APPOINTMENTS:   PCP  Neurosurgery    ADDITIONAL CARE RECOMMENDATIONS: as above    DIET: renal diet    ACTIVITY: activity as tolerated    DISCHARGE MEDICATIONS:   See Medication Reconciliation Form    It is important that you take the medication exactly as they are prescribed. Keep your medication in the bottles provided by the pharmacist and keep a list of the medication names, dosages, and times to be taken in your wallet. Do not take other medications without consulting your doctor. NOTIFY YOUR PHYSICIAN FOR ANY OF THE FOLLOWING:   Fever over 101 degrees for 24 hours. Chest pain, shortness of breath, fever, chills, nausea, vomiting, diarrhea, change in mentation, falling, weakness, bleeding. Severe pain or pain not relieved by medications. Or, any other signs or symptoms that you may have questions about.       DISPOSITION:   x Home With:   OT  PT  HH  RN       SNF/Inpatient Rehab/LTAC    Independent/assisted living    Hospice    Other:     CDMP Checked:   Yes x     PROBLEM LIST Updated:  Yes x       Signed:   Sheryl Lott MD  9/26/2023  11:43 AM

## 2023-09-27 ENCOUNTER — APPOINTMENT (OUTPATIENT)
Facility: HOSPITAL | Age: 58
DRG: 981 | End: 2023-09-27
Payer: MEDICARE

## 2023-09-27 LAB
COMMENT:: NORMAL
SPECIMEN HOLD: NORMAL

## 2023-09-27 PROCEDURE — 97530 THERAPEUTIC ACTIVITIES: CPT

## 2023-09-27 PROCEDURE — 2580000003 HC RX 258: Performed by: FAMILY MEDICINE

## 2023-09-27 PROCEDURE — 71045 X-RAY EXAM CHEST 1 VIEW: CPT

## 2023-09-27 PROCEDURE — 2700000000 HC OXYGEN THERAPY PER DAY

## 2023-09-27 PROCEDURE — 97161 PT EVAL LOW COMPLEX 20 MIN: CPT

## 2023-09-27 PROCEDURE — 2060000000 HC ICU INTERMEDIATE R&B

## 2023-09-27 PROCEDURE — 6360000002 HC RX W HCPCS: Performed by: FAMILY MEDICINE

## 2023-09-27 PROCEDURE — 97165 OT EVAL LOW COMPLEX 30 MIN: CPT

## 2023-09-27 PROCEDURE — 94760 N-INVAS EAR/PLS OXIMETRY 1: CPT

## 2023-09-27 PROCEDURE — 6370000000 HC RX 637 (ALT 250 FOR IP): Performed by: FAMILY MEDICINE

## 2023-09-27 PROCEDURE — 36415 COLL VENOUS BLD VENIPUNCTURE: CPT

## 2023-09-27 PROCEDURE — 90935 HEMODIALYSIS ONE EVALUATION: CPT

## 2023-09-27 PROCEDURE — 6370000000 HC RX 637 (ALT 250 FOR IP): Performed by: NURSE PRACTITIONER

## 2023-09-27 PROCEDURE — 6360000002 HC RX W HCPCS: Performed by: NURSE PRACTITIONER

## 2023-09-27 RX ORDER — ARFORMOTEROL TARTRATE 15 UG/2ML
15 SOLUTION RESPIRATORY (INHALATION)
Status: DISCONTINUED | OUTPATIENT
Start: 2023-09-27 | End: 2023-10-01 | Stop reason: HOSPADM

## 2023-09-27 RX ORDER — BUDESONIDE 0.5 MG/2ML
0.5 INHALANT ORAL
Status: DISCONTINUED | OUTPATIENT
Start: 2023-09-27 | End: 2023-10-01 | Stop reason: HOSPADM

## 2023-09-27 RX ADMIN — HEPARIN SODIUM 5000 UNITS: 5000 INJECTION INTRAVENOUS; SUBCUTANEOUS at 04:25

## 2023-09-27 RX ADMIN — SODIUM CHLORIDE, PRESERVATIVE FREE 10 ML: 5 INJECTION INTRAVENOUS at 20:32

## 2023-09-27 RX ADMIN — DEXAMETHASONE SODIUM PHOSPHATE 4 MG: 4 INJECTION, SOLUTION INTRAMUSCULAR; INTRAVENOUS at 22:48

## 2023-09-27 RX ADMIN — DEXAMETHASONE SODIUM PHOSPHATE 4 MG: 4 INJECTION, SOLUTION INTRAMUSCULAR; INTRAVENOUS at 06:51

## 2023-09-27 RX ADMIN — HYDRALAZINE HYDROCHLORIDE 50 MG: 50 TABLET, FILM COATED ORAL at 04:26

## 2023-09-27 RX ADMIN — DEXAMETHASONE SODIUM PHOSPHATE 4 MG: 4 INJECTION, SOLUTION INTRAMUSCULAR; INTRAVENOUS at 13:07

## 2023-09-27 RX ADMIN — SODIUM CHLORIDE, PRESERVATIVE FREE 10 ML: 5 INJECTION INTRAVENOUS at 11:16

## 2023-09-27 RX ADMIN — HYDRALAZINE HYDROCHLORIDE 50 MG: 50 TABLET, FILM COATED ORAL at 18:17

## 2023-09-27 RX ADMIN — PREGABALIN 75 MG: 75 CAPSULE ORAL at 20:31

## 2023-09-27 RX ADMIN — CARVEDILOL 25 MG: 12.5 TABLET, FILM COATED ORAL at 18:17

## 2023-09-27 RX ADMIN — CARVEDILOL 25 MG: 12.5 TABLET, FILM COATED ORAL at 11:13

## 2023-09-27 RX ADMIN — CLONIDINE HYDROCHLORIDE 0.3 MG: 0.2 TABLET ORAL at 11:12

## 2023-09-27 RX ADMIN — AMLODIPINE BESYLATE 10 MG: 5 TABLET ORAL at 11:13

## 2023-09-27 RX ADMIN — BUPRENORPHINE HYDROCHLORIDE AND NALOXONE HYDROCHLORIDE DIHYDRATE 2 TABLET: 8; 2 TABLET SUBLINGUAL at 11:13

## 2023-09-27 RX ADMIN — CLONIDINE HYDROCHLORIDE 0.3 MG: 0.2 TABLET ORAL at 20:31

## 2023-09-27 ASSESSMENT — PAIN SCALES - GENERAL
PAINLEVEL_OUTOF10: 0

## 2023-09-27 NOTE — FLOWSHEET NOTE
09/27/23 1051   Vital Signs   BP (!) 134/98   Pulse 81   Pain Assessment   Pain Assessment None - Denies Pain   Post-Hemodialysis Assessment   Post-Treatment Procedures Catheter capped, clamped with Citrate x 2 ports; Blood returned   Machine Disinfection Process Exterior Machine Disinfection   Rinseback Volume (ml) 200 ml   Blood Volume Processed (Liters) 79.1 L   Dialyzer Clearance Clear   Duration of Treatment (minutes) 3.5 minutes   Hemodialysis Intake (ml) 500 ml   Hemodialysis Output (ml) 4000 ml   NET Removed (ml) 3500   Tolerated Treatment Good   Patient Response to Treatment Tolerated treatment   Bilateral Breath Sounds   (no sob; no respiratory distress noted)   Edema Generalized Trace   Time Off 1051   Patient Disposition Other (Comment)  (patient remained in room 441)     Primary RN SBAR: Jhoan Jc RN
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
## 2023-09-27 NOTE — CONSULTS
Pulmonary, Critical Care, and Sleep Medicine~Consult Note    Name: Sam Camarena MRN: [id number]   : 1965 Hospital: [id number]   Date: 2023 11:00 AM Admission: 2023     Impression Plan   Acute hypoxic respiratory failure. Now resolved  Acute noncardiogenic pulmonary edema with associated bilateral pleural effusions right greater than left. Likely secondary to missed hemodialysis  Left arm radiculopathy from his cervical degenerative disease. Smoker, 2 packs/day. Likely has COPD  End-stage renal disease . Chest x-ray today is pending to evaluate pulmonary edema and moderate right pleural effusion. If right pleural effusion is still noted will need a thoracentesis  Bedside spirometry to assess lung function. O2 titration above 90%  Encourage smoking cessation  Bronchodilators to be started  Discussed with bedside nurse  Currently on hemodialysis  Decadron per NS     Daily Progression:    Consult Note requested by hospitalist service. Patient presented for admission on 2023 for worsening chest pain following missing session of dialysis. He also noted some left axillary pains that radiated from his chest.  Noted some nerve damage unclear length of time? He was evaluated by neurosurgery on 2023 and was found to have problems around C7-T1. His left arm radiculopathy is cervical in nature. Patient does have a long smoking history and states he smokes about 2 packs/day up until his hospitalization. He has never been evaluated by a pulmonologist before and has not had pulmonary function tests to his knowledge. He is not on any maintenance bronchodilators. He does not provide a detailed history and discussions. On admission his chest x-ray showed severe pulmonary edema with a small to moderate right pleural effusion.   He had a 2023 echo with an EF of 45 to 50%, moderate concentric hypertrophy, mild regurgitation in the mitral

## 2023-09-28 ENCOUNTER — ANESTHESIA EVENT (OUTPATIENT)
Facility: HOSPITAL | Age: 58
End: 2023-09-28
Payer: MEDICARE

## 2023-09-28 ENCOUNTER — APPOINTMENT (OUTPATIENT)
Facility: HOSPITAL | Age: 58
DRG: 981 | End: 2023-09-28
Payer: MEDICARE

## 2023-09-28 ENCOUNTER — ANESTHESIA (OUTPATIENT)
Facility: HOSPITAL | Age: 58
End: 2023-09-28
Payer: MEDICARE

## 2023-09-28 LAB
ANION GAP SERPL CALC-SCNC: 10 MMOL/L (ref 5–15)
BUN SERPL-MCNC: 52 MG/DL (ref 6–20)
BUN/CREAT SERPL: 10 (ref 12–20)
CALCIUM SERPL-MCNC: 8.6 MG/DL (ref 8.5–10.1)
CHLORIDE SERPL-SCNC: 100 MMOL/L (ref 97–108)
CO2 SERPL-SCNC: 23 MMOL/L (ref 21–32)
COMMENT:: NORMAL
CREAT SERPL-MCNC: 5.36 MG/DL (ref 0.7–1.3)
GLUCOSE BLD STRIP.AUTO-MCNC: 366 MG/DL (ref 65–117)
GLUCOSE BLD STRIP.AUTO-MCNC: 384 MG/DL (ref 65–117)
GLUCOSE BLD STRIP.AUTO-MCNC: 455 MG/DL (ref 65–117)
GLUCOSE BLD STRIP.AUTO-MCNC: 461 MG/DL (ref 65–117)
GLUCOSE BLD STRIP.AUTO-MCNC: 488 MG/DL (ref 65–117)
GLUCOSE SERPL-MCNC: 418 MG/DL (ref 65–100)
HISTORY CHECK: NORMAL
POTASSIUM SERPL-SCNC: 5.8 MMOL/L (ref 3.5–5.1)
SERVICE CMNT-IMP: ABNORMAL
SODIUM SERPL-SCNC: 133 MMOL/L (ref 136–145)
SPECIMEN HOLD: NORMAL

## 2023-09-28 PROCEDURE — 01N80ZZ RELEASE THORACIC NERVE, OPEN APPROACH: ICD-10-PCS | Performed by: NEUROLOGICAL SURGERY

## 2023-09-28 PROCEDURE — 2700000000 HC OXYGEN THERAPY PER DAY

## 2023-09-28 PROCEDURE — 6370000000 HC RX 637 (ALT 250 FOR IP): Performed by: FAMILY MEDICINE

## 2023-09-28 PROCEDURE — 2580000003 HC RX 258: Performed by: NURSE ANESTHETIST, CERTIFIED REGISTERED

## 2023-09-28 PROCEDURE — 2500000003 HC RX 250 WO HCPCS: Performed by: NURSE ANESTHETIST, CERTIFIED REGISTERED

## 2023-09-28 PROCEDURE — 2720000010 HC SURG SUPPLY STERILE: Performed by: NEUROLOGICAL SURGERY

## 2023-09-28 PROCEDURE — 82962 GLUCOSE BLOOD TEST: CPT

## 2023-09-28 PROCEDURE — C1713 ANCHOR/SCREW BN/BN,TIS/BN: HCPCS | Performed by: NEUROLOGICAL SURGERY

## 2023-09-28 PROCEDURE — 7100000000 HC PACU RECOVERY - FIRST 15 MIN: Performed by: NEUROLOGICAL SURGERY

## 2023-09-28 PROCEDURE — 2580000003 HC RX 258: Performed by: FAMILY MEDICINE

## 2023-09-28 PROCEDURE — 0RG10K1 FUSION OF CERVICAL VERTEBRAL JOINT WITH NONAUTOLOGOUS TISSUE SUBSTITUTE, POSTERIOR APPROACH, POSTERIOR COLUMN, OPEN APPROACH: ICD-10-PCS | Performed by: NEUROLOGICAL SURGERY

## 2023-09-28 PROCEDURE — 2709999900 HC NON-CHARGEABLE SUPPLY: Performed by: NEUROLOGICAL SURGERY

## 2023-09-28 PROCEDURE — 94640 AIRWAY INHALATION TREATMENT: CPT

## 2023-09-28 PROCEDURE — 6360000002 HC RX W HCPCS: Performed by: FAMILY MEDICINE

## 2023-09-28 PROCEDURE — 86923 COMPATIBILITY TEST ELECTRIC: CPT

## 2023-09-28 PROCEDURE — 94760 N-INVAS EAR/PLS OXIMETRY 1: CPT

## 2023-09-28 PROCEDURE — 6360000002 HC RX W HCPCS: Performed by: NEUROLOGICAL SURGERY

## 2023-09-28 PROCEDURE — 0RG40K1 FUSION OF CERVICOTHORACIC VERTEBRAL JOINT WITH NONAUTOLOGOUS TISSUE SUBSTITUTE, POSTERIOR APPROACH, POSTERIOR COLUMN, OPEN APPROACH: ICD-10-PCS | Performed by: NEUROLOGICAL SURGERY

## 2023-09-28 PROCEDURE — 6360000002 HC RX W HCPCS

## 2023-09-28 PROCEDURE — 6370000000 HC RX 637 (ALT 250 FOR IP): Performed by: STUDENT IN AN ORGANIZED HEALTH CARE EDUCATION/TRAINING PROGRAM

## 2023-09-28 PROCEDURE — 2500000003 HC RX 250 WO HCPCS: Performed by: NEUROLOGICAL SURGERY

## 2023-09-28 PROCEDURE — 6360000002 HC RX W HCPCS: Performed by: NURSE ANESTHETIST, CERTIFIED REGISTERED

## 2023-09-28 PROCEDURE — 2580000003 HC RX 258: Performed by: NEUROLOGICAL SURGERY

## 2023-09-28 PROCEDURE — 86850 RBC ANTIBODY SCREEN: CPT

## 2023-09-28 PROCEDURE — 6360000002 HC RX W HCPCS: Performed by: PHYSICIAN ASSISTANT

## 2023-09-28 PROCEDURE — 7100000001 HC PACU RECOVERY - ADDTL 15 MIN: Performed by: NEUROLOGICAL SURGERY

## 2023-09-28 PROCEDURE — 6370000000 HC RX 637 (ALT 250 FOR IP): Performed by: NEUROLOGICAL SURGERY

## 2023-09-28 PROCEDURE — L0180 CER POST COL OCC/MAN SUP ADJ: HCPCS | Performed by: NEUROLOGICAL SURGERY

## 2023-09-28 PROCEDURE — 3700000000 HC ANESTHESIA ATTENDED CARE: Performed by: NEUROLOGICAL SURGERY

## 2023-09-28 PROCEDURE — 1100000003 HC PRIVATE W/ TELEMETRY

## 2023-09-28 PROCEDURE — 6370000000 HC RX 637 (ALT 250 FOR IP): Performed by: NURSE PRACTITIONER

## 2023-09-28 PROCEDURE — 86901 BLOOD TYPING SEROLOGIC RH(D): CPT

## 2023-09-28 PROCEDURE — P9045 ALBUMIN (HUMAN), 5%, 250 ML: HCPCS | Performed by: NURSE ANESTHETIST, CERTIFIED REGISTERED

## 2023-09-28 PROCEDURE — 3700000001 HC ADD 15 MINUTES (ANESTHESIA): Performed by: NEUROLOGICAL SURGERY

## 2023-09-28 PROCEDURE — 01N10ZZ RELEASE CERVICAL NERVE, OPEN APPROACH: ICD-10-PCS | Performed by: NEUROLOGICAL SURGERY

## 2023-09-28 PROCEDURE — 80048 BASIC METABOLIC PNL TOTAL CA: CPT

## 2023-09-28 PROCEDURE — 3600000004 HC SURGERY LEVEL 4 BASE: Performed by: NEUROLOGICAL SURGERY

## 2023-09-28 PROCEDURE — C1729 CATH, DRAINAGE: HCPCS | Performed by: NEUROLOGICAL SURGERY

## 2023-09-28 PROCEDURE — 94010 BREATHING CAPACITY TEST: CPT

## 2023-09-28 PROCEDURE — 6360000002 HC RX W HCPCS: Performed by: ANESTHESIOLOGY

## 2023-09-28 PROCEDURE — 36415 COLL VENOUS BLD VENIPUNCTURE: CPT

## 2023-09-28 PROCEDURE — 6360000002 HC RX W HCPCS: Performed by: NURSE PRACTITIONER

## 2023-09-28 PROCEDURE — 3600000014 HC SURGERY LEVEL 4 ADDTL 15MIN: Performed by: NEUROLOGICAL SURGERY

## 2023-09-28 PROCEDURE — 6370000000 HC RX 637 (ALT 250 FOR IP): Performed by: ANESTHESIOLOGY

## 2023-09-28 PROCEDURE — 86900 BLOOD TYPING SEROLOGIC ABO: CPT

## 2023-09-28 DEVICE — ROD 3603730 PRE-CUT 3.5MM X 30MM
Type: IMPLANTABLE DEVICE | Site: SPINE CERVICAL | Status: FUNCTIONAL
Brand: INFINITY™ OCCIPITOCERVICAL UPPER THORACIC SYSTEM

## 2023-09-28 DEVICE — ALLOGRAFT GRAFTON DBF 6CC W/DELIVERY TUBE: Type: IMPLANTABLE DEVICE | Site: SPINE CERVICAL | Status: FUNCTIONAL

## 2023-09-28 RX ORDER — DIPHENHYDRAMINE HCL 25 MG
25 CAPSULE ORAL EVERY 6 HOURS PRN
Status: DISCONTINUED | OUTPATIENT
Start: 2023-09-28 | End: 2023-10-01 | Stop reason: HOSPADM

## 2023-09-28 RX ORDER — SODIUM CHLORIDE 9 MG/ML
INJECTION, SOLUTION INTRAVENOUS PRN
Status: COMPLETED | OUTPATIENT
Start: 2023-09-28 | End: 2023-09-28

## 2023-09-28 RX ORDER — OXYCODONE HYDROCHLORIDE 5 MG/1
10 TABLET ORAL EVERY 4 HOURS PRN
Status: DISCONTINUED | OUTPATIENT
Start: 2023-09-28 | End: 2023-10-01 | Stop reason: HOSPADM

## 2023-09-28 RX ORDER — PROPOFOL 10 MG/ML
INJECTION, EMULSION INTRAVENOUS PRN
Status: DISCONTINUED | OUTPATIENT
Start: 2023-09-28 | End: 2023-09-28 | Stop reason: SDUPTHER

## 2023-09-28 RX ORDER — FENTANYL CITRATE 50 UG/ML
25 INJECTION, SOLUTION INTRAMUSCULAR; INTRAVENOUS EVERY 5 MIN PRN
Status: DISCONTINUED | OUTPATIENT
Start: 2023-09-28 | End: 2023-09-28 | Stop reason: SDUPTHER

## 2023-09-28 RX ORDER — SODIUM CHLORIDE 0.9 % (FLUSH) 0.9 %
5-40 SYRINGE (ML) INJECTION EVERY 12 HOURS SCHEDULED
Status: DISCONTINUED | OUTPATIENT
Start: 2023-09-28 | End: 2023-09-28 | Stop reason: HOSPADM

## 2023-09-28 RX ORDER — SODIUM CHLORIDE 0.9 % (FLUSH) 0.9 %
5-40 SYRINGE (ML) INJECTION EVERY 12 HOURS SCHEDULED
Status: DISCONTINUED | OUTPATIENT
Start: 2023-09-28 | End: 2023-10-01 | Stop reason: HOSPADM

## 2023-09-28 RX ORDER — EPHEDRINE SULFATE 50 MG/ML
INJECTION INTRAVENOUS PRN
Status: DISCONTINUED | OUTPATIENT
Start: 2023-09-28 | End: 2023-09-28 | Stop reason: SDUPTHER

## 2023-09-28 RX ORDER — FENTANYL CITRATE 50 UG/ML
INJECTION, SOLUTION INTRAMUSCULAR; INTRAVENOUS
Status: COMPLETED
Start: 2023-09-28 | End: 2023-09-28

## 2023-09-28 RX ORDER — DIPHENHYDRAMINE HYDROCHLORIDE 50 MG/ML
25 INJECTION INTRAMUSCULAR; INTRAVENOUS EVERY 6 HOURS PRN
Status: DISCONTINUED | OUTPATIENT
Start: 2023-09-28 | End: 2023-10-01 | Stop reason: HOSPADM

## 2023-09-28 RX ORDER — SODIUM CHLORIDE 9 MG/ML
INJECTION, SOLUTION INTRAVENOUS CONTINUOUS
Status: DISCONTINUED | OUTPATIENT
Start: 2023-09-28 | End: 2023-09-29

## 2023-09-28 RX ORDER — SODIUM CHLORIDE 0.9 % (FLUSH) 0.9 %
5-40 SYRINGE (ML) INJECTION PRN
Status: DISCONTINUED | OUTPATIENT
Start: 2023-09-28 | End: 2023-10-01 | Stop reason: HOSPADM

## 2023-09-28 RX ORDER — INSULIN LISPRO 100 [IU]/ML
0-4 INJECTION, SOLUTION INTRAVENOUS; SUBCUTANEOUS NIGHTLY
Status: DISCONTINUED | OUTPATIENT
Start: 2023-09-28 | End: 2023-10-01

## 2023-09-28 RX ORDER — INSULIN LISPRO 100 [IU]/ML
0-4 INJECTION, SOLUTION INTRAVENOUS; SUBCUTANEOUS
Status: DISCONTINUED | OUTPATIENT
Start: 2023-09-28 | End: 2023-10-01

## 2023-09-28 RX ORDER — OXYCODONE HYDROCHLORIDE 5 MG/1
5 TABLET ORAL EVERY 4 HOURS PRN
Status: DISCONTINUED | OUTPATIENT
Start: 2023-09-28 | End: 2023-10-01 | Stop reason: HOSPADM

## 2023-09-28 RX ORDER — HYDROMORPHONE HYDROCHLORIDE 2 MG/ML
INJECTION, SOLUTION INTRAMUSCULAR; INTRAVENOUS; SUBCUTANEOUS PRN
Status: DISCONTINUED | OUTPATIENT
Start: 2023-09-28 | End: 2023-09-28 | Stop reason: SDUPTHER

## 2023-09-28 RX ORDER — PROCHLORPERAZINE EDISYLATE 5 MG/ML
5 INJECTION INTRAMUSCULAR; INTRAVENOUS
Status: DISCONTINUED | OUTPATIENT
Start: 2023-09-28 | End: 2023-09-28 | Stop reason: HOSPADM

## 2023-09-28 RX ORDER — SODIUM CHLORIDE 0.9 % (FLUSH) 0.9 %
5-40 SYRINGE (ML) INJECTION PRN
Status: DISCONTINUED | OUTPATIENT
Start: 2023-09-28 | End: 2023-09-28 | Stop reason: HOSPADM

## 2023-09-28 RX ORDER — DEXTROSE MONOHYDRATE 100 MG/ML
INJECTION, SOLUTION INTRAVENOUS CONTINUOUS PRN
Status: DISCONTINUED | OUTPATIENT
Start: 2023-09-28 | End: 2023-09-29 | Stop reason: SDUPTHER

## 2023-09-28 RX ORDER — LIDOCAINE HYDROCHLORIDE 20 MG/ML
INJECTION, SOLUTION EPIDURAL; INFILTRATION; INTRACAUDAL; PERINEURAL PRN
Status: DISCONTINUED | OUTPATIENT
Start: 2023-09-28 | End: 2023-09-28 | Stop reason: SDUPTHER

## 2023-09-28 RX ORDER — ACETAMINOPHEN 325 MG/1
650 TABLET ORAL EVERY 6 HOURS
Status: DISCONTINUED | OUTPATIENT
Start: 2023-09-28 | End: 2023-10-01 | Stop reason: HOSPADM

## 2023-09-28 RX ORDER — KETAMINE HCL IN NACL, ISO-OSM 100MG/10ML
SYRINGE (ML) INJECTION PRN
Status: DISCONTINUED | OUTPATIENT
Start: 2023-09-28 | End: 2023-09-28 | Stop reason: SDUPTHER

## 2023-09-28 RX ORDER — HYDRALAZINE HYDROCHLORIDE 20 MG/ML
10 INJECTION INTRAMUSCULAR; INTRAVENOUS
Status: DISCONTINUED | OUTPATIENT
Start: 2023-09-28 | End: 2023-09-28 | Stop reason: HOSPADM

## 2023-09-28 RX ORDER — SUCCINYLCHOLINE/SOD CL,ISO/PF 200MG/10ML
SYRINGE (ML) INTRAVENOUS PRN
Status: DISCONTINUED | OUTPATIENT
Start: 2023-09-28 | End: 2023-09-28 | Stop reason: SDUPTHER

## 2023-09-28 RX ORDER — ONDANSETRON 2 MG/ML
INJECTION INTRAMUSCULAR; INTRAVENOUS PRN
Status: DISCONTINUED | OUTPATIENT
Start: 2023-09-28 | End: 2023-09-28 | Stop reason: SDUPTHER

## 2023-09-28 RX ORDER — POLYETHYLENE GLYCOL 3350 17 G/17G
17 POWDER, FOR SOLUTION ORAL DAILY
Status: DISCONTINUED | OUTPATIENT
Start: 2023-09-28 | End: 2023-10-01 | Stop reason: HOSPADM

## 2023-09-28 RX ORDER — HYDROMORPHONE HYDROCHLORIDE 1 MG/ML
0.5 INJECTION, SOLUTION INTRAMUSCULAR; INTRAVENOUS; SUBCUTANEOUS EVERY 5 MIN PRN
Status: DISCONTINUED | OUTPATIENT
Start: 2023-09-28 | End: 2023-09-28 | Stop reason: HOSPADM

## 2023-09-28 RX ORDER — OXYCODONE HYDROCHLORIDE 5 MG/1
5 TABLET ORAL
Status: DISCONTINUED | OUTPATIENT
Start: 2023-09-28 | End: 2023-09-28 | Stop reason: HOSPADM

## 2023-09-28 RX ORDER — BUPIVACAINE HYDROCHLORIDE AND EPINEPHRINE 2.5; 5 MG/ML; UG/ML
INJECTION, SOLUTION EPIDURAL; INFILTRATION; INTRACAUDAL; PERINEURAL PRN
Status: DISCONTINUED | OUTPATIENT
Start: 2023-09-28 | End: 2023-09-28 | Stop reason: HOSPADM

## 2023-09-28 RX ORDER — INSULIN LISPRO 100 [IU]/ML
2 INJECTION, SOLUTION INTRAVENOUS; SUBCUTANEOUS ONCE
Status: COMPLETED | OUTPATIENT
Start: 2023-09-28 | End: 2023-09-28

## 2023-09-28 RX ORDER — ROCURONIUM BROMIDE 10 MG/ML
INJECTION, SOLUTION INTRAVENOUS PRN
Status: DISCONTINUED | OUTPATIENT
Start: 2023-09-28 | End: 2023-09-28 | Stop reason: SDUPTHER

## 2023-09-28 RX ORDER — CEFAZOLIN SODIUM 1 G/3ML
INJECTION, POWDER, FOR SOLUTION INTRAMUSCULAR; INTRAVENOUS PRN
Status: DISCONTINUED | OUTPATIENT
Start: 2023-09-28 | End: 2023-09-28 | Stop reason: SDUPTHER

## 2023-09-28 RX ORDER — DEXAMETHASONE SODIUM PHOSPHATE 4 MG/ML
INJECTION, SOLUTION INTRA-ARTICULAR; INTRALESIONAL; INTRAMUSCULAR; INTRAVENOUS; SOFT TISSUE PRN
Status: DISCONTINUED | OUTPATIENT
Start: 2023-09-28 | End: 2023-09-28 | Stop reason: SDUPTHER

## 2023-09-28 RX ORDER — FENTANYL CITRATE 50 UG/ML
25 INJECTION, SOLUTION INTRAMUSCULAR; INTRAVENOUS EVERY 5 MIN PRN
Status: DISCONTINUED | OUTPATIENT
Start: 2023-09-28 | End: 2023-09-29

## 2023-09-28 RX ORDER — FENTANYL CITRATE 50 UG/ML
INJECTION, SOLUTION INTRAMUSCULAR; INTRAVENOUS PRN
Status: DISCONTINUED | OUTPATIENT
Start: 2023-09-28 | End: 2023-09-28 | Stop reason: SDUPTHER

## 2023-09-28 RX ORDER — ESMOLOL HYDROCHLORIDE 10 MG/ML
INJECTION INTRAVENOUS PRN
Status: DISCONTINUED | OUTPATIENT
Start: 2023-09-28 | End: 2023-09-28 | Stop reason: SDUPTHER

## 2023-09-28 RX ORDER — SENNA AND DOCUSATE SODIUM 50; 8.6 MG/1; MG/1
1 TABLET, FILM COATED ORAL 2 TIMES DAILY
Status: DISCONTINUED | OUTPATIENT
Start: 2023-09-28 | End: 2023-10-01 | Stop reason: HOSPADM

## 2023-09-28 RX ORDER — ONDANSETRON 2 MG/ML
4 INJECTION INTRAMUSCULAR; INTRAVENOUS
Status: DISCONTINUED | OUTPATIENT
Start: 2023-09-28 | End: 2023-09-28 | Stop reason: HOSPADM

## 2023-09-28 RX ORDER — ONDANSETRON 4 MG/1
4 TABLET, ORALLY DISINTEGRATING ORAL EVERY 8 HOURS PRN
Status: DISCONTINUED | OUTPATIENT
Start: 2023-09-28 | End: 2023-10-01 | Stop reason: HOSPADM

## 2023-09-28 RX ORDER — ONDANSETRON 2 MG/ML
4 INJECTION INTRAMUSCULAR; INTRAVENOUS EVERY 6 HOURS PRN
Status: DISCONTINUED | OUTPATIENT
Start: 2023-09-28 | End: 2023-10-01 | Stop reason: HOSPADM

## 2023-09-28 RX ORDER — SODIUM CHLORIDE, SODIUM LACTATE, POTASSIUM CHLORIDE, CALCIUM CHLORIDE 600; 310; 30; 20 MG/100ML; MG/100ML; MG/100ML; MG/100ML
INJECTION, SOLUTION INTRAVENOUS CONTINUOUS PRN
Status: DISCONTINUED | OUTPATIENT
Start: 2023-09-28 | End: 2023-09-28 | Stop reason: SDUPTHER

## 2023-09-28 RX ORDER — ALBUMIN, HUMAN INJ 5% 5 %
SOLUTION INTRAVENOUS PRN
Status: DISCONTINUED | OUTPATIENT
Start: 2023-09-28 | End: 2023-09-28 | Stop reason: SDUPTHER

## 2023-09-28 RX ORDER — SODIUM CHLORIDE 9 MG/ML
INJECTION, SOLUTION INTRAVENOUS PRN
Status: DISCONTINUED | OUTPATIENT
Start: 2023-09-28 | End: 2023-09-28 | Stop reason: HOSPADM

## 2023-09-28 RX ADMIN — SODIUM CHLORIDE 50 ML/HR: 9 INJECTION, SOLUTION INTRAVENOUS at 17:47

## 2023-09-28 RX ADMIN — ALBUMIN (HUMAN) 12.5 G: 12.5 INJECTION, SOLUTION INTRAVENOUS at 12:10

## 2023-09-28 RX ADMIN — Medication 10 MG: at 12:19

## 2023-09-28 RX ADMIN — ROCURONIUM BROMIDE 35 MG: 10 INJECTION, SOLUTION INTRAVENOUS at 11:35

## 2023-09-28 RX ADMIN — Medication 2 UNITS: at 21:36

## 2023-09-28 RX ADMIN — FENTANYL CITRATE 25 MCG: 50 INJECTION, SOLUTION INTRAMUSCULAR; INTRAVENOUS at 18:23

## 2023-09-28 RX ADMIN — BUDESONIDE 500 MCG: 0.5 INHALANT RESPIRATORY (INHALATION) at 07:05

## 2023-09-28 RX ADMIN — BUPRENORPHINE HYDROCHLORIDE AND NALOXONE HYDROCHLORIDE DIHYDRATE 2 TABLET: 8; 2 TABLET SUBLINGUAL at 08:12

## 2023-09-28 RX ADMIN — SODIUM CHLORIDE, PRESERVATIVE FREE 5 ML: 5 INJECTION INTRAVENOUS at 23:15

## 2023-09-28 RX ADMIN — FENTANYL CITRATE 100 MCG: 0.05 INJECTION, SOLUTION INTRAMUSCULAR; INTRAVENOUS at 11:22

## 2023-09-28 RX ADMIN — SUGAMMADEX 200 MG: 100 INJECTION, SOLUTION INTRAVENOUS at 16:18

## 2023-09-28 RX ADMIN — AMLODIPINE BESYLATE 10 MG: 5 TABLET ORAL at 08:12

## 2023-09-28 RX ADMIN — SODIUM CHLORIDE, POTASSIUM CHLORIDE, SODIUM LACTATE AND CALCIUM CHLORIDE: 600; 310; 30; 20 INJECTION, SOLUTION INTRAVENOUS at 11:14

## 2023-09-28 RX ADMIN — SODIUM CHLORIDE: 9 INJECTION, SOLUTION INTRAVENOUS at 11:30

## 2023-09-28 RX ADMIN — FENTANYL CITRATE 50 MCG: 0.05 INJECTION, SOLUTION INTRAMUSCULAR; INTRAVENOUS at 11:14

## 2023-09-28 RX ADMIN — PHENYLEPHRINE HYDROCHLORIDE 50 MCG/MIN: 10 INJECTION INTRAVENOUS at 11:22

## 2023-09-28 RX ADMIN — OXYCODONE HYDROCHLORIDE 10 MG: 5 TABLET ORAL at 23:00

## 2023-09-28 RX ADMIN — Medication 4 UNITS: at 19:07

## 2023-09-28 RX ADMIN — CARVEDILOL 25 MG: 12.5 TABLET, FILM COATED ORAL at 08:11

## 2023-09-28 RX ADMIN — Medication 5 UNITS: at 18:25

## 2023-09-28 RX ADMIN — Medication 120 MG: at 11:22

## 2023-09-28 RX ADMIN — CARVEDILOL 25 MG: 12.5 TABLET, FILM COATED ORAL at 18:51

## 2023-09-28 RX ADMIN — ROCURONIUM BROMIDE 5 MG: 10 INJECTION, SOLUTION INTRAVENOUS at 11:22

## 2023-09-28 RX ADMIN — HYDRALAZINE HYDROCHLORIDE 50 MG: 50 TABLET, FILM COATED ORAL at 03:53

## 2023-09-28 RX ADMIN — SODIUM CHLORIDE, PRESERVATIVE FREE 10 ML: 5 INJECTION INTRAVENOUS at 08:13

## 2023-09-28 RX ADMIN — ROCURONIUM BROMIDE 10 MG: 10 INJECTION, SOLUTION INTRAVENOUS at 13:05

## 2023-09-28 RX ADMIN — NOREPINEPHRINE BITARTRATE 8 MCG/MIN: 1 INJECTION, SOLUTION, CONCENTRATE INTRAVENOUS at 12:03

## 2023-09-28 RX ADMIN — PROPOFOL 160 MG: 10 INJECTION, EMULSION INTRAVENOUS at 11:22

## 2023-09-28 RX ADMIN — WATER 2000 MG: 1 INJECTION INTRAMUSCULAR; INTRAVENOUS; SUBCUTANEOUS at 18:51

## 2023-09-28 RX ADMIN — ONDANSETRON 4 MG: 2 INJECTION INTRAMUSCULAR; INTRAVENOUS at 15:47

## 2023-09-28 RX ADMIN — ROCURONIUM BROMIDE 10 MG: 10 INJECTION, SOLUTION INTRAVENOUS at 12:44

## 2023-09-28 RX ADMIN — HYDROMORPHONE HYDROCHLORIDE 0.5 MG: 2 INJECTION, SOLUTION INTRAMUSCULAR; INTRAVENOUS; SUBCUTANEOUS at 16:34

## 2023-09-28 RX ADMIN — DEXAMETHASONE SODIUM PHOSPHATE 4 MG: 4 INJECTION, SOLUTION INTRAMUSCULAR; INTRAVENOUS at 21:36

## 2023-09-28 RX ADMIN — HYDRALAZINE HYDROCHLORIDE 50 MG: 50 TABLET, FILM COATED ORAL at 18:51

## 2023-09-28 RX ADMIN — ALBUMIN (HUMAN) 12.5 G: 12.5 INJECTION, SOLUTION INTRAVENOUS at 13:00

## 2023-09-28 RX ADMIN — DEXAMETHASONE SODIUM PHOSPHATE 4 MG: 4 INJECTION, SOLUTION INTRAMUSCULAR; INTRAVENOUS at 06:08

## 2023-09-28 RX ADMIN — ACETAMINOPHEN 650 MG: 325 TABLET ORAL at 18:51

## 2023-09-28 RX ADMIN — LIDOCAINE HYDROCHLORIDE 20 MG: 20 INJECTION, SOLUTION EPIDURAL; INFILTRATION; INTRACAUDAL; PERINEURAL at 11:22

## 2023-09-28 RX ADMIN — ARFORMOTEROL TARTRATE 15 MCG: 15 SOLUTION RESPIRATORY (INHALATION) at 07:05

## 2023-09-28 RX ADMIN — Medication 4 UNITS: at 21:35

## 2023-09-28 RX ADMIN — FENTANYL CITRATE 25 MCG: 50 INJECTION, SOLUTION INTRAMUSCULAR; INTRAVENOUS at 18:08

## 2023-09-28 RX ADMIN — PREGABALIN 75 MG: 75 CAPSULE ORAL at 20:41

## 2023-09-28 RX ADMIN — Medication 5 MG: at 13:39

## 2023-09-28 RX ADMIN — Medication 10 MG: at 12:59

## 2023-09-28 RX ADMIN — ROCURONIUM BROMIDE 10 MG: 10 INJECTION, SOLUTION INTRAVENOUS at 14:17

## 2023-09-28 RX ADMIN — OXYCODONE HYDROCHLORIDE 10 MG: 5 TABLET ORAL at 18:54

## 2023-09-28 RX ADMIN — ESMOLOL HYDROCHLORIDE 30 MG: 10 INJECTION, SOLUTION INTRAVENOUS at 11:35

## 2023-09-28 RX ADMIN — EPHEDRINE SULFATE 20 MG: 50 INJECTION INTRAVENOUS at 12:13

## 2023-09-28 RX ADMIN — DEXAMETHASONE SODIUM PHOSPHATE 8 MG: 4 INJECTION, SOLUTION INTRAMUSCULAR; INTRAVENOUS at 12:15

## 2023-09-28 RX ADMIN — SODIUM CHLORIDE: 9 INJECTION, SOLUTION INTRAVENOUS at 13:45

## 2023-09-28 RX ADMIN — CLONIDINE HYDROCHLORIDE 0.3 MG: 0.2 TABLET ORAL at 08:10

## 2023-09-28 RX ADMIN — CEFAZOLIN 2 G: 330 INJECTION, POWDER, FOR SOLUTION INTRAMUSCULAR; INTRAVENOUS at 12:15

## 2023-09-28 ASSESSMENT — PAIN DESCRIPTION - ORIENTATION
ORIENTATION: POSTERIOR
ORIENTATION: LEFT
ORIENTATION: INNER
ORIENTATION: POSTERIOR
ORIENTATION: POSTERIOR

## 2023-09-28 ASSESSMENT — PAIN DESCRIPTION - LOCATION
LOCATION: NECK
LOCATION: BACK;NECK
LOCATION: NECK
LOCATION: HEAD
LOCATION: NECK

## 2023-09-28 ASSESSMENT — PAIN SCALES - GENERAL
PAINLEVEL_OUTOF10: 10
PAINLEVEL_OUTOF10: 8
PAINLEVEL_OUTOF10: 10

## 2023-09-28 ASSESSMENT — PAIN DESCRIPTION - PAIN TYPE
TYPE: SURGICAL PAIN
TYPE: ACUTE PAIN
TYPE: SURGICAL PAIN

## 2023-09-28 ASSESSMENT — PAIN DESCRIPTION - DESCRIPTORS
DESCRIPTORS: ACHING

## 2023-09-28 ASSESSMENT — PAIN - FUNCTIONAL ASSESSMENT
PAIN_FUNCTIONAL_ASSESSMENT: 0-10
PAIN_FUNCTIONAL_ASSESSMENT: PREVENTS OR INTERFERES SOME ACTIVE ACTIVITIES AND ADLS

## 2023-09-28 ASSESSMENT — PAIN DESCRIPTION - FREQUENCY: FREQUENCY: CONTINUOUS

## 2023-09-28 NOTE — PERIOP NOTE
FAMILY COMMUNICATION ATTEMPTED AT BEGINNING OF PROCEDURE. LEFT VOICEMAIL WITH PATIENT CONTACT, AKIN.

## 2023-09-28 NOTE — PERIOP NOTE
Surgifoam to sterile back field   Lot: [id number]  Exp: 08-18-26    Floseal to sterile back field  WVP:[id number]  Exp: 05-

## 2023-09-29 ENCOUNTER — APPOINTMENT (OUTPATIENT)
Facility: HOSPITAL | Age: 58
DRG: 981 | End: 2023-09-29
Payer: MEDICARE

## 2023-09-29 PROBLEM — M54.12 CERVICAL RADICULOPATHY: Status: ACTIVE | Noted: 2023-09-29

## 2023-09-29 PROBLEM — T38.0X5A STEROID-INDUCED HYPERGLYCEMIA: Status: ACTIVE | Noted: 2023-09-29

## 2023-09-29 PROBLEM — R73.9 STEROID-INDUCED HYPERGLYCEMIA: Status: ACTIVE | Noted: 2023-09-29

## 2023-09-29 PROBLEM — Z98.1 S/P CERVICAL SPINAL FUSION: Status: ACTIVE | Noted: 2023-09-29

## 2023-09-29 PROBLEM — Z91.158 NON-COMPLIANCE WITH RENAL DIALYSIS: Status: ACTIVE | Noted: 2023-09-29

## 2023-09-29 LAB
ABO + RH BLD: NORMAL
ANION GAP SERPL CALC-SCNC: 12 MMOL/L (ref 5–15)
ANION GAP SERPL CALC-SCNC: 5 MMOL/L (ref 5–15)
BASOPHILS # BLD: 0 K/UL (ref 0–0.1)
BASOPHILS NFR BLD: 0 % (ref 0–1)
BLD PROD TYP BPU: NORMAL
BLD PROD TYP BPU: NORMAL
BLOOD BANK DISPENSE STATUS: NORMAL
BLOOD BANK DISPENSE STATUS: NORMAL
BLOOD GROUP ANTIBODIES SERPL: NORMAL
BPU ID: NORMAL
BPU ID: NORMAL
BUN SERPL-MCNC: 54 MG/DL (ref 6–20)
BUN SERPL-MCNC: 60 MG/DL (ref 6–20)
BUN/CREAT SERPL: 10 (ref 12–20)
BUN/CREAT SERPL: 10 (ref 12–20)
CALCIUM SERPL-MCNC: 8.6 MG/DL (ref 8.5–10.1)
CALCIUM SERPL-MCNC: 8.7 MG/DL (ref 8.5–10.1)
CHLORIDE SERPL-SCNC: 100 MMOL/L (ref 97–108)
CHLORIDE SERPL-SCNC: 103 MMOL/L (ref 97–108)
CO2 SERPL-SCNC: 19 MMOL/L (ref 21–32)
CO2 SERPL-SCNC: 23 MMOL/L (ref 21–32)
CREAT SERPL-MCNC: 5.68 MG/DL (ref 0.7–1.3)
CREAT SERPL-MCNC: 5.83 MG/DL (ref 0.7–1.3)
CROSSMATCH RESULT: NORMAL
CROSSMATCH RESULT: NORMAL
DIFFERENTIAL METHOD BLD: ABNORMAL
EOSINOPHIL # BLD: 0 K/UL (ref 0–0.4)
EOSINOPHIL NFR BLD: 0 % (ref 0–7)
ERYTHROCYTE [DISTWIDTH] IN BLOOD BY AUTOMATED COUNT: 17.2 % (ref 11.5–14.5)
GLUCOSE BLD STRIP.AUTO-MCNC: 124 MG/DL (ref 65–117)
GLUCOSE BLD STRIP.AUTO-MCNC: 132 MG/DL (ref 65–117)
GLUCOSE BLD STRIP.AUTO-MCNC: 135 MG/DL (ref 65–117)
GLUCOSE BLD STRIP.AUTO-MCNC: 159 MG/DL (ref 65–117)
GLUCOSE BLD STRIP.AUTO-MCNC: 194 MG/DL (ref 65–117)
GLUCOSE BLD STRIP.AUTO-MCNC: 226 MG/DL (ref 65–117)
GLUCOSE BLD STRIP.AUTO-MCNC: 251 MG/DL (ref 65–117)
GLUCOSE BLD STRIP.AUTO-MCNC: 287 MG/DL (ref 65–117)
GLUCOSE BLD STRIP.AUTO-MCNC: 349 MG/DL (ref 65–117)
GLUCOSE BLD STRIP.AUTO-MCNC: 442 MG/DL (ref 65–117)
GLUCOSE BLD STRIP.AUTO-MCNC: 457 MG/DL (ref 65–117)
GLUCOSE BLD STRIP.AUTO-MCNC: 478 MG/DL (ref 65–117)
GLUCOSE BLD STRIP.AUTO-MCNC: 551 MG/DL (ref 65–117)
GLUCOSE SERPL-MCNC: 411 MG/DL (ref 65–100)
GLUCOSE SERPL-MCNC: 451 MG/DL (ref 65–100)
HCT VFR BLD AUTO: 29.5 % (ref 36.6–50.3)
HGB BLD-MCNC: 9.5 G/DL (ref 12.1–17)
IMM GRANULOCYTES # BLD AUTO: 0.1 K/UL (ref 0–0.04)
IMM GRANULOCYTES NFR BLD AUTO: 1 % (ref 0–0.5)
LYMPHOCYTES # BLD: 0.2 K/UL (ref 0.8–3.5)
LYMPHOCYTES NFR BLD: 2 % (ref 12–49)
MCH RBC QN AUTO: 28.7 PG (ref 26–34)
MCHC RBC AUTO-ENTMCNC: 32.2 G/DL (ref 30–36.5)
MCV RBC AUTO: 89.1 FL (ref 80–99)
MONOCYTES # BLD: 0.5 K/UL (ref 0–1)
MONOCYTES NFR BLD: 5 % (ref 5–13)
NEUTS SEG # BLD: 9.1 K/UL (ref 1.8–8)
NEUTS SEG NFR BLD: 92 % (ref 32–75)
NRBC # BLD: 0 K/UL (ref 0–0.01)
NRBC BLD-RTO: 0 PER 100 WBC
PLATELET # BLD AUTO: 221 K/UL (ref 150–400)
PMV BLD AUTO: 12.8 FL (ref 8.9–12.9)
POTASSIUM SERPL-SCNC: 6 MMOL/L (ref 3.5–5.1)
POTASSIUM SERPL-SCNC: 6.1 MMOL/L (ref 3.5–5.1)
RBC # BLD AUTO: 3.31 M/UL (ref 4.1–5.7)
RBC MORPH BLD: ABNORMAL
RBC MORPH BLD: ABNORMAL
SERVICE CMNT-IMP: ABNORMAL
SODIUM SERPL-SCNC: 128 MMOL/L (ref 136–145)
SODIUM SERPL-SCNC: 134 MMOL/L (ref 136–145)
SPECIMEN EXP DATE BLD: NORMAL
UNIT DIVISION: 0
UNIT DIVISION: 0
WBC # BLD AUTO: 9.9 K/UL (ref 4.1–11.1)

## 2023-09-29 PROCEDURE — 2580000003 HC RX 258: Performed by: NEUROLOGICAL SURGERY

## 2023-09-29 PROCEDURE — 82962 GLUCOSE BLOOD TEST: CPT

## 2023-09-29 PROCEDURE — 6360000002 HC RX W HCPCS: Performed by: NEUROLOGICAL SURGERY

## 2023-09-29 PROCEDURE — 97535 SELF CARE MNGMENT TRAINING: CPT

## 2023-09-29 PROCEDURE — 6370000000 HC RX 637 (ALT 250 FOR IP): Performed by: NEUROLOGICAL SURGERY

## 2023-09-29 PROCEDURE — 2700000000 HC OXYGEN THERAPY PER DAY

## 2023-09-29 PROCEDURE — 6370000000 HC RX 637 (ALT 250 FOR IP): Performed by: CLINICAL NURSE SPECIALIST

## 2023-09-29 PROCEDURE — 36415 COLL VENOUS BLD VENIPUNCTURE: CPT

## 2023-09-29 PROCEDURE — 6360000002 HC RX W HCPCS: Performed by: NURSE PRACTITIONER

## 2023-09-29 PROCEDURE — 80048 BASIC METABOLIC PNL TOTAL CA: CPT

## 2023-09-29 PROCEDURE — 94760 N-INVAS EAR/PLS OXIMETRY 1: CPT

## 2023-09-29 PROCEDURE — 97530 THERAPEUTIC ACTIVITIES: CPT

## 2023-09-29 PROCEDURE — 99024 POSTOP FOLLOW-UP VISIT: CPT | Performed by: NURSE PRACTITIONER

## 2023-09-29 PROCEDURE — 2580000003 HC RX 258: Performed by: CLINICAL NURSE SPECIALIST

## 2023-09-29 PROCEDURE — 90935 HEMODIALYSIS ONE EVALUATION: CPT

## 2023-09-29 PROCEDURE — 2060000000 HC ICU INTERMEDIATE R&B

## 2023-09-29 PROCEDURE — 74018 RADEX ABDOMEN 1 VIEW: CPT

## 2023-09-29 PROCEDURE — 99222 1ST HOSP IP/OBS MODERATE 55: CPT | Performed by: CLINICAL NURSE SPECIALIST

## 2023-09-29 PROCEDURE — 6370000000 HC RX 637 (ALT 250 FOR IP): Performed by: STUDENT IN AN ORGANIZED HEALTH CARE EDUCATION/TRAINING PROGRAM

## 2023-09-29 PROCEDURE — 94640 AIRWAY INHALATION TREATMENT: CPT

## 2023-09-29 PROCEDURE — 82985 ASSAY OF GLYCATED PROTEIN: CPT

## 2023-09-29 PROCEDURE — 85025 COMPLETE CBC W/AUTO DIFF WBC: CPT

## 2023-09-29 PROCEDURE — 6370000000 HC RX 637 (ALT 250 FOR IP): Performed by: NURSE PRACTITIONER

## 2023-09-29 PROCEDURE — 2580000003 HC RX 258: Performed by: STUDENT IN AN ORGANIZED HEALTH CARE EDUCATION/TRAINING PROGRAM

## 2023-09-29 RX ORDER — DEXTROSE AND SODIUM CHLORIDE 5; .45 G/100ML; G/100ML
INJECTION, SOLUTION INTRAVENOUS CONTINUOUS PRN
Status: DISCONTINUED | OUTPATIENT
Start: 2023-09-29 | End: 2023-09-29

## 2023-09-29 RX ORDER — DEXAMETHASONE 1 MG
2 TABLET ORAL DAILY
Status: COMPLETED | OUTPATIENT
Start: 2023-10-01 | End: 2023-10-01

## 2023-09-29 RX ORDER — DEXAMETHASONE SODIUM PHOSPHATE 4 MG/ML
4 INJECTION, SOLUTION INTRA-ARTICULAR; INTRALESIONAL; INTRAMUSCULAR; INTRAVENOUS; SOFT TISSUE EVERY 12 HOURS
Status: COMPLETED | OUTPATIENT
Start: 2023-09-29 | End: 2023-09-29

## 2023-09-29 RX ORDER — DEXAMETHASONE 1 MG
2 TABLET ORAL EVERY 12 HOURS SCHEDULED
Status: COMPLETED | OUTPATIENT
Start: 2023-09-30 | End: 2023-09-30

## 2023-09-29 RX ORDER — INSULIN GLARGINE 100 [IU]/ML
10 INJECTION, SOLUTION SUBCUTANEOUS DAILY
Status: DISCONTINUED | OUTPATIENT
Start: 2023-09-29 | End: 2023-09-29

## 2023-09-29 RX ORDER — INSULIN LISPRO 100 [IU]/ML
4 INJECTION, SOLUTION INTRAVENOUS; SUBCUTANEOUS ONCE
Status: COMPLETED | OUTPATIENT
Start: 2023-09-29 | End: 2023-09-29

## 2023-09-29 RX ORDER — BISACODYL 10 MG
10 SUPPOSITORY, RECTAL RECTAL DAILY
Status: DISCONTINUED | OUTPATIENT
Start: 2023-09-29 | End: 2023-10-01 | Stop reason: HOSPADM

## 2023-09-29 RX ORDER — SODIUM CHLORIDE 9 MG/ML
INJECTION, SOLUTION INTRAVENOUS CONTINUOUS
Status: DISCONTINUED | OUTPATIENT
Start: 2023-09-29 | End: 2023-09-29

## 2023-09-29 RX ORDER — HYDROMORPHONE HYDROCHLORIDE 1 MG/ML
0.5 INJECTION, SOLUTION INTRAMUSCULAR; INTRAVENOUS; SUBCUTANEOUS
Status: DISCONTINUED | OUTPATIENT
Start: 2023-09-29 | End: 2023-10-01 | Stop reason: HOSPADM

## 2023-09-29 RX ORDER — SODIUM CHLORIDE 9 MG/ML
INJECTION, SOLUTION INTRAVENOUS CONTINUOUS
Status: DISCONTINUED | OUTPATIENT
Start: 2023-09-29 | End: 2023-10-01 | Stop reason: HOSPADM

## 2023-09-29 RX ORDER — DEXTROSE MONOHYDRATE 100 MG/ML
INJECTION, SOLUTION INTRAVENOUS CONTINUOUS PRN
Status: DISCONTINUED | OUTPATIENT
Start: 2023-09-29 | End: 2023-09-29 | Stop reason: SDUPTHER

## 2023-09-29 RX ORDER — DEXTROSE MONOHYDRATE 100 MG/ML
INJECTION, SOLUTION INTRAVENOUS CONTINUOUS PRN
Status: DISCONTINUED | OUTPATIENT
Start: 2023-09-29 | End: 2023-10-01 | Stop reason: HOSPADM

## 2023-09-29 RX ORDER — INSULIN LISPRO 100 [IU]/ML
2 INJECTION, SOLUTION INTRAVENOUS; SUBCUTANEOUS ONCE
Status: COMPLETED | OUTPATIENT
Start: 2023-09-29 | End: 2023-09-29

## 2023-09-29 RX ADMIN — DIPHENHYDRAMINE HYDROCHLORIDE 25 MG: 50 INJECTION INTRAMUSCULAR; INTRAVENOUS at 21:13

## 2023-09-29 RX ADMIN — BUDESONIDE 500 MCG: 0.5 INHALANT RESPIRATORY (INHALATION) at 19:55

## 2023-09-29 RX ADMIN — BUDESONIDE 500 MCG: 0.5 INHALANT RESPIRATORY (INHALATION) at 00:14

## 2023-09-29 RX ADMIN — CARVEDILOL 25 MG: 12.5 TABLET, FILM COATED ORAL at 16:44

## 2023-09-29 RX ADMIN — Medication 4 UNITS: at 00:25

## 2023-09-29 RX ADMIN — ARFORMOTEROL TARTRATE 15 MCG: 15 SOLUTION RESPIRATORY (INHALATION) at 19:55

## 2023-09-29 RX ADMIN — CLONIDINE HYDROCHLORIDE 0.3 MG: 0.2 TABLET ORAL at 21:05

## 2023-09-29 RX ADMIN — WATER 2000 MG: 1 INJECTION INTRAMUSCULAR; INTRAVENOUS; SUBCUTANEOUS at 03:16

## 2023-09-29 RX ADMIN — SODIUM CHLORIDE, PRESERVATIVE FREE 10 ML: 5 INJECTION INTRAVENOUS at 21:06

## 2023-09-29 RX ADMIN — HYDRALAZINE HYDROCHLORIDE 50 MG: 50 TABLET, FILM COATED ORAL at 18:46

## 2023-09-29 RX ADMIN — SODIUM CHLORIDE: 9 INJECTION, SOLUTION INTRAVENOUS at 12:45

## 2023-09-29 RX ADMIN — OXYCODONE HYDROCHLORIDE 10 MG: 5 TABLET ORAL at 21:12

## 2023-09-29 RX ADMIN — BISACODYL 10 MG: 10 SUPPOSITORY RECTAL at 08:43

## 2023-09-29 RX ADMIN — BUPRENORPHINE HYDROCHLORIDE AND NALOXONE HYDROCHLORIDE DIHYDRATE 2 TABLET: 8; 2 TABLET SUBLINGUAL at 08:47

## 2023-09-29 RX ADMIN — ACETAMINOPHEN 650 MG: 325 TABLET ORAL at 18:47

## 2023-09-29 RX ADMIN — Medication 2 UNITS: at 07:08

## 2023-09-29 RX ADMIN — OXYCODONE HYDROCHLORIDE 10 MG: 5 TABLET ORAL at 11:12

## 2023-09-29 RX ADMIN — SENNOSIDES AND DOCUSATE SODIUM 1 TABLET: 50; 8.6 TABLET ORAL at 08:46

## 2023-09-29 RX ADMIN — SENNOSIDES AND DOCUSATE SODIUM 1 TABLET: 50; 8.6 TABLET ORAL at 21:06

## 2023-09-29 RX ADMIN — ACETAMINOPHEN 650 MG: 325 TABLET ORAL at 12:45

## 2023-09-29 RX ADMIN — DEXAMETHASONE SODIUM PHOSPHATE 4 MG: 4 INJECTION, SOLUTION INTRAMUSCULAR; INTRAVENOUS at 18:47

## 2023-09-29 RX ADMIN — OXYCODONE HYDROCHLORIDE 10 MG: 5 TABLET ORAL at 17:00

## 2023-09-29 RX ADMIN — OXYCODONE HYDROCHLORIDE 10 MG: 5 TABLET ORAL at 06:47

## 2023-09-29 RX ADMIN — BUDESONIDE 500 MCG: 0.5 INHALANT RESPIRATORY (INHALATION) at 07:18

## 2023-09-29 RX ADMIN — DEXAMETHASONE SODIUM PHOSPHATE 4 MG: 4 INJECTION, SOLUTION INTRAMUSCULAR; INTRAVENOUS at 07:08

## 2023-09-29 RX ADMIN — ARFORMOTEROL TARTRATE 15 MCG: 15 SOLUTION RESPIRATORY (INHALATION) at 00:14

## 2023-09-29 RX ADMIN — ARFORMOTEROL TARTRATE 15 MCG: 15 SOLUTION RESPIRATORY (INHALATION) at 07:18

## 2023-09-29 RX ADMIN — POLYETHYLENE GLYCOL 3350 17 G: 17 POWDER, FOR SOLUTION ORAL at 08:46

## 2023-09-29 RX ADMIN — PREGABALIN 75 MG: 75 CAPSULE ORAL at 21:06

## 2023-09-29 RX ADMIN — SODIUM CHLORIDE 7.94 UNITS/HR: 9 INJECTION, SOLUTION INTRAVENOUS at 11:02

## 2023-09-29 RX ADMIN — Medication 4 UNITS: at 07:08

## 2023-09-29 RX ADMIN — DEXTROSE AND SODIUM CHLORIDE: 5; 450 INJECTION, SOLUTION INTRAVENOUS at 16:11

## 2023-09-29 RX ADMIN — Medication 8 UNITS: at 16:45

## 2023-09-29 RX ADMIN — OXYCODONE HYDROCHLORIDE 10 MG: 5 TABLET ORAL at 03:15

## 2023-09-29 RX ADMIN — INSULIN GLARGINE 10 UNITS: 100 INJECTION, SOLUTION SUBCUTANEOUS at 07:08

## 2023-09-29 RX ADMIN — ACETAMINOPHEN 650 MG: 325 TABLET ORAL at 06:47

## 2023-09-29 ASSESSMENT — PAIN SCALES - GENERAL
PAINLEVEL_OUTOF10: 4
PAINLEVEL_OUTOF10: 10
PAINLEVEL_OUTOF10: 6
PAINLEVEL_OUTOF10: 9
PAINLEVEL_OUTOF10: 10
PAINLEVEL_OUTOF10: 4

## 2023-09-29 ASSESSMENT — PAIN DESCRIPTION - LOCATION
LOCATION: NECK

## 2023-09-29 ASSESSMENT — PAIN DESCRIPTION - ORIENTATION
ORIENTATION: POSTERIOR
ORIENTATION: POSTERIOR

## 2023-09-29 ASSESSMENT — PAIN DESCRIPTION - PAIN TYPE
TYPE: ACUTE PAIN

## 2023-09-29 ASSESSMENT — PAIN DESCRIPTION - FREQUENCY: FREQUENCY: CONTINUOUS

## 2023-09-29 ASSESSMENT — PAIN DESCRIPTION - DESCRIPTORS: DESCRIPTORS: ACHING

## 2023-09-29 NOTE — CARE COORDINATION
Transition of Care Plan:    RUR: 23%   Prior Level of Functioning: Independent   Disposition: Home with Family Assistance \"Pending Therapies Recs\"   OP HD:   MWF HD at [facility] 6:00 am-10 am  Medicaid Transport    Follow up appointments: PCP   DME needed: None \"Pending PT/OT eval\"   Transportation at discharge: Family   IM/IMM Medicare/ letter given: 2nd IMM letter Needed  Caregiver Contact: Ashwini Hammond (Child) [phone] vs. Cecile Arriaga Levander Fear [phone]  Discharge Caregiver contacted prior to discharge? N/A   Care Conference needed?  No   Barriers to discharge: Medical, PT/OT eval    S/P 9/28:  Posterior cervical fusion C6 to T1 and left C7-T1 foraminotomy

## 2023-09-29 NOTE — OP NOTE
both 1 and 2 mm,  I was able to decompress and visualize the C8 root. A partial laminectomy was performed as well to decompress the exit site of the root. I then proceeded to stabilize the spine over that level by placing lateral mass screws at C6 and C7 on the right as well as a pedicle screw at T1 using the O-arm navigation system. I was able to place one lateral mass screw at C6 but not in the C7 on the left side because of the pathology. I was able to place a pedicle screw in T1 on the left without difficulty. We then connected the screws with a 40 mm long neha on the right and a 30 mm long neha on the left, locked it down with locking caps and did another O-arm spin to confirm good position of the screws, which was indeed noted. The area was irrigated copiously with normal saline. A drain was placed and brought out through a separate stab incision. The fascia closed with 0 Vicryl, subcutaneous layer with 2-0 interrupted inverted Vicryl sutures, and staples applied to the skin edges. The needle, sponge, and instrument counts were correct at the end of the procedure.       MD KERRI [email or web address]/[email or web address]  D:  09/28/2023 16:56  T:  09/28/2023 18:13  JOB #:  [id number]

## 2023-09-29 NOTE — CONSULTS
[facility]  DIABETES MANAGEMENT CONSULT    Consulted by Provider for advanced nursing evaluation and care for inpatient blood glucose management. Evaluation and Action Plan   Martínez Perez is a 63 y.o. AA male w/ T2D. No recent A1C values on file since 2022. Ordered fructosamine to better ascertain diabetes status. Admitted from the ED after experiencing pulmonary edema 2/2 missed dialysis. S/p Posterior cervical fusion C6 to T1 and left C7-T1 foraminotomy, on steroids. Since initiating steroid therapy, POCT BG noted to be >400 consistently. After conferring with hospitalist, decision was made to initiate insulin infusion to quickly improve steroid-induced hyperglycemia due to concern for anion gap opening/acidosis. PTA self-care diabetes management: not been on diabetes medications or checking BG for ~1yr due to ESRD impacting diabetes. Anticipate BG to improve with insulin infusion, follow insulin infusion protocol for transitioning once appropriate. Given ESRD will likely require lower doses of basal insulin, however steroids may increase his insulin resistance. Recommend weight-based SQ insulin dosing for transitioning off insulin infusion. Management Rationale Action Plan   Medication  Continue insulin infusion per IP adult insulin protocol  POC glucose hourly  Once BG <250, transition off insulin infusion    When ready to convert  Initiate the subcutaneous insulin order set with:  Basal insulin: 0.2 units/kg/day. (16 units Lantus daily)  First dose now then turn off insulin gtt two hours later  Correctional insulin ACHS at normal sensitivity, start at a glucose of 200  Consistent Carbohydrate diet  If patient is experiencing pre-prandial hyperglycemia over 200 on basal and correctional insulin, add Low dose bolus insulin. 0.05 units/kg/meal (4 units lispro TID with meals).  Hold if patient consumes less than 50% of carbohydrates on meal tray    Lab   [x] Serum

## 2023-09-30 LAB
ANION GAP SERPL CALC-SCNC: 6 MMOL/L (ref 5–15)
ANION GAP SERPL CALC-SCNC: 7 MMOL/L (ref 5–15)
ANION GAP SERPL CALC-SCNC: 7 MMOL/L (ref 5–15)
BASOPHILS # BLD: 0 K/UL (ref 0–0.1)
BASOPHILS NFR BLD: 0 % (ref 0–1)
BUN SERPL-MCNC: 36 MG/DL (ref 6–20)
BUN SERPL-MCNC: 39 MG/DL (ref 6–20)
BUN SERPL-MCNC: 54 MG/DL (ref 6–20)
BUN/CREAT SERPL: 10 (ref 12–20)
BUN/CREAT SERPL: 9 (ref 12–20)
BUN/CREAT SERPL: 9 (ref 12–20)
CALCIUM SERPL-MCNC: 7.6 MG/DL (ref 8.5–10.1)
CALCIUM SERPL-MCNC: 8.2 MG/DL (ref 8.5–10.1)
CALCIUM SERPL-MCNC: 8.4 MG/DL (ref 8.5–10.1)
CHLORIDE SERPL-SCNC: 101 MMOL/L (ref 97–108)
CO2 SERPL-SCNC: 25 MMOL/L (ref 21–32)
CO2 SERPL-SCNC: 26 MMOL/L (ref 21–32)
CO2 SERPL-SCNC: 26 MMOL/L (ref 21–32)
CREAT SERPL-MCNC: 4.01 MG/DL (ref 0.7–1.3)
CREAT SERPL-MCNC: 4.29 MG/DL (ref 0.7–1.3)
CREAT SERPL-MCNC: 5.17 MG/DL (ref 0.7–1.3)
DIFFERENTIAL METHOD BLD: ABNORMAL
EOSINOPHIL # BLD: 0 K/UL (ref 0–0.4)
EOSINOPHIL NFR BLD: 0 % (ref 0–7)
ERYTHROCYTE [DISTWIDTH] IN BLOOD BY AUTOMATED COUNT: 17.1 % (ref 11.5–14.5)
GLUCOSE BLD STRIP.AUTO-MCNC: 161 MG/DL (ref 65–117)
GLUCOSE BLD STRIP.AUTO-MCNC: 197 MG/DL (ref 65–117)
GLUCOSE BLD STRIP.AUTO-MCNC: 212 MG/DL (ref 65–117)
GLUCOSE BLD STRIP.AUTO-MCNC: 261 MG/DL (ref 65–117)
GLUCOSE BLD STRIP.AUTO-MCNC: 362 MG/DL (ref 65–117)
GLUCOSE BLD STRIP.AUTO-MCNC: 451 MG/DL (ref 65–117)
GLUCOSE BLD STRIP.AUTO-MCNC: 456 MG/DL (ref 65–117)
GLUCOSE BLD STRIP.AUTO-MCNC: 464 MG/DL (ref 65–117)
GLUCOSE BLD STRIP.AUTO-MCNC: 466 MG/DL (ref 65–117)
GLUCOSE BLD STRIP.AUTO-MCNC: 469 MG/DL (ref 65–117)
GLUCOSE BLD STRIP.AUTO-MCNC: 497 MG/DL (ref 65–117)
GLUCOSE BLD STRIP.AUTO-MCNC: 526 MG/DL (ref 65–117)
GLUCOSE SERPL-MCNC: 177 MG/DL (ref 65–100)
GLUCOSE SERPL-MCNC: 210 MG/DL (ref 65–100)
GLUCOSE SERPL-MCNC: 489 MG/DL (ref 65–100)
HCT VFR BLD AUTO: 29.6 % (ref 36.6–50.3)
HGB BLD-MCNC: 9.7 G/DL (ref 12.1–17)
IMM GRANULOCYTES # BLD AUTO: 0.1 K/UL (ref 0–0.04)
IMM GRANULOCYTES NFR BLD AUTO: 1 % (ref 0–0.5)
LYMPHOCYTES # BLD: 0.3 K/UL (ref 0.8–3.5)
LYMPHOCYTES NFR BLD: 3 % (ref 12–49)
MCH RBC QN AUTO: 28.5 PG (ref 26–34)
MCHC RBC AUTO-ENTMCNC: 32.8 G/DL (ref 30–36.5)
MCV RBC AUTO: 87.1 FL (ref 80–99)
MONOCYTES # BLD: 0.8 K/UL (ref 0–1)
MONOCYTES NFR BLD: 8 % (ref 5–13)
NEUTS SEG # BLD: 9.3 K/UL (ref 1.8–8)
NEUTS SEG NFR BLD: 88 % (ref 32–75)
NRBC # BLD: 0 K/UL (ref 0–0.01)
NRBC BLD-RTO: 0 PER 100 WBC
PLATELET # BLD AUTO: 218 K/UL (ref 150–400)
PMV BLD AUTO: 12.2 FL (ref 8.9–12.9)
POTASSIUM SERPL-SCNC: 4.8 MMOL/L (ref 3.5–5.1)
POTASSIUM SERPL-SCNC: 5.3 MMOL/L (ref 3.5–5.1)
POTASSIUM SERPL-SCNC: 5.3 MMOL/L (ref 3.5–5.1)
RBC # BLD AUTO: 3.4 M/UL (ref 4.1–5.7)
RBC MORPH BLD: ABNORMAL
SERVICE CMNT-IMP: ABNORMAL
SODIUM SERPL-SCNC: 133 MMOL/L (ref 136–145)
SODIUM SERPL-SCNC: 133 MMOL/L (ref 136–145)
SODIUM SERPL-SCNC: 134 MMOL/L (ref 136–145)
WBC # BLD AUTO: 10.5 K/UL (ref 4.1–11.1)

## 2023-09-30 PROCEDURE — 97116 GAIT TRAINING THERAPY: CPT

## 2023-09-30 PROCEDURE — 6370000000 HC RX 637 (ALT 250 FOR IP): Performed by: NEUROLOGICAL SURGERY

## 2023-09-30 PROCEDURE — 6370000000 HC RX 637 (ALT 250 FOR IP): Performed by: STUDENT IN AN ORGANIZED HEALTH CARE EDUCATION/TRAINING PROGRAM

## 2023-09-30 PROCEDURE — 97535 SELF CARE MNGMENT TRAINING: CPT

## 2023-09-30 PROCEDURE — 97530 THERAPEUTIC ACTIVITIES: CPT

## 2023-09-30 PROCEDURE — 82962 GLUCOSE BLOOD TEST: CPT

## 2023-09-30 PROCEDURE — 6360000002 HC RX W HCPCS: Performed by: NEUROLOGICAL SURGERY

## 2023-09-30 PROCEDURE — 85025 COMPLETE CBC W/AUTO DIFF WBC: CPT

## 2023-09-30 PROCEDURE — 2580000003 HC RX 258: Performed by: NEUROLOGICAL SURGERY

## 2023-09-30 PROCEDURE — 6360000002 HC RX W HCPCS: Performed by: NURSE PRACTITIONER

## 2023-09-30 PROCEDURE — 2580000003 HC RX 258: Performed by: STUDENT IN AN ORGANIZED HEALTH CARE EDUCATION/TRAINING PROGRAM

## 2023-09-30 PROCEDURE — 80048 BASIC METABOLIC PNL TOTAL CA: CPT

## 2023-09-30 PROCEDURE — 2060000000 HC ICU INTERMEDIATE R&B

## 2023-09-30 PROCEDURE — 36415 COLL VENOUS BLD VENIPUNCTURE: CPT

## 2023-09-30 PROCEDURE — 94640 AIRWAY INHALATION TREATMENT: CPT

## 2023-09-30 RX ORDER — INSULIN LISPRO 100 [IU]/ML
2 INJECTION, SOLUTION INTRAVENOUS; SUBCUTANEOUS ONCE
Status: COMPLETED | OUTPATIENT
Start: 2023-09-30 | End: 2023-09-30

## 2023-09-30 RX ORDER — DEXTROSE MONOHYDRATE 100 MG/ML
INJECTION, SOLUTION INTRAVENOUS CONTINUOUS PRN
Status: DISCONTINUED | OUTPATIENT
Start: 2023-09-30 | End: 2023-10-01 | Stop reason: HOSPADM

## 2023-09-30 RX ADMIN — ARFORMOTEROL TARTRATE 15 MCG: 15 SOLUTION RESPIRATORY (INHALATION) at 06:58

## 2023-09-30 RX ADMIN — SODIUM CHLORIDE, PRESERVATIVE FREE 10 ML: 5 INJECTION INTRAVENOUS at 21:00

## 2023-09-30 RX ADMIN — SENNOSIDES AND DOCUSATE SODIUM 1 TABLET: 50; 8.6 TABLET ORAL at 20:49

## 2023-09-30 RX ADMIN — ARFORMOTEROL TARTRATE 15 MCG: 15 SOLUTION RESPIRATORY (INHALATION) at 20:47

## 2023-09-30 RX ADMIN — BUDESONIDE 500 MCG: 0.5 INHALANT RESPIRATORY (INHALATION) at 06:58

## 2023-09-30 RX ADMIN — BUPRENORPHINE HYDROCHLORIDE AND NALOXONE HYDROCHLORIDE DIHYDRATE 2 TABLET: 8; 2 TABLET SUBLINGUAL at 08:12

## 2023-09-30 RX ADMIN — SENNOSIDES AND DOCUSATE SODIUM 1 TABLET: 50; 8.6 TABLET ORAL at 08:12

## 2023-09-30 RX ADMIN — CARVEDILOL 25 MG: 12.5 TABLET, FILM COATED ORAL at 17:09

## 2023-09-30 RX ADMIN — Medication 4 UNITS: at 17:08

## 2023-09-30 RX ADMIN — ACETAMINOPHEN 650 MG: 325 TABLET ORAL at 18:10

## 2023-09-30 RX ADMIN — CARVEDILOL 25 MG: 12.5 TABLET, FILM COATED ORAL at 08:12

## 2023-09-30 RX ADMIN — Medication 2 UNITS: at 17:21

## 2023-09-30 RX ADMIN — DEXAMETHASONE 2 MG: 1 TABLET ORAL at 20:49

## 2023-09-30 RX ADMIN — Medication 6 UNITS: at 16:30

## 2023-09-30 RX ADMIN — OXYCODONE HYDROCHLORIDE 10 MG: 5 TABLET ORAL at 13:24

## 2023-09-30 RX ADMIN — DEXAMETHASONE 2 MG: 1 TABLET ORAL at 08:13

## 2023-09-30 RX ADMIN — BUDESONIDE 500 MCG: 0.5 INHALANT RESPIRATORY (INHALATION) at 20:47

## 2023-09-30 RX ADMIN — POLYETHYLENE GLYCOL 3350 17 G: 17 POWDER, FOR SOLUTION ORAL at 08:14

## 2023-09-30 RX ADMIN — HYDRALAZINE HYDROCHLORIDE 50 MG: 50 TABLET, FILM COATED ORAL at 03:10

## 2023-09-30 RX ADMIN — ACETAMINOPHEN 650 MG: 325 TABLET ORAL at 12:59

## 2023-09-30 RX ADMIN — OXYCODONE HYDROCHLORIDE 10 MG: 5 TABLET ORAL at 18:10

## 2023-09-30 RX ADMIN — ACETAMINOPHEN 650 MG: 325 TABLET ORAL at 22:18

## 2023-09-30 RX ADMIN — BISACODYL 10 MG: 10 SUPPOSITORY RECTAL at 08:20

## 2023-09-30 RX ADMIN — OXYCODONE HYDROCHLORIDE 10 MG: 5 TABLET ORAL at 22:18

## 2023-09-30 RX ADMIN — SODIUM CHLORIDE, PRESERVATIVE FREE 10 ML: 5 INJECTION INTRAVENOUS at 08:12

## 2023-09-30 RX ADMIN — CLONIDINE HYDROCHLORIDE 0.3 MG: 0.2 TABLET ORAL at 20:48

## 2023-09-30 RX ADMIN — SODIUM CHLORIDE 6.04 UNITS/HR: 9 INJECTION, SOLUTION INTRAVENOUS at 20:44

## 2023-09-30 RX ADMIN — HYDRALAZINE HYDROCHLORIDE 50 MG: 50 TABLET, FILM COATED ORAL at 12:59

## 2023-09-30 RX ADMIN — OXYCODONE HYDROCHLORIDE 10 MG: 5 TABLET ORAL at 02:05

## 2023-09-30 RX ADMIN — ACETAMINOPHEN 650 MG: 325 TABLET ORAL at 06:16

## 2023-09-30 RX ADMIN — CLONIDINE HYDROCHLORIDE 0.3 MG: 0.2 TABLET ORAL at 08:12

## 2023-09-30 RX ADMIN — HYDRALAZINE HYDROCHLORIDE 50 MG: 50 TABLET, FILM COATED ORAL at 18:10

## 2023-09-30 RX ADMIN — AMLODIPINE BESYLATE 10 MG: 5 TABLET ORAL at 08:12

## 2023-09-30 RX ADMIN — ACETAMINOPHEN 650 MG: 325 TABLET ORAL at 00:24

## 2023-09-30 RX ADMIN — Medication 8 UNITS: at 07:48

## 2023-09-30 RX ADMIN — PREGABALIN 75 MG: 75 CAPSULE ORAL at 20:49

## 2023-09-30 RX ADMIN — DIPHENHYDRAMINE HYDROCHLORIDE 25 MG: 25 CAPSULE ORAL at 22:18

## 2023-09-30 ASSESSMENT — PAIN SCALES - GENERAL
PAINLEVEL_OUTOF10: 5
PAINLEVEL_OUTOF10: 4
PAINLEVEL_OUTOF10: 0
PAINLEVEL_OUTOF10: 9
PAINLEVEL_OUTOF10: 7
PAINLEVEL_OUTOF10: 4
PAINLEVEL_OUTOF10: 5

## 2023-09-30 ASSESSMENT — PAIN DESCRIPTION - DESCRIPTORS
DESCRIPTORS: SORE
DESCRIPTORS: SORE

## 2023-09-30 ASSESSMENT — PAIN DESCRIPTION - PAIN TYPE: TYPE: SURGICAL PAIN

## 2023-09-30 ASSESSMENT — PAIN DESCRIPTION - LOCATION
LOCATION: NECK

## 2023-09-30 ASSESSMENT — PAIN DESCRIPTION - ORIENTATION
ORIENTATION: POSTERIOR
ORIENTATION: POSTERIOR

## 2023-09-30 ASSESSMENT — PAIN DESCRIPTION - ONSET: ONSET: ON-GOING

## 2023-09-30 ASSESSMENT — PAIN DESCRIPTION - FREQUENCY: FREQUENCY: INTERMITTENT

## 2023-10-01 VITALS
DIASTOLIC BLOOD PRESSURE: 72 MMHG | OXYGEN SATURATION: 95 % | SYSTOLIC BLOOD PRESSURE: 136 MMHG | WEIGHT: 147.05 LBS | TEMPERATURE: 98 F | HEART RATE: 74 BPM | HEIGHT: 68 IN | RESPIRATION RATE: 16 BRPM | BODY MASS INDEX: 22.29 KG/M2

## 2023-10-01 LAB
ANION GAP SERPL CALC-SCNC: 7 MMOL/L (ref 5–15)
BASOPHILS # BLD: 0 K/UL (ref 0–0.1)
BASOPHILS NFR BLD: 0 % (ref 0–1)
BUN SERPL-MCNC: 64 MG/DL (ref 6–20)
BUN/CREAT SERPL: 11 (ref 12–20)
CALCIUM SERPL-MCNC: 7.7 MG/DL (ref 8.5–10.1)
CHLORIDE SERPL-SCNC: 103 MMOL/L (ref 97–108)
CO2 SERPL-SCNC: 27 MMOL/L (ref 21–32)
CREAT SERPL-MCNC: 5.6 MG/DL (ref 0.7–1.3)
DIFFERENTIAL METHOD BLD: ABNORMAL
EOSINOPHIL # BLD: 0 K/UL (ref 0–0.4)
EOSINOPHIL NFR BLD: 0 % (ref 0–7)
ERYTHROCYTE [DISTWIDTH] IN BLOOD BY AUTOMATED COUNT: 17.2 % (ref 11.5–14.5)
FRUCTOSAMINE SERPL-SCNC: 441 UMOL/L (ref 0–285)
GLUCOSE BLD STRIP.AUTO-MCNC: 144 MG/DL (ref 65–117)
GLUCOSE BLD STRIP.AUTO-MCNC: 151 MG/DL (ref 65–117)
GLUCOSE BLD STRIP.AUTO-MCNC: 163 MG/DL (ref 65–117)
GLUCOSE BLD STRIP.AUTO-MCNC: 231 MG/DL (ref 65–117)
GLUCOSE BLD STRIP.AUTO-MCNC: 321 MG/DL (ref 65–117)
GLUCOSE BLD STRIP.AUTO-MCNC: 384 MG/DL (ref 65–117)
GLUCOSE BLD STRIP.AUTO-MCNC: 406 MG/DL (ref 65–117)
GLUCOSE BLD STRIP.AUTO-MCNC: 432 MG/DL (ref 65–117)
GLUCOSE BLD STRIP.AUTO-MCNC: 463 MG/DL (ref 65–117)
GLUCOSE SERPL-MCNC: 141 MG/DL (ref 65–100)
HCT VFR BLD AUTO: 27 % (ref 36.6–50.3)
HGB BLD-MCNC: 8.7 G/DL (ref 12.1–17)
IMM GRANULOCYTES # BLD AUTO: 0.1 K/UL (ref 0–0.04)
IMM GRANULOCYTES NFR BLD AUTO: 1 % (ref 0–0.5)
LYMPHOCYTES # BLD: 0.2 K/UL (ref 0.8–3.5)
LYMPHOCYTES NFR BLD: 2 % (ref 12–49)
MCH RBC QN AUTO: 28.2 PG (ref 26–34)
MCHC RBC AUTO-ENTMCNC: 32.2 G/DL (ref 30–36.5)
MCV RBC AUTO: 87.7 FL (ref 80–99)
MONOCYTES # BLD: 1.2 K/UL (ref 0–1)
MONOCYTES NFR BLD: 11 % (ref 5–13)
NEUTS SEG # BLD: 9.6 K/UL (ref 1.8–8)
NEUTS SEG NFR BLD: 86 % (ref 32–75)
NRBC # BLD: 0 K/UL (ref 0–0.01)
NRBC BLD-RTO: 0 PER 100 WBC
PLATELET # BLD AUTO: 204 K/UL (ref 150–400)
PMV BLD AUTO: 12.4 FL (ref 8.9–12.9)
POTASSIUM SERPL-SCNC: 5.1 MMOL/L (ref 3.5–5.1)
RBC # BLD AUTO: 3.08 M/UL (ref 4.1–5.7)
RBC MORPH BLD: ABNORMAL
SERVICE CMNT-IMP: ABNORMAL
SODIUM SERPL-SCNC: 137 MMOL/L (ref 136–145)
WBC # BLD AUTO: 11.1 K/UL (ref 4.1–11.1)

## 2023-10-01 PROCEDURE — 6370000000 HC RX 637 (ALT 250 FOR IP): Performed by: STUDENT IN AN ORGANIZED HEALTH CARE EDUCATION/TRAINING PROGRAM

## 2023-10-01 PROCEDURE — 6360000002 HC RX W HCPCS: Performed by: NURSE PRACTITIONER

## 2023-10-01 PROCEDURE — 85025 COMPLETE CBC W/AUTO DIFF WBC: CPT

## 2023-10-01 PROCEDURE — 6370000000 HC RX 637 (ALT 250 FOR IP): Performed by: NEUROLOGICAL SURGERY

## 2023-10-01 PROCEDURE — 2580000003 HC RX 258: Performed by: STUDENT IN AN ORGANIZED HEALTH CARE EDUCATION/TRAINING PROGRAM

## 2023-10-01 PROCEDURE — 2580000003 HC RX 258: Performed by: NEUROLOGICAL SURGERY

## 2023-10-01 PROCEDURE — 80048 BASIC METABOLIC PNL TOTAL CA: CPT

## 2023-10-01 PROCEDURE — 97116 GAIT TRAINING THERAPY: CPT

## 2023-10-01 PROCEDURE — 94640 AIRWAY INHALATION TREATMENT: CPT

## 2023-10-01 PROCEDURE — 36415 COLL VENOUS BLD VENIPUNCTURE: CPT

## 2023-10-01 PROCEDURE — 82962 GLUCOSE BLOOD TEST: CPT

## 2023-10-01 PROCEDURE — 6360000002 HC RX W HCPCS: Performed by: NEUROLOGICAL SURGERY

## 2023-10-01 PROCEDURE — 2700000000 HC OXYGEN THERAPY PER DAY

## 2023-10-01 PROCEDURE — 97530 THERAPEUTIC ACTIVITIES: CPT

## 2023-10-01 RX ORDER — INSULIN LISPRO 100 [IU]/ML
0-8 INJECTION, SOLUTION INTRAVENOUS; SUBCUTANEOUS
Status: DISCONTINUED | OUTPATIENT
Start: 2023-10-01 | End: 2023-10-01 | Stop reason: HOSPADM

## 2023-10-01 RX ORDER — OXYCODONE HYDROCHLORIDE 5 MG/1
5 TABLET ORAL EVERY 6 HOURS PRN
Qty: 20 TABLET | Refills: 0 | Status: SHIPPED | OUTPATIENT
Start: 2023-10-01 | End: 2023-10-06

## 2023-10-01 RX ORDER — INSULIN LISPRO 100 [IU]/ML
0-4 INJECTION, SOLUTION INTRAVENOUS; SUBCUTANEOUS NIGHTLY
Status: DISCONTINUED | OUTPATIENT
Start: 2023-10-01 | End: 2023-10-01 | Stop reason: HOSPADM

## 2023-10-01 RX ADMIN — SENNOSIDES AND DOCUSATE SODIUM 1 TABLET: 50; 8.6 TABLET ORAL at 07:56

## 2023-10-01 RX ADMIN — BUDESONIDE 500 MCG: 0.5 INHALANT RESPIRATORY (INHALATION) at 07:17

## 2023-10-01 RX ADMIN — Medication 1 UNITS: at 08:16

## 2023-10-01 RX ADMIN — HYDRALAZINE HYDROCHLORIDE 50 MG: 50 TABLET, FILM COATED ORAL at 14:41

## 2023-10-01 RX ADMIN — Medication 3 UNITS: at 12:36

## 2023-10-01 RX ADMIN — DEXAMETHASONE 2 MG: 1 TABLET ORAL at 07:56

## 2023-10-01 RX ADMIN — SODIUM CHLORIDE 2.5 UNITS/HR: 9 INJECTION, SOLUTION INTRAVENOUS at 01:49

## 2023-10-01 RX ADMIN — CLONIDINE HYDROCHLORIDE 0.3 MG: 0.2 TABLET ORAL at 07:55

## 2023-10-01 RX ADMIN — Medication 8 UNITS: at 16:02

## 2023-10-01 RX ADMIN — HYDRALAZINE HYDROCHLORIDE 50 MG: 50 TABLET, FILM COATED ORAL at 06:13

## 2023-10-01 RX ADMIN — Medication 6 UNITS: at 07:52

## 2023-10-01 RX ADMIN — SODIUM CHLORIDE, PRESERVATIVE FREE 10 ML: 5 INJECTION INTRAVENOUS at 07:57

## 2023-10-01 RX ADMIN — OXYCODONE HYDROCHLORIDE 10 MG: 5 TABLET ORAL at 12:36

## 2023-10-01 RX ADMIN — ACETAMINOPHEN 650 MG: 325 TABLET ORAL at 12:36

## 2023-10-01 RX ADMIN — ACETAMINOPHEN 650 MG: 325 TABLET ORAL at 03:27

## 2023-10-01 RX ADMIN — POLYETHYLENE GLYCOL 3350 17 G: 17 POWDER, FOR SOLUTION ORAL at 07:57

## 2023-10-01 RX ADMIN — OXYCODONE HYDROCHLORIDE 10 MG: 5 TABLET ORAL at 08:35

## 2023-10-01 RX ADMIN — ARFORMOTEROL TARTRATE 15 MCG: 15 SOLUTION RESPIRATORY (INHALATION) at 07:17

## 2023-10-01 RX ADMIN — BISACODYL 10 MG: 10 SUPPOSITORY RECTAL at 07:57

## 2023-10-01 RX ADMIN — AMLODIPINE BESYLATE 10 MG: 5 TABLET ORAL at 07:56

## 2023-10-01 RX ADMIN — OXYCODONE HYDROCHLORIDE 10 MG: 5 TABLET ORAL at 03:26

## 2023-10-01 RX ADMIN — CARVEDILOL 25 MG: 12.5 TABLET, FILM COATED ORAL at 07:56

## 2023-10-01 RX ADMIN — Medication 7 UNITS: at 16:02

## 2023-10-01 ASSESSMENT — PAIN DESCRIPTION - LOCATION
LOCATION: NECK
LOCATION: NECK

## 2023-10-01 ASSESSMENT — PAIN DESCRIPTION - ORIENTATION: ORIENTATION: POSTERIOR

## 2023-10-01 ASSESSMENT — PAIN DESCRIPTION - DESCRIPTORS: DESCRIPTORS: SORE

## 2023-10-01 ASSESSMENT — PAIN SCALES - GENERAL
PAINLEVEL_OUTOF10: 6
PAINLEVEL_OUTOF10: 9
PAINLEVEL_OUTOF10: 9

## 2023-10-01 NOTE — DISCHARGE SUMMARY
Discharge Summary       PATIENT ID: Roderick Mcclendon  MRN: [id number]   YOB: 1965    DATE OF ADMISSION: 9/23/2023  5:40 PM    DATE OF DISCHARGE: 9/26/2023   PRIMARY CARE PROVIDER: Ellyn Brandon MD     ATTENDING PHYSICIAN: Dr Anuradha Ricardo  DISCHARGING PROVIDER: Anuradha Ricardo MD    To contact this individual call [phone] and ask the  to page. If unavailable ask to be transferred the Adult Hospitalist Department. CONSULTATIONS: IP CONSULT TO NEPHROLOGY  IP CONSULT TO HOSPITALIST  IP CONSULT TO PHARMACY  IP CONSULT TO ORTHOPEDIC SURGERY  IP CONSULT TO NEUROSURGERY    PROCEDURES/SURGERIES: * No surgery found *    ADMITTING DIAGNOSES & HOSPITAL COURSE:   Acute Hypoxic Respiratory Failure  Acute pulmonary edema form volume overload after missing dialysis  -supplemental O2 for goal spo2>93%,   -completed UF/HD yesterday and breathing better. Still on [facility], try wean off today     ESRD on M,W,F HD  HTN  -continue home clonidine, hydralazine, norvasc, and carvedilol  -further UF/HD per renal     Left shoulder pain  L arm radiculopathy from cervical DDD  -recent CT C-spine done at [facility] with moderate spinal stenosis,   -MRI C spine: 9/12/23: Anterolisthesis of C7 on T1 with severe narrowing the canal and bilateral foramen. No need to repeat this. -denies trauma to his shoulder. There is tenderness to palpation around rotator cuff, ?  Bursitis or tendonitis   -Xray shoulder unremarkable  -continue gabapentin  -lidocaine patch  -Appreciate neurosurgery, poor candidate for surgery, medical management with outpatient follow up     Suboxone tx  -home suboxone dose confirmed by pharmacy, and ordered     Constipation  -lactulose and senna      PENDING TEST RESULTS:   At the time of discharge the following test results are still pending: none    FOLLOW UP APPOINTMENTS:    PCP  Neurosurgery    ADDITIONAL CARE RECOMMENDATIONS: as above    DIET: renal diet    ACTIVITY: activity as
NEURONTIN  Take 1 capsule by mouth 3 times daily for 30 days. Max Daily Amount: 300 mg  What changed:   medication strength  how much to take            CONTINUE taking these medications      amLODIPine 10 MG tablet  Commonly known as: NORVASC     carvedilol 25 MG tablet  Commonly known as: COREG     cloNIDine 0.3 MG tablet  Commonly known as: CATAPRES     hydrALAZINE 50 MG tablet  Commonly known as: APRESOLINE     lidocaine 5 %  Commonly known as: LIDODERM     naloxone 4 MG/0.1ML Liqd nasal spray  Commonly known as: Narcan  1 spray by Nasal route as needed for Opioid Reversal     nitroGLYCERIN 0.4 MG SL tablet  Commonly known as: NITROSTAT     Suboxone 8-2 MG Film SL film  Generic drug: buprenorphine-naloxone            STOP taking these medications      bumetanide 2 MG tablet  Commonly known as: BUMEX               Where to Get Your Medications        These medications were sent to [address] [place] [address] [phone]  [place] [address]      Phone: [phone]   gabapentin 100 MG capsule  oxyCODONE 5 MG immediate release tablet           NOTIFY YOUR PHYSICIAN FOR ANY OF THE FOLLOWING:   Fever over 101 degrees for 24 hours. Chest pain, shortness of breath, fever, chills, nausea, vomiting, diarrhea, change in mentation, falling, weakness, bleeding. Severe pain or pain not relieved by medications. Or, any other signs or symptoms that you may have questions about. DISPOSITION: AMA    Home With:   OT  PT  HH  RN       Long term SNF/Inpatient Rehab    Independent/assisted living    Hospice    Other:       PATIENT CONDITION AT DISCHARGE:     Functional status    Poor     Deconditioned    X Independent      Cognition    X Lucid     Forgetful     Dementia      Catheters/lines (plus indication)    [place]     PICC     PEG    X None      Code status    X Full code     DNR      PHYSICAL EXAMINATION AT DISCHARGE:    Patient left AMA.     Please refer

## 2023-10-01 NOTE — FLOWSHEET NOTE
Patient supposed to be on tele monitor remote, education was provided. Patient continues refusing tele monitor, MD and [facility] are aware.  Patient will be closely monitored by RN

## 2023-10-17 NOTE — PATIENT INSTRUCTIONS
Leg and Ankle Edema: Care Instructions  Your Care Instructions  Swelling in the legs, ankles, and feet is called edema. It is common after you sit or stand for a while. Long plane flights or car rides often cause swelling in the legs and feet. You may also have swelling if you have to stand for long periods of time at your job. Problems with the veins in the legs (varicose veins) and changes in hormones can also cause swelling. Sometimes the swelling in the ankles and feet is caused by a more serious problem, such as heart failure, infection, blood clots, or liver or kidney disease. Follow-up care is a key part of your treatment and safety. Be sure to make and go to all appointments, and call your doctor if you are having problems. It's also a good idea to know your test results and keep a list of the medicines you take. How can you care for yourself at home? · If your doctor gave you medicine, take it as prescribed. Call your doctor if you think you are having a problem with your medicine. · Whenever you are resting, raise your legs up. Try to keep the swollen area higher than the level of your heart. · Take breaks from standing or sitting in one position. ? Walk around to increase the blood flow in your lower legs. ? Move your feet and ankles often while you stand, or tighten and relax your leg muscles. · Wear support stockings. Put them on in the morning, before swelling gets worse. · Eat a balanced diet. Lose weight if you need to. · Limit the amount of salt (sodium) in your diet. Salt holds fluid in the body and may increase swelling. When should you call for help? Call 911 anytime you think you may need emergency care. For example, call if:    · You have symptoms of a blood clot in your lung (called a pulmonary embolism). These may include:  ? Sudden chest pain. ? Trouble breathing. ?  Coughing up blood.    Call your doctor now or seek immediate medical care if:    · You have signs of a blood clot, such as:  ? Pain in your calf, back of the knee, thigh, or groin. ? Redness and swelling in your leg or groin.     · You have symptoms of infection, such as:  ? Increased pain, swelling, warmth, or redness. ? Red streaks or pus. ? A fever.    Watch closely for changes in your health, and be sure to contact your doctor if:    · Your swelling is getting worse.     · You have new or worsening pain in your legs.     · You do not get better as expected. Where can you learn more? Go to http://maciel-zack.info/. Enter C357 in the search box to learn more about \"Leg and Ankle Edema: Care Instructions. \"  Current as of: September 23, 2018  Content Version: 11.9  © 7716-5996 [employer]. Care instructions adapted under license by [employer] (which disclaims liability or warranty for this information). If you have questions about a medical condition or this instruction, always ask your healthcare professional. [place] [address] any warranty or liability for your use of this information. High Cholesterol: Care Instructions  Your Care Instructions    Cholesterol is a type of fat in your blood. It is needed for many body functions, such as making new cells. Cholesterol is made by your body. It also comes from food you eat. High cholesterol means that you have too much of the fat in your blood. This raises your risk of a heart attack and stroke. LDL and HDL are part of your total cholesterol. LDL is the \"bad\" cholesterol. High LDL can raise your risk for heart disease, heart attack, and stroke. HDL is the \"good\" cholesterol. It helps clear bad cholesterol from the body. High HDL is linked with a lower risk of heart disease, heart attack, and stroke. Your cholesterol levels help your doctor find out your risk for having a heart attack or stroke.  You and your doctor can talk about whether you need to lower your risk and what treatment is best for you.  A heart-healthy lifestyle along with medicines can help lower your cholesterol and your risk. The way you choose to lower your risk will depend on how high your risk is for heart attack and stroke. It will also depend on how you feel about taking medicines. Follow-up care is a key part of your treatment and safety. Be sure to make and go to all appointments, and call your doctor if you are having problems. It's also a good idea to know your test results and keep a list of the medicines you take. How can you care for yourself at home? · Eat a variety of foods every day. Good choices include fruits, vegetables, whole grains (like oatmeal), dried beans and peas, nuts and seeds, soy products (like tofu), and fat-free or low-fat dairy products. · Replace butter, margarine, and hydrogenated or partially hydrogenated oils with olive and canola oils. (Canola oil margarine without trans fat is fine.)  · Replace red meat with fish, poultry, and soy protein (like tofu). · Limit processed and packaged foods like chips, crackers, and cookies. · Bake, broil, or steam foods. Don't liu them. · Be physically active. Get at least 30 minutes of exercise on most days of the week. Walking is a good choice. You also may want to do other activities, such as running, swimming, cycling, or playing tennis or team sports. · Stay at a healthy weight or lose weight by making the changes in eating and physical activity listed above. Losing just a small amount of weight, even 5 to 10 pounds, can reduce your risk for having a heart attack or stroke. · Do not smoke. When should you call for help? Watch closely for changes in your health, and be sure to contact your doctor if:    · You need help making lifestyle changes.     · You have questions about your medicine. Where can you learn more? Go to http://maciel-zack.info/.   Enter N705 in the search box to learn more about \"High Cholesterol: Care Instructions. \"  Current as of: July 22, 2018  Content Version: 11.9  © 2581-3854 [employer]. Care instructions adapted under license by [employer] (which disclaims liability or warranty for this information). If you have questions about a medical condition or this instruction, always ask your healthcare professional. [address] any warranty or liability for your use of this information. High Blood Pressure: Care Instructions  Overview    It's normal for blood pressure to go up and down throughout the day. But if it stays up, you have high blood pressure. Another name for high blood pressure is hypertension. Despite what a lot of people think, high blood pressure usually doesn't cause headaches or make you feel dizzy or lightheaded. It usually has no symptoms. But it does increase your risk of stroke, heart attack, and other problems. You and your doctor will talk about your risks of these problems based on your blood pressure. Your doctor will give you a goal for your blood pressure. Your goal will be based on your health and your age. Lifestyle changes, such as eating healthy and being active, are always important to help lower blood pressure. You might also take medicine to reach your blood pressure goal.  Follow-up care is a key part of your treatment and safety. Be sure to make and go to all appointments, and call your doctor if you are having problems. It's also a good idea to know your test results and keep a list of the medicines you take. How can you care for yourself at home? Medical treatment  · If you stop taking your medicine, your blood pressure will go back up. You may take one or more types of medicine to lower your blood pressure. Be safe with medicines. Take your medicine exactly as prescribed. Call your doctor if you think you are having a problem with your medicine. · Talk to your doctor before you start taking aspirin every day. Aspirin can help certain people lower their risk of a heart attack or stroke. But taking aspirin isn't right for everyone, because it can cause serious bleeding. · See your doctor regularly. You may need to see the doctor more often at first or until your blood pressure comes down. · If you are taking blood pressure medicine, talk to your doctor before you take decongestants or anti-inflammatory medicine, such as ibuprofen. Some of these medicines can raise blood pressure. · Learn how to check your blood pressure at home. Lifestyle changes  · Stay at a healthy weight. This is especially important if you put on weight around the waist. Losing even 10 pounds can help you lower your blood pressure. · If your doctor recommends it, get more exercise. Walking is a good choice. Bit by bit, increase the amount you walk every day. Try for at least 30 minutes on most days of the week. You also may want to swim, bike, or do other activities. · Avoid or limit alcohol. Talk to your doctor about whether you can drink any alcohol. · Try to limit how much sodium you eat to less than 2,300 milligrams (mg) a day. Your doctor may ask you to try to eat less than 1,500 mg a day. · Eat plenty of fruits (such as bananas and oranges), vegetables, legumes, whole grains, and low-fat dairy products. · Lower the amount of saturated fat in your diet. Saturated fat is found in animal products such as milk, cheese, and meat. Limiting these foods may help you lose weight and also lower your risk for heart disease. · Do not smoke. Smoking increases your risk for heart attack and stroke. If you need help quitting, talk to your doctor about stop-smoking programs and medicines. These can increase your chances of quitting for good. When should you call for help? Call 911 anytime you think you may need emergency care. This may mean having symptoms that suggest that your blood pressure is causing a serious heart or blood vessel problem.  Your blood pressure may be over 180/120.   For example, call 911 if:    · You have symptoms of a heart attack. These may include:  ? Chest pain or pressure, or a strange feeling in the chest.  ? Sweating. ? Shortness of breath. ? Nausea or vomiting. ? Pain, pressure, or a strange feeling in the back, neck, jaw, or upper belly or in one or both shoulders or arms. ? Lightheadedness or sudden weakness. ? A fast or irregular heartbeat.     · You have symptoms of a stroke. These may include:  ? Sudden numbness, tingling, weakness, or loss of movement in your face, arm, or leg, especially on only one side of your body. ? Sudden vision changes. ? Sudden trouble speaking. ? Sudden confusion or trouble understanding simple statements. ? Sudden problems with walking or balance. ? A sudden, severe headache that is different from past headaches.     · You have severe back or belly pain.    Do not wait until your blood pressure comes down on its own. Get help right away.   Call your doctor now or seek immediate care if:    · Your blood pressure is much higher than normal (such as 180/120 or higher), but you don't have symptoms.     · You think high blood pressure is causing symptoms, such as:  ? Severe headache.  ? Blurry vision.    Watch closely for changes in your health, and be sure to contact your doctor if:    · Your blood pressure measures higher than your doctor recommends at least 2 times. That means the top number is higher or the bottom number is higher, or both.     · You think you may be having side effects from your blood pressure medicine. Where can you learn more? Go to http://maciel-zack.info/. Enter J430 in the search box to learn more about \"High Blood Pressure: Care Instructions. \"  Current as of: July 22, 2018  Content Version: 11.9  © 1841-4373 [employer].  Care instructions adapted under license by [employer] (which disclaims liability or warranty for this information). If you have questions about a medical condition or this instruction, always ask your healthcare professional. [place] [address] any warranty or liability for your use of this information. No indicators present

## 2023-10-23 NOTE — PROGRESS NOTES
Missing patient called on patient. Patient ambulated to [facility] with significant other. RN went to kitchen less than five minutes later. Patient not there. Checked the floor. Not found.  Security notified Patient presents today for Follow-up re:    Right   shoulder   Current Symptoms:  Pt states that she is having a lot of pains to the right shoulder. Pt is want to have cortisone today.    Last visit:  7/17/2023  Treatment since last seen:  cortisone injections    Improved since last visit?   No    CURRENT Pain Medication: Med none  CURRENT WORK STATUS:  does not apply    At today’s visit, patient needs:  - Work Status record updated?:    No  - Medication refill?:  No

## 2023-11-16 ENCOUNTER — APPOINTMENT (OUTPATIENT)
Facility: HOSPITAL | Age: 58
DRG: 640 | End: 2023-11-16
Payer: MEDICARE

## 2023-11-16 ENCOUNTER — HOSPITAL ENCOUNTER (INPATIENT)
Facility: HOSPITAL | Age: 58
LOS: 5 days | Discharge: NURSING, REHAB OR LONG-TERM CARE | DRG: 640 | End: 2023-11-21
Attending: EMERGENCY MEDICINE | Admitting: INTERNAL MEDICINE
Payer: MEDICARE

## 2023-11-16 DIAGNOSIS — R79.89 ELEVATED TROPONIN: Primary | ICD-10-CM

## 2023-11-16 DIAGNOSIS — E87.70 HYPERVOLEMIA, UNSPECIFIED HYPERVOLEMIA TYPE: ICD-10-CM

## 2023-11-16 LAB
ALBUMIN SERPL-MCNC: 3.2 G/DL (ref 3.5–5)
ALBUMIN/GLOB SERPL: 0.6 (ref 1.1–2.2)
ALP SERPL-CCNC: 285 U/L (ref 45–117)
ALT SERPL-CCNC: 42 U/L (ref 12–78)
ANION GAP SERPL CALC-SCNC: 13 MMOL/L (ref 5–15)
APPEARANCE UR: CLEAR
AST SERPL-CCNC: 65 U/L (ref 15–37)
BACTERIA URNS QL MICRO: NEGATIVE /HPF
BASOPHILS # BLD: 0 K/UL (ref 0–0.1)
BASOPHILS NFR BLD: 0 % (ref 0–1)
BILIRUB SERPL-MCNC: 1.9 MG/DL (ref 0.2–1)
BILIRUB UR QL CFM: NEGATIVE
BUN SERPL-MCNC: 31 MG/DL (ref 6–20)
BUN/CREAT SERPL: 6 (ref 12–20)
CALCIUM SERPL-MCNC: 8.9 MG/DL (ref 8.5–10.1)
CHLORIDE SERPL-SCNC: 102 MMOL/L (ref 97–108)
CO2 SERPL-SCNC: 19 MMOL/L (ref 21–32)
COLOR UR: ABNORMAL
COMMENT:: NORMAL
COMMENT:: NORMAL
CREAT SERPL-MCNC: 5.16 MG/DL (ref 0.7–1.3)
D DIMER PPP FEU-MCNC: 3.88 MG/L FEU (ref 0–0.65)
DIFFERENTIAL METHOD BLD: ABNORMAL
EKG ATRIAL RATE: 104 BPM
EKG DIAGNOSIS: NORMAL
EKG P AXIS: 59 DEGREES
EKG P-R INTERVAL: 122 MS
EKG Q-T INTERVAL: 338 MS
EKG QRS DURATION: 92 MS
EKG QTC CALCULATION (BAZETT): 444 MS
EKG R AXIS: -31 DEGREES
EKG T AXIS: 154 DEGREES
EKG VENTRICULAR RATE: 104 BPM
EOSINOPHIL # BLD: 0 K/UL (ref 0–0.4)
EOSINOPHIL NFR BLD: 0 % (ref 0–7)
EPITH CASTS URNS QL MICRO: ABNORMAL /LPF
ERYTHROCYTE [DISTWIDTH] IN BLOOD BY AUTOMATED COUNT: 22.5 % (ref 11.5–14.5)
GLOBULIN SER CALC-MCNC: 5.6 G/DL (ref 2–4)
GLUCOSE BLD STRIP.AUTO-MCNC: 194 MG/DL (ref 65–117)
GLUCOSE SERPL-MCNC: 158 MG/DL (ref 65–100)
GLUCOSE UR STRIP.AUTO-MCNC: 500 MG/DL
HCT VFR BLD AUTO: 35.6 % (ref 36.6–50.3)
HGB BLD-MCNC: 11.3 G/DL (ref 12.1–17)
HGB UR QL STRIP: ABNORMAL
IMM GRANULOCYTES # BLD AUTO: 0.1 K/UL (ref 0–0.04)
IMM GRANULOCYTES NFR BLD AUTO: 1 % (ref 0–0.5)
IRON SATN MFR SERPL: 9 % (ref 20–50)
IRON SERPL-MCNC: 34 UG/DL (ref 35–150)
KETONES UR QL STRIP.AUTO: NEGATIVE MG/DL
LACTATE SERPL-SCNC: 1.8 MMOL/L (ref 0.4–2)
LEUKOCYTE ESTERASE UR QL STRIP.AUTO: NEGATIVE
LYMPHOCYTES # BLD: 0.6 K/UL (ref 0.8–3.5)
LYMPHOCYTES NFR BLD: 5 % (ref 12–49)
MAGNESIUM SERPL-MCNC: 2.5 MG/DL (ref 1.6–2.4)
MCH RBC QN AUTO: 29.3 PG (ref 26–34)
MCHC RBC AUTO-ENTMCNC: 31.7 G/DL (ref 30–36.5)
MCV RBC AUTO: 92.2 FL (ref 80–99)
MONOCYTES # BLD: 1 K/UL (ref 0–1)
MONOCYTES NFR BLD: 9 % (ref 5–13)
NEUTS SEG # BLD: 9.9 K/UL (ref 1.8–8)
NEUTS SEG NFR BLD: 85 % (ref 32–75)
NITRITE UR QL STRIP.AUTO: NEGATIVE
NRBC # BLD: 0 K/UL (ref 0–0.01)
NRBC BLD-RTO: 0 PER 100 WBC
NT PRO BNP: ABNORMAL PG/ML
PH UR STRIP: 7.5 (ref 5–8)
PLATELET # BLD AUTO: 184 K/UL (ref 150–400)
PMV BLD AUTO: 11.7 FL (ref 8.9–12.9)
POTASSIUM SERPL-SCNC: 3.8 MMOL/L (ref 3.5–5.1)
PROT SERPL-MCNC: 8.8 G/DL (ref 6.4–8.2)
PROT UR STRIP-MCNC: >300 MG/DL
RBC # BLD AUTO: 3.86 M/UL (ref 4.1–5.7)
RBC #/AREA URNS HPF: ABNORMAL /HPF (ref 0–5)
RBC MORPH BLD: ABNORMAL
SERVICE CMNT-IMP: ABNORMAL
SODIUM SERPL-SCNC: 134 MMOL/L (ref 136–145)
SP GR UR REFRACTOMETRY: 1.01 (ref 1–1.03)
SPECIMEN HOLD: NORMAL
SPECIMEN HOLD: NORMAL
TIBC SERPL-MCNC: 361 UG/DL (ref 250–450)
TROPONIN I SERPL HS-MCNC: 170 NG/L (ref 0–76)
TROPONIN I SERPL HS-MCNC: 188 NG/L (ref 0–76)
TSH SERPL DL<=0.05 MIU/L-ACNC: 1 UIU/ML (ref 0.36–3.74)
UROBILINOGEN UR QL STRIP.AUTO: 2 EU/DL (ref 0.2–1)
WBC # BLD AUTO: 11.6 K/UL (ref 4.1–11.1)
WBC URNS QL MICRO: ABNORMAL /HPF (ref 0–4)

## 2023-11-16 PROCEDURE — 83540 ASSAY OF IRON: CPT

## 2023-11-16 PROCEDURE — 90935 HEMODIALYSIS ONE EVALUATION: CPT

## 2023-11-16 PROCEDURE — 81001 URINALYSIS AUTO W/SCOPE: CPT

## 2023-11-16 PROCEDURE — 36415 COLL VENOUS BLD VENIPUNCTURE: CPT

## 2023-11-16 PROCEDURE — 84443 ASSAY THYROID STIM HORMONE: CPT

## 2023-11-16 PROCEDURE — 80053 COMPREHEN METABOLIC PANEL: CPT

## 2023-11-16 PROCEDURE — 87040 BLOOD CULTURE FOR BACTERIA: CPT

## 2023-11-16 PROCEDURE — 85379 FIBRIN DEGRADATION QUANT: CPT

## 2023-11-16 PROCEDURE — 6360000002 HC RX W HCPCS: Performed by: INTERNAL MEDICINE

## 2023-11-16 PROCEDURE — 99222 1ST HOSP IP/OBS MODERATE 55: CPT | Performed by: SPECIALIST

## 2023-11-16 PROCEDURE — 84484 ASSAY OF TROPONIN QUANT: CPT

## 2023-11-16 PROCEDURE — 83550 IRON BINDING TEST: CPT

## 2023-11-16 PROCEDURE — 99285 EMERGENCY DEPT VISIT HI MDM: CPT

## 2023-11-16 PROCEDURE — 93005 ELECTROCARDIOGRAM TRACING: CPT | Performed by: EMERGENCY MEDICINE

## 2023-11-16 PROCEDURE — 2580000003 HC RX 258: Performed by: INTERNAL MEDICINE

## 2023-11-16 PROCEDURE — 83605 ASSAY OF LACTIC ACID: CPT

## 2023-11-16 PROCEDURE — 6370000000 HC RX 637 (ALT 250 FOR IP): Performed by: EMERGENCY MEDICINE

## 2023-11-16 PROCEDURE — 2060000000 HC ICU INTERMEDIATE R&B

## 2023-11-16 PROCEDURE — 85025 COMPLETE CBC W/AUTO DIFF WBC: CPT

## 2023-11-16 PROCEDURE — 83880 ASSAY OF NATRIURETIC PEPTIDE: CPT

## 2023-11-16 PROCEDURE — 83735 ASSAY OF MAGNESIUM: CPT

## 2023-11-16 PROCEDURE — 82962 GLUCOSE BLOOD TEST: CPT

## 2023-11-16 PROCEDURE — 71045 X-RAY EXAM CHEST 1 VIEW: CPT

## 2023-11-16 RX ORDER — BUPRENORPHINE AND NALOXONE 12; 3 MG/1; MG/1
1 FILM, SOLUBLE BUCCAL; SUBLINGUAL DAILY
COMMUNITY

## 2023-11-16 RX ORDER — CLONIDINE HYDROCHLORIDE 0.2 MG/1
0.2 TABLET ORAL 3 TIMES DAILY
Status: DISCONTINUED | OUTPATIENT
Start: 2023-11-16 | End: 2023-11-21 | Stop reason: HOSPADM

## 2023-11-16 RX ORDER — AMLODIPINE BESYLATE 5 MG/1
10 TABLET ORAL DAILY
Status: DISCONTINUED | OUTPATIENT
Start: 2023-11-16 | End: 2023-11-16

## 2023-11-16 RX ORDER — 0.9 % SODIUM CHLORIDE 0.9 %
100 INTRAVENOUS SOLUTION INTRAVENOUS PRN
Status: DISCONTINUED | OUTPATIENT
Start: 2023-11-16 | End: 2023-11-21 | Stop reason: HOSPADM

## 2023-11-16 RX ORDER — SODIUM CHLORIDE 9 MG/ML
INJECTION, SOLUTION INTRAVENOUS PRN
Status: DISCONTINUED | OUTPATIENT
Start: 2023-11-16 | End: 2023-11-16

## 2023-11-16 RX ORDER — CARVEDILOL 12.5 MG/1
25 TABLET ORAL 2 TIMES DAILY WITH MEALS
Status: DISCONTINUED | OUTPATIENT
Start: 2023-11-16 | End: 2023-11-16

## 2023-11-16 RX ORDER — INSULIN LISPRO 100 [IU]/ML
0-4 INJECTION, SOLUTION INTRAVENOUS; SUBCUTANEOUS NIGHTLY
Status: DISCONTINUED | OUTPATIENT
Start: 2023-11-16 | End: 2023-11-21 | Stop reason: HOSPADM

## 2023-11-16 RX ORDER — HEPARIN SODIUM 5000 [USP'U]/ML
5000 INJECTION, SOLUTION INTRAVENOUS; SUBCUTANEOUS EVERY 8 HOURS SCHEDULED
Status: DISCONTINUED | OUTPATIENT
Start: 2023-11-16 | End: 2023-11-21 | Stop reason: HOSPADM

## 2023-11-16 RX ORDER — INSULIN LISPRO 100 [IU]/ML
0-4 INJECTION, SOLUTION INTRAVENOUS; SUBCUTANEOUS
Status: DISCONTINUED | OUTPATIENT
Start: 2023-11-16 | End: 2023-11-21 | Stop reason: HOSPADM

## 2023-11-16 RX ORDER — ALBUMIN (HUMAN) 12.5 G/50ML
25 SOLUTION INTRAVENOUS PRN
Status: DISCONTINUED | OUTPATIENT
Start: 2023-11-16 | End: 2023-11-21 | Stop reason: HOSPADM

## 2023-11-16 RX ORDER — SODIUM CHLORIDE 0.9 % (FLUSH) 0.9 %
5-40 SYRINGE (ML) INJECTION PRN
Status: DISCONTINUED | OUTPATIENT
Start: 2023-11-16 | End: 2023-11-21 | Stop reason: HOSPADM

## 2023-11-16 RX ORDER — OXYCODONE HYDROCHLORIDE 5 MG/1
5 TABLET ORAL
Status: COMPLETED | OUTPATIENT
Start: 2023-11-16 | End: 2023-11-16

## 2023-11-16 RX ORDER — GABAPENTIN 100 MG/1
300 CAPSULE ORAL NIGHTLY
Status: DISCONTINUED | OUTPATIENT
Start: 2023-11-16 | End: 2023-11-16

## 2023-11-16 RX ORDER — ONDANSETRON 4 MG/1
4 TABLET, ORALLY DISINTEGRATING ORAL EVERY 8 HOURS PRN
Status: DISCONTINUED | OUTPATIENT
Start: 2023-11-16 | End: 2023-11-21 | Stop reason: HOSPADM

## 2023-11-16 RX ORDER — NALOXONE HYDROCHLORIDE 4 MG/.1ML
1 SPRAY NASAL PRN
Status: DISCONTINUED | OUTPATIENT
Start: 2023-11-16 | End: 2023-11-16

## 2023-11-16 RX ORDER — FUROSEMIDE 10 MG/ML
60 INJECTION INTRAMUSCULAR; INTRAVENOUS ONCE
Status: COMPLETED | OUTPATIENT
Start: 2023-11-16 | End: 2023-11-16

## 2023-11-16 RX ORDER — ALBUMIN (HUMAN) 12.5 G/50ML
25 SOLUTION INTRAVENOUS
Status: ACTIVE | OUTPATIENT
Start: 2023-11-16 | End: 2023-11-17

## 2023-11-16 RX ORDER — ONDANSETRON 2 MG/ML
4 INJECTION INTRAMUSCULAR; INTRAVENOUS EVERY 6 HOURS PRN
Status: DISCONTINUED | OUTPATIENT
Start: 2023-11-16 | End: 2023-11-21 | Stop reason: HOSPADM

## 2023-11-16 RX ORDER — POLYETHYLENE GLYCOL 3350 17 G/17G
17 POWDER, FOR SOLUTION ORAL DAILY PRN
Status: DISCONTINUED | OUTPATIENT
Start: 2023-11-16 | End: 2023-11-21 | Stop reason: HOSPADM

## 2023-11-16 RX ORDER — BUPRENORPHINE AND NALOXONE 8; 2 MG/1; MG/1
2 FILM, SOLUBLE BUCCAL; SUBLINGUAL DAILY
Status: DISCONTINUED | OUTPATIENT
Start: 2023-11-16 | End: 2023-11-21

## 2023-11-16 RX ORDER — DEXTROSE MONOHYDRATE 100 MG/ML
INJECTION, SOLUTION INTRAVENOUS CONTINUOUS PRN
Status: DISCONTINUED | OUTPATIENT
Start: 2023-11-16 | End: 2023-11-20 | Stop reason: SDUPTHER

## 2023-11-16 RX ORDER — HYDRALAZINE HYDROCHLORIDE 25 MG/1
50 TABLET, FILM COATED ORAL 3 TIMES DAILY
Status: DISCONTINUED | OUTPATIENT
Start: 2023-11-16 | End: 2023-11-16

## 2023-11-16 RX ORDER — CLONIDINE HYDROCHLORIDE 0.1 MG/1
0.3 TABLET ORAL 3 TIMES DAILY
Status: DISCONTINUED | OUTPATIENT
Start: 2023-11-16 | End: 2023-11-16

## 2023-11-16 RX ORDER — SODIUM CHLORIDE 0.9 % (FLUSH) 0.9 %
5-40 SYRINGE (ML) INJECTION EVERY 12 HOURS SCHEDULED
Status: DISCONTINUED | OUTPATIENT
Start: 2023-11-16 | End: 2023-11-21 | Stop reason: HOSPADM

## 2023-11-16 RX ADMIN — HEPARIN SODIUM 1800 UNITS: 1000 INJECTION INTRAVENOUS; SUBCUTANEOUS at 16:10

## 2023-11-16 RX ADMIN — FUROSEMIDE 60 MG: 10 INJECTION, SOLUTION INTRAMUSCULAR; INTRAVENOUS at 13:18

## 2023-11-16 RX ADMIN — OXYCODONE HYDROCHLORIDE 5 MG: 5 TABLET ORAL at 07:09

## 2023-11-16 RX ADMIN — Medication 10 ML: at 13:14

## 2023-11-16 RX ADMIN — HEPARIN SODIUM 5000 UNITS: 5000 INJECTION INTRAVENOUS; SUBCUTANEOUS at 19:50

## 2023-11-16 ASSESSMENT — PAIN - FUNCTIONAL ASSESSMENT
PAIN_FUNCTIONAL_ASSESSMENT: 0-10
PAIN_FUNCTIONAL_ASSESSMENT: ACTIVITIES ARE NOT PREVENTED
PAIN_FUNCTIONAL_ASSESSMENT: ACTIVITIES ARE NOT PREVENTED

## 2023-11-16 ASSESSMENT — PAIN SCALES - GENERAL
PAINLEVEL_OUTOF10: 10
PAINLEVEL_OUTOF10: 8
PAINLEVEL_OUTOF10: 10
PAINLEVEL_OUTOF10: 10

## 2023-11-16 ASSESSMENT — PAIN DESCRIPTION - ORIENTATION
ORIENTATION: RIGHT;LEFT
ORIENTATION: RIGHT
ORIENTATION: ANTERIOR;MID

## 2023-11-16 ASSESSMENT — PAIN DESCRIPTION - LOCATION
LOCATION: ARM
LOCATION: BACK;ARM
LOCATION: ARM
LOCATION: CHEST

## 2023-11-16 ASSESSMENT — PAIN DESCRIPTION - DESCRIPTORS
DESCRIPTORS: ACHING
DESCRIPTORS: PRESSURE
DESCRIPTORS: ACHING
DESCRIPTORS: ACHING

## 2023-11-16 ASSESSMENT — PAIN DESCRIPTION - FREQUENCY
FREQUENCY: CONTINUOUS
FREQUENCY: CONTINUOUS

## 2023-11-16 NOTE — ED TRIAGE NOTES
Pt arrives via ambulance c/o SOB, chest pain, difficulty breathing after his dialysis treatment on Wednesday. He arrives receiving nebulizer treatment as he was wheezing when EMS picking him up. He wears 2L O2 at baseline.  He arrives 100% on 6L completing neb tx    PMH: CHF, renal failure, T2DM

## 2023-11-16 NOTE — ED NOTES
Bedside and Verbal shift change report given to Dionisio Bond RN (oncoming nurse) by Rhona Li RN (offgoing nurse). Report included the following information Nurse Handoff Report, Index, ED Encounter Summary, ED SBAR, Adult Overview, MAR, Recent Results, Med Rec Status, Cardiac Rhythm Sinus Tach, Quality Measures, Neuro Assessment, and Event Log.        Yessenia Wan RN  11/16/23 8961

## 2023-11-17 ENCOUNTER — APPOINTMENT (OUTPATIENT)
Facility: HOSPITAL | Age: 58
DRG: 640 | End: 2023-11-17
Payer: MEDICARE

## 2023-11-17 LAB
ALBUMIN SERPL-MCNC: 2.8 G/DL (ref 3.5–5)
ALBUMIN/GLOB SERPL: 0.5 (ref 1.1–2.2)
ALP SERPL-CCNC: 260 U/L (ref 45–117)
ALT SERPL-CCNC: 31 U/L (ref 12–78)
AMMONIA PLAS-SCNC: 36 UMOL/L
AMPHET UR QL SCN: NEGATIVE
ANION GAP SERPL CALC-SCNC: 8 MMOL/L (ref 5–15)
AST SERPL-CCNC: ABNORMAL U/L (ref 15–37)
BARBITURATES UR QL SCN: NEGATIVE
BASOPHILS # BLD: 0.1 K/UL (ref 0–0.1)
BASOPHILS NFR BLD: 1 % (ref 0–1)
BENZODIAZ UR QL: NEGATIVE
BILIRUB SERPL-MCNC: 1.3 MG/DL (ref 0.2–1)
BUN SERPL-MCNC: 46 MG/DL (ref 6–20)
BUN/CREAT SERPL: 8 (ref 12–20)
CALCIUM SERPL-MCNC: 7.6 MG/DL (ref 8.5–10.1)
CANNABINOIDS UR QL SCN: NEGATIVE
CHLORIDE SERPL-SCNC: 106 MMOL/L (ref 97–108)
CHOLEST SERPL-MCNC: 128 MG/DL
CO2 SERPL-SCNC: 19 MMOL/L (ref 21–32)
COCAINE UR QL SCN: POSITIVE
CREAT SERPL-MCNC: 5.56 MG/DL (ref 0.7–1.3)
DIFFERENTIAL METHOD BLD: ABNORMAL
EOSINOPHIL # BLD: 0.1 K/UL (ref 0–0.4)
EOSINOPHIL NFR BLD: 1 % (ref 0–7)
ERYTHROCYTE [DISTWIDTH] IN BLOOD BY AUTOMATED COUNT: 22.5 % (ref 11.5–14.5)
GLOBULIN SER CALC-MCNC: 5.1 G/DL (ref 2–4)
GLUCOSE BLD STRIP.AUTO-MCNC: 157 MG/DL (ref 65–117)
GLUCOSE BLD STRIP.AUTO-MCNC: 161 MG/DL (ref 65–117)
GLUCOSE BLD STRIP.AUTO-MCNC: 186 MG/DL (ref 65–117)
GLUCOSE BLD STRIP.AUTO-MCNC: 223 MG/DL (ref 65–117)
GLUCOSE BLD STRIP.AUTO-MCNC: 246 MG/DL (ref 65–117)
GLUCOSE SERPL-MCNC: 225 MG/DL (ref 65–100)
HCT VFR BLD AUTO: 35 % (ref 36.6–50.3)
HDLC SERPL-MCNC: 69 MG/DL
HDLC SERPL: 1.9 (ref 0–5)
HGB BLD-MCNC: 10.9 G/DL (ref 12.1–17)
IMM GRANULOCYTES # BLD AUTO: 0.1 K/UL (ref 0–0.04)
IMM GRANULOCYTES NFR BLD AUTO: 1 % (ref 0–0.5)
LDLC SERPL CALC-MCNC: 48.2 MG/DL (ref 0–100)
LYMPHOCYTES # BLD: 0.4 K/UL (ref 0.8–3.5)
LYMPHOCYTES NFR BLD: 4 % (ref 12–49)
Lab: ABNORMAL
MAGNESIUM SERPL-MCNC: 2.6 MG/DL (ref 1.6–2.4)
MCH RBC QN AUTO: 28.7 PG (ref 26–34)
MCHC RBC AUTO-ENTMCNC: 31.1 G/DL (ref 30–36.5)
MCV RBC AUTO: 92.1 FL (ref 80–99)
METHADONE UR QL: NEGATIVE
MONOCYTES # BLD: 0.9 K/UL (ref 0–1)
MONOCYTES NFR BLD: 8 % (ref 5–13)
NEUTS SEG # BLD: 9.4 K/UL (ref 1.8–8)
NEUTS SEG NFR BLD: 85 % (ref 32–75)
NRBC # BLD: 0 K/UL (ref 0–0.01)
NRBC BLD-RTO: 0 PER 100 WBC
OPIATES UR QL: NEGATIVE
PCP UR QL: NEGATIVE
PLATELET # BLD AUTO: 155 K/UL (ref 150–400)
PLATELET COMMENT: ABNORMAL
POTASSIUM SERPL-SCNC: ABNORMAL MMOL/L (ref 3.5–5.1)
PROT SERPL-MCNC: 7.9 G/DL (ref 6.4–8.2)
RBC # BLD AUTO: 3.8 M/UL (ref 4.1–5.7)
RBC MORPH BLD: ABNORMAL
SERVICE CMNT-IMP: ABNORMAL
SODIUM SERPL-SCNC: 133 MMOL/L (ref 136–145)
TRIGL SERPL-MCNC: 54 MG/DL
TROPONIN I SERPL HS-MCNC: 166 NG/L (ref 0–76)
VLDLC SERPL CALC-MCNC: 10.8 MG/DL
WBC # BLD AUTO: 11 K/UL (ref 4.1–11.1)

## 2023-11-17 PROCEDURE — 6360000002 HC RX W HCPCS: Performed by: INTERNAL MEDICINE

## 2023-11-17 PROCEDURE — 36415 COLL VENOUS BLD VENIPUNCTURE: CPT

## 2023-11-17 PROCEDURE — 99232 SBSQ HOSP IP/OBS MODERATE 35: CPT | Performed by: SPECIALIST

## 2023-11-17 PROCEDURE — 90935 HEMODIALYSIS ONE EVALUATION: CPT

## 2023-11-17 PROCEDURE — 2580000003 HC RX 258: Performed by: INTERNAL MEDICINE

## 2023-11-17 PROCEDURE — 80061 LIPID PANEL: CPT

## 2023-11-17 PROCEDURE — 6370000000 HC RX 637 (ALT 250 FOR IP): Performed by: INTERNAL MEDICINE

## 2023-11-17 PROCEDURE — 85025 COMPLETE CBC W/AUTO DIFF WBC: CPT

## 2023-11-17 PROCEDURE — 80053 COMPREHEN METABOLIC PANEL: CPT

## 2023-11-17 PROCEDURE — 6370000000 HC RX 637 (ALT 250 FOR IP): Performed by: STUDENT IN AN ORGANIZED HEALTH CARE EDUCATION/TRAINING PROGRAM

## 2023-11-17 PROCEDURE — 82962 GLUCOSE BLOOD TEST: CPT

## 2023-11-17 PROCEDURE — 83735 ASSAY OF MAGNESIUM: CPT

## 2023-11-17 PROCEDURE — 2060000000 HC ICU INTERMEDIATE R&B

## 2023-11-17 PROCEDURE — 82140 ASSAY OF AMMONIA: CPT

## 2023-11-17 PROCEDURE — 5A1D70Z PERFORMANCE OF URINARY FILTRATION, INTERMITTENT, LESS THAN 6 HOURS PER DAY: ICD-10-PCS | Performed by: STUDENT IN AN ORGANIZED HEALTH CARE EDUCATION/TRAINING PROGRAM

## 2023-11-17 PROCEDURE — 80307 DRUG TEST PRSMV CHEM ANLYZR: CPT

## 2023-11-17 RX ORDER — HYDRALAZINE HYDROCHLORIDE 25 MG/1
50 TABLET, FILM COATED ORAL EVERY 8 HOURS SCHEDULED
Status: DISCONTINUED | OUTPATIENT
Start: 2023-11-17 | End: 2023-11-21 | Stop reason: HOSPADM

## 2023-11-17 RX ORDER — HYDRALAZINE HYDROCHLORIDE 20 MG/ML
50 INJECTION INTRAMUSCULAR; INTRAVENOUS 3 TIMES DAILY
Status: DISCONTINUED | OUTPATIENT
Start: 2023-11-17 | End: 2023-11-17 | Stop reason: SDUPTHER

## 2023-11-17 RX ORDER — CARVEDILOL 12.5 MG/1
25 TABLET ORAL 2 TIMES DAILY WITH MEALS
Status: DISCONTINUED | OUTPATIENT
Start: 2023-11-17 | End: 2023-11-21 | Stop reason: HOSPADM

## 2023-11-17 RX ORDER — AMLODIPINE BESYLATE 5 MG/1
10 TABLET ORAL DAILY
Status: DISCONTINUED | OUTPATIENT
Start: 2023-11-17 | End: 2023-11-21 | Stop reason: HOSPADM

## 2023-11-17 RX ADMIN — Medication 10 ML: at 22:57

## 2023-11-17 RX ADMIN — HEPARIN SODIUM 1800 UNITS: 1000 INJECTION INTRAVENOUS; SUBCUTANEOUS at 11:54

## 2023-11-17 RX ADMIN — HEPARIN SODIUM 5000 UNITS: 5000 INJECTION INTRAVENOUS; SUBCUTANEOUS at 22:55

## 2023-11-17 RX ADMIN — Medication 1 UNITS: at 18:23

## 2023-11-17 RX ADMIN — CARVEDILOL 25 MG: 12.5 TABLET, FILM COATED ORAL at 18:23

## 2023-11-17 ASSESSMENT — PAIN SCALES - GENERAL
PAINLEVEL_OUTOF10: 8
PAINLEVEL_OUTOF10: 7

## 2023-11-17 ASSESSMENT — PAIN DESCRIPTION - DESCRIPTORS: DESCRIPTORS: ACHING;DISCOMFORT

## 2023-11-17 ASSESSMENT — PAIN DESCRIPTION - LOCATION
LOCATION: GENERALIZED
LOCATION: GENERALIZED

## 2023-11-17 ASSESSMENT — PAIN DESCRIPTION - PAIN TYPE: TYPE: CHRONIC PAIN

## 2023-11-17 NOTE — DISCHARGE INSTRUCTIONS
Download the Heart Failure [place] Leesa: Search in your Capture Media (Android) or Virool Leesa Store (Six Apartphone): Search for- HF [place] Leesa.    [employer]    HF [place] is a brand-new phone leesa that helps you track daily symptoms, vitals, mood, energy level and more. You can even add your heart failure care team members to view your data and monitor your condition at home.     HF [place] lets you:  Track symptoms, medications and more  Share health information with your health care team  Connect with others living with heart failure

## 2023-11-17 NOTE — ED NOTES
TRANSFER - OUT REPORT:    Verbal report given to VAMSI White on Sharon Mckeon  being transferred to [facility]  for routine progression of patient care       Report consisted of patient's Situation, Background, Assessment and   Recommendations(SBAR). Information from the following report(s) Nurse Handoff Report, ED Encounter Summary, ED SBAR, Intake/Output, MAR, and Recent Results was reviewed with the receiving nurse. [place] Assessment:    Presents to emergency department  because of falls (Syncope, seizure, or loss of consciousness): No  Age > 70: No  Altered Mental Status, Intoxication with alcohol or substance confusion (Disorientation, impaired judgment, poor safety awaremess, or inability to follow instructions): Yes  Impaired Mobility: Ambulates or transfers with assistive devices or assistance; Unable to ambulate or transer.: Yes  Nursing Judgement: Yes          Lines:   Peripheral IV 11/16/23 Posterior;Right Forearm (Active)       Hemodialysis Central Access Right Subclavian (Active)   $Hemodialysis Central Access $Yes 11/16/23 1340   Continued need for line? Yes 11/16/23 1548   Site Assessment Clean, dry & intact 11/16/23 1548   Venous Lumen Status Blood return noted; Flushed;Heparin locked; Capped 11/16/23 1548   Arterial Lumen Status Blood return noted; Flushed;Heparin locked; Capped 11/16/23 1548   Line Care Connections checked and tightened;Ports disinfected 11/16/23 1548   Dressing Type Bacteriocidal;Transparent 11/16/23 1548   Date of Last Dressing Change 11/16/23 11/16/23 1548   Dressing Status Clean, dry & intact 11/16/23 1548   Dressing Intervention Dressing changed 11/16/23 1340   Dressing Change Due 11/17/23 11/16/23 1548        Opportunity for questions and clarification was provided.       Patient transported with:  Monitor, O2 @ 3lpm, and Registered Nurse           Tino Evans RN  11/16/23 9220

## 2023-11-18 LAB
GLUCOSE BLD STRIP.AUTO-MCNC: 120 MG/DL (ref 65–117)
GLUCOSE BLD STRIP.AUTO-MCNC: 176 MG/DL (ref 65–117)
GLUCOSE BLD STRIP.AUTO-MCNC: 283 MG/DL (ref 65–117)
GLUCOSE BLD STRIP.AUTO-MCNC: 361 MG/DL (ref 65–117)
SERVICE CMNT-IMP: ABNORMAL

## 2023-11-18 PROCEDURE — 6370000000 HC RX 637 (ALT 250 FOR IP): Performed by: STUDENT IN AN ORGANIZED HEALTH CARE EDUCATION/TRAINING PROGRAM

## 2023-11-18 PROCEDURE — 1100000000 HC RM PRIVATE

## 2023-11-18 PROCEDURE — 6370000000 HC RX 637 (ALT 250 FOR IP): Performed by: INTERNAL MEDICINE

## 2023-11-18 PROCEDURE — 6360000002 HC RX W HCPCS: Performed by: INTERNAL MEDICINE

## 2023-11-18 PROCEDURE — 82962 GLUCOSE BLOOD TEST: CPT

## 2023-11-18 PROCEDURE — 97530 THERAPEUTIC ACTIVITIES: CPT

## 2023-11-18 PROCEDURE — 97162 PT EVAL MOD COMPLEX 30 MIN: CPT

## 2023-11-18 PROCEDURE — 97165 OT EVAL LOW COMPLEX 30 MIN: CPT

## 2023-11-18 PROCEDURE — 2580000003 HC RX 258: Performed by: INTERNAL MEDICINE

## 2023-11-18 RX ORDER — TRAMADOL HYDROCHLORIDE 50 MG/1
50 TABLET ORAL EVERY 6 HOURS PRN
Status: DISCONTINUED | OUTPATIENT
Start: 2023-11-18 | End: 2023-11-21 | Stop reason: HOSPADM

## 2023-11-18 RX ORDER — LIDOCAINE 4 G/G
1 PATCH TOPICAL DAILY
Status: DISCONTINUED | OUTPATIENT
Start: 2023-11-18 | End: 2023-11-21 | Stop reason: HOSPADM

## 2023-11-18 RX ORDER — INSULIN LISPRO 100 [IU]/ML
20 INJECTION, SOLUTION INTRAVENOUS; SUBCUTANEOUS ONCE
Status: COMPLETED | OUTPATIENT
Start: 2023-11-18 | End: 2023-11-18

## 2023-11-18 RX ADMIN — CLONIDINE HYDROCHLORIDE 0.2 MG: 0.2 TABLET ORAL at 20:23

## 2023-11-18 RX ADMIN — INSULIN LISPRO 20 UNITS: 100 INJECTION, SOLUTION INTRAVENOUS; SUBCUTANEOUS at 12:20

## 2023-11-18 RX ADMIN — Medication 10 ML: at 20:24

## 2023-11-18 RX ADMIN — HEPARIN SODIUM 5000 UNITS: 5000 INJECTION INTRAVENOUS; SUBCUTANEOUS at 20:23

## 2023-11-18 RX ADMIN — HYDRALAZINE HYDROCHLORIDE 50 MG: 50 TABLET, FILM COATED ORAL at 07:13

## 2023-11-18 RX ADMIN — CARVEDILOL 25 MG: 12.5 TABLET, FILM COATED ORAL at 10:41

## 2023-11-18 RX ADMIN — HYDRALAZINE HYDROCHLORIDE 50 MG: 50 TABLET, FILM COATED ORAL at 14:41

## 2023-11-18 RX ADMIN — AMLODIPINE BESYLATE 10 MG: 5 TABLET ORAL at 10:41

## 2023-11-18 RX ADMIN — HYDRALAZINE HYDROCHLORIDE 50 MG: 50 TABLET, FILM COATED ORAL at 20:23

## 2023-11-18 RX ADMIN — CARVEDILOL 25 MG: 12.5 TABLET, FILM COATED ORAL at 17:46

## 2023-11-18 RX ADMIN — HEPARIN SODIUM 5000 UNITS: 5000 INJECTION INTRAVENOUS; SUBCUTANEOUS at 07:13

## 2023-11-18 RX ADMIN — Medication 10 ML: at 10:42

## 2023-11-18 RX ADMIN — HEPARIN SODIUM 5000 UNITS: 5000 INJECTION INTRAVENOUS; SUBCUTANEOUS at 14:41

## 2023-11-18 RX ADMIN — CLONIDINE HYDROCHLORIDE 0.2 MG: 0.2 TABLET ORAL at 10:41

## 2023-11-18 RX ADMIN — CLONIDINE HYDROCHLORIDE 0.2 MG: 0.2 TABLET ORAL at 14:41

## 2023-11-18 RX ADMIN — TRAMADOL HYDROCHLORIDE 50 MG: 50 TABLET ORAL at 15:33

## 2023-11-18 ASSESSMENT — PAIN SCALES - GENERAL
PAINLEVEL_OUTOF10: 6
PAINLEVEL_OUTOF10: 0

## 2023-11-18 ASSESSMENT — PAIN DESCRIPTION - LOCATION: LOCATION: BACK

## 2023-11-18 ASSESSMENT — PAIN DESCRIPTION - DESCRIPTORS: DESCRIPTORS: SHARP

## 2023-11-18 ASSESSMENT — PAIN DESCRIPTION - ORIENTATION: ORIENTATION: POSTERIOR

## 2023-11-19 ENCOUNTER — APPOINTMENT (OUTPATIENT)
Facility: HOSPITAL | Age: 58
DRG: 640 | End: 2023-11-19
Payer: MEDICARE

## 2023-11-19 LAB
ALBUMIN SERPL-MCNC: 2.6 G/DL (ref 3.5–5)
ALBUMIN/GLOB SERPL: 0.6 (ref 1.1–2.2)
ALP SERPL-CCNC: 284 U/L (ref 45–117)
ALT SERPL-CCNC: 39 U/L (ref 12–78)
ANION GAP SERPL CALC-SCNC: 10 MMOL/L (ref 5–15)
AST SERPL-CCNC: 112 U/L (ref 15–37)
BASOPHILS # BLD: 0 K/UL (ref 0–0.1)
BASOPHILS NFR BLD: 0 % (ref 0–1)
BILIRUB SERPL-MCNC: 1 MG/DL (ref 0.2–1)
BUN SERPL-MCNC: 62 MG/DL (ref 6–20)
BUN/CREAT SERPL: 11 (ref 12–20)
CALCIUM SERPL-MCNC: 7.6 MG/DL (ref 8.5–10.1)
CHLORIDE SERPL-SCNC: 98 MMOL/L (ref 97–108)
CO2 SERPL-SCNC: 23 MMOL/L (ref 21–32)
CREAT SERPL-MCNC: 5.67 MG/DL (ref 0.7–1.3)
DIFFERENTIAL METHOD BLD: ABNORMAL
EOSINOPHIL # BLD: 0.1 K/UL (ref 0–0.4)
EOSINOPHIL NFR BLD: 1 % (ref 0–7)
ERYTHROCYTE [DISTWIDTH] IN BLOOD BY AUTOMATED COUNT: 20.6 % (ref 11.5–14.5)
EST. AVERAGE GLUCOSE BLD GHB EST-MCNC: 154 MG/DL
GLOBULIN SER CALC-MCNC: 4.6 G/DL (ref 2–4)
GLUCOSE BLD STRIP.AUTO-MCNC: 218 MG/DL (ref 65–117)
GLUCOSE BLD STRIP.AUTO-MCNC: 245 MG/DL (ref 65–117)
GLUCOSE BLD STRIP.AUTO-MCNC: 271 MG/DL (ref 65–117)
GLUCOSE BLD STRIP.AUTO-MCNC: 374 MG/DL (ref 65–117)
GLUCOSE SERPL-MCNC: 399 MG/DL (ref 65–100)
HBA1C MFR BLD: 7 % (ref 4–5.6)
HCT VFR BLD AUTO: 31.9 % (ref 36.6–50.3)
HGB BLD-MCNC: 10.1 G/DL (ref 12.1–17)
IMM GRANULOCYTES # BLD AUTO: 0.1 K/UL (ref 0–0.04)
IMM GRANULOCYTES NFR BLD AUTO: 1 % (ref 0–0.5)
LYMPHOCYTES # BLD: 0.4 K/UL (ref 0.8–3.5)
LYMPHOCYTES NFR BLD: 4 % (ref 12–49)
MAGNESIUM SERPL-MCNC: 2.5 MG/DL (ref 1.6–2.4)
MCH RBC QN AUTO: 28.6 PG (ref 26–34)
MCHC RBC AUTO-ENTMCNC: 31.7 G/DL (ref 30–36.5)
MCV RBC AUTO: 90.4 FL (ref 80–99)
MONOCYTES # BLD: 0.6 K/UL (ref 0–1)
MONOCYTES NFR BLD: 6 % (ref 5–13)
NEUTS SEG # BLD: 8.9 K/UL (ref 1.8–8)
NEUTS SEG NFR BLD: 88 % (ref 32–75)
NRBC # BLD: 0 K/UL (ref 0–0.01)
NRBC BLD-RTO: 0 PER 100 WBC
NT PRO BNP: ABNORMAL PG/ML
PLATELET # BLD AUTO: 178 K/UL (ref 150–400)
PMV BLD AUTO: 10.9 FL (ref 8.9–12.9)
POTASSIUM SERPL-SCNC: 4.1 MMOL/L (ref 3.5–5.1)
PROT SERPL-MCNC: 7.2 G/DL (ref 6.4–8.2)
RBC # BLD AUTO: 3.53 M/UL (ref 4.1–5.7)
RBC MORPH BLD: ABNORMAL
RBC MORPH BLD: ABNORMAL
SERVICE CMNT-IMP: ABNORMAL
SODIUM SERPL-SCNC: 131 MMOL/L (ref 136–145)
WBC # BLD AUTO: 10.1 K/UL (ref 4.1–11.1)

## 2023-11-19 PROCEDURE — 2580000003 HC RX 258: Performed by: INTERNAL MEDICINE

## 2023-11-19 PROCEDURE — 6370000000 HC RX 637 (ALT 250 FOR IP): Performed by: INTERNAL MEDICINE

## 2023-11-19 PROCEDURE — 36415 COLL VENOUS BLD VENIPUNCTURE: CPT

## 2023-11-19 PROCEDURE — 80053 COMPREHEN METABOLIC PANEL: CPT

## 2023-11-19 PROCEDURE — 6360000002 HC RX W HCPCS: Performed by: INTERNAL MEDICINE

## 2023-11-19 PROCEDURE — 83735 ASSAY OF MAGNESIUM: CPT

## 2023-11-19 PROCEDURE — 83880 ASSAY OF NATRIURETIC PEPTIDE: CPT

## 2023-11-19 PROCEDURE — 1100000000 HC RM PRIVATE

## 2023-11-19 PROCEDURE — 6370000000 HC RX 637 (ALT 250 FOR IP): Performed by: STUDENT IN AN ORGANIZED HEALTH CARE EDUCATION/TRAINING PROGRAM

## 2023-11-19 PROCEDURE — 82962 GLUCOSE BLOOD TEST: CPT

## 2023-11-19 PROCEDURE — 85025 COMPLETE CBC W/AUTO DIFF WBC: CPT

## 2023-11-19 PROCEDURE — 83036 HEMOGLOBIN GLYCOSYLATED A1C: CPT

## 2023-11-19 RX ORDER — INSULIN GLARGINE 100 [IU]/ML
16 INJECTION, SOLUTION SUBCUTANEOUS NIGHTLY
Status: DISCONTINUED | OUTPATIENT
Start: 2023-11-19 | End: 2023-11-20

## 2023-11-19 RX ADMIN — Medication 1 UNITS: at 17:13

## 2023-11-19 RX ADMIN — CLONIDINE HYDROCHLORIDE 0.2 MG: 0.2 TABLET ORAL at 17:00

## 2023-11-19 RX ADMIN — HYDRALAZINE HYDROCHLORIDE 50 MG: 50 TABLET, FILM COATED ORAL at 15:26

## 2023-11-19 RX ADMIN — CLONIDINE HYDROCHLORIDE 0.2 MG: 0.2 TABLET ORAL at 21:11

## 2023-11-19 RX ADMIN — CARVEDILOL 25 MG: 12.5 TABLET, FILM COATED ORAL at 17:13

## 2023-11-19 RX ADMIN — Medication 1 UNITS: at 12:17

## 2023-11-19 RX ADMIN — CARVEDILOL 25 MG: 12.5 TABLET, FILM COATED ORAL at 09:35

## 2023-11-19 RX ADMIN — Medication 10 ML: at 21:14

## 2023-11-19 RX ADMIN — HYDRALAZINE HYDROCHLORIDE 50 MG: 50 TABLET, FILM COATED ORAL at 21:11

## 2023-11-19 RX ADMIN — HEPARIN SODIUM 5000 UNITS: 5000 INJECTION INTRAVENOUS; SUBCUTANEOUS at 15:26

## 2023-11-19 RX ADMIN — Medication 4 UNITS: at 07:31

## 2023-11-19 RX ADMIN — HYDRALAZINE HYDROCHLORIDE 50 MG: 50 TABLET, FILM COATED ORAL at 06:07

## 2023-11-19 RX ADMIN — HEPARIN SODIUM 5000 UNITS: 5000 INJECTION INTRAVENOUS; SUBCUTANEOUS at 21:12

## 2023-11-19 RX ADMIN — AMLODIPINE BESYLATE 10 MG: 5 TABLET ORAL at 09:36

## 2023-11-19 RX ADMIN — INSULIN GLARGINE 16 UNITS: 100 INJECTION, SOLUTION SUBCUTANEOUS at 21:12

## 2023-11-19 RX ADMIN — Medication 10 ML: at 09:34

## 2023-11-19 RX ADMIN — CLONIDINE HYDROCHLORIDE 0.2 MG: 0.2 TABLET ORAL at 09:35

## 2023-11-19 RX ADMIN — HEPARIN SODIUM 5000 UNITS: 5000 INJECTION INTRAVENOUS; SUBCUTANEOUS at 06:06

## 2023-11-20 ENCOUNTER — APPOINTMENT (OUTPATIENT)
Facility: HOSPITAL | Age: 58
DRG: 640 | End: 2023-11-20
Payer: MEDICARE

## 2023-11-20 LAB
ECHO AV PEAK GRADIENT: 4 MMHG
ECHO AV PEAK VELOCITY: 1 M/S
ECHO BSA: 1.74 M2
ECHO LV EDV A2C: 116 ML
ECHO LV EDV A4C: 83 ML
ECHO LV EDV BP: 103 ML (ref 67–155)
ECHO LV EDV INDEX A4C: 48 ML/M2
ECHO LV EDV INDEX BP: 59 ML/M2
ECHO LV EDV NDEX A2C: 67 ML/M2
ECHO LV EJECTION FRACTION A2C: 29 %
ECHO LV EJECTION FRACTION A4C: 35 %
ECHO LV EJECTION FRACTION BIPLANE: 30 % (ref 55–100)
ECHO LV ESV A2C: 82 ML
ECHO LV ESV A4C: 54 ML
ECHO LV ESV BP: 72 ML (ref 22–58)
ECHO LV ESV INDEX A2C: 47 ML/M2
ECHO LV ESV INDEX A4C: 31 ML/M2
ECHO LV ESV INDEX BP: 41 ML/M2
ECHO LV FRACTIONAL SHORTENING: 13 % (ref 28–44)
ECHO LV INTERNAL DIMENSION DIASTOLE INDEX: 2.59 CM/M2
ECHO LV INTERNAL DIMENSION DIASTOLIC: 4.5 CM (ref 4.2–5.9)
ECHO LV INTERNAL DIMENSION SYSTOLIC INDEX: 2.24 CM/M2
ECHO LV INTERNAL DIMENSION SYSTOLIC: 3.9 CM
ECHO LV IVSD: 1.6 CM (ref 0.6–1)
ECHO LV MASS 2D: 304.6 G (ref 88–224)
ECHO LV MASS INDEX 2D: 175.1 G/M2 (ref 49–115)
ECHO LV POSTERIOR WALL DIASTOLIC: 1.6 CM (ref 0.6–1)
ECHO LV RELATIVE WALL THICKNESS RATIO: 0.71
ECHO RV TAPSE: 1.1 CM (ref 1.7–?)
ECHO TV REGURGITANT MAX VELOCITY: 2.46 M/S
ECHO TV REGURGITANT PEAK GRADIENT: 24 MMHG
EST. AVERAGE GLUCOSE BLD GHB EST-MCNC: 151 MG/DL
GLUCOSE BLD STRIP.AUTO-MCNC: 147 MG/DL (ref 65–117)
GLUCOSE BLD STRIP.AUTO-MCNC: 175 MG/DL (ref 65–117)
GLUCOSE BLD STRIP.AUTO-MCNC: 184 MG/DL (ref 65–117)
GLUCOSE BLD STRIP.AUTO-MCNC: 200 MG/DL (ref 65–117)
GLUCOSE BLD STRIP.AUTO-MCNC: 304 MG/DL (ref 65–117)
HBA1C MFR BLD: 6.9 % (ref 4–5.6)
HBV SURFACE AB SER QL: REACTIVE
HBV SURFACE AB SER-ACNC: 77.67 MIU/ML
HBV SURFACE AG SER QL: <0.1 INDEX
HBV SURFACE AG SER QL: NEGATIVE
HCV AB SER IA-ACNC: >11 INDEX
HCV AB SERPL QL IA: REACTIVE
SERVICE CMNT-IMP: ABNORMAL

## 2023-11-20 PROCEDURE — 93321 DOPPLER ECHO F-UP/LMTD STD: CPT | Performed by: INTERNAL MEDICINE

## 2023-11-20 PROCEDURE — 93308 TTE F-UP OR LMTD: CPT | Performed by: INTERNAL MEDICINE

## 2023-11-20 PROCEDURE — 2580000003 HC RX 258: Performed by: INTERNAL MEDICINE

## 2023-11-20 PROCEDURE — 90935 HEMODIALYSIS ONE EVALUATION: CPT

## 2023-11-20 PROCEDURE — 93308 TTE F-UP OR LMTD: CPT

## 2023-11-20 PROCEDURE — 93325 DOPPLER ECHO COLOR FLOW MAPG: CPT | Performed by: INTERNAL MEDICINE

## 2023-11-20 PROCEDURE — 86706 HEP B SURFACE ANTIBODY: CPT

## 2023-11-20 PROCEDURE — 86705 HEP B CORE ANTIBODY IGM: CPT

## 2023-11-20 PROCEDURE — 36415 COLL VENOUS BLD VENIPUNCTURE: CPT

## 2023-11-20 PROCEDURE — 6370000000 HC RX 637 (ALT 250 FOR IP): Performed by: INTERNAL MEDICINE

## 2023-11-20 PROCEDURE — 1100000000 HC RM PRIVATE

## 2023-11-20 PROCEDURE — 6370000000 HC RX 637 (ALT 250 FOR IP): Performed by: STUDENT IN AN ORGANIZED HEALTH CARE EDUCATION/TRAINING PROGRAM

## 2023-11-20 PROCEDURE — 86803 HEPATITIS C AB TEST: CPT

## 2023-11-20 PROCEDURE — 87340 HEPATITIS B SURFACE AG IA: CPT

## 2023-11-20 PROCEDURE — 86704 HEP B CORE ANTIBODY TOTAL: CPT

## 2023-11-20 PROCEDURE — 6370000000 HC RX 637 (ALT 250 FOR IP)

## 2023-11-20 PROCEDURE — 83036 HEMOGLOBIN GLYCOSYLATED A1C: CPT

## 2023-11-20 PROCEDURE — 82962 GLUCOSE BLOOD TEST: CPT

## 2023-11-20 PROCEDURE — 6360000002 HC RX W HCPCS: Performed by: INTERNAL MEDICINE

## 2023-11-20 RX ORDER — DEXTROSE MONOHYDRATE 100 MG/ML
INJECTION, SOLUTION INTRAVENOUS CONTINUOUS PRN
Status: DISCONTINUED | OUTPATIENT
Start: 2023-11-20 | End: 2023-11-21 | Stop reason: HOSPADM

## 2023-11-20 RX ORDER — INSULIN LISPRO 100 [IU]/ML
0.05 INJECTION, SOLUTION INTRAVENOUS; SUBCUTANEOUS
Status: DISCONTINUED | OUTPATIENT
Start: 2023-11-20 | End: 2023-11-21

## 2023-11-20 RX ORDER — GINSENG 100 MG
CAPSULE ORAL DAILY
Status: DISCONTINUED | OUTPATIENT
Start: 2023-11-20 | End: 2023-11-21 | Stop reason: HOSPADM

## 2023-11-20 RX ORDER — INSULIN GLARGINE 100 [IU]/ML
0.15 INJECTION, SOLUTION SUBCUTANEOUS NIGHTLY
Status: DISCONTINUED | OUTPATIENT
Start: 2023-11-20 | End: 2023-11-21

## 2023-11-20 RX ADMIN — HYDRALAZINE HYDROCHLORIDE 50 MG: 50 TABLET, FILM COATED ORAL at 07:34

## 2023-11-20 RX ADMIN — Medication 3 UNITS: at 16:11

## 2023-11-20 RX ADMIN — Medication: at 16:15

## 2023-11-20 RX ADMIN — INSULIN LISPRO 3 UNITS: 100 INJECTION, SOLUTION INTRAVENOUS; SUBCUTANEOUS at 16:11

## 2023-11-20 RX ADMIN — Medication 10 ML: at 09:16

## 2023-11-20 RX ADMIN — TRAMADOL HYDROCHLORIDE 50 MG: 50 TABLET ORAL at 00:06

## 2023-11-20 RX ADMIN — INSULIN GLARGINE 10 UNITS: 100 INJECTION, SOLUTION SUBCUTANEOUS at 21:24

## 2023-11-20 RX ADMIN — CARVEDILOL 25 MG: 12.5 TABLET, FILM COATED ORAL at 07:34

## 2023-11-20 RX ADMIN — HEPARIN SODIUM 5000 UNITS: 5000 INJECTION INTRAVENOUS; SUBCUTANEOUS at 16:15

## 2023-11-20 RX ADMIN — HEPARIN SODIUM 5000 UNITS: 5000 INJECTION INTRAVENOUS; SUBCUTANEOUS at 21:21

## 2023-11-20 RX ADMIN — HEPARIN SODIUM 5000 UNITS: 5000 INJECTION INTRAVENOUS; SUBCUTANEOUS at 07:33

## 2023-11-20 RX ADMIN — TRAMADOL HYDROCHLORIDE 50 MG: 50 TABLET ORAL at 18:39

## 2023-11-20 ASSESSMENT — PAIN SCALES - GENERAL
PAINLEVEL_OUTOF10: 8
PAINLEVEL_OUTOF10: 10
PAINLEVEL_OUTOF10: 3
PAINLEVEL_OUTOF10: 8
PAINLEVEL_OUTOF10: 5

## 2023-11-20 ASSESSMENT — PAIN DESCRIPTION - DESCRIPTORS: DESCRIPTORS: ACHING

## 2023-11-20 ASSESSMENT — PAIN DESCRIPTION - LOCATION
LOCATION: GENERALIZED
LOCATION: HEAD
LOCATION: ARM

## 2023-11-20 ASSESSMENT — PAIN SCALES - WONG BAKER: WONGBAKER_NUMERICALRESPONSE: 0

## 2023-11-20 ASSESSMENT — PAIN - FUNCTIONAL ASSESSMENT: PAIN_FUNCTIONAL_ASSESSMENT: ACTIVITIES ARE NOT PREVENTED

## 2023-11-20 ASSESSMENT — PAIN DESCRIPTION - ORIENTATION
ORIENTATION: OTHER (COMMENT)
ORIENTATION: LEFT

## 2023-11-20 NOTE — NURSE NAVIGATOR
HEART FAILURE NURSE NAVIGATOR NOTE  [address]    Patient chart was reviewed by Heart Failure (HF) Nurse Navigators for compliance of prescribed treatment with guidelines directed medical therapy (GDMT) and HF database completed. Please, review beneath recommendations for symptomatic patients with HF with Mildly Reduced Ejection Fraction 41-49% (HFmrEF) for your consideration when taking care of this patient. Current Medical Therapy:      Name Sam Camarena    1965   LVEF   45/50   NYHA Functional Class   Documentation needed   ARNi/ACEi/ARB    Beta-blocker   Coreg   Aldosterone Antagonist   GFR 10   SGLT2 inhibitor   GFR 10   Hydralazine/Isosorbide Dinitrate   Hydralazine   Consulting Cardiologist      Recommendations: For patients with previously normal or newly diagnosed HFmrEF 41-49%, consider adding the following GDMT, if appropriate:  SGLT2 inhibitor [Class 2a]  ARNi/ACEi/ARB [Class 2b]  Beta-blockers (carvedilol, sustained-release metoprolol succinate or bisoprolol) [Class 2b]  Aldosterone antagonists GFR > 30 and K< 5 [Class 2b]  Adjust diuretic dose at discharge if hospitalized for volume overload [Class 1]    For patients who have recovered LVEF to 41-49%, GDMT should be continued to prevent heart failure and relapse of LV dysfunction [Class 1].    Please, add the following GDMT [Class 1] or document in discharge summary/progress note why patient cannot take the medication:  ARNi/ACEi or ARB  Beta-blockers (carvedilol, sustained-release metoprolol succinate or bisoprolol)  Aldosterone antagonists GFR > 30 and K< 5  SGLT2 inhibitor  Hydralazine/Isosorbide dinitrate for  Americans with Class III/IV symptoms  Adjust diuretic dose at discharge if hospitalized for volume overload  Note: the following medications may be potentially harmful in heart failure [Class 3]:  Calcium channel blockers (doxazosin, diltiazem, verapamil, nifedipine)  Antiarrhythmics

## 2023-11-20 NOTE — NURSE NAVIGATOR
Heart Failure Nurse Navigator rounds completed. HF NN provided introduction to self and nurse navigator role. Living With Heart Failure booklet given to patient, along with weight calendar, and magnet. Reviewed signs and symptoms of heart failure. Patient stated he had congestive heart failure. Reviewed when to notify the physician. Patient sleepy at times during education and closing his eyes. Reviewed low salt diet. Patient wants to know when he is going to SNF. Patient has a discharge order. Patient states when he goes home his fiance assists driving him to appointments and helps get his medications. Will continue to follow until discharge.         Time spent with patient discussing HF education:  15 minutes

## 2023-11-20 NOTE — WOUND CARE
WOCN Note:     New consult for scrotum  Seen in 530    Chart shows:  Admitted on 11/16/23. Admitted for fluid overload; unable to sit for HD. History of CHF, DM, HD, Hep C, neck surgery 9/28, polysubstance abuse. Assessment:   Appropriately conversational and on phone throughout my encounter. Able to reposition on right side for sacral assessment. Reports pain to posterior scrotum with assessment. Wearing brief. Bilateral heels intact and without erythema. Moves feet freely. Buttocks and sacrum intact without erythema. 1. POA full thickness erosion to posterior scrotum  0.4 x 0.7 x 0.3 cm  Red base  No exudate; No edema or redness  Consistent with MASD; margins appear chronic    Wound Recommendations:    Posterior scrotum: bacitracin daily after bathing    No concerns to relay to physician.     Transition of Care: Plan to follow weekly and as needed while admitted to hospital.    Estefania Nazario, GERRYN, RN, [address] [facility]  Certified Wound, Ostomy, Continence Nurse  office [phone]  Available via [facility]

## 2023-11-21 VITALS
HEIGHT: 67 IN | HEART RATE: 66 BPM | TEMPERATURE: 97.5 F | OXYGEN SATURATION: 96 % | WEIGHT: 141.09 LBS | DIASTOLIC BLOOD PRESSURE: 74 MMHG | BODY MASS INDEX: 22.15 KG/M2 | SYSTOLIC BLOOD PRESSURE: 144 MMHG | RESPIRATION RATE: 18 BRPM

## 2023-11-21 LAB
BACTERIA SPEC CULT: NORMAL
BACTERIA SPEC CULT: NORMAL
GLUCOSE BLD STRIP.AUTO-MCNC: 186 MG/DL (ref 65–117)
GLUCOSE BLD STRIP.AUTO-MCNC: 281 MG/DL (ref 65–117)
SERVICE CMNT-IMP: ABNORMAL
SERVICE CMNT-IMP: ABNORMAL
SERVICE CMNT-IMP: NORMAL
SERVICE CMNT-IMP: NORMAL

## 2023-11-21 PROCEDURE — 6360000002 HC RX W HCPCS: Performed by: INTERNAL MEDICINE

## 2023-11-21 PROCEDURE — 97530 THERAPEUTIC ACTIVITIES: CPT

## 2023-11-21 PROCEDURE — 2580000003 HC RX 258: Performed by: INTERNAL MEDICINE

## 2023-11-21 PROCEDURE — 6370000000 HC RX 637 (ALT 250 FOR IP)

## 2023-11-21 PROCEDURE — 82962 GLUCOSE BLOOD TEST: CPT

## 2023-11-21 PROCEDURE — 6370000000 HC RX 637 (ALT 250 FOR IP): Performed by: STUDENT IN AN ORGANIZED HEALTH CARE EDUCATION/TRAINING PROGRAM

## 2023-11-21 PROCEDURE — 6370000000 HC RX 637 (ALT 250 FOR IP): Performed by: INTERNAL MEDICINE

## 2023-11-21 RX ORDER — INSULIN LISPRO 100 [IU]/ML
0-4 INJECTION, SOLUTION INTRAVENOUS; SUBCUTANEOUS
Qty: 10 ML | Refills: 0 | Status: SHIPPED
Start: 2023-11-21 | End: 2024-02-12

## 2023-11-21 RX ORDER — INSULIN LISPRO 100 [IU]/ML
4 INJECTION, SOLUTION INTRAVENOUS; SUBCUTANEOUS
Status: DISCONTINUED | OUTPATIENT
Start: 2023-11-21 | End: 2023-11-21 | Stop reason: HOSPADM

## 2023-11-21 RX ORDER — INSULIN GLARGINE 100 [IU]/ML
12 INJECTION, SOLUTION SUBCUTANEOUS NIGHTLY
Status: DISCONTINUED | OUTPATIENT
Start: 2023-11-21 | End: 2023-11-21 | Stop reason: HOSPADM

## 2023-11-21 RX ORDER — INSULIN GLARGINE 100 [IU]/ML
0.15 INJECTION, SOLUTION SUBCUTANEOUS NIGHTLY
Qty: 10 ML | Refills: 0 | Status: SHIPPED
Start: 2023-11-21 | End: 2024-02-29

## 2023-11-21 RX ADMIN — INSULIN LISPRO 3 UNITS: 100 INJECTION, SOLUTION INTRAVENOUS; SUBCUTANEOUS at 11:46

## 2023-11-21 RX ADMIN — HEPARIN SODIUM 5000 UNITS: 5000 INJECTION INTRAVENOUS; SUBCUTANEOUS at 06:00

## 2023-11-21 RX ADMIN — AMLODIPINE BESYLATE 10 MG: 5 TABLET ORAL at 08:43

## 2023-11-21 RX ADMIN — Medication: at 08:43

## 2023-11-21 RX ADMIN — Medication 2 UNITS: at 11:45

## 2023-11-21 RX ADMIN — Medication 10 ML: at 08:43

## 2023-11-21 RX ADMIN — HYDRALAZINE HYDROCHLORIDE 50 MG: 50 TABLET, FILM COATED ORAL at 14:31

## 2023-11-21 RX ADMIN — CLONIDINE HYDROCHLORIDE 0.2 MG: 0.2 TABLET ORAL at 08:43

## 2023-11-21 RX ADMIN — INSULIN LISPRO 3 UNITS: 100 INJECTION, SOLUTION INTRAVENOUS; SUBCUTANEOUS at 07:04

## 2023-11-21 RX ADMIN — TRAMADOL HYDROCHLORIDE 50 MG: 50 TABLET ORAL at 00:24

## 2023-11-21 RX ADMIN — HEPARIN SODIUM 5000 UNITS: 5000 INJECTION INTRAVENOUS; SUBCUTANEOUS at 14:31

## 2023-11-21 RX ADMIN — CARVEDILOL 25 MG: 12.5 TABLET, FILM COATED ORAL at 07:04

## 2023-11-21 RX ADMIN — HEPARIN SODIUM 1800 UNITS: 1000 INJECTION INTRAVENOUS; SUBCUTANEOUS at 00:04

## 2023-11-21 RX ADMIN — CLONIDINE HYDROCHLORIDE 0.2 MG: 0.2 TABLET ORAL at 14:31

## 2023-11-21 ASSESSMENT — PAIN DESCRIPTION - LOCATION
LOCATION: HEAD
LOCATION: HEAD

## 2023-11-21 ASSESSMENT — PAIN SCALES - GENERAL
PAINLEVEL_OUTOF10: 10
PAINLEVEL_OUTOF10: 5
PAINLEVEL_OUTOF10: 4

## 2023-11-21 NOTE — CARE COORDINATION
CM called Medicaid ([phone]) twice to get an ETA for his  time. CM was told that they [facility]  [facility]. RYANN updated pt's nurse. Roseann Le
NIRMALA- D/c to [facility] pending auth. Need- new dialysis labs for the Dialysis Den at [facility]      CM reviewed SNF referrals and noted that pt has been accepted by Susie Funez  and [facility] . CM met with pt to discuss and he said that he wants to go to [facility]. RYANN spoke with Leni in admissions at [facility] and she said that they will start the [address]. If pt goes to the dialysis den at [facility], will need the labs needed (for new dialysis). Some of the labs are in the chart. RYANN sent a perfectserve message to Dr. Karol Perales asking him to order correct labs for new dialysis. Lena Brown
NIRMALA- D/c to [facility] pending auth  Dialysis-[email or web address] [address] on M,W,F at 6:30am        RYANN spoke with [facility] with [facility] and she met with pt and spoke with his fiance  yesterday. She said that this pt would rather go to [facility] now and return to his dialysis chair at [facility] @ [address] [facility] [phone]. She also said that auth was started yesterday. RYANN called [facility] ([phone]) and spoke with Shree Shukla RN, to inform her of pt's dc plan. He will be going to [facility] and will return to [facility] at d/c. She requested clinicals which were faxed by this Cm. Will continue to follow.  Jodee Monroy
Transition of Care Plan:    RUR: 26%  Prior Level of Functioning: mod I  Disposition: [address] [place] [address]  Follow up appointments:   DME needed: none  Transportation at discharge: [facility]- Medicaid  IM/IMM Medicare/ letter given: 11/21/23  Is patient a  and connected with VA? If yes, was [employer] transfer form completed and VA notified? Caregiver Contact: Harrie Gosselin [phone]  Discharge Caregiver contacted prior to discharge? yes  Care Conference needed? no  Barriers to discharge:  none/ [address] [place] [address] received auth for this pt to be admitted there today. RYANN received a call from [facility] with Lavelle W Claudette Gan that they have the insurance auth. She can accept this pt today. CM called [facility] ([phone]) and spoke with Johann Nicholas RN to inform her that this pt will be returning there tomorrow morning at 6:30am. She confirmed that she can accept this pt tomorrow. CM received permission from [facility] to pay for the first transportation for this pt going from [address] [place] [address] to [facility] in the morning. CM called [facility] to [facility] this up for stretcher for a 6:00am tomorrow. The cost is 470.00. CM called pt's girlfriend, Rodo Hayden and left a voice mail for her to update her. CM called Medicaid ([phone]) to set up d/c transportation via stretcher to [address] [place] [address]. The reference number is [id number]. An envelope containing pt's AVS and MAR will be sent with this pt to [address] [place] [address]. Also, pt's nurse will call report. BIRGIT Gaytan,ACM-SW
Transition of Care Plan:    RUR: 27%  Prior Level of Functioning: Needs assistance with ADLs  Disposition: SNF Placement-possible LTC placement   Follow up appointments: Follow up with PCP and/or Specialist   DME needed: chronic O2  Transportation at discharge: BLS transport   IM/IMM Medicare/ letter given: 2nd IM Medicare Letter to be given   Is patient a  and connected with VA? N/A   If yes, was [place] transfer form completed and VA notified? N/A  Caregiver Contact: Dotty Tavera (daughter) [phone]  Discharge Caregiver contacted prior to discharge? Family to contact  Care Conference needed? Not at this time   Barriers to discharge: Medical Stable and Placement      CM: Cristina Vallecillo is currently working with pt. CM staffed case with physician, and regarding pt needing placement (snf). Physician aware that pt will require insurance auth prior to d/c    CM completed room visit with pt to review d/c needs and plans. Pt is agreeable to snf placement at facility. Pt reported that he didn't know where he would like to transition to facility at the time of d/c. Pt reported continuous O2 need and dialysis need. Pt reported that he may have transport benefits attached to medicaid, CM will verify. CM will send multiple referrals to snf placement , via [facility] that can assist with pts transition to facility. CM will continue to follow.     GARIMA Young CM
received a denial from insurance. She requested admitting MD call her. Sent Perfect Serve message to admitting MD. She confirmed receipt of message. Called [employer] ([phone]). Spoke with Pito Moreno RN. She confirmed patient had 12 liters of fluid last visit-Monday. They were able to take off 2 liters. Patient missed the last 2 HD sessions before that. She said he does complain of pain in his arm and they give him ibuprofen for that. He has never complained of neck pain while at HD. Patient was also missed HD often prior to his neck injury mid September. Patient does have itching during dialysis. They use the blue dialyzer with him. Explained patient will be admitted and assessed to go to some type of rehab.       11/16/23 1120   Service Assessment   Patient Orientation Alert and Oriented   Cognition Alert   History Provided By Patient   Primary Caregiver Self  (Patient's fiance Art Blinks assists patient.)   Support Systems Spouse/Significant Other  (Patient's fiance Art Blinks assists patient.)   Patient's Healthcare Decision Maker is: Legal Next of [address] [place] [address]   PCP Verified by CM Yes  (Leeann Juan Carlos [phone])   Last Visit to PCP Within last 3 months   Prior Functional Level Assistance with the following:;Housework;Cooking; Bathing;Dressing; Toileting; Shopping;Mobility   Current Functional Level Assistance with the following:  (Await therapy evaluations.)   Ability to make needs known: Good   Family able to assist with home care needs: No  (Patient said his care needs are not able to be met by his fiance'. )   Financial Resources Medicare;Medicaid  ([employer])   Community Resources Other (Comment)   Social/Functional History   Lives With Significant other   Home Layout One level   Home Access Stairs to enter with rails   Entrance Stairs - Number of Steps 3   Entrance Stairs - Rails Left   Home Equipment Oxygen  (2 liters with [employer])   Receives Help From Friend(s)   ADL

## 2023-11-21 NOTE — PLAN OF CARE
0730: Bedside and Verbal shift change report given to Balta Ridley RN (oncoming nurse) by Jenise Barthel, RN (offgoing nurse). Report included the following information Nurse Handoff Report, Index, Intake/Output, MAR, and Recent Results. 1052Calicja Agustin MD messaged about OT and case management note stating to send patient to SNF. RN asking for clarification on discharge plan. Per Dixie Agustin MD get PT to work with patient. 1209Cyrolly Agustin MD updated about blood sugar of 361. Obtained verbal order for 20 units lispro SQ    1255: Dixie Agustin MD updated about PT recommending SNF. MD to cancel discharge. RN left a message for case management to start setting up SNF. Per Dixie Agustin MD [facility] surgeon called. After multiple attempts unable to get ahold of someone. Message left. 1529Cyrolly Agustin MD updated about patient having frequent liquidy BMs. No new orders. 1440: Patient transferred out to [facility] [address] Report given to VAMSI Herrera. Patient transferred with all belongings. Care Plans:   Problem: Skin/Tissue Integrity  Goal: Absence of new skin breakdown  Description: 1. Monitor for areas of redness and/or skin breakdown  2. Assess vascular access sites hourly  3. Every 4-6 hours minimum:  Change oxygen saturation probe site  4. Every 4-6 hours:  If on nasal continuous positive airway pressure, respiratory therapy assess nares and determine need for appliance change or resting period.   11/18/2023 1107 by Krupa Velarde RN  Outcome: Progressing  11/18/2023 0914 by Ana Pink RN  Outcome: Progressing     Problem: Discharge Planning  Goal: Discharge to home or other facility with appropriate resources  11/18/2023 1107 by Krupa Velarde RN  Outcome: Progressing  11/18/2023 0914 by Ana Pink RN  Outcome: Progressing  Flowsheets  Taken 11/18/2023 0843 by Krupa Velarde RN  Discharge to home or other facility with appropriate resources: Identify barriers to discharge with patient and caregiver  Taken 11/17/2023 2000 by
Problem: Discharge Planning  Goal: Discharge to home or other facility with appropriate resources  Recent Flowsheet Documentation  Taken 11/20/2023 0509 by Redd Burgos RN  Discharge to home or other facility with appropriate resources:   Identify barriers to discharge with patient and caregiver   Arrange for needed discharge resources and transportation as appropriate   Arrange for interpreters to assist at discharge as needed     Problem: Safety - Adult  Goal: Free from fall injury  Recent Flowsheet Documentation  Taken 11/20/2023 0509 by Redd Burgos RN  Free From Fall Injury:   Instruct family/caregiver on patient safety   Based on caregiver fall risk screen, instruct family/caregiver to ask for assistance with transferring infant if caregiver noted to have fall risk factors
Problem: Occupational Therapy - Adult  Goal: By Discharge: Performs self-care activities at highest level of function for planned discharge setting. See evaluation for individualized goals. Description: FUNCTIONAL STATUS PRIOR TO ADMISSION: Pt reports he lives with his fiance who provides assistance with all ADLs and IADLs prior to admission d/t increased pain in back. HOME SUPPORT: Patient lived with peter who provides assistance for all ADLs and IADLs. Occupational Therapy Goals:  Initiated 11/18/2023  1. Patient will perform grooming seated with Stand by Assist within 7 day(s). 2.  Patient will perform upper body dressing seated with Minimal Assist within 7 day(s). 3.  Patient will perform lower body dressing seated with Maximal Assist within 7 day(s). 4.  Patient will perform toilet transfers with Moderate Assist  within 7 day(s). 5.  Patient will perform all aspects of toileting with Maximal Assist within 7 day(s). 6.  Patient will participate in upper extremity therapeutic exercise/activities with Supervision for 5 minutes within 7 day(s). 7.  Patient will utilize energy conservation techniques during functional activities with verbal cues within 7 day(s). Outcome: Progressing   OCCUPATIONAL THERAPY TREATMENT  Patient: Roderick Mcclendon (85 y.o. male)  Date: 11/21/2023  Primary Diagnosis: Elevated troponin [R79.89]  Fluid overload [E87.70]  Hypervolemia, unspecified hypervolemia type [E87.70]       Precautions: Fall Risk                Chart, occupational therapy assessment, plan of care, and goals were reviewed. ASSESSMENT  Patient continues to benefit from skilled OT services and is progressing towards goals. Patient received semi supine in bed and attempting to get up to EOB with bed rail in place. Assisted to drop bed rail and patient then supervision to come to sitting. Patient agreeable to transfer OOB to recliner this date.  Reoriented patient to call bell and importance of calling
Problem: Occupational Therapy - Adult  Goal: By Discharge: Performs self-care activities at highest level of function for planned discharge setting. See evaluation for individualized goals. Description: FUNCTIONAL STATUS PRIOR TO ADMISSION: Pt reports he lives with his peter who provides assistance with all ADLs and IADLs prior to admission d/t increased pain in back. HOME SUPPORT: Patient lived with peter who provides assistance for all ADLs and IADLs. Occupational Therapy Goals:  Initiated 11/18/2023  1. Patient will perform grooming seated with Stand by Assist within 7 day(s). 2.  Patient will perform upper body dressing seated with Minimal Assist within 7 day(s). 3.  Patient will perform lower body dressing seated with Maximal Assist within 7 day(s). 4.  Patient will perform toilet transfers with Moderate Assist  within 7 day(s). 5.  Patient will perform all aspects of toileting with Maximal Assist within 7 day(s). 6.  Patient will participate in upper extremity therapeutic exercise/activities with Supervision for 5 minutes within 7 day(s). 7.  Patient will utilize energy conservation techniques during functional activities with verbal cues within 7 day(s). 11/21/2023 1227 by Vitor Harrell OT  Outcome: Progressing     Problem: Physical Therapy - Adult  Goal: By Discharge: Performs mobility at highest level of function for planned discharge setting. See evaluation for individualized goals. Description: FUNCTIONAL STATUS PRIOR TO ADMISSION: Pt reported he has been having difficulty walking since c-spine surgery ~1 month ago. Stated he has barely been out of bed for 2-3 weeks and using a bedpan. Stated he has had 1 fall recently. Pt on HD and reported he had skipped HD for 2-3 weeks      HOME SUPPORT PRIOR TO ADMISSION: The patient lived with peter. Physical Therapy Goals  Initiated 11/18/2023  1.   Patient will move from supine to sit and sit to supine and roll side to side in bed
Problem: Physical Therapy - Adult  Goal: By Discharge: Performs mobility at highest level of function for planned discharge setting. See evaluation for individualized goals. Description: FUNCTIONAL STATUS PRIOR TO ADMISSION: Pt reported he has been having difficulty walking since c-spine surgery ~1 month ago. Stated he has barely been out of bed for 2-3 weeks and using a bedpan. Stated he has had 1 fall recently. Pt on HD and reported he had skipped HD for 2-3 weeks      HOME SUPPORT PRIOR TO ADMISSION: The patient lived with [facility]. Physical Therapy Goals  Initiated 11/18/2023  1. Patient will move from supine to sit and sit to supine and roll side to side in bed with supervision/set-up within 7 day(s). 2.  Patient will perform sit to stand with moderate assistance within 7 day(s). 3.  Patient will transfer from bed to chair and chair to bed with moderate assistance using the least restrictive device within 7 day(s). 4.  Patient will ambulate with moderate assistance for 15 feet with the least restrictive device within 7 day(s). Outcome: Progressing   PHYSICAL THERAPY EVALUATION    Patient: Pati De La Garza (89 y.o. male)  Date: 11/18/2023  Primary Diagnosis: Elevated troponin [R79.89]  Fluid overload [E87.70]  Hypervolemia, unspecified hypervolemia type [E87.70]       Precautions: Fall Risk      Contact              ASSESSMENT :   DEFICITS/IMPAIRMENTS:   The patient is limited by decreased functional mobility, strength, activity tolerance, cognition, command following, coordination, balance, increased pain levels, non compliance s/p admission on 11/16 with SOB and fluid overload      Based on the impairments listed above pt received seated EOB and required max education on role and purpose of PT. Pt laid himself back down in sidelying position; refused OOB assessment. Pt performed rolling L and R with max A and max verbal cues. Pt reported pain at posterior neck during bed mobility.  Required
Problem: Physical Therapy - Adult  Goal: By Discharge: Performs mobility at highest level of function for planned discharge setting. See evaluation for individualized goals. Description: FUNCTIONAL STATUS PRIOR TO ADMISSION: Pt reported he has been having difficulty walking since c-spine surgery ~1 month ago. Stated he has barely been out of bed for 2-3 weeks and using a bedpan. Stated he has had 1 fall recently. Pt on HD and reported he had skipped HD for 2-3 weeks      HOME SUPPORT PRIOR TO ADMISSION: The patient lived with [facility]. Physical Therapy Goals  Initiated 11/18/2023  1. Patient will move from supine to sit and sit to supine and roll side to side in bed with supervision/set-up within 7 day(s). 2.  Patient will perform sit to stand with moderate assistance within 7 day(s). 3.  Patient will transfer from bed to chair and chair to bed with moderate assistance using the least restrictive device within 7 day(s). 4.  Patient will ambulate with moderate assistance for 15 feet with the least restrictive device within 7 day(s). Outcome: Progressing   PHYSICAL THERAPY TREATMENT    Patient: Sharon Mckeon (21 y.o. male)  Date: 11/21/2023  Diagnosis: Elevated troponin [R79.89]  Fluid overload [E87.70]  Hypervolemia, unspecified hypervolemia type [E87.70] Fluid overload      Precautions: Fall Risk                    ASSESSMENT:  Patient continues to benefit from skilled PT services and is slowly progressing towards goals. Pt tolerated session fairly well and participated in increased activity this date. Pt required up to min A x 2 sit<>stand and mod A x 2 for short gait due to generalized instability, increased trunk sway. Pt reported RLE pain during weight bearing activity. Pt is at increased risk for falls, functioning below baseline and will benefit from SNF placement upon discharge to continue therapy efforts.          PLAN:  Patient continues to benefit from skilled intervention to address the
Problem: Safety - Adult  Goal: Free from fall injury  Outcome: Progressing     Problem: Discharge Planning  Goal: Discharge to home or other facility with appropriate resources  Outcome: Progressing
Problem: Safety - Adult  Goal: Free from fall injury  Outcome: Progressing     Problem: Skin/Tissue Integrity  Goal: Absence of new skin breakdown  Description: 1. Monitor for areas of redness and/or skin breakdown  2. Assess vascular access sites hourly  3. Every 4-6 hours minimum:  Change oxygen saturation probe site  4. Every 4-6 hours:  If on nasal continuous positive airway pressure, respiratory therapy assess nares and determine need for appliance change or resting period.   11/18/2023 1258 by Ashleigh Roach RN  Outcome: Adequate for Discharge     Problem: Chronic Conditions and Co-morbidities  Goal: Patient's chronic conditions and co-morbidity symptoms are monitored and maintained or improved  11/18/2023 1258 by Ashleigh Roach RN  Outcome: Adequate for Discharge
Problem: Skin/Tissue Integrity  Goal: Absence of new skin breakdown  Description: 1. Monitor for areas of redness and/or skin breakdown  2. Assess vascular access sites hourly  3. Every 4-6 hours minimum:  Change oxygen saturation probe site  4. Every 4-6 hours:  If on nasal continuous positive airway pressure, respiratory therapy assess nares and determine need for appliance change or resting period.   Outcome: Progressing     Problem: Discharge Planning  Goal: Discharge to home or other facility with appropriate resources  Outcome: Progressing  Flowsheets (Taken 11/17/2023 2000)  Discharge to home or other facility with appropriate resources: Identify barriers to discharge with patient and caregiver     Problem: Pain  Goal: Verbalizes/displays adequate comfort level or baseline comfort level  Outcome: Progressing     Problem: Chronic Conditions and Co-morbidities  Goal: Patient's chronic conditions and co-morbidity symptoms are monitored and maintained or improved  Outcome: Progressing  Flowsheets (Taken 11/17/2023 2000)  Care Plan - Patient's Chronic Conditions and Co-Morbidity Symptoms are Monitored and Maintained or Improved: Monitor and assess patient's chronic conditions and comorbid symptoms for stability, deterioration, or improvement
comorbidities that affect functional and  no verbal  or physical assist needed to complete eval tasks   Based on the above components, the patient evaluation is determined to be of the following complexity level: Low
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
## 2023-11-22 LAB
HBV CORE AB SERPL QL IA: NEGATIVE
HBV CORE IGM SERPL QL IA: NEGATIVE

## (undated) DEVICE — INTENDED FOR TISSUE SEPARATION, AND OTHER PROCEDURES THAT REQUIRE A SHARP SURGICAL BLADE TO PUNCTURE OR CUT.: Brand: BARD-PARKER ® CARBON RIB-BACK BLADES

## (undated) DEVICE — STOCKINETTE: Brand: DEROYAL

## (undated) DEVICE — NEEDLE HYPO 25GA L1.5IN BVL ORIENTED ECLIPSE

## (undated) DEVICE — TOOL 15MH22 LEGEND 15CM 2.2MM MH: Brand: MIDAS REX ™

## (undated) DEVICE — SURGICAL PROCEDURE PACK BASIN MAJ SET CUST NO CAUT

## (undated) DEVICE — MAYFIELD® DISPOSABLE ADULT SKULL PIN (PLASTIC BASE): Brand: MAYFIELD®

## (undated) DEVICE — DRILL BIT G3606010 2.4MM

## (undated) DEVICE — BANDAGE,GAUZE,BULKEE II,4.5"X4.1YD,STRL: Brand: MEDLINE

## (undated) DEVICE — TRAY PREP DRY W/ PREM GLV 2 APPL 6 SPNG 2 UNDPD 1 OVERWRAP

## (undated) DEVICE — INTENT OT USE PROVIDES A STERILE INTERFACE BETWEEN THE OPERATING ROOM SURGICAL LAMPS (NON-STERILE) AND THE SURGEON OR STAFF WORKING IN THE STERILE FIELD.: Brand: ASPEN® ALC PLUS LIGHT HANDLE COVER

## (undated) DEVICE — ROCKER SWITCH PENCIL BLADE ELECTRODE, HOLSTER: Brand: EDGE

## (undated) DEVICE — SUTURE VCRL SZ 0 L45CM ABSRB VLT OS-6 L36.4MM 1/2 CIR REV J711T

## (undated) DEVICE — BANDAGE,GAUZE,CONFORMING,3"X75",STRL,LF: Brand: MEDLINE

## (undated) DEVICE — SPONGE GZ W4XL4IN COT 12 PLY TYP VII WVN C FLD DSGN

## (undated) DEVICE — SYR 10ML LUER LOK 1/5ML GRAD --

## (undated) DEVICE — PACK,BASIC,SIRUS,V: Brand: MEDLINE

## (undated) DEVICE — STRAP,POSITIONING,KNEE/BODY,FOAM,4X60": Brand: MEDLINE

## (undated) DEVICE — DRESSING BORDERED ADH GZ UNIV GEN USE 8INX4IN AND 6INX2IN

## (undated) DEVICE — SUTURE ETHLN SZ 4-0 L18IN NONABSORBABLE BLK L19MM PS-2 3/8 1667H

## (undated) DEVICE — PRECISION THIN (9.0 X 0.38 X 31.0MM)

## (undated) DEVICE — FLOSEAL WITH RECOTHROM - 10ML.: Brand: FLOSEAL HEMOSTATIC MATRIX

## (undated) DEVICE — SOLUTION IV 1000ML 0.9% SOD CHL

## (undated) DEVICE — SUTURE NONABSORBABLE MONOFILAMENT 2-0 FS 18 IN ETHILON 664H

## (undated) DEVICE — SOLUTION IRRIG 1000ML 0.9% SOD CHL USP POUR PLAS BTL

## (undated) DEVICE — DRAPE SHT 3 QTR PROXIMA 53X77 --

## (undated) DEVICE — COLLAR CERV AD REG CUSH FLX TAB OCCIPITAL SUPP STRP

## (undated) DEVICE — SOL IRRIGATION INJ NACL 0.9% 500ML BTL

## (undated) DEVICE — REM POLYHESIVE ADULT PATIENT RETURN ELECTRODE: Brand: VALLEYLAB

## (undated) DEVICE — SUTURE VCRL SZ 2-0 L18IN ABSRB UD L26MM CP-2 1/2 CIR REV J762D

## (undated) DEVICE — CULTURETTE SGL EVAC TUBE PALL -- 100/CA

## (undated) DEVICE — ROCKER SWITCH PENCIL HOLSTER: Brand: VALLEYLAB

## (undated) DEVICE — INFECTION CONTROL KIT SYS

## (undated) DEVICE — GLOVE ORANGE PI 7 1/2   MSG9075

## (undated) DEVICE — SUTURE VCRL SZ 2-0 L27IN ABSRB UD L26MM SH 1/2 CIR J417H

## (undated) DEVICE — BANDAGE COBAN 4 IN COMPR W4INXL5YD FOAM COHESIVE QUIK STK SELF ADH SFT

## (undated) DEVICE — DRAPE MICSCP XLN W48XL118IN 2 BRDG STEREO OBS ZEISS

## (undated) DEVICE — GLOVE SURG SZ 65 L12IN FNGR THK94MIL STD WHT LTX FREE

## (undated) DEVICE — BONE WAX WHITE: Brand: BONE WAX WHITE

## (undated) DEVICE — STERILE POLYISOPRENE POWDER-FREE SURGICAL GLOVES: Brand: PROTEXIS

## (undated) DEVICE — ELECTRODE PT RET AD L9FT HI MOIST COND ADH HYDRGEL CORDED

## (undated) DEVICE — XTW CERVICAL COLLAR: Brand: DEROYAL

## (undated) DEVICE — TUBING SUCT 12FR MAL ALUM SHFT FN CAP VENT UNIV CONN W/ OBT

## (undated) DEVICE — DRAPE,LAPAROTOMY,T,PEDI,STERILE: Brand: MEDLINE

## (undated) DEVICE — SUT ETHLN 3-0 18IN PS2 BLK --

## (undated) DEVICE — KIT,1200CC CANISTER,3/16"X6' TUBING: Brand: MEDLINE INDUSTRIES, INC.

## (undated) DEVICE — DRAPE,EXTREMITY,89X128,STERILE: Brand: MEDLINE

## (undated) DEVICE — CORD ES L12FT BPLR FRCP

## (undated) DEVICE — NEEDLE HYPO 18GA L1.5IN PNK S STL HUB POLYPR SHLD REG BVL

## (undated) DEVICE — (D)PREP SKN CHLRAPRP APPL 26ML -- CONVERT TO ITEM 371833

## (undated) DEVICE — BANDAGE,ELASTIC,ESMARK,STERILE,4"X9',LF: Brand: MEDLINE

## (undated) DEVICE — SPHERE STRL PASSIVE MARKER 60

## (undated) DEVICE — HANDLE LT SNAP ON ULT DURABLE LENS FOR TRUMPF ALC DISPOSABLE

## (undated) DEVICE — KIT EVAC 400CC DIA1/8IN H PAT 12.5IN 3 SPR RND SHP PVC DRN

## (undated) DEVICE — SURGIFOAM SPNG SZ 12-7

## (undated) DEVICE — DRSG GZ OIL EMUL CURAD 3X3 --

## (undated) DEVICE — TUBING, SUCTION, 1/4" X 10', STRAIGHT: Brand: MEDLINE

## (undated) DEVICE — SWAB CULT LIQ STUART AGR AERB MOD IN BRK SGL RAYON TIP PLAS 220099] BECTON DICKINSON MICRO]

## (undated) DEVICE — BLADE CLP TAPR HD WET DRY CAPABILITY GTT IN CHARGING USE

## (undated) DEVICE — TOWEL SURG W17XL27IN STD BLU COT NONFENESTRATED PREWASHED

## (undated) DEVICE — 1LYRTR 16FR10ML100%SIL UMS SNP: Brand: MEDLINE INDUSTRIES, INC.

## (undated) DEVICE — ANTERIOR CERVICAL-SMH: Brand: MEDLINE INDUSTRIES, INC.

## (undated) DEVICE — DRAPE,REIN 53X77,STERILE: Brand: MEDLINE